# Patient Record
Sex: MALE | Race: BLACK OR AFRICAN AMERICAN | NOT HISPANIC OR LATINO | Employment: OTHER | URBAN - METROPOLITAN AREA
[De-identification: names, ages, dates, MRNs, and addresses within clinical notes are randomized per-mention and may not be internally consistent; named-entity substitution may affect disease eponyms.]

---

## 2017-01-12 PROBLEM — I10 ESSENTIAL HYPERTENSION: Status: ACTIVE | Noted: 2017-01-12

## 2017-01-12 PROBLEM — R07.9 CHEST PAIN: Status: ACTIVE | Noted: 2017-01-12

## 2017-01-15 ENCOUNTER — HOSPITAL ENCOUNTER (INPATIENT)
Facility: HOSPITAL | Age: 67
LOS: 3 days | Discharge: NURSING, REHAB OR LONG-TERM CARE | DRG: 638 | End: 2017-01-18
Attending: HOSPITALIST | Admitting: HOSPITALIST
Payer: MEDICARE

## 2017-01-15 DIAGNOSIS — M86.172 ACUTE OSTEOMYELITIS OF LEFT FOOT: ICD-10-CM

## 2017-01-15 DIAGNOSIS — N18.5 CHRONIC KIDNEY DISEASE, STAGE 5, KIDNEY FAILURE: Chronic | ICD-10-CM

## 2017-01-15 DIAGNOSIS — I10 ESSENTIAL HYPERTENSION: Chronic | ICD-10-CM

## 2017-01-15 DIAGNOSIS — M86.9 OSTEOMYELITIS: ICD-10-CM

## 2017-01-15 DIAGNOSIS — M79.89 TOE SWELLING: ICD-10-CM

## 2017-01-15 DIAGNOSIS — E87.20 METABOLIC ACIDEMIA: ICD-10-CM

## 2017-01-15 DIAGNOSIS — E11.40 DIABETIC NEUROPATHY: ICD-10-CM

## 2017-01-15 LAB
ALBUMIN SERPL BCP-MCNC: 2.7 G/DL
ALP SERPL-CCNC: 93 U/L
ALT SERPL W/O P-5'-P-CCNC: 17 U/L
ANION GAP SERPL CALC-SCNC: 12 MMOL/L
AST SERPL-CCNC: 23 U/L
BASOPHILS # BLD AUTO: 0.03 K/UL
BASOPHILS NFR BLD: 0.2 %
BILIRUB SERPL-MCNC: 0.4 MG/DL
BUN SERPL-MCNC: 41 MG/DL
CALCIUM SERPL-MCNC: 8.7 MG/DL
CHLORIDE SERPL-SCNC: 106 MMOL/L
CO2 SERPL-SCNC: 18 MMOL/L
CREAT SERPL-MCNC: 4.9 MG/DL
CRP SERPL-MCNC: 154.8 MG/L
DIFFERENTIAL METHOD: ABNORMAL
EOSINOPHIL # BLD AUTO: 0.1 K/UL
EOSINOPHIL NFR BLD: 0.5 %
ERYTHROCYTE [DISTWIDTH] IN BLOOD BY AUTOMATED COUNT: 13.3 %
ERYTHROCYTE [SEDIMENTATION RATE] IN BLOOD BY WESTERGREN METHOD: 95 MM/HR
EST. GFR  (AFRICAN AMERICAN): 13 ML/MIN/1.73 M^2
EST. GFR  (NON AFRICAN AMERICAN): 11 ML/MIN/1.73 M^2
GLUCOSE SERPL-MCNC: 144 MG/DL
HCT VFR BLD AUTO: 33.5 %
HGB BLD-MCNC: 11.1 G/DL
LYMPHOCYTES # BLD AUTO: 1.5 K/UL
LYMPHOCYTES NFR BLD: 11.9 %
MCH RBC QN AUTO: 27.7 PG
MCHC RBC AUTO-ENTMCNC: 33.1 %
MCV RBC AUTO: 84 FL
MONOCYTES # BLD AUTO: 1.2 K/UL
MONOCYTES NFR BLD: 9.5 %
NEUTROPHILS # BLD AUTO: 9.9 K/UL
NEUTROPHILS NFR BLD: 77.9 %
PLATELET # BLD AUTO: 226 K/UL
PMV BLD AUTO: 10.4 FL
POCT GLUCOSE: 132 MG/DL (ref 70–110)
POTASSIUM SERPL-SCNC: 4.4 MMOL/L
PROT SERPL-MCNC: 7.3 G/DL
RBC # BLD AUTO: 4.01 M/UL
SODIUM SERPL-SCNC: 136 MMOL/L
WBC # BLD AUTO: 12.7 K/UL

## 2017-01-15 PROCEDURE — 85651 RBC SED RATE NONAUTOMATED: CPT

## 2017-01-15 PROCEDURE — 96365 THER/PROPH/DIAG IV INF INIT: CPT

## 2017-01-15 PROCEDURE — 11000001 HC ACUTE MED/SURG PRIVATE ROOM

## 2017-01-15 PROCEDURE — 82962 GLUCOSE BLOOD TEST: CPT

## 2017-01-15 PROCEDURE — 85025 COMPLETE CBC W/AUTO DIFF WBC: CPT

## 2017-01-15 PROCEDURE — 25000003 PHARM REV CODE 250: Performed by: EMERGENCY MEDICINE

## 2017-01-15 PROCEDURE — 25000003 PHARM REV CODE 250: Performed by: PHYSICIAN ASSISTANT

## 2017-01-15 PROCEDURE — 96366 THER/PROPH/DIAG IV INF ADDON: CPT

## 2017-01-15 PROCEDURE — 80053 COMPREHEN METABOLIC PANEL: CPT

## 2017-01-15 PROCEDURE — 63600175 PHARM REV CODE 636 W HCPCS: Performed by: EMERGENCY MEDICINE

## 2017-01-15 PROCEDURE — 99284 EMERGENCY DEPT VISIT MOD MDM: CPT | Mod: 25

## 2017-01-15 PROCEDURE — 25000003 PHARM REV CODE 250: Performed by: HOSPITALIST

## 2017-01-15 PROCEDURE — 63600175 PHARM REV CODE 636 W HCPCS: Performed by: HOSPITALIST

## 2017-01-15 PROCEDURE — 86140 C-REACTIVE PROTEIN: CPT

## 2017-01-15 RX ORDER — TAMSULOSIN HYDROCHLORIDE 0.4 MG/1
0.4 CAPSULE ORAL DAILY
Status: ON HOLD | COMMUNITY
End: 2017-02-07

## 2017-01-15 RX ORDER — TAMSULOSIN HYDROCHLORIDE 0.4 MG/1
0.4 CAPSULE ORAL DAILY
Status: DISCONTINUED | OUTPATIENT
Start: 2017-01-16 | End: 2017-01-18 | Stop reason: HOSPADM

## 2017-01-15 RX ORDER — HYDRALAZINE HYDROCHLORIDE 25 MG/1
25 TABLET, FILM COATED ORAL 3 TIMES DAILY
Status: DISCONTINUED | OUTPATIENT
Start: 2017-01-15 | End: 2017-01-17

## 2017-01-15 RX ORDER — INSULIN ASPART 100 [IU]/ML
0-5 INJECTION, SOLUTION INTRAVENOUS; SUBCUTANEOUS EVERY 6 HOURS PRN
Status: DISCONTINUED | OUTPATIENT
Start: 2017-01-15 | End: 2017-01-18 | Stop reason: HOSPADM

## 2017-01-15 RX ORDER — GLUCAGON 1 MG
1 KIT INJECTION
Status: DISCONTINUED | OUTPATIENT
Start: 2017-01-15 | End: 2017-01-18 | Stop reason: HOSPADM

## 2017-01-15 RX ORDER — SUCRALFATE 1 G/10ML
1 SUSPENSION ORAL 4 TIMES DAILY
Status: DISCONTINUED | OUTPATIENT
Start: 2017-01-16 | End: 2017-01-18 | Stop reason: HOSPADM

## 2017-01-15 RX ORDER — PANTOPRAZOLE SODIUM 40 MG/1
40 TABLET, DELAYED RELEASE ORAL DAILY
Status: DISCONTINUED | OUTPATIENT
Start: 2017-01-16 | End: 2017-01-18 | Stop reason: HOSPADM

## 2017-01-15 RX ORDER — SODIUM CHLORIDE 9 MG/ML
INJECTION, SOLUTION INTRAVENOUS CONTINUOUS
Status: DISCONTINUED | OUTPATIENT
Start: 2017-01-15 | End: 2017-01-16

## 2017-01-15 RX ORDER — DILTIAZEM HYDROCHLORIDE 180 MG/1
360 CAPSULE, COATED, EXTENDED RELEASE ORAL DAILY
Status: DISCONTINUED | OUTPATIENT
Start: 2017-01-16 | End: 2017-01-18 | Stop reason: HOSPADM

## 2017-01-15 RX ORDER — HYDROCODONE BITARTRATE AND ACETAMINOPHEN 10; 325 MG/1; MG/1
1 TABLET ORAL
Status: COMPLETED | OUTPATIENT
Start: 2017-01-15 | End: 2017-01-15

## 2017-01-15 RX ORDER — MORPHINE SULFATE 2 MG/ML
2 INJECTION, SOLUTION INTRAMUSCULAR; INTRAVENOUS EVERY 4 HOURS PRN
Status: DISCONTINUED | OUTPATIENT
Start: 2017-01-15 | End: 2017-01-18 | Stop reason: HOSPADM

## 2017-01-15 RX ORDER — ONDANSETRON 2 MG/ML
4 INJECTION INTRAMUSCULAR; INTRAVENOUS EVERY 8 HOURS PRN
Status: DISCONTINUED | OUTPATIENT
Start: 2017-01-15 | End: 2017-01-18 | Stop reason: HOSPADM

## 2017-01-15 RX ORDER — ACETAMINOPHEN 325 MG/1
650 TABLET ORAL EVERY 6 HOURS PRN
Status: DISCONTINUED | OUTPATIENT
Start: 2017-01-15 | End: 2017-01-18 | Stop reason: HOSPADM

## 2017-01-15 RX ORDER — MORPHINE SULFATE 4 MG/ML
4 INJECTION, SOLUTION INTRAMUSCULAR; INTRAVENOUS EVERY 4 HOURS PRN
Status: DISCONTINUED | OUTPATIENT
Start: 2017-01-15 | End: 2017-01-18 | Stop reason: HOSPADM

## 2017-01-15 RX ADMIN — HYDRALAZINE HYDROCHLORIDE 25 MG: 25 TABLET, FILM COATED ORAL at 10:01

## 2017-01-15 RX ADMIN — SUCRALFATE 1 G: 1 SUSPENSION ORAL at 11:01

## 2017-01-15 RX ADMIN — HYDROCODONE BITARTRATE AND ACETAMINOPHEN 1 TABLET: 10; 325 TABLET ORAL at 05:01

## 2017-01-15 RX ADMIN — SODIUM CHLORIDE: 0.9 INJECTION, SOLUTION INTRAVENOUS at 10:01

## 2017-01-15 RX ADMIN — MORPHINE SULFATE 2 MG: 2 INJECTION, SOLUTION INTRAMUSCULAR; INTRAVENOUS at 10:01

## 2017-01-15 RX ADMIN — INSULIN ASPART 1 UNITS: 100 INJECTION, SOLUTION INTRAVENOUS; SUBCUTANEOUS at 11:01

## 2017-01-15 RX ADMIN — VANCOMYCIN HYDROCHLORIDE 1500 MG: 1 INJECTION, POWDER, LYOPHILIZED, FOR SOLUTION INTRAVENOUS at 05:01

## 2017-01-15 NOTE — IP AVS SNAPSHOT
[facility]  [id number] [place] [address] Dr Corona [place] [address]           I have received a copy of my After Visit Summary and discharge instructions from [facility].    INSTRUCTIONS RECEIVED AND UNDERSTOOD BY:                     Patient/Patient Representative: ________________________________________________________________     Date/Time: ________________________________________________________________                     Instructions Given By: ________________________________________________________________     Date/Time: ________________________________________________________________

## 2017-01-15 NOTE — IP AVS SNAPSHOT
[address] Dr Corona [place] [address]           Patient Discharge Instructions     Our goal is to set you up for success. This packet includes information on your condition, medications, and your home care. It will help you to care for yourself so you don't get sicker and need to go back to the hospital.     Please ask your nurse if you have any questions.        There are many details to remember when preparing to leave the hospital. Here is what you will need to do:    1. Take your medicine. If you are prescribed medications, review your Medication List in the following pages. You may have new medications to  at the pharmacy and others that you'll need to stop taking. Review the instructions for how and when to take your medications. Talk with your doctor or nurses if you are unsure of what to do.     2. Go to your follow-up appointments. Specific follow-up information is listed in the following pages. Your may be contacted by a transition nurse or clinical provider about future appointments. Be sure we have all of the phone numbers to reach you, if needed. Please contact your provider's office if you are unable to make an appointment.     3. Watch for warning signs. Your doctor or nurse will give you detailed warning signs to watch for and when to call for assistance. These instructions may also include educational information about your condition. If you experience any of warning signs to your health, call your doctor.               ** Verify the list of medication(s) below is accurate and up to date. Carry this with you in case of emergency. If your medications have changed, please notify your healthcare provider.             Medication List      START taking these medications        Additional Info                      amlodipine 5 MG tablet   Commonly known as:  NORVASC   Quantity:  30 tablet   Refills:  11   Dose:  5 mg    Start Date:  1/19/2017   Last time  this was given:  2.5 mg on 1/18/2017  8:56 AM   Instructions:  Take 1 tablet (5 mg total) by mouth once daily.     Begin Date    AM    Noon    PM    Bedtime       docusate sodium 100 MG capsule   Commonly known as:  COLACE   Refills:  0   Dose:  100 mg    Last time this was given:  100 mg on 1/18/2017  8:56 AM   Instructions:  Take 1 capsule (100 mg total) by mouth 2 (two) times daily.     Begin Date    AM    Noon    PM    Bedtime       insulin aspart 100 unit/mL Inpn pen   Commonly known as:  NovoLOG   Refills:  0   Dose:  0-5 Units    Last time this was given:  2 Units on 1/18/2017  4:56 AM   Instructions:  Inject 0-5 Units into the skin every 6 (six) hours as needed (Hyperglycemia).     Begin Date    AM    Noon    PM    Bedtime       lactulose 20 gram/30 mL Soln   Commonly known as:  CHRONULAC   Refills:  0   Dose:  30 g    Last time this was given:  10 g on 1/18/2017 11:38 AM   Instructions:  Take 45 mLs (30 g total) by mouth every 6 (six) hours as needed.     Begin Date    AM    Noon    PM    Bedtime       levoFLOXacin 750 mg/150 mL Pgbk   Commonly known as:  LEVAQUIN IN 5 % DEXTROSE   Refills:  0   Dose:  750 mg    Instructions:  Inject 150 mLs (750 mg total) into the vein every other day.     Begin Date    AM    Noon    PM    Bedtime       VANCOMYCIN 1 G/250 ML D5W (READY TO MIX SYSTEM)   Refills:  0    Last time this was given:  1,500 mg on 1/17/2017  9:52 AM   Instructions:  Consult Pharmacy at [facility] to dose     Begin Date    AM    Noon    PM    Bedtime         CHANGE how you take these medications        Additional Info                      hydrALAZINE 50 MG tablet   Commonly known as:  APRESOLINE   Quantity:  90 tablet   Refills:  11   Dose:  50 mg   What changed:    - medication strength  - how much to take    Last time this was given:  50 mg on 1/18/2017  1:50 PM   Instructions:  Take 1 tablet (50 mg total) by mouth 3 (three) times daily.     Begin Date    AM    Noon    PM    Bedtime         CONTINUE  taking these medications        Additional Info                      aspirin 81 MG Chew   Refills:  0   Dose:  81 mg    Instructions:  Take 81 mg by mouth once daily.     Begin Date    AM    Noon    PM    Bedtime       atorvastatin 40 MG tablet   Commonly known as:  LIPITOR   Refills:  0   Dose:  40 mg   Indications:  hyperlipidemia    Instructions:  Take 40 mg by mouth once daily.     Begin Date    AM    Noon    PM    Bedtime       DEXILANT 30 mg Cpdm   Refills:  0   Dose:  50 mg   Generic drug:  dexlansoprazole    Instructions:  Take 50 mg by mouth.     Begin Date    AM    Noon    PM    Bedtime       diltiaZEM 360 MG 24 hr capsule   Commonly known as:  CARDIZEM CD   Refills:  0   Dose:  360 mg    Last time this was given:  360 mg on 1/18/2017  8:56 AM   Instructions:  Take 360 mg by mouth once daily.     Begin Date    AM    Noon    PM    Bedtime       FLOMAX 0.4 mg Cp24   Refills:  0   Dose:  0.4 mg   Generic drug:  tamsulosin    Last time this was given:  0.4 mg on 1/18/2017  8:56 AM   Instructions:  Take 0.4 mg by mouth once daily.     Begin Date    AM    Noon    PM    Bedtime       ondansetron 4 MG tablet   Commonly known as:  ZOFRAN   Quantity:  10 tablet   Refills:  0   Dose:  4 mg    Instructions:  Take 1 tablet (4 mg total) by mouth every 6 (six) hours.     Begin Date    AM    Noon    PM    Bedtime       sucralfate 100 mg/mL suspension   Commonly known as:  CARAFATE   Quantity:  1 Bottle   Refills:  0   Dose:  1 g    Last time this was given:  1 g on 1/18/2017 11:33 AM   Instructions:  Take 10 mLs (1 g total) by mouth 4 (four) times daily.     Begin Date    AM    Noon    PM    Bedtime         STOP taking these medications     HUMULIN 70/30 SUBQ       pantoprazole 20 MG tablet   Commonly known as:  PROTONIX            Where to Get Your Medications      You can get these medications from any pharmacy     You don't need a prescription for these medications     docusate sodium 100 MG capsule         Information  about where to get these medications is not yet available     ! Ask your nurse or doctor about these medications     amlodipine 5 MG tablet    hydrALAZINE 50 MG tablet    insulin aspart 100 unit/mL Inpn pen    lactulose 20 gram/30 mL Soln    levoFLOXacin 750 mg/150 mL Pgbk    VANCOMYCIN 1 G/250 ML D5W (READY TO MIX SYSTEM)                  Please bring to all follow up appointments:    1. A copy of your discharge instructions.  2. All medicines you are currently taking in their original bottles.  3. Identification and insurance card.    Please arrive 15 minutes ahead of scheduled appointment time.    Please call 24 hours in advance if you must reschedule your appointment and/or time.        Follow-up Information     Please follow up.    Why:  LTAC provider in 1 day         Please follow up.    Why:  Daily random vancomycin levels         Please follow up.    Why:  BMP twice weekly on LTAC        Discharge Instructions     Future Orders    Diet general     Questions:    Total calories:      Fat restriction, if any:      Protein restriction, if any:      Na restriction, if any:      Fluid restriction:      Additional restrictions:          Primary Diagnosis     Your primary diagnosis was:  Bone Infection      Admission Information     Date & Time Provider Department [facility]    1/15/2017  5:18 PM Brendan Blake MD [facility] -  [phone]      Care Providers     Provider Role Specialty Primary office phone    Brendan Blake MD Attending Provider Internal Medicine [phone]    Bandar Gomes DPM Consulting Physician  Podiatry [phone]    Lazara Granados MD Consulting Physician  Nephrology [phone]    Brendan Blake MD Team Attending  Internal Medicine [phone]    Eder Kay MD Consulting Physician  Infectious Diseases [phone]      Important Medicare Message          Most Recent Value    Important Message from Medicare Regarding Discharge Appeal Rights  Given to  "patient/caregiver, Explained to patient/caregiver, Signed/date by patient/caregiver yes 01/18/2017 1030      Your Vitals Were     BP Pulse Temp Resp Height Weight    118/81 (BP Location: Left arm, Patient Position: Sitting, BP Method: Automatic) 97 98.9 °F (37.2 °C) (Oral) 18 6' 6" (1.981 m) 125.6 kg (277 lb)    SpO2 BMI             98% 32.01 kg/m2         Recent Lab Values     No lab values to display.      Pending Labs     Order Current Status    Blood culture Preliminary result    Blood culture Preliminary result    CBC auto differential Preliminary result      Allergies as of 1/18/2017        Reactions    Augmentin [Amoxicillin-pot Clavulanate]     Sulfa (Sulfonamide Antibiotics)       [facility]     [facility] Nurse Care Line - 24/7 Assistance  Unless otherwise directed by your provider, please contact [facility], our nurse care line that is available for 24/7 assistance.     Registered nurses in the [facility] provide clinical advisement, health education, appointment booking, and other advisory services.  Call for this free service at [phone].        Advance Directives     An advance directive is a document which, in the event you are no longer able to make decisions for yourself, tells your healthcare team what kind of treatment you do or do not want to receive, or who you would like to make those decisions for you.  If you do not currently have an advance directive, [facility] encourages you to create one.  For more information call:  [phone]), [phone] ([phone]),  or log on to [email or web address].        Language Assistance Services     ATTENTION: Language assistance services are available, free of charge. Please call [phone].      ATENCIÓN: Si habla español, tiene a espinoza disposición servicios gratuitos de asistencia lingüística. Llame al [phone].     CHÚ Ý: N?u b?n nói Ti?ng Vi?t, có các d?ch v? h? tr? ngôn ng? mi?n phí dành cho " b?n. G?i s? [phone].        Chronic Kindey Disease Education             [facility] Sign-Up     Activating your [facility] account is as easy as 1-2-3!     1) Visit [email or web address], select Sign Up Now, enter this activation code and your date of birth, then select Next.  [email or web address]  Expires: 2/26/2017  8:09 AM      2) Create a username and password to use when you visit [facility] in the future and select a security question in case you lose your password and select Next.    3) Enter your e-mail address and click Sign Up!    Additional Information  If you have questions, please e-mail [email or web address] or call [phone] to talk to our [facility] staff. Remember, [facility] is NOT to be used for urgent needs. For medical emergencies, dial 911.          [facility] complies with applicable Federal civil rights laws and does not discriminate on the basis of race, color, national origin, age, disability, or sex.

## 2017-01-15 NOTE — ED PROVIDER NOTES
"SCRIBE #1 NOTE: I, Santo Bowen, am scribing for, and in the presence of, ANISHA Chang. I have scribed the entire note.      History      Chief Complaint   Patient presents with    Toe Pain     Pt states, "My second toe on the left foot is swollen and hurting".       Review of patient's allergies indicates:   Allergen Reactions    Augmentin [amoxicillin-pot clavulanate]     Sulfa (sulfonamide antibiotics)         HPI   HPI    1/15/2017, 5:22 PM   History obtained from the patient      History of Present Illness: Conner Perez III is a 66 y.o. male patient who presents to the Emergency Department for toe swelling which onset gradually 1 day ago. Symptoms are constant and moderate in severity. The toe swelling is located to the 2nd toe of the left foot. Pt reports having toe pain along with the swelling that radiates up the lower leg. Pt states the pain is a 7/10. Pt has decreased sensation below the ankle. No mitigating or exacerbating factors reported. No associated sx reported. Patient denies any gait problem, fever, chills, n/v/d, CP, SOB, HA,, and all other sxs at this time. No further complaints or concerns at this time.     Arrival mode: Personal vehicle    PCP: Racile Angela MD       Past Medical History:  Past Medical History   Diagnosis Date    Diabetes mellitus     HTN (hypertension)     Renal disorder      CKD 3       Past Surgical History:  History reviewed. No pertinent past surgical history.      Family History:  Family History   Problem Relation Age of Onset    Hypertension Father        Social History:  Social History     Social History Main Topics    Smoking status: Never Smoker    Smokeless tobacco: Unknown    Alcohol use No    Drug use: No    Sexual activity: Unknown       ROS   Review of Systems   Constitutional: Negative for chills and fever.   HENT: Negative for sore throat.    Respiratory: Negative for shortness of breath.    Cardiovascular: Negative for chest pain. "   Gastrointestinal: Negative for diarrhea, nausea and vomiting.   Genitourinary: Negative for dysuria.   Musculoskeletal: Positive for joint swelling (left 2nd toe). Negative for back pain and gait problem.        (+) left 2nd toe pain   Skin: Negative for rash.   Neurological: Negative for weakness and headaches.   Hematological: Does not bruise/bleed easily.       Physical Exam    Initial Vitals   BP Pulse Resp Temp SpO2   01/15/17 1643 01/15/17 1643 01/15/17 1643 01/15/17 1643 01/15/17 1643   149/75 105 20 98.1 °F (36.7 °C) 95 %      Physical Exam  Nursing Notes and Vital Signs Reviewed.  Constitutional: Patient is in no acute distress. Awake and alert. Well-developed and well-nourished.  Head: Atraumatic. Normocephalic.  Eyes: PERRL. EOM intact. Conjunctivae are not pale. No scleral icterus.  ENT: Mucous membranes are moist. Oropharynx is clear and symmetric.    Neck: Supple. Full ROM. No lymphadenopathy.  Cardiovascular: Regular rate. Regular rhythm. No murmurs, rubs, or gallops. Distal pulses are 2+ and symmetric.  Pulmonary/Chest: No respiratory distress. Clear to auscultation bilaterally. No wheezing, rales, or rhonchi.  Abdominal: Soft and non-distended.  There is no tenderness.  No rebound, guarding, or rigidity.  Good bowel sounds.    Musculoskeletal: Moves all extremities. No obvious deformities. No edema. No calf tenderness.  Left Ankle:  No obvious deformity. There is swelling to the left 2nd digit.  No pain with palpation. No bony tenderness over navicular.  No tenderness over the base of the 5th metatarsal.  No proximal fibular tenderness. He is able to bear weight.   Ankle dorsiflexion and ankle plantar flexion are intact.  Intact sensation to light touch. PT and DP pulses are 2+ bilaterally. Unable to determine capillary refill due to fungus on the toenail. No open sores. Erythema to the dorsal aspect of the left foot. No pitting edema through the lower leg and foot. Pt has decreased sensation  "below the ankle at baseline.  Skin: Warm and dry.  Neurological:  Alert, awake, and appropriate.  Normal speech.  No acute focal neurological deficits are appreciated.  Psychiatric: Normal affect. Good eye contact. Appropriate in content.    ED Course    Procedures  ED Vital Signs:  Vitals:    01/15/17 1643 01/15/17 1939 01/15/17 2121   BP: (!) 149/75 (!) 144/74 (!) 154/58   Pulse: 105 92 92   Resp: 20  18   Temp: 98.1 °F (36.7 °C) 98.2 °F (36.8 °C)    TempSrc: Oral Oral    SpO2: 95% 99% 99%   Weight: 129.3 kg (285 lb)     Height: 6' 6" (1.981 m)         Abnormal Lab Results:  Labs Reviewed   CBC W/ AUTO DIFFERENTIAL - Abnormal; Notable for the following:        Result Value    RBC 4.01 (*)     Hemoglobin 11.1 (*)     Hematocrit 33.5 (*)     Gran # 9.9 (*)     Mono # 1.2 (*)     Gran% 77.9 (*)     Lymph% 11.9 (*)     All other components within normal limits   COMPREHENSIVE METABOLIC PANEL - Abnormal; Notable for the following:     CO2 18 (*)     Glucose 144 (*)     BUN, Bld 41 (*)     Creatinine 4.9 (*)     Albumin 2.7 (*)     eGFR if  13 (*)     eGFR if non  11 (*)     All other components within normal limits   SEDIMENTATION RATE, MANUAL - Abnormal; Notable for the following:     Sed Rate 95 (*)     All other components within normal limits   C-REACTIVE PROTEIN - Abnormal; Notable for the following:     .8 (*)     All other components within normal limits   POCT GLUCOSE - Abnormal; Notable for the following:     POCT Glucose 132 (*)     All other components within normal limits   POCT GLUCOSE MONITORING CONTINUOUS        All Lab Results:  Results for orders placed or performed during the hospital encounter of 01/15/17   CBC auto differential   Result Value Ref Range    WBC 12.70 3.90 - 12.70 K/uL    RBC 4.01 (L) 4.60 - 6.20 M/uL    Hemoglobin 11.1 (L) 14.0 - 18.0 g/dL    Hematocrit 33.5 (L) 40.0 - 54.0 %    MCV 84 82 - 98 fL    MCH 27.7 27.0 - 31.0 pg    MCHC 33.1 32.0 - " 36.0 %    RDW 13.3 11.5 - 14.5 %    Platelets 226 150 - 350 K/uL    MPV 10.4 9.2 - 12.9 fL    Gran # 9.9 (H) 1.8 - 7.7 K/uL    Lymph # 1.5 1.0 - 4.8 K/uL    Mono # 1.2 (H) 0.3 - 1.0 K/uL    Eos # 0.1 0.0 - 0.5 K/uL    Baso # 0.03 0.00 - 0.20 K/uL    Gran% 77.9 (H) 38.0 - 73.0 %    Lymph% 11.9 (L) 18.0 - 48.0 %    Mono% 9.5 4.0 - 15.0 %    Eosinophil% 0.5 0.0 - 8.0 %    Basophil% 0.2 0.0 - 1.9 %    Differential Method Automated    Comprehensive metabolic panel   Result Value Ref Range    Sodium 136 136 - 145 mmol/L    Potassium 4.4 3.5 - 5.1 mmol/L    Chloride 106 95 - 110 mmol/L    CO2 18 (L) 23 - 29 mmol/L    Glucose 144 (H) 70 - 110 mg/dL    BUN, Bld 41 (H) 8 - 23 mg/dL    Creatinine 4.9 (H) 0.5 - 1.4 mg/dL    Calcium 8.7 8.7 - 10.5 mg/dL    Total Protein 7.3 6.0 - 8.4 g/dL    Albumin 2.7 (L) 3.5 - 5.2 g/dL    Total Bilirubin 0.4 0.1 - 1.0 mg/dL    Alkaline Phosphatase 93 55 - 135 U/L    AST 23 10 - 40 U/L    ALT 17 10 - 44 U/L    Anion Gap 12 8 - 16 mmol/L    eGFR if African American 13 (A) >60 mL/min/1.73 m^2    eGFR if non African American 11 (A) >60 mL/min/1.73 m^2   Sedimentation rate, manual   Result Value Ref Range    Sed Rate 95 (H) 0 - 10 mm/Hr   C-reactive protein   Result Value Ref Range    .8 (H) 0.0 - 8.2 mg/L   POCT glucose   Result Value Ref Range    POCT Glucose 132 (H) 70 - 110 mg/dL         Imaging Results:  Imaging Results         X-Ray Foot Complete Left (Final result) Result time:  01/15/17 18:20:21    Final result by Iglesia Isaac Jr., MD (01/15/17 18:20:21)    Impression:         Osteomyelitis changes involving the 2nd toe      Electronically signed by: IGLESIA ISAAC MD  Date:     01/15/17  Time:    18:20     Narrative:    EXAM:   XR FOOT COMPLETE 3 VIEW LEFT    CLINICAL HISTORY:    Other specified soft tissue disorders; E11.40 Type 2 diabetes mellitus with diabetic neuropathy, unspecified;.    COMPARISON:  None    FINDINGS:   With significant soft tissue swelling of the 2nd toe.   Erosive changes involving the distal aspect of the distal phalanx of the 2nd toe, consistent with osteomyelitis.                      The Emergency Provider reviewed the vital signs and test results, which are outlined above.    ED Discussion     9:06 PM: Discussed case with Dr. Muñoz ([facility]). Dr. Muñoz agrees with current care and management of pt and accepts admission. Dr. Muñoz will go and see the patient.    9:06 PM: Re-evaluated pt. I have discussed test results, shared treatment plan, and the need for admission with patient and family at bedside. Pt and family express understanding at this time and agree with all information. All questions answered. Pt and family have no further questions or concerns at this time. Pt is ready for admit.          ED Medication(s):  Medications   vancomycin (VANCOCIN) 1,500 mg in dextrose 5 % 250 mL IVPB (1,500 mg Intravenous Trough Due 30 minutes Before Dose 1/17/17 0430)   pantoprazole EC tablet 40 mg (not administered)   diltiaZEM 24 hr capsule 360 mg (not administered)   hydrALAZINE tablet 25 mg (not administered)   sucralfate 100 mg/mL suspension 1 g (not administered)   dextrose 50% injection 12.5 g (not administered)   glucagon (human recombinant) injection 1 mg (not administered)   insulin aspart pen 0-5 Units (not administered)   acetaminophen tablet 650 mg (not administered)   ondansetron injection 4 mg (not administered)   morphine injection 2 mg (not administered)   morphine injection 4 mg (not administered)   0.9%  NaCl infusion (not administered)   hydrocodone-acetaminophen 10-325mg per tablet 1 tablet (1 tablet Oral Given 1/15/17 1744)   vancomycin (VANCOCIN) 1,500 mg in dextrose 5 % 250 mL IVPB (0 mg Intravenous Stopped 1/15/17 1956)       New Prescriptions    No medications on file             Medical Decision Making    Medical Decision Making:   Clinical Tests:   Lab Tests: Ordered and Reviewed  Radiological Study: Reviewed and Ordered            Scribe Attestation:   Scribe #1: I performed the above scribed service and the documentation accurately describes the services I performed. I attest to the accuracy of the note.    Attending:   Physician Attestation Statement for Scribe #1: I, ANISHA Chang, personally performed the services described in this documentation, as scribed by Santo Bowen, in my presence, and it is both accurate and complete.          Clinical Impression       ICD-10-CM ICD-9-CM   1. Diabetic neuropathy E11.40 250.60     357.2   2. Toe swelling M79.89 729.81   3. Osteomyelitis M86.9 730.20       Disposition:   Disposition: Admitted  Condition: Fair         ANISHA Guy  01/15/17 2133       ANISHA Guy  01/15/17 2134

## 2017-01-16 PROBLEM — E87.20 METABOLIC ACIDEMIA: Status: ACTIVE | Noted: 2017-01-16

## 2017-01-16 LAB
POCT GLUCOSE: 107 MG/DL (ref 70–110)
POCT GLUCOSE: 135 MG/DL (ref 70–110)
POCT GLUCOSE: 195 MG/DL (ref 70–110)
POCT GLUCOSE: 206 MG/DL (ref 70–110)
POCT GLUCOSE: 270 MG/DL (ref 70–110)

## 2017-01-16 PROCEDURE — 99223 1ST HOSP IP/OBS HIGH 75: CPT | Mod: ,,, | Performed by: INTERNAL MEDICINE

## 2017-01-16 PROCEDURE — 63600175 PHARM REV CODE 636 W HCPCS: Performed by: HOSPITALIST

## 2017-01-16 PROCEDURE — 96372 THER/PROPH/DIAG INJ SC/IM: CPT

## 2017-01-16 PROCEDURE — 11000001 HC ACUTE MED/SURG PRIVATE ROOM

## 2017-01-16 PROCEDURE — 87040 BLOOD CULTURE FOR BACTERIA: CPT | Mod: 59

## 2017-01-16 PROCEDURE — 25000003 PHARM REV CODE 250: Performed by: HOSPITALIST

## 2017-01-16 RX ORDER — MOXIFLOXACIN HYDROCHLORIDE 400 MG/250ML
400 INJECTION, SOLUTION INTRAVENOUS
Status: DISCONTINUED | OUTPATIENT
Start: 2017-01-16 | End: 2017-01-18 | Stop reason: HOSPADM

## 2017-01-16 RX ADMIN — HYDRALAZINE HYDROCHLORIDE 25 MG: 25 TABLET, FILM COATED ORAL at 03:01

## 2017-01-16 RX ADMIN — DILTIAZEM HYDROCHLORIDE 360 MG: 180 CAPSULE, COATED, EXTENDED RELEASE ORAL at 09:01

## 2017-01-16 RX ADMIN — MOXIFLOXACIN HYDROCHLORIDE 400 MG: 400 INJECTION, SOLUTION INTRAVENOUS at 06:01

## 2017-01-16 RX ADMIN — HYDRALAZINE HYDROCHLORIDE 25 MG: 25 TABLET, FILM COATED ORAL at 06:01

## 2017-01-16 RX ADMIN — PANTOPRAZOLE SODIUM 40 MG: 40 TABLET, DELAYED RELEASE ORAL at 09:01

## 2017-01-16 RX ADMIN — SUCRALFATE 1 G: 1 SUSPENSION ORAL at 06:01

## 2017-01-16 RX ADMIN — TAMSULOSIN HYDROCHLORIDE 0.4 MG: 0.4 CAPSULE ORAL at 09:01

## 2017-01-16 RX ADMIN — SUCRALFATE 1 G: 1 SUSPENSION ORAL at 12:01

## 2017-01-16 RX ADMIN — HYDRALAZINE HYDROCHLORIDE 25 MG: 25 TABLET, FILM COATED ORAL at 09:01

## 2017-01-16 RX ADMIN — SODIUM CHLORIDE: 0.9 INJECTION, SOLUTION INTRAVENOUS at 06:01

## 2017-01-16 RX ADMIN — INSULIN ASPART 3 UNITS: 100 INJECTION, SOLUTION INTRAVENOUS; SUBCUTANEOUS at 06:01

## 2017-01-16 NOTE — PLAN OF CARE
CM met with patient at the bedside to assess for discharge needs.  Patient states that course of treatment is still being determined.  Patient states he may need IV antibiotics.  Patient does not have preference for IV provider or HH.  Choice form completed and placed in chart.      01/16/17 8593   Discharge Assessment   Assessment Type Discharge Planning Assessment   Confirmed/corrected address and phone number on facesheet? Yes   Assessment information obtained from? Patient   Expected Length of Stay (days) (1-2)   Communicated expected length of stay with patient/caregiver yes   Prior to hospitilization cognitive status: Alert/Oriented   Prior to hospitalization functional status: Independent   Current cognitive status: Alert/Oriented   Current Functional Status: Independent   Arrived From admitted as an inpatient;home or self-care   Lives With child(kris), adult   Able to Return to Prior Arrangements yes   Is patient able to care for self after discharge? Yes   How many people do you have in your home that can help with your care after discharge? 1   Who are your caregiver(s) and their phone number(s)? Corey Soto, daughter [phone]   Patient's perception of discharge disposition admitted as an inpatient;home health;other (comments)  (Possibly IV antibiotics)   Readmission Within The Last 30 Days no previous admission in last 30 days   Patient currently being followed by outpatient case management? No   Patient currently receives home health services? No   Does the patient currently use HME? No   Patient currently receives private duty nursing? No   Patient currently receives any other outside agency services? No   Equipment Currently Used at Home none   Do you have any problems affording any of your prescribed medications? No   Is the patient taking medications as prescribed? yes   Do you have any financial concerns preventing you from receiving the healthcare you need? No   Does the patient have  transportation to healthcare appointments? Yes   Transportation Available car   On Dialysis? No   Does the patient receive services at the [facility]? No   Are there any open cases? No   Discharge Plan A Home Health   Discharge Plan B Home Health   Patient/Family In Agreement With Plan yes

## 2017-01-16 NOTE — NURSING
Patient assessed for diabetes educational needs following chart review  He reports was diagnosed over 10 years ago  Has a home glucose meter and checks 2 times daily with  a verages 160-170  He is consistent with taking his insulin using the insulin pen  He reports correct insulin storage and site selection/rotation  Reviewed info on target glucose values/hyper/hypoglycemia/diabetic foot care  He does not identify any diabetes educational needs at this time

## 2017-01-16 NOTE — SUBJECTIVE & OBJECTIVE
Interval History: no major events.     Review of Systems   Constitutional: Negative for activity change and fatigue.   HENT: Negative for congestion and hearing loss.    Cardiovascular: Negative for chest pain and leg swelling.   Musculoskeletal:        Lt 2nd toe swelling      Objective:     Vital Signs (Most Recent):  Temp: 98.5 °F (36.9 °C) (01/16/17 1537)  Pulse: 91 (01/16/17 1537)  Resp: 18 (01/16/17 1537)  BP: (!) 153/71 (01/16/17 1537)  SpO2: 95 % (01/16/17 1537) Vital Signs (24h Range):  Temp:  [98 °F (36.7 °C)-98.7 °F (37.1 °C)] 98.5 °F (36.9 °C)  Pulse:  [] 91  Resp:  [18-20] 18  SpO2:  [93 %-99 %] 95 %  BP: (126-160)/(58-85) 153/71     Weight: 125.6 kg (277 lb)  Body mass index is 32.01 kg/(m^2).    Intake/Output Summary (Last 24 hours) at 01/16/17 1609  Last data filed at 01/16/17 1500   Gross per 24 hour   Intake          1180.42 ml   Output             2075 ml   Net          -894.58 ml      Physical Exam   Constitutional: He appears well-developed and well-nourished.   HENT:   Head: Normocephalic and atraumatic.   Eyes: Conjunctivae are normal. Pupils are equal, round, and reactive to light. No scleral icterus.   Neck: Normal range of motion. Neck supple. No JVD present.   Cardiovascular: Normal rate and regular rhythm.    Pulmonary/Chest: Effort normal and breath sounds normal.   Abdominal: Soft. Bowel sounds are normal. He exhibits no distension. There is no tenderness.   Genitourinary:   Genitourinary Comments: Deferred    Musculoskeletal: Normal range of motion. He exhibits edema.   Left 2nd toe swollen   Skin: Skin is warm and dry. There is erythema.   Erythema left 2nd toe   Psychiatric: He has a normal mood and affect. His behavior is normal.       Significant Labs:   BMP:   Recent Labs  Lab 01/15/17  1743   *      K 4.4      CO2 18*   BUN 41*   CREATININE 4.9*   CALCIUM 8.7     CBC:   Recent Labs  Lab 01/14/17 2047 01/15/17  1743   WBC 13.72* 12.70   HGB 10.9* 11.1*    HCT 32.6* 33.5*    226       Significant Imaging: I have reviewed and interpreted all pertinent imaging results/findings within the past 24 hours.

## 2017-01-16 NOTE — PLAN OF CARE
Problem: Patient Care Overview  Goal: Plan of Care Review  Outcome: Ongoing (interventions implemented as appropriate)  Patient AAO and VSS. Remains free of injury. Fall precautions maintained with bed low and wheels locked.  IVF administered as ordered. Pain adequately managed with prn meds. Glucose of 206 covered with 1 unit insulin aspart per sliding scale. Chart review for 24 hours completed. Will continue to monitor till discharge.

## 2017-01-16 NOTE — ED NOTES
Dr. Muñoz, hospitals medicine, discussed admission with pt. Educated pt on POC. Questions answered. Pt verbalizes understanding. No further complaints or concerns. Pt ready for transport.

## 2017-01-16 NOTE — CONSULTS
Clinical Pharmacy: Vancomycin Consult Note    Pharmacy consulted to dose vancomycin by Dr. Germain in ER    IBW = 91.4 kg  ABW = 129.3 kg  Adjusted weight = 106.6 kg --> use for dosing  SCr = 4.9 --> will PULSE DOSE    Gave pt 1500mg (15 mg/kg x adj weight) in ER  1500mg q48h ordered as placeholder dose   Random level due 1/17 @ 0430 with AM labs    WBC = 12.70  Tmax = 98.1  No cultures drawn  ER admitting complaint is toe pain  Goal trough: 10-15 mcg/ml     Thank you for allowing us to participate in this patient's care.  Katherine McArdle, Pharm.D. 1/15/2017 7:29 PM

## 2017-01-16 NOTE — SUBJECTIVE & OBJECTIVE
Past Medical History   Diagnosis Date    Diabetes mellitus     HTN (hypertension)     Renal disorder      CKD 3       History reviewed. No pertinent past surgical history.    Review of patient's allergies indicates:   Allergen Reactions    Augmentin [amoxicillin-pot clavulanate]     Sulfa (sulfonamide antibiotics)        Current Facility-Administered Medications on File Prior to Encounter   Medication    [COMPLETED] ondansetron injection 4 mg     Current Outpatient Prescriptions on File Prior to Encounter   Medication Sig    aspirin 81 MG Chew Take 81 mg by mouth once daily.    dexlansoprazole (DEXILANT) 30 mg CpDM Take 50 mg by mouth.    diltiaZEM (CARDIZEM CD) 360 MG 24 hr capsule Take 360 mg by mouth once daily.    hydrALAZINE (APRESOLINE) 25 MG tablet Take 25 mg by mouth 3 (three) times daily.    INSULIN NPH HUM/REG INSULIN HM (HUMULIN 70/30 SUBQ) Inject 30 Units into the skin 2 (two) times daily.    ondansetron (ZOFRAN) 4 MG tablet Take 1 tablet (4 mg total) by mouth every 6 (six) hours.    pantoprazole (PROTONIX) 20 MG tablet Take 1 tablet (20 mg total) by mouth once daily.    sucralfate (CARAFATE) 100 mg/mL suspension Take 10 mLs (1 g total) by mouth 4 (four) times daily.     Family History     None        Social History Main Topics    Smoking status: Never Smoker    Smokeless tobacco: Not on file    Alcohol use No    Drug use: No    Sexual activity: Not on file     Review of Systems   Complete 14 point review of systems done and negative except per HPI  Objective:     Vital Signs (Most Recent):  Temp: 98.2 °F (36.8 °C) (01/15/17 1939)  Pulse: 92 (01/15/17 1939)  Resp: 20 (01/15/17 1643)  BP: (!) 144/74 (01/15/17 1939)  SpO2: 99 % (01/15/17 1939) Vital Signs (24h Range):  Temp:  [98.1 °F (36.7 °C)-98.2 °F (36.8 °C)] 98.2 °F (36.8 °C)  Pulse:  [] 92  Resp:  [19-22] 20  SpO2:  [95 %-99 %] 99 %  BP: (144-169)/(74-81) 144/74     Weight: 129.3 kg (285 lb)  Body mass index is 32.94  kg/(m^2).    Physical Exam   Constitutional: He appears well-developed and well-nourished.   HENT:   Head: Normocephalic and atraumatic.   Eyes: Conjunctivae are normal. Pupils are equal, round, and reactive to light. No scleral icterus.   Neck: Normal range of motion. Neck supple. No JVD present.   Cardiovascular: Normal rate and regular rhythm.    Pulmonary/Chest: Effort normal and breath sounds normal.   Abdominal: Soft. Bowel sounds are normal. He exhibits no distension. There is no tenderness.   Genitourinary:   Genitourinary Comments: Deferred    Musculoskeletal: Normal range of motion. He exhibits edema.   Left 2nd toe swollen   Skin: Skin is warm and dry. There is erythema.   Erythema left 2nd toe   Psychiatric: He has a normal mood and affect. His behavior is normal.        Significant Labs:   CBC:   Recent Labs  Lab 01/14/17  2047 01/15/17  1743   WBC 13.72* 12.70   HGB 10.9* 11.1*   HCT 32.6* 33.5*    226     CMP:   Recent Labs  Lab 01/14/17  2047 01/15/17  1743    136   K 4.8 4.4    106   CO2 16* 18*   * 144*   BUN 41* 41*   CREATININE 4.9* 4.9*   CALCIUM 8.6* 8.7   PROT 7.7 7.3   ALBUMIN 3.0* 2.7*   BILITOT 0.4 0.4   ALKPHOS 90 93   AST 23 23   ALT 18 17   ANIONGAP 13 12   EGFRNONAA 11* 11*     ESR 95  .8        Significant Imaging: Left Foot Xray - Osteomyelitis changes involving the 2nd toe

## 2017-01-16 NOTE — CONSULTS
Nephrology consult note:  Date of consult: 1/16/17  Reason for consult: chronic kidney disease  Referring physician: 1/16/17    Conner Perez III is a 66 y.o. male for whom nephrology consult has been requested to evaluate and give opinion.     HPI: Pt was seen and examined. Chart and h/o were reviewed. Pt is a 67 y/o man with a h/o of CKD stage 5, HTN, DM since 1992, who presented with left second toe swelling x 1 wks. Pt does not recall if he had bumped the foot against any objects. Imaging studies suggest he has osteomyelitis in that toe. ESRD and CPR are both very high. Review of labs show that his Cr about a year ago was close to 2.5. Cr in the past few months has been steady close to 5.0. Pt follows with Dr. Rhodes from [facility] who has talked to the pt about long term dialysis. Pt has an appt wit Dr. Rhodes in about a wk. He has no acute c/o's today, no CP, no SOB, no fever. He says that his affected foot is not hurting.    PAST MEDICAL HISTORY:  He  has a past medical history of Diabetes mellitus; HTN (hypertension); and CKD stage 5..    PAST SURGICAL HISTORY:  He  has no past surgical history on file.    SOCIAL HISTORY:  He  reports that he has never smoked. He does not have any smokeless tobacco history on file. He reports that he does not drink alcohol or use illicit drugs.    FAMILY MEDICAL HISTORY:  His family history includes Hypertension in his father.    Review of patient's allergies indicates:   Allergen Reactions    Augmentin [amoxicillin-pot clavulanate]     Sulfa (sulfonamide antibiotics)         diltiaZEM  360 mg Oral Daily    hydrALAZINE  25 mg Oral TID    pantoprazole  40 mg Oral Daily    sucralfate  1 g Oral QID    tamsulosin  0.4 mg Oral Daily    [START ON 1/17/2017] vancomycin (VANCOCIN) IVPB  1,500 mg Intravenous Q48H       Prior to Admission medications    Medication Sig Start Date End Date Taking? Authorizing Provider   aspirin 81 MG Chew Take 81 mg by mouth once daily.    "Yes Historical Provider, MD   diltiaZEM (CARDIZEM CD) 360 MG 24 hr capsule Take 360 mg by mouth once daily.   Yes Historical Provider, MD   hydrALAZINE (APRESOLINE) 25 MG tablet Take 25 mg by mouth 3 (three) times daily.   Yes Historical Provider, MD   INSULIN NPH HUM/REG INSULIN HM (HUMULIN 70/30 SUBQ) Inject 30 Units into the skin 2 (two) times daily.   Yes Historical Provider, MD   ondansetron (ZOFRAN) 4 MG tablet Take 1 tablet (4 mg total) by mouth every 6 (six) hours. 1/14/17  Yes Alcon Henderson MD   sucralfate (CARAFATE) 100 mg/mL suspension Take 10 mLs (1 g total) by mouth 4 (four) times daily. 1/12/17  Yes Loyd Germain MD   tamsulosin (FLOMAX) 0.4 mg Cp24 Take 0.4 mg by mouth once daily.   Yes Historical Provider, MD   dexlansoprazole (DEXILANT) 30 mg CpDM Take 50 mg by mouth.    Historical Provider, MD   pantoprazole (PROTONIX) 20 MG tablet Take 1 tablet (20 mg total) by mouth once daily. 1/12/17 1/12/18  Loyd Germain MD        REVIEW OF SYSTEMS:  Patient has no fever, fatigue, visual changes, chest pain, edema, cough, dyspnea, nausea, vomiting, constipation, diarrhea, arthralgias, pruritis, dizziness, weakness, depression, confusion.      Intake/Output Summary (Last 24 hours) at 01/16/17 7864  Last data filed at 01/16/17 0245   Gross per 24 hour   Intake                0 ml   Output              600 ml   Net             -600 ml       PHYSICAL EXAM:   height is 6' 6" (1.981 m) and weight is 125.6 kg (277 lb). His oral temperature is 98.7 °F (37.1 °C). His blood pressure is 126/69 and his pulse is 95. His respiration is 18 and oxygen saturation is 95%.   Gen: WDWN male in no apparent distress  Psych: Normal mood and affect  Skin: No rashes or ulcers  Eyes: Normal conjunctiva and lids, PERRLA  ENT: Normal hearing   Neck: No JVD  Chest: Clear with no rales, rhonchi, wheezing with normal effort  CV: Regular with no murmurs, gallops or rubs  Abd: Soft, nontender, no distension, positive bowel " sounds  Ext: No cyanosis, clubbing or edema, L foot exam shows a large, edematous 2nd toe      Recent Labs  Lab 01/12/17  0300 01/14/17  2047 01/15/17  1743    136 136   K 4.1 4.8 4.4    107 106   CO2 20* 16* 18*   BUN 39* 41* 41*   CREATININE 5.0* 4.9* 4.9*   CALCIUM 8.2* 8.6* 8.7       Recent Labs  Lab 01/12/17  0300 01/14/17  2047 01/15/17  1743   WBC 9.71 13.72* 12.70   HGB 10.9* 10.9* 11.1*   HCT 32.4* 32.6* 33.5*    254 226     ESRD 95      Foot Xray:  With significant soft tissue swelling of the 2nd toe.  Erosive changes involving the distal aspect of the distal phalanx of the 2nd toe, consistent with osteomyelitis.      IMPRESSION AND RECOMMENDATIONS:  65 y/o man with advanced CKD stage 5 was admitted for toe osteomyelitis    1. Renal: CKD stage 5.   Renal function appears stable, not worsened compared to baseline on this admit.  Comparing labs, s C r is worse from a year ago.  Pt has his own nephrologist, whom he sees as outpt (Dr. Rhodes)  K normal  Metabolic acidosis    2. HTN: BP controlled.  Meds reviewed    3. ID: elevated ESR and CRP.  Foot xray findings c/w osteomyelitis.  Abx reviewed: On vancomycin. Pharmacy is dosing it.  Recommend maintain a therapeutic window of 15-20.  Also, please consider adding an abx for Gram negative coverage, will defer to PCP.    4. DM: long standing. Since 1992.  Likely has diabetic nephropathy and vasculopathy.    Plans and recommendations:  As discussed above  Thank you for the consult.  Total time spent 70 minutes including time needed to review the records, the   patient evaluation, documentation, face-to-face discussion with the patient,   more than 50% of the time was spent on coordination of care and counseling.    Level V visit.    Lazara Granados MD

## 2017-01-16 NOTE — PROGRESS NOTES
[facility] Hospital Medicine  Progress Note    Patient Name: Conner Perez III  MRN: [id number]  Patient Class: IP- Inpatient   Admission Date: 1/15/2017  Length of Stay: 1 days  Attending Physician: Brendan Blake, *  Primary Care Provider: Raciel Angela MD        Subjective:     Principal Problem:Acute osteomyelitis of left foot    HPI:  66 year old male with h/o CKD stage 5 and IDDM presenrts complaining of left 2nd toe swelling and pain since yesterday.  Denies any fever, chills or sweats.  Denies any cuts or insect bites.  Followed by Dr. Rhodes for CKD.    Hospital Course:  - Has no complaints, been evaluated by nephrology, patient on IV Abx, waiting for podiatry input.     Interval History: no major events.     Review of Systems   Constitutional: Negative for activity change and fatigue.   HENT: Negative for congestion and hearing loss.    Cardiovascular: Negative for chest pain and leg swelling.   Musculoskeletal:        Lt 2nd toe swelling      Objective:     Vital Signs (Most Recent):  Temp: 98.5 °F (36.9 °C) (01/16/17 1537)  Pulse: 91 (01/16/17 1537)  Resp: 18 (01/16/17 1537)  BP: (!) 153/71 (01/16/17 1537)  SpO2: 95 % (01/16/17 1537) Vital Signs (24h Range):  Temp:  [98 °F (36.7 °C)-98.7 °F (37.1 °C)] 98.5 °F (36.9 °C)  Pulse:  [] 91  Resp:  [18-20] 18  SpO2:  [93 %-99 %] 95 %  BP: (126-160)/(58-85) 153/71     Weight: 125.6 kg (277 lb)  Body mass index is 32.01 kg/(m^2).    Intake/Output Summary (Last 24 hours) at 01/16/17 1609  Last data filed at 01/16/17 1500   Gross per 24 hour   Intake          1180.42 ml   Output             2075 ml   Net          -894.58 ml      Physical Exam   Constitutional: He appears well-developed and well-nourished.   HENT:   Head: Normocephalic and atraumatic.   Eyes: Conjunctivae are normal. Pupils are equal, round, and reactive to light. No scleral icterus.   Neck: Normal range of motion. Neck supple. No JVD present.   Cardiovascular: Normal  rate and regular rhythm.    Pulmonary/Chest: Effort normal and breath sounds normal.   Abdominal: Soft. Bowel sounds are normal. He exhibits no distension. There is no tenderness.   Genitourinary:   Genitourinary Comments: Deferred    Musculoskeletal: Normal range of motion. He exhibits edema.   Left 2nd toe swollen   Skin: Skin is warm and dry. There is erythema.   Erythema left 2nd toe   Psychiatric: He has a normal mood and affect. His behavior is normal.       Significant Labs:   BMP:   Recent Labs  Lab 01/15/17  1743   *      K 4.4      CO2 18*   BUN 41*   CREATININE 4.9*   CALCIUM 8.7     CBC:   Recent Labs  Lab 01/14/17  2047 01/15/17  1743   WBC 13.72* 12.70   HGB 10.9* 11.1*   HCT 32.6* 33.5*    226       Significant Imaging: I have reviewed and interpreted all pertinent imaging results/findings within the past 24 hours.    Assessment/Plan:      * Acute osteomyelitis of left foot  On IV Vancomycin with Hx of DM will broaden the coverage with IV avelox.     Essential hypertension  Resumed home meds; hydralazine prn    Plan: continue with the current medications, monitor and adjust as needed    Chronic kidney disease, stage 5, kidney failure  Stable, appreciate nephrology input    Osteomyelitis        Metabolic acidemia        VTE Risk Mitigation         Ordered     Place sequential compression device  Until discontinued      01/15/17 2100          Brendan Blake MD  Department of Hospital Medicine   [facility]

## 2017-01-16 NOTE — H&P
[facility] Hospital Medicine  History & Physical    Patient Name: Conner Perez III  MRN: [id number]  Admission Date: 1/15/2017  Attending Physician: Margie Muñoz MD   Primary Care Provider: Raciel Angela MD         Patient information was obtained from patient and ER records.     Subjective:     Principal Problem:Acute osteomyelitis of left foot    Chief Complaint:  Toe pain     HPI: 66 year old male with h/o CKD stage 5 and IDDM presenrts complaining of left 2nd toe swelling and pain since yesterday.  Denies any fever, chills or sweats.  Denies any cuts or insect bites.  Followed by Dr. Rhodes for CKD.    Past Medical History   Diagnosis Date    Diabetes mellitus     HTN (hypertension)     Renal disorder      CKD 3       History reviewed. No pertinent past surgical history.    Review of patient's allergies indicates:   Allergen Reactions    Augmentin [amoxicillin-pot clavulanate]     Sulfa (sulfonamide antibiotics)        Current Facility-Administered Medications on File Prior to Encounter   Medication    [COMPLETED] ondansetron injection 4 mg     Current Outpatient Prescriptions on File Prior to Encounter   Medication Sig    aspirin 81 MG Chew Take 81 mg by mouth once daily.    dexlansoprazole (DEXILANT) 30 mg CpDM Take 50 mg by mouth.    diltiaZEM (CARDIZEM CD) 360 MG 24 hr capsule Take 360 mg by mouth once daily.    hydrALAZINE (APRESOLINE) 25 MG tablet Take 25 mg by mouth 3 (three) times daily.    INSULIN NPH HUM/REG INSULIN HM (HUMULIN 70/30 SUBQ) Inject 30 Units into the skin 2 (two) times daily.    ondansetron (ZOFRAN) 4 MG tablet Take 1 tablet (4 mg total) by mouth every 6 (six) hours.    pantoprazole (PROTONIX) 20 MG tablet Take 1 tablet (20 mg total) by mouth once daily.    sucralfate (CARAFATE) 100 mg/mL suspension Take 10 mLs (1 g total) by mouth 4 (four) times daily.     Family History     None        Social History Main Topics    Smoking status: Never Smoker     Smokeless tobacco: Not on file    Alcohol use No    Drug use: No    Sexual activity: Not on file     Review of Systems   Complete 14 point review of systems done and negative except per HPI  Objective:     Vital Signs (Most Recent):  Temp: 98.2 °F (36.8 °C) (01/15/17 1939)  Pulse: 92 (01/15/17 1939)  Resp: 20 (01/15/17 1643)  BP: (!) 144/74 (01/15/17 1939)  SpO2: 99 % (01/15/17 1939) Vital Signs (24h Range):  Temp:  [98.1 °F (36.7 °C)-98.2 °F (36.8 °C)] 98.2 °F (36.8 °C)  Pulse:  [] 92  Resp:  [19-22] 20  SpO2:  [95 %-99 %] 99 %  BP: (144-169)/(74-81) 144/74     Weight: 129.3 kg (285 lb)  Body mass index is 32.94 kg/(m^2).    Physical Exam   Constitutional: He appears well-developed and well-nourished.   HENT:   Head: Normocephalic and atraumatic.   Eyes: Conjunctivae are normal. Pupils are equal, round, and reactive to light. No scleral icterus.   Neck: Normal range of motion. Neck supple. No JVD present.   Cardiovascular: Normal rate and regular rhythm.    Pulmonary/Chest: Effort normal and breath sounds normal.   Abdominal: Soft. Bowel sounds are normal. He exhibits no distension. There is no tenderness.   Genitourinary:   Genitourinary Comments: Deferred    Musculoskeletal: Normal range of motion. He exhibits edema.   Left 2nd toe swollen   Skin: Skin is warm and dry. There is erythema.   Erythema left 2nd toe   Psychiatric: He has a normal mood and affect. His behavior is normal.        Significant Labs:   CBC:   Recent Labs  Lab 01/14/17  2047 01/15/17  1743   WBC 13.72* 12.70   HGB 10.9* 11.1*   HCT 32.6* 33.5*    226     CMP:   Recent Labs  Lab 01/14/17  2047 01/15/17  1743    136   K 4.8 4.4    106   CO2 16* 18*   * 144*   BUN 41* 41*   CREATININE 4.9* 4.9*   CALCIUM 8.6* 8.7   PROT 7.7 7.3   ALBUMIN 3.0* 2.7*   BILITOT 0.4 0.4   ALKPHOS 90 93   AST 23 23   ALT 18 17   ANIONGAP 13 12   EGFRNONAA 11* 11*     ESR 95  .8        Significant Imaging: Left Foot Xray -  Osteomyelitis changes involving the 2nd toe    Assessment/Plan:     * Acute osteomyelitis of left foot  Vancomycin; consult podiatry in AM; NPO after midnight       Chronic kidney disease, stage 5, kidney failure  Consult nephrology in AM      Essential hypertension  Resume home meds; hydralazine prn      VTE Risk Mitigation         Ordered     Place sequential compression device  Until discontinued      01/15/17 2100      No Lovenox as patient will probably need surgery in AM  Margie Muñoz MD  Department of Hospital Medicine   [facility]

## 2017-01-16 NOTE — ASSESSMENT & PLAN NOTE
Resumed home meds; hydralazine prn    Plan: continue with the current medications, monitor and adjust as needed

## 2017-01-17 LAB
ANION GAP SERPL CALC-SCNC: 11 MMOL/L
BASOPHILS # BLD AUTO: 0.05 K/UL
BASOPHILS NFR BLD: 0.4 %
BUN SERPL-MCNC: 39 MG/DL
CALCIUM SERPL-MCNC: 8.2 MG/DL
CHLORIDE SERPL-SCNC: 107 MMOL/L
CO2 SERPL-SCNC: 17 MMOL/L
CREAT SERPL-MCNC: 4.6 MG/DL
DIFFERENTIAL METHOD: ABNORMAL
EOSINOPHIL # BLD AUTO: 0.4 K/UL
EOSINOPHIL NFR BLD: 3.6 %
ERYTHROCYTE [DISTWIDTH] IN BLOOD BY AUTOMATED COUNT: 13.1 %
EST. GFR  (AFRICAN AMERICAN): 14 ML/MIN/1.73 M^2
EST. GFR  (NON AFRICAN AMERICAN): 12 ML/MIN/1.73 M^2
GLUCOSE SERPL-MCNC: 204 MG/DL
HCT VFR BLD AUTO: 29.9 %
HGB BLD-MCNC: 9.9 G/DL
LYMPHOCYTES # BLD AUTO: 2 K/UL
LYMPHOCYTES NFR BLD: 16.5 %
MCH RBC QN AUTO: 27.7 PG
MCHC RBC AUTO-ENTMCNC: 33.1 %
MCV RBC AUTO: 84 FL
MONOCYTES # BLD AUTO: 1.3 K/UL
MONOCYTES NFR BLD: 10.5 %
NEUTROPHILS # BLD AUTO: 8.3 K/UL
NEUTROPHILS NFR BLD: 69 %
PLATELET # BLD AUTO: 246 K/UL
PMV BLD AUTO: 10.8 FL
POCT GLUCOSE: 147 MG/DL (ref 70–110)
POCT GLUCOSE: 232 MG/DL (ref 70–110)
POCT GLUCOSE: 256 MG/DL (ref 70–110)
POTASSIUM SERPL-SCNC: 4.4 MMOL/L
RBC # BLD AUTO: 3.58 M/UL
SODIUM SERPL-SCNC: 135 MMOL/L
VANCOMYCIN SERPL-MCNC: 8.8 UG/ML
WBC # BLD AUTO: 12.05 K/UL

## 2017-01-17 PROCEDURE — 25000003 PHARM REV CODE 250: Performed by: EMERGENCY MEDICINE

## 2017-01-17 PROCEDURE — 63600175 PHARM REV CODE 636 W HCPCS: Performed by: HOSPITALIST

## 2017-01-17 PROCEDURE — B548ZZA ULTRASONOGRAPHY OF SUPERIOR VENA CAVA, GUIDANCE: ICD-10-PCS | Performed by: RADIOLOGY

## 2017-01-17 PROCEDURE — 80202 ASSAY OF VANCOMYCIN: CPT

## 2017-01-17 PROCEDURE — 25000003 PHARM REV CODE 250: Performed by: INTERNAL MEDICINE

## 2017-01-17 PROCEDURE — 80048 BASIC METABOLIC PNL TOTAL CA: CPT

## 2017-01-17 PROCEDURE — 85025 COMPLETE CBC W/AUTO DIFF WBC: CPT

## 2017-01-17 PROCEDURE — 36415 COLL VENOUS BLD VENIPUNCTURE: CPT

## 2017-01-17 PROCEDURE — 02HV33Z INSERTION OF INFUSION DEVICE INTO SUPERIOR VENA CAVA, PERCUTANEOUS APPROACH: ICD-10-PCS | Performed by: RADIOLOGY

## 2017-01-17 PROCEDURE — 99233 SBSQ HOSP IP/OBS HIGH 50: CPT | Mod: ,,, | Performed by: INTERNAL MEDICINE

## 2017-01-17 PROCEDURE — 63600175 PHARM REV CODE 636 W HCPCS: Performed by: EMERGENCY MEDICINE

## 2017-01-17 PROCEDURE — 11000001 HC ACUTE MED/SURG PRIVATE ROOM

## 2017-01-17 PROCEDURE — 25000003 PHARM REV CODE 250: Performed by: HOSPITALIST

## 2017-01-17 PROCEDURE — 25000003 PHARM REV CODE 250: Performed by: NURSE PRACTITIONER

## 2017-01-17 PROCEDURE — 96372 THER/PROPH/DIAG INJ SC/IM: CPT

## 2017-01-17 RX ORDER — RAMELTEON 8 MG/1
8 TABLET ORAL NIGHTLY PRN
Status: DISCONTINUED | OUTPATIENT
Start: 2017-01-17 | End: 2017-01-18 | Stop reason: HOSPADM

## 2017-01-17 RX ORDER — ENOXAPARIN SODIUM 100 MG/ML
30 INJECTION SUBCUTANEOUS EVERY 24 HOURS
Status: DISCONTINUED | OUTPATIENT
Start: 2017-01-17 | End: 2017-01-18 | Stop reason: HOSPADM

## 2017-01-17 RX ORDER — POLYETHYLENE GLYCOL 3350 17 G/17G
17 POWDER, FOR SOLUTION ORAL DAILY
Status: DISCONTINUED | OUTPATIENT
Start: 2017-01-18 | End: 2017-01-18 | Stop reason: HOSPADM

## 2017-01-17 RX ORDER — DOCUSATE SODIUM 100 MG/1
100 CAPSULE, LIQUID FILLED ORAL DAILY
Status: DISCONTINUED | OUTPATIENT
Start: 2017-01-17 | End: 2017-01-17

## 2017-01-17 RX ORDER — DOCUSATE SODIUM 100 MG/1
100 CAPSULE, LIQUID FILLED ORAL 2 TIMES DAILY
Status: DISCONTINUED | OUTPATIENT
Start: 2017-01-17 | End: 2017-01-18 | Stop reason: HOSPADM

## 2017-01-17 RX ORDER — HYDRALAZINE HYDROCHLORIDE 50 MG/1
50 TABLET, FILM COATED ORAL 3 TIMES DAILY
Status: DISCONTINUED | OUTPATIENT
Start: 2017-01-17 | End: 2017-01-18 | Stop reason: HOSPADM

## 2017-01-17 RX ORDER — AMLODIPINE BESYLATE 2.5 MG/1
2.5 TABLET ORAL DAILY
Status: DISCONTINUED | OUTPATIENT
Start: 2017-01-17 | End: 2017-01-18

## 2017-01-17 RX ADMIN — RAMELTEON 8 MG: 8 TABLET, FILM COATED ORAL at 01:01

## 2017-01-17 RX ADMIN — SUCRALFATE 1 G: 1 SUSPENSION ORAL at 05:01

## 2017-01-17 RX ADMIN — HYDRALAZINE HYDROCHLORIDE 25 MG: 25 TABLET, FILM COATED ORAL at 05:01

## 2017-01-17 RX ADMIN — INSULIN ASPART 1 UNITS: 100 INJECTION, SOLUTION INTRAVENOUS; SUBCUTANEOUS at 11:01

## 2017-01-17 RX ADMIN — TAMSULOSIN HYDROCHLORIDE 0.4 MG: 0.4 CAPSULE ORAL at 09:01

## 2017-01-17 RX ADMIN — VANCOMYCIN HYDROCHLORIDE 1500 MG: 100 INJECTION, POWDER, LYOPHILIZED, FOR SOLUTION INTRAVENOUS at 09:01

## 2017-01-17 RX ADMIN — INSULIN ASPART 2 UNITS: 100 INJECTION, SOLUTION INTRAVENOUS; SUBCUTANEOUS at 12:01

## 2017-01-17 RX ADMIN — HYDRALAZINE HYDROCHLORIDE 50 MG: 50 TABLET, FILM COATED ORAL at 03:01

## 2017-01-17 RX ADMIN — DOCUSATE SODIUM 100 MG: 100 CAPSULE, LIQUID FILLED ORAL at 09:01

## 2017-01-17 RX ADMIN — SUCRALFATE 1 G: 1 SUSPENSION ORAL at 11:01

## 2017-01-17 RX ADMIN — ENOXAPARIN SODIUM 30 MG: 100 INJECTION SUBCUTANEOUS at 12:01

## 2017-01-17 RX ADMIN — MOXIFLOXACIN HYDROCHLORIDE 400 MG: 400 INJECTION, SOLUTION INTRAVENOUS at 05:01

## 2017-01-17 RX ADMIN — DILTIAZEM HYDROCHLORIDE 360 MG: 180 CAPSULE, COATED, EXTENDED RELEASE ORAL at 09:01

## 2017-01-17 RX ADMIN — PANTOPRAZOLE SODIUM 40 MG: 40 TABLET, DELAYED RELEASE ORAL at 09:01

## 2017-01-17 RX ADMIN — HYDRALAZINE HYDROCHLORIDE 50 MG: 50 TABLET, FILM COATED ORAL at 09:01

## 2017-01-17 RX ADMIN — SUCRALFATE 1 G: 1 SUSPENSION ORAL at 12:01

## 2017-01-17 RX ADMIN — AMLODIPINE BESYLATE 2.5 MG: 2.5 TABLET ORAL at 05:01

## 2017-01-17 NOTE — OP NOTE
The procedure was performed by me.  The RUE was sterilely prepped and draped.  Lidocaine was used as a local anesthetic.  Using u/s guidance a 5 [place] 40 cm picc was placed into the brachial vein into the SVC/right atrium junction.  Placement was verified using X Ray.  PICC was secured in place with attachment device and is ready to use.  Pt tolerated proc well without immediate complications.

## 2017-01-17 NOTE — PROGRESS NOTES
"Renal f/u note:  Admit Date: 1/15/2017    Follow-up For:  CKD stage 5. Acute osteomyelitis of left foot     Subjective:      Interval History: Pt was seen and examined No new events, stable last pm.  No new c/o's, o fever.  Reviewed podiatry note and recommendations to the pt.    Objective:      Vital Signs Range (Last 24H): Blood pressure (!) 150/77, pulse 83, temperature 98.7 °F (37.1 °C), temperature source Oral, resp. rate 18, height 6' 6" (1.981 m), weight 125.6 kg (277 lb), SpO2 98 %.    I & O (Last 24H):    Intake/Output Summary (Last 24 hours) at 01/17/17 1557  Last data filed at 01/17/17 1500   Gross per 24 hour   Intake             1875 ml   Output             2700 ml   Net             -825 ml     Physical Exam   Constitutional: Patient is oriented to person, place, and time.  Cardiovascular: Normal rate and regular rhythm. s1 and s2 audible   Pulmonary/Chest: Effort normal and clear to auscultation bilaterally. No respiratory distress.   Abdomen: Soft. Non-tender and non-distended. Bowel sounds are normal.   Neurological: Moves all extremities.  Skin: Warm and dry. No rashes.  Extremities: No edema.  L second toe dressed    Medications:   diltiaZEM  360 mg Oral Daily    docusate sodium  100 mg Oral Daily    enoxaparin  30 mg Subcutaneous Daily    hydrALAZINE  50 mg Oral TID    moxifloxacin  400 mg Intravenous Q24H    pantoprazole  40 mg Oral Daily    sucralfate  1 g Oral QID    tamsulosin  0.4 mg Oral Daily    vancomycin (VANCOCIN) IVPB  1,500 mg Intravenous Q48H       Laboratory Data:  Reviewed and noted in plan where applicable. Please see chart for full laboratory data.    Lab Results   Component Value Date    WBC 12.05 01/17/2017    HGB 9.9 (L) 01/17/2017    HCT 29.9 (L) 01/17/2017    MCV 84 01/17/2017     01/17/2017         Recent Labs  Lab 01/17/17  0640   *   *   K 4.4      CO2 17*   BUN 39*   CREATININE 4.6*   CALCIUM 8.2*       Microbiology Results (last 7 days) "     Procedure Component Value Units Date/Time    Blood culture [[id number]] Collected:  01/16/17 0552    Order Status:  Completed Specimen:  Blood Updated:  01/17/17 1212     Blood Culture, Routine No Growth to date     Blood Culture, Routine No Growth to date    Blood culture [[id number]] Collected:  01/16/17 0542    Order Status:  Completed Specimen:  Blood Updated:  01/17/17 1212     Blood Culture, Routine No Growth to date     Blood Culture, Routine No Growth to date          Radiology:  Reviewed and noted in plan where applicable.  Please see chart for full radiology data.    Active Hospital Problems    Diagnosis  POA    *Acute osteomyelitis of left foot [M86.172]  Yes    Metabolic acidemia [E87.2]  Yes    Chronic kidney disease, stage 5, kidney failure [N18.5]  Yes     Chronic    Osteomyelitis [M86.9]  Yes    Essential hypertension [I10]  Yes     Chronic      Resolved Hospital Problems    Diagnosis Date Resolved POA   No resolved problems to display.       67 y/o man with advanced CKD stage 5 was admitted for toe osteomyelitis     1. Renal: CKD stage 5.   No change or worsening in s Cr. At baseline.  Renal function is stable  K normal  Metabolic acidosis     2. HTN: BP not fully controlled.  Meds reviewed  Will add small dose of amldipine     3. ID: left second toe osteomyelitis.  Abx reviewed: On vancomycin. Pharmacy is dosing it.  Recommend maintain a therapeutic window of 15-20.  Also, please consider adding an abx for Gram negative coverage, will defer to PCP.  Pt may require amputation  Podiatry following     4. DM: long standing. Since 1992.  Likely has diabetic nephropathy and vasculopathy.     Plans and recommendations:  As discussed above  Add amlodipine 2.5 mg po qd  Will f/u with podiatry recommendations     Lazara Granados MD

## 2017-01-17 NOTE — PROGRESS NOTES
Clinical Pharmacy: Vancomycin Progress Note    Indication: osteomyelitis   Goal level: 15-20 mcg/ml  Random level @ 06:40 = 8.8 after one dose of 1500mg given on 1/15 @ 1800   We will re-dose with 1500mg today @ 10:00am  We will check another random level tomorrow 1/18 @ 0430 to make sure pt is within therapeutic range   CKD stage 5 but not on HD yet --> continue pulse dosing  No new CBC; Tmax 99.4; blood cultures negative    Thank you for allowing us to participate in this patient's care.  Katherine McArdle, Pharm.D. 1/17/2017 9:54 AM

## 2017-01-17 NOTE — ASSESSMENT & PLAN NOTE
- MRI is positive for osteomylitis  - On IV Vancomycin with Hx of DM will broaden the coverage with IV avelox.   - Patient want to proceed with long IV abx tx.   - Appreciate podiatry input    Plan: will get PICC line, consult ID, FU with podiatry.

## 2017-01-17 NOTE — PROGRESS NOTES
Patient C/o constipation. Says he has not had a BM since Thursday, 1/12. Order placed for stool softener. Will administer with am meds. Denies pain at this time. No distress noted. Will continue to monitor.

## 2017-01-17 NOTE — PLAN OF CARE
Problem: Patient Care Overview  Goal: Plan of Care Review  Outcome: Ongoing (interventions implemented as appropriate)  Reviewed POC, including indications and possible side effects of administered medications. Pt verbalized understanding and teachback.        Problem: Pain, Acute (Adult)  Intervention: Mutually Develop/Implement Acute Pain Management Plan  Denies pain.

## 2017-01-17 NOTE — ASSESSMENT & PLAN NOTE
Resumed home meds; hydralazine prn  - BP uncontrolled    Plan: will increase hydralazine 50mg TID, monitor and adjust as needed.

## 2017-01-17 NOTE — PROGRESS NOTES
[facility] Hospital Medicine  Progress Note    Patient Name: Conner Perez III  MRN: [id number]  Patient Class: IP- Inpatient   Admission Date: 1/15/2017  Length of Stay: 2 days  Attending Physician: Brendan Blake, *  Primary Care Provider: Raciel Angela MD        Subjective:     Principal Problem:Acute osteomyelitis of left foot    HPI:  66 year old male with h/o CKD stage 5 and IDDM presenrts complaining of left 2nd toe swelling and pain since yesterday.  Denies any fever, chills or sweats.  Denies any cuts or insect bites.  Followed by Dr. Rhodes for CKD.    Hospital Course:  1/17- has no complaints, MRI is positive for osteomyelitis, seen by podiatry, wants to do IV abx.          Interval History: No major events    Review of Systems   Constitutional: Negative for activity change and fatigue.   HENT: Negative for congestion and hearing loss.    Cardiovascular: Negative for chest pain and leg swelling.   Gastrointestinal: Negative for abdominal distention, abdominal pain, nausea and vomiting.   Genitourinary: Negative for dysuria.   Musculoskeletal:        Lt 2nd toe swelling    Neurological: Negative for dizziness, syncope and numbness.   Psychiatric/Behavioral: Negative for agitation and behavioral problems.     Objective:     Vital Signs (Most Recent):  Temp: 98.3 °F (36.8 °C) (01/17/17 0510)  Pulse: 92 (01/17/17 0735)  Resp: 18 (01/17/17 0735)  BP: (!) 165/92 (01/17/17 0735)  SpO2: 97 % (01/17/17 0735) Vital Signs (24h Range):  Temp:  [98.3 °F (36.8 °C)-99.4 °F (37.4 °C)] 98.3 °F (36.8 °C)  Pulse:  [84-96] 92  Resp:  [18] 18  SpO2:  [95 %-97 %] 97 %  BP: (147-165)/(71-97) 165/92     Weight: 125.6 kg (277 lb)  Body mass index is 32.01 kg/(m^2).    Intake/Output Summary (Last 24 hours) at 01/17/17 1104  Last data filed at 01/17/17 0509   Gross per 24 hour   Intake              950 ml   Output             2200 ml   Net            -1250 ml      Physical Exam   Constitutional: He appears  well-developed and well-nourished.   HENT:   Head: Normocephalic and atraumatic.   Eyes: Conjunctivae are normal. Pupils are equal, round, and reactive to light. No scleral icterus.   Neck: Normal range of motion. Neck supple. No JVD present.   Cardiovascular: Normal rate and regular rhythm.    Pulmonary/Chest: Effort normal and breath sounds normal.   Abdominal: Soft. Bowel sounds are normal. He exhibits no distension. There is no tenderness.   Genitourinary:   Genitourinary Comments: Deferred    Musculoskeletal: Normal range of motion. He exhibits edema.   Left 2nd toe swollen   Skin: Skin is warm and dry. There is erythema.   Erythema, swelling of left 2nd toe   Psychiatric: He has a normal mood and affect. His behavior is normal.       Significant Labs:   Blood Culture:   Recent Labs  Lab 01/16/17  0542 01/16/17  0552   LABBLOO No Growth to date No Growth to date     BMP:   Recent Labs  Lab 01/17/17  0640   *   *   K 4.4      CO2 17*   BUN 39*   CREATININE 4.6*   CALCIUM 8.2*     CBC:   Recent Labs  Lab 01/15/17  1743   WBC 12.70   HGB 11.1*   HCT 33.5*          Significant Imaging: I have reviewed all pertinent imaging results/findings within the past 24 hours.    Assessment/Plan:      * Acute osteomyelitis of left foot  - MRI is positive for osteomylitis  - On IV Vancomycin with Hx of DM will broaden the coverage with IV avelox.   - Patient want to proceed with long IV abx tx.   - Appreciate podiatry input    Plan: will get PICC line, consult ID, FU with podiatry.     Essential hypertension  Resumed home meds; hydralazine prn  - BP uncontrolled    Plan: will increase hydralazine 50mg TID, monitor and adjust as needed.     Chronic kidney disease, stage 5, kidney failure  Stable, appreciate nephrology input    Metabolic acidemia        VTE Risk Mitigation         Ordered     enoxaparin injection 30 mg  Daily     Route:  Subcutaneous        01/17/17 1102     Medium Risk of VTE  Once       01/17/17 1102     Place sequential compression device  Until discontinued      01/15/17 2100          Brendan Blake MD  Department of Hospital Medicine   [facility]

## 2017-01-17 NOTE — PLAN OF CARE
Problem: Patient Care Overview  Goal: Plan of Care Review  Outcome: Ongoing (interventions implemented as appropriate)  Reviewed POC, including indications and possible side effects of administered medications. Pt verbalized understanding and teachback.     12 hour chart check complete.      Goal: Discharge Needs Assessment  Outcome: Ongoing (interventions implemented as appropriate)  PICC line placed today in anticipation of long term antibiotic administration needs.    Problem: Pain, Acute (Adult)  Intervention: Monitor/Manage Analgesia  Denies pain at this time.

## 2017-01-17 NOTE — SUBJECTIVE & OBJECTIVE
Interval History: No major events    Review of Systems   Constitutional: Negative for activity change and fatigue.   HENT: Negative for congestion and hearing loss.    Cardiovascular: Negative for chest pain and leg swelling.   Gastrointestinal: Negative for abdominal distention, abdominal pain, nausea and vomiting.   Genitourinary: Negative for dysuria.   Musculoskeletal:        Lt 2nd toe swelling    Neurological: Negative for dizziness, syncope and numbness.   Psychiatric/Behavioral: Negative for agitation and behavioral problems.     Objective:     Vital Signs (Most Recent):  Temp: 98.3 °F (36.8 °C) (01/17/17 0510)  Pulse: 92 (01/17/17 0735)  Resp: 18 (01/17/17 0735)  BP: (!) 165/92 (01/17/17 0735)  SpO2: 97 % (01/17/17 0735) Vital Signs (24h Range):  Temp:  [98.3 °F (36.8 °C)-99.4 °F (37.4 °C)] 98.3 °F (36.8 °C)  Pulse:  [84-96] 92  Resp:  [18] 18  SpO2:  [95 %-97 %] 97 %  BP: (147-165)/(71-97) 165/92     Weight: 125.6 kg (277 lb)  Body mass index is 32.01 kg/(m^2).    Intake/Output Summary (Last 24 hours) at 01/17/17 1104  Last data filed at 01/17/17 0509   Gross per 24 hour   Intake              950 ml   Output             2200 ml   Net            -1250 ml      Physical Exam   Constitutional: He appears well-developed and well-nourished.   HENT:   Head: Normocephalic and atraumatic.   Eyes: Conjunctivae are normal. Pupils are equal, round, and reactive to light. No scleral icterus.   Neck: Normal range of motion. Neck supple. No JVD present.   Cardiovascular: Normal rate and regular rhythm.    Pulmonary/Chest: Effort normal and breath sounds normal.   Abdominal: Soft. Bowel sounds are normal. He exhibits no distension. There is no tenderness.   Genitourinary:   Genitourinary Comments: Deferred    Musculoskeletal: Normal range of motion. He exhibits edema.   Left 2nd toe swollen   Skin: Skin is warm and dry. There is erythema.   Erythema, swelling of left 2nd toe   Psychiatric: He has a normal mood and affect.  His behavior is normal.       Significant Labs:   Blood Culture:   Recent Labs  Lab 01/16/17  0542 01/16/17  0552   LABBLOO No Growth to date No Growth to date     BMP:   Recent Labs  Lab 01/17/17  0640   *   *   K 4.4      CO2 17*   BUN 39*   CREATININE 4.6*   CALCIUM 8.2*     CBC:   Recent Labs  Lab 01/15/17  1743   WBC 12.70   HGB 11.1*   HCT 33.5*          Significant Imaging: I have reviewed all pertinent imaging results/findings within the past 24 hours.

## 2017-01-17 NOTE — PLAN OF CARE
Problem: Patient Care Overview  Goal: Plan of Care Review  Outcome: Ongoing (interventions implemented as appropriate)  Remains free from harm, no c/o pain, no acute distress noted. 24 hour chart check complete.

## 2017-01-17 NOTE — CONSULTS
Inpatient consult to Podiatry  Consult performed by: LYRIC SALAZAR  Consult ordered by: JUAN JOSE SINHA      Subjective: This 66 year old male admitted for nausea and fever. Podiatry consulted for left foot infection. Patient states he noticed swelling and pain in the left toe on Saturday. Patient denies any direct injury to the toe. Patient states in the past the toe has swollen and gotten better. Patient states he is diabetic and his blood sugar runs 120-140mg/dl. Patient admits having neuropathy in his feet. Patient denies any drainage or open area from the toe.      PCP: Dr. GEORGE Angela      Patient Active Problem List   Diagnosis    Constipation    Renal dysfunction    Chest pain    Essential hypertension    Acute osteomyelitis of left foot    Chronic kidney disease, stage 5, kidney failure    Osteomyelitis    Metabolic acidemia       No current facility-administered medications on file prior to encounter.      Current Outpatient Prescriptions on File Prior to Encounter   Medication Sig Dispense Refill    aspirin 81 MG Chew Take 81 mg by mouth once daily.      diltiaZEM (CARDIZEM CD) 360 MG 24 hr capsule Take 360 mg by mouth once daily.      hydrALAZINE (APRESOLINE) 25 MG tablet Take 25 mg by mouth 3 (three) times daily.      INSULIN NPH HUM/REG INSULIN HM (HUMULIN 70/30 SUBQ) Inject 30 Units into the skin 2 (two) times daily.      ondansetron (ZOFRAN) 4 MG tablet Take 1 tablet (4 mg total) by mouth every 6 (six) hours. 10 tablet 0    sucralfate (CARAFATE) 100 mg/mL suspension Take 10 mLs (1 g total) by mouth 4 (four) times daily. 1 Bottle 0    dexlansoprazole (DEXILANT) 30 mg CpDM Take 50 mg by mouth.      pantoprazole (PROTONIX) 20 MG tablet Take 1 tablet (20 mg total) by mouth once daily. 30 tablet 0       Review of patient's allergies indicates:   Allergen Reactions    Augmentin [amoxicillin-pot clavulanate]     Sulfa (sulfonamide antibiotics)        History reviewed. No pertinent past  surgical history.    Family History   Problem Relation Age of Onset    Hypertension Father        Social History     Social History    Marital status:      Spouse name: N/A    Number of children: N/A    Years of education: N/A     Occupational History    Not on file.     Social History Main Topics    Smoking status: Never Smoker    Smokeless tobacco: Not on file    Alcohol use No    Drug use: No    Sexual activity: Not on file     Other Topics Concern    Not on file     Social History Narrative     Vitals:    01/16/17 0523 01/16/17 0855 01/16/17 1537 01/16/17 2000   BP: (!) 159/85 (!) 160/83 (!) 153/71 (!) 147/81   Pulse: 86 89 91 84   Resp: 18 18 18 18   Temp: 98 °F (36.7 °C) 98.4 °F (36.9 °C) 98.5 °F (36.9 °C) 98.4 °F (36.9 °C)   TempSrc: Oral Oral Oral Oral   SpO2: 95% (!) 93% 95% 95%   Weight:       Height:         Review of Systems   Constitution: Negative for chills, fever, weakness and malaise/fatigue.   Cardiovascular: Negative for chest pain, claudication and leg swelling.   Respiratory: Negative for cough and shortness of breath.   Skin: Positive for nail changes. Negative for dry skin, itching and rash.   Musculoskeletal: Negative for arthritis, joint pain and muscle weakness. Negative for back pain and muscle cramps.   Gastrointestinal: Positive for nausea and vomiting.   Neurological: Positive for numbness and paresthesias.   Psychiatric/Behavioral: Negative for altered mental status.     OBJECTIVE:   PHYSICAL EXAM: Apperance: Alert and orient in no distress,well developed, and with good attention to grooming and body habits  Lower Extremity Exam  VASCULAR: Dorsalis pedis pulses 1/4 bilateral and Posterior Tibial pulses 1/4 bilateral. Capillary fill time <4 seconds bilateral. Moderate edema observed left 2nd toe. Varicosities absent bilateral. Skin temperature of the lower extremities is warm to cool, proximal to distal. Hair growth dim bilateral. (--) lymphangitis or (--) cellulitis  noted bilateral.  DERMATOLOGICAL: (+) edema, (--) erythema, (--) malodor, (--) drainage, (+) warmth to left 2nd toe. No open area or fluctuance noted to left 2nd toe. Nails 1-5 bilateral dystrophic, mycotic. Webspaces 1-4 clean, dry and without evidence of break in skin integrity bilateral.   NEUROLOGICAL: Light touch, sharp-dull, proprioception all present and equal bilaterally. Protective sensation decreased at sites as tested with a [place] 5.07 monofilament.   MUSCULOSKELETAL: Muscle strength is 5/5 for foot inverters, everters, plantarflexors, and dorsiflexors. Muscle tone is normal.  Inspection/palpation of bone, joints and muscles unremarkable with the exception of that noted above.    Imaging Results         MRI Lower Extremity Without Contrast Left (In process)     Procedure changed from MRI Lower Extremity W WO Contrast Left            US Ankle Brachial Indices Low Ext WO Str (Final result) Result time:  01/16/17 15:02:23    Procedure changed from US Lower Extrem Arteries Bilat with JOSE DANIEL (xpd)        Final result by Alfonso Farr MD (01/16/17 15:02:23)    Impression:      The right and left JOSE DANIEL is normal.  The digital JOSE DANIEL is mildly to moderately decreased with dampened waveforms.          >1.3: Non-compressible or calcified vessels  0.97 - 1.29: No significant PAD  0.75 - 0.96: Mild PAD  0.50 - 0.74: Moderate PAD  <0.50: Severe PAD  <0.30: Critical PAD      Electronically signed by: ALFONSO FARR MD  Date:     01/16/17  Time:    15:02     Narrative:    Exam: US ANKLE BRACHIAL INDICES LOW EXT WO STR,    Date:  01/16/17 14:46:18    History: Arterial insufficiency    Findings: Complete lower extremity JOSE DANIEL with PVR. The lower extremities were evaluated at multiple levels with continuous wave Doppler to obtain systolic segmental pressure recordings at the brachial and ankle segments.    Right                                                     Left  brachial:  161                                                 brachial:     170  ankle(PT):189    Index:1.1                               ankle(PT):  178    Index:1.05  ankle(DP):171             1.0                               ankle(DP):  171            1.0  digit:       119       TBI  0.7                               digit:         74      TBI 0.44      The right wave form is normal.    The left wave form is normal.            X-Ray Foot Complete Left (Final result) Result time:  01/15/17 18:20:21    Final result by Iglesia Isaac Jr., MD (01/15/17 18:20:21)    Impression:         Osteomyelitis changes involving the 2nd toe      Electronically signed by: IGLESIA ISAAC MD  Date:     01/15/17  Time:    18:20     Narrative:    EXAM:   XR FOOT COMPLETE 3 VIEW LEFT    CLINICAL HISTORY:    Other specified soft tissue disorders; E11.40 Type 2 diabetes mellitus with diabetic neuropathy, unspecified;.    COMPARISON:  None    FINDINGS:   With significant soft tissue swelling of the 2nd toe.  Erosive changes involving the distal aspect of the distal phalanx of the 2nd toe, consistent with osteomyelitis.                        ASSESSMENT:  1. Toe infection, left 2nd toe   2. Diabetes mellitus with neuropathy    TREATMENT/PLAN: Treatment today consisted of the followin. The patient was counseled regarding findings of my examination, my impressions, treatment options, results of diagnostic tests and usual treatment plan.   2. Reviewed foot x-rays in exam room with patient.   3. Discussed with patient in detail the treatment options for infected ulcer with bone infection, including (1) local wound care with IV antibiotic for 6-8 weeks, or (2) surgical excision(amputation) of infected bone. Risk for treatments including further limb loss, delayed healing, non-healing was also diascussed. Patient states he undertands both treatment options and would like to try local wound care with IV antibiotics pending the MRI and circulation testing. Patient states he also  understands that he may still need amputation if IV antibiotics and wound care does not work.   3. Ordered left foot MRI and lower extremity JOSE DANIEL.   4. Podiatry to continue to follow.

## 2017-01-18 VITALS
DIASTOLIC BLOOD PRESSURE: 86 MMHG | SYSTOLIC BLOOD PRESSURE: 167 MMHG | HEART RATE: 97 BPM | HEIGHT: 78 IN | OXYGEN SATURATION: 99 % | WEIGHT: 277 LBS | RESPIRATION RATE: 20 BRPM | BODY MASS INDEX: 32.05 KG/M2 | TEMPERATURE: 98 F

## 2017-01-18 PROBLEM — D63.1 ANEMIA OF RENAL DISEASE: Status: ACTIVE | Noted: 2017-01-18

## 2017-01-18 PROBLEM — N18.9 ANEMIA OF RENAL DISEASE: Status: ACTIVE | Noted: 2017-01-18

## 2017-01-18 LAB
CRP SERPL-MCNC: 109.6 MG/L
ERYTHROCYTE [SEDIMENTATION RATE] IN BLOOD BY WESTERGREN METHOD: 106 MM/HR
POCT GLUCOSE: 151 MG/DL (ref 70–110)
POCT GLUCOSE: 175 MG/DL (ref 70–110)
POCT GLUCOSE: 228 MG/DL (ref 70–110)
POCT GLUCOSE: 231 MG/DL (ref 70–110)
VANCOMYCIN SERPL-MCNC: 19.1 UG/ML

## 2017-01-18 PROCEDURE — 86140 C-REACTIVE PROTEIN: CPT

## 2017-01-18 PROCEDURE — 63600175 PHARM REV CODE 636 W HCPCS: Performed by: HOSPITALIST

## 2017-01-18 PROCEDURE — 85651 RBC SED RATE NONAUTOMATED: CPT

## 2017-01-18 PROCEDURE — 25000003 PHARM REV CODE 250: Performed by: INTERNAL MEDICINE

## 2017-01-18 PROCEDURE — 99233 SBSQ HOSP IP/OBS HIGH 50: CPT | Mod: ,,, | Performed by: INTERNAL MEDICINE

## 2017-01-18 PROCEDURE — 25000003 PHARM REV CODE 250: Performed by: HOSPITALIST

## 2017-01-18 PROCEDURE — 25000003 PHARM REV CODE 250: Performed by: NURSE PRACTITIONER

## 2017-01-18 PROCEDURE — 96372 THER/PROPH/DIAG INJ SC/IM: CPT

## 2017-01-18 PROCEDURE — 80202 ASSAY OF VANCOMYCIN: CPT

## 2017-01-18 RX ORDER — LEVOFLOXACIN 5 MG/ML
750 INJECTION, SOLUTION INTRAVENOUS EVERY OTHER DAY
Status: ON HOLD
Start: 2017-01-18 | End: 2017-02-07 | Stop reason: HOSPADM

## 2017-01-18 RX ORDER — AMLODIPINE BESYLATE 5 MG/1
5 TABLET ORAL DAILY
Status: DISCONTINUED | OUTPATIENT
Start: 2017-01-19 | End: 2017-01-18 | Stop reason: HOSPADM

## 2017-01-18 RX ORDER — LACTULOSE 10 G/15ML
30 SOLUTION ORAL EVERY 6 HOURS PRN
Status: ON HOLD
Start: 2017-01-18 | End: 2017-02-07 | Stop reason: HOSPADM

## 2017-01-18 RX ORDER — DOCUSATE SODIUM 100 MG/1
100 CAPSULE, LIQUID FILLED ORAL 2 TIMES DAILY
Refills: 0 | Status: ON HOLD | COMMUNITY
Start: 2017-01-18 | End: 2017-02-07

## 2017-01-18 RX ORDER — HYDRALAZINE HYDROCHLORIDE 50 MG/1
50 TABLET, FILM COATED ORAL 3 TIMES DAILY
Qty: 90 TABLET | Refills: 11 | Status: ON HOLD
Start: 2017-01-18 | End: 2017-02-07

## 2017-01-18 RX ORDER — LACTULOSE 10 G/15ML
30 SOLUTION ORAL EVERY 6 HOURS PRN
Status: DISCONTINUED | OUTPATIENT
Start: 2017-01-18 | End: 2017-01-18 | Stop reason: HOSPADM

## 2017-01-18 RX ORDER — LACTULOSE 10 G/15ML
10 SOLUTION ORAL ONCE
Status: COMPLETED | OUTPATIENT
Start: 2017-01-18 | End: 2017-01-18

## 2017-01-18 RX ORDER — GLYCERIN 1 G/1
1 SUPPOSITORY RECTAL ONCE
Status: COMPLETED | OUTPATIENT
Start: 2017-01-18 | End: 2017-01-18

## 2017-01-18 RX ORDER — INSULIN ASPART 100 [IU]/ML
0-5 INJECTION, SOLUTION INTRAVENOUS; SUBCUTANEOUS EVERY 6 HOURS PRN
Refills: 0 | Status: ON HOLD
Start: 2017-01-18 | End: 2017-02-07 | Stop reason: HOSPADM

## 2017-01-18 RX ORDER — AMLODIPINE BESYLATE 5 MG/1
5 TABLET ORAL DAILY
Qty: 30 TABLET | Refills: 11 | Status: ON HOLD
Start: 2017-01-19 | End: 2017-02-07

## 2017-01-18 RX ORDER — ATORVASTATIN CALCIUM 40 MG/1
40 TABLET, FILM COATED ORAL DAILY
Status: ON HOLD | COMMUNITY
End: 2017-02-07

## 2017-01-18 RX ADMIN — DOCUSATE SODIUM 100 MG: 100 CAPSULE, LIQUID FILLED ORAL at 08:01

## 2017-01-18 RX ADMIN — LACTULOSE 30 G: 10 SOLUTION ORAL at 04:01

## 2017-01-18 RX ADMIN — DILTIAZEM HYDROCHLORIDE 360 MG: 180 CAPSULE, COATED, EXTENDED RELEASE ORAL at 08:01

## 2017-01-18 RX ADMIN — LACTULOSE 10 G: 10 SOLUTION ORAL at 11:01

## 2017-01-18 RX ADMIN — INSULIN ASPART 2 UNITS: 100 INJECTION, SOLUTION INTRAVENOUS; SUBCUTANEOUS at 04:01

## 2017-01-18 RX ADMIN — DAPTOMYCIN 500 MG: 500 INJECTION, POWDER, LYOPHILIZED, FOR SOLUTION INTRAVENOUS at 01:01

## 2017-01-18 RX ADMIN — SUCRALFATE 1 G: 1 SUSPENSION ORAL at 11:01

## 2017-01-18 RX ADMIN — TAMSULOSIN HYDROCHLORIDE 0.4 MG: 0.4 CAPSULE ORAL at 08:01

## 2017-01-18 RX ADMIN — ENOXAPARIN SODIUM 30 MG: 100 INJECTION SUBCUTANEOUS at 11:01

## 2017-01-18 RX ADMIN — GLYCERIN 1 SUPPOSITORY: 2.1 SUPPOSITORY RECTAL at 03:01

## 2017-01-18 RX ADMIN — MOXIFLOXACIN HYDROCHLORIDE 400 MG: 400 INJECTION, SOLUTION INTRAVENOUS at 04:01

## 2017-01-18 RX ADMIN — PANTOPRAZOLE SODIUM 40 MG: 40 TABLET, DELAYED RELEASE ORAL at 08:01

## 2017-01-18 RX ADMIN — HYDRALAZINE HYDROCHLORIDE 50 MG: 50 TABLET, FILM COATED ORAL at 01:01

## 2017-01-18 RX ADMIN — AMLODIPINE BESYLATE 2.5 MG: 2.5 TABLET ORAL at 08:01

## 2017-01-18 RX ADMIN — SUCRALFATE 1 G: 1 SUSPENSION ORAL at 05:01

## 2017-01-18 RX ADMIN — HYDRALAZINE HYDROCHLORIDE 50 MG: 50 TABLET, FILM COATED ORAL at 05:01

## 2017-01-18 RX ADMIN — POLYETHYLENE GLYCOL 3350 17 G: 17 POWDER, FOR SOLUTION ORAL at 08:01

## 2017-01-18 NOTE — PROGRESS NOTES
"Renal f/u note:  Admit Date: 1/15/2017    Follow-up For: CKD stage 5 and  Acute osteomyelitis of left foot     Subjective:      Interval History: Pt was seen and examined. No new events, stable last pm.  No c/o's today, no SOB.    Objective:      Vital Signs Range (Last 24H): Blood pressure (!) 148/83, pulse 87, temperature 99 °F (37.2 °C), temperature source Oral, resp. rate 18, height 6' 6" (1.981 m), weight 125.6 kg (277 lb), SpO2 98 %.    I & O (Last 24H):    Intake/Output Summary (Last 24 hours) at 01/18/17 1336  Last data filed at 01/18/17 1105   Gross per 24 hour   Intake             1390 ml   Output             2200 ml   Net             -810 ml       Physical Exam   Constitutional: Patient is oriented to person, place, and time.   Neck: Neck no JVD  Cardiovascular: Normal rate and regular rhythm.    Pulmonary/Chest: Effort normal and clear to auscultation bilaterally. No respiratory distress.   Abdomen: Soft. Non-tender and non-distended. Bowel sounds are normal.   Neurological: Moves all extremities.  Skin: Warm and dry. No rashes.  Extremities: No clubbing cyanosis or edema.  L foot dressed    Medications:   amlodipine  2.5 mg Oral Daily    DAPTOmycin (CUBICIN)  IV  4 mg/kg Intravenous Q48H    diltiaZEM  360 mg Oral Daily    docusate sodium  100 mg Oral BID    enoxaparin  30 mg Subcutaneous Daily    hydrALAZINE  50 mg Oral TID    moxifloxacin  400 mg Intravenous Q24H    pantoprazole  40 mg Oral Daily    polyethylene glycol  17 g Oral Daily    sucralfate  1 g Oral QID    tamsulosin  0.4 mg Oral Daily       Laboratory Data:  Reviewed and noted in plan where applicable. Please see chart for full laboratory data.    Lab Results   Component Value Date    WBC 12.05 01/17/2017    HGB 9.9 (L) 01/17/2017    HCT 29.9 (L) 01/17/2017    MCV 84 01/17/2017     01/17/2017     BMP  Lab Results   Component Value Date     (L) 01/17/2017    K 4.4 01/17/2017     01/17/2017    CO2 17 (L) " 01/17/2017    BUN 39 (H) 01/17/2017    CREATININE 4.6 (H) 01/17/2017    CALCIUM 8.2 (L) 01/17/2017    ANIONGAP 11 01/17/2017    ESTGFRAFRICA 14 (A) 01/17/2017    EGFRNONAA 12 (A) 01/17/2017     Vancomycin 19    Microbiology Results (last 7 days)     Procedure Component Value Units Date/Time    Blood culture [[id number]] Collected:  01/16/17 0552    Order Status:  Completed Specimen:  Blood Updated:  01/18/17 1212     Blood Culture, Routine No Growth to date     Blood Culture, Routine No Growth to date     Blood Culture, Routine No Growth to date    Blood culture [[id number]] Collected:  01/16/17 0542    Order Status:  Completed Specimen:  Blood Updated:  01/18/17 1212     Blood Culture, Routine No Growth to date     Blood Culture, Routine No Growth to date     Blood Culture, Routine No Growth to date        Active Hospital Problems    Diagnosis  POA    *Acute osteomyelitis of left foot [M86.172]  Yes    Metabolic acidemia [E87.2]  Yes    Chronic kidney disease, stage 5, kidney failure [N18.5]  Yes     Chronic    Osteomyelitis [M86.9]  Yes    Essential hypertension [I10]  Yes     Chronic      Resolved Hospital Problems    Diagnosis Date Resolved POA   No resolved problems to display.       A/P  67 y/o man with advanced CKD stage 5 was admitted for toe osteomyelitis      1. Renal: CKD stage 5. Renal function has remained stable during this hospital stay  No significant change in s Cr  Pt was informed  K normal  Metabolic acidosis is present, will add PObicarboante      2. HTN: BP better controlled after adding low dose amlodipine  Will increase dose  Meds reviewed      3. ID: left second toe osteomyelitis.  Abx reviewed: On daptomycin and ancomycin. Pharmacy is dosing vancomycin. Level is therapeutic  Recommend maintain a therapeutic window of 15-20.  Pt may require amputation  Podiatry following      4. DM: long standing. Since 1992.  Likely has diabetic nephropathy and vasculopathy.      Plans and  recommendations:  As discussed above  Increase amlodipine to 5 mg po qd  Will f/u with podiatry recommendations     Lazara Granados MD

## 2017-01-18 NOTE — ASSESSMENT & PLAN NOTE
He might need amputation, no obvious ulceration is noted at this time.  Will check ESR /CRP  Continue vanco and will add fortaz .

## 2017-01-18 NOTE — PLAN OF CARE
Spoke to Keke with [employer].  Patient will be required a copay amount that will reach 3000.00.  Met with patient to discuss options home vs LTAC.   Patient requested 20 minutes to decide.  CM will meet again with patient in 20 minutes.    @1535 met with patient at the bedside regarding choice for antibiotics - Home vs LTAC.  Patient would like to talk to [facility] to find out if services would be covered at 100%.  Choice from completed and referral made via right care.  CM left message for Giuseppe regarding referral.    @1620 CM spoke to [facility] with [facility].  Benefits would be paid just as acute care hospital.  Kathy will be coming to hospital to meet with patient.    @1629 CM met with patient at the bedside in regards to to information provided by [facility] at [facility].  Patient states that if he does not have to pay, he will go to [facility].  CM notified patient that discharge orders will be placed by doctor to reflect transfer to [facility] today.    @6724 Update to TAMIKA Sow [facility] on med/surg

## 2017-01-18 NOTE — DISCHARGE SUMMARY
[facility] Hospital Medicine  Discharge Summary      Patient Name: oCnner Perez III  MRN: [id number]  Admission Date: 1/15/2017  Hospital Length of Stay: 3 days  Discharge Date and Time: 1/18/2017 5:27 PM  Attending Physician: Brendan Blake, *   Discharging Provider: Susan Retana NP  Primary Care Provider: Raciel Angela MD      HPI:   66 year old male with h/o CKD stage 5, IDDM, and peripheral neuropathy who presented to ED with left 2nd toe erythema, swelling, and pain x 2 days.  Denies any fever, chills or sweats. Denies any cuts or insect bites.  Followed by Dr. Rhodes for CKD.    * No surgery found *      Indwelling Lines/Drains at time of discharge:   Lines/Drains/Airways     Peripherally Inserted Central Catheter Line                 PICC Double Lumen 01/17/17 1446 right brachial 1 day              Hospital Course:   The pt was admitted with presumed osteomyelitis of left 2nd toe on IV Vancomycin. MRI was positive for osteomyelitis. Care was discussed with Podiatry who recommended medical management for now with IV antibiotics. Care was discussed with ID who recommended IV Vancomycin and Levaquin x 6 weeks. The pt will be discharged to [facility] for continued IV antibiotics and wound care and close monitoring of renal function. Recommended daily random Vancomycin levels until stable. Also recommended BMP twice weekly to monitor CKD5. The pt was seen and examined today and determined stable for discharge.           Consults:   Consults         Status Ordering Provider     Inpatient consult to Infectious Diseases  Once     Provider:  Eder Kay MD    Acknowledged BRENDAN BLAKE     Inpatient consult to Nephrology  Once     Provider:  Lazara Granados MD    Completed JUAN JOSE SINHA     Inpatient consult to PICC team (NIAS)  Once     Provider:  (Not yet assigned)    Acknowledged BRENDAN BLAKE     Inpatient consult to Podiatry  Once     Provider:  Bandar Gomes  DPM    Completed JUAN JOSE SINHA     Inpatient consult to Social Work  Once     Provider:  (Not yet assigned)    Completed JORGE LUIS THOMAS     Pharmacy to dose Vancomycin consult  Once     Provider:  (Not yet assigned)    Acknowledged MI PHAM          Significant Diagnostic Studies:   Imaging Results         FL PICC Line Placement (xpd) (Final result) Result time:  01/17/17 15:02:21    Final result by Alfonso Farr MD (01/17/17 15:02:21)    Impression:          Successful ultrasound guided right upper extremity PICC placement.      Electronically signed by: ALFONSO FARR MD  Date:     01/17/17  Time:    15:02     Narrative:    Ultrasound guided PICC line placement   History: Long-term antibiotic therapy., IV abx.  Osteomyelitis right 2nd toe.    Procedure:     PICC placement.    Procedure Note:      Procedure was performed with the assistance of ANISHA Diop. After obtaining informed consent, the right upper extremity was prepped and draped in the usual sterile fashion using maximum sterile barrier technique. Lidocaine was used to achieve adequate local anesthesia. The right brachial vein was localized using sonography and venous access was achieved with a micropuncture needle under fluoroscopic guidance. A 5 [place] PICC was cut to 40 cm in length. The PICC was inserted through the sheath and tip guided to the cavoatrial junction. The catheter was flushed with heparin and secured in place with a chlorhexidine patch. The patient tolerated the procedure well with minimal blood loss and no immediate complications.    Maximum sterile barrier technique was utilized.    2 images obtained.  0.5 minute fluoroscopy time.            MRI Lower Extremity Without Contrast Left (Final result) Result time:  01/17/17 08:23:19    Procedure changed from MRI Lower Extremity W WO Contrast Left        Final result by TERESA Kumar Sr., MD (01/17/17 08:23:19)    Impression:      1.  There is a moderate  amount of edema in the distal phalanx of the left second toe.  The distal aspect of the distal phalanx is absent.  This is consistent with the patient's history and characteristic of osteomyelitis.  2.  There is a moderate amount of edema in the soft tissue around the distal and middle phalanges of the left second toe.  There is a mild amount of edema in the dorsum of the foot.  This is consistent with the patient's history and characteristic of cellulitis.        Electronically signed by: TERESA FAROOQ MD  Date:     01/17/17  Time:    08:23     Narrative:    MRI of the left foot without IV contrast    Clinical History Osteomyelitis of the second toe    Technique: A standard foot MRI protocol without IV contrast was performed.       Finding: Comparison was made to a prior examination performed on 1/15/2017.  There is a moderate amount of edema in the soft tissue around the distal and middle phalanges of the left second toe.  There is a moderate amount of edema in the distal phalanx of the left second toe.  The distal aspect of the distal phalanx is absent.  There is no dislocation.  There is a mild amount of edema in the dorsum of the foot.            US Ankle Brachial Indices Low Ext WO Str (Final result) Result time:  01/16/17 15:02:23    Procedure changed from US Lower Extrem Arteries Bilat with JOSE DANIEL (xpd)        Final result by Dashawn Farr MD (01/16/17 15:02:23)    Impression:      The right and left JOSE DANIEL is normal.  The digital JOSE DANIEL is mildly to moderately decreased with dampened waveforms.          >1.3: Non-compressible or calcified vessels  0.97 - 1.29: No significant PAD  0.75 - 0.96: Mild PAD  0.50 - 0.74: Moderate PAD  <0.50: Severe PAD  <0.30: Critical PAD      Electronically signed by: DASHAWN FARR MD  Date:     01/16/17  Time:    15:02     Narrative:    Exam: US ANKLE BRACHIAL INDICES LOW EXT WO STR,    Date:  01/16/17 14:46:18    History: Arterial insufficiency    Findings: Complete lower  extremity JOSE DANIEL with PVR. The lower extremities were evaluated at multiple levels with continuous wave Doppler to obtain systolic segmental pressure recordings at the brachial and ankle segments.    Right                                                     Left  brachial:  161                                                brachial:     170  ankle(PT):189    Index:1.1                               ankle(PT):  178    Index:1.05  ankle(DP):171             1.0                               ankle(DP):  171            1.0  digit:       119       TBI  0.7                               digit:         74      TBI 0.44      The right wave form is normal.    The left wave form is normal.            X-Ray Foot Complete Left (Final result) Result time:  01/15/17 18:20:21    Final result by Iglesia Isaac Jr., MD (01/15/17 18:20:21)    Impression:         Osteomyelitis changes involving the 2nd toe      Electronically signed by: IGLESIA ISAAC MD  Date:     01/15/17  Time:    18:20     Narrative:    EXAM:   XR FOOT COMPLETE 3 VIEW LEFT    CLINICAL HISTORY:    Other specified soft tissue disorders; E11.40 Type 2 diabetes mellitus with diabetic neuropathy, unspecified;.    COMPARISON:  None    FINDINGS:   With significant soft tissue swelling of the 2nd toe.  Erosive changes involving the distal aspect of the distal phalanx of the 2nd toe, consistent with osteomyelitis.              Pending Diagnostic Studies:     None        Final Active Diagnoses:    Diagnosis Date Noted POA    PRINCIPAL PROBLEM:  Acute osteomyelitis of left foot [M86.172] 01/15/2017 Yes    Chronic kidney disease, stage 5, kidney failure [N18.5] 01/15/2017 Yes     Chronic    Anemia of renal disease [D63.1] 01/18/2017 Yes    Metabolic acidemia [E87.2] 01/16/2017 Yes    Essential hypertension [I10] 01/12/2017 Yes     Chronic    Constipation [K59.00] 11/23/2016 Yes      Problems Resolved During this Admission:    Diagnosis Date Noted Date Resolved POA       No new Assessment & Plan notes have been filed under this hospital service since the last note was generated.  Service: Hospital Medicine      Discharged Condition: stable    Disposition: Long Term Care    Follow Up:  Follow-up Information     Please follow up.    Why:  LTAC provider in 1 day         Please follow up.    Why:  Daily random vancomycin levels         Please follow up.    Why:  BMP twice weekly on LTAC        Patient Instructions:     Diet general       Medications:  Reconciled Home Medications:   Current Discharge Medication List      START taking these medications    Details   amlodipine (NORVASC) 5 MG tablet Take 1 tablet (5 mg total) by mouth once daily.  Qty: 30 tablet, Refills: 11      docusate sodium (COLACE) 100 MG capsule Take 1 capsule (100 mg total) by mouth 2 (two) times daily.  Refills: 0      insulin aspart (NOVOLOG) 100 unit/mL InPn pen Inject 0-5 Units into the skin every 6 (six) hours as needed (Hyperglycemia).  Refills: 0      lactulose (CHRONULAC) 20 gram/30 mL Soln Take 45 mLs (30 g total) by mouth every 6 (six) hours as needed.      levoFLOXacin (LEVAQUIN IN 5 % DEXTROSE) 750 mg/150 mL PgBk Inject 150 mLs (750 mg total) into the vein every other day.      VANCOMYCIN HCL (VANCOMYCIN 1 G/250 ML D5W, READY TO MIX SYSTEM,) Consult Pharmacy at [facility] to dose         CONTINUE these medications which have CHANGED    Details   hydrALAZINE (APRESOLINE) 50 MG tablet Take 1 tablet (50 mg total) by mouth 3 (three) times daily.  Qty: 90 tablet, Refills: 11         CONTINUE these medications which have NOT CHANGED    Details   aspirin 81 MG Chew Take 81 mg by mouth once daily.      atorvastatin (LIPITOR) 40 MG tablet Take 40 mg by mouth once daily.      diltiaZEM (CARDIZEM CD) 360 MG 24 hr capsule Take 360 mg by mouth once daily.      ondansetron (ZOFRAN) 4 MG tablet Take 1 tablet (4 mg total) by mouth every 6 (six) hours.  Qty: 10 tablet, Refills: 0      sucralfate (CARAFATE) 100 mg/mL  suspension Take 10 mLs (1 g total) by mouth 4 (four) times daily.  Qty: 1 Bottle, Refills: 0      tamsulosin (FLOMAX) 0.4 mg Cp24 Take 0.4 mg by mouth once daily.      dexlansoprazole (DEXILANT) 30 mg CpDM Take 50 mg by mouth.         STOP taking these medications       INSULIN NPH HUM/REG INSULIN HM (HUMULIN 70/30 SUBQ) Comments:   Reason for Stopping:         pantoprazole (PROTONIX) 20 MG tablet Comments:   Reason for Stopping:             Time spent on the discharge of patient: 45 minutes    Susan Retana NP  Department of Hospital Medicine  [facility]     Patient is seen and examined, complained of constipation, one dose of lactulose was ordered with no relief, enema was ordered, no change in exam from yesterday. Discussed with ID about recommendation for Abx, SW consult for LTAC, patient was accepted to LTAC, will dc patient to LTAC.

## 2017-01-18 NOTE — PLAN OF CARE
Problem: Patient Care Overview  Goal: Plan of Care Review  Outcome: Ongoing (interventions implemented as appropriate)  Pt remains free from injury. Pt refuses bed alarm. Fall precautions in place. Bed in locked, low position with bed rails upx2. IV ANX given during shift as ordered. Pt tolerating DM diet. Denies pain. Call light and personal items within reach. Pt educated on medication's side effects. Pt verbalized understanding. VSS.  Will continue to monitor.

## 2017-01-18 NOTE — PROGRESS NOTES
Conner Perez [facility] [id number] is a 66 y.o. male who has been consulted for vancomycin dosing.    The patient has the following labs:     Date Creatinine (mg/dl)    BUN WBC Count   1/18/2017 Estimated Creatinine Clearance: 23.5 mL/min (based on Cr of 4.6). Lab Results   Component Value Date    BUN 39 (H) 01/17/2017     Lab Results   Component Value Date    WBC 12.05 01/17/2017      which calculates to an Estimated Creatinine Clearance: 23.5 mL/min (based on Cr of 4.6)..       Current weight is 125.6 kg (277 lb)    Random level at 0343 on 1/18/17 was 19.1    Therapeutic trough range is 15-20 mcg/mL for Osteomyelitis    We will continue 1,500 mg Vancomycin every 24 hours beginning this morning at 0730.  A trough level will be ordered prior to the next dose on 1/19/17 at 0630.    Thank you for allowing us to participate in this patient's care.     Santo Whitt

## 2017-01-18 NOTE — PROGRESS NOTES
Clinical Pharmacy: Vancomycin Brief Progress Note  **please disregard previous note from pharmacy @ 0619 this morning**    Since we are actively treating osteomyelitis (goal trough 15-20 mcg/ml) & we are pulse-dosing PRN due to CKD stage 5 (not on HD yet), we will only re-dose when random level is < 18 mcg/ml  Random @ 03:43 = 19.1 --> no dose needed today  We will check another random tomorrow 1/19 @ 0430 and will re-dose if < 18 mcg/ml  Next dose tentatively due 1/19 @ 1700 PM    Thank you for allowing us to participate in this patient's care.  Katherine McArdle, Pharm.D. 1/18/2017 7:45 AM

## 2017-01-18 NOTE — SUBJECTIVE & OBJECTIVE
Past Medical History   Diagnosis Date    Diabetes mellitus     HTN (hypertension)     Renal disorder      CKD 3       History reviewed. No pertinent past surgical history.    Review of patient's allergies indicates:   Allergen Reactions    Augmentin [amoxicillin-pot clavulanate]     Sulfa (sulfonamide antibiotics)        Medications:  Prescriptions Prior to Admission   Medication Sig    aspirin 81 MG Chew Take 81 mg by mouth once daily.    atorvastatin (LIPITOR) 40 MG tablet Take 40 mg by mouth once daily.    diltiaZEM (CARDIZEM CD) 360 MG 24 hr capsule Take 360 mg by mouth once daily.    hydrALAZINE (APRESOLINE) 25 MG tablet Take 25 mg by mouth 3 (three) times daily.    INSULIN NPH HUM/REG INSULIN HM (HUMULIN 70/30 SUBQ) Inject 30 Units into the skin 2 (two) times daily.    ondansetron (ZOFRAN) 4 MG tablet Take 1 tablet (4 mg total) by mouth every 6 (six) hours.    sucralfate (CARAFATE) 100 mg/mL suspension Take 10 mLs (1 g total) by mouth 4 (four) times daily.    tamsulosin (FLOMAX) 0.4 mg Cp24 Take 0.4 mg by mouth once daily.    dexlansoprazole (DEXILANT) 30 mg CpDM Take 50 mg by mouth.    pantoprazole (PROTONIX) 20 MG tablet Take 1 tablet (20 mg total) by mouth once daily.     Antibiotics     Start     Stop Route Frequency Ordered    01/17/17 1000  vancomycin (VANCOCIN) 1,500 mg in dextrose 5 % 250 mL IVPB      -- IV Every 48 hours (non-standard times) 01/15/17 1923    01/16/17 1730  moxifloxacin 400 mg/250 mL IVPB 400 mg      -- IV Every 24 hours (non-standard times) 01/16/17 1621        Antifungals     None        Antivirals     None             There is no immunization history on file for this patient.    Family History     Problem Relation (Age of Onset)    Hypertension Father        Social History     Social History    Marital status:      Spouse name: N/A    Number of children: N/A    Years of education: N/A     Social History Main Topics    Smoking status: Never Smoker     Smokeless tobacco: None    Alcohol use No    Drug use: No    Sexual activity: Not Asked     Other Topics Concern    None     Social History Narrative     Travel History:   Has patient traveled outside of the [place]?  No  Has patient traveled outside of [place]? No      Review of Systems   Constitutional: Negative for activity change, appetite change, chills, diaphoresis and fatigue.   HENT: Negative for congestion, dental problem, ear discharge, ear pain and facial swelling.    Eyes: Negative for pain, discharge and itching.   Respiratory: Negative for apnea, cough, chest tightness and shortness of breath.    Cardiovascular: Negative for chest pain and leg swelling.   Gastrointestinal: Negative for abdominal distention and abdominal pain.   Endocrine: Negative for cold intolerance, heat intolerance and polydipsia.   Genitourinary: Negative for difficulty urinating, dysuria and enuresis.   Musculoskeletal: Negative for arthralgias and back pain.   Skin: Negative for color change and pallor.   Allergic/Immunologic: Negative for environmental allergies and food allergies.   Neurological: Negative for dizziness, facial asymmetry, light-headedness and headaches.   Hematological: Negative for adenopathy. Does not bruise/bleed easily.   Psychiatric/Behavioral: Negative for agitation and behavioral problems.     Objective:     Vital Signs (Most Recent):  Temp: 98.1 °F (36.7 °C) (01/18/17 0357)  Pulse: 78 (01/18/17 0357)  Resp: 18 (01/18/17 0357)  BP: 127/81 (01/18/17 0357)  SpO2: 98 % (01/18/17 0357) Vital Signs (24h Range):  Temp:  [98 °F (36.7 °C)-98.7 °F (37.1 °C)] 98.1 °F (36.7 °C)  Pulse:  [76-92] 78  Resp:  [16-18] 18  SpO2:  [97 %-98 %] 98 %  BP: (121-165)/(74-92) 127/81     Weight: 125.6 kg (277 lb)  Body mass index is 32.01 kg/(m^2).    Estimated Creatinine Clearance: 23.5 mL/min (based on Cr of 4.6).    Physical Exam   Constitutional: He is oriented to person, place, and time. He appears  well-developed and well-nourished.   HENT:   Head: Normocephalic and atraumatic.   Nose: Nose normal.   Eyes: EOM are normal. Pupils are equal, round, and reactive to light.   Neck: Normal range of motion. Neck supple.   Cardiovascular: Normal rate, regular rhythm and normal heart sounds.    Pulmonary/Chest: Effort normal and breath sounds normal. No respiratory distress. He has no wheezes. He has no rales.   Abdominal: There is no tenderness.   Musculoskeletal: Normal range of motion. He exhibits no edema.   Neurological: He is alert and oriented to person, place, and time.   Skin: Skin is dry.   Nursing note and vitals reviewed.      Significant Labs:   CMP:   Recent Labs  Lab 01/17/17  0640   *   K 4.4      CO2 17*   *   BUN 39*   CREATININE 4.6*   CALCIUM 8.2*   ANIONGAP 11   EGFRNONAA 12*       Significant Imaging: I have reviewed all pertinent imaging results/findings within the past 24 hours.

## 2017-01-18 NOTE — CONSULTS
[facility]  Infectious Disease  Consult Note    Patient Name: Conner Perez III  MRN: [id number]  Admission Date: 1/15/2017  Hospital Length of Stay: 3 days  Attending Physician: Brendan Blake, *  Primary Care Provider: Raciel Angela MD     Isolation Status: No active isolations    Patient information was obtained from patient and ER records.      Consults  Assessment/Plan:     * Acute osteomyelitis of left foot  He might need amputation, no obvious ulceration is noted at this time.  Will check ESR /CRP  Continue vanco and Avelox for now .          Chronic kidney disease, stage 5, kidney failure  Nephrology follow up .  Will avoid nephrotoxic meds , closely monitor serum creatinine.      Thank you for your consult. I will follow-up with patient. Please contact us if you have any additional questions.    Subjective:     Principal Problem: Acute osteomyelitis of left foot    HPI: 66 year old male with history of CKD stage 5 and DM who was admitted with compliants of left second toe swelling and pain. Since admission, MRI of the left foot showed  that  there is a moderate amount of edema in the distal phalanx of the left second toe.  The distal aspect of the distal phalanx is absent.  This is consistent with osteomyelitis.  2.  There is a moderate amount of edema in the soft tissue around the distal and middle phalanges of the left second toe.  There is a mild amount of edema in the dorsum of the foot.  This is consistent with  cellulitis.He denies any history of trauma .      CBC showed wbc of 13.57. He is afebrile .        Past Medical History   Diagnosis Date    Diabetes mellitus     HTN (hypertension)     Renal disorder      CKD 3       History reviewed. No pertinent past surgical history.    Review of patient's allergies indicates:   Allergen Reactions    Augmentin [amoxicillin-pot clavulanate]     Sulfa (sulfonamide antibiotics)        Medications:  Prescriptions Prior to Admission    Medication Sig    aspirin 81 MG Chew Take 81 mg by mouth once daily.    atorvastatin (LIPITOR) 40 MG tablet Take 40 mg by mouth once daily.    diltiaZEM (CARDIZEM CD) 360 MG 24 hr capsule Take 360 mg by mouth once daily.    hydrALAZINE (APRESOLINE) 25 MG tablet Take 25 mg by mouth 3 (three) times daily.    INSULIN NPH HUM/REG INSULIN HM (HUMULIN 70/30 SUBQ) Inject 30 Units into the skin 2 (two) times daily.    ondansetron (ZOFRAN) 4 MG tablet Take 1 tablet (4 mg total) by mouth every 6 (six) hours.    sucralfate (CARAFATE) 100 mg/mL suspension Take 10 mLs (1 g total) by mouth 4 (four) times daily.    tamsulosin (FLOMAX) 0.4 mg Cp24 Take 0.4 mg by mouth once daily.    dexlansoprazole (DEXILANT) 30 mg CpDM Take 50 mg by mouth.    pantoprazole (PROTONIX) 20 MG tablet Take 1 tablet (20 mg total) by mouth once daily.     Antibiotics     Start     Stop Route Frequency Ordered    01/17/17 1000  vancomycin (VANCOCIN) 1,500 mg in dextrose 5 % 250 mL IVPB      -- IV Every 48 hours (non-standard times) 01/15/17 1923    01/16/17 1730  moxifloxacin 400 mg/250 mL IVPB 400 mg      -- IV Every 24 hours (non-standard times) 01/16/17 1621        Antifungals     None        Antivirals     None             There is no immunization history on file for this patient.    Family History     Problem Relation (Age of Onset)    Hypertension Father        Social History     Social History    Marital status:      Spouse name: N/A    Number of children: N/A    Years of education: N/A     Social History Main Topics    Smoking status: Never Smoker    Smokeless tobacco: None    Alcohol use No    Drug use: No    Sexual activity: Not Asked     Other Topics Concern    None     Social History Narrative     Travel History:   Has patient traveled outside of the [place]?  No  Has patient traveled outside of [place]? No      Review of Systems   Constitutional: Negative for activity change, appetite change, chills,  diaphoresis and fatigue.   HENT: Negative for congestion, dental problem, ear discharge, ear pain and facial swelling.    Eyes: Negative for pain, discharge and itching.   Respiratory: Negative for apnea, cough, chest tightness and shortness of breath.    Cardiovascular: Negative for chest pain and leg swelling.   Gastrointestinal: Negative for abdominal distention and abdominal pain.   Endocrine: Negative for cold intolerance, heat intolerance and polydipsia.   Genitourinary: Negative for difficulty urinating, dysuria and enuresis.   Musculoskeletal: Negative for arthralgias and back pain.   Skin: Negative for color change and pallor.   Allergic/Immunologic: Negative for environmental allergies and food allergies.   Neurological: Negative for dizziness, facial asymmetry, light-headedness and headaches.   Hematological: Negative for adenopathy. Does not bruise/bleed easily.   Psychiatric/Behavioral: Negative for agitation and behavioral problems.     Objective:     Vital Signs (Most Recent):  Temp: 98.1 °F (36.7 °C) (01/18/17 0357)  Pulse: 78 (01/18/17 0357)  Resp: 18 (01/18/17 0357)  BP: 127/81 (01/18/17 0357)  SpO2: 98 % (01/18/17 0357) Vital Signs (24h Range):  Temp:  [98 °F (36.7 °C)-98.7 °F (37.1 °C)] 98.1 °F (36.7 °C)  Pulse:  [76-92] 78  Resp:  [16-18] 18  SpO2:  [97 %-98 %] 98 %  BP: (121-165)/(74-92) 127/81     Weight: 125.6 kg (277 lb)  Body mass index is 32.01 kg/(m^2).    Estimated Creatinine Clearance: 23.5 mL/min (based on Cr of 4.6).    Physical Exam   Constitutional: He is oriented to person, place, and time. He appears well-developed and well-nourished.   HENT:   Head: Normocephalic and atraumatic.   Nose: Nose normal.   Eyes: EOM are normal. Pupils are equal, round, and reactive to light.   Neck: Normal range of motion. Neck supple.   Cardiovascular: Normal rate, regular rhythm and normal heart sounds.    Pulmonary/Chest: Effort normal and breath sounds normal. No respiratory distress. He has no  wheezes. He has no rales.   Abdominal: There is no tenderness.   Musculoskeletal: Normal range of motion. He exhibits no edema.       Neurological: He is alert and oriented to person, place, and time.   Skin: Skin is dry.   Nursing note and vitals reviewed.      Significant Labs:   CMP:   Recent Labs  Lab 01/17/17  0640   *   K 4.4      CO2 17*   *   BUN 39*   CREATININE 4.6*   CALCIUM 8.2*   ANIONGAP 11   EGFRNONAA 12*       Significant Imaging: I have reviewed all pertinent imaging results/findings within the past 24 hours.        Eder Kay MD  Infectious Disease  [facility]

## 2017-01-18 NOTE — PROGRESS NOTES
"Pt sitting up in chair complaining of constipation. Pt stated, "I feel like I am impacted." Last bm was 1/12//17. Stool softener increased to BID earlier in shift. Notified Dr. Muñoz. Will continue to monitor.  "

## 2017-01-18 NOTE — PLAN OF CARE
CM received consult for Home Health and home IV antibiotics.  VENICE contacted Keke with [phone] with referral.  VENICE contacted Daynell with [facility] with referral.

## 2017-01-18 NOTE — PLAN OF CARE
Problem: Patient Care Overview  Goal: Plan of Care Review  Outcome: Ongoing (interventions implemented as appropriate)  Pt denies pain. Pt ambulates in room. Pt complained of constipation during shift. VSS.  Fall precautions in place. Side rails up. Bed locked and low in position. Call light and personal items within reach. 24 hour order chart check completed. Pt educated on medication's side effects. Pt verbalized understanding.   Will continue to monitor.

## 2017-01-18 NOTE — PLAN OF CARE
Problem: Patient Care Overview  Goal: Plan of Care Review  Outcome: Ongoing (interventions implemented as appropriate)  Pt remains free from injury. Pt denies pain. Pt ambulates in room. Fall precautions in place. Side rails up x2. Bed locked and low in position. Call light and personal items within reach. Pt educated on medication's side effects. Pt verbalized understanding. VSS. Will continue to monitor.

## 2017-01-19 ENCOUNTER — ANESTHESIA EVENT (OUTPATIENT)
Dept: SURGERY | Facility: HOSPITAL | Age: 67
End: 2017-01-19

## 2017-01-19 DIAGNOSIS — M86.172 ACUTE OSTEOMYELITIS OF LEFT FOOT: Primary | ICD-10-CM

## 2017-01-19 DIAGNOSIS — L97.523 TOE ULCER, LEFT, WITH NECROSIS OF MUSCLE: ICD-10-CM

## 2017-01-19 PROBLEM — E43: Chronic | Status: ACTIVE | Noted: 2017-01-19

## 2017-01-19 PROBLEM — E11.9 DM II (DIABETES MELLITUS, TYPE II), CONTROLLED: Status: ACTIVE | Noted: 2017-01-19

## 2017-01-19 PROBLEM — E11.69: Chronic | Status: ACTIVE | Noted: 2017-01-19

## 2017-01-19 PROBLEM — R53.81 DEBILITY: Status: ACTIVE | Noted: 2017-01-19

## 2017-01-19 PROBLEM — E43 SEVERE MALNUTRITION: Chronic | Status: ACTIVE | Noted: 2017-01-19

## 2017-01-19 NOTE — PROGRESS NOTES
Room air, respirations even and unlabored, no distress noted on assessment, no pain nausea ior shortness of breath at present time, left foot second toe swollen, right upper arm PICC dl sl, transfer to [facility]

## 2017-01-20 ENCOUNTER — ANESTHESIA (OUTPATIENT)
Dept: SURGERY | Facility: HOSPITAL | Age: 67
End: 2017-01-20
Payer: MEDICARE

## 2017-01-20 PROBLEM — E11.621 DIABETIC ULCER OF LEFT FOOT: Status: ACTIVE | Noted: 2017-01-20

## 2017-01-20 PROBLEM — L97.529 DIABETIC ULCER OF LEFT FOOT: Status: ACTIVE | Noted: 2017-01-20

## 2017-01-21 LAB
BACTERIA BLD CULT: NORMAL
BACTERIA BLD CULT: NORMAL

## 2017-01-24 ENCOUNTER — SURGERY (OUTPATIENT)
Age: 67
End: 2017-01-24

## 2017-01-24 PROBLEM — M86.9 OSTEOMYELITIS OF TOE OF LEFT FOOT: Status: ACTIVE | Noted: 2017-01-24

## 2017-01-24 PROBLEM — E11.621 DIABETIC ULCER OF LEFT FOOT: Status: RESOLVED | Noted: 2017-01-20 | Resolved: 2017-01-24

## 2017-01-24 PROBLEM — L97.529 DIABETIC ULCER OF LEFT FOOT: Status: RESOLVED | Noted: 2017-01-20 | Resolved: 2017-01-24

## 2017-01-24 PROCEDURE — 25000003 PHARM REV CODE 250: Performed by: NURSE ANESTHETIST, CERTIFIED REGISTERED

## 2017-01-24 PROCEDURE — 63600175 PHARM REV CODE 636 W HCPCS: Performed by: NURSE ANESTHETIST, CERTIFIED REGISTERED

## 2017-01-24 RX ORDER — FENTANYL CITRATE 50 UG/ML
INJECTION, SOLUTION INTRAMUSCULAR; INTRAVENOUS
Status: DISCONTINUED | OUTPATIENT
Start: 2017-01-24 | End: 2017-01-24

## 2017-01-24 RX ORDER — SODIUM CHLORIDE, SODIUM LACTATE, POTASSIUM CHLORIDE, CALCIUM CHLORIDE 600; 310; 30; 20 MG/100ML; MG/100ML; MG/100ML; MG/100ML
INJECTION, SOLUTION INTRAVENOUS CONTINUOUS PRN
Status: DISCONTINUED | OUTPATIENT
Start: 2017-01-24 | End: 2017-01-24

## 2017-01-24 RX ORDER — MIDAZOLAM HYDROCHLORIDE 1 MG/ML
INJECTION, SOLUTION INTRAMUSCULAR; INTRAVENOUS
Status: DISCONTINUED | OUTPATIENT
Start: 2017-01-24 | End: 2017-01-24

## 2017-01-24 RX ORDER — PROPOFOL 10 MG/ML
VIAL (ML) INTRAVENOUS CONTINUOUS PRN
Status: DISCONTINUED | OUTPATIENT
Start: 2017-01-24 | End: 2017-01-24

## 2017-01-24 RX ORDER — LIDOCAINE HCL/PF 100 MG/5ML
SYRINGE (ML) INTRAVENOUS
Status: DISCONTINUED | OUTPATIENT
Start: 2017-01-24 | End: 2017-01-24

## 2017-01-24 RX ADMIN — MIDAZOLAM HYDROCHLORIDE 2 MG: 1 INJECTION, SOLUTION INTRAMUSCULAR; INTRAVENOUS at 07:01

## 2017-01-24 RX ADMIN — FENTANYL CITRATE 100 MCG: 50 INJECTION, SOLUTION INTRAMUSCULAR; INTRAVENOUS at 07:01

## 2017-01-24 RX ADMIN — SODIUM CHLORIDE, SODIUM LACTATE, POTASSIUM CHLORIDE, AND CALCIUM CHLORIDE: 600; 310; 30; 20 INJECTION, SOLUTION INTRAVENOUS at 07:01

## 2017-01-24 RX ADMIN — PROPOFOL 600 MG/HR: 10 INJECTION, EMULSION INTRAVENOUS at 07:01

## 2017-01-24 RX ADMIN — LIDOCAINE HYDROCHLORIDE 80 MG: 20 INJECTION, SOLUTION INTRAVENOUS at 07:01

## 2017-01-24 RX ADMIN — LIDOCAINE HYDROCHLORIDE 10 ML: 10 INJECTION, SOLUTION EPIDURAL; INFILTRATION; INTRACAUDAL; PERINEURAL at 07:01

## 2017-01-24 RX ADMIN — BUPIVACAINE HYDROCHLORIDE 30 ML: 5 INJECTION, SOLUTION PERINEURAL at 07:01

## 2017-01-24 NOTE — ANESTHESIA PREPROCEDURE EVALUATION
01/24/2017  Conner Perez III is a 66 y.o., male.    [facility] Anesthesia Evaluation    I have reviewed the Patient Summary Reports.        Review of Systems  Cardiovascular:   Hypertension    Renal/:   Chronic Renal Disease, CRI    Hepatic/GI:   malnutrition   Musculoskeletal:  Musculoskeletal General/Symptoms: Acute osteomyelitis of left foot   Endocrine:   Diabetes        Physical Exam  General:  Obesity    Airway/Jaw/Neck:  Airway Findings: Tongue: Normal Mallampati: II      Dental:  Dental Findings: Periodontal disease, Severe   Chest/Lungs:  Chest/Lungs Findings: Clear to auscultation     Heart/Vascular:  Heart Findings: Rate: Normal        Mental Status:  Mental Status Findings:  Alert and Oriented         Anesthesia Plan  Type of Anesthesia, risks & benefits discussed:  Anesthesia Type:  MAC, regional  Patient's Preference:   Intra-op Monitoring Plan:   Intra-op Monitoring Plan Comments:   Post Op Pain Control Plan:   Post Op Pain Control Plan Comments:   Induction:    Beta Blocker:  Patient is not currently on a Beta-Blocker (No further documentation required).       Informed Consent: Patient understands risks and agrees with Anesthesia plan.  Questions answered.   ASA Score: 4     Day of Surgery Review of History & Physical: I have interviewed and examined the patient. I have reviewed the patient's H&P dated: 1/20/17. There are no significant changes.          Ready For Surgery From Anesthesia Perspective.

## 2017-01-24 NOTE — ANESTHESIA RELEASE NOTE
Anesthesia Release from PACU Note    Patient: Conner Perez III    Procedure(s) Performed: Procedure(s) (LRB):  AMPUTATION-TOE (Left)    Anesthesia type: MAC    Post pain: Adequate analgesia    Post assessment: no apparent anesthetic complications    Last Vitals: There were no vitals taken for this visit.    Post vital signs: stable    Level of consciousness: awake    Nausea/Vomiting: no nausea/no vomiting    Complications: none    Airway Patency: patent    Respiratory: unassisted    Cardiovascular: stable and blood pressure at baseline    Hydration: euvolemic

## 2017-01-24 NOTE — ANESTHESIA POSTPROCEDURE EVALUATION
Anesthesia Post Evaluation    Patient: Conner Perez III    Procedure(s) Performed: Procedure(s) (LRB):  AMPUTATION-TOE (Left)    Final Anesthesia Type: general  Patient location during evaluation: PACU  Patient participation: Yes- Able to Participate  Level of consciousness: awake and alert  Post-procedure vital signs: reviewed and stable  Pain management: adequate  Airway patency: patent  PONV status at discharge: No PONV  Anesthetic complications: no      Cardiovascular status: stable  Respiratory status: unassisted  Hydration status: euvolemic  Follow-up not needed.        Visit Vitals    /66    Pulse 84    Temp 36.9 °C (98.4 °F) (Temporal)    Resp 18    SpO2 97%       Pain/Albert Score: Pain Assessment Performed: Yes (1/24/2017  8:45 AM)  Presence of Pain: denies (1/24/2017  8:45 AM)  Albert Score: 10 (1/24/2017  8:45 AM)

## 2017-02-10 ENCOUNTER — OFFICE VISIT (OUTPATIENT)
Dept: PODIATRY | Facility: CLINIC | Age: 67
End: 2017-02-10
Payer: MEDICARE

## 2017-02-10 ENCOUNTER — TELEPHONE (OUTPATIENT)
Dept: PODIATRY | Facility: CLINIC | Age: 67
End: 2017-02-10

## 2017-02-10 VITALS
WEIGHT: 265.88 LBS | HEART RATE: 83 BPM | DIASTOLIC BLOOD PRESSURE: 70 MMHG | SYSTOLIC BLOOD PRESSURE: 113 MMHG | HEIGHT: 78 IN | BODY MASS INDEX: 30.76 KG/M2

## 2017-02-10 DIAGNOSIS — M86.172 ACUTE OSTEOMYELITIS OF LEFT FOOT: ICD-10-CM

## 2017-02-10 DIAGNOSIS — Z89.422 S/P AMPUTATION OF LESSER TOE, LEFT: Primary | ICD-10-CM

## 2017-02-10 PROCEDURE — 99213 OFFICE O/P EST LOW 20 MIN: CPT | Mod: PBBFAC,PO | Performed by: PODIATRIST

## 2017-02-10 PROCEDURE — 99024 POSTOP FOLLOW-UP VISIT: CPT | Mod: ,,, | Performed by: PODIATRIST

## 2017-02-10 PROCEDURE — 99999 PR PBB SHADOW E&M-EST. PATIENT-LVL III: CPT | Mod: PBBFAC,,, | Performed by: PODIATRIST

## 2017-02-10 NOTE — TELEPHONE ENCOUNTER
[facility] was contacted in regards to Mr. Conner Perez, and Uzma answered the telephone call. Uzma was informed per Dr. Gomes to discontinue Mr. Perez [facility] for his left 2nd toenail only. Uzma verbalized understanding and the call ended well.

## 2017-02-10 NOTE — MR AVS SNAPSHOT
[facility]  [address] [place] [address]  Phone: [phone]  Fax: [phone]                  Conner Perez III   2/10/2017 2:20 PM   Office Visit    Description:  Male : 1950   Provider:  Bandar Gomes DPM   Department:  [facility]           Reason for Visit     Post-op Evaluation                To Do List           Future Appointments        Provider Department Dept Phone    2/15/2017 4:00 PM Bandar Gomes DPM [facility] [phone]      Goals (5 Years of Data)     None      [facility]     [facility] -  Assistance  Registered nurses in the [facility] provide clinical advisement, health education, appointment booking, and other advisory services.  Call for this free service at [phone].             Medications           Message regarding Medications     Verify the changes and/or additions to your medication regime listed below are the same as discussed with your clinician today.  If any of these changes or additions are incorrect, please notify your healthcare provider.             Verify that the below list of medications is an accurate representation of the medications you are currently taking.  If none reported, the list may be blank. If incorrect, please contact your healthcare provider. Carry this list with you in case of emergency.           Current Medications     amlodipine (NORVASC) 10 MG tablet Take 1 tablet (10 mg total) by mouth once daily.    aspirin 81 MG Chew Take 1 tablet (81 mg total) by mouth once daily.    atorvastatin (LIPITOR) 40 MG tablet Take 1 tablet (40 mg total) by mouth every evening.    calcium carbonate (TUMS) 200 mg calcium (500 mg) chewable tablet Take 1 tablet (500 mg total) by mouth once daily.    diltiaZEM (CARDIZEM CD) 360 MG 24 hr capsule Take 1 capsule (360 mg total) by mouth once daily.    docusate sodium (COLACE) 100 MG capsule Take 1 capsule (100 mg total) by mouth 2 (two) times  "daily.    hydrALAZINE (APRESOLINE) 50 MG tablet Take 1 tablet (50 mg total) by mouth 3 (three) times daily.    insulin NPH-insulin regular, 70/30, (NOVOLIN 70/30) 100 unit/mL (70-30) injection Inject 30 Units into the skin 2 (two) times daily. Dispense with needles and syringes.    oxycodone (ROXICODONE) 10 mg Tab immediate release tablet Take 1 tablet (10 mg total) by mouth every 4 (four) hours as needed for Pain.    pantoprazole (PROTONIX) 40 MG tablet Take 1 tablet (40 mg total) by mouth once daily.    polyethylene glycol (GLYCOLAX) 17 gram PwPk Take 17 g by mouth once daily.    sodium bicarbonate 650 MG tablet Take 1 tablet (650 mg total) by mouth 3 (three) times daily.    sucralfate (CARAFATE) 100 mg/mL suspension Take 10 mLs (1 g total) by mouth 4 (four) times daily.    tamsulosin (FLOMAX) 0.4 mg Cp24 Take 1 capsule (0.4 mg total) by mouth once daily.    zolpidem (AMBIEN) 5 MG Tab Take 1 tablet (5 mg total) by mouth nightly as needed.           Clinical Reference Information           Your Vitals Were     BP Pulse Height Weight BMI    113/70 (BP Location: Right arm, Patient Position: Sitting, BP Method: Automatic) 83 6' 6" (1.981 m) 120.6 kg (265 lb 14 oz) 30.72 kg/m2      Blood Pressure          Most Recent Value    BP  113/70      Allergies as of 2/10/2017     Augmentin [Amoxicillin-pot Clavulanate]    Sulfa (Sulfonamide Antibiotics)      Immunizations Administered on Date of Encounter - 2/10/2017     None      [facility] Sign-Up     Activating your [facility] account is as easy as 1-2-3!     1) Visit [email or web address], select Sign Up Now, enter this activation code and your date of birth, then select Next.  [email or web address]  Expires: 2/26/2017  8:09 AM      2) Create a username and password to use when you visit [facility] in the future and select a security question in case you lose your password and select Next.    3) Enter your e-mail address and click Sign Up!    Additional Information  If you have " questions, please e-mail [email or web address] or call [phone] to talk to our [facility] staff. Remember, [facility] is NOT to be used for urgent needs. For medical emergencies, dial 911.         Language Assistance Services     ATTENTION: Language assistance services are available, free of charge. Please call [phone].      ATENCIÓN: Si habla español, tiene a espinoza disposición servicios gratuitos de asistencia lingüística. Llame al [phone].     CHÚ Ý: N?u b?n nói Ti?ng Vi?t, có các d?ch v? h? tr? ngôn ng? mi?n phí dành cho b?n. G?i s? [phone].         [facility] complies with applicable Federal civil rights laws and does not discriminate on the basis of race, color, national origin, age, disability, or sex.

## 2017-02-15 ENCOUNTER — OFFICE VISIT (OUTPATIENT)
Dept: PODIATRY | Facility: CLINIC | Age: 67
End: 2017-02-15
Payer: MEDICARE

## 2017-02-15 VITALS
BODY MASS INDEX: 30.76 KG/M2 | SYSTOLIC BLOOD PRESSURE: 97 MMHG | HEIGHT: 78 IN | WEIGHT: 265.88 LBS | HEART RATE: 88 BPM | DIASTOLIC BLOOD PRESSURE: 62 MMHG

## 2017-02-15 DIAGNOSIS — E11.49 TYPE II DIABETES MELLITUS WITH NEUROLOGICAL MANIFESTATIONS: ICD-10-CM

## 2017-02-15 DIAGNOSIS — Z89.422 S/P AMPUTATION OF LESSER TOE, LEFT: Primary | ICD-10-CM

## 2017-02-15 DIAGNOSIS — Z98.890 POST-OPERATIVE STATE: Primary | ICD-10-CM

## 2017-02-15 DIAGNOSIS — M86.172 ACUTE OSTEOMYELITIS OF LEFT FOOT: ICD-10-CM

## 2017-02-15 PROCEDURE — 99024 POSTOP FOLLOW-UP VISIT: CPT | Mod: ,,, | Performed by: PODIATRIST

## 2017-02-15 PROCEDURE — 99999 PR PBB SHADOW E&M-EST. PATIENT-LVL III: CPT | Mod: PBBFAC,,, | Performed by: PODIATRIST

## 2017-02-15 PROCEDURE — 99213 OFFICE O/P EST LOW 20 MIN: CPT | Mod: PBBFAC,PO | Performed by: PODIATRIST

## 2017-02-15 RX ORDER — PEN NEEDLE, DIABETIC 31 GX5/16"
NEEDLE, DISPOSABLE MISCELLANEOUS
Refills: 0 | COMMUNITY
Start: 2017-01-10 | End: 2017-12-17 | Stop reason: ALTCHOICE

## 2017-02-15 RX ORDER — LACTULOSE 10 G/15ML
10 SOLUTION ORAL; RECTAL NIGHTLY
Refills: 0 | COMMUNITY
Start: 2017-01-13 | End: 2017-11-09

## 2017-02-15 RX ORDER — BLOOD-GLUCOSE METER
KIT MISCELLANEOUS
Refills: 0 | COMMUNITY
Start: 2017-01-10 | End: 2017-12-17 | Stop reason: ALTCHOICE

## 2017-02-15 RX ORDER — METOPROLOL SUCCINATE 25 MG/1
25 TABLET, EXTENDED RELEASE ORAL DAILY
Refills: 0 | COMMUNITY
Start: 2017-01-04 | End: 2017-04-07

## 2017-02-15 NOTE — PROGRESS NOTES
"Subjective:     Patient ID: Conner Perez III is a 66 y.o. male.    Chief Complaint: Follow-up (Post-Op left 2nd toe. Patient states no current pain.) and Diabetes Mellitus (Today's glucose- 140 mg/dL. Last PCP visit with -December 2016.)    HPI: This 66 year old male returns to the clinic 3 weeks status post left 2nd toe ampuation procedure. Patient has no complaints of fever chills or sweats. Negative pain. Patient states dressing was kept dry, clean, and intact. Patient states his blood sugar this morning was 140mg/dl.       Patient Active Problem List   Diagnosis    Constipation    Chest pain    Essential hypertension    Acute osteomyelitis of left foot    Chronic kidney disease, stage 5, kidney failure    Metabolic acidemia    Anemia of renal disease    DM II (diabetes mellitus, type II), controlled    Debility, unspecified    Severe malnutrition due to type 2 diabetes mellitus    Osteomyelitis of toe of left foot       Medication List with Changes/Refills   Current Medications    AMLODIPINE (NORVASC) 10 MG TABLET    Take 1 tablet (10 mg total) by mouth once daily.    ASPIRIN 81 MG CHEW    Take 1 tablet (81 mg total) by mouth once daily.    ATORVASTATIN (LIPITOR) 40 MG TABLET    Take 1 tablet (40 mg total) by mouth every evening.    BD ULTRA-FINE ANISA PEN NEEDLES 32 GAUGE X 5/32" NDLE        CALCIUM CARBONATE (TUMS) 200 MG CALCIUM (500 MG) CHEWABLE TABLET    Take 1 tablet (500 mg total) by mouth once daily.    DILTIAZEM (CARDIZEM CD) 360 MG 24 HR CAPSULE    Take 1 capsule (360 mg total) by mouth once daily.    DOCUSATE SODIUM (COLACE) 100 MG CAPSULE    Take 1 capsule (100 mg total) by mouth 2 (two) times daily.    FREESTYLE LITE STRIPS STRP        HYDRALAZINE (APRESOLINE) 50 MG TABLET    Take 1 tablet (50 mg total) by mouth 3 (three) times daily.    INSULIN NPH-INSULIN REGULAR, 70/30, (NOVOLIN 70/30) 100 UNIT/ML (70-30) INJECTION    Inject 30 Units into the skin 2 (two) times daily. " "Dispense with needles and syringes.    LACTULOSE (CHRONULAC) 10 GRAM/15 ML SOLUTION        METOPROLOL SUCCINATE (TOPROL-XL) 25 MG 24 HR TABLET    Take 25 mg by mouth once daily.    OXYCODONE (ROXICODONE) 10 MG TAB IMMEDIATE RELEASE TABLET    Take 1 tablet (10 mg total) by mouth every 4 (four) hours as needed for Pain.    PANTOPRAZOLE (PROTONIX) 40 MG TABLET    Take 1 tablet (40 mg total) by mouth once daily.    POLYETHYLENE GLYCOL (GLYCOLAX) 17 GRAM PWPK    Take 17 g by mouth once daily.    SODIUM BICARBONATE 650 MG TABLET    Take 1 tablet (650 mg total) by mouth 3 (three) times daily.    SUCRALFATE (CARAFATE) 100 MG/ML SUSPENSION    Take 10 mLs (1 g total) by mouth 4 (four) times daily.    TAMSULOSIN (FLOMAX) 0.4 MG CP24    Take 1 capsule (0.4 mg total) by mouth once daily.    ZOLPIDEM (AMBIEN) 5 MG TAB    Take 1 tablet (5 mg total) by mouth nightly as needed.       Review of patient's allergies indicates:   Allergen Reactions    Augmentin [amoxicillin-pot clavulanate]     Lisinopril      cough    Sulfa (sulfonamide antibiotics)        Past Surgical History   Procedure Laterality Date    Circumcision  1978       Family History   Problem Relation Age of Onset    Hypertension Father        Social History     Social History    Marital status:      Spouse name: N/A    Number of children: N/A    Years of education: N/A     Occupational History    Not on file.     Social History Main Topics    Smoking status: Former Smoker    Smokeless tobacco: Not on file    Alcohol use No    Drug use: No    Sexual activity: Not on file     Other Topics Concern    Not on file     Social History Narrative       Vitals:    02/15/17 1603   BP: 97/62   Pulse: 88   Weight: 120.6 kg (265 lb 14 oz)   Height: 6' 6" (1.981 m)   PainSc: 0-No pain     Review of Systems   Constitutional: Negative for chills and fever.   Respiratory: Negative for shortness of breath.    Cardiovascular: Negative for chest pain, palpitations, " orthopnea, claudication and leg swelling.   Gastrointestinal: Negative for diarrhea, nausea and vomiting.   Musculoskeletal: Negative for joint pain.   Skin: Negative for rash.   Neurological: Positive for tingling and speech change. Negative for dizziness, sensory change, focal weakness and weakness.   Psychiatric/Behavioral: Negative.            Objective:      Physical examination: General: Patient is in no acute distress, alert and oriented x 3.  Dressing to left foot clean, dry, and intact.   Lower Extremity Exam:  Vascular: Dorsalis pedis and Posterior tibial pulses palpable on left foot. Capillary fill time <3 sec to toes on left foot. No edema noted on left foot.   Dermatologic: Sutures Intact. Incision site well copated on left foot. Negative erythema, drainage, or increased temp noted to surgical site.   Neurological: Light touch sensation intact to left foot.   Musculoskeletal: Negative pain on palpation/ROM of left foot. Left partial 2nd toe amputation noted.         Assessment:       Encounter Diagnoses   Name Primary?    S/P amputation of lesser toe, left Yes    Acute osteomyelitis of left foot     Type II diabetes mellitus with neurological manifestations          Plan:   S/P amputation of lesser toe, left    Acute osteomyelitis of left foot    Type II diabetes mellitus with neurological manifestations    I counseled the patient on his conditions, their implications and medical management.  All sutures removed.   Left foot dressed with betadine soaked adaptic, gauze, magdaleno, kerlix, and ACE.   Patient instructed to keep dressing dry, clean and intact until Friday and then remove and return to regular bathing habits.    Patient instructed to continue to ambulate in surgical shoe.  Ordered left foot x-rays to be taken next visit.   Patient should call the clinic immediately if any signs of infection such as fever chills sweats increased redness or pain.  Patient to return in 2 weeks or sooner if  needed.                 DMITRI SalazarM  [facility]

## 2017-02-15 NOTE — MR AVS SNAPSHOT
"    [facility]  [address] [facility] [place] [address]  Phone: [phone]  Fax: [phone]                  Conner Perez III   2/15/2017 4:00 PM   Office Visit    Description:  Male : 1950   Provider:  Bandar Gomes DPM   Department:  [facility] - Podiatry           Reason for Visit     Follow-up     Diabetes Mellitus                To Do List           Future Appointments        Provider Department Dept Phone    3/1/2017 3:15 PM [facility] XR2 [facility] [phone]    3/1/2017 3:40 PM Bandar Gomes DPM [facility] [phone]      Goals (5 Years of Data)     None      [facility]     [facility] -  Assistance  Registered nurses in the [facility] provide clinical advisement, health education, appointment booking, and other advisory services.  Call for this free service at [phone].             Medications           Message regarding Medications     Verify the changes and/or additions to your medication regime listed below are the same as discussed with your clinician today.  If any of these changes or additions are incorrect, please notify your healthcare provider.             Verify that the below list of medications is an accurate representation of the medications you are currently taking.  If none reported, the list may be blank. If incorrect, please contact your healthcare provider. Carry this list with you in case of emergency.           Current Medications     amlodipine (NORVASC) 10 MG tablet Take 1 tablet (10 mg total) by mouth once daily.    aspirin 81 MG Chew Take 1 tablet (81 mg total) by mouth once daily.    atorvastatin (LIPITOR) 40 MG tablet Take 1 tablet (40 mg total) by mouth every evening.    BD ULTRA-FINE ANISA PEN NEEDLES 32 gauge x 5/32" Ndle     calcium carbonate (TUMS) 200 mg calcium (500 mg) chewable tablet Take 1 tablet (500 mg total) by mouth once daily.    diltiaZEM (CARDIZEM CD) 360 MG 24 hr capsule Take 1 " "capsule (360 mg total) by mouth once daily.    docusate sodium (COLACE) 100 MG capsule Take 1 capsule (100 mg total) by mouth 2 (two) times daily.    FREESTYLE LITE STRIPS Strp     hydrALAZINE (APRESOLINE) 50 MG tablet Take 1 tablet (50 mg total) by mouth 3 (three) times daily.    insulin NPH-insulin regular, 70/30, (NOVOLIN 70/30) 100 unit/mL (70-30) injection Inject 30 Units into the skin 2 (two) times daily. Dispense with needles and syringes.    lactulose (CHRONULAC) 10 gram/15 mL solution     metoprolol succinate (TOPROL-XL) 25 MG 24 hr tablet Take 25 mg by mouth once daily.    oxycodone (ROXICODONE) 10 mg Tab immediate release tablet Take 1 tablet (10 mg total) by mouth every 4 (four) hours as needed for Pain.    pantoprazole (PROTONIX) 40 MG tablet Take 1 tablet (40 mg total) by mouth once daily.    polyethylene glycol (GLYCOLAX) 17 gram PwPk Take 17 g by mouth once daily.    sodium bicarbonate 650 MG tablet Take 1 tablet (650 mg total) by mouth 3 (three) times daily.    sucralfate (CARAFATE) 100 mg/mL suspension Take 10 mLs (1 g total) by mouth 4 (four) times daily.    tamsulosin (FLOMAX) 0.4 mg Cp24 Take 1 capsule (0.4 mg total) by mouth once daily.    zolpidem (AMBIEN) 5 MG Tab Take 1 tablet (5 mg total) by mouth nightly as needed.           Clinical Reference Information           Your Vitals Were     BP Pulse Height Weight BMI    97/62 (BP Location: Right arm, Patient Position: Sitting, BP Method: Automatic) 88 6' 6" (1.981 m) 120.6 kg (265 lb 14 oz) 30.72 kg/m2      Blood Pressure          Most Recent Value    BP  97/62      Allergies as of 2/15/2017     Augmentin [Amoxicillin-pot Clavulanate]    Lisinopril    Sulfa (Sulfonamide Antibiotics)      Immunizations Administered on Date of Encounter - 2/15/2017     None      [facility] Sign-Up     Activating your [facility] account is as easy as 1-2-3!     1) Visit [email or web address], select Sign Up Now, enter this activation code and your date of birth, then " select Next.  [email or web address]  Expires: 2/26/2017  8:09 AM      2) Create a username and password to use when you visit [facility] in the future and select a security question in case you lose your password and select Next.    3) Enter your e-mail address and click Sign Up!    Additional Information  If you have questions, please e-mail [email or web address] or call [phone] to talk to our [employer] staff. Remember, [employer] is NOT to be used for urgent needs. For medical emergencies, dial 911.         Language Assistance Services     ATTENTION: Language assistance services are available, free of charge. Please call [phone].      ATENCIÓN: Si tessala aldoyajaira, tiene a espinoza disposición servicios gratuitos de asistencia lingüística. Llame al [phone].     CHÚ Ý: N?u b?n nói Ti?ng Vi?t, có các d?ch v? h? tr? ngôn ng? mi?n phí dành cho b?n. G?i s? [phone].         [facility] complies with applicable Federal civil rights laws and does not discriminate on the basis of race, color, national origin, age, disability, or sex.

## 2017-02-19 NOTE — PROGRESS NOTES
"Subjective:     Patient ID: Conner Perez III is a 66 y.o. male.    Chief Complaint: Post-op Evaluation (diabetic pt., amputation of the left 2nd toe, pt. denied current pain/problems)    HPI: This 66 year old male returns to the clinic 2 weeks status post left toe amputation procedure. Patient has no complaints of fever chills or sweats. Negative pain. Patient states dressing was kept dry, clean, and intact. Patient states his blood sugar this morning was 118mg/dl.     Patient Active Problem List   Diagnosis    Constipation    Chest pain    Essential hypertension    Acute osteomyelitis of left foot    Chronic kidney disease, stage 5, kidney failure    Metabolic acidemia    Anemia of renal disease    DM II (diabetes mellitus, type II), controlled    Debility, unspecified    Severe malnutrition due to type 2 diabetes mellitus    Osteomyelitis of toe of left foot       Medication List with Changes/Refills   Current Medications    AMLODIPINE (NORVASC) 10 MG TABLET    Take 1 tablet (10 mg total) by mouth once daily.    ASPIRIN 81 MG CHEW    Take 1 tablet (81 mg total) by mouth once daily.    ATORVASTATIN (LIPITOR) 40 MG TABLET    Take 1 tablet (40 mg total) by mouth every evening.    BD ULTRA-FINE ANISA PEN NEEDLES 32 GAUGE X 5/32" NDLE        CALCIUM CARBONATE (TUMS) 200 MG CALCIUM (500 MG) CHEWABLE TABLET    Take 1 tablet (500 mg total) by mouth once daily.    DILTIAZEM (CARDIZEM CD) 360 MG 24 HR CAPSULE    Take 1 capsule (360 mg total) by mouth once daily.    DOCUSATE SODIUM (COLACE) 100 MG CAPSULE    Take 1 capsule (100 mg total) by mouth 2 (two) times daily.    FREESTYLE LITE STRIPS STRP        HYDRALAZINE (APRESOLINE) 50 MG TABLET    Take 1 tablet (50 mg total) by mouth 3 (three) times daily.    INSULIN NPH-INSULIN REGULAR, 70/30, (NOVOLIN 70/30) 100 UNIT/ML (70-30) INJECTION    Inject 30 Units into the skin 2 (two) times daily. Dispense with needles and syringes.    LACTULOSE (CHRONULAC) 10 GRAM/15 ML " "SOLUTION        METOPROLOL SUCCINATE (TOPROL-XL) 25 MG 24 HR TABLET    Take 25 mg by mouth once daily.    OXYCODONE (ROXICODONE) 10 MG TAB IMMEDIATE RELEASE TABLET    Take 1 tablet (10 mg total) by mouth every 4 (four) hours as needed for Pain.    PANTOPRAZOLE (PROTONIX) 40 MG TABLET    Take 1 tablet (40 mg total) by mouth once daily.    POLYETHYLENE GLYCOL (GLYCOLAX) 17 GRAM PWPK    Take 17 g by mouth once daily.    SODIUM BICARBONATE 650 MG TABLET    Take 1 tablet (650 mg total) by mouth 3 (three) times daily.    SUCRALFATE (CARAFATE) 100 MG/ML SUSPENSION    Take 10 mLs (1 g total) by mouth 4 (four) times daily.    TAMSULOSIN (FLOMAX) 0.4 MG CP24    Take 1 capsule (0.4 mg total) by mouth once daily.    ZOLPIDEM (AMBIEN) 5 MG TAB    Take 1 tablet (5 mg total) by mouth nightly as needed.   Discontinued Medications    PANTOPRAZOLE (PROTONIX) 40 MG TABLET    Take 1 tablet (40 mg total) by mouth once daily.       Review of patient's allergies indicates:   Allergen Reactions    Augmentin [amoxicillin-pot clavulanate]     Lisinopril      cough    Sulfa (sulfonamide antibiotics)        Past Surgical History   Procedure Laterality Date    Circumcision  1978       Family History   Problem Relation Age of Onset    Hypertension Father        Social History     Social History    Marital status:      Spouse name: N/A    Number of children: N/A    Years of education: N/A     Occupational History    Not on file.     Social History Main Topics    Smoking status: Former Smoker    Smokeless tobacco: Not on file    Alcohol use No    Drug use: No    Sexual activity: Not on file     Other Topics Concern    Not on file     Social History Narrative       Vitals:    02/10/17 1433   BP: 113/70   Pulse: 83   Weight: 120.6 kg (265 lb 14 oz)   Height: 6' 6" (1.981 m)   PainSc: 0-No pain       Review of Systems   Constitutional: Negative for chills and fever.   Respiratory: Negative for shortness of breath.  "   Cardiovascular: Negative for chest pain, palpitations, orthopnea, claudication and leg swelling.   Gastrointestinal: Negative for diarrhea, nausea and vomiting.   Musculoskeletal: Negative for joint pain.   Skin: Negative for rash.   Neurological: Positive for tingling and sensory change. Negative for dizziness, focal weakness and weakness.             Objective:        Physical examination: General: Patient is in no acute distress, alert and oriented x 3.  Dressing to left foot clean, dry, and intact.   Lower Extremity Exam:  Vascular: Dorsalis pedis and Posterior tibial pulses palpable on left foot.  Capillary fill time <3 sec to toes on left foot. No edema noted on left foot.   Dermatologic: Sutures Intact. Incision site well copated on left foot. Negative erythema, drainage, or increased temp noted to surgical site.   Neurological: Light touch sensation intact to left foot.   Musculoskeletal: Negative pain on palpation/ROM of left foot. Left partial 2nd toe amputation noted.         Assessment:       Encounter Diagnoses   Name Primary?    S/P amputation of lesser toe, left Yes    Acute osteomyelitis of left foot          Plan:   S/P amputation of lesser toe, left    Acute osteomyelitis of left foot    I counseled the patient on his conditions, their implications and medical management.  Left foot dressed with betadine soaked adaptic, gauze, magdaleno, kerlix, and ACE.   Patient instructed to keep dressing dry, clean and intact.   Patient instructed to continue to ambulate in surgical shoe  Patient should call the clinic immediately if any signs of infection such as fever chills sweats increased redness or pain.  Patient to return in 1 week for suture removal.                           Bandar Gomes DPM  [facility]

## 2017-03-01 ENCOUNTER — HOSPITAL ENCOUNTER (OUTPATIENT)
Dept: RADIOLOGY | Facility: HOSPITAL | Age: 67
Discharge: HOME | End: 2017-03-01
Attending: PODIATRIST
Payer: MEDICARE

## 2017-03-01 ENCOUNTER — OFFICE VISIT (OUTPATIENT)
Dept: PODIATRY | Facility: CLINIC | Age: 67
End: 2017-03-01
Payer: MEDICARE

## 2017-03-01 VITALS
SYSTOLIC BLOOD PRESSURE: 141 MMHG | WEIGHT: 270.75 LBS | DIASTOLIC BLOOD PRESSURE: 74 MMHG | BODY MASS INDEX: 31.33 KG/M2 | HEIGHT: 78 IN | HEART RATE: 76 BPM

## 2017-03-01 DIAGNOSIS — Z89.422 S/P AMPUTATION OF LESSER TOE, LEFT: ICD-10-CM

## 2017-03-01 DIAGNOSIS — Z98.890 POST-OPERATIVE STATE: Primary | ICD-10-CM

## 2017-03-01 DIAGNOSIS — E11.49 TYPE II DIABETES MELLITUS WITH NEUROLOGICAL MANIFESTATIONS: ICD-10-CM

## 2017-03-01 DIAGNOSIS — Z98.890 POST-OPERATIVE STATE: ICD-10-CM

## 2017-03-01 PROCEDURE — 99213 OFFICE O/P EST LOW 20 MIN: CPT | Mod: PBBFAC,PO | Performed by: PODIATRIST

## 2017-03-01 PROCEDURE — 73630 X-RAY EXAM OF FOOT: CPT | Mod: 26,LT,, | Performed by: RADIOLOGY

## 2017-03-01 PROCEDURE — 99999 PR PBB SHADOW E&M-EST. PATIENT-LVL III: CPT | Mod: PBBFAC,,, | Performed by: PODIATRIST

## 2017-03-01 PROCEDURE — 99024 POSTOP FOLLOW-UP VISIT: CPT | Mod: ,,, | Performed by: PODIATRIST

## 2017-03-01 NOTE — MR AVS SNAPSHOT
"    [facility]  [address] [facility] [place] [address]  Phone: [phone]  Fax: [phone]                  Conner Perez III   3/1/2017 3:40 PM   Office Visit    Description:  Male : 1950   Provider:  Bandar Gomes DPM   Department:  [facility]           Reason for Visit     Post-op Evaluation                To Do List           Future Appointments        Provider Department Dept Phone    2017 2:00 PM Bandar Gomes DPM [facility] - Podiat[facility] [phone]      Goals (5 Years of Data)     None      [facility]     [facility] Care Line -  Assistance  Registered nurses in the [facility] Center provide clinical advisement, health education, appointment booking, and other advisory services.  Call for this free service at [phone].             Medications           Message regarding Medications     Verify the changes and/or additions to your medication regime listed below are the same as discussed with your clinician today.  If any of these changes or additions are incorrect, please notify your healthcare provider.             Verify that the below list of medications is an accurate representation of the medications you are currently taking.  If none reported, the list may be blank. If incorrect, please contact your healthcare provider. Carry this list with you in case of emergency.           Current Medications     amlodipine (NORVASC) 10 MG tablet Take 1 tablet (10 mg total) by mouth once daily.    aspirin 81 MG Chew Take 1 tablet (81 mg total) by mouth once daily.    atorvastatin (LIPITOR) 40 MG tablet Take 1 tablet (40 mg total) by mouth every evening.    BD ULTRA-FINE ANISA PEN NEEDLES 32 gauge x 5/32" Ndle     calcium carbonate (TUMS) 200 mg calcium (500 mg) chewable tablet Take 1 tablet (500 mg total) by mouth once daily.    diltiaZEM (CARDIZEM CD) 360 MG 24 hr capsule Take 1 capsule (360 mg total) by mouth once daily.    docusate sodium (COLACE) 100 MG " capsule Take 1 capsule (100 mg total) by mouth 2 (two) times daily.    FREESTYLE LITE STRIPS Strp     hydrALAZINE (APRESOLINE) 50 MG tablet Take 1 tablet (50 mg total) by mouth 3 (three) times daily.    insulin NPH-insulin regular, 70/30, (NOVOLIN 70/30) 100 unit/mL (70-30) injection Inject 30 Units into the skin 2 (two) times daily. Dispense with needles and syringes.    lactulose (CHRONULAC) 10 gram/15 mL solution     metoprolol succinate (TOPROL-XL) 25 MG 24 hr tablet Take 25 mg by mouth once daily.    pantoprazole (PROTONIX) 40 MG tablet Take 1 tablet (40 mg total) by mouth once daily.    sodium bicarbonate 650 MG tablet Take 1 tablet (650 mg total) by mouth 3 (three) times daily.    sucralfate (CARAFATE) 100 mg/mL suspension Take 10 mLs (1 g total) by mouth 4 (four) times daily.    tamsulosin (FLOMAX) 0.4 mg Cp24 Take 1 capsule (0.4 mg total) by mouth once daily.    zolpidem (AMBIEN) 5 MG Tab Take 1 tablet (5 mg total) by mouth nightly as needed.    oxycodone (ROXICODONE) 10 mg Tab immediate release tablet Take 1 tablet (10 mg total) by mouth every 4 (four) hours as needed for Pain.    polyethylene glycol (GLYCOLAX) 17 gram PwPk Take 17 g by mouth once daily.           Clinical Reference Information           Your Vitals Were     BP                   141/74 (BP Location: Right arm, Patient Position: Sitting, BP Method: Automatic)           Blood Pressure          Most Recent Value    BP  (!)  141/74      Allergies as of 3/1/2017     Augmentin [Amoxicillin-pot Clavulanate]    Lisinopril    Sulfa (Sulfonamide Antibiotics)      Immunizations Administered on Date of Encounter - 3/1/2017     None      [facility] Sign-Up     Activating your [facility] account is as easy as 1-2-3!     1) Visit [email or web address], select Sign Up Now, enter this activation code and your date of birth, then select Next.  [email or web address]  Expires: 4/15/2017  4:03 PM      2) Create a username and password to use when you visit [facility] in the  future and select a security question in case you lose your password and select Next.    3) Enter your e-mail address and click Sign Up!    Additional Information  If you have questions, please e-mail [email or web address] or call [phone] to talk to our [facility] staff. Remember, [facility] is NOT to be used for urgent needs. For medical emergencies, dial 911.         Language Assistance Services     ATTENTION: Language assistance services are available, free of charge. Please call [phone].      ATENCIÓN: Si habla español, tiene a espinoza disposición servicios gratuitos de asistencia lingüística. Llame al [phone].     CHÚ Ý: N?u b?n nói Ti?ng Vi?t, có các d?ch v? h? tr? ngôn ng? mi?n phí dành cho b?n. G?i s? [phone].         [facility] complies with applicable Federal civil rights laws and does not discriminate on the basis of race, color, national origin, age, disability, or sex.

## 2017-03-05 PROBLEM — M86.172 ACUTE OSTEOMYELITIS OF LEFT FOOT: Status: RESOLVED | Noted: 2017-01-15 | Resolved: 2017-03-05

## 2017-03-05 PROBLEM — M86.9 OSTEOMYELITIS OF TOE OF LEFT FOOT: Status: RESOLVED | Noted: 2017-01-24 | Resolved: 2017-03-05

## 2017-03-06 PROBLEM — E87.20 METABOLIC ACIDEMIA: Status: RESOLVED | Noted: 2017-01-16 | Resolved: 2017-03-06

## 2017-03-06 NOTE — PROGRESS NOTES
"Subjective:     Patient ID: Conner Perez III is a 67 y.o. male.    Chief Complaint: Post-op Evaluation (diabetic pt., left foot, pt. denied current pain/problems with his left foot )    HPI: This 67 year old male returns to the clinic 1 months status post left toe amputation procedure. Patient has no complaints of fever chills or sweats. Negative pain. Patient states his blood sugar this morning was 108mg/dl.         Patient Active Problem List   Diagnosis    Constipation    Chest pain    Essential hypertension    Chronic kidney disease, stage 5, kidney failure    Metabolic acidemia    Anemia of renal disease    DM II (diabetes mellitus, type II), controlled    Debility, unspecified    Severe malnutrition due to type 2 diabetes mellitus       Medication List with Changes/Refills   Current Medications    AMLODIPINE (NORVASC) 10 MG TABLET    Take 1 tablet (10 mg total) by mouth once daily.    ASPIRIN 81 MG CHEW    Take 1 tablet (81 mg total) by mouth once daily.    ATORVASTATIN (LIPITOR) 40 MG TABLET    Take 1 tablet (40 mg total) by mouth every evening.    BD ULTRA-FINE ANISA PEN NEEDLES 32 GAUGE X 5/32" NDLE        CALCIUM CARBONATE (TUMS) 200 MG CALCIUM (500 MG) CHEWABLE TABLET    Take 1 tablet (500 mg total) by mouth once daily.    DILTIAZEM (CARDIZEM CD) 360 MG 24 HR CAPSULE    Take 1 capsule (360 mg total) by mouth once daily.    DOCUSATE SODIUM (COLACE) 100 MG CAPSULE    Take 1 capsule (100 mg total) by mouth 2 (two) times daily.    FREESTYLE LITE STRIPS STRP        HYDRALAZINE (APRESOLINE) 50 MG TABLET    Take 1 tablet (50 mg total) by mouth 3 (three) times daily.    INSULIN NPH-INSULIN REGULAR, 70/30, (NOVOLIN 70/30) 100 UNIT/ML (70-30) INJECTION    Inject 30 Units into the skin 2 (two) times daily. Dispense with needles and syringes.    LACTULOSE (CHRONULAC) 10 GRAM/15 ML SOLUTION        METOPROLOL SUCCINATE (TOPROL-XL) 25 MG 24 HR TABLET    Take 25 mg by mouth once daily.    OXYCODONE (ROXICODONE) 10 " "MG TAB IMMEDIATE RELEASE TABLET    Take 1 tablet (10 mg total) by mouth every 4 (four) hours as needed for Pain.    PANTOPRAZOLE (PROTONIX) 40 MG TABLET    Take 1 tablet (40 mg total) by mouth once daily.    POLYETHYLENE GLYCOL (GLYCOLAX) 17 GRAM PWPK    Take 17 g by mouth once daily.    SODIUM BICARBONATE 650 MG TABLET    Take 1 tablet (650 mg total) by mouth 3 (three) times daily.    SUCRALFATE (CARAFATE) 100 MG/ML SUSPENSION    Take 10 mLs (1 g total) by mouth 4 (four) times daily.    TAMSULOSIN (FLOMAX) 0.4 MG CP24    Take 1 capsule (0.4 mg total) by mouth once daily.    ZOLPIDEM (AMBIEN) 5 MG TAB    Take 1 tablet (5 mg total) by mouth nightly as needed.       Review of patient's allergies indicates:   Allergen Reactions    Augmentin [amoxicillin-pot clavulanate]     Lisinopril      cough    Sulfa (sulfonamide antibiotics)        Past Surgical History:   Procedure Laterality Date    CIRCUMCISION  1978       Family History   Problem Relation Age of Onset    Hypertension Father        Social History     Social History    Marital status:      Spouse name: N/A    Number of children: N/A    Years of education: N/A     Occupational History    Not on file.     Social History Main Topics    Smoking status: Former Smoker    Smokeless tobacco: Not on file    Alcohol use No    Drug use: No    Sexual activity: Not on file     Other Topics Concern    Not on file     Social History Narrative       Vitals:    03/01/17 1541   BP: (!) 141/74   Pulse: 76   Weight: 122.8 kg (270 lb 11.6 oz)   Height: 6' 6" (1.981 m)   PainSc: 0-No pain       Review of Systems   Constitutional: Negative for chills and fever.   Respiratory: Negative for shortness of breath.    Cardiovascular: Negative for chest pain, palpitations, orthopnea, claudication and leg swelling.   Gastrointestinal: Negative for diarrhea, nausea and vomiting.   Musculoskeletal: Negative for joint pain.   Skin: Negative for rash.   Neurological: " Positive for tingling and sensory change. Negative for dizziness, focal weakness and weakness.   Psychiatric/Behavioral: Negative.              Objective:          Physical examination: General: Patient is in no acute distress, alert and oriented x 3.  Patient presents ambulating in surgical shoe to left foot and lace up shoe on right foot.   Lower Extremity Exam:  Vascular: Dorsalis pedis and Posterior tibial pulses palpable on left foot. Capillary fill time <3 sec to toes on left foot. No edema noted on left foot.   Dermatologic: Incision site well copated on left foot. Negative erythema, drainage, or increased temp noted to surgical site.   Neurological: Light touch sensation intact to left foot.   Musculoskeletal: Negative pain on palpation/ROM of left foot. Left partial 2nd toe amputation noted.     TEST RESULTS: Radiograph of left foot reveals stable post surgical changes second digit.  No significant soft tissue swelling or subcutaneous air.  No focal bony erosion or periosteal reaction along the distal margin of the second proximal phalanx at the amputation site.  Remaining osseous structures stable in appearance.    Assessment:       Encounter Diagnoses   Name Primary?    Post-operative state Yes    S/P amputation of lesser toe, left     Type II diabetes mellitus with neurological manifestations          Plan:   Post-operative state    S/P amputation of lesser toe, left    Type II diabetes mellitus with neurological manifestations    I counseled the patient on his conditions, their implications and medical management.  Reviewed x-rays in exam room with patient.   Patient instructed to continue to ambulate in surgical shoe for 1 week and then gradually return to proper shoe gear.   Patient should call the clinic immediately if any signs of infection such as fever chills sweats increased redness or pain.  Patient to return in 1 month or sooner if needed.                         Bandar Gomes,  DPM [facility]

## 2017-04-07 ENCOUNTER — HOSPITAL ENCOUNTER (OUTPATIENT)
Facility: HOSPITAL | Age: 67
Discharge: HOME | End: 2017-04-08
Attending: SPECIALIST | Admitting: INTERNAL MEDICINE
Payer: MEDICARE

## 2017-04-07 DIAGNOSIS — N18.5 CHRONIC KIDNEY DISEASE, STAGE 5, KIDNEY FAILURE: Chronic | ICD-10-CM

## 2017-04-07 DIAGNOSIS — E11.21 CONTROLLED TYPE 2 DIABETES MELLITUS WITH DIABETIC NEPHROPATHY, WITH LONG-TERM CURRENT USE OF INSULIN: ICD-10-CM

## 2017-04-07 DIAGNOSIS — R10.84 GENERALIZED ABDOMINAL PAIN: Primary | ICD-10-CM

## 2017-04-07 DIAGNOSIS — R10.9 ABDOMINAL PAIN: ICD-10-CM

## 2017-04-07 DIAGNOSIS — Z79.4 CONTROLLED TYPE 2 DIABETES MELLITUS WITH DIABETIC NEPHROPATHY, WITH LONG-TERM CURRENT USE OF INSULIN: ICD-10-CM

## 2017-04-07 DIAGNOSIS — I10 ESSENTIAL (PRIMARY) HYPERTENSION: ICD-10-CM

## 2017-04-07 DIAGNOSIS — R79.89 ELEVATED TROPONIN: ICD-10-CM

## 2017-04-07 LAB
ALBUMIN SERPL BCP-MCNC: 3.2 G/DL
ALP SERPL-CCNC: 93 U/L
ALT SERPL W/O P-5'-P-CCNC: 10 U/L
ANION GAP SERPL CALC-SCNC: 10 MMOL/L
APTT BLDCRRT: 29.8 SEC
AST SERPL-CCNC: 13 U/L
BACTERIA #/AREA URNS HPF: ABNORMAL /HPF
BASOPHILS # BLD AUTO: 0.05 K/UL
BASOPHILS NFR BLD: 0.6 %
BILIRUB SERPL-MCNC: 0.3 MG/DL
BILIRUB UR QL STRIP: NEGATIVE
BUN SERPL-MCNC: 41 MG/DL
CALCIUM SERPL-MCNC: 8.5 MG/DL
CHLORIDE SERPL-SCNC: 104 MMOL/L
CK SERPL-CCNC: 150 U/L
CLARITY UR: CLEAR
CO2 SERPL-SCNC: 24 MMOL/L
COLOR UR: YELLOW
CREAT SERPL-MCNC: 5.6 MG/DL
DIFFERENTIAL METHOD: ABNORMAL
EOSINOPHIL # BLD AUTO: 0.1 K/UL
EOSINOPHIL NFR BLD: 1.5 %
ERYTHROCYTE [DISTWIDTH] IN BLOOD BY AUTOMATED COUNT: 13.1 %
EST. GFR  (AFRICAN AMERICAN): 11 ML/MIN/1.73 M^2
EST. GFR  (NON AFRICAN AMERICAN): 10 ML/MIN/1.73 M^2
GLUCOSE SERPL-MCNC: 112 MG/DL
GLUCOSE UR QL STRIP: ABNORMAL
HCT VFR BLD AUTO: 33.8 %
HGB BLD-MCNC: 10.9 G/DL
HGB UR QL STRIP: ABNORMAL
HYALINE CASTS #/AREA URNS LPF: 2 /LPF
INR PPP: 1
KETONES UR QL STRIP: NEGATIVE
LEUKOCYTE ESTERASE UR QL STRIP: NEGATIVE
LIPASE SERPL-CCNC: 38 U/L
LYMPHOCYTES # BLD AUTO: 1.9 K/UL
LYMPHOCYTES NFR BLD: 21.7 %
MAGNESIUM SERPL-MCNC: 2.6 MG/DL
MCH RBC QN AUTO: 26.7 PG
MCHC RBC AUTO-ENTMCNC: 32.2 %
MCV RBC AUTO: 83 FL
MICROSCOPIC COMMENT: ABNORMAL
MONOCYTES # BLD AUTO: 0.7 K/UL
MONOCYTES NFR BLD: 7.8 %
NEUTROPHILS # BLD AUTO: 6.1 K/UL
NEUTROPHILS NFR BLD: 68.4 %
NITRITE UR QL STRIP: NEGATIVE
PH UR STRIP: 6 [PH] (ref 5–8)
PLATELET # BLD AUTO: 248 K/UL
PMV BLD AUTO: 10.2 FL
POTASSIUM SERPL-SCNC: 4.7 MMOL/L
PROT SERPL-MCNC: 7.4 G/DL
PROT UR QL STRIP: ABNORMAL
PROTHROMBIN TIME: 10.7 SEC
RBC # BLD AUTO: 4.08 M/UL
RBC #/AREA URNS HPF: 3 /HPF (ref 0–4)
SODIUM SERPL-SCNC: 138 MMOL/L
SP GR UR STRIP: 1.02 (ref 1–1.03)
TROPONIN I SERPL DL<=0.01 NG/ML-MCNC: 0.12 NG/ML
TROPONIN I SERPL DL<=0.01 NG/ML-MCNC: 0.14 NG/ML
URN SPEC COLLECT METH UR: ABNORMAL
UROBILINOGEN UR STRIP-ACNC: NEGATIVE EU/DL
WBC # BLD AUTO: 8.9 K/UL
WBC #/AREA URNS HPF: 2 /HPF (ref 0–5)

## 2017-04-07 PROCEDURE — 93306 TTE W/DOPPLER COMPLETE: CPT | Mod: 26,,, | Performed by: INTERNAL MEDICINE

## 2017-04-07 PROCEDURE — 93010 ELECTROCARDIOGRAM REPORT: CPT | Mod: ,,, | Performed by: INTERNAL MEDICINE

## 2017-04-07 PROCEDURE — 83690 ASSAY OF LIPASE: CPT

## 2017-04-07 PROCEDURE — 85730 THROMBOPLASTIN TIME PARTIAL: CPT

## 2017-04-07 PROCEDURE — 84484 ASSAY OF TROPONIN QUANT: CPT | Mod: 91

## 2017-04-07 PROCEDURE — G0378 HOSPITAL OBSERVATION PER HR: HCPCS

## 2017-04-07 PROCEDURE — 36415 COLL VENOUS BLD VENIPUNCTURE: CPT

## 2017-04-07 PROCEDURE — 25000003 PHARM REV CODE 250: Performed by: NURSE PRACTITIONER

## 2017-04-07 PROCEDURE — 93306 TTE W/DOPPLER COMPLETE: CPT

## 2017-04-07 PROCEDURE — 82550 ASSAY OF CK (CPK): CPT

## 2017-04-07 PROCEDURE — 83735 ASSAY OF MAGNESIUM: CPT

## 2017-04-07 PROCEDURE — 85025 COMPLETE CBC W/AUTO DIFF WBC: CPT

## 2017-04-07 PROCEDURE — 99285 EMERGENCY DEPT VISIT HI MDM: CPT

## 2017-04-07 PROCEDURE — 25000003 PHARM REV CODE 250: Performed by: SPECIALIST

## 2017-04-07 PROCEDURE — 81000 URINALYSIS NONAUTO W/SCOPE: CPT

## 2017-04-07 PROCEDURE — 85610 PROTHROMBIN TIME: CPT

## 2017-04-07 PROCEDURE — 80053 COMPREHEN METABOLIC PANEL: CPT

## 2017-04-07 RX ORDER — ASPIRIN 325 MG
325 TABLET ORAL DAILY
Status: DISCONTINUED | OUTPATIENT
Start: 2017-04-07 | End: 2017-04-08 | Stop reason: HOSPADM

## 2017-04-07 RX ORDER — SODIUM CHLORIDE 9 MG/ML
INJECTION, SOLUTION INTRAVENOUS CONTINUOUS
Status: DISCONTINUED | OUTPATIENT
Start: 2017-04-07 | End: 2017-04-08 | Stop reason: HOSPADM

## 2017-04-07 RX ORDER — HYDRALAZINE HYDROCHLORIDE 50 MG/1
50 TABLET, FILM COATED ORAL 3 TIMES DAILY
Status: DISCONTINUED | OUTPATIENT
Start: 2017-04-07 | End: 2017-04-08 | Stop reason: HOSPADM

## 2017-04-07 RX ORDER — FAMOTIDINE 20 MG/1
20 TABLET, FILM COATED ORAL DAILY
Status: DISCONTINUED | OUTPATIENT
Start: 2017-04-07 | End: 2017-04-08 | Stop reason: HOSPADM

## 2017-04-07 RX ORDER — BISACODYL 5 MG
5 TABLET, DELAYED RELEASE (ENTERIC COATED) ORAL ONCE
Status: COMPLETED | OUTPATIENT
Start: 2017-04-07 | End: 2017-04-07

## 2017-04-07 RX ORDER — ATORVASTATIN CALCIUM 40 MG/1
40 TABLET, FILM COATED ORAL NIGHTLY
Status: DISCONTINUED | OUTPATIENT
Start: 2017-04-07 | End: 2017-04-08 | Stop reason: HOSPADM

## 2017-04-07 RX ORDER — DILTIAZEM HYDROCHLORIDE 180 MG/1
360 CAPSULE, COATED, EXTENDED RELEASE ORAL DAILY
Status: DISCONTINUED | OUTPATIENT
Start: 2017-04-07 | End: 2017-04-08 | Stop reason: HOSPADM

## 2017-04-07 RX ORDER — HEPARIN SODIUM 5000 [USP'U]/ML
5000 INJECTION, SOLUTION INTRAVENOUS; SUBCUTANEOUS EVERY 8 HOURS
Status: DISCONTINUED | OUTPATIENT
Start: 2017-04-08 | End: 2017-04-08 | Stop reason: HOSPADM

## 2017-04-07 RX ORDER — POLYETHYLENE GLYCOL 3350 17 G/17G
17 POWDER, FOR SOLUTION ORAL DAILY
Status: DISCONTINUED | OUTPATIENT
Start: 2017-04-07 | End: 2017-04-08 | Stop reason: HOSPADM

## 2017-04-07 RX ADMIN — ASPIRIN 325 MG ORAL TABLET 325 MG: 325 PILL ORAL at 02:04

## 2017-04-07 RX ADMIN — FAMOTIDINE 20 MG: 20 TABLET ORAL at 04:04

## 2017-04-07 RX ADMIN — BISACODYL 5 MG: 5 TABLET, COATED ORAL at 04:04

## 2017-04-07 RX ADMIN — DILTIAZEM HYDROCHLORIDE 360 MG: 180 CAPSULE, COATED, EXTENDED RELEASE ORAL at 09:04

## 2017-04-07 RX ADMIN — SODIUM CHLORIDE: 0.9 INJECTION, SOLUTION INTRAVENOUS at 04:04

## 2017-04-07 RX ADMIN — ATORVASTATIN CALCIUM 40 MG: 40 TABLET, FILM COATED ORAL at 09:04

## 2017-04-07 RX ADMIN — HYDRALAZINE HYDROCHLORIDE 50 MG: 50 TABLET, FILM COATED ORAL at 09:04

## 2017-04-07 RX ADMIN — POLYETHYLENE GLYCOL 3350 17 G: 17 POWDER, FOR SOLUTION ORAL at 04:04

## 2017-04-07 NOTE — PROGRESS NOTES
Pt arrived to floor via stretcher. BS report received. Tele monitor on.  VS taken and assessment completed ,see flow sheets for detailed assessment.  No acute distress noted.Pt oriented to room service, call bell within reach, hourly rounding and fall prevention measures in placed.

## 2017-04-07 NOTE — SUBJECTIVE & OBJECTIVE
"Past Medical History:   Diagnosis Date    CKD (chronic kidney disease), stage IV     Diabetes mellitus     Diabetes mellitus, type 2     HTN (hypertension)        Past Surgical History:   Procedure Laterality Date    CIRCUMCISION  1978       Review of patient's allergies indicates:   Allergen Reactions    Augmentin [amoxicillin-pot clavulanate]     Lisinopril      cough    Sulfa (sulfonamide antibiotics)        No current facility-administered medications on file prior to encounter.      Current Outpatient Prescriptions on File Prior to Encounter   Medication Sig    aspirin 81 MG Chew Take 1 tablet (81 mg total) by mouth once daily.    atorvastatin (LIPITOR) 40 MG tablet Take 1 tablet (40 mg total) by mouth every evening.    BD ULTRA-FINE ANISA PEN NEEDLES 32 gauge x 5/32" Ndle     diltiaZEM (CARDIZEM CD) 360 MG 24 hr capsule Take 1 capsule (360 mg total) by mouth once daily.    docusate sodium (COLACE) 100 MG capsule Take 1 capsule (100 mg total) by mouth 2 (two) times daily.    FREESTYLE LITE STRIPS Strp     hydrALAZINE (APRESOLINE) 50 MG tablet Take 1 tablet (50 mg total) by mouth 3 (three) times daily.    insulin NPH-insulin regular, 70/30, (NOVOLIN 70/30) 100 unit/mL (70-30) injection Inject 30 Units into the skin 2 (two) times daily. Dispense with needles and syringes.    lactulose (CHRONULAC) 10 gram/15 mL solution     oxycodone (ROXICODONE) 10 mg Tab immediate release tablet Take 1 tablet (10 mg total) by mouth every 4 (four) hours as needed for Pain.    pantoprazole (PROTONIX) 40 MG tablet Take 1 tablet (40 mg total) by mouth once daily.    sucralfate (CARAFATE) 100 mg/mL suspension Take 10 mLs (1 g total) by mouth 4 (four) times daily.    tamsulosin (FLOMAX) 0.4 mg Cp24 Take 1 capsule (0.4 mg total) by mouth once daily.    zolpidem (AMBIEN) 5 MG Tab Take 1 tablet (5 mg total) by mouth nightly as needed.    calcium carbonate (TUMS) 200 mg calcium (500 mg) chewable tablet Take 1 tablet " (500 mg total) by mouth once daily.    polyethylene glycol (GLYCOLAX) 17 gram PwPk Take 17 g by mouth once daily.    sodium bicarbonate 650 MG tablet Take 1 tablet (650 mg total) by mouth 3 (three) times daily.    [DISCONTINUED] amlodipine (NORVASC) 10 MG tablet Take 1 tablet (10 mg total) by mouth once daily.    [DISCONTINUED] metoprolol succinate (TOPROL-XL) 25 MG 24 hr tablet Take 25 mg by mouth once daily.     Family History     Problem Relation (Age of Onset)    Hypertension Father        Social History Main Topics    Smoking status: Former Smoker    Smokeless tobacco: Not on file    Alcohol use No    Drug use: No    Sexual activity: Not on file     Review of Systems   Constitutional: Negative for chills, diaphoresis, fatigue and fever.   HENT: Negative for drooling, ear pain, rhinorrhea and sore throat.    Eyes: Negative.    Respiratory: Positive for shortness of breath (exertional). Negative for cough and wheezing.    Cardiovascular: Negative for palpitations and leg swelling.   Gastrointestinal: Positive for abdominal pain (cramping) and constipation. Negative for diarrhea and nausea.   Endocrine: Negative.    Genitourinary: Negative for dysuria, hematuria and urgency.   Musculoskeletal: Negative.    Skin: Negative for color change and wound.   Allergic/Immunologic: Negative.    Neurological: Negative for dizziness, syncope and speech difficulty.   Hematological: Negative.    Psychiatric/Behavioral: Negative.      Objective:     Vital Signs (Most Recent):  Temp: 97.7 °F (36.5 °C) (04/07/17 1545)  Pulse: 83 (04/07/17 1545)  Resp: 18 (04/07/17 1545)  BP: (!) 159/96 (04/07/17 1545)  SpO2: 95 % (04/07/17 1545) Vital Signs (24h Range):  Temp:  [97.7 °F (36.5 °C)] 97.7 °F (36.5 °C)  Pulse:  [83-84] 83  Resp:  [18] 18  SpO2:  [95 %-99 %] 95 %  BP: (154-159)/(71-96) 159/96     Weight: 123.4 kg (272 lb)  Body mass index is 31.43 kg/(m^2).    Physical Exam   Constitutional: He is oriented to person, place,  and time. He appears well-developed and well-nourished. No distress.   HENT:   Head: Normocephalic and atraumatic.   Eyes: EOM are normal.   Neck: Normal range of motion. Neck supple.   Cardiovascular: Normal rate, regular rhythm and normal heart sounds.    Pulmonary/Chest: Effort normal and breath sounds normal. No respiratory distress.   Abdominal: Soft. Bowel sounds are normal. He exhibits no distension. There is no tenderness.   Obese   Musculoskeletal: Normal range of motion. He exhibits no edema.   Neurological: He is alert and oriented to person, place, and time.   Skin: Skin is dry.   Nursing note and vitals reviewed.       Significant Labs:   CBC:   Recent Labs  Lab 04/07/17  1305   WBC 8.90   HGB 10.9*   HCT 33.8*        CMP:   Recent Labs  Lab 04/07/17  1305      K 4.7      CO2 24   *   BUN 41*   CREATININE 5.6*   CALCIUM 8.5*   PROT 7.4   ALBUMIN 3.2*   BILITOT 0.3   ALKPHOS 93   AST 13   ALT 10   ANIONGAP 10   EGFRNONAA 10*       Significant Imaging:   Imaging Results         X-Ray Chest 1 View (Final result) Result time:  04/07/17 13:27:07    Final result by TERESA Kumar Sr., MD (04/07/17 13:27:07)    Impression:      Normal study.      Electronically signed by: TERESA KUMAR MD  Date:     04/07/17  Time:    13:27     Narrative:    One-view chest x-ray    Clinical History: Abdominal pain    Finding: Comparison was made to a prior examination performed on 1/12/2017.  The size of the heart is normal. The lungs are clear. There is no pneumothorax.  The costophrenic angles are sharp.            X-Ray Abdomen Flat And Erect (Final result) Result time:  04/07/17 13:29:24    Final result by TERESA Kumar Sr., MD (04/07/17 13:29:24)    Impression:         There is a prominent amount of fecal material within the ascending portion of the colon.      Electronically signed by: TERESA KUMAR MD  Date:     04/07/17  Time:    13:29     Narrative:    Flat and erect KUB    History: Abdominal  pain    Finding: There is a prominent amount of fecal material within the ascending portion of the colon.  There is no pneumoperitoneum. The bony structures appear intact.

## 2017-04-07 NOTE — ED PROVIDER NOTES
"SCRIBE #1 NOTE: I, Vicente Arvizu, am scribing for, and in the presence of, Marisa Grossman MD. I have scribed the entire note.      History      Chief Complaint   Patient presents with    Abdominal Pain     abd pain, back pain and costipation x 3 days "I think I am impacted"       Review of patient's allergies indicates:   Allergen Reactions    Augmentin [amoxicillin-pot clavulanate]     Lisinopril      cough    Sulfa (sulfonamide antibiotics)         HPI   HPI    4/7/2017, 12:38 PM   History obtained from the patient      History of Present Illness: Conner Perez III is a 67 y.o. male patient with a PMHx of DM, who presents to the Emergency Department for constipation which onset gradually 2 days ago. Sxs are constant and moderate in severity. Pt's last BM was 2 days ago. Pt reports he is passing gas. There are no mitigating or exacerbating factors noted. Associated sxs include lower abd pain, lower back pain.  Pt denies any fever, N/V/D, dysuria, difficulty urinating, HA, blood in stool, CP,  and all other sxs at this time. Pt reports he has a podiatry appointment at 2 PM today for blisters to bilateral heels. Pt reports drinking magnesium citrate 2 days ago without relief. No further complaints or concerns at this time.       Arrival mode: Personal vehicle      PCP: Raciel Angela MD       Past Medical History:  Past Medical History:   Diagnosis Date    CKD (chronic kidney disease), stage IV     Diabetes mellitus     Diabetes mellitus, type 2     HTN (hypertension)        Past Surgical History:  Past Surgical History:   Procedure Laterality Date    CIRCUMCISION  1978         Family History:  Family History   Problem Relation Age of Onset    Hypertension Father        Social History:  Social History     Social History Main Topics    Smoking status: Former Smoker    Smokeless tobacco: Not on file    Alcohol use No    Drug use: No    Sexual activity: Not on file       ROS   Review of " Systems   Constitutional: Negative for fever.   HENT: Negative for sore throat.    Respiratory: Negative for shortness of breath.    Cardiovascular: Negative for chest pain.   Gastrointestinal: Positive for abdominal pain and constipation. Negative for blood in stool, diarrhea, nausea and vomiting.   Genitourinary: Negative for dysuria.   Musculoskeletal: Positive for back pain (lower).   Skin: Negative for rash.   Neurological: Negative for weakness.   Hematological: Does not bruise/bleed easily.     Physical Exam    Initial Vitals   BP Pulse Resp Temp SpO2   04/07/17 1235 04/07/17 1235 04/07/17 1235 04/07/17 1235 04/07/17 1235   154/71 84 18 97.7 °F (36.5 °C) 99 %      Physical Exam  Nursing Notes and Vital Signs Reviewed.  Constitutional: Patient is in no acute distress. Awake and alert. Well-developed and well-nourished.  Head: Atraumatic. Normocephalic.  Eyes: PERRL. EOM intact. Conjunctivae are not pale. No scleral icterus.  ENT: Mucous membranes are moist. Oropharynx is clear and symmetric.    Neck: Supple. Full ROM. No lymphadenopathy.  Cardiovascular: Regular rate. Regular rhythm. No murmurs, rubs, or gallops. Distal pulses are 2+ and symmetric.  Pulmonary/Chest: No respiratory distress. Clear to auscultation bilaterally. No wheezing, rales, or rhonchi.  Abdominal: Soft and non-distended.  There is no tenderness.  No rebound, guarding, or rigidity. Good bowel sounds.  Genitourinary: No CVA tenderness  Musculoskeletal: Moves all extremities. No obvious deformities. No edema. No calf tenderness.  Skin: Warm and dry.  Neurological:  Alert, awake, and appropriate.  Normal speech.  No acute focal neurological deficits are appreciated.  Psychiatric: Normal affect. Good eye contact. Appropriate in content.    ED Course    Procedures  ED Vital Signs:  Vitals:    04/07/17 1235 04/07/17 1545 04/07/17 1939   BP: (!) 154/71 (!) 159/96 (!) 156/100   Pulse: 84 83 77   Resp: 18 18 18   Temp: 97.7 °F (36.5 °C) 97.7 °F  "(36.5 °C) 97.7 °F (36.5 °C)   TempSrc: Oral Oral Oral   SpO2: 99% 95% 97%   Weight: 123.4 kg (272 lb)     Height: 6' 6" (1.981 m)         Abnormal Lab Results:  Labs Reviewed   CBC W/ AUTO DIFFERENTIAL - Abnormal; Notable for the following:        Result Value    RBC 4.08 (*)     Hemoglobin 10.9 (*)     Hematocrit 33.8 (*)     MCH 26.7 (*)     All other components within normal limits   COMPREHENSIVE METABOLIC PANEL - Abnormal; Notable for the following:     Glucose 112 (*)     BUN, Bld 41 (*)     Creatinine 5.6 (*)     Calcium 8.5 (*)     Albumin 3.2 (*)     eGFR if  11 (*)     eGFR if non  10 (*)     All other components within normal limits   TROPONIN I - Abnormal; Notable for the following:     Troponin I 0.138 (*)     All other components within normal limits   URINALYSIS - Abnormal; Notable for the following:     Protein, UA 3+ (*)     Glucose, UA 1+ (*)     Occult Blood UA 1+ (*)     All other components within normal limits   URINALYSIS MICROSCOPIC - Abnormal; Notable for the following:     Hyaline Casts, UA 2 (*)     All other components within normal limits   MAGNESIUM   CK   APTT   PROTIME-INR   LIPASE        All Lab Results:  Results for orders placed or performed during the hospital encounter of 04/07/17   CBC auto differential   Result Value Ref Range    WBC 8.90 3.90 - 12.70 K/uL    RBC 4.08 (L) 4.60 - 6.20 M/uL    Hemoglobin 10.9 (L) 14.0 - 18.0 g/dL    Hematocrit 33.8 (L) 40.0 - 54.0 %    MCV 83 82 - 98 fL    MCH 26.7 (L) 27.0 - 31.0 pg    MCHC 32.2 32.0 - 36.0 %    RDW 13.1 11.5 - 14.5 %    Platelets 248 150 - 350 K/uL    MPV 10.2 9.2 - 12.9 fL    Gran # 6.1 1.8 - 7.7 K/uL    Lymph # 1.9 1.0 - 4.8 K/uL    Mono # 0.7 0.3 - 1.0 K/uL    Eos # 0.1 0.0 - 0.5 K/uL    Baso # 0.05 0.00 - 0.20 K/uL    Gran% 68.4 38.0 - 73.0 %    Lymph% 21.7 18.0 - 48.0 %    Mono% 7.8 4.0 - 15.0 %    Eosinophil% 1.5 0.0 - 8.0 %    Basophil% 0.6 0.0 - 1.9 %    Differential Method Automated  "   Comprehensive metabolic panel   Result Value Ref Range    Sodium 138 136 - 145 mmol/L    Potassium 4.7 3.5 - 5.1 mmol/L    Chloride 104 95 - 110 mmol/L    CO2 24 23 - 29 mmol/L    Glucose 112 (H) 70 - 110 mg/dL    BUN, Bld 41 (H) 8 - 23 mg/dL    Creatinine 5.6 (H) 0.5 - 1.4 mg/dL    Calcium 8.5 (L) 8.7 - 10.5 mg/dL    Total Protein 7.4 6.0 - 8.4 g/dL    Albumin 3.2 (L) 3.5 - 5.2 g/dL    Total Bilirubin 0.3 0.1 - 1.0 mg/dL    Alkaline Phosphatase 93 55 - 135 U/L    AST 13 10 - 40 U/L    ALT 10 10 - 44 U/L    Anion Gap 10 8 - 16 mmol/L    eGFR if African American 11 (A) >60 mL/min/1.73 m^2    eGFR if non African American 10 (A) >60 mL/min/1.73 m^2   Magnesium   Result Value Ref Range    Magnesium 2.6 1.6 - 2.6 mg/dL   CK   Result Value Ref Range     20 - 200 U/L   Troponin I   Result Value Ref Range    Troponin I 0.138 (H) 0.000 - 0.026 ng/mL   Urinalysis   Result Value Ref Range    Specimen UA Urine, Clean Catch     Color, UA Yellow Yellow, Straw, Brenda    Appearance, UA Clear Clear    pH, UA 6.0 5.0 - 8.0    Specific Gravity, UA 1.020 1.005 - 1.030    Protein, UA 3+ (A) Negative    Glucose, UA 1+ (A) Negative    Ketones, UA Negative Negative    Bilirubin (UA) Negative Negative    Occult Blood UA 1+ (A) Negative    Nitrite, UA Negative Negative    Urobilinogen, UA Negative <2.0 EU/dL    Leukocytes, UA Negative Negative   APTT   Result Value Ref Range    aPTT 29.8 21.0 - 32.0 sec   Protime-INR   Result Value Ref Range    Prothrombin Time 10.7 9.0 - 12.5 sec    INR 1.0 0.8 - 1.2   Lipase   Result Value Ref Range    Lipase 38 4 - 60 U/L   Urinalysis Microscopic   Result Value Ref Range    RBC, UA 3 0 - 4 /hpf    WBC, UA 2 0 - 5 /hpf    Bacteria, UA Occasional None-Occ /hpf    Hyaline Casts, UA 2 (A) 0-1/lpf /lpf    Microscopic Comment SEE COMMENT          Imaging Results:  Imaging Results         CT Renal Stone Study ABD Pelvis WO (Final result) Result time:  04/07/17 19:11:47    Final result by Alfonso BARON  MD Javier (04/07/17 19:11:47)    Impression:     No acute intra-abdominal process.  Minor chronic findings as noted above.      All CT scans at this facility use dose modulation, iterative reconstruction and/or weight based dosing when appropriate to reduce radiation dose to as low as reasonably achievable.       Electronically signed by: DASHAWN FARR MD  Date:     04/07/17  Time:    19:11     Narrative:    CT RENAL STONE STUDY ABD PELVIS WO,     Date:  04/07/17 19:07:53    Technique: Limited noncontrast CT scan of the abdomen and pelvis.Comparison with 06/05/2015.    History:  Abdominal pain with constipation.  Chronic renal failure.,     Findings:  The lung bases are clear.     The liver and spleen are not enlarged.  Small hiatal hernia is suspected.  Gallbladder is present.    The adrenal glands reveal symmetric thickening.    Kidneys reveal no significant hydronephrosis or nephrolithiasis.    The pancreas is grossly normal.    The bowel loops are nondilated.  No evidence of appendicitis.  Several diverticula of the sigmoid colon are noted.    Within the pelvis the prostate gland does not appear significantly enlarged.    Minor thoracolumbar spondylosis.  Mild aortic atherosclerosis.            X-Ray Chest 1 View (Final result) Result time:  04/07/17 13:27:07    Final result by TERESA Kumar Sr., MD (04/07/17 13:27:07)    Impression:      Normal study.      Electronically signed by: TERESA KUMAR MD  Date:     04/07/17  Time:    13:27     Narrative:    One-view chest x-ray    Clinical History: Abdominal pain    Finding: Comparison was made to a prior examination performed on 1/12/2017.  The size of the heart is normal. The lungs are clear. There is no pneumothorax.  The costophrenic angles are sharp.            X-Ray Abdomen Flat And Erect (Final result) Result time:  04/07/17 13:29:24    Final result by TERESA Kumar Sr., MD (04/07/17 13:29:24)    Impression:         There is a prominent amount of  fecal material within the ascending portion of the colon.      Electronically signed by: TERESA FAROOQ MD  Date:     04/07/17  Time:    13:29     Narrative:    Flat and erect KUB    History: Abdominal pain    Finding: There is a prominent amount of fecal material within the ascending portion of the colon.  There is no pneumoperitoneum. The bony structures appear intact.                    The EKG was ordered, reviewed, and independently interpreted by the ED provider.  Interpretation time: 12:56  Rate: 81 BPM  Rhythm: premature ventricular contractions (PVC)  Interpretation: no acute ST changes. No STEMI.      The Emergency Provider reviewed the vital signs and test results, which are outlined above.    ED Discussion     2:55 PM: Discussed case with ANISHA Recio ([facility]). Doc agrees with current care and management of pt and accepts admission.   Admitting Service: Hospital medicine   Admitting Physician: Dr. Slade  Admit to: telemetry    2:56 PM: Re-evaluated pt. I have discussed test results, shared treatment plan, and the need for admission with patient and family at bedside. Pt and family express understanding at this time and agree with all information. All questions answered. Pt and family have no further questions or concerns at this time. Pt is ready for admit.      ED Medication(s):  Medications   diltiaZEM 24 hr capsule 360 mg (not administered)   atorvastatin tablet 40 mg (not administered)   hydrALAZINE tablet 50 mg (not administered)   0.9%  NaCl infusion ( Intravenous New Bag 4/7/17 1632)   heparin (porcine) injection 5,000 Units (not administered)   famotidine tablet 20 mg (20 mg Oral Given 4/7/17 1632)   aspirin tablet 325 mg (325 mg Oral Given 4/7/17 1454)   polyethylene glycol packet 17 g (17 g Oral Given 4/7/17 1632)   bisacodyl EC tablet 5 mg (5 mg Oral Given 4/7/17 1632)       Current Discharge Medication List                Medical Decision Making    Medical Decision Making:    Clinical Tests:   Lab Tests: Ordered and Reviewed  Radiological Study: Reviewed and Ordered  Medical Tests: Reviewed and Ordered           Scribe Attestation:   Scribe #1: I performed the above scribed service and the documentation accurately describes the services I performed. I attest to the accuracy of the note.    Attending:   Physician Attestation Statement for Scribe #1: I, Marisa Grossman MD, personally performed the services described in this documentation, as scribed by Vicente Arvizu, in my presence, and it is both accurate and complete.          Clinical Impression       ICD-10-CM ICD-9-CM   1. Generalized abdominal pain R10.84 789.07   2. Abdominal pain R10.9 789.00   3. Elevated troponin R74.8 790.6   4. Essential (primary) hypertension I10 401.9       Disposition:   Disposition: Admitted  Condition: Fair         Marisa Grossman MD  04/07/17 1958

## 2017-04-07 NOTE — PLAN OF CARE
Problem: Patient Care Overview  Goal: Plan of Care Review  Outcome: Ongoing (interventions implemented as appropriate)  Patient remains free from falls, fall precaution in place. Up ad celeste.   VS stable.c/o of abdominal pain 3/10 tolerable per patient. IV fluids infusing.   No other C/O at this time.Call bell and belongings within reach, reminded to call for assistance.

## 2017-04-07 NOTE — H&P
"[facility] Hospital Medicine  History & Physical    Patient Name: Conner Perez III  MRN: [id number]  Admission Date: 4/7/2017  Attending Physician: Antione Slade MD   Primary Care Provider: Raciel Angela MD      Patient information was obtained from patient and ER records.     Subjective:     Principal Problem:Elevated troponin    Chief Complaint:   Chief Complaint   Patient presents with    Abdominal Pain     abd pain, back pain and costipation x 3 days "I think I am impacted"        HPI:    History of Present Illness: Conner Perez III is a 67 y.o. male patient with a PMHx of DM, who presents to the Emergency Department for constipation which onset gradually 2 days ago. Sxs are constant and moderate in severity. Pt's last BM was 2 days ago. Pt reports he is passing gas. There are no mitigating or exacerbating factors noted. Associated sxs include lower abd pain, lower back pain. Pt denies any fever, N/V/D, dysuria, difficulty urinating, HA, blood in stool, CP,  and all other sxs at this time. Pt reports he has a podiatry appointment at 2 PM today for blisters to bilateral heels. Pt reports drinking magnesium citrate 2 days ago without relief. No further complaints or concerns at this time.     Past Medical History:   Diagnosis Date    CKD (chronic kidney disease), stage IV     Diabetes mellitus     Diabetes mellitus, type 2     HTN (hypertension)        Past Surgical History:   Procedure Laterality Date    CIRCUMCISION  1978       Review of patient's allergies indicates:   Allergen Reactions    Augmentin [amoxicillin-pot clavulanate]     Lisinopril      cough    Sulfa (sulfonamide antibiotics)        No current facility-administered medications on file prior to encounter.      Current Outpatient Prescriptions on File Prior to Encounter   Medication Sig    aspirin 81 MG Chew Take 1 tablet (81 mg total) by mouth once daily.    atorvastatin (LIPITOR) 40 MG tablet Take 1 tablet (40 mg " "total) by mouth every evening.    BD ULTRA-FINE ANISA PEN NEEDLES 32 gauge x 5/32" Ndle     diltiaZEM (CARDIZEM CD) 360 MG 24 hr capsule Take 1 capsule (360 mg total) by mouth once daily.    docusate sodium (COLACE) 100 MG capsule Take 1 capsule (100 mg total) by mouth 2 (two) times daily.    FREESTYLE LITE STRIPS Strp     hydrALAZINE (APRESOLINE) 50 MG tablet Take 1 tablet (50 mg total) by mouth 3 (three) times daily.    insulin NPH-insulin regular, 70/30, (NOVOLIN 70/30) 100 unit/mL (70-30) injection Inject 30 Units into the skin 2 (two) times daily. Dispense with needles and syringes.    lactulose (CHRONULAC) 10 gram/15 mL solution     oxycodone (ROXICODONE) 10 mg Tab immediate release tablet Take 1 tablet (10 mg total) by mouth every 4 (four) hours as needed for Pain.    pantoprazole (PROTONIX) 40 MG tablet Take 1 tablet (40 mg total) by mouth once daily.    sucralfate (CARAFATE) 100 mg/mL suspension Take 10 mLs (1 g total) by mouth 4 (four) times daily.    tamsulosin (FLOMAX) 0.4 mg Cp24 Take 1 capsule (0.4 mg total) by mouth once daily.    zolpidem (AMBIEN) 5 MG Tab Take 1 tablet (5 mg total) by mouth nightly as needed.    calcium carbonate (TUMS) 200 mg calcium (500 mg) chewable tablet Take 1 tablet (500 mg total) by mouth once daily.    polyethylene glycol (GLYCOLAX) 17 gram PwPk Take 17 g by mouth once daily.    sodium bicarbonate 650 MG tablet Take 1 tablet (650 mg total) by mouth 3 (three) times daily.    [DISCONTINUED] amlodipine (NORVASC) 10 MG tablet Take 1 tablet (10 mg total) by mouth once daily.    [DISCONTINUED] metoprolol succinate (TOPROL-XL) 25 MG 24 hr tablet Take 25 mg by mouth once daily.     Family History     Problem Relation (Age of Onset)    Hypertension Father        Social History Main Topics    Smoking status: Former Smoker    Smokeless tobacco: Not on file    Alcohol use No    Drug use: No    Sexual activity: Not on file     Review of Systems   Constitutional: " Negative for chills, diaphoresis, fatigue and fever.   HENT: Negative for drooling, ear pain, rhinorrhea and sore throat.    Eyes: Negative.    Respiratory: Positive for shortness of breath (exertional). Negative for cough and wheezing.    Cardiovascular: Negative for palpitations and leg swelling.   Gastrointestinal: Positive for abdominal pain (cramping) and constipation. Negative for diarrhea and nausea.   Endocrine: Negative.    Genitourinary: Negative for dysuria, hematuria and urgency.   Musculoskeletal: Negative.    Skin: Negative for color change and wound.   Allergic/Immunologic: Negative.    Neurological: Negative for dizziness, syncope and speech difficulty.   Hematological: Negative.    Psychiatric/Behavioral: Negative.      Objective:     Vital Signs (Most Recent):  Temp: 97.7 °F (36.5 °C) (04/07/17 1545)  Pulse: 83 (04/07/17 1545)  Resp: 18 (04/07/17 1545)  BP: (!) 159/96 (04/07/17 1545)  SpO2: 95 % (04/07/17 1545) Vital Signs (24h Range):  Temp:  [97.7 °F (36.5 °C)] 97.7 °F (36.5 °C)  Pulse:  [83-84] 83  Resp:  [18] 18  SpO2:  [95 %-99 %] 95 %  BP: (154-159)/(71-96) 159/96     Weight: 123.4 kg (272 lb)  Body mass index is 31.43 kg/(m^2).    Physical Exam   Constitutional: He is oriented to person, place, and time. He appears well-developed and well-nourished. No distress.   HENT:   Head: Normocephalic and atraumatic.   Eyes: EOM are normal.   Neck: Normal range of motion. Neck supple.   Cardiovascular: Normal rate, regular rhythm and normal heart sounds.    Pulmonary/Chest: Effort normal and breath sounds normal. No respiratory distress.   Abdominal: Soft. Bowel sounds are normal. He exhibits no distension. There is no tenderness.   Obese   Musculoskeletal: Normal range of motion. He exhibits no edema.   Neurological: He is alert and oriented to person, place, and time.   Skin: Skin is dry.   Nursing note and vitals reviewed.       Significant Labs:   CBC:   Recent Labs  Lab 04/07/17  1305   WBC 8.90    HGB 10.9*   HCT 33.8*        CMP:   Recent Labs  Lab 04/07/17  1305      K 4.7      CO2 24   *   BUN 41*   CREATININE 5.6*   CALCIUM 8.5*   PROT 7.4   ALBUMIN 3.2*   BILITOT 0.3   ALKPHOS 93   AST 13   ALT 10   ANIONGAP 10   EGFRNONAA 10*       Significant Imaging:   Imaging Results         X-Ray Chest 1 View (Final result) Result time:  04/07/17 13:27:07    Final result by TERESA Kumar Sr., MD (04/07/17 13:27:07)    Impression:      Normal study.      Electronically signed by: TERESA KUMAR MD  Date:     04/07/17  Time:    13:27     Narrative:    One-view chest x-ray    Clinical History: Abdominal pain    Finding: Comparison was made to a prior examination performed on 1/12/2017.  The size of the heart is normal. The lungs are clear. There is no pneumothorax.  The costophrenic angles are sharp.            X-Ray Abdomen Flat And Erect (Final result) Result time:  04/07/17 13:29:24    Final result by TERESA Kumar Sr., MD (04/07/17 13:29:24)    Impression:         There is a prominent amount of fecal material within the ascending portion of the colon.      Electronically signed by: TERESA KUMAR MD  Date:     04/07/17  Time:    13:29     Narrative:    Flat and erect KUB    History: Abdominal pain    Finding: There is a prominent amount of fecal material within the ascending portion of the colon.  There is no pneumoperitoneum. The bony structures appear intact.                Assessment/Plan:     * Elevated troponin  Troponin 0.030--1/2017.  Troponin elevation could be related to worsening kidney function. Will need to trend enzymes  -Echocardiogram      Constipation  -Dulcolax and Miralax    Generalized abdominal pain  -due to above      Essential hypertension  -resume Cardizem; hydralazine    Chronic kidney disease, stage 5, kidney failure  -Sees Dr. Rhodes at [facility]. Has appt with Vascular Surgery 4/22/17 for fistula placement.   -Creatinine 4.75 in March 2017.   -Consult  nephrology  -Strict i&O    Anemia of renal disease  H/H stable    DM II (diabetes mellitus, type II), controlled  Hold home medications  -insulin sliding scale  -HgbA1c      VTE Risk Mitigation         Ordered     heparin (porcine) injection 5,000 Units  Every 8 hours     Route:  Subcutaneous        04/07/17 1449     Medium Risk of VTE  Once      04/07/17 1544        Susan Mo NP  Department of Hospital Medicine   [facility]

## 2017-04-07 NOTE — CONSULTS
"HPI:  67 year old male with history of HTN, DM2, CKD 5, anemia  presents to [facility] with abdominal pain. Workup revealed UGO. Nephrology was consulted to help with the patient's renal care while he is admitted at [facility]. I saw and examined the patient in his hospital room. Patient reports that he developed abdominal pain 1 day PTO. Last bowel movement was about 2 days ago. He denies any other symptoms at present. Patient has history of CKD 5 and is followed by Dr. Rhodes from [facility]. He is scheduled to have permanent access placed by Dr. Peralta in near future. He denies any NSAID use. He consumes "plenty" of fluids.         Past medical history:  Past Medical History:   Diagnosis Date    CKD (chronic kidney disease), stage IV     Diabetes mellitus     Diabetes mellitus, type 2     HTN (hypertension)          Surgical history:  Past Surgical History:   Procedure Laterality Date    CIRCUMCISION  1978         Social History:    Social History     Social History    Marital status:      Spouse name: N/A    Number of children: N/A    Years of education: N/A     Occupational History    Not on file.     Social History Main Topics    Smoking status: Former Smoker    Smokeless tobacco: Not on file    Alcohol use No    Drug use: No    Sexual activity: Not on file     Other Topics Concern    Not on file     Social History Narrative       Family history:    Family History   Problem Relation Age of Onset    Hypertension Father        Allergies:   Review of patient's allergies indicates:   Allergen Reactions    Augmentin [amoxicillin-pot clavulanate]     Lisinopril      cough    Sulfa (sulfonamide antibiotics)          Medications:  Current Facility-Administered Medications   Medication Dose Route Frequency Provider Last Rate Last Dose    0.9%  NaCl infusion   Intravenous Continuous Marisa Grossman MD        aspirin tablet 325 mg  325 mg Oral Daily Marisa Grossman MD   " "325 mg at 04/07/17 1454    atorvastatin tablet 40 mg  40 mg Oral QHS Marisa Grossman MD        bisacodyl EC tablet 5 mg  5 mg Oral Once Susan Mo NP        diltiaZEM 24 hr capsule 360 mg  360 mg Oral Daily Marisa Grossman MD        famotidine tablet 20 mg  20 mg Oral Daily Marisa Grossman MD        [START ON 4/8/2017] heparin (porcine) injection 5,000 Units  5,000 Units Subcutaneous Q8H Marisa Grossman MD        hydrALAZINE tablet 50 mg  50 mg Oral TID Marisa Grossman MD        polyethylene glycol packet 17 g  17 g Oral Daily Susan Mo NP           Review of Systems:  1. GENERAL: patient denies fevers, weight change, generalized weakness/fatigue  2. HEENT: no headaches, visual disturbances, swallowing problems.  3. CARDIOVASCULAR: patient denies chest pain, palpitations.  4. PULMONARY: Patient denies SOB, wheezing, hemoptysis.  5. GI: patient reports abdominal pain, no nausea/vomiting, diarrhea.  6. GENITOURINARY: no dysuria, hematuria.  7. EXTREMITIES: no LE edema.  8. NEURO: no extremity weakness, no extremity numbness/tingling.  9. SKIN: no rashes  10. MUSKULOSKELETAL: no joint pain or joint swelling.  11. HEMATOLOGY: no rectal or gum bleeding.  12. PSYCH: no anxiety or depression.      Physical Exam:  BP (!) 159/96  Pulse 83  Temp 97.7 °F (36.5 °C) (Oral)   Resp 18  Ht 6' 6" (1.981 m)  Wt 123.4 kg (272 lb)  SpO2 95%  BMI 31.43 kg/m2    HEENT: Moist mucosal membranes, PER, no oral exudates, no nasal discharge.  NECK: no lymphadenopathy, no thyroid masses.  HEART: Regular, S1/S2 audible, no rubs, good extremity pulses.  LUNGS: Clear to auscultation, bilaterally, no wheezing, breathing is non-labored.  ABDOMEN: soft, nontender, not distended, bowel sounds are present, no abdominal hernia.  EXTREMITIES: no LE edema.  SKIN: no visible rashes.  NEURO: A & O x3      Labs:  Lab Results   Component Value Date    CREATININE 5.6 (H) 04/07/2017    BUN 41 (H) 04/07/2017    NA " 138 04/07/2017    K 4.7 04/07/2017     04/07/2017    CO2 24 04/07/2017     Lab Results   Component Value Date    CALCIUM 8.5 (L) 04/07/2017     Lab Results   Component Value Date    ALBUMIN 3.2 (L) 04/07/2017       Recent Labs  Lab 04/07/17  1305   MG 2.6     Lab Results   Component Value Date    WBC 8.90 04/07/2017    HGB 10.9 (L) 04/07/2017    HCT 33.8 (L) 04/07/2017    MCV 83 04/07/2017     04/07/2017       Recent Labs  Lab 04/07/17  1305   TROPONINI 0.138*     Urinalysis    Recent Labs  Lab 04/07/17  1434   COLORU Yellow   SPECGRAV 1.020   PHUR 6.0   PROTEINUA 3+*   BACTERIA Occasional   NITRITE Negative   LEUKOCYTESUR Negative   UROBILINOGEN Negative   HYALINECASTS 2*   3 RBC, 2 WBC    Imaging:  Imaging Results         X-Ray Chest 1 View (Final result) Result time:  04/07/17 13:27:07    Final result by TERESA Kumar Sr., MD (04/07/17 13:27:07)    Impression:      Normal study.      Electronically signed by: TERESA KUMAR MD  Date:     04/07/17  Time:    13:27     Narrative:    One-view chest x-ray    Clinical History: Abdominal pain    Finding: Comparison was made to a prior examination performed on 1/12/2017.  The size of the heart is normal. The lungs are clear. There is no pneumothorax.  The costophrenic angles are sharp.            X-Ray Abdomen Flat And Erect (Final result) Result time:  04/07/17 13:29:24    Final result by TERESA Kumar Sr., MD (04/07/17 13:29:24)    Impression:         There is a prominent amount of fecal material within the ascending portion of the colon.      Electronically signed by: TERESA KUMAR MD  Date:     04/07/17  Time:    13:29     Narrative:    Flat and erect KUB    History: Abdominal pain    Finding: There is a prominent amount of fecal material within the ascending portion of the colon.  There is no pneumoperitoneum. The bony structures appear intact.              Assessment and Plan:  67 year old male with history of HTN, DM2, CKD 5, anemia  presents to  [facility] with abdominal pain.    1. UGO: Patient has history of CKD 5 and is scheduled for dialysis access to be placed in near future. He presents with mild UGO and creatinine has now increased to 5.6. Patient displays no uremic symptoms. He is not fluid overloaded and electrolytes are well controlled. No indication for dialysis at present.     2. Electrolytes: at goal.     3. Acid-base: no issues.     4. Volume: no gross overload.     5. HTN: acceptable BP control.     6. Anemia: Hgb at goal for CKD 5 patient.     7. Medications: Medications reviewed. Agree with medical regimen. Avoid NSAIDS, ACE-I, ARBs.

## 2017-04-07 NOTE — ASSESSMENT & PLAN NOTE
Troponin 0.030--1/2017.  Troponin elevation could be related to worsening kidney function. Will need to trend enzymes  -Echocardiogram

## 2017-04-07 NOTE — IP AVS SNAPSHOT
[address] [place] [address]           Patient Discharge Instructions   Our goal is to set you up for success. This packet includes information on your condition, medications, and your home care.  It will help you care for yourself to prevent having to return to the hospital.     Please ask your nurse if you have any questions.      There are many details to remember when preparing to leave the hospital. Here is what you will need to do:    1. Take your medicine. If you are prescribed medications, review your Medication List on the following pages. You may have new medications to  at the pharmacy and others that you'll need to stop taking. Review the instructions for how and when to take your medications. Talk with your doctor or nurses if you are unsure of what to do.     2. Go to your follow-up appointments. Specific follow-up information is listed in the following pages. Your may be contacted by a nurse or clinical provider about future appointments. Be sure we have all of the phone numbers to reach you. Please contact your provider's office if you are unable to make an appointment.     3. Watch for warning signs. Your doctor or nurse will give you detailed warning signs to watch for and when to call for assistance. These instructions may also include educational information about your condition. If you experience any of warning signs to your health, call your doctor.               ** Verify the list of medication(s) below is accurate and up to date. Carry this with you in case of emergency. If your medications have changed, please notify your healthcare provider.             Medication List      CONTINUE taking these medications        Additional Info                      aspirin 81 MG Chew   Quantity:  30 tablet   Refills:  0   Dose:  81 mg    Instructions:  Take 1 tablet (81 mg total) by mouth once daily.     Begin Date    AM    Noon    PM    Bedtime     "   atorvastatin 40 MG tablet   Commonly known as:  LIPITOR   Quantity:  30 tablet   Refills:  0   Dose:  40 mg   Indications:  hyperlipidemia    Last time this was given:  40 mg on 4/7/2017  9:11 PM   Instructions:  Take 1 tablet (40 mg total) by mouth every evening.     Begin Date    AM    Noon    PM    Bedtime       BD ULTRA-FINE ANISA PEN NEEDLES 32 gauge x 5/32" Ndle   Refills:  0   Generic drug:  pen needle, diabetic      Begin Date    AM    Noon    PM    Bedtime       calcium carbonate 200 mg calcium (500 mg) chewable tablet   Commonly known as:  TUMS   Quantity:  90 tablet   Refills:  0   Dose:  1 tablet    Instructions:  Take 1 tablet (500 mg total) by mouth once daily.     Begin Date    AM    Noon    PM    Bedtime       diltiaZEM 360 MG 24 hr capsule   Commonly known as:  CARDIZEM CD   Quantity:  30 capsule   Refills:  0   Dose:  360 mg    Last time this was given:  360 mg on 4/8/2017  8:45 AM   Instructions:  Take 1 capsule (360 mg total) by mouth once daily.     Begin Date    AM    Noon    PM    Bedtime       docusate sodium 100 MG capsule   Commonly known as:  COLACE   Quantity:  60 capsule   Refills:  0   Dose:  100 mg    Instructions:  Take 1 capsule (100 mg total) by mouth 2 (two) times daily.     Begin Date    AM    Noon    PM    Bedtime       FREESTYLE LITE STRIPS Strp   Refills:  0   Generic drug:  blood sugar diagnostic      Begin Date    AM    Noon    PM    Bedtime       hydrALAZINE 50 MG tablet   Commonly known as:  APRESOLINE   Quantity:  90 tablet   Refills:  0   Dose:  50 mg    Last time this was given:  50 mg on 4/8/2017  5:13 AM   Instructions:  Take 1 tablet (50 mg total) by mouth 3 (three) times daily.     Begin Date    AM    Noon    PM    Bedtime       insulin NPH-insulin regular (70/30) 100 unit/mL (70-30) injection   Commonly known as:  NOVOLIN 70/30   Quantity:  20 mL   Refills:  0   Dose:  30 Units    Instructions:  Inject 30 Units into the skin 2 (two) times daily. Dispense with " needles and syringes.     Begin Date    AM    Noon    PM    Bedtime       lactulose 10 gram/15 mL solution   Commonly known as:  CHRONULAC   Refills:  0    Last time this was given:  30 g on 4/8/2017  1:31 AM     Begin Date    AM    Noon    PM    Bedtime       pantoprazole 40 MG tablet   Commonly known as:  PROTONIX   Quantity:  30 tablet   Refills:  0   Dose:  40 mg    Instructions:  Take 1 tablet (40 mg total) by mouth once daily.     Begin Date    AM    Noon    PM    Bedtime       polyethylene glycol 17 gram Pwpk   Commonly known as:  GLYCOLAX   Quantity:  30 packet   Refills:  0   Dose:  17 g    Last time this was given:  17 g on 4/8/2017  8:45 AM   Instructions:  Take 17 g by mouth once daily.     Begin Date    AM    Noon    PM    Bedtime       sodium bicarbonate 650 MG tablet   Quantity:  90 tablet   Refills:  0   Dose:  650 mg    Instructions:  Take 1 tablet (650 mg total) by mouth 3 (three) times daily.     Begin Date    AM    Noon    PM    Bedtime       sucralfate 100 mg/mL suspension   Commonly known as:  CARAFATE   Quantity:  1 Bottle   Refills:  0   Dose:  1 g    Instructions:  Take 10 mLs (1 g total) by mouth 4 (four) times daily.     Begin Date    AM    Noon    PM    Bedtime       tamsulosin 0.4 mg Cp24   Commonly known as:  FLOMAX   Quantity:  30 capsule   Refills:  0   Dose:  0.4 mg    Instructions:  Take 1 capsule (0.4 mg total) by mouth once daily.     Begin Date    AM    Noon    PM    Bedtime       zolpidem 5 MG Tab   Commonly known as:  AMBIEN   Quantity:  10 tablet   Refills:  0   Dose:  5 mg    Instructions:  Take 1 tablet (5 mg total) by mouth nightly as needed.     Begin Date    AM    Noon    PM    Bedtime         STOP taking these medications     oxycodone 10 mg Tab immediate release tablet   Commonly known as:  ROXICODONE                  Please bring to all follow up appointments:    1. A copy of your discharge instructions.  2. All medicines you are currently taking in their original  "bottles.  3. Identification and insurance card.    Please arrive 15 minutes ahead of scheduled appointment time.    Please call 24 hours in advance if you must reschedule your appointment and/or time.        Your Scheduled Appointments     Apr 12, 2017  8:20 AM CDT   Post OP with Bandar Gomes DPM   [facility] ([facility])    [address] [place] [id number] [phone]              Follow-up Information     Follow up with Raciel Angela MD In 3 days.    Specialty:  Family Medicine    Contact information:    [address]  [address]  [place] [address] [phone]          Discharge Instructions     Future Orders    Activity as tolerated     Diet Diabetic 2000 Calories     Diet renal       Discharge References/Attachments     CONSTIPATION (ADULT) (ENGLISH)    KIDNEY DISEASE, DISCHARGE INSTRUCTIONS FOR CHRONIC  (ENGLISH)    SCIATICA (ENGLISH)        Primary Diagnosis     Your primary diagnosis was:  Elevated Troponin Level      Admission Information     Date & Time Provider Department CSN    4/7/2017 12:36 PM Antione Slade MD [facility] -  [id number]      Care Providers     Provider Role Specialty Primary office phone    Antione Slade MD Attending Provider Internal Medicine [phone]    Antione Slade MD Team Attending  Internal Medicine [phone]    Marshall Hess MD Consulting Physician  Nephrology [phone]    Satya Barnhart MD Consulting Physician  Cardiology [phone]      Your Vitals Were     BP Pulse Temp Resp Height Weight    169/86 (BP Location: Left arm, Patient Position: Lying, BP Method: Automatic) 86 98.4 °F (36.9 °C) (Oral) 18 6' 6" (1.981 m) 123.4 kg (272 lb)    SpO2 BMI             95% 31.43 kg/m2         Recent Lab Values     No lab values to display.      Allergies as of 4/8/2017        Reactions    Augmentin [Amoxicillin-pot Clavulanate]     Lisinopril     cough    Sulfa (Sulfonamide Antibiotics)       [facility]     [facility] Nurse Care Line - 24/7 Assistance  Unless otherwise directed by your provider, please contact [facility], our nurse care line that is available for 24/7 assistance.     Registered nurses in the [facility] provide clinical advisement, health education, appointment booking, and other advisory services.  Call for this free service at [phone].        Advance Directives     An advance directive is a document which, in the event you are no longer able to make decisions for yourself, tells your healthcare team what kind of treatment you do or do not want to receive, or who you would like to make those decisions for you.  If you do not currently have an advance directive, [facility] encourages you to create one.  For more information call:  [phone]), [phone] ([phone]),  or log on to [email or web address].        Smoking Cessation     If you would like to quit smoking:   You may be eligible for free services if you are a [place] resident and started smoking cigarettes before September 1, 1988.  Call the Smoking Cessation Trust (SCT) toll free at (626) 316-8522 or (731) 661-9603.   Call 2-687-QUIT-NOW if you do not meet the above criteria.   Contact us via email: [email or web address]   View our website for more information: [email or web address]        Language Assistance Services     ATTENTION: Language assistance services are available, free of charge. Please call [phone].      ATENCIÓN: Si habla español, tiene a espinoza disposición servicios gratuitos de asistencia lingüística. Llame al [phone].     [facility] Ý: N?u b?n nói Ti?ng Vi?t, có các d?ch v? h? tr? ngôn ng? mi?n phí dành cho b?n. G?i s? [phone].        Chronic Kindey Disease Education             Diabetes Discharge Instructions                                   [facility] Sign-Up     Activating your [facility] account is as easy as 1-2-3!     1) Visit [email or web address], select Sign Up  Now, enter this activation code and your date of birth, then select Next.  [email or web address]  Expires: 4/15/2017  5:03 PM      2) Create a username and password to use when you visit [facility] in the future and select a security question in case you lose your password and select Next.    3) Enter your e-mail address and click Sign Up!    Additional Information  If you have questions, please e-mail [email or web address] or call [phone] to talk to our [facility] staff. Remember, [facility] is NOT to be used for urgent needs. For medical emergencies, dial 911.          [facility] complies with applicable Federal civil rights laws and does not discriminate on the basis of race, color, national origin, age, disability, or sex.

## 2017-04-08 VITALS
OXYGEN SATURATION: 95 % | HEIGHT: 78 IN | HEART RATE: 86 BPM | DIASTOLIC BLOOD PRESSURE: 86 MMHG | TEMPERATURE: 98 F | SYSTOLIC BLOOD PRESSURE: 169 MMHG | WEIGHT: 272 LBS | BODY MASS INDEX: 31.47 KG/M2 | RESPIRATION RATE: 18 BRPM

## 2017-04-08 PROBLEM — R10.84 GENERALIZED ABDOMINAL PAIN: Status: RESOLVED | Noted: 2017-04-07 | Resolved: 2017-04-08

## 2017-04-08 LAB
ALBUMIN SERPL BCP-MCNC: 3.2 G/DL
ALP SERPL-CCNC: 96 U/L
ALT SERPL W/O P-5'-P-CCNC: 12 U/L
ANION GAP SERPL CALC-SCNC: 11 MMOL/L
AST SERPL-CCNC: 13 U/L
BASOPHILS # BLD AUTO: 0.03 K/UL
BASOPHILS NFR BLD: 0.3 %
BILIRUB SERPL-MCNC: 0.4 MG/DL
BUN SERPL-MCNC: 40 MG/DL
CALCIUM SERPL-MCNC: 8.3 MG/DL
CHLORIDE SERPL-SCNC: 106 MMOL/L
CO2 SERPL-SCNC: 19 MMOL/L
CREAT SERPL-MCNC: 5.5 MG/DL
DIASTOLIC DYSFUNCTION: NO
DIFFERENTIAL METHOD: ABNORMAL
EOSINOPHIL # BLD AUTO: 0.1 K/UL
EOSINOPHIL NFR BLD: 1 %
ERYTHROCYTE [DISTWIDTH] IN BLOOD BY AUTOMATED COUNT: 13.2 %
EST. GFR  (AFRICAN AMERICAN): 11 ML/MIN/1.73 M^2
EST. GFR  (NON AFRICAN AMERICAN): 10 ML/MIN/1.73 M^2
GLUCOSE SERPL-MCNC: 202 MG/DL
HCT VFR BLD AUTO: 32.2 %
HGB BLD-MCNC: 10.7 G/DL
LYMPHOCYTES # BLD AUTO: 1.1 K/UL
LYMPHOCYTES NFR BLD: 12.4 %
MCH RBC QN AUTO: 27.4 PG
MCHC RBC AUTO-ENTMCNC: 33.2 %
MCV RBC AUTO: 83 FL
MONOCYTES # BLD AUTO: 0.6 K/UL
MONOCYTES NFR BLD: 7.2 %
NEUTROPHILS # BLD AUTO: 7 K/UL
NEUTROPHILS NFR BLD: 79.1 %
PLATELET # BLD AUTO: 230 K/UL
PMV BLD AUTO: 10.3 FL
POCT GLUCOSE: 192 MG/DL (ref 70–110)
POTASSIUM SERPL-SCNC: 4.3 MMOL/L
PROT SERPL-MCNC: 7.2 G/DL
RBC # BLD AUTO: 3.9 M/UL
RETIRED EF AND QEF - SEE NOTES: 55 (ref 55–65)
SODIUM SERPL-SCNC: 136 MMOL/L
TROPONIN I SERPL DL<=0.01 NG/ML-MCNC: 0.12 NG/ML
WBC # BLD AUTO: 8.9 K/UL

## 2017-04-08 PROCEDURE — 96375 TX/PRO/DX INJ NEW DRUG ADDON: CPT

## 2017-04-08 PROCEDURE — 25000003 PHARM REV CODE 250: Performed by: SPECIALIST

## 2017-04-08 PROCEDURE — 25000003 PHARM REV CODE 250: Performed by: INTERNAL MEDICINE

## 2017-04-08 PROCEDURE — 85025 COMPLETE CBC W/AUTO DIFF WBC: CPT

## 2017-04-08 PROCEDURE — 96372 THER/PROPH/DIAG INJ SC/IM: CPT

## 2017-04-08 PROCEDURE — 25000003 PHARM REV CODE 250: Performed by: NURSE PRACTITIONER

## 2017-04-08 PROCEDURE — 82962 GLUCOSE BLOOD TEST: CPT

## 2017-04-08 PROCEDURE — 96374 THER/PROPH/DIAG INJ IV PUSH: CPT

## 2017-04-08 PROCEDURE — 99203 OFFICE O/P NEW LOW 30 MIN: CPT | Mod: ,,, | Performed by: INTERNAL MEDICINE

## 2017-04-08 PROCEDURE — 36415 COLL VENOUS BLD VENIPUNCTURE: CPT

## 2017-04-08 PROCEDURE — 80053 COMPREHEN METABOLIC PANEL: CPT

## 2017-04-08 PROCEDURE — G0378 HOSPITAL OBSERVATION PER HR: HCPCS

## 2017-04-08 PROCEDURE — 96361 HYDRATE IV INFUSION ADD-ON: CPT

## 2017-04-08 PROCEDURE — 84484 ASSAY OF TROPONIN QUANT: CPT

## 2017-04-08 PROCEDURE — 25000003 PHARM REV CODE 250: Performed by: HOSPITALIST

## 2017-04-08 PROCEDURE — 63600175 PHARM REV CODE 636 W HCPCS: Performed by: HOSPITALIST

## 2017-04-08 RX ORDER — LACTULOSE 10 G/15ML
30 SOLUTION ORAL DAILY PRN
Status: DISCONTINUED | OUTPATIENT
Start: 2017-04-08 | End: 2017-04-08 | Stop reason: HOSPADM

## 2017-04-08 RX ORDER — HYDRALAZINE HYDROCHLORIDE 20 MG/ML
10 INJECTION INTRAMUSCULAR; INTRAVENOUS EVERY 6 HOURS PRN
Status: DISCONTINUED | OUTPATIENT
Start: 2017-04-08 | End: 2017-04-08 | Stop reason: HOSPADM

## 2017-04-08 RX ORDER — ACETAMINOPHEN 325 MG/1
650 TABLET ORAL EVERY 6 HOURS PRN
Status: DISCONTINUED | OUTPATIENT
Start: 2017-04-08 | End: 2017-04-08 | Stop reason: HOSPADM

## 2017-04-08 RX ORDER — ONDANSETRON 2 MG/ML
4 INJECTION INTRAMUSCULAR; INTRAVENOUS EVERY 6 HOURS PRN
Status: DISCONTINUED | OUTPATIENT
Start: 2017-04-08 | End: 2017-04-08 | Stop reason: HOSPADM

## 2017-04-08 RX ADMIN — LACTULOSE 30 G: 10 SOLUTION ORAL at 01:04

## 2017-04-08 RX ADMIN — HYDRALAZINE HYDROCHLORIDE 10 MG: 20 INJECTION INTRAMUSCULAR; INTRAVENOUS at 01:04

## 2017-04-08 RX ADMIN — POLYETHYLENE GLYCOL 3350 17 G: 17 POWDER, FOR SOLUTION ORAL at 08:04

## 2017-04-08 RX ADMIN — ASPIRIN 325 MG ORAL TABLET 325 MG: 325 PILL ORAL at 08:04

## 2017-04-08 RX ADMIN — HEPARIN SODIUM 5000 UNITS: 5000 INJECTION, SOLUTION INTRAVENOUS; SUBCUTANEOUS at 05:04

## 2017-04-08 RX ADMIN — FAMOTIDINE 20 MG: 20 TABLET ORAL at 08:04

## 2017-04-08 RX ADMIN — HYDRALAZINE HYDROCHLORIDE 50 MG: 50 TABLET, FILM COATED ORAL at 05:04

## 2017-04-08 RX ADMIN — ONDANSETRON 4 MG: 2 INJECTION INTRAMUSCULAR; INTRAVENOUS at 04:04

## 2017-04-08 RX ADMIN — DILTIAZEM HYDROCHLORIDE 360 MG: 180 CAPSULE, COATED, EXTENDED RELEASE ORAL at 08:04

## 2017-04-08 RX ADMIN — ACETAMINOPHEN 650 MG: 325 TABLET ORAL at 10:04

## 2017-04-08 NOTE — CONSULTS
Consult Note  Cardiology    Consult Requested By: Dr. Barron  Reason for Consult: Elevated troponin    SUBJECTIVE:     History of Present Illness:  Patient is a 67 y.o. male with a PMHx of CKD stage IV/V, DM type II, HTN, and hyperlipidemia who presented to [facility] ED yesterday with a chief complaint of abdominal pain, back pain, and constipation. Patient reportedly drank magnesium citrate two days prior without any improvement. He denied any associated fever, chills, nausea, vomiting, or diaphoresis. Workup in ED revealed elevated creatinine of 5.6 and initial troponin of 0.138. Abdominal Xray showed findings consistent with constipation and patient was subsequently admitted for further evaluation and treatment. Cardiology consulted due to elevated troponin. Patient seen and examined today, resting in bed. Still reports chronic back/sciatica pain. No cardiac complaints. Denies any chest pain, SOB, or other anginal signs or symptoms. He also denies any palpitations, near syncope, or syncope. He reports compliance with his medications. States he was seen in the past by Dr. Jimenez and had two previous LHC, most recent in 8/16 which did not show any significant blockages per patient. Chat reviewed. Repeat troponin 0.117, 0.122. EKG reviewed and shows SR with occasional PVC's. 2D echo pending.     Review of patient's allergies indicates:   Allergen Reactions    Augmentin [amoxicillin-pot clavulanate]     Lisinopril      cough    Sulfa (sulfonamide antibiotics)        Past Medical History:   Diagnosis Date    CKD (chronic kidney disease), stage IV     Diabetes mellitus     Diabetes mellitus, type 2     HTN (hypertension)      Past Surgical History:   Procedure Laterality Date    CIRCUMCISION  1978     Family History   Problem Relation Age of Onset    Hypertension Father      Social History   Substance Use Topics    Smoking status: Former Smoker    Smokeless tobacco: None    Alcohol use No        Review of  "Systems:  Constitutional: negative  Eyes: negative  Ears, nose, mouth, throat, and face: negative  Respiratory: negative  Cardiovascular: negative  Gastrointestinal: +constipation, abdominal pain  Genitourinary:negative  Hematologic/lymphatic: negative  Musculoskeletal: +back pain  Neurological: negative  Behavioral/Psych: negative  Endocrine: negative  Allergic/Immunologic: negative    OBJECTIVE:     Vital Signs (Most Recent)  Temp: 98.7 °F (37.1 °C) (04/08/17 0729)  Pulse: 90 (04/08/17 0729)  Resp: 18 (04/08/17 0729)  BP: (!) 146/86 (04/08/17 0729)  SpO2: 96 % (04/08/17 1002)    Vital Signs Range (Last 24H):  Temp:  [97.7 °F (36.5 °C)-98.9 °F (37.2 °C)]   Pulse:  [69-91]   Resp:  [18-20]   BP: (125-191)/()   SpO2:  [95 %-99 %]     Current Facility-Administered Medications   Medication    0.9%  NaCl infusion    acetaminophen tablet 650 mg    aspirin tablet 325 mg    atorvastatin tablet 40 mg    diltiaZEM 24 hr capsule 360 mg    famotidine tablet 20 mg    heparin (porcine) injection 5,000 Units    hydrALAZINE injection 10 mg    hydrALAZINE tablet 50 mg    lactulose 20 gram/30 mL solution Soln 30 g    ondansetron injection 4 mg    polyethylene glycol packet 17 g     No current facility-administered medications on file prior to encounter.      Current Outpatient Prescriptions on File Prior to Encounter   Medication Sig    aspirin 81 MG Chew Take 1 tablet (81 mg total) by mouth once daily.    atorvastatin (LIPITOR) 40 MG tablet Take 1 tablet (40 mg total) by mouth every evening.    BD ULTRA-FINE ANISA PEN NEEDLES 32 gauge x 5/32" Ndle     diltiaZEM (CARDIZEM CD) 360 MG 24 hr capsule Take 1 capsule (360 mg total) by mouth once daily.    docusate sodium (COLACE) 100 MG capsule Take 1 capsule (100 mg total) by mouth 2 (two) times daily.    FREESTYLE LITE STRIPS Strp     hydrALAZINE (APRESOLINE) 50 MG tablet Take 1 tablet (50 mg total) by mouth 3 (three) times daily.    insulin NPH-insulin regular, " 70/30, (NOVOLIN 70/30) 100 unit/mL (70-30) injection Inject 30 Units into the skin 2 (two) times daily. Dispense with needles and syringes.    lactulose (CHRONULAC) 10 gram/15 mL solution     oxycodone (ROXICODONE) 10 mg Tab immediate release tablet Take 1 tablet (10 mg total) by mouth every 4 (four) hours as needed for Pain.    pantoprazole (PROTONIX) 40 MG tablet Take 1 tablet (40 mg total) by mouth once daily.    sucralfate (CARAFATE) 100 mg/mL suspension Take 10 mLs (1 g total) by mouth 4 (four) times daily.    tamsulosin (FLOMAX) 0.4 mg Cp24 Take 1 capsule (0.4 mg total) by mouth once daily.    zolpidem (AMBIEN) 5 MG Tab Take 1 tablet (5 mg total) by mouth nightly as needed.    calcium carbonate (TUMS) 200 mg calcium (500 mg) chewable tablet Take 1 tablet (500 mg total) by mouth once daily.    polyethylene glycol (GLYCOLAX) 17 gram PwPk Take 17 g by mouth once daily.    sodium bicarbonate 650 MG tablet Take 1 tablet (650 mg total) by mouth 3 (three) times daily.       Physical Exam:  General appearance: alert, appears stated age and cooperative  Head: Normocephalic, without obvious abnormality, atraumatic  Neck: no adenopathy, no carotid bruit, no JVD  Lungs:  clear to auscultation bilaterally  Chest wall: no tenderness  Heart: regular rate and rhythm, S1, S2 normal, no murmur, click, rub or gallop  Abdomen: soft, non-tender; bowel sounds normal; no masses,  no organomegaly  Extremities: extremities normal, atraumatic, no cyanosis or edema  Skin: Skin color, texture, turgor normal. No rashes or lesions  Neurologic: Grossly normal, AAO x 3    Laboratory:  Chemistry:   Lab Results   Component Value Date     04/08/2017    K 4.3 04/08/2017     04/08/2017    CO2 19 (L) 04/08/2017    BUN 40 (H) 04/08/2017    CREATININE 5.5 (H) 04/08/2017    CALCIUM 8.3 (L) 04/08/2017     Cardiac Markers:   Lab Results   Component Value Date    TROPONINI 0.122 (H) 04/08/2017     Cardiac Markers (Last 3):   Lab  Results   Component Value Date    TROPONINI 0.122 (H) 04/08/2017    TROPONINI 0.117 (H) 04/07/2017    TROPONINI 0.138 (H) 04/07/2017     CBC:   Lab Results   Component Value Date    WBC 8.90 04/08/2017    HGB 10.7 (L) 04/08/2017    HCT 32.2 (L) 04/08/2017    MCV 83 04/08/2017     04/08/2017     Lipids: No results found for: CHOL, TRIG, HDL, LDLDIRECT  Coagulation:   Lab Results   Component Value Date    INR 1.0 04/07/2017    APTT 29.8 04/07/2017       Diagnostic Results:  ECG: Reviewed  X-Ray: Reviewed  Echo: Pending    ASSESSMENT/PLAN:     Patient Active Problem List   Diagnosis    Constipation    Chest pain    Essential hypertension    Chronic kidney disease, stage 5, kidney failure    Anemia of renal disease    DM II (diabetes mellitus, type II), controlled    Debility, unspecified    Severe malnutrition due to type 2 diabetes mellitus    Generalized abdominal pain    Elevated troponin      Patient who presents with constipation and elevated troponin, unclear etiology, may be in part due to ESRD. Cardiac wise, patient remains stable. Denies chest pain or SOB. EKG without acute ST-T changes. 2D echo pending. Given risk factors and history, should have MPI stress test as OP, will arrange.   Plan:   -Continue ASA, statin, CCB  -Stress test as OP  -2D echo    Chart reviewed. Dr. Barnhart examined patient and agrees with plan as outlined above.

## 2017-04-08 NOTE — PLAN OF CARE
Problem: Patient Care Overview  Goal: Plan of Care Review  Outcome: Ongoing (interventions implemented as appropriate)  Pt alert and oriented. C/o abdominal discomfort with lower back pain. No relief from Lactulose, order for soap socrates enema. BP in the 190/90's during shift, new order for PRN hydralazine. Troponin trended during shift. Mepilex dressing to bilateral heels CDI. Pt remained free of falls during shift, call light in reach, room free of clutter, POC reviewed with pt, side rails up X2, pt on telemetry monitor SR, will continue to monitor.

## 2017-04-08 NOTE — PROGRESS NOTES
"Subjective    Patient reports abdominal and back pain.       Physical Exam:  BP (!) 146/86 (BP Location: Left arm, Patient Position: Lying, BP Method: Automatic)  Pulse 90  Temp 98.7 °F (37.1 °C) (Oral)   Resp 18  Ht 6' 6" (1.981 m)  Wt 123.4 kg (272 lb)  SpO2 95%  BMI 31.43 kg/m2    HEENT: Moist mucosal membranes, PER, no oral exudates, no nasal discharge.  NECK: no lymphadenopathy, no thyroid masses.  HEART: Regular, S1/S2 audible, no rubs, good extremity pulses.  LUNGS: Clear to auscultation, bilaterally, no wheezing, breathing is non-labored.  ABDOMEN: soft, nontender, not distended, bowel sounds are present, no abdominal hernia.  EXTREMITIES: no LE edema.  SKIN: no visible rashes.  NEURO: A & O x3      Labs:  Lab Results   Component Value Date    CREATININE 5.5 (H) 04/08/2017    BUN 40 (H) 04/08/2017     04/08/2017    K 4.3 04/08/2017     04/08/2017    CO2 19 (L) 04/08/2017     Lab Results   Component Value Date    CALCIUM 8.3 (L) 04/08/2017     Lab Results   Component Value Date    ALBUMIN 3.2 (L) 04/08/2017       Recent Labs  Lab 04/07/17  1305   MG 2.6     Lab Results   Component Value Date    WBC 8.90 04/08/2017    HGB 10.7 (L) 04/08/2017    HCT 32.2 (L) 04/08/2017    MCV 83 04/08/2017     04/08/2017       Recent Labs  Lab 04/08/17  0015   TROPONINI 0.122*     Urinalysis    Recent Labs  Lab 04/07/17  1434   COLORU Yellow   SPECGRAV 1.020   PHUR 6.0   PROTEINUA 3+*   BACTERIA Occasional   NITRITE Negative   LEUKOCYTESUR Negative   UROBILINOGEN Negative   HYALINECASTS 2*   3 RBC, 2 WBC    Imaging:  Imaging Results         CT Renal Stone Study ABD Pelvis WO (Final result) Result time:  04/07/17 19:11:47    Final result by Alfonso Herrera MD (04/07/17 19:11:47)    Impression:     No acute intra-abdominal process.  Minor chronic findings as noted above.      All CT scans at this facility use dose modulation, iterative reconstruction and/or weight based dosing when appropriate to " reduce radiation dose to as low as reasonably achievable.       Electronically signed by: DASHAWN FARR MD  Date:     04/07/17  Time:    19:11     Narrative:    CT RENAL STONE STUDY ABD PELVIS WO,     Date:  04/07/17 19:07:53    Technique: Limited noncontrast CT scan of the abdomen and pelvis.Comparison with 06/05/2015.    History:  Abdominal pain with constipation.  Chronic renal failure.,     Findings:  The lung bases are clear.     The liver and spleen are not enlarged.  Small hiatal hernia is suspected.  Gallbladder is present.    The adrenal glands reveal symmetric thickening.    Kidneys reveal no significant hydronephrosis or nephrolithiasis.    The pancreas is grossly normal.    The bowel loops are nondilated.  No evidence of appendicitis.  Several diverticula of the sigmoid colon are noted.    Within the pelvis the prostate gland does not appear significantly enlarged.    Minor thoracolumbar spondylosis.  Mild aortic atherosclerosis.            X-Ray Chest 1 View (Final result) Result time:  04/07/17 13:27:07    Final result by TERESA Kumar Sr., MD (04/07/17 13:27:07)    Impression:      Normal study.      Electronically signed by: TERESA KUMAR MD  Date:     04/07/17  Time:    13:27     Narrative:    One-view chest x-ray    Clinical History: Abdominal pain    Finding: Comparison was made to a prior examination performed on 1/12/2017.  The size of the heart is normal. The lungs are clear. There is no pneumothorax.  The costophrenic angles are sharp.            X-Ray Abdomen Flat And Erect (Final result) Result time:  04/07/17 13:29:24    Final result by TERESA Kumar Sr., MD (04/07/17 13:29:24)    Impression:         There is a prominent amount of fecal material within the ascending portion of the colon.      Electronically signed by: TERESA KUMAR MD  Date:     04/07/17  Time:    13:29     Narrative:    Flat and erect KUB    History: Abdominal pain    Finding: There is a prominent amount of fecal  material within the ascending portion of the colon.  There is no pneumoperitoneum. The bony structures appear intact.              Assessment and Plan:  67 year old male with history of HTN, DM2, CKD 5, anemia  presents to [facility] with abdominal pain.    1. UGO: Patient has history of CKD 5 and is scheduled for dialysis access to be placed in near future. He presents with mild UGO. However, creatinine has now stabilized at 5.5. Patient displays no uremic symptoms. He is not fluid overloaded and electrolytes are well controlled. No indication for dialysis at present. Will monitor with repeat renal panel.    2. Electrolytes: at goal.     3. Acid-base: mild acidosis, monitor.     4. Volume: no gross overload.     5. HTN: acceptable BP control.     6. Anemia: Hgb at goal for CKD 5 patient.     7. Medications: Medications reviewed. Agree with medical regimen. Avoid NSAIDS, ACE-I, ARBs.

## 2017-04-08 NOTE — DISCHARGE SUMMARY
[facility] Hospital Medicine  Discharge Summary      Patient Name: Conner Perez III  MRN: [id number]  Admission Date: 4/7/2017  Hospital Length of Stay: 0 days  Discharge Date and Time:  04/08/2017 11:03 AM  Attending Physician: Antione Slade MD   Discharging Provider: Antione Slade MD  Primary Care Provider: Raciel Angela MD      HPI:   History of Present Illness: Conner Perez III is a 67 y.o. male patient with a PMHx of DM, who presents to the Emergency Department for constipation which onset gradually 2 days ago. Sxs are constant and moderate in severity. Pt's last BM was 2 days ago. Pt reports he is passing gas. There are no mitigating or exacerbating factors noted. Associated sxs include lower abd pain, lower back pain. Pt denies any fever, N/V/D, dysuria, difficulty urinating, HA, blood in stool, CP,  and all other sxs at this time. Pt reports he has a podiatry appointment at 2 PM today for blisters to bilateral heels. Pt reports drinking magnesium citrate 2 days ago without relief. No further complaints or concerns at this time.     * No surgery found *      Indwelling Lines/Drains at time of discharge:   Lines/Drains/Airways     Peripherally Inserted Central Catheter Line                 PICC Double Lumen 01/17/17 1446 right brachial 80 days              Hospital Course:   Patient 66 yo male admitted to hospital for elevated troponin. Patient ruled out for ACS via serial ce's. Patient was seen by cardiology who reviewed the patients recent cath and recommended a stress test to be performed this week as an O/P. Patient with constipation treated with lactulose. Patient with multiple BM with improvement in sx. 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 Patient with ongoing LBP secondary to Sciatica. Patient to f/u with ortho / spine for recs on chronic LBP. Patient was also evaluated by Nephrology for his CKD Stage V. Continue to monitor and avoid nephrotoxic materials. Patient seen and  examined on the date of discharge deemed safe for a discharge to home with close PCP followup.     Consults:   Consults         Status Ordering Provider     Inpatient consult to Cardiology  Once     Provider:  Satya Barnhart MD    Completed TITO SORIA     Inpatient consult to Nephrology  Once     Provider:  Marshall Hess MD    Acknowledged KAREN KEYS          Significant Diagnostic Studies: Labs:   BMP:   Recent Labs  Lab 04/07/17  1305 04/08/17  0512   * 202*    136   K 4.7 4.3    106   CO2 24 19*   BUN 41* 40*   CREATININE 5.6* 5.5*   CALCIUM 8.5* 8.3*   MG 2.6  --    , CMP   Recent Labs  Lab 04/07/17  1305 04/08/17  0512    136   K 4.7 4.3    106   CO2 24 19*   * 202*   BUN 41* 40*   CREATININE 5.6* 5.5*   CALCIUM 8.5* 8.3*   PROT 7.4 7.2   ALBUMIN 3.2* 3.2*   BILITOT 0.3 0.4   ALKPHOS 93 96   AST 13 13   ALT 10 12   ANIONGAP 10 11   ESTGFRAFRICA 11* 11*   EGFRNONAA 10* 10*   , CBC   Recent Labs  Lab 04/07/17  1305 04/08/17  0513   WBC 8.90 8.90   HGB 10.9* 10.7*   HCT 33.8* 32.2*    230    and All labs within the past 24 hours have been reviewed    Pending Diagnostic Studies:     None        Final Active Diagnoses:    Diagnosis Date Noted POA    PRINCIPAL PROBLEM:  Elevated troponin [R74.8] 04/07/2017 Yes    DM II (diabetes mellitus, type II), controlled [E11.9] 01/19/2017 Yes    Anemia of renal disease [D63.1] 01/18/2017 Yes    Chronic kidney disease, stage 5, kidney failure [N18.5] 01/15/2017 Yes     Chronic    Essential hypertension [I10] 01/12/2017 Yes     Chronic      Problems Resolved During this Admission:    Diagnosis Date Noted Date Resolved POA    Generalized abdominal pain [R10.84] 04/07/2017 04/08/2017 Yes    Constipation [K59.00] 11/23/2016 04/08/2017 Yes      No new Assessment & Plan notes have been filed under this hospital service since the last note was generated.  Service: Hospital Medicine      Discharged Condition:  "good    Disposition: Home or Self Care    Follow Up:  Follow-up Information     Follow up with Raciel Angela MD In 3 days.    Specialty:  Family Medicine    Contact information:    [address]   [address]  [place] [address] [phone]          Patient Instructions:     Diet renal     Diet Diabetic 2000 Calories     Activity as tolerated       Medications:  Reconciled Home Medications:   Current Discharge Medication List      CONTINUE these medications which have NOT CHANGED    Details   aspirin 81 MG Chew Take 1 tablet (81 mg total) by mouth once daily.  Qty: 30 tablet, Refills: 0      atorvastatin (LIPITOR) 40 MG tablet Take 1 tablet (40 mg total) by mouth every evening.  Qty: 30 tablet, Refills: 0      BD ULTRA-FINE ANISA PEN NEEDLES 32 gauge x 5/32" Ndle Refills: 0      diltiaZEM (CARDIZEM CD) 360 MG 24 hr capsule Take 1 capsule (360 mg total) by mouth once daily.  Qty: 30 capsule, Refills: 0      docusate sodium (COLACE) 100 MG capsule Take 1 capsule (100 mg total) by mouth 2 (two) times daily.  Qty: 60 capsule, Refills: 0      FREESTYLE LITE STRIPS Strp Refills: 0      hydrALAZINE (APRESOLINE) 50 MG tablet Take 1 tablet (50 mg total) by mouth 3 (three) times daily.  Qty: 90 tablet, Refills: 0      insulin NPH-insulin regular, 70/30, (NOVOLIN 70/30) 100 unit/mL (70-30) injection Inject 30 Units into the skin 2 (two) times daily. Dispense with needles and syringes.  Qty: 20 mL, Refills: 0      lactulose (CHRONULAC) 10 gram/15 mL solution Refills: 0      pantoprazole (PROTONIX) 40 MG tablet Take 1 tablet (40 mg total) by mouth once daily.  Qty: 30 tablet, Refills: 0      sucralfate (CARAFATE) 100 mg/mL suspension Take 10 mLs (1 g total) by mouth 4 (four) times daily.  Qty: 1 Bottle, Refills: 0      tamsulosin (FLOMAX) 0.4 mg Cp24 Take 1 capsule (0.4 mg total) by mouth once daily.  Qty: 30 capsule, Refills: 0      zolpidem (AMBIEN) 5 MG Tab Take 1 tablet (5 mg total) by mouth nightly as " needed.  Qty: 10 tablet, Refills: 0      calcium carbonate (TUMS) 200 mg calcium (500 mg) chewable tablet Take 1 tablet (500 mg total) by mouth once daily.  Qty: 90 tablet, Refills: 0      polyethylene glycol (GLYCOLAX) 17 gram PwPk Take 17 g by mouth once daily.  Qty: 30 packet, Refills: 0      sodium bicarbonate 650 MG tablet Take 1 tablet (650 mg total) by mouth 3 (three) times daily.  Qty: 90 tablet, Refills: 0         STOP taking these medications       oxycodone (ROXICODONE) 10 mg Tab immediate release tablet Comments:   Reason for Stopping:             Time spent on the discharge of patient: 35 minutes    Antione Slade MD  Department of Hospital Medicine  [facility]

## 2017-04-11 DIAGNOSIS — R94.31 ABNORMAL ELECTROCARDIOGRAM: ICD-10-CM

## 2017-04-11 DIAGNOSIS — I10 ESSENTIAL HYPERTENSION: ICD-10-CM

## 2017-04-11 DIAGNOSIS — E11.22 TYPE 2 DIABETES MELLITUS WITH STAGE 5 CHRONIC KIDNEY DISEASE NOT ON CHRONIC DIALYSIS, UNSPECIFIED LONG TERM INSULIN USE STATUS: ICD-10-CM

## 2017-04-11 DIAGNOSIS — R79.89 ELEVATED TROPONIN: Primary | ICD-10-CM

## 2017-04-11 DIAGNOSIS — N18.5 TYPE 2 DIABETES MELLITUS WITH STAGE 5 CHRONIC KIDNEY DISEASE NOT ON CHRONIC DIALYSIS, UNSPECIFIED LONG TERM INSULIN USE STATUS: ICD-10-CM

## 2017-04-11 DIAGNOSIS — R07.9 CHEST PAIN, UNSPECIFIED TYPE: ICD-10-CM

## 2017-04-11 DIAGNOSIS — R53.81 DEBILITY, UNSPECIFIED: ICD-10-CM

## 2017-04-11 DIAGNOSIS — R93.1 ABNORMAL ECHOCARDIOGRAM: ICD-10-CM

## 2017-04-11 DIAGNOSIS — N18.5 CHRONIC KIDNEY DISEASE, STAGE 5, KIDNEY FAILURE: Chronic | ICD-10-CM

## 2017-04-19 ENCOUNTER — HOSPITAL ENCOUNTER (EMERGENCY)
Facility: HOSPITAL | Age: 67
Discharge: HOME | End: 2017-04-19
Attending: EMERGENCY MEDICINE
Payer: MEDICARE

## 2017-04-19 VITALS
BODY MASS INDEX: 31.47 KG/M2 | SYSTOLIC BLOOD PRESSURE: 200 MMHG | TEMPERATURE: 98 F | OXYGEN SATURATION: 100 % | HEART RATE: 85 BPM | DIASTOLIC BLOOD PRESSURE: 91 MMHG | WEIGHT: 272 LBS | HEIGHT: 78 IN | RESPIRATION RATE: 16 BRPM

## 2017-04-19 DIAGNOSIS — R10.84 ABDOMINAL DISCOMFORT, GENERALIZED: ICD-10-CM

## 2017-04-19 LAB
ALBUMIN SERPL BCP-MCNC: 3.3 G/DL
ALP SERPL-CCNC: 93 U/L
ALT SERPL W/O P-5'-P-CCNC: 12 U/L
ANION GAP SERPL CALC-SCNC: 9 MMOL/L
AST SERPL-CCNC: 18 U/L
BACTERIA #/AREA URNS HPF: NORMAL /HPF
BASOPHILS # BLD AUTO: 0.08 K/UL
BASOPHILS NFR BLD: 1 %
BILIRUB SERPL-MCNC: 0.4 MG/DL
BILIRUB UR QL STRIP: NEGATIVE
BUN SERPL-MCNC: 35 MG/DL
CALCIUM SERPL-MCNC: 8.5 MG/DL
CHLORIDE SERPL-SCNC: 104 MMOL/L
CLARITY UR: CLEAR
CO2 SERPL-SCNC: 22 MMOL/L
COLOR UR: YELLOW
CREAT SERPL-MCNC: 6.1 MG/DL
DIFFERENTIAL METHOD: ABNORMAL
EOSINOPHIL # BLD AUTO: 0.2 K/UL
EOSINOPHIL NFR BLD: 2.1 %
ERYTHROCYTE [DISTWIDTH] IN BLOOD BY AUTOMATED COUNT: 13.5 %
EST. GFR  (AFRICAN AMERICAN): 10 ML/MIN/1.73 M^2
EST. GFR  (NON AFRICAN AMERICAN): 9 ML/MIN/1.73 M^2
GLUCOSE SERPL-MCNC: 197 MG/DL
GLUCOSE UR QL STRIP: ABNORMAL
HCT VFR BLD AUTO: 33.1 %
HGB BLD-MCNC: 11 G/DL
HGB UR QL STRIP: ABNORMAL
HYALINE CASTS #/AREA URNS LPF: 0 /LPF
KETONES UR QL STRIP: NEGATIVE
LEUKOCYTE ESTERASE UR QL STRIP: NEGATIVE
LIPASE SERPL-CCNC: 36 U/L
LYMPHOCYTES # BLD AUTO: 2.1 K/UL
LYMPHOCYTES NFR BLD: 25.2 %
MCH RBC QN AUTO: 27.4 PG
MCHC RBC AUTO-ENTMCNC: 33.2 %
MCV RBC AUTO: 82 FL
MICROSCOPIC COMMENT: NORMAL
MONOCYTES # BLD AUTO: 0.8 K/UL
MONOCYTES NFR BLD: 9.2 %
NEUTROPHILS # BLD AUTO: 5.1 K/UL
NEUTROPHILS NFR BLD: 62.5 %
NITRITE UR QL STRIP: NEGATIVE
PH UR STRIP: 7 [PH] (ref 5–8)
PLATELET # BLD AUTO: 242 K/UL
PMV BLD AUTO: 10.3 FL
POTASSIUM SERPL-SCNC: 4.6 MMOL/L
PROT SERPL-MCNC: 7.4 G/DL
PROT UR QL STRIP: ABNORMAL
RBC # BLD AUTO: 4.02 M/UL
RBC #/AREA URNS HPF: 2 /HPF (ref 0–4)
SODIUM SERPL-SCNC: 135 MMOL/L
SP GR UR STRIP: 1.02 (ref 1–1.03)
SQUAMOUS #/AREA URNS HPF: 1 /HPF
TROPONIN I SERPL DL<=0.01 NG/ML-MCNC: 0.03 NG/ML
URN SPEC COLLECT METH UR: ABNORMAL
UROBILINOGEN UR STRIP-ACNC: NEGATIVE EU/DL
WBC # BLD AUTO: 8.12 K/UL
WBC #/AREA URNS HPF: 1 /HPF (ref 0–5)

## 2017-04-19 PROCEDURE — 81000 URINALYSIS NONAUTO W/SCOPE: CPT

## 2017-04-19 PROCEDURE — 83690 ASSAY OF LIPASE: CPT

## 2017-04-19 PROCEDURE — 93005 ELECTROCARDIOGRAM TRACING: CPT

## 2017-04-19 PROCEDURE — 63600175 PHARM REV CODE 636 W HCPCS: Performed by: EMERGENCY MEDICINE

## 2017-04-19 PROCEDURE — 99284 EMERGENCY DEPT VISIT MOD MDM: CPT | Mod: 25

## 2017-04-19 PROCEDURE — 93010 ELECTROCARDIOGRAM REPORT: CPT | Mod: ,,, | Performed by: INTERNAL MEDICINE

## 2017-04-19 PROCEDURE — 85025 COMPLETE CBC W/AUTO DIFF WBC: CPT

## 2017-04-19 PROCEDURE — 84484 ASSAY OF TROPONIN QUANT: CPT

## 2017-04-19 PROCEDURE — 80053 COMPREHEN METABOLIC PANEL: CPT

## 2017-04-19 PROCEDURE — 96374 THER/PROPH/DIAG INJ IV PUSH: CPT

## 2017-04-19 RX ORDER — METOCLOPRAMIDE 10 MG/1
10 TABLET ORAL EVERY 6 HOURS PRN
Qty: 30 TABLET | Refills: 0 | Status: SHIPPED | OUTPATIENT
Start: 2017-04-19 | End: 2017-11-09

## 2017-04-19 RX ORDER — METOCLOPRAMIDE HYDROCHLORIDE 5 MG/ML
10 INJECTION INTRAMUSCULAR; INTRAVENOUS
Status: COMPLETED | OUTPATIENT
Start: 2017-04-19 | End: 2017-04-19

## 2017-04-19 RX ADMIN — METOCLOPRAMIDE 10 MG: 5 INJECTION, SOLUTION INTRAMUSCULAR; INTRAVENOUS at 12:04

## 2017-04-19 NOTE — ED AVS SNAPSHOT
[facility]  [id number] [place] [address]               Conner Perez III   2017 12:21 AM   ED    Description:  Male : 1950   Department:  [facility]           Your Care was Coordinated By:     Provider Role From To    Norma Salomon MD Attending Provider 17 0031 --      Reason for Visit     GI Problem           Diagnoses this Visit        Comments    Abdominal discomfort, generalized         Abdominal discomfort, generalized           ED Disposition     ED Disposition Condition Comment    Discharge             To Do List           Follow-up Information     Follow up with Raciel Angela MD In 1 day.    Specialty:  Family Medicine    Contact information:    [address] [facility]  [address]  [place] [id number] [phone]          Follow up with [facility].    Specialty:  Emergency Medicine    Why:  As needed, If symptoms worsen    Contact information:    [address] [place] [address] [phone]       These Medications        Disp Refills Start End    metoclopramide HCl (REGLAN) 10 MG tablet 30 tablet 0 2017     Take 1 tablet (10 mg total) by mouth every 6 (six) hours as needed. - Oral    [facility] [address] [facility] #: [phone]         [facility]     [facility] Care Line - 24/ Assistance  Unless otherwise directed by your provider, please contact [facility], our nurse care line that is available for 24/7 assistance.     Registered nurses in the [facility] provide: appointment scheduling, clinical advisement, health education, and other advisory services.  Call: [phone] (toll free)               Medications           Message regarding Medications     Verify the changes and/or additions to your medication regime listed below are the same as discussed with your clinician today.   "If any of these changes or additions are incorrect, please notify your healthcare provider.        START taking these NEW medications        Refills    metoclopramide HCl (REGLAN) 10 MG tablet 0    Sig: Take 1 tablet (10 mg total) by mouth every 6 (six) hours as needed.    Class: Print    Route: Oral      These medications were administered today        Dose Freq    metoclopramide HCl injection 10 mg 10 mg ED 1 Time    Sig: Inject 2 mLs (10 mg total) into the vein ED 1 Time.    Class: Normal    Route: Intravenous           Verify that the below list of medications is an accurate representation of the medications you are currently taking.  If none reported, the list may be blank. If incorrect, please contact your healthcare provider. Carry this list with you in case of emergency.           Current Medications     aspirin 81 MG Chew Take 1 tablet (81 mg total) by mouth once daily.    atorvastatin (LIPITOR) 40 MG tablet Take 1 tablet (40 mg total) by mouth every evening.    BD ULTRA-FINE ANISA PEN NEEDLES 32 gauge x 5/32" Ndle     calcium carbonate (TUMS) 200 mg calcium (500 mg) chewable tablet Take 1 tablet (500 mg total) by mouth once daily.    diltiaZEM (CARDIZEM CD) 360 MG 24 hr capsule Take 1 capsule (360 mg total) by mouth once daily.    docusate sodium (COLACE) 100 MG capsule Take 1 capsule (100 mg total) by mouth 2 (two) times daily.    FREESTYLE LITE STRIPS Strp     hydrALAZINE (APRESOLINE) 50 MG tablet Take 1 tablet (50 mg total) by mouth 3 (three) times daily.    insulin NPH-insulin regular, 70/30, (NOVOLIN 70/30) 100 unit/mL (70-30) injection Inject 30 Units into the skin 2 (two) times daily. Dispense with needles and syringes.    lactulose (CHRONULAC) 10 gram/15 mL solution     metoclopramide HCl (REGLAN) 10 MG tablet Take 1 tablet (10 mg total) by mouth every 6 (six) hours as needed.    pantoprazole (PROTONIX) 40 MG tablet Take 1 tablet (40 mg total) by mouth once daily.    polyethylene glycol (GLYCOLAX) " "17 gram PwPk Take 17 g by mouth once daily.    sodium bicarbonate 650 MG tablet Take 1 tablet (650 mg total) by mouth 3 (three) times daily.    sucralfate (CARAFATE) 100 mg/mL suspension Take 10 mLs (1 g total) by mouth 4 (four) times daily.    tamsulosin (FLOMAX) 0.4 mg Cp24 Take 1 capsule (0.4 mg total) by mouth once daily.    zolpidem (AMBIEN) 5 MG Tab Take 1 tablet (5 mg total) by mouth nightly as needed.           Clinical Reference Information           Your Vitals Were     BP Pulse Temp Resp Height Weight    169/85 (BP Location: Right arm, Patient Position: Sitting) 94 98.4 °F (36.9 °C) (Oral) 20 6' 6" (1.981 m) 123.4 kg (272 lb)    SpO2 BMI             96% 31.43 kg/m2         Allergies as of 4/19/2017        Reactions    Augmentin [Amoxicillin-pot Clavulanate]     Lisinopril     cough    Sulfa (Sulfonamide Antibiotics)       Immunizations Administered on Date of Encounter - 4/19/2017     None      ED Micro, Lab, POCT     Start Ordered       Status Ordering Provider    04/19/17 0039 04/19/17 0038  Lipase  STAT      Final result     04/19/17 0037 04/19/17 0037  Urinalysis Microscopic  Once      In process     04/19/17 0036 04/19/17 0036  CBC auto differential  STAT      Final result     04/19/17 0036 04/19/17 0036  Comprehensive metabolic panel  STAT      Final result     04/19/17 0036 04/19/17 0036  Urinalysis  STAT      In process     04/19/17 0036 04/19/17 0036  Troponin I  STAT      Final result       ED Imaging Orders     Start Ordered       Status Ordering Provider    04/19/17 0036 04/19/17 0036  X-Ray Abdomen Flat And Erect  1 time imaging      In process         Discharge Instructions         Symptoms With Uncertain Cause (Adult)  You have been examined, and tests may have been done. However, the exact cause of your symptoms is still not certain. Watch for any new symptoms or worsening of your condition. Another exam or more testing at a later time may be needed. Unless told otherwise, you can go back " to your normal routine.  Follow-up care  Follow up with your healthcare provider if your symptoms do not begin to improve in the next few days, or as advised by our staff.   When to seek medical advice  Call your healthcare provider if your symptoms get worse or if new symptoms appear.  Date Last Reviewed: 6/26/2015  © 3999-7004 [employer]. [address] [place] [address]. All rights reserved. This information is not intended as a substitute for professional medical care. Always follow your healthcare professional's instructions.          Your Scheduled Appointments     Apr 21, 2017 10:00 AM CDT   Post OP with Bandar Gomes DPM   [facility] ([facility]    [address] [facility] [place] [address]   [phone]            Apr 25, 2017  8:30 AM CDT   Nuclear Regadenonsen with TREADMILL, NUCLEAR MEDICINE   [facility] ([facility])    [address] [place] [address], 3rd Floor  [facility] [address]   [phone]            Apr 25, 2017  8:30 AM CDT   MPI Delay with [place] [address] CARDIAC   [facility]    [address] [facility] [address]   [phone]            Apr 25, 2017 10:30 AM CDT   MPI Delay with [place] [address] CARDIAC   [facility] ([facility]    [address] [facility] [address]   [phone]              [facility] Sign-Up     Activating your [facility] account is as easy as 1-2-3!     1) Visit [email or web address], select Sign Up Now, enter this activation code and your date of birth, then select Next.  [email or web address]  Expires: 6/3/2017  1:59 AM      2) Create a username and password to use when you visit [facility] in the future and select a security question in case you lose your password and select Next.    3) Enter your e-mail address and click Sign Up!    Additional Information  If you have questions, please e-mail [email or web address] or call [phone] to talk to our [facility] staff. Remember,  [facility] is NOT to be used for urgent needs. For medical emergencies, dial 911.         Smoking Cessation     If you would like to quit smoking:   You may be eligible for free services if you are a [place] resident and started smoking cigarettes before September 1, 1988.  Call the Smoking Cessation Trust (SCT) toll free at (883) 610-4604 or (804) 631-4564.   Call 1-800-QUIT-NOW if you do not meet the above criteria.   Contact us via email: [email or web address]   View our website for more information: [email or web address]         [facility] complies with applicable Federal civil rights laws and does not discriminate on the basis of race, color, national origin, age, disability, or sex.        Language Assistance Services     ATTENTION: Language assistance services are available, free of charge. Please call [phone].      ATENCIÓN: Si habla español, tiene a espinoza disposición servicios gratuitos de asistencia lingüística. Llame al [phone].     CHÚ Ý: N?u b?n nói Ti?ng Vi?t, có các d?ch v? h? tr? ngôn ng? mi?n phí dành cho b?n. G?i s? [phone].

## 2017-04-19 NOTE — DISCHARGE INSTRUCTIONS
Symptoms With Uncertain Cause (Adult)  You have been examined, and tests may have been done. However, the exact cause of your symptoms is still not certain. Watch for any new symptoms or worsening of your condition. Another exam or more testing at a later time may be needed. Unless told otherwise, you can go back to your normal routine.  Follow-up care  Follow up with your healthcare provider if your symptoms do not begin to improve in the next few days, or as advised by our staff.   When to seek medical advice  Call your healthcare provider if your symptoms get worse or if new symptoms appear.  Date Last Reviewed: 6/26/2015  © 4113-6015 [employer]. [address] [place] [address], [place] [address]. All rights reserved. This information is not intended as a substitute for professional medical care. Always follow your healthcare professional's instructions.

## 2017-04-19 NOTE — ED PROVIDER NOTES
"SCRIBE #1 NOTE: I, Isaias Minerva, am scribing for, and in the presence of, Norma Salomon MD. I have scribed the entire note.      History      Chief Complaint   Patient presents with    GI Problem     Pt reports bloating/feeling of fullness, "like I can't get out a burp" x1day. +nausea       Review of patient's allergies indicates:   Allergen Reactions    Augmentin [amoxicillin-pot clavulanate]     Lisinopril      cough    Sulfa (sulfonamide antibiotics)         HPI   HPI    4/19/2017, 12:35 AM   History obtained from the patient      History of Present Illness: Conner Perez III is a 67 y.o. male patient who presents to the Emergency Department for GI problem which onset gradually earlier today. Sx are constant and moderate in severity. Pt describes sx as abd discomfort, he thinks he might have some type of blockage because he has not been able to pass gas today. He has only been able to force out burps. Pt states he has had 3 small bowel movements today. There are no mitigating or exacerbating factors noted. Associated sx include nausea.  Pt denies any fever, chills, abd pain, CP, SOB, vomiting, diarrhea, urinary sx, constipation, and all other sx at this time. No further complaints or concerns at this time.       Arrival mode: Personal vehicle     PCP: Raciel Angela MD       Past Medical History:  Past Medical History:   Diagnosis Date    CKD (chronic kidney disease), stage IV     Diabetes mellitus     Diabetes mellitus, type 2     HTN (hypertension)        Past Surgical History:  Past Surgical History:   Procedure Laterality Date    CIRCUMCISION  1978         Family History:  Family History   Problem Relation Age of Onset    Hypertension Father        Social History:  Social History     Social History Main Topics    Smoking status: Former Smoker    Smokeless tobacco: Not on file    Alcohol use No    Drug use: No    Sexual activity: Not on file       ROS   Review of Systems   Constitutional: " Negative for chills and fever.   HENT: Negative for sore throat and trouble swallowing.    Respiratory: Negative for cough and shortness of breath.    Cardiovascular: Negative for chest pain.   Gastrointestinal: Positive for nausea. Negative for abdominal pain, constipation, diarrhea and vomiting.        + GI problem   Genitourinary: Negative for dysuria and hematuria.   Musculoskeletal: Negative for back pain.   Skin: Negative for rash and wound.   Neurological: Negative for weakness and numbness.   Hematological: Does not bruise/bleed easily.   All other systems reviewed and are negative.      Physical Exam    Initial Vitals   BP Pulse Resp Temp SpO2   04/19/17 0017 04/19/17 0017 04/19/17 0017 04/19/17 0017 04/19/17 0017   169/85 99 20 98.4 °F (36.9 °C) 96 %      Physical Exam  Nursing Notes and Vital Signs Reviewed.  Constitutional: Patient is in no acute distress. Awake and alert. Well-developed and well-nourished.  Head: Atraumatic. Normocephalic.  Eyes: PERRL. EOM intact. Conjunctivae are not pale. No scleral icterus.  ENT: Mucous membranes are moist. Oropharynx is clear and symmetric.    Neck: Supple. Full ROM. No lymphadenopathy.  Cardiovascular: Regular rate. Regular rhythm. No murmurs, rubs, or gallops. Distal pulses are 2+ and symmetric.  Pulmonary/Chest: No respiratory distress. Clear to auscultation bilaterally. No wheezing, rales, or rhonchi.  Abdominal: Soft and non-distended.  There is no tenderness.  No rebound, guarding, or rigidity.  Good bowel sounds.    Genitourinary: No CVA tenderness  Musculoskeletal: Moves all extremities. No obvious deformities. No edema. No calf tenderness.  Skin: Warm and dry.  Neurological:  Alert, awake, and appropriate.  Normal speech.  No acute focal neurological deficits are appreciated.  Psychiatric: Normal affect. Good eye contact. Appropriate in content.    ED Course    Procedures  ED Vital Signs:  Vitals:    04/19/17 0017 04/19/17 0042 04/19/17 0208   BP: (!)  "169/85  (!) 200/91   Pulse: 99 94 85   Resp: 20  16   Temp: 98.4 °F (36.9 °C)  98.4 °F (36.9 °C)   TempSrc: Oral  Oral   SpO2: 96%  100%   Weight: 123.4 kg (272 lb)     Height: 6' 6" (1.981 m)         Abnormal Lab Results:  Labs Reviewed   CBC W/ AUTO DIFFERENTIAL - Abnormal; Notable for the following:        Result Value    RBC 4.02 (*)     Hemoglobin 11.0 (*)     Hematocrit 33.1 (*)     All other components within normal limits   COMPREHENSIVE METABOLIC PANEL - Abnormal; Notable for the following:     Sodium 135 (*)     CO2 22 (*)     Glucose 197 (*)     BUN, Bld 35 (*)     Creatinine 6.1 (*)     Calcium 8.5 (*)     Albumin 3.3 (*)     eGFR if  10 (*)     eGFR if non  9 (*)     All other components within normal limits   URINALYSIS - Abnormal; Notable for the following:     Protein, UA 3+ (*)     Glucose, UA 1+ (*)     Occult Blood UA 2+ (*)     All other components within normal limits   TROPONIN I - Abnormal; Notable for the following:     Troponin I 0.027 (*)     All other components within normal limits   LIPASE   URINALYSIS MICROSCOPIC        All Lab Results:  Results for orders placed or performed during the hospital encounter of 04/19/17   CBC auto differential   Result Value Ref Range    WBC 8.12 3.90 - 12.70 K/uL    RBC 4.02 (L) 4.60 - 6.20 M/uL    Hemoglobin 11.0 (L) 14.0 - 18.0 g/dL    Hematocrit 33.1 (L) 40.0 - 54.0 %    MCV 82 82 - 98 fL    MCH 27.4 27.0 - 31.0 pg    MCHC 33.2 32.0 - 36.0 %    RDW 13.5 11.5 - 14.5 %    Platelets 242 150 - 350 K/uL    MPV 10.3 9.2 - 12.9 fL    Gran # 5.1 1.8 - 7.7 K/uL    Lymph # 2.1 1.0 - 4.8 K/uL    Mono # 0.8 0.3 - 1.0 K/uL    Eos # 0.2 0.0 - 0.5 K/uL    Baso # 0.08 0.00 - 0.20 K/uL    Gran% 62.5 38.0 - 73.0 %    Lymph% 25.2 18.0 - 48.0 %    Mono% 9.2 4.0 - 15.0 %    Eosinophil% 2.1 0.0 - 8.0 %    Basophil% 1.0 0.0 - 1.9 %    Differential Method Automated    Comprehensive metabolic panel   Result Value Ref Range    Sodium 135 (L) 136 " - 145 mmol/L    Potassium 4.6 3.5 - 5.1 mmol/L    Chloride 104 95 - 110 mmol/L    CO2 22 (L) 23 - 29 mmol/L    Glucose 197 (H) 70 - 110 mg/dL    BUN, Bld 35 (H) 8 - 23 mg/dL    Creatinine 6.1 (H) 0.5 - 1.4 mg/dL    Calcium 8.5 (L) 8.7 - 10.5 mg/dL    Total Protein 7.4 6.0 - 8.4 g/dL    Albumin 3.3 (L) 3.5 - 5.2 g/dL    Total Bilirubin 0.4 0.1 - 1.0 mg/dL    Alkaline Phosphatase 93 55 - 135 U/L    AST 18 10 - 40 U/L    ALT 12 10 - 44 U/L    Anion Gap 9 8 - 16 mmol/L    eGFR if African American 10 (A) >60 mL/min/1.73 m^2    eGFR if non African American 9 (A) >60 mL/min/1.73 m^2   Urinalysis   Result Value Ref Range    Specimen UA Urine, Clean Catch     Color, UA Yellow Yellow, Straw, Brenda    Appearance, UA Clear Clear    pH, UA 7.0 5.0 - 8.0    Specific Gravity, UA 1.020 1.005 - 1.030    Protein, UA 3+ (A) Negative    Glucose, UA 1+ (A) Negative    Ketones, UA Negative Negative    Bilirubin (UA) Negative Negative    Occult Blood UA 2+ (A) Negative    Nitrite, UA Negative Negative    Urobilinogen, UA Negative <2.0 EU/dL    Leukocytes, UA Negative Negative   Troponin I   Result Value Ref Range    Troponin I 0.027 (H) 0.000 - 0.026 ng/mL   Lipase   Result Value Ref Range    Lipase 36 4 - 60 U/L   Urinalysis Microscopic   Result Value Ref Range    RBC, UA 2 0 - 4 /hpf    WBC, UA 1 0 - 5 /hpf    Bacteria, UA Rare None-Occ /hpf    Squam Epithel, UA 1 /hpf    Hyaline Casts, UA 0 0-1/lpf /lpf    Microscopic Comment SEE COMMENT          Imaging Results:  Imaging Results         X-Ray Abdomen Flat And Erect   Per ED physician, pt's  abd XR results indicate no acute findings.           The EKG was ordered, reviewed, and independently interpreted by the ED provider.  Interpretation time: 12:44 AM  Rate: 94 BPM  Rhythm: normal sinus rhythm  Interpretation:  No STEMI.        The Emergency Provider reviewed the vital signs and test results, which are outlined above.    ED Discussion     2:00 AM: Reassessed pt at this time. Awake  and alert. NAD. Pt states his condition has improved at this time. Discussed with pt all pertinent ED information and results. Discussed pt dx and plan of tx. Gave pt all f/u and return to the ED instructions. All questions and concerns were addressed at this time. Pt expresses understanding of information and instructions, and is comfortable with plan to discharge. Pt is stable for discharge.      I discussed with patient and/or family/caretaker that evaluation in the ED does not suggest any emergent or life threatening medical conditions requiring immediate intervention beyond what was provided in the ED, and I believe patient is safe for discharge.  Regardless, an unremarkable evaluation in the ED does not preclude the development or presence of a serious of life threatening condition. As such, patient was instructed to return immediately for any worsening or change in current symptoms.      Pre-hypertension/Hypertension: The pt has been informed that they may have pre-hypertension or hypertension based on a blood pressure reading in the ED. I recommend that the pt call the PCP listed on their discharge instructions or a physician of their choice this week to arrange f/u for further evaluation of possible pre-hypertension or hypertension.     ED Medication(s):  Medications   metoclopramide HCl injection 10 mg (10 mg Intravenous Given 4/19/17 0049)       Discharge Medication List as of 4/19/2017  2:00 AM      START taking these medications    Details   metoclopramide HCl (REGLAN) 10 MG tablet Take 1 tablet (10 mg total) by mouth every 6 (six) hours as needed., Starting 4/19/2017, Until Discontinued, Print             Follow-up Information     Follow up with Raciel Angela MD In 1 day.    Specialty:  Family Medicine    Contact information:    [id number]   [address]  [place] [address] [phone]          Follow up with [facility] - .    Specialty:  Emergency Medicine    Why:  As  needed, If symptoms worsen    Contact information:    [address] [facility] [address] [phone]             Medical Decision Making    Medical Decision Making:   Clinical Tests:   Lab Tests: Ordered and Reviewed  Radiological Study: Ordered and Reviewed  Medical Tests: Ordered and Reviewed           Scribe Attestation:   Scribe #1: I performed the above scribed service and the documentation accurately describes the services I performed. I attest to the accuracy of the note.    Attending:   Physician Attestation Statement for Scribe #1: I, Norma Salomon MD, personally performed the services described in this documentation, as scribed by Isaias Palma in my presence, and it is both accurate and complete.          Clinical Impression       ICD-10-CM ICD-9-CM   1. Abdominal discomfort, generalized R10.84 789.07   2. Abdominal discomfort, generalized R10.84 789.07       Disposition:   Disposition: Discharged  Condition: Stable         Norma Salomon MD  04/19/17 1922

## 2017-04-19 NOTE — ED NOTES
NAD noted. AAO x 3. RR e/u, airway open and patent. MD Salomon notified of pt's BP. Pt states that he does not want BP treated at ER. Pt states that he will take his BP medication when he gets home. MD Salomon notified. MD states to continue with discharge.

## 2017-04-22 ENCOUNTER — HOSPITAL ENCOUNTER (EMERGENCY)
Facility: HOSPITAL | Age: 67
Discharge: HOME | End: 2017-04-22
Attending: EMERGENCY MEDICINE
Payer: MEDICARE

## 2017-04-22 VITALS
DIASTOLIC BLOOD PRESSURE: 82 MMHG | HEIGHT: 78 IN | BODY MASS INDEX: 31.12 KG/M2 | SYSTOLIC BLOOD PRESSURE: 151 MMHG | HEART RATE: 86 BPM | OXYGEN SATURATION: 97 % | TEMPERATURE: 98 F | RESPIRATION RATE: 16 BRPM | WEIGHT: 269 LBS

## 2017-04-22 DIAGNOSIS — E83.41 HIGH MAGNESIUM LEVELS: ICD-10-CM

## 2017-04-22 DIAGNOSIS — E11.9 TYPE II DIABETES MELLITUS, WELL CONTROLLED: ICD-10-CM

## 2017-04-22 DIAGNOSIS — K52.1 DIARRHEA DUE TO DRUG: ICD-10-CM

## 2017-04-22 DIAGNOSIS — I10 ESSENTIAL HYPERTENSION: ICD-10-CM

## 2017-04-22 DIAGNOSIS — R19.7 DIARRHEA, UNSPECIFIED TYPE: Primary | ICD-10-CM

## 2017-04-22 DIAGNOSIS — N18.5 CHRONIC KIDNEY DISEASE, STAGE 5: ICD-10-CM

## 2017-04-22 DIAGNOSIS — R11.10 VOMITING: ICD-10-CM

## 2017-04-22 LAB
ALBUMIN SERPL BCP-MCNC: 3.2 G/DL
ALP SERPL-CCNC: 87 U/L
ALT SERPL W/O P-5'-P-CCNC: 12 U/L
ANION GAP SERPL CALC-SCNC: 10 MMOL/L
AST SERPL-CCNC: 15 U/L
BACTERIA #/AREA URNS HPF: ABNORMAL /HPF
BASOPHILS # BLD AUTO: 0.05 K/UL
BASOPHILS NFR BLD: 0.7 %
BILIRUB SERPL-MCNC: 0.5 MG/DL
BILIRUB UR QL STRIP: NEGATIVE
BUN SERPL-MCNC: 37 MG/DL
CALCIUM SERPL-MCNC: 8.6 MG/DL
CHLORIDE SERPL-SCNC: 104 MMOL/L
CLARITY UR: CLEAR
CO2 SERPL-SCNC: 24 MMOL/L
COLOR UR: YELLOW
CREAT SERPL-MCNC: 6.5 MG/DL
DIFFERENTIAL METHOD: ABNORMAL
EOSINOPHIL # BLD AUTO: 0.1 K/UL
EOSINOPHIL NFR BLD: 1.8 %
ERYTHROCYTE [DISTWIDTH] IN BLOOD BY AUTOMATED COUNT: 13.5 %
EST. GFR  (AFRICAN AMERICAN): 9 ML/MIN/1.73 M^2
EST. GFR  (NON AFRICAN AMERICAN): 8 ML/MIN/1.73 M^2
GLUCOSE SERPL-MCNC: 147 MG/DL
GLUCOSE UR QL STRIP: NEGATIVE
HCT VFR BLD AUTO: 32.4 %
HGB BLD-MCNC: 10.7 G/DL
HGB UR QL STRIP: ABNORMAL
HYALINE CASTS #/AREA URNS LPF: 0 /LPF
KETONES UR QL STRIP: NEGATIVE
LEUKOCYTE ESTERASE UR QL STRIP: NEGATIVE
LYMPHOCYTES # BLD AUTO: 1.9 K/UL
LYMPHOCYTES NFR BLD: 27.8 %
MAGNESIUM SERPL-MCNC: 3.1 MG/DL
MCH RBC QN AUTO: 27.3 PG
MCHC RBC AUTO-ENTMCNC: 33 %
MCV RBC AUTO: 83 FL
MICROSCOPIC COMMENT: ABNORMAL
MONOCYTES # BLD AUTO: 0.5 K/UL
MONOCYTES NFR BLD: 6.7 %
NEUTROPHILS # BLD AUTO: 4.2 K/UL
NEUTROPHILS NFR BLD: 63 %
NITRITE UR QL STRIP: NEGATIVE
PH UR STRIP: 7 [PH] (ref 5–8)
PLATELET # BLD AUTO: 222 K/UL
PMV BLD AUTO: 10.3 FL
POCT GLUCOSE: 141 MG/DL (ref 70–110)
POTASSIUM SERPL-SCNC: 4.6 MMOL/L
PROT SERPL-MCNC: 7 G/DL
PROT UR QL STRIP: ABNORMAL
RBC # BLD AUTO: 3.92 M/UL
RBC #/AREA URNS HPF: 2 /HPF (ref 0–4)
SODIUM SERPL-SCNC: 138 MMOL/L
SP GR UR STRIP: 1.01 (ref 1–1.03)
URN SPEC COLLECT METH UR: ABNORMAL
UROBILINOGEN UR STRIP-ACNC: NEGATIVE EU/DL
WBC # BLD AUTO: 6.73 K/UL
WBC #/AREA URNS HPF: 4 /HPF (ref 0–5)

## 2017-04-22 PROCEDURE — 80053 COMPREHEN METABOLIC PANEL: CPT

## 2017-04-22 PROCEDURE — 93005 ELECTROCARDIOGRAM TRACING: CPT

## 2017-04-22 PROCEDURE — 25000003 PHARM REV CODE 250: Performed by: EMERGENCY MEDICINE

## 2017-04-22 PROCEDURE — 96374 THER/PROPH/DIAG INJ IV PUSH: CPT

## 2017-04-22 PROCEDURE — 63600175 PHARM REV CODE 636 W HCPCS: Performed by: EMERGENCY MEDICINE

## 2017-04-22 PROCEDURE — 83735 ASSAY OF MAGNESIUM: CPT

## 2017-04-22 PROCEDURE — 81000 URINALYSIS NONAUTO W/SCOPE: CPT

## 2017-04-22 PROCEDURE — 85025 COMPLETE CBC W/AUTO DIFF WBC: CPT

## 2017-04-22 PROCEDURE — 93010 ELECTROCARDIOGRAM REPORT: CPT | Mod: ,,, | Performed by: INTERNAL MEDICINE

## 2017-04-22 PROCEDURE — 99284 EMERGENCY DEPT VISIT MOD MDM: CPT | Mod: 25

## 2017-04-22 PROCEDURE — 82962 GLUCOSE BLOOD TEST: CPT

## 2017-04-22 PROCEDURE — 96361 HYDRATE IV INFUSION ADD-ON: CPT

## 2017-04-22 RX ORDER — HYDROCODONE BITARTRATE AND ACETAMINOPHEN 10; 325 MG/1; MG/1
1 TABLET ORAL
Status: COMPLETED | OUTPATIENT
Start: 2017-04-22 | End: 2017-04-22

## 2017-04-22 RX ORDER — ONDANSETRON 4 MG/1
4 TABLET, ORALLY DISINTEGRATING ORAL EVERY 8 HOURS PRN
Qty: 12 TABLET | Refills: 0 | Status: SHIPPED | OUTPATIENT
Start: 2017-04-22 | End: 2018-10-13

## 2017-04-22 RX ORDER — ONDANSETRON 2 MG/ML
4 INJECTION INTRAMUSCULAR; INTRAVENOUS ONCE
Status: COMPLETED | OUTPATIENT
Start: 2017-04-22 | End: 2017-04-22

## 2017-04-22 RX ADMIN — HYDROCODONE BITARTRATE AND ACETAMINOPHEN 1 TABLET: 10; 325 TABLET ORAL at 02:04

## 2017-04-22 RX ADMIN — ONDANSETRON 4 MG: 2 INJECTION INTRAMUSCULAR; INTRAVENOUS at 02:04

## 2017-04-22 RX ADMIN — SODIUM CHLORIDE 1000 ML: 0.9 INJECTION, SOLUTION INTRAVENOUS at 02:04

## 2017-04-22 NOTE — ED PROVIDER NOTES
SCRIBE #1 NOTE: I, Lizbet Willis, am scribing for, and in the presence of, Karyn Akhtar DO. I have scribed the entire note.      History      Chief Complaint   Patient presents with    Emesis     since last night       Review of patient's allergies indicates:   Allergen Reactions    Augmentin [amoxicillin-pot clavulanate]     Lisinopril      cough    Sulfa (sulfonamide antibiotics)         HPI   HPI    4/22/2017, 2:34 PM   History obtained from the patient      History of Present Illness: Conner Perez III is a 67 y.o. male patient who presents to the Emergency Department for emesis and diarrhea which onset gradually last night. Pt states that he has been taking milk of magnesia. Pt states he took Mylanta last night.  Patient has chronic renal failure Stage V.   Symptoms are constant and moderate in severity. No mitigating or exacerbating factors reported. Associated sxs include diarrhea (x8), and decreased appetite. Patient denies any hematochezia, abd pain, bladder incontinence, CP, cough, dizziness, lightheadedness, rash, and all other sxs at this time. Pt reports last time he took Abx was in January 2017 for osteomyelitis.. Pt also reports chronic back pain.  No new weakness of lower extremity.  There is no perirectal paresthesia.  No fever, or unexpected weight change.  No further complaints or concerns at this time.           Arrival mode: Personal vehicle      PCP: Raciel Angela MD       Past Medical History:  Past Medical History:   Diagnosis Date    CKD (chronic kidney disease), stage IV     Diabetes mellitus     Diabetes mellitus, type 2     HTN (hypertension)        Past Surgical History:  Past Surgical History:   Procedure Laterality Date    CIRCUMCISION  1978         Family History:  Family History   Problem Relation Age of Onset    Hypertension Father        Social History:  Social History     Social History Main Topics    Smoking status: Former Smoker    Smokeless tobacco: Unknown     Alcohol use No    Drug use: No    Sexual activity: Unknown       ROS   Review of Systems   Constitutional: Positive for appetite change (decreased). Negative for fever.   HENT: Negative for sore throat.    Respiratory: Negative for cough and shortness of breath.    Cardiovascular: Negative for chest pain.   Gastrointestinal: Positive for diarrhea (x8), nausea and vomiting. Negative for abdominal pain and blood in stool.   Genitourinary: Negative for dysuria.        (-) bladder incontinence   Musculoskeletal: Positive for back pain.   Skin: Negative for rash.   Neurological: Negative for dizziness, weakness and light-headedness.   Hematological: Does not bruise/bleed easily.   All other systems reviewed and are negative.      Physical Exam    Initial Vitals   BP Pulse Resp Temp SpO2   04/22/17 1339 04/22/17 1339 04/22/17 1339 04/22/17 1339 04/22/17 1339   155/78 101 20 97.5 °F (36.4 °C) 97 %      Physical Exam  Nursing Notes and Vital Signs Reviewed.  Constitutional: Patient is in no apparent distress. Awake and alert. Well-developed and well-nourished.  Head: Atraumatic. Normocephalic.  Eyes: PERRL. EOM intact. Conjunctivae are not pale. No scleral icterus.  ENT: Mucous membranes are dry. Oropharynx is clear and symmetric.    Neck: Supple. Full ROM. No lymphadenopathy.  Cardiovascular: Regular rate. Regular rhythm. No murmurs, rubs, or gallops. Distal pulses are 2+ and symmetric.  Pulmonary/Chest: No respiratory distress. Clear to auscultation bilaterally. No wheezing, rales, or rhonchi.  Abdominal: Soft and non-distended.  There is no tenderness.  No rebound, guarding, or rigidity. Good bowel sounds.  Genitourinary: No CVA tenderness  Musculoskeletal: Moves all extremities. No obvious deformities. No edema. No calf tenderness. Ambulates without difficulty.   Back: No tenderness. No midline bony tenderness, deformities, or step-offs of the T-spine or L-spine. Skin appears normal without abrasions or  "bruising.  Skin: Warm and dry.  Neurological:  Alert, awake, and appropriate.  Normal speech.  No acute focal neurological deficits are appreciated.  CNII-XIII intact.  Psychiatric: Normal affect. Good eye contact. Appropriate in content.    ED Course    Procedures  ED Vital Signs:  Vitals:    04/22/17 1339 04/22/17 1704   BP: (!) 155/78 (!) 151/82   Pulse: 101 86   Resp: 20 16   Temp: 97.5 °F (36.4 °C)    TempSrc: Oral    SpO2: 97% 97%   Weight: 122 kg (269 lb)    Height: 6' 6" (1.981 m)        Abnormal Lab Results:  Labs Reviewed   CBC W/ AUTO DIFFERENTIAL - Abnormal; Notable for the following:        Result Value    RBC 3.92 (*)     Hemoglobin 10.7 (*)     Hematocrit 32.4 (*)     All other components within normal limits   COMPREHENSIVE METABOLIC PANEL - Abnormal; Notable for the following:     Glucose 147 (*)     BUN, Bld 37 (*)     Creatinine 6.5 (*)     Calcium 8.6 (*)     Albumin 3.2 (*)     eGFR if  9 (*)     eGFR if non  8 (*)     All other components within normal limits   MAGNESIUM - Abnormal; Notable for the following:     Magnesium 3.1 (*)     All other components within normal limits   URINALYSIS - Abnormal; Notable for the following:     Protein, UA 3+ (*)     Occult Blood UA Trace (*)     All other components within normal limits   URINALYSIS MICROSCOPIC - Abnormal; Notable for the following:     Bacteria, UA Few (*)     All other components within normal limits   POCT GLUCOSE - Abnormal; Notable for the following:     POCT Glucose 141 (*)     All other components within normal limits        All Lab Results:  Results for orders placed or performed during the hospital encounter of 04/22/17   CBC auto differential   Result Value Ref Range    WBC 6.73 3.90 - 12.70 K/uL    RBC 3.92 (L) 4.60 - 6.20 M/uL    Hemoglobin 10.7 (L) 14.0 - 18.0 g/dL    Hematocrit 32.4 (L) 40.0 - 54.0 %    MCV 83 82 - 98 fL    MCH 27.3 27.0 - 31.0 pg    MCHC 33.0 32.0 - 36.0 %    RDW 13.5 11.5 - " 14.5 %    Platelets 222 150 - 350 K/uL    MPV 10.3 9.2 - 12.9 fL    Gran # 4.2 1.8 - 7.7 K/uL    Lymph # 1.9 1.0 - 4.8 K/uL    Mono # 0.5 0.3 - 1.0 K/uL    Eos # 0.1 0.0 - 0.5 K/uL    Baso # 0.05 0.00 - 0.20 K/uL    Gran% 63.0 38.0 - 73.0 %    Lymph% 27.8 18.0 - 48.0 %    Mono% 6.7 4.0 - 15.0 %    Eosinophil% 1.8 0.0 - 8.0 %    Basophil% 0.7 0.0 - 1.9 %    Differential Method Automated    Comprehensive metabolic panel   Result Value Ref Range    Sodium 138 136 - 145 mmol/L    Potassium 4.6 3.5 - 5.1 mmol/L    Chloride 104 95 - 110 mmol/L    CO2 24 23 - 29 mmol/L    Glucose 147 (H) 70 - 110 mg/dL    BUN, Bld 37 (H) 8 - 23 mg/dL    Creatinine 6.5 (H) 0.5 - 1.4 mg/dL    Calcium 8.6 (L) 8.7 - 10.5 mg/dL    Total Protein 7.0 6.0 - 8.4 g/dL    Albumin 3.2 (L) 3.5 - 5.2 g/dL    Total Bilirubin 0.5 0.1 - 1.0 mg/dL    Alkaline Phosphatase 87 55 - 135 U/L    AST 15 10 - 40 U/L    ALT 12 10 - 44 U/L    Anion Gap 10 8 - 16 mmol/L    eGFR if African American 9 (A) >60 mL/min/1.73 m^2    eGFR if non African American 8 (A) >60 mL/min/1.73 m^2   Magnesium   Result Value Ref Range    Magnesium 3.1 (H) 1.6 - 2.6 mg/dL   Urinalysis   Result Value Ref Range    Specimen UA Urine, Clean Catch     Color, UA Yellow Yellow, Straw, Brenda    Appearance, UA Clear Clear    pH, UA 7.0 5.0 - 8.0    Specific Gravity, UA 1.015 1.005 - 1.030    Protein, UA 3+ (A) Negative    Glucose, UA Negative Negative    Ketones, UA Negative Negative    Bilirubin (UA) Negative Negative    Occult Blood UA Trace (A) Negative    Nitrite, UA Negative Negative    Urobilinogen, UA Negative <2.0 EU/dL    Leukocytes, UA Negative Negative   Urinalysis Microscopic   Result Value Ref Range    RBC, UA 2 0 - 4 /hpf    WBC, UA 4 0 - 5 /hpf    Bacteria, UA Few (A) None-Occ /hpf    Hyaline Casts, UA 0 0-1/lpf /lpf    Microscopic Comment SEE COMMENT    POCT glucose   Result Value Ref Range    POCT Glucose 141 (H) 70 - 110 mg/dL         Imaging Results:  Imaging Results          X-Ray Abdomen Flat And Erect (Final result) Result time:  04/22/17 15:13:14    Final result by Rosales Al MD (04/22/17 15:13:14)    Impression:           1. Negative for acute process involving the abdomen or pelvis.  2.  Fluid in nondilated colon, which can be seen in patients with diarrhea or gastroenteritis.  Clinical correlation is advised.  3. Numerous stable findings as noted above.      Electronically signed by: ROSALES AL MD  Date:     04/22/17  Time:    15:13     Narrative:    Abdomen flat and erect, 2 views:    Clinical Indication: nausea.  Vomiting    Findings:    Comparison made to 04/19/2017.   The abdominal gas pattern is normal. No sign of bowel dilation, air/fluid levels or small bowel free air.  There appears to be some fluid within the nondilated colon.    There are degenerative changes of the spine and pelvis, stable. Phleboliths versus ureteroliths overlie the pelvis.            X-Ray Chest 1 View (Final result) Result time:  04/22/17 15:12:01    Final result by Rosales Al MD (04/22/17 15:12:01)    Impression:          1.  Negative for acute process involving the chest, considering there are low lung volumes on the current study.  2.  Stable findings as noted above.      Electronically signed by: ROSALES AL MD  Date:     04/22/17  Time:    15:12     Narrative:    Portable Chest x-ray    Clinical Indication: Vomiting.       Findings:     Comparison made to 04/07/2017, and 01/12/2017.  There are lower lung volumes on the current study with vascular crowding or atelectasis in the lung bases, similar to the 01/12/2017 study.     The lungs are otherwise clear. The cardiac silhouette size is normal. The trachea is midline and the mediastinal width is normal. Negative for focal infiltrate, effusion or pneumothorax. Pulmonary vasculature is normal. Negative for osseous abnormalities. Ectatic and tortuous aorta. There are degenerative changes of spine and both shoulder girdles. There are fat  pads adjacent to one or both cardiophrenic angles.             The EKG was ordered, reviewed, and independently interpreted by the ED provider.  Interpretation time: 14:21  Rate: 91 BPM  Rhythm: normal sinus rhythm  Interpretation: No acute ST changes. No STEMI.  When compared to EKG performed 4/19/2017, there are no significant changes.       Results for ROSA M PAL III (MRN [id number]) as of 4/22/2017 16:06   Ref. Range 4/8/2017 05:12 4/19/2017 00:49 4/22/2017 14:00   BUN, Bld Latest Ref Range: 8 - 23 mg/dL 40 (H) 35 (H) 37 (H)   Creatinine Latest Ref Range: 0.5 - 1.4 mg/dL 5.5 (H) 6.1 (H) 6.5 (H)     Results for ROSA M PAL III (MRN [id number]) as of 4/22/2017 16:06   Ref. Range 4/7/2017 13:05 4/8/2017 05:13 4/19/2017 00:49 4/22/2017 14:00   Hemoglobin Latest Ref Range: 14.0 - 18.0 g/dL 10.9 (L) 10.7 (L) 11.0 (L) 10.7 (L)   Hematocrit Latest Ref Range: 40.0 - 54.0 % 33.8 (L) 32.2 (L) 33.1 (L) 32.4 (L)         The Emergency Provider reviewed the vital signs and test results, which are outlined above.    ED Discussion   4:08 PM: Re-evaluated pt. Pt is resting comfortably and is in no acute distress.  Pt's abd is soft and non-distended. There is no tenderness. No rebound, guarding, or rigidity.    4:13 PM: Patient reports that he sees Dr. Rhodes with [facility].     4:30 PM: Dr. Akhtar discussed the pt's case with a physician at [facility] who recommends having pt avoid magnesia products.   He does not recommend starting a loop diuretic.     4:36 PM: Reassessed pt at this time.  Pt states his condition has improved at this time. Discussed with pt all pertinent ED information and results. Discussed pt dx and plan of tx. Gave pt all f/u and return to the ED instructions. All questions and concerns were addressed at this time. Pt expresses understanding of information and instructions, and is comfortable with plan to discharge. Pt is stable for discharge.    I discussed with patient and/or family/caretaker  that evaluation in the ED does not suggest any emergent or life threatening medical conditions requiring immediate intervention beyond what was provided in the ED, and I believe patient is safe for discharge.  Regardless, an unremarkable evaluation in the ED does not preclude the development or presence of a serious of life threatening condition. As such, patient was instructed to return immediately for any worsening or change in current symptoms.      ED Medication(s):  Medications   sodium chloride 0.9% bolus 1,000 mL (0 mLs Intravenous Stopped 4/22/17 1641)   ondansetron injection 4 mg (4 mg Intravenous Given 4/22/17 1432)   hydrocodone-acetaminophen 10-325mg per tablet 1 tablet (1 tablet Oral Given 4/22/17 1435)       Discharge Medication List as of 4/22/2017  4:34 PM      START taking these medications    Details   ondansetron (ZOFRAN-ODT) 4 MG TbDL Take 1 tablet (4 mg total) by mouth every 8 (eight) hours as needed., Starting 4/22/2017, Until Discontinued, Print             Follow-up Information     Follow up with [facility] [place] In 2 days.    Why:  Avoid magnesium containing products - no mylanta, no milk of magnesia, no magnesemium replacements.     Contact information:    [address]  [address]  [place] [address] [phone]          Follow up with Raciel Angela MD. Schedule an appointment as soon as possible for a visit in 2 days.    Specialty:  Family Medicine    Why:  Return to the ED for:   nausea, vomiting, fever, abdominal pain, weakness, confusuion or other concerns.     Contact information:    [address]   [address]  [place] [address] [phone]              Medical Decision Making    Medical Decision Making:   Clinical Tests:   Lab Tests: Ordered and Reviewed  Radiological Study: Ordered and Reviewed  Medical Tests: Ordered and Reviewed           Scribe Attestation:   Scribe #1: I performed the above scribed service and the documentation accurately  describes the services I performed. I attest to the accuracy of the note.    Attending:   Physician Attestation Statement for Scribe #1: I, Karyn Akhtar DO, personally performed the services described in this documentation, as scribed by Lizbet Willis, in my presence, and it is both accurate and complete.          Clinical Impression       ICD-10-CM ICD-9-CM   1. Diarrhea, unspecified type R19.7 787.91   2. Vomiting R11.10 787.03   3. High magnesium levels E83.41 275.2   4. Chronic kidney disease, stage 5 N18.5 585.5   5. Type II diabetes mellitus, well controlled E11.9 250.00   6. Essential hypertension I10 401.9   7. Diarrhea due to drug K52.1 787.91     E980.5       Disposition:   Disposition: Discharged  Condition: Stable         Karyn Akhtar DO  04/22/17 1911

## 2017-06-19 ENCOUNTER — HOSPITAL ENCOUNTER (EMERGENCY)
Facility: HOSPITAL | Age: 67
Discharge: HOME | End: 2017-06-19
Attending: EMERGENCY MEDICINE
Payer: MEDICARE

## 2017-06-19 VITALS
HEIGHT: 78 IN | HEART RATE: 92 BPM | TEMPERATURE: 98 F | RESPIRATION RATE: 20 BRPM | SYSTOLIC BLOOD PRESSURE: 177 MMHG | WEIGHT: 273 LBS | OXYGEN SATURATION: 98 % | DIASTOLIC BLOOD PRESSURE: 103 MMHG | BODY MASS INDEX: 31.59 KG/M2

## 2017-06-19 DIAGNOSIS — Z87.19 HISTORY OF HIATAL HERNIA: ICD-10-CM

## 2017-06-19 DIAGNOSIS — R10.13 DYSPEPSIA: Primary | ICD-10-CM

## 2017-06-19 DIAGNOSIS — R11.0 NAUSEA: ICD-10-CM

## 2017-06-19 DIAGNOSIS — I10 CHRONIC HYPERTENSION: ICD-10-CM

## 2017-06-19 PROCEDURE — 93005 ELECTROCARDIOGRAM TRACING: CPT

## 2017-06-19 PROCEDURE — 25000003 PHARM REV CODE 250: Performed by: EMERGENCY MEDICINE

## 2017-06-19 PROCEDURE — 93010 ELECTROCARDIOGRAM REPORT: CPT | Mod: ,,, | Performed by: INTERNAL MEDICINE

## 2017-06-19 PROCEDURE — 99284 EMERGENCY DEPT VISIT MOD MDM: CPT | Mod: 25

## 2017-06-19 RX ORDER — CYCLOBENZAPRINE HCL 10 MG
TABLET ORAL
COMMUNITY
Start: 2017-04-20 | End: 2017-11-09

## 2017-06-19 RX ORDER — FAMOTIDINE 20 MG/1
20 TABLET, FILM COATED ORAL
Status: COMPLETED | OUTPATIENT
Start: 2017-06-19 | End: 2017-06-19

## 2017-06-19 RX ORDER — DILTIAZEM HYDROCHLORIDE 180 MG/1
360 CAPSULE, COATED, EXTENDED RELEASE ORAL
Status: COMPLETED | OUTPATIENT
Start: 2017-06-19 | End: 2017-06-19

## 2017-06-19 RX ORDER — CAPSAICIN 0.1 %
CREAM (GRAM) TOPICAL
COMMUNITY
Start: 2013-06-24 | End: 2017-12-17 | Stop reason: ALTCHOICE

## 2017-06-19 RX ORDER — PANTOPRAZOLE SODIUM 40 MG/1
40 TABLET, DELAYED RELEASE ORAL DAILY
Qty: 30 TABLET | Refills: 0 | Status: SHIPPED | OUTPATIENT
Start: 2017-06-19 | End: 2019-02-19 | Stop reason: SDUPTHER

## 2017-06-19 RX ORDER — TRAMADOL HYDROCHLORIDE 50 MG/1
TABLET ORAL
COMMUNITY
Start: 2017-04-20 | End: 2017-11-09

## 2017-06-19 RX ORDER — CLONIDINE HYDROCHLORIDE 0.1 MG/1
0.1 TABLET ORAL
Status: COMPLETED | OUTPATIENT
Start: 2017-06-19 | End: 2017-06-19

## 2017-06-19 RX ORDER — AZELASTINE 1 MG/ML
2 SPRAY, METERED NASAL 2 TIMES DAILY
COMMUNITY
Start: 2017-03-01 | End: 2017-12-17 | Stop reason: ALTCHOICE

## 2017-06-19 RX ORDER — ONDANSETRON 4 MG/1
4 TABLET, ORALLY DISINTEGRATING ORAL
Status: COMPLETED | OUTPATIENT
Start: 2017-06-19 | End: 2017-06-19

## 2017-06-19 RX ADMIN — ONDANSETRON 4 MG: 4 TABLET, ORALLY DISINTEGRATING ORAL at 10:06

## 2017-06-19 RX ADMIN — FAMOTIDINE 20 MG: 20 TABLET ORAL at 10:06

## 2017-06-19 RX ADMIN — CLONIDINE HYDROCHLORIDE 0.1 MG: 0.1 TABLET ORAL at 11:06

## 2017-06-19 RX ADMIN — DILTIAZEM HYDROCHLORIDE 360 MG: 180 CAPSULE, COATED, EXTENDED RELEASE ORAL at 11:06

## 2017-06-19 RX ADMIN — DICYCLOMINE HYDROCHLORIDE 50 ML: 10 SOLUTION ORAL at 10:06

## 2017-06-19 NOTE — ED PROVIDER NOTES
"SCRIBE #1 NOTE: I, Vicente Amx Luh, am scribing for, and in the presence of, Yoana Mondragon MD. I have scribed the entire note.      History      Chief Complaint   Patient presents with    Abdominal Pain     Pt reports hx of hiatal hernia and reports feeling pressure in abdomen and belching        Review of patient's allergies indicates:   Allergen Reactions    Augmentin [amoxicillin-pot clavulanate]     Lisinopril      cough    Sulfa (sulfonamide antibiotics)         HPI   HPI    6/19/2017, 10:06 AM   History obtained from the patient      History of Present Illness: Conner Perez III is a 67 y.o. male patient who presents to the Emergency Department for upper abd pain which onset gradually one month ago. Sxs are intermittent and moderate in severity. Pt describes sxs as similar to the pain he gets from his chronic hiatal hernia. There are no mitigating or exacerbating factors noted. Pt states "I am not able to burp".  Pt denies any fever, N/V/D, chills, dysuria, hematuria, constipation, CP, SOB, and all other sxs at this time. No further complaints or concerns at this time.       Arrival mode: Personal vehicle      PCP: Raciel Angela MD       Past Medical History:  Past Medical History:   Diagnosis Date    CKD (chronic kidney disease), stage IV     Diabetes mellitus     Diabetes mellitus, type 2     HTN (hypertension)        Past Surgical History:  Past Surgical History:   Procedure Laterality Date    CIRCUMCISION  1978         Family History:  Family History   Problem Relation Age of Onset    Hypertension Father        Social History:  Social History     Social History Main Topics    Smoking status: Former Smoker    Smokeless tobacco: unknown    Alcohol use No    Drug use: No    Sexual activity: unknown       ROS   Review of Systems   Constitutional: Negative for fever.   HENT: Negative for sore throat.    Respiratory: Negative for shortness of breath.    Cardiovascular: Negative for " "chest pain.   Gastrointestinal: Positive for abdominal pain. Negative for constipation, diarrhea, nausea and vomiting.   Genitourinary: Negative for dysuria.   Musculoskeletal: Negative for back pain.   Skin: Negative for rash.   Neurological: Negative for weakness.   Hematological: Does not bruise/bleed easily.     Physical Exam      Initial Vitals [06/19/17 0937]   BP Pulse Resp Temp SpO2   (!) 201/106 108 18 98.3 °F (36.8 °C) 96 %      Physical Exam  Nursing Notes and Vital Signs Reviewed.  Constitutional: Patient is in no acute distress. Well-developed and well-nourished.  Head: Atraumatic. Normocephalic.  Eyes: PERRL. EOM intact. Conjunctivae are not pale. No scleral icterus.  ENT: Mucous membranes are moist. Oropharynx is clear and symmetric.    Neck: Supple. Full ROM. No lymphadenopathy.  Cardiovascular: Tachycardia. Regular rhythm. No murmurs, rubs, or gallops. Distal pulses are 2+ and symmetric.  Pulmonary/Chest: No respiratory distress. Clear to auscultation bilaterally. No wheezing, rales, or rhonchi.  Abdominal: Soft and non-distended.  There is no tenderness.  No rebound, guarding, or rigidity. Good bowel sounds.  Genitourinary: No CVA tenderness  Musculoskeletal: Moves all extremities. No obvious deformities. No edema. No calf tenderness.  Skin: Warm and dry.  Neurological:  Alert, awake, and appropriate.  Normal speech.  No acute focal neurological deficits are appreciated.  Psychiatric: Normal affect. Good eye contact. Appropriate in content.    ED Course    Procedures  ED Vital Signs:  Vitals:    06/19/17 0937 06/19/17 1031 06/19/17 1032 06/19/17 1047   BP: (!) 201/106  (!) 192/113 (!) 170/100   Pulse: 108 101 100 97   Resp: 18  16 13   Temp: 98.3 °F (36.8 °C)      TempSrc: Oral      SpO2: 96%  100% 100%   Weight: 123.8 kg (273 lb)      Height: 6' 6" (1.981 m)       06/19/17 1101 06/19/17 1102 06/19/17 1115 06/19/17 1134   BP: (!) 192/101 (!) 192/101 (!) 180/92 (!) 177/103   Pulse:  96 91 92   Resp: "  18 18 20   Temp:       TempSrc:       SpO2:  99% 98% 98%   Weight:       Height:           Imaging Results          X-Ray Abdomen Flat And Erect (Final result)  Result time 06/19/17 10:35:11    Final result by Paulo Rangel MD (06/19/17 10:35:11)                 Impression:     Negative total      Electronically signed by: PAULO RANGEL MD  Date:     06/19/17  Time:    10:35              Narrative:    History: Nausea and    Bowel gas pattern is nonspecific. No evidence of free air or abnormal abdominal or pelvic calcifications.                                   The EKG was ordered, reviewed, and independently interpreted by the ED provider.  Interpretation time: 10:25  Rate: 103 BPM  Rhythm: sinus tachycardia  Interpretation: No acute ST changes. No STEMI.      The Emergency Provider reviewed the vital signs and test results, which are outlined above.    ED Discussion     11:00 AM: Reassessed pt. Discussed with pt all pertinent ED information and results. Discussed plan of treatment with pt. Gave pt all f/u and return to the ED instructions. All questions and concerns were addressed at this time. Pt understands and agrees to plan as discussed. Pt is stable for discharge.       ED Medication(s):  Medications   ondansetron disintegrating tablet 4 mg (4 mg Oral Given 6/19/17 1025)   famotidine tablet 20 mg (20 mg Oral Given 6/19/17 1025)   GI cocktail (mylanta 30 mL, lidocaine 2 % viscous 10 mL, dicyclomine 10 mL) 50 mL (50 mLs Oral Given 6/19/17 1025)   diltiaZEM 24 hr capsule 360 mg (360 mg Oral Given 6/19/17 1101)   cloNIDine tablet 0.1 mg (0.1 mg Oral Given 6/19/17 1120)       Current Discharge Medication List          Follow-up Information     Raciel Angela MD. Schedule an appointment as soon as possible for a visit in 2 days.    Specialty:  Family Medicine  Why:  Return to the Emergency Room, If symptoms worsen  Contact information:  [address]  [address]  [place]  [address]  [phone]                     Medical Decision Making    Medical Decision Making:   Clinical Tests:   Radiological Study: Reviewed and Ordered  Medical Tests: Reviewed and Ordered           Scribe Attestation:   Scribe #1: I performed the above scribed service and the documentation accurately describes the services I performed. I attest to the accuracy of the note.    Attending:   Physician Attestation Statement for Scribe #1: I, Yoana Mondragon MD, personally performed the services described in this documentation, as scribed by Vicente Arvizu, in my presence, and it is both accurate and complete.          Clinical Impression       ICD-10-CM ICD-9-CM   1. Dyspepsia R10.13 536.8   2. Nausea R11.0 787.02   3. History of hiatal hernia Z87.19 V12.79   4. Chronic hypertension I10 401.9       Disposition:   Disposition: Discharged  Condition: Stable         Yoana Mondragon MD  06/19/17 1147

## 2017-07-30 ENCOUNTER — HOSPITAL ENCOUNTER (EMERGENCY)
Facility: HOSPITAL | Age: 67
Discharge: HOME | End: 2017-07-30
Attending: INTERNAL MEDICINE
Payer: MEDICARE

## 2017-07-30 VITALS
TEMPERATURE: 98 F | HEART RATE: 80 BPM | DIASTOLIC BLOOD PRESSURE: 81 MMHG | HEIGHT: 78 IN | RESPIRATION RATE: 16 BRPM | WEIGHT: 265 LBS | BODY MASS INDEX: 30.66 KG/M2 | SYSTOLIC BLOOD PRESSURE: 163 MMHG | OXYGEN SATURATION: 99 %

## 2017-07-30 DIAGNOSIS — R07.9 CHEST PAIN: ICD-10-CM

## 2017-07-30 DIAGNOSIS — R10.11 RUQ PAIN: ICD-10-CM

## 2017-07-30 LAB
ALBUMIN SERPL BCP-MCNC: 2.7 G/DL
ALP SERPL-CCNC: 72 U/L
ALT SERPL W/O P-5'-P-CCNC: 17 U/L
ANION GAP SERPL CALC-SCNC: 12 MMOL/L
AST SERPL-CCNC: 19 U/L
BASOPHILS # BLD AUTO: 0.02 K/UL
BASOPHILS NFR BLD: 0.3 %
BILIRUB SERPL-MCNC: 0.3 MG/DL
BNP SERPL-MCNC: 163 PG/ML
BUN SERPL-MCNC: 70 MG/DL
CALCIUM SERPL-MCNC: 7.3 MG/DL
CHLORIDE SERPL-SCNC: 106 MMOL/L
CO2 SERPL-SCNC: 20 MMOL/L
CREAT SERPL-MCNC: 12.1 MG/DL
DIFFERENTIAL METHOD: ABNORMAL
EOSINOPHIL # BLD AUTO: 0.2 K/UL
EOSINOPHIL NFR BLD: 2.2 %
ERYTHROCYTE [DISTWIDTH] IN BLOOD BY AUTOMATED COUNT: 13.1 %
EST. GFR  (AFRICAN AMERICAN): 4 ML/MIN/1.73 M^2
EST. GFR  (NON AFRICAN AMERICAN): 4 ML/MIN/1.73 M^2
GLUCOSE SERPL-MCNC: 135 MG/DL
HCT VFR BLD AUTO: 25.1 %
HGB BLD-MCNC: 8.2 G/DL
LIPASE SERPL-CCNC: 52 U/L
LYMPHOCYTES # BLD AUTO: 1.3 K/UL
LYMPHOCYTES NFR BLD: 19 %
MCH RBC QN AUTO: 27.2 PG
MCHC RBC AUTO-ENTMCNC: 32.7 G/DL
MCV RBC AUTO: 83 FL
MONOCYTES # BLD AUTO: 0.5 K/UL
MONOCYTES NFR BLD: 7.8 %
NEUTROPHILS # BLD AUTO: 4.9 K/UL
NEUTROPHILS NFR BLD: 70.7 %
PLATELET # BLD AUTO: 199 K/UL
PMV BLD AUTO: 10.1 FL
POCT GLUCOSE: 136 MG/DL (ref 70–110)
POTASSIUM SERPL-SCNC: 4.5 MMOL/L
PROT SERPL-MCNC: 6.5 G/DL
RBC # BLD AUTO: 3.01 M/UL
SODIUM SERPL-SCNC: 138 MMOL/L
TROPONIN I SERPL DL<=0.01 NG/ML-MCNC: 0.09 NG/ML
TROPONIN I SERPL DL<=0.01 NG/ML-MCNC: 0.09 NG/ML
WBC # BLD AUTO: 6.89 K/UL

## 2017-07-30 PROCEDURE — 80053 COMPREHEN METABOLIC PANEL: CPT

## 2017-07-30 PROCEDURE — 83690 ASSAY OF LIPASE: CPT

## 2017-07-30 PROCEDURE — 99285 EMERGENCY DEPT VISIT HI MDM: CPT | Mod: 25

## 2017-07-30 PROCEDURE — 85025 COMPLETE CBC W/AUTO DIFF WBC: CPT

## 2017-07-30 PROCEDURE — 93005 ELECTROCARDIOGRAM TRACING: CPT

## 2017-07-30 PROCEDURE — 84484 ASSAY OF TROPONIN QUANT: CPT

## 2017-07-30 PROCEDURE — 82962 GLUCOSE BLOOD TEST: CPT

## 2017-07-30 PROCEDURE — 25000003 PHARM REV CODE 250: Performed by: INTERNAL MEDICINE

## 2017-07-30 PROCEDURE — 93010 ELECTROCARDIOGRAM REPORT: CPT | Mod: ,,, | Performed by: INTERNAL MEDICINE

## 2017-07-30 PROCEDURE — 83880 ASSAY OF NATRIURETIC PEPTIDE: CPT

## 2017-07-30 RX ORDER — CLONIDINE HYDROCHLORIDE 0.2 MG/1
0.2 TABLET ORAL
Status: COMPLETED | OUTPATIENT
Start: 2017-07-30 | End: 2017-07-30

## 2017-07-30 RX ORDER — ASPIRIN 325 MG
325 TABLET ORAL
Status: COMPLETED | OUTPATIENT
Start: 2017-07-30 | End: 2017-07-30

## 2017-07-30 RX ORDER — METOPROLOL TARTRATE 50 MG/1
50 TABLET ORAL
Status: COMPLETED | OUTPATIENT
Start: 2017-07-30 | End: 2017-07-30

## 2017-07-30 RX ADMIN — METOPROLOL TARTRATE 50 MG: 50 TABLET ORAL at 07:07

## 2017-07-30 RX ADMIN — CLONIDINE HYDROCHLORIDE 0.2 MG: 0.2 TABLET ORAL at 06:07

## 2017-07-30 RX ADMIN — ASPIRIN 325 MG ORAL TABLET 325 MG: 325 PILL ORAL at 03:07

## 2017-07-30 NOTE — ED PROVIDER NOTES
"SCRIBE #1 NOTE: I, Salazar Cisneros, am scribing for, and in the presence of, Jesus Colon MD. I have scribed the entire note.      History      Chief Complaint   Patient presents with    Chest Pain     Had pain this morning ,but has little bit of discomfort at this time since 0900am,belching in triage       Review of patient's allergies indicates:   Allergen Reactions    Augmentin [amoxicillin-pot clavulanate]     Lisinopril      cough    Sulfa (sulfonamide antibiotics)      swelling        HPI   HPI    7/30/2017, 3:25 PM   History obtained from the patient      History of Present Illness: Conner Perez III is a 67 y.o. male patient with PMHx of 2 heart catheterizations who presents to the Emergency Department for chest pain which onset gradually at approximately 9:30 AM this morning. Symptoms are improved and moderate in severity. Pt states "it feels like someone is sitting on my chest" and reports no radiation. No mitigating or exacerbating factors reported. Associated sxs include RUQ abdominal pain. Patient denies any fever, chills, SOB, diaphoresis, palpitations, leg pain/swelling, N/V/D, weakness/numbness, dizziness, and all other sxs at this time. No further complaints or concerns at this time.       Arrival mode: Personal vehicle      PCP: Raciel Angela MD       Past Medical History:  Past Medical History:   Diagnosis Date    CKD (chronic kidney disease), stage IV     Diabetes mellitus     Diabetes mellitus, type 2     HTN (hypertension)        Past Surgical History:  Past Surgical History:   Procedure Laterality Date    CIRCUMCISION  1978         Family History:  Family History   Problem Relation Age of Onset    Hypertension Father        Social History:  Social History     Social History Main Topics    Smoking status: Former Smoker    Smokeless tobacco: Not given    Alcohol use No    Drug use: No    Sexual activity: Not given       ROS   Review of Systems   Constitutional: Negative " for chills, diaphoresis and fever.   HENT: Negative for sore throat.    Respiratory: Negative for shortness of breath.    Cardiovascular: Positive for chest pain. Negative for palpitations and leg swelling.   Gastrointestinal: Positive for abdominal pain (RUQ). Negative for diarrhea, nausea and vomiting.   Genitourinary: Negative for dysuria.   Musculoskeletal: Negative for back pain.   Skin: Negative for rash.   Neurological: Negative for dizziness, weakness and numbness.   Hematological: Does not bruise/bleed easily.   All other systems reviewed and are negative.    Physical Exam      Initial Vitals [07/30/17 1509]   BP Pulse Resp Temp SpO2   (!) 161/74 92 20 97.7 °F (36.5 °C) 98 %      MAP       103          Physical Exam  Nursing Notes and Vital Signs Reviewed.  Constitutional: Patient is in no acute distress. Well-developed and well-nourished.  Head: Atraumatic. Normocephalic.  Eyes: PERRL. EOM intact. Conjunctivae are not pale. No scleral icterus.  ENT: Mucous membranes are moist. Oropharynx is clear and symmetric.    Neck: Supple. Full ROM. No lymphadenopathy.  Cardiovascular: Regular rate. Regular rhythm. No murmurs, rubs, or gallops. Distal pulses are 2+ and symmetric.  Pulmonary/Chest: No respiratory distress. Clear to auscultation bilaterally. No wheezing, rales, or rhonchi.  Abdominal: Soft and non-distended.  There is RUQ tenderness.  No rebound, guarding, or rigidity. Good bowel sounds.  Musculoskeletal: Moves all extremities. No obvious deformities. No edema. No calf tenderness.  Skin: Warm and dry.  Neurological:  Alert, awake, and appropriate.  Normal speech.  No acute focal neurological deficits are appreciated.  Psychiatric: Normal affect. Good eye contact. Appropriate in content.    ED Course    Procedures  ED Vital Signs:  Vitals:    07/30/17 1509 07/30/17 1517 07/30/17 1632 07/30/17 1702   BP: (!) 161/74  (!) 195/94 (!) 192/96   Pulse: 92 85 84 89   Resp: 20  18 18   Temp: 97.7 °F (36.5 °C)     "  TempSrc: Oral      SpO2: 98%  97% 99%   Weight: 120.2 kg (265 lb)      Height: 6' 6" (1.981 m)       07/30/17 1732 07/30/17 1843 07/30/17 1920 07/30/17 1933   BP: (!) 181/83 (!) 169/89 (!) 165/71 (!) 163/81   Pulse: 80 85 84 80   Resp: 16 15 19 16   Temp:       TempSrc:       SpO2: 98% 98% 100% 99%   Weight:       Height:           Abnormal Lab Results:  Labs Reviewed   CBC W/ AUTO DIFFERENTIAL - Abnormal; Notable for the following:        Result Value    RBC 3.01 (*)     Hemoglobin 8.2 (*)     Hematocrit 25.1 (*)     All other components within normal limits   COMPREHENSIVE METABOLIC PANEL - Abnormal; Notable for the following:     CO2 20 (*)     Glucose 135 (*)     BUN, Bld 70 (*)     Creatinine 12.1 (*)     Calcium 7.3 (*)     Albumin 2.7 (*)     eGFR if  4 (*)     eGFR if non  4 (*)     All other components within normal limits   TROPONIN I - Abnormal; Notable for the following:     Troponin I 0.092 (*)     All other components within normal limits   B-TYPE NATRIURETIC PEPTIDE - Abnormal; Notable for the following:      (*)     All other components within normal limits   TROPONIN I - Abnormal; Notable for the following:     Troponin I 0.088 (*)     All other components within normal limits   POCT GLUCOSE - Abnormal; Notable for the following:     POCT Glucose 136 (*)     All other components within normal limits   LIPASE   LIPASE        All Lab Results:  Results for orders placed or performed during the hospital encounter of 07/30/17   CBC auto differential   Result Value Ref Range    WBC 6.89 3.90 - 12.70 K/uL    RBC 3.01 (L) 4.60 - 6.20 M/uL    Hemoglobin 8.2 (L) 14.0 - 18.0 g/dL    Hematocrit 25.1 (L) 40.0 - 54.0 %    MCV 83 82 - 98 fL    MCH 27.2 27.0 - 31.0 pg    MCHC 32.7 32.0 - 36.0 g/dL    RDW 13.1 11.5 - 14.5 %    Platelets 199 150 - 350 K/uL    MPV 10.1 9.2 - 12.9 fL    Gran # 4.9 1.8 - 7.7 K/uL    Lymph # 1.3 1.0 - 4.8 K/uL    Mono # 0.5 0.3 - 1.0 K/uL    Eos # " 0.2 0.0 - 0.5 K/uL    Baso # 0.02 0.00 - 0.20 K/uL    Gran% 70.7 38.0 - 73.0 %    Lymph% 19.0 18.0 - 48.0 %    Mono% 7.8 4.0 - 15.0 %    Eosinophil% 2.2 0.0 - 8.0 %    Basophil% 0.3 0.0 - 1.9 %    Differential Method Automated    Comprehensive metabolic panel   Result Value Ref Range    Sodium 138 136 - 145 mmol/L    Potassium 4.5 3.5 - 5.1 mmol/L    Chloride 106 95 - 110 mmol/L    CO2 20 (L) 23 - 29 mmol/L    Glucose 135 (H) 70 - 110 mg/dL    BUN, Bld 70 (H) 8 - 23 mg/dL    Creatinine 12.1 (H) 0.5 - 1.4 mg/dL    Calcium 7.3 (L) 8.7 - 10.5 mg/dL    Total Protein 6.5 6.0 - 8.4 g/dL    Albumin 2.7 (L) 3.5 - 5.2 g/dL    Total Bilirubin 0.3 0.1 - 1.0 mg/dL    Alkaline Phosphatase 72 55 - 135 U/L    AST 19 10 - 40 U/L    ALT 17 10 - 44 U/L    Anion Gap 12 8 - 16 mmol/L    eGFR if African American 4 (A) >60 mL/min/1.73 m^2    eGFR if non African American 4 (A) >60 mL/min/1.73 m^2   Troponin I #1   Result Value Ref Range    Troponin I 0.092 (H) 0.000 - 0.026 ng/mL   B-Type natriuretic peptide (BNP)   Result Value Ref Range     (H) 0 - 99 pg/mL   Lipase   Result Value Ref Range    Lipase 52 4 - 60 U/L   Troponin I #2   Result Value Ref Range    Troponin I 0.088 (H) 0.000 - 0.026 ng/mL   POCT glucose   Result Value Ref Range    POCT Glucose 136 (H) 70 - 110 mg/dL         Imaging Results:  Imaging Results          US Abdomen Limited (Gallbladder) (Final result)  Result time 07/30/17 16:29:27    Final result by Tito Amaya III, MD (07/30/17 16:29:27)                 Impression:      The midline structures are obscured by bowel gas.  Otherwise normal RUQ abdominal ultrasound without evidence of gallbladder disease.         Electronically signed by: TITO AMAYA MD  Date:     07/30/17  Time:    16:29              Narrative:    US ABDOMEN LIMITED    History: R10.11 Right upper quadrant pain    Findings: The liver measures 13.8 cm in length. The liver is normal in appearance without focal hepatic mass identified. The  gallbladder is normal in appearance without evidence of cholelithiasis or acute cholecystitis. Sonographic Patricia's sign is negative.  There is no biliary ductal dilation. The right kidney is unremarkable without focal mass, hydronephrosis, or shadowing stones. The pancreas and aorta are obscured by overlying bowel gas. Color and spectral Doppler evaluation demonstrated normal antegrade flow and normal waveforms in the portal vein. No ascites identified.                             X-Ray Chest AP Portable (Final result)  Result time 07/30/17 15:59:07    Final result by Damien Carlson MD (07/30/17 15:59:07)                 Impression:     Negative      Electronically signed by: DAMIEN CARLSON MD  Date:     07/30/17  Time:    15:59              Narrative:    History: Chest pain    Normal heart size. Clear lungs.                             The EKG was ordered, reviewed, and independently interpreted by the ED provider.  Interpretation time: 1517  Rate: 85 BPM  Rhythm: normal sinus rhythm  Interpretation: T wave inversion. No STEMI.  When compared to EKG performed in 6/2017 and 4/2017, there is new T wave inversion in aVL lead.         The Emergency Provider reviewed the vital signs and test results, which are outlined above.    ED Discussion     4:18 PM: Re-evaluated pt. Pt is resting comfortably and is in no acute distress.  Pt states he has been belching and states his pain is slightly relieved after belching. Also states he has been taking his Protonix. Upon reviewing patient's past medical records, pt was evaluated here in the ED for same sxs in April 2017 and had negative CT scan. Pt has also never had a stress test and had a normal ECHO. D/w pt all pertinent results. D/w pt any concerns expressed at this time. Answered all questions. Pt expresses understanding at this time.    7:40 PM: Reassessed pt at this time. Pt is AAO x3, in NAD, and VSS. Pt states his condition has improved at this time. Discussed  with pt all pertinent ED information and results. Discussed pt dx and plan of tx. Advised pt to f/u with cardiology with Dr. Chavez. Gave pt all f/u and return to the ED instructions. All questions and concerns were addressed at this time. Pt expresses understanding of information and instructions, and is comfortable with plan to discharge. Pt is stable for discharge.    I discussed with patient and/or family/caretaker that evaluation in the ED does not suggest any emergent or life threatening medical conditions requiring immediate intervention beyond what was provided in the ED, and I believe patient is safe for discharge.  Regardless, an unremarkable evaluation in the ED does not preclude the development or presence of a serious of life threatening condition. As such, patient was instructed to return immediately for any worsening or change in current symptoms.    ED Medication(s):  Medications   aspirin tablet 325 mg (325 mg Oral Given 7/30/17 1547)   cloNIDine tablet 0.2 mg (0.2 mg Oral Given 7/30/17 1812)   metoprolol tartrate (LOPRESSOR) tablet 50 mg (50 mg Oral Given 7/30/17 1917)       Discharge Medication List as of 7/30/2017  7:38 PM          Follow-up Information     Winston Chavez MD. Schedule an appointment as soon as possible for a visit in 2 days.    Specialties:  Cardiology, INTERVENTIONAL CARDIOLOGY  Why:  need outpatient cardiology work up with stress test  Contact information:  [address]  [place] [address] [phone]             Go to  [facility]    Specialty:  Emergency Medicine  Why:  If symptoms worsen  Contact information:  [address] [facility] [address] [phone]                   Medical Decision Making    Medical Decision Making:   Clinical Tests:   Lab Tests: Ordered and Reviewed  Radiological Study: Ordered and Reviewed  Medical Tests: Ordered and Reviewed           Scribe Attestation:   Scribe #1: I performed the  above scribed service and the documentation accurately describes the services I performed. I attest to the accuracy of the note.    Attending:   Physician Attestation Statement for Scribe #1: I, Jesus Colon MD, personally performed the services described in this documentation, as scribed by Salazar Cisneros, in my presence, and it is both accurate and complete.          Clinical Impression       ICD-10-CM ICD-9-CM   1. Chest pain R07.9 786.50   2. RUQ pain R10.11 789.01       Disposition:   Disposition: Discharged  Condition: Stable         Jesus Colon MD  07/30/17 1503

## 2017-08-08 ENCOUNTER — OFFICE VISIT (OUTPATIENT)
Dept: CARDIOLOGY | Facility: CLINIC | Age: 67
End: 2017-08-08
Payer: MEDICARE

## 2017-08-08 VITALS
HEART RATE: 78 BPM | DIASTOLIC BLOOD PRESSURE: 68 MMHG | SYSTOLIC BLOOD PRESSURE: 142 MMHG | BODY MASS INDEX: 29.89 KG/M2 | WEIGHT: 258.38 LBS | HEIGHT: 78 IN

## 2017-08-08 DIAGNOSIS — R79.89 ELEVATED TROPONIN: ICD-10-CM

## 2017-08-08 DIAGNOSIS — R07.9 CHEST PAIN, UNSPECIFIED TYPE: Primary | ICD-10-CM

## 2017-08-08 DIAGNOSIS — I10 ESSENTIAL HYPERTENSION: Chronic | ICD-10-CM

## 2017-08-08 PROCEDURE — 99214 OFFICE O/P EST MOD 30 MIN: CPT | Mod: S$PBB,,, | Performed by: NURSE PRACTITIONER

## 2017-08-08 PROCEDURE — 3008F BODY MASS INDEX DOCD: CPT | Mod: ,,, | Performed by: NURSE PRACTITIONER

## 2017-08-08 PROCEDURE — 3077F SYST BP >= 140 MM HG: CPT | Mod: ,,, | Performed by: NURSE PRACTITIONER

## 2017-08-08 PROCEDURE — 99214 OFFICE O/P EST MOD 30 MIN: CPT | Mod: PBBFAC,PO | Performed by: NURSE PRACTITIONER

## 2017-08-08 PROCEDURE — 3078F DIAST BP <80 MM HG: CPT | Mod: ,,, | Performed by: NURSE PRACTITIONER

## 2017-08-08 PROCEDURE — 99999 PR PBB SHADOW E&M-EST. PATIENT-LVL IV: CPT | Mod: PBBFAC,,, | Performed by: NURSE PRACTITIONER

## 2017-08-08 PROCEDURE — 1159F MED LIST DOCD IN RCRD: CPT | Mod: ,,, | Performed by: NURSE PRACTITIONER

## 2017-08-08 PROCEDURE — 1126F AMNT PAIN NOTED NONE PRSNT: CPT | Mod: ,,, | Performed by: NURSE PRACTITIONER

## 2017-08-08 NOTE — PROGRESS NOTES
Subjective:    Patient ID:  Conner Perez III is a 67 y.o. male who presents for follow-up of Hospital Follow Up; Chest Pain; and Hypertension      HPI   Mr Perez is a 67 year old male with HTN, DM, EKATERINA and CKD stage 5, who visits the clinic today for Emergency Room follow up. He was evaluated on 7/30/2017 in the ED with chest pain and RUQ pain. He currently denies chest pain. Patient was admitted to [facility] on 4/8/2017, had elevated troponin, outpatient Stress Test was recommend, has not been done. Creatinine 12.1 in the ED , primary nephrologist Dr Rhodes, reports he is about to start HD. Associated symptoms include MONTGOMERY, denies orthopnea or PND. 2 D Echo on 4/7/2017 showed normal left ventricular systolic function (EF 55-60%). Apical RWMA noted and normal left ventricular diastolic function.     Review of Systems   Constitution: Negative for diaphoresis, weakness, malaise/fatigue, weight gain and weight loss.   HENT: Negative for congestion and nosebleeds.    Eyes: Negative for blurred vision and double vision.   Cardiovascular: Positive for dyspnea on exertion. Negative for chest pain, claudication, cyanosis, irregular heartbeat, leg swelling, near-syncope, orthopnea, palpitations, paroxysmal nocturnal dyspnea and syncope.   Respiratory: Positive for shortness of breath. Negative for cough, hemoptysis, sputum production and wheezing.    Hematologic/Lymphatic: Negative for bleeding problem. Does not bruise/bleed easily.   Skin: Negative for rash.   Musculoskeletal: Negative for back pain, falls, joint pain, joint swelling and neck pain.   Gastrointestinal: Negative for abdominal pain, heartburn and vomiting.   Genitourinary: Negative for dysuria, frequency and hematuria.   Neurological: Negative for difficulty with concentration, dizziness, focal weakness, light-headedness, numbness and seizures.   Psychiatric/Behavioral: Negative for depression, memory loss and substance abuse.   Allergic/Immunologic: Negative for  "HIV exposure and hives.           Past Medical History:   Diagnosis Date    CKD (chronic kidney disease), stage IV     Diabetes mellitus     Diabetes mellitus, type 2     HTN (hypertension)      Past Surgical History:   Procedure Laterality Date    CIRCUMCISION  1978     Family History   Problem Relation Age of Onset    Hypertension Father      Social History     Social History    Marital status:      Spouse name: N/A    Number of children: N/A    Years of education: N/A     Social History Main Topics    Smoking status: Former Smoker    Smokeless tobacco: Never Used    Alcohol use No    Drug use: No    Sexual activity: Not Asked     Other Topics Concern    None     Social History Narrative    None     Review of patient's allergies indicates:   Allergen Reactions    Augmentin [amoxicillin-pot clavulanate]     Lisinopril      cough    Sulfa (sulfonamide antibiotics)      swelling     Current Outpatient Prescriptions on File Prior to Visit   Medication Sig Dispense Refill    aspirin 81 MG Chew Take 1 tablet (81 mg total) by mouth once daily. 30 tablet 0    atorvastatin (LIPITOR) 40 MG tablet Take 1 tablet (40 mg total) by mouth every evening. 30 tablet 0    avanafil 100 mg Tab Take 100 mg by mouth.      azelastine (ASTELIN) 137 mcg (0.1 %) nasal spray 2 sprays by Nasal route 2 (two) times daily.       BD ULTRA-FINE ANISA PEN NEEDLES 32 gauge x 5/32" Ndle   0    calcium carbonate (TUMS) 200 mg calcium (500 mg) chewable tablet Take 1 tablet (500 mg total) by mouth once daily. 90 tablet 0    ceramides 1,3,6-11 Crea Apply to the affected area daily.      cyclobenzaprine (FLEXERIL) 10 MG tablet One hs prn  Muscle relaxer      diltiaZEM (CARDIZEM CD) 360 MG 24 hr capsule Take 1 capsule (360 mg total) by mouth once daily. 30 capsule 0    docusate sodium (COLACE) 100 MG capsule Take 1 capsule (100 mg total) by mouth 2 (two) times daily. 60 capsule 0    FREESTYLE LITE STRIPS Strp   0    " hydrALAZINE (APRESOLINE) 50 MG tablet Take 1 tablet (50 mg total) by mouth 3 (three) times daily. 90 tablet 0    insulin NPH-insulin regular, 70/30, (NOVOLIN 70/30) 100 unit/mL (70-30) injection Inject 30 Units into the skin 2 (two) times daily. Dispense with needles and syringes. 20 mL 0    lactulose (CHRONULAC) 10 gram/15 mL solution Take 10 g by mouth every evening.   0    metoclopramide HCl (REGLAN) 10 MG tablet Take 1 tablet (10 mg total) by mouth every 6 (six) hours as needed. 30 tablet 0    ondansetron (ZOFRAN-ODT) 4 MG TbDL Take 1 tablet (4 mg total) by mouth every 8 (eight) hours as needed. 12 tablet 0    pantoprazole (PROTONIX) 40 MG tablet Take 1 tablet (40 mg total) by mouth once daily. 30 tablet 0    polyethylene glycol (GLYCOLAX) 17 gram PwPk Take 17 g by mouth once daily. 30 packet 0    sodium bicarbonate 650 MG tablet Take 1 tablet (650 mg total) by mouth 3 (three) times daily. 90 tablet 0    sucralfate (CARAFATE) 100 mg/mL suspension Take 10 mLs (1 g total) by mouth 4 (four) times daily. 1 Bottle 0    tamsulosin (FLOMAX) 0.4 mg Cp24 Take 1 capsule (0.4 mg total) by mouth once daily. 30 capsule 0    tramadol (ULTRAM) 50 mg tablet 1-2 bid prn pain      zolpidem (AMBIEN) 5 MG Tab Take 1 tablet (5 mg total) by mouth nightly as needed. 10 tablet 0     No current facility-administered medications on file prior to visit.      .     Objective:    Physical Exam   Constitutional: He is oriented to person, place, and time. He appears well-developed and well-nourished.   HENT:   Head: Normocephalic and atraumatic.   Eyes: Right eye exhibits no discharge. Left eye exhibits no discharge.   Neck: No JVD present.   Cardiovascular: Exam reveals no gallop and no friction rub.    No murmur heard.  Pulmonary/Chest: Effort normal and breath sounds normal. No respiratory distress. He has no wheezes. He has no rales. He exhibits no tenderness.   Abdominal: Soft. Bowel sounds are normal. He exhibits no  "distension and no mass. There is no tenderness. There is no rebound and no guarding.   Mild distended abdomen    Musculoskeletal: He exhibits no edema or deformity.   AV shunt Rt right    Neurological: He is alert and oriented to person, place, and time.   Skin: Skin is warm and dry.   Psychiatric: He has a normal mood and affect. His behavior is normal. Thought content normal.   Vitals reviewed.        No results found for: CHOL  No results found for: HDL  No results found for: LDLCALC  No results found for: TRIG  No results found for: CHOLHDL    Chemistry        Component Value Date/Time     07/30/2017 1540    K 4.5 07/30/2017 1540     07/30/2017 1540    CO2 20 (L) 07/30/2017 1540    BUN 70 (H) 07/30/2017 1540    CREATININE 12.1 (H) 07/30/2017 1540     (H) 07/30/2017 1540        Component Value Date/Time    CALCIUM 7.3 (L) 07/30/2017 1540    ALKPHOS 72 07/30/2017 1540    AST 19 07/30/2017 1540    ALT 17 07/30/2017 1540    BILITOT 0.3 07/30/2017 1540    ESTGFRAFRICA 4 (A) 07/30/2017 1540    EGFRNONAA 4 (A) 07/30/2017 1540          No results found for: TSH  Lab Results   Component Value Date    INR 1.0 04/07/2017    INR 1.0 01/12/2017     Lab Results   Component Value Date    WBC 6.89 07/30/2017    HGB 8.2 (L) 07/30/2017    HCT 25.1 (L) 07/30/2017    MCV 83 07/30/2017     07/30/2017     BP (!) 142/68   Pulse 78   Ht 6' 6" (1.981 m)   Wt 117.2 kg (258 lb 6.1 oz)   BMI 29.86 kg/m²     Assessment:       1. Chest pain, unspecified type    2. Essential hypertension    3. Elevated troponin      Patient presents for Emergency Room follow up for chest pain and RUQ pain.   He currently denies chest pain and feels it related to GI issues.      Plan:       Will continue current medications and treatment plan  Risk modification   Heart Healty Diet   Follow up with his Nephrologist for elevated Creatinine  NM Pharm Stress Test, telephone review results   Follow up in clinic in 3 month or sooner if " needed

## 2017-08-24 ENCOUNTER — HOSPITAL ENCOUNTER (INPATIENT)
Facility: HOSPITAL | Age: 67
LOS: 5 days | Discharge: HOME | DRG: 291 | End: 2017-08-30
Attending: EMERGENCY MEDICINE | Admitting: INTERNAL MEDICINE
Payer: MEDICARE

## 2017-08-24 DIAGNOSIS — N18.5 CHRONIC KIDNEY DISEASE, STAGE V: ICD-10-CM

## 2017-08-24 DIAGNOSIS — N18.9 ACUTE RENAL FAILURE SUPERIMPOSED ON CHRONIC KIDNEY DISEASE, UNSPECIFIED CKD STAGE, UNSPECIFIED ACUTE RENAL FAILURE TYPE: ICD-10-CM

## 2017-08-24 DIAGNOSIS — I10 ESSENTIAL HYPERTENSION: Chronic | ICD-10-CM

## 2017-08-24 DIAGNOSIS — N18.9 ACUTE RENAL FAILURE SUPERIMPOSED ON CHRONIC KIDNEY DISEASE: ICD-10-CM

## 2017-08-24 DIAGNOSIS — N19 UREMIA: ICD-10-CM

## 2017-08-24 DIAGNOSIS — R06.02 SHORTNESS OF BREATH: ICD-10-CM

## 2017-08-24 DIAGNOSIS — N17.9 ACUTE RENAL FAILURE SUPERIMPOSED ON CHRONIC KIDNEY DISEASE: ICD-10-CM

## 2017-08-24 DIAGNOSIS — J81.0 ACUTE PULMONARY EDEMA: Primary | ICD-10-CM

## 2017-08-24 DIAGNOSIS — I47.20 V-TACH: ICD-10-CM

## 2017-08-24 DIAGNOSIS — N17.9 ACUTE RENAL FAILURE SUPERIMPOSED ON CHRONIC KIDNEY DISEASE, UNSPECIFIED CKD STAGE, UNSPECIFIED ACUTE RENAL FAILURE TYPE: ICD-10-CM

## 2017-08-24 DIAGNOSIS — N17.9 AKI (ACUTE KIDNEY INJURY): ICD-10-CM

## 2017-08-24 DIAGNOSIS — I50.33 ACUTE ON CHRONIC DIASTOLIC HEART FAILURE: ICD-10-CM

## 2017-08-24 DIAGNOSIS — N18.6 END STAGE RENAL DISEASE: ICD-10-CM

## 2017-08-24 LAB
ALBUMIN SERPL BCP-MCNC: 2.7 G/DL
ALP SERPL-CCNC: 70 U/L
ALT SERPL W/O P-5'-P-CCNC: 11 U/L
ANION GAP SERPL CALC-SCNC: 16 MMOL/L
AST SERPL-CCNC: 16 U/L
BASOPHILS # BLD AUTO: 0.07 K/UL
BASOPHILS NFR BLD: 1 %
BILIRUB SERPL-MCNC: 0.3 MG/DL
BNP SERPL-MCNC: 265 PG/ML
BUN SERPL-MCNC: 72 MG/DL
CALCIUM SERPL-MCNC: 7.3 MG/DL
CHLORIDE SERPL-SCNC: 105 MMOL/L
CO2 SERPL-SCNC: 17 MMOL/L
CREAT SERPL-MCNC: 12.6 MG/DL
DIFFERENTIAL METHOD: ABNORMAL
EOSINOPHIL # BLD AUTO: 0.2 K/UL
EOSINOPHIL NFR BLD: 2.7 %
ERYTHROCYTE [DISTWIDTH] IN BLOOD BY AUTOMATED COUNT: 12.9 %
EST. GFR  (AFRICAN AMERICAN): 4 ML/MIN/1.73 M^2
EST. GFR  (NON AFRICAN AMERICAN): 4 ML/MIN/1.73 M^2
GLUCOSE SERPL-MCNC: 164 MG/DL
HCT VFR BLD AUTO: 24.8 %
HGB BLD-MCNC: 8.2 G/DL
INR PPP: 1.1
LYMPHOCYTES # BLD AUTO: 1.1 K/UL
LYMPHOCYTES NFR BLD: 17.1 %
MCH RBC QN AUTO: 27.4 PG
MCHC RBC AUTO-ENTMCNC: 33.1 G/DL
MCV RBC AUTO: 83 FL
MONOCYTES # BLD AUTO: 0.7 K/UL
MONOCYTES NFR BLD: 10.5 %
NEUTROPHILS # BLD AUTO: 4.6 K/UL
NEUTROPHILS NFR BLD: 68.7 %
PLATELET # BLD AUTO: 198 K/UL
PMV BLD AUTO: 10.2 FL
POTASSIUM SERPL-SCNC: 4.5 MMOL/L
PROT SERPL-MCNC: 6.5 G/DL
PROTHROMBIN TIME: 11 SEC
RBC # BLD AUTO: 2.99 M/UL
SODIUM SERPL-SCNC: 138 MMOL/L
TROPONIN I SERPL DL<=0.01 NG/ML-MCNC: 0.05 NG/ML
WBC # BLD AUTO: 6.68 K/UL

## 2017-08-24 PROCEDURE — 63600175 PHARM REV CODE 636 W HCPCS: Performed by: EMERGENCY MEDICINE

## 2017-08-24 PROCEDURE — 36415 COLL VENOUS BLD VENIPUNCTURE: CPT

## 2017-08-24 PROCEDURE — 83880 ASSAY OF NATRIURETIC PEPTIDE: CPT

## 2017-08-24 PROCEDURE — 96374 THER/PROPH/DIAG INJ IV PUSH: CPT

## 2017-08-24 PROCEDURE — 85025 COMPLETE CBC W/AUTO DIFF WBC: CPT

## 2017-08-24 PROCEDURE — 85610 PROTHROMBIN TIME: CPT

## 2017-08-24 PROCEDURE — 99285 EMERGENCY DEPT VISIT HI MDM: CPT | Mod: 25

## 2017-08-24 PROCEDURE — 93010 ELECTROCARDIOGRAM REPORT: CPT | Mod: ,,, | Performed by: INTERNAL MEDICINE

## 2017-08-24 PROCEDURE — 80053 COMPREHEN METABOLIC PANEL: CPT

## 2017-08-24 PROCEDURE — 96376 TX/PRO/DX INJ SAME DRUG ADON: CPT

## 2017-08-24 PROCEDURE — 93005 ELECTROCARDIOGRAM TRACING: CPT

## 2017-08-24 PROCEDURE — 84484 ASSAY OF TROPONIN QUANT: CPT

## 2017-08-24 RX ORDER — FUROSEMIDE 40 MG/1
40 TABLET ORAL DAILY
Status: ON HOLD | COMMUNITY
End: 2017-08-27

## 2017-08-24 RX ORDER — FUROSEMIDE 10 MG/ML
40 INJECTION INTRAMUSCULAR; INTRAVENOUS
Status: COMPLETED | OUTPATIENT
Start: 2017-08-24 | End: 2017-08-24

## 2017-08-24 RX ADMIN — FUROSEMIDE 40 MG: 10 INJECTION, SOLUTION INTRAMUSCULAR; INTRAVENOUS at 11:08

## 2017-08-25 PROBLEM — I50.33 ACUTE ON CHRONIC DIASTOLIC HEART FAILURE: Status: ACTIVE | Noted: 2017-08-25

## 2017-08-25 PROBLEM — N18.5 CHRONIC KIDNEY DISEASE, STAGE V: Status: ACTIVE | Noted: 2017-08-25

## 2017-08-25 PROBLEM — E83.51 HYPOCALCEMIA: Status: ACTIVE | Noted: 2017-08-25

## 2017-08-25 PROBLEM — N18.9 ACUTE RENAL FAILURE SUPERIMPOSED ON CHRONIC KIDNEY DISEASE: Status: ACTIVE | Noted: 2017-08-25

## 2017-08-25 PROBLEM — N17.9 AKI (ACUTE KIDNEY INJURY): Status: ACTIVE | Noted: 2017-08-25

## 2017-08-25 PROBLEM — D64.9 ANEMIA: Status: ACTIVE | Noted: 2017-08-25

## 2017-08-25 PROBLEM — J81.0 ACUTE PULMONARY EDEMA: Status: ACTIVE | Noted: 2017-08-25

## 2017-08-25 PROBLEM — N17.9 ACUTE RENAL FAILURE SUPERIMPOSED ON CHRONIC KIDNEY DISEASE: Status: ACTIVE | Noted: 2017-08-25

## 2017-08-25 PROBLEM — R06.02 SHORTNESS OF BREATH: Status: ACTIVE | Noted: 2017-08-25

## 2017-08-25 LAB
ALBUMIN SERPL BCP-MCNC: 2.6 G/DL
ALP SERPL-CCNC: 70 U/L
ALT SERPL W/O P-5'-P-CCNC: 10 U/L
ANION GAP SERPL CALC-SCNC: 14 MMOL/L
AST SERPL-CCNC: 15 U/L
BASOPHILS # BLD AUTO: 0.07 K/UL
BASOPHILS NFR BLD: 1.1 %
BILIRUB SERPL-MCNC: 0.4 MG/DL
BUN SERPL-MCNC: 73 MG/DL
CALCIUM SERPL-MCNC: 7.2 MG/DL
CHLORIDE SERPL-SCNC: 107 MMOL/L
CO2 SERPL-SCNC: 19 MMOL/L
CREAT SERPL-MCNC: 12.8 MG/DL
DIFFERENTIAL METHOD: ABNORMAL
EOSINOPHIL # BLD AUTO: 0.2 K/UL
EOSINOPHIL NFR BLD: 3.2 %
ERYTHROCYTE [DISTWIDTH] IN BLOOD BY AUTOMATED COUNT: 13 %
EST. GFR  (AFRICAN AMERICAN): 4 ML/MIN/1.73 M^2
EST. GFR  (NON AFRICAN AMERICAN): 4 ML/MIN/1.73 M^2
ESTIMATED AVG GLUCOSE: 117 MG/DL
GLUCOSE SERPL-MCNC: 125 MG/DL
HBA1C MFR BLD HPLC: 5.7 %
HCT VFR BLD AUTO: 25.7 %
HGB BLD-MCNC: 8.5 G/DL
LYMPHOCYTES # BLD AUTO: 1.4 K/UL
LYMPHOCYTES NFR BLD: 20.7 %
MCH RBC QN AUTO: 28 PG
MCHC RBC AUTO-ENTMCNC: 33.1 G/DL
MCV RBC AUTO: 85 FL
MONOCYTES # BLD AUTO: 0.6 K/UL
MONOCYTES NFR BLD: 9.8 %
NEUTROPHILS # BLD AUTO: 4.2 K/UL
NEUTROPHILS NFR BLD: 65.2 %
PLATELET # BLD AUTO: 206 K/UL
PMV BLD AUTO: 10.1 FL
POCT GLUCOSE: 107 MG/DL (ref 70–110)
POCT GLUCOSE: 156 MG/DL (ref 70–110)
POCT GLUCOSE: 162 MG/DL (ref 70–110)
POTASSIUM SERPL-SCNC: 4.4 MMOL/L
PROT SERPL-MCNC: 6.4 G/DL
RBC # BLD AUTO: 3.04 M/UL
SODIUM SERPL-SCNC: 140 MMOL/L
TROPONIN I SERPL DL<=0.01 NG/ML-MCNC: 0.06 NG/ML
WBC # BLD AUTO: 6.51 K/UL

## 2017-08-25 PROCEDURE — 63600175 PHARM REV CODE 636 W HCPCS: Performed by: EMERGENCY MEDICINE

## 2017-08-25 PROCEDURE — 80053 COMPREHEN METABOLIC PANEL: CPT

## 2017-08-25 PROCEDURE — 85025 COMPLETE CBC W/AUTO DIFF WBC: CPT

## 2017-08-25 PROCEDURE — 25000003 PHARM REV CODE 250: Performed by: INTERNAL MEDICINE

## 2017-08-25 PROCEDURE — 36415 COLL VENOUS BLD VENIPUNCTURE: CPT

## 2017-08-25 PROCEDURE — 99223 1ST HOSP IP/OBS HIGH 75: CPT | Mod: ,,, | Performed by: INTERNAL MEDICINE

## 2017-08-25 PROCEDURE — 63600175 PHARM REV CODE 636 W HCPCS: Performed by: INTERNAL MEDICINE

## 2017-08-25 PROCEDURE — 84484 ASSAY OF TROPONIN QUANT: CPT | Mod: 91

## 2017-08-25 PROCEDURE — 83036 HEMOGLOBIN GLYCOSYLATED A1C: CPT

## 2017-08-25 PROCEDURE — 21400001 HC TELEMETRY ROOM

## 2017-08-25 RX ORDER — INSULIN ASPART 100 [IU]/ML
0-5 INJECTION, SOLUTION INTRAVENOUS; SUBCUTANEOUS
Status: DISCONTINUED | OUTPATIENT
Start: 2017-08-25 | End: 2017-08-30 | Stop reason: HOSPADM

## 2017-08-25 RX ORDER — HYDRALAZINE HYDROCHLORIDE 20 MG/ML
10 INJECTION INTRAMUSCULAR; INTRAVENOUS EVERY 8 HOURS PRN
Status: DISCONTINUED | OUTPATIENT
Start: 2017-08-25 | End: 2017-08-30 | Stop reason: HOSPADM

## 2017-08-25 RX ORDER — ONDANSETRON 2 MG/ML
4 INJECTION INTRAMUSCULAR; INTRAVENOUS EVERY 8 HOURS PRN
Status: DISCONTINUED | OUTPATIENT
Start: 2017-08-25 | End: 2017-08-30 | Stop reason: HOSPADM

## 2017-08-25 RX ORDER — IPRATROPIUM BROMIDE AND ALBUTEROL SULFATE 2.5; .5 MG/3ML; MG/3ML
3 SOLUTION RESPIRATORY (INHALATION) EVERY 4 HOURS PRN
Status: DISCONTINUED | OUTPATIENT
Start: 2017-08-25 | End: 2017-08-30 | Stop reason: HOSPADM

## 2017-08-25 RX ORDER — GLUCAGON 1 MG
1 KIT INJECTION
Status: DISCONTINUED | OUTPATIENT
Start: 2017-08-25 | End: 2017-08-30 | Stop reason: HOSPADM

## 2017-08-25 RX ORDER — TAMSULOSIN HYDROCHLORIDE 0.4 MG/1
0.4 CAPSULE ORAL DAILY
Status: DISCONTINUED | OUTPATIENT
Start: 2017-08-25 | End: 2017-08-30 | Stop reason: HOSPADM

## 2017-08-25 RX ORDER — FUROSEMIDE 10 MG/ML
80 INJECTION INTRAMUSCULAR; INTRAVENOUS 3 TIMES DAILY
Status: DISCONTINUED | OUTPATIENT
Start: 2017-08-25 | End: 2017-08-26

## 2017-08-25 RX ORDER — INSULIN ASPART 100 [IU]/ML
20 INJECTION, SUSPENSION SUBCUTANEOUS 2 TIMES DAILY
Status: DISCONTINUED | OUTPATIENT
Start: 2017-08-25 | End: 2017-08-30 | Stop reason: HOSPADM

## 2017-08-25 RX ORDER — IBUPROFEN 200 MG
24 TABLET ORAL
Status: DISCONTINUED | OUTPATIENT
Start: 2017-08-25 | End: 2017-08-30 | Stop reason: HOSPADM

## 2017-08-25 RX ORDER — ACETAMINOPHEN 325 MG/1
650 TABLET ORAL EVERY 8 HOURS PRN
Status: DISCONTINUED | OUTPATIENT
Start: 2017-08-25 | End: 2017-08-30 | Stop reason: HOSPADM

## 2017-08-25 RX ORDER — FUROSEMIDE 10 MG/ML
40 INJECTION INTRAMUSCULAR; INTRAVENOUS
Status: COMPLETED | OUTPATIENT
Start: 2017-08-25 | End: 2017-08-25

## 2017-08-25 RX ORDER — DIPHENHYDRAMINE HCL 25 MG
25 CAPSULE ORAL EVERY 6 HOURS PRN
Status: DISCONTINUED | OUTPATIENT
Start: 2017-08-25 | End: 2017-08-30 | Stop reason: HOSPADM

## 2017-08-25 RX ORDER — ATORVASTATIN CALCIUM 40 MG/1
40 TABLET, FILM COATED ORAL NIGHTLY
Status: DISCONTINUED | OUTPATIENT
Start: 2017-08-25 | End: 2017-08-30 | Stop reason: HOSPADM

## 2017-08-25 RX ORDER — SODIUM BICARBONATE 650 MG/1
650 TABLET ORAL 3 TIMES DAILY
Status: DISCONTINUED | OUTPATIENT
Start: 2017-08-25 | End: 2017-08-30

## 2017-08-25 RX ORDER — IBUPROFEN 200 MG
16 TABLET ORAL
Status: DISCONTINUED | OUTPATIENT
Start: 2017-08-25 | End: 2017-08-30 | Stop reason: HOSPADM

## 2017-08-25 RX ORDER — DILTIAZEM HYDROCHLORIDE 180 MG/1
360 CAPSULE, COATED, EXTENDED RELEASE ORAL DAILY
Status: DISCONTINUED | OUTPATIENT
Start: 2017-08-25 | End: 2017-08-30 | Stop reason: HOSPADM

## 2017-08-25 RX ORDER — PANTOPRAZOLE SODIUM 40 MG/1
40 TABLET, DELAYED RELEASE ORAL DAILY
Status: DISCONTINUED | OUTPATIENT
Start: 2017-08-25 | End: 2017-08-30 | Stop reason: HOSPADM

## 2017-08-25 RX ORDER — GUAIFENESIN 100 MG/5ML
200 SOLUTION ORAL EVERY 4 HOURS PRN
Status: DISCONTINUED | OUTPATIENT
Start: 2017-08-25 | End: 2017-08-30 | Stop reason: HOSPADM

## 2017-08-25 RX ADMIN — TAMSULOSIN HYDROCHLORIDE 0.4 MG: 0.4 CAPSULE ORAL at 10:08

## 2017-08-25 RX ADMIN — INSULIN ASPART 20 UNITS: 100 INJECTION, SUSPENSION SUBCUTANEOUS at 09:08

## 2017-08-25 RX ADMIN — ATORVASTATIN CALCIUM 40 MG: 40 TABLET, FILM COATED ORAL at 09:08

## 2017-08-25 RX ADMIN — SODIUM BICARBONATE 650 MG TABLET 650 MG: at 09:08

## 2017-08-25 RX ADMIN — SODIUM BICARBONATE 650 MG TABLET 650 MG: at 01:08

## 2017-08-25 RX ADMIN — ATORVASTATIN CALCIUM 40 MG: 40 TABLET, FILM COATED ORAL at 02:08

## 2017-08-25 RX ADMIN — PANTOPRAZOLE SODIUM 40 MG: 40 TABLET, DELAYED RELEASE ORAL at 10:08

## 2017-08-25 RX ADMIN — FUROSEMIDE 80 MG: 10 INJECTION, SOLUTION INTRAVENOUS at 06:08

## 2017-08-25 RX ADMIN — INSULIN ASPART 2 UNITS: 100 INJECTION, SOLUTION INTRAVENOUS; SUBCUTANEOUS at 09:08

## 2017-08-25 RX ADMIN — FUROSEMIDE 80 MG: 10 INJECTION, SOLUTION INTRAVENOUS at 01:08

## 2017-08-25 RX ADMIN — SODIUM BICARBONATE 650 MG TABLET 650 MG: at 06:08

## 2017-08-25 RX ADMIN — FUROSEMIDE 40 MG: 10 INJECTION, SOLUTION INTRAVENOUS at 12:08

## 2017-08-25 RX ADMIN — FUROSEMIDE 80 MG: 10 INJECTION, SOLUTION INTRAVENOUS at 09:08

## 2017-08-25 RX ADMIN — DILTIAZEM HYDROCHLORIDE 360 MG: 180 CAPSULE, EXTENDED RELEASE ORAL at 10:08

## 2017-08-25 NOTE — ASSESSMENT & PLAN NOTE
Resume home medications.  Monitor and make adjustments as needed.    BP up; will adjust medications; continue effort at diuresis; patient likely as ESRD an in need of dialysis to maintain fluid balance. Not pushed on acid-base or potassium issues.  Will monitor CXR

## 2017-08-25 NOTE — HOSPITAL COURSE
Patient diuresing with IV lasix. Patient non-oliguric; has a fistula in place - too immature to use at present, placed in june of this year. Nephrology Dr. Granados consulted. Pts nephrologist, Dr. Rhodes not adverse to his starting hemodialysis here. Pt was given high dose parenteral lasix with prompt and sustained diuresis. Renal function was monitored and remained level. CXR clearing on serial exams. No s/s uremia. He was offered placement of a temporary access to initiate HD here if needed, however refused, this opting instead to follow up with his nephrologist as scheduled in early September.  Patient had run of V.Tach - asymptomatic. Repeat electrolytes and magnesium were normal. Pt kept overnight for continued cardiac monitoring. In Am, pt decided he wanted to initiate HD now, not wait for nephrology appt in Sept. Nephrology notified. Vas-Cath placed and HD initiated on 8/28/17. 2nd HD on 8/29/17. 3rd HD on 8/30/17. Patient was seen and examined today and deemed stable for discharge. Pt set to dialyze at [facility] on M-W-F at 10:30am.

## 2017-08-25 NOTE — ASSESSMENT & PLAN NOTE
CXR shows vascular congestion.  IV Lasix 80 mg IV TID x3 does.  May need HD if patient does not improve.  Still make good amount of urine.      Continue present regimen; monitor CXR response;  Otherwise as per nephrology consultant

## 2017-08-25 NOTE — PLAN OF CARE
IMM signed.       08/25/17 1027   Medicare Message   Important Message from Medicare regarding Discharge Appeal Rights Given to patient/caregiver;Explained to patient/caregiver;Signed/date by patient/caregiver   Date IMM was signed 08/25/17   Time IMM was signed 1000

## 2017-08-25 NOTE — PLAN OF CARE
CM met with pt for d/c planning assessment.  Per the pt, he lives at home with his son, and is independent with all ADLs.  The pt does not anticipate having any CM needs at this time.     D/C plan: home        08/25/17 1025   Discharge Assessment   Assessment Type Discharge Planning Assessment   Confirmed/corrected address and phone number on facesheet? Yes   Assessment information obtained from? Patient;Medical Record   Expected Length of Stay (days) (TBD)   Prior to hospitilization cognitive status: Alert/Oriented   Prior to hospitalization functional status: Independent   Current cognitive status: Alert/Oriented   Current Functional Status: Independent   Lives With child(kris), adult   Able to Return to Prior Arrangements yes   Is patient able to care for self after discharge? Yes   Who are your caregiver(s) and their phone number(s)? Corey Soto, daughter: [phone]; Phillip Perez, son: [phone]   Patient's perception of discharge disposition home or selfcare   Readmission Within The Last 30 Days no previous admission in last 30 days   Patient currently being followed by outpatient case management? No   Patient currently receives any other outside agency services? No   Equipment Currently Used at Home none   Do you have any problems affording any of your prescribed medications? No   Is the patient taking medications as prescribed? yes   Does the patient have transportation home? Yes   Transportation Available car;family or friend will provide   Does the patient receive services at the [facility]? No   Discharge Plan A Home   Discharge Plan B Home   Patient/Family In Agreement With Plan yes

## 2017-08-25 NOTE — ASSESSMENT & PLAN NOTE
Serum creatinine 12.6.  Non-oliguric.  Nephrology consulted.  Continue IV Lasix 80 mg IV q8 x3 doses.

## 2017-08-25 NOTE — SUBJECTIVE & OBJECTIVE
Interval History: breathing better; johanne shortness of breath reported    Review of Systems   Constitutional: Negative.    HENT: Negative.    Eyes: Negative.    Respiratory: Positive for shortness of breath.    Cardiovascular: Negative.    Gastrointestinal: Negative.    Genitourinary: Negative.    Neurological: Negative.    Hematological: Negative.    Psychiatric/Behavioral: Negative.      Objective:     Vital Signs (Most Recent):  Temp: 98.2 °F (36.8 °C) (08/25/17 1112)  Pulse: 93 (08/25/17 1112)  Resp: 18 (08/25/17 1112)  BP: (!) 180/88 (08/25/17 1112)  SpO2: 95 % (08/25/17 1112) Vital Signs (24h Range):  Temp:  [97.4 °F (36.3 °C)-98.2 °F (36.8 °C)] 98.2 °F (36.8 °C)  Pulse:  [93-99] 93  Resp:  [13-22] 18  SpO2:  [95 %-100 %] 95 %  BP: (157-180)/(74-88) 180/88     Weight: 118.7 kg (261 lb 11.2 oz)  Body mass index is 30.24 kg/m².    Intake/Output Summary (Last 24 hours) at 08/25/17 1341  Last data filed at 08/25/17 1000   Gross per 24 hour   Intake              300 ml   Output             2100 ml   Net            -1800 ml      Physical Exam   Constitutional: He appears well-developed and well-nourished.   HENT:   Head: Normocephalic.   Eyes: EOM are normal. Pupils are equal, round, and reactive to light.   Neck: Normal range of motion. Neck supple.   Cardiovascular: Regular rhythm.    Pulmonary/Chest:   Dullness at lung bases   Abdominal:   Obese, non tender   Skin: Skin is warm and dry.   Av fistula forearm, with  Good bruit and thrill, deep to forearm   Psychiatric: He has a normal mood and affect.       Significant Labs:   BMP:   Recent Labs  Lab 08/25/17  0610   *      K 4.4      CO2 19*   BUN 73*   CREATININE 12.8*   CALCIUM 7.2*     CBC:   Recent Labs  Lab 08/24/17  2146   WBC 6.68   HGB 8.2*   HCT 24.8*          Significant Imaging: I have reviewed all pertinent imaging results/findings within the past 24 hours.

## 2017-08-25 NOTE — PLAN OF CARE
Problem: Patient Care Overview  Goal: Plan of Care Review  Outcome: Ongoing (interventions implemented as appropriate)  Patient rested well during the night.  No complaints of dyspnea.  O2 sats >95% on room air.  No acute events this shift.  Patient and friend at bedside updated to plan of care, no questions at this time.     Urine output 1300mL since arrival to room

## 2017-08-25 NOTE — ASSESSMENT & PLAN NOTE
Dyspnea.  Due to CHF exacerbation  Good response to IV diuretics  Discussed salt restriction (2-3 g/day) and fluid restriction (1-2 L/day) with pt.  Pt has improved clinically  Is hemodynamically stable.

## 2017-08-25 NOTE — HPI
Mr. Perez is a 66 y/o AA male with h/o CKD V, T2DM, HTN, CAD preented to the ED c/o worsening SOB since yesterday. He was noted to have a creatinine of 12.1 on 7/30/17, but makes good amount of urine. He takes lasix 40 mg po daily. He was seen by  (nephrology) on 7/30/17.  Today CXR shows pulmonary edema pattern with vascular congestion.  was contacted, recommended giving Lasix 80 mg IV x1. Patient currently not on oxygen, and sats mid-high 90's on room air. Patient already has right radial artery fistula in place. Never been on dialysis before.

## 2017-08-25 NOTE — ASSESSMENT & PLAN NOTE
Will continue to monitor for signs and symptoms if uremia, and need for renal replacement therapy.

## 2017-08-25 NOTE — PROGRESS NOTES
[facility] Hospital Medicine  Progress Note    Patient Name: Conner Perez III  MRN: [id number]  Patient Class: IP- Inpatient   Admission Date: 8/24/2017  Length of Stay: 0 days  Attending Physician: Antione Garcia MD  Primary Care Provider: Raciel Angela MD        Subjective:     Principal Problem:Acute pulmonary edema    HPI:  Mr. Perez is a 68 y/o AA male with h/o CKD V, T2DM, HTN, CAD preented to the ED c/o worsening SOB since yesterday. He was noted to have a creatinine of 12.1 on 7/30/17, but makes good amount of urine. He takes lasix 40 mg po daily. He was seen by  (nephrology) on 7/30/17.  Today CXR shows pulmonary edema pattern with vascular congestion.  was contacted, recommended giving Lasix 80 mg IV x1. Patient currently not on oxygen, and sats mid-high 90's on room air. Patient already has right radial artery fistula in place. Never been on dialysis before.    Hospital Course:  Patient diuresing with IV lasix; however chest x-ray continues wet;  No N/V, no pruritis. or tremulousness;  Patient non-oliguric;  Has a fistula in place., placed in june of this year.    No new subjective & objective note has been filed under this hospital service since the last note was generated.    Assessment/Plan:      Anemia      Will monitor; consider DARIUSZ therapy pending course (procrit/epogen)          Chronic kidney disease, stage V      Will continue to monitor for signs and symptoms if uremia, and need for renal replacement therapy.          Hypocalcemia    Check PO4 level as well; likely secondary hyperparathyroidism;  Check intact molecule PTH if not done recently;  Also hepatitis serologies if needs to be enrolled in in center HD program.          Acute renal failure superimposed on chronic kidney disease    Serum creatinine 12.6.  Non-oliguric.  Nephrology consulted.  Continue IV Lasix 80 mg IV q8 x3 doses.      Will continue IV lasix at 80 mg BID;  And push as tolerated;     Monitor weights and I&O data.might benefit from oral zaroxolyn;  Otherwise per nephrology;  Fistula may not be matured enough to use; also appears to run deep in the forearm        DM II (diabetes mellitus, type II), controlled    Resume lower dose home insulin regimen  ISS  Monitor, avoid hypoglycemia      Continue sliding scale coverage; renal and diabetic diet to continue          Essential hypertension    Resume home medications.  Monitor and make adjustments as needed.    BP up; will adjust medications; continue effort at diuresis; patient likely as ESRD an in need of dialysis to maintain fluid balance. Not pushed on acid-base or potassium issues.  Will monitor CXR        * Acute pulmonary edema    CXR shows vascular congestion.  IV Lasix 80 mg IV TID x3 does.  May need HD if patient does not improve.  Still make good amount of urine.      Continue present regimen; monitor CXR response;  Otherwise as per nephrology consultant          VTE Risk Mitigation         Ordered     Place sequential compression device  Until discontinued      08/25/17 0136     Medium Risk of VTE  Once      08/25/17 0134     Place sequential compression device  Until discontinued      08/25/17 0134              Antione Tomlin MD  Department of Hospital Medicine   [facility]

## 2017-08-25 NOTE — PROGRESS NOTES
0125 Patient arrived to room from ED no complaints of pain or dyspnea.  Patient O2 sats via pulse oximetry 96% on room air.  Lung sounds clear equal bilat.  Patient oriented to room and instructed to call for assistance.  Md Taylor reviewing chart currently.

## 2017-08-25 NOTE — ASSESSMENT & PLAN NOTE
Check PO4 level as well; likely secondary hyperparathyroidism;  Check intact molecule PTH if not done recently;  Also hepatitis serologies if needs to be enrolled in in center HD program.

## 2017-08-25 NOTE — ASSESSMENT & PLAN NOTE
Renal: has CKD stage 5.  Pt is pre-ESRD, pre-HD  Has UGO on CKD stage 5 likely due to prerenal azotemia and CHF  K normal  Metabolic acidosis  Follows with Dr. Rhodes, who does not come to this hospital  Discussed importance of following closely with Dr. Rhodes to the pt, given that eh is very close to starting chronic HD

## 2017-08-25 NOTE — SUBJECTIVE & OBJECTIVE
"Past Medical History:   Diagnosis Date    CKD (chronic kidney disease), stage IV     Diabetes mellitus     Diabetes mellitus, type 2     HTN (hypertension)        Past Surgical History:   Procedure Laterality Date    CIRCUMCISION  1978       Review of patient's allergies indicates:   Allergen Reactions    Augmentin [amoxicillin-pot clavulanate]     Lisinopril      cough    Sulfa (sulfonamide antibiotics)      swelling       No current facility-administered medications on file prior to encounter.      Current Outpatient Prescriptions on File Prior to Encounter   Medication Sig    aspirin 81 MG Chew Take 1 tablet (81 mg total) by mouth once daily.    atorvastatin (LIPITOR) 40 MG tablet Take 1 tablet (40 mg total) by mouth every evening.    diltiaZEM (CARDIZEM CD) 360 MG 24 hr capsule Take 1 capsule (360 mg total) by mouth once daily.    hydrALAZINE (APRESOLINE) 50 MG tablet Take 1 tablet (50 mg total) by mouth 3 (three) times daily.    insulin NPH-insulin regular, 70/30, (NOVOLIN 70/30) 100 unit/mL (70-30) injection Inject 30 Units into the skin 2 (two) times daily. Dispense with needles and syringes. (Patient taking differently: Inject 30 Units into the skin 2 (two) times daily. Dispense with needles and syringes. Pt states tjhat he only takes it 1 2 times aweek)    ondansetron (ZOFRAN-ODT) 4 MG TbDL Take 1 tablet (4 mg total) by mouth every 8 (eight) hours as needed.    pantoprazole (PROTONIX) 40 MG tablet Take 1 tablet (40 mg total) by mouth once daily.    tamsulosin (FLOMAX) 0.4 mg Cp24 Take 1 capsule (0.4 mg total) by mouth once daily.    tramadol (ULTRAM) 50 mg tablet 1-2 bid prn pain    avanafil 100 mg Tab Take 100 mg by mouth.    azelastine (ASTELIN) 137 mcg (0.1 %) nasal spray 2 sprays by Nasal route 2 (two) times daily.     BD ULTRA-FINE ANISA PEN NEEDLES 32 gauge x 5/32" Ndle     calcium carbonate (TUMS) 200 mg calcium (500 mg) chewable tablet Take 1 tablet (500 mg total) by mouth once " daily.    ceramides 1,3,6-11 Crea Apply to the affected area daily.    cyclobenzaprine (FLEXERIL) 10 MG tablet One hs prn  Muscle relaxer    docusate sodium (COLACE) 100 MG capsule Take 1 capsule (100 mg total) by mouth 2 (two) times daily.    FREESTYLE LITE STRIPS Strp     lactulose (CHRONULAC) 10 gram/15 mL solution Take 10 g by mouth every evening.     metoclopramide HCl (REGLAN) 10 MG tablet Take 1 tablet (10 mg total) by mouth every 6 (six) hours as needed.    polyethylene glycol (GLYCOLAX) 17 gram PwPk Take 17 g by mouth once daily.    sodium bicarbonate 650 MG tablet Take 1 tablet (650 mg total) by mouth 3 (three) times daily.    sucralfate (CARAFATE) 100 mg/mL suspension Take 10 mLs (1 g total) by mouth 4 (four) times daily.    zolpidem (AMBIEN) 5 MG Tab Take 1 tablet (5 mg total) by mouth nightly as needed.     Family History     Problem Relation (Age of Onset)    Hypertension Father        Social History Main Topics    Smoking status: Former Smoker    Smokeless tobacco: Never Used    Alcohol use No    Drug use: No    Sexual activity: Not on file     Review of Systems   Constitutional: Negative.  Negative for appetite change, fatigue and fever.   HENT: Negative.  Negative for congestion, nosebleeds and sore throat.    Eyes: Negative.  Negative for photophobia, redness and visual disturbance.   Respiratory: Positive for shortness of breath. Negative for cough and wheezing.    Cardiovascular: Positive for leg swelling. Negative for chest pain and palpitations.   Gastrointestinal: Negative.  Negative for abdominal pain, constipation, diarrhea, nausea and vomiting.   Endocrine: Negative.  Negative for polydipsia, polyphagia and polyuria.   Genitourinary: Negative.  Negative for dysuria, flank pain, frequency and urgency.   Musculoskeletal: Negative.  Negative for arthralgias, back pain and joint swelling.   Skin: Negative.  Negative for color change, pallor and rash.   Allergic/Immunologic:  Negative.  Negative for environmental allergies, food allergies and immunocompromised state.   Neurological: Negative.  Negative for dizziness, syncope, weakness, light-headedness, numbness and headaches.   Hematological: Negative.    Psychiatric/Behavioral: Negative.  Negative for confusion and hallucinations. The patient is not nervous/anxious.    All other systems reviewed and are negative.    Objective:     Vital Signs (Most Recent):  Temp: 97.6 °F (36.4 °C) (08/25/17 0130)  Pulse: 95 (08/25/17 0130)  Resp: 20 (08/25/17 0130)  BP: (!) 176/84 (08/25/17 0130)  SpO2: 98 % (08/25/17 0130) Vital Signs (24h Range):  Temp:  [97.5 °F (36.4 °C)-97.6 °F (36.4 °C)] 97.6 °F (36.4 °C)  Pulse:  [94-99] 95  Resp:  [13-22] 20  SpO2:  [97 %-100 %] 98 %  BP: (157-176)/(74-85) 176/84     Weight: 121.6 kg (268 lb)  Body mass index is 30.97 kg/m².    Physical Exam   Constitutional: He is oriented to person, place, and time. He appears well-developed and well-nourished. No distress.   HENT:   Head: Normocephalic and atraumatic.   Eyes: Conjunctivae and EOM are normal. Pupils are equal, round, and reactive to light. No scleral icterus.   Neck: Normal range of motion. Neck supple. No thyromegaly present.   Cardiovascular: Normal rate and regular rhythm.    Pulmonary/Chest: Effort normal. No respiratory distress. He has no wheezes. He has rales. He exhibits no tenderness.   Abdominal: Soft. Bowel sounds are normal. There is no tenderness.   Musculoskeletal: Normal range of motion. He exhibits edema. He exhibits no tenderness.   Lymphadenopathy:     He has no cervical adenopathy.   Neurological: He is alert and oriented to person, place, and time. No cranial nerve deficit. He exhibits normal muscle tone. Coordination normal.   Skin: Skin is warm and dry. He is not diaphoretic.   Psychiatric: He has a normal mood and affect. His behavior is normal. Thought content normal.   Nursing note and vitals reviewed.       Significant Labs:   A1C:  No results for input(s): HGBA1C in the last 4320 hours.  Bilirubin:   Recent Labs  Lab 07/30/17  1540 08/24/17 2146   BILITOT 0.3 0.3     Blood Culture: No results for input(s): LABBLOO in the last 48 hours.  BMP:   Recent Labs  Lab 08/24/17 2146   *      K 4.5      CO2 17*   BUN 72*   CREATININE 12.6*   CALCIUM 7.3*     CBC:   Recent Labs  Lab 08/24/17 2146   WBC 6.68   HGB 8.2*   HCT 24.8*        CMP:   Recent Labs  Lab 08/24/17 2146      K 4.5      CO2 17*   *   BUN 72*   CREATININE 12.6*   CALCIUM 7.3*   PROT 6.5   ALBUMIN 2.7*   BILITOT 0.3   ALKPHOS 70   AST 16   ALT 11   ANIONGAP 16   EGFRNONAA 4*     Cardiac Markers:   Recent Labs  Lab 08/24/17 2146   *     Lactic Acid: No results for input(s): LACTATE in the last 48 hours.  Lipase: No results for input(s): LIPASE in the last 48 hours.  Magnesium: No results for input(s): MG in the last 48 hours.  Prealbumin: No results for input(s): PREALBUMIN in the last 48 hours.  Troponin:   Recent Labs  Lab 08/24/17 2146   TROPONINI 0.052*     TSH: No results for input(s): TSH in the last 4320 hours.  Urine Studies: No results for input(s): COLORU, APPEARANCEUA, PHUR, SPECGRAV, PROTEINUA, GLUCUA, KETONESU, BILIRUBINUA, OCCULTUA, NITRITE, UROBILINOGEN, LEUKOCYTESUR, RBCUA, WBCUA, BACTERIA, SQUAMEPITHEL, HYALINECASTS in the last 48 hours.    Invalid input(s): WRIGHTSUR  All pertinent labs within the past 24 hours have been reviewed.    Significant Imaging: I have reviewed and interpreted all pertinent imaging results/findings within the past 24 hours.     Imaging Results          X-Ray Chest 1 View (Final result)  Result time 08/24/17 21:50:32    Final result by Stefanie Cantrell MD (Timothy) (08/24/17 21:50:32)                 Impression:     Mild cardiomegaly.Pulmonary vascularity is slightly prominent. No focal infiltrates.  No change compared to 07/30/2017.      Electronically signed by: STEFANIE CANTRELL MD  Date:      08/24/17  Time:    21:50              Narrative:    Chest, 1 view.    Clinical History: Shortness of breath                                I have independently reviewed and interpreted the EKG. My findings include rate 90's, sinus rhythm, no ST segment changes or T wave inversions.    I have independently reviewed all pertinent labs within the past 24 hours.    I have independently reviewed, visualized and interpreted all pertinent imaging results within the past 24 hours and discussed the findings with the ED physician.

## 2017-08-25 NOTE — ED PROVIDER NOTES
"SCRIBE #1 NOTE: I, Tay Danelle, am scribing for, and in the presence of, Alcon Henderson MD. I have scribed the entire note.      History      Chief Complaint   Patient presents with    Shortness of Breath     Since this afternoon "my chest feels clogged up".       Review of patient's allergies indicates:   Allergen Reactions    Augmentin [amoxicillin-pot clavulanate]     Lisinopril      cough    Sulfa (sulfonamide antibiotics)      swelling        HPI   HPI    8/24/2017, 9:16 PM   History obtained from the patient      History of Present Illness: Conner Perez III is a 67 y.o. male patient who presents to the Emergency Department for SOB which onset gradually this afternoon and worsened this evening. Symptoms are constant and moderate in severity. Sxs are exacerbated by exertion and relieved with rest. No other mitigating or exacerbating factors reported. There are no associated sxs at this time. Patient denies any recent travel, long car trips, fever, leg pain/swelling, CP, cough, n/v, and all other sxs at this time. Pt has hx of diabetes and CKD but is not currently on dialysis. No further complaints or concerns at this time.       Arrival mode: Personal vehicle     PCP: Raciel Angela MD       Past Medical History:  Past Medical History:   Diagnosis Date    CKD (chronic kidney disease), stage IV     Diabetes mellitus     Diabetes mellitus, type 2     HTN (hypertension)        Past Surgical History:  Past Surgical History:   Procedure Laterality Date    CIRCUMCISION  1978         Family History:  Family History   Problem Relation Age of Onset    Hypertension Father        Social History:  Social History     Social History Main Topics    Smoking status: Former Smoker    Smokeless tobacco: Never Used    Alcohol use No    Drug use: No    Sexual activity: Not given       ROS   Review of Systems   Constitutional: Negative for fever.        (-) recent travel, long car trips   HENT: Negative for " sore throat.    Respiratory: Positive for shortness of breath. Negative for cough.    Cardiovascular: Negative for chest pain and leg swelling.   Gastrointestinal: Negative for nausea and vomiting.   Genitourinary: Negative for dysuria.   Musculoskeletal: Negative for back pain.   Skin: Negative for rash.   Neurological: Negative for weakness.   Hematological: Does not bruise/bleed easily.   All other systems reviewed and are negative.      Physical Exam      Initial Vitals [08/24/17 2059]   BP Pulse Resp Temp SpO2   (!) 157/74 99 (!) 22 97.5 °F (36.4 °C) 100 %      MAP       101.67          Physical Exam  Nursing Notes and Vital Signs Reviewed.  Constitutional: Patient is in no acute distress. Well-developed and well-nourished.  Head: Atraumatic. Normocephalic.  Eyes: PERRL. EOM intact. Conjunctivae are not pale. No scleral icterus.  ENT: Mucous membranes are moist. Oropharynx is clear and symmetric.    Neck: Supple. Full ROM. No lymphadenopathy.  Cardiovascular: Regular rate. Regular rhythm. No murmurs, rubs, or gallops. Distal pulses are 2+ and symmetric.  Pulmonary/Chest: No respiratory distress. Clear to auscultation bilaterally. No wheezing, rales, or rhonchi.  Abdominal: Soft and non-distended.  There is no tenderness.  No rebound, guarding, or rigidity.  Genitourinary: No CVA tenderness  Musculoskeletal: Moves all extremities. No obvious deformities. 1+ BLE edema. No calf tenderness.  Skin: Warm and dry.  Neurological:  Alert, awake, and appropriate.  Normal and clear speech.  No acute focal neurological deficits are appreciated.  Light touch sense is intact. Grossly neurlogically intact.  Psychiatric: Normal affect. Good eye contact. Appropriate in content.    ED Course    Critical Care  Date/Time: 8/25/2017 12:33 AM  Performed by: YAMILETH NIÑO.  Authorized by: YAMILETH NIÑO   Direct patient critical care time: 10 minutes  Additional history critical care time: 5 minutes  Ordering / reviewing  "critical care time: 5 minutes  Documentation critical care time: 5 minutes  Consulting other physicians critical care time: 5 minutes  Consult with family critical care time: 5 minutes  Total critical care time (exclusive of procedural time) : 35 minutes  Critical care time was exclusive of separately billable procedures and treating other patients and teaching time.  Critical care was necessary to treat or prevent imminent or life-threatening deterioration of the following conditions: IV lasix administration and acute on chronic renal failure and SOB to prevent decompensation.  Critical care was time spent personally by me on the following activities: blood draw for specimens, development of treatment plan with patient or surrogate, discussions with consultants, interpretation of cardiac output measurements, evaluation of patient's response to treatment, examination of patient, obtaining history from patient or surrogate, ordering and performing treatments and interventions, ordering and review of laboratory studies, pulse oximetry, ordering and review of radiographic studies, re-evaluation of patient's condition and review of old charts.  Subsequent provider of critical care: I assumed direction of critical care for this patient from another provider of my specialty.        ED Vital Signs:  Vitals:    08/24/17 2059 08/24/17 2131 08/24/17 2246 08/24/17 2316   BP: (!) 157/74 (!) 163/80 (!) 168/74 (!) 167/77   Pulse: 99 96 96 94   Resp: (!) 22 18 14 13   Temp: 97.5 °F (36.4 °C)      TempSrc: Oral      SpO2: 100% 99% 99% 97%   Weight: 121.6 kg (268 lb)      Height: 6' 6" (1.981 m)          Abnormal Lab Results:  Labs Reviewed   CBC W/ AUTO DIFFERENTIAL - Abnormal; Notable for the following:        Result Value    RBC 2.99 (*)     Hemoglobin 8.2 (*)     Hematocrit 24.8 (*)     Lymph% 17.1 (*)     All other components within normal limits   COMPREHENSIVE METABOLIC PANEL - Abnormal; Notable for the following:     CO2 17 " (*)     Glucose 164 (*)     BUN, Bld 72 (*)     Creatinine 12.6 (*)     Calcium 7.3 (*)     Albumin 2.7 (*)     eGFR if  4 (*)     eGFR if non  4 (*)     All other components within normal limits   TROPONIN I - Abnormal; Notable for the following:     Troponin I 0.052 (*)     All other components within normal limits   B-TYPE NATRIURETIC PEPTIDE - Abnormal; Notable for the following:      (*)     All other components within normal limits   PROTIME-INR   B-TYPE NATRIURETIC PEPTIDE        All Lab Results:  Results for orders placed or performed during the hospital encounter of 08/24/17   CBC auto differential   Result Value Ref Range    WBC 6.68 3.90 - 12.70 K/uL    RBC 2.99 (L) 4.60 - 6.20 M/uL    Hemoglobin 8.2 (L) 14.0 - 18.0 g/dL    Hematocrit 24.8 (L) 40.0 - 54.0 %    MCV 83 82 - 98 fL    MCH 27.4 27.0 - 31.0 pg    MCHC 33.1 32.0 - 36.0 g/dL    RDW 12.9 11.5 - 14.5 %    Platelets 198 150 - 350 K/uL    MPV 10.2 9.2 - 12.9 fL    Gran # 4.6 1.8 - 7.7 K/uL    Lymph # 1.1 1.0 - 4.8 K/uL    Mono # 0.7 0.3 - 1.0 K/uL    Eos # 0.2 0.0 - 0.5 K/uL    Baso # 0.07 0.00 - 0.20 K/uL    Gran% 68.7 38.0 - 73.0 %    Lymph% 17.1 (L) 18.0 - 48.0 %    Mono% 10.5 4.0 - 15.0 %    Eosinophil% 2.7 0.0 - 8.0 %    Basophil% 1.0 0.0 - 1.9 %    Differential Method Automated    Comprehensive metabolic panel   Result Value Ref Range    Sodium 138 136 - 145 mmol/L    Potassium 4.5 3.5 - 5.1 mmol/L    Chloride 105 95 - 110 mmol/L    CO2 17 (L) 23 - 29 mmol/L    Glucose 164 (H) 70 - 110 mg/dL    BUN, Bld 72 (H) 8 - 23 mg/dL    Creatinine 12.6 (H) 0.5 - 1.4 mg/dL    Calcium 7.3 (L) 8.7 - 10.5 mg/dL    Total Protein 6.5 6.0 - 8.4 g/dL    Albumin 2.7 (L) 3.5 - 5.2 g/dL    Total Bilirubin 0.3 0.1 - 1.0 mg/dL    Alkaline Phosphatase 70 55 - 135 U/L    AST 16 10 - 40 U/L    ALT 11 10 - 44 U/L    Anion Gap 16 8 - 16 mmol/L    eGFR if African American 4 (A) >60 mL/min/1.73 m^2    eGFR if non  4 (A)  >60 mL/min/1.73 m^2   Protime-INR   Result Value Ref Range    Prothrombin Time 11.0 9.0 - 12.5 sec    INR 1.1 0.8 - 1.2   Troponin I   Result Value Ref Range    Troponin I 0.052 (H) 0.000 - 0.026 ng/mL   Brain natriuretic peptide   Result Value Ref Range     (H) 0 - 99 pg/mL         Imaging Results:  Imaging Results          X-Ray Chest 1 View (Final result)  Result time 08/24/17 21:50:32    Final result by Stefanie Pham MD (Timothy) (08/24/17 21:50:32)                 Impression:     Mild cardiomegaly.Pulmonary vascularity is slightly prominent. No focal infiltrates.  No change compared to 07/30/2017.      Electronically signed by: STEFANIE PHAM MD  Date:     08/24/17  Time:    21:50              Narrative:    Chest, 1 view.    Clinical History: Shortness of breath                             The EKG was ordered, reviewed, and independently interpreted by the ED provider.  Interpretation time: 21:26  Rate: 97 BPM  Rhythm: normal sinus rhythm  Interpretation: Axis normal. QRS normal. ST segments and T wave normal. No STEMI.         The Emergency Provider reviewed the vital signs and test results, which are outlined above.    ED Discussion     11:19 PM: Pt had cardic echo completed on 4/7/17 that showed an ejection fraction of 55-60%.    11:25 PM: Pt ambulated down the hallway with increased work of breathing. Will contact nephrology and hospital medicine for consult.    11:35 PM: Dr. Henderson discussed the pt's case with Dr. London (Nephrology) who recommends admission of the pt and IV lasix.    12:22 AM: Discussed case with Dr. Taylor (Hospital Medicine).  agrees with current care and management of pt and accepts admission.   Admitting Service: Hospital medicine   Admitting Physician: Brandon  Admit to: Tele    12:35 AM: Re-evaluated pt. I have discussed test results, shared treatment plan, and the need for admission with patient and family at bedside. Pt and famil express understanding at this  time and agree with all information. All questions answered. Pt and family have no further questions or concerns at this time. Pt is ready for admit.      ED Medication(s):  Medications   furosemide injection 40 mg (not administered)   furosemide injection 40 mg (40 mg Intravenous Given 8/24/17 6945)         Medical Decision Making    Medical Decision Making:   Clinical Tests:   Lab Tests: Ordered and Reviewed  Radiological Study: Ordered and Reviewed  Medical Tests: Ordered and Reviewed           Scribe Attestation:   Scribe #1: I performed the above scribed service and the documentation accurately describes the services I performed. I attest to the accuracy of the note.    Attending:   Physician Attestation Statement for Scribe #1: I, Alcon Henderson MD, personally performed the services described in this documentation, as scribed by Tay Mcclendon, in my presence, and it is both accurate and complete.          Clinical Impression       ICD-10-CM ICD-9-CM   1. Acute renal failure superimposed on chronic kidney disease, unspecified CKD stage, unspecified acute renal failure type N17.9 584.9    N18.9 585.9   2. Shortness of breath R06.02 786.05       Disposition:   Disposition: Admitted (tele)  Condition: Fair         Alcon Henderson MD  08/25/17 0510

## 2017-08-25 NOTE — ASSESSMENT & PLAN NOTE
Resume lower dose home insulin regimen  ISS  Monitor, avoid hypoglycemia      Continue sliding scale coverage; renal and diabetic diet to continue

## 2017-08-25 NOTE — H&P
"[facility] Hospital Medicine  History & Physical    Patient Name: Conner Perez III  MRN: [id number]  Admission Date: 8/24/2017  Attending Physician: Miquel Taylor MD  Primary Care Provider: Raciel Angela MD         Patient information was obtained from patient and ER records.     Subjective:     Principal Problem:Acute pulmonary edema    Chief Complaint:   Chief Complaint   Patient presents with    Shortness of Breath     Since this afternoon "my chest feels clogged up".        HPI: Mr. Perez is a 66 y/o AA male with h/o CKD V, T2DM, HTN, CAD preented to the ED c/o worsening SOB since yesterday. He was noted to have a creatinine of 12.1 on 7/30/17, but makes good amount of urine. He takes lasix 40 mg po daily. He was seen by  (nephrology) on 7/30/17.  Today CXR shows pulmonary edema pattern with vascular congestion.  was contacted, recommended giving Lasix 80 mg IV x1. Patient currently not on oxygen, and sats mid-high 90's on room air. Patient already has right radial artery fistula in place. Never been on dialysis before.    Past Medical History:   Diagnosis Date    CKD (chronic kidney disease), stage IV     Diabetes mellitus     Diabetes mellitus, type 2     HTN (hypertension)        Past Surgical History:   Procedure Laterality Date    CIRCUMCISION  1978       Review of patient's allergies indicates:   Allergen Reactions    Augmentin [amoxicillin-pot clavulanate]     Lisinopril      cough    Sulfa (sulfonamide antibiotics)      swelling       No current facility-administered medications on file prior to encounter.      Current Outpatient Prescriptions on File Prior to Encounter   Medication Sig    aspirin 81 MG Chew Take 1 tablet (81 mg total) by mouth once daily.    atorvastatin (LIPITOR) 40 MG tablet Take 1 tablet (40 mg total) by mouth every evening.    diltiaZEM (CARDIZEM CD) 360 MG 24 hr capsule Take 1 capsule (360 mg total) by mouth once daily.    hydrALAZINE " "(APRESOLINE) 50 MG tablet Take 1 tablet (50 mg total) by mouth 3 (three) times daily.    insulin NPH-insulin regular, 70/30, (NOVOLIN 70/30) 100 unit/mL (70-30) injection Inject 30 Units into the skin 2 (two) times daily. Dispense with needles and syringes. (Patient taking differently: Inject 30 Units into the skin 2 (two) times daily. Dispense with needles and syringes. Pt states tjhat he only takes it 1 2 times aweek)    ondansetron (ZOFRAN-ODT) 4 MG TbDL Take 1 tablet (4 mg total) by mouth every 8 (eight) hours as needed.    pantoprazole (PROTONIX) 40 MG tablet Take 1 tablet (40 mg total) by mouth once daily.    tamsulosin (FLOMAX) 0.4 mg Cp24 Take 1 capsule (0.4 mg total) by mouth once daily.    tramadol (ULTRAM) 50 mg tablet 1-2 bid prn pain    avanafil 100 mg Tab Take 100 mg by mouth.    azelastine (ASTELIN) 137 mcg (0.1 %) nasal spray 2 sprays by Nasal route 2 (two) times daily.     BD ULTRA-FINE ANISA PEN NEEDLES 32 gauge x 5/32" Ndle     calcium carbonate (TUMS) 200 mg calcium (500 mg) chewable tablet Take 1 tablet (500 mg total) by mouth once daily.    ceramides 1,3,6-11 Crea Apply to the affected area daily.    cyclobenzaprine (FLEXERIL) 10 MG tablet One hs prn  Muscle relaxer    docusate sodium (COLACE) 100 MG capsule Take 1 capsule (100 mg total) by mouth 2 (two) times daily.    FREESTYLE LITE STRIPS Strp     lactulose (CHRONULAC) 10 gram/15 mL solution Take 10 g by mouth every evening.     metoclopramide HCl (REGLAN) 10 MG tablet Take 1 tablet (10 mg total) by mouth every 6 (six) hours as needed.    polyethylene glycol (GLYCOLAX) 17 gram PwPk Take 17 g by mouth once daily.    sodium bicarbonate 650 MG tablet Take 1 tablet (650 mg total) by mouth 3 (three) times daily.    sucralfate (CARAFATE) 100 mg/mL suspension Take 10 mLs (1 g total) by mouth 4 (four) times daily.    zolpidem (AMBIEN) 5 MG Tab Take 1 tablet (5 mg total) by mouth nightly as needed.     Family History     Problem " Relation (Age of Onset)    Hypertension Father        Social History Main Topics    Smoking status: Former Smoker    Smokeless tobacco: Never Used    Alcohol use No    Drug use: No    Sexual activity: Not on file     Review of Systems   Constitutional: Negative.  Negative for appetite change, fatigue and fever.   HENT: Negative.  Negative for congestion, nosebleeds and sore throat.    Eyes: Negative.  Negative for photophobia, redness and visual disturbance.   Respiratory: Positive for shortness of breath. Negative for cough and wheezing.    Cardiovascular: Positive for leg swelling. Negative for chest pain and palpitations.   Gastrointestinal: Negative.  Negative for abdominal pain, constipation, diarrhea, nausea and vomiting.   Endocrine: Negative.  Negative for polydipsia, polyphagia and polyuria.   Genitourinary: Negative.  Negative for dysuria, flank pain, frequency and urgency.   Musculoskeletal: Negative.  Negative for arthralgias, back pain and joint swelling.   Skin: Negative.  Negative for color change, pallor and rash.   Allergic/Immunologic: Negative.  Negative for environmental allergies, food allergies and immunocompromised state.   Neurological: Negative.  Negative for dizziness, syncope, weakness, light-headedness, numbness and headaches.   Hematological: Negative.    Psychiatric/Behavioral: Negative.  Negative for confusion and hallucinations. The patient is not nervous/anxious.    All other systems reviewed and are negative.    Objective:     Vital Signs (Most Recent):  Temp: 97.6 °F (36.4 °C) (08/25/17 0130)  Pulse: 95 (08/25/17 0130)  Resp: 20 (08/25/17 0130)  BP: (!) 176/84 (08/25/17 0130)  SpO2: 98 % (08/25/17 0130) Vital Signs (24h Range):  Temp:  [97.5 °F (36.4 °C)-97.6 °F (36.4 °C)] 97.6 °F (36.4 °C)  Pulse:  [94-99] 95  Resp:  [13-22] 20  SpO2:  [97 %-100 %] 98 %  BP: (157-176)/(74-85) 176/84     Weight: 121.6 kg (268 lb)  Body mass index is 30.97 kg/m².    Physical Exam    Constitutional: He is oriented to person, place, and time. He appears well-developed and well-nourished. No distress.   HENT:   Head: Normocephalic and atraumatic.   Eyes: Conjunctivae and EOM are normal. Pupils are equal, round, and reactive to light. No scleral icterus.   Neck: Normal range of motion. Neck supple. No thyromegaly present.   Cardiovascular: Normal rate and regular rhythm.    Pulmonary/Chest: Effort normal. No respiratory distress. He has no wheezes. He has rales. He exhibits no tenderness.   Abdominal: Soft. Bowel sounds are normal. There is no tenderness.   Musculoskeletal: Normal range of motion. He exhibits edema. He exhibits no tenderness.   Lymphadenopathy:     He has no cervical adenopathy.   Neurological: He is alert and oriented to person, place, and time. No cranial nerve deficit. He exhibits normal muscle tone. Coordination normal.   Skin: Skin is warm and dry. He is not diaphoretic.   Psychiatric: He has a normal mood and affect. His behavior is normal. Thought content normal.   Nursing note and vitals reviewed.       Significant Labs:   A1C: No results for input(s): HGBA1C in the last 4320 hours.  Bilirubin:   Recent Labs  Lab 07/30/17  1540 08/24/17 2146   BILITOT 0.3 0.3     Blood Culture: No results for input(s): LABBLOO in the last 48 hours.  BMP:   Recent Labs  Lab 08/24/17 2146   *      K 4.5      CO2 17*   BUN 72*   CREATININE 12.6*   CALCIUM 7.3*     CBC:   Recent Labs  Lab 08/24/17 2146   WBC 6.68   HGB 8.2*   HCT 24.8*        CMP:   Recent Labs  Lab 08/24/17 2146      K 4.5      CO2 17*   *   BUN 72*   CREATININE 12.6*   CALCIUM 7.3*   PROT 6.5   ALBUMIN 2.7*   BILITOT 0.3   ALKPHOS 70   AST 16   ALT 11   ANIONGAP 16   EGFRNONAA 4*     Cardiac Markers:   Recent Labs  Lab 08/24/17 2146   *     Lactic Acid: No results for input(s): LACTATE in the last 48 hours.  Lipase: No results for input(s): LIPASE in the last 48  hours.  Magnesium: No results for input(s): MG in the last 48 hours.  Prealbumin: No results for input(s): PREALBUMIN in the last 48 hours.  Troponin:   Recent Labs  Lab 08/24/17 2146   TROPONINI 0.052*     TSH: No results for input(s): TSH in the last 4320 hours.  Urine Studies: No results for input(s): COLORU, APPEARANCEUA, PHUR, SPECGRAV, PROTEINUA, GLUCUA, KETONESU, BILIRUBINUA, OCCULTUA, NITRITE, UROBILINOGEN, LEUKOCYTESUR, RBCUA, WBCUA, BACTERIA, SQUAMEPITHEL, HYALINECASTS in the last 48 hours.    Invalid input(s): WRIGHTSUR  All pertinent labs within the past 24 hours have been reviewed.    Significant Imaging: I have reviewed and interpreted all pertinent imaging results/findings within the past 24 hours.     Imaging Results          X-Ray Chest 1 View (Final result)  Result time 08/24/17 21:50:32    Final result by Stefanie Cantrell MD (Timothy) (08/24/17 21:50:32)                 Impression:     Mild cardiomegaly.Pulmonary vascularity is slightly prominent. No focal infiltrates.  No change compared to 07/30/2017.      Electronically signed by: STEFANIE CANTRELL MD  Date:     08/24/17  Time:    21:50              Narrative:    Chest, 1 view.    Clinical History: Shortness of breath                                I have independently reviewed and interpreted the EKG. My findings include rate 90's, sinus rhythm, no ST segment changes or T wave inversions.    I have independently reviewed all pertinent labs within the past 24 hours.    I have independently reviewed, visualized and interpreted all pertinent imaging results within the past 24 hours and discussed the findings with the ED physician.            Assessment/Plan:     * Acute pulmonary edema    CXR shows vascular congestion.  IV Lasix 80 mg IV TID x3 does.  May need HD if patient does not improve.  Still make good amount of urine.          Acute renal failure superimposed on chronic kidney disease V    Serum creatinine 12.6.  Non-oliguric.  Nephrology  consulted.  Continue IV Lasix 80 mg IV q8 x3 doses.          DM II (diabetes mellitus, type II), controlled    Resume lower dose home insulin regimen  ISS  Monitor, avoid hypoglycemia          Essential hypertension    Resume home medications.  Monitor and make adjustments as needed.            VTE Risk Mitigation         Ordered     Place sequential compression device  Until discontinued      08/25/17 0136     Medium Risk of VTE  Once      08/25/17 0134     Place sequential compression device  Until discontinued      08/25/17 0134             Miquel Taylor MD  Department of Hospital Medicine   [facility]

## 2017-08-25 NOTE — NURSING
Chart reviewed  Patient has been seen by this nurse on a previous admit  Please consult if any additional diabetes educational needs are identified

## 2017-08-25 NOTE — ED NOTES
Per Dr. Henderson orders, patient ambulated with pulse ox down paz and back to room. Patient noted with increased WOB on exertion. O2 decreased from 100 % on RA to 96% on RA during walk. Dr. Henderson notified of patient tolerance.

## 2017-08-25 NOTE — ED NOTES
Patient placed on continuous cardiac monitor, automatic blood pressure cuff and continuous pulse oximeter. NSR NOTED

## 2017-08-25 NOTE — ASSESSMENT & PLAN NOTE
Serum creatinine 12.6.  Non-oliguric.  Nephrology consulted.  Continue IV Lasix 80 mg IV q8 x3 doses.      Will continue IV lasix at 80 mg BID;  And push as tolerated;    Monitor weights and I&O data.might benefit from oral zaroxolyn;  Otherwise per nephrology;  Fistula may not be matured enough to use; also appears to run deep in the forearm

## 2017-08-25 NOTE — PROGRESS NOTES
[facility] Hospital Medicine  Progress Note    Patient Name: Conner Perez III  MRN: [id number]  Patient Class: IP- Inpatient   Admission Date: 8/24/2017  Length of Stay: 0 days  Attending Physician: Antione Garcia MD  Primary Care Provider: Raciel Angela MD        Subjective:     Principal Problem:Acute pulmonary edema    HPI:  Mr. Perez is a 66 y/o AA male with h/o CKD V, T2DM, HTN, CAD preented to the ED c/o worsening SOB since yesterday. He was noted to have a creatinine of 12.1 on 7/30/17, but makes good amount of urine. He takes lasix 40 mg po daily. He was seen by  (nephrology) on 7/30/17.  Today CXR shows pulmonary edema pattern with vascular congestion.  was contacted, recommended giving Lasix 80 mg IV x1. Patient currently not on oxygen, and sats mid-high 90's on room air. Patient already has right radial artery fistula in place. Never been on dialysis before.    Hospital Course:  Patient diuresing with IV lasix; however chest x-ray continues wet;  No N/V, no pruritis. or tremulousness;  Patient non-oliguric;  Has a fistula in place., placed in june of this year.    Interval History: breathing better; johanne shortness of breath reported    Review of Systems   Constitutional: Negative.    HENT: Negative.    Eyes: Negative.    Respiratory: Positive for shortness of breath.    Cardiovascular: Negative.    Gastrointestinal: Negative.    Genitourinary: Negative.    Neurological: Negative.    Hematological: Negative.    Psychiatric/Behavioral: Negative.      Objective:     Vital Signs (Most Recent):  Temp: 98.2 °F (36.8 °C) (08/25/17 1112)  Pulse: 93 (08/25/17 1112)  Resp: 18 (08/25/17 1112)  BP: (!) 180/88 (08/25/17 1112)  SpO2: 95 % (08/25/17 1112) Vital Signs (24h Range):  Temp:  [97.4 °F (36.3 °C)-98.2 °F (36.8 °C)] 98.2 °F (36.8 °C)  Pulse:  [93-99] 93  Resp:  [13-22] 18  SpO2:  [95 %-100 %] 95 %  BP: (157-180)/(74-88) 180/88     Weight: 118.7 kg (261 lb 11.2 oz)  Body mass  index is 30.24 kg/m².    Intake/Output Summary (Last 24 hours) at 08/25/17 1341  Last data filed at 08/25/17 1000   Gross per 24 hour   Intake              300 ml   Output             2100 ml   Net            -1800 ml      Physical Exam   Constitutional: He appears well-developed and well-nourished.   HENT:   Head: Normocephalic.   Eyes: EOM are normal. Pupils are equal, round, and reactive to light.   Neck: Normal range of motion. Neck supple.   Cardiovascular: Regular rhythm.    Pulmonary/Chest:   Dullness at lung bases   Abdominal:   Obese, non tender   Skin: Skin is warm and dry.   Av fistula forearm, with  Good bruit and thrill, deep to forearm   Psychiatric: He has a normal mood and affect.       Significant Labs:   BMP:   Recent Labs  Lab 08/25/17  0610   *      K 4.4      CO2 19*   BUN 73*   CREATININE 12.8*   CALCIUM 7.2*     CBC:   Recent Labs  Lab 08/24/17  2146   WBC 6.68   HGB 8.2*   HCT 24.8*          Significant Imaging: I have reviewed all pertinent imaging results/findings within the past 24 hours.    Assessment/Plan:      Anemia      Will monitor; consider DARIUSZ therapy pending course (procrit/epogen)          Chronic kidney disease, stage V      Will continue to monitor for signs and symptoms if uremia, and need for renal replacement therapy.          Hypocalcemia    Check PO4 level as well; likely secondary hyperparathyroidism;  Check intact molecule PTH if not done recently;  Also hepatitis serologies if needs to be enrolled in in center HD program.          Acute renal failure superimposed on chronic kidney disease    Serum creatinine 12.6.  Non-oliguric.  Nephrology consulted.  Continue IV Lasix 80 mg IV q8 x3 doses.      Will continue IV lasix at 80 mg BID;  And push as tolerated;    Monitor weights and I&O data.might benefit from oral zaroxolyn;  Otherwise per nephrology;  Fistula may not be matured enough to use; also appears to run deep in the forearm        DM II  (diabetes mellitus, type II), controlled    Resume lower dose home insulin regimen  ISS  Monitor, avoid hypoglycemia      Continue sliding scale coverage; renal and diabetic diet to continue          Essential hypertension    Resume home medications.  Monitor and make adjustments as needed.    BP up; will adjust medications; continue effort at diuresis; patient likely as ESRD an in need of dialysis to maintain fluid balance. Not pushed on acid-base or potassium issues.  Will monitor CXR        * Acute pulmonary edema    CXR shows vascular congestion.  IV Lasix 80 mg IV TID x3 does.  May need HD if patient does not improve.  Still make good amount of urine.      Continue present regimen; monitor CXR response;  Otherwise as per nephrology consultant          VTE Risk Mitigation         Ordered     Place sequential compression device  Until discontinued      08/25/17 0136     Medium Risk of VTE  Once      08/25/17 0134     Place sequential compression device  Until discontinued      08/25/17 0134              Antione Tomlin MD  Department of Hospital Medicine   [facility]

## 2017-08-25 NOTE — SUBJECTIVE & OBJECTIVE
Past Medical History:   Diagnosis Date    CKD (chronic kidney disease), stage IV     Diabetes mellitus     Diabetes mellitus, type 2     HTN (hypertension)        Past Surgical History:   Procedure Laterality Date    CIRCUMCISION  1978       Review of patient's allergies indicates:   Allergen Reactions    Augmentin [amoxicillin-pot clavulanate]     Lisinopril      cough    Sulfa (sulfonamide antibiotics)      swelling     Current Facility-Administered Medications   Medication Frequency    acetaminophen tablet 650 mg Q8H PRN    albuterol-ipratropium 2.5mg-0.5mg/3mL nebulizer solution 3 mL Q4H PRN    atorvastatin tablet 40 mg QHS    dextrose 50% injection 12.5 g PRN    dextrose 50% injection 25 g PRN    diltiaZEM 24 hr capsule 360 mg Daily    diphenhydrAMINE capsule 25 mg Q6H PRN    furosemide injection 80 mg TID    glucagon (human recombinant) injection 1 mg PRN    glucose chewable tablet 16 g PRN    glucose chewable tablet 24 g PRN    guaifenesin 100 mg/5 ml syrup 200 mg Q4H PRN    hydrALAZINE injection 10 mg Q8H PRN    insulin aspart pen 0-5 Units QID (AC + HS) PRN    insulin aspart protamine-insulin aspart (NovoLOG 70/30) injection BID    ondansetron injection 4 mg Q8H PRN    pantoprazole EC tablet 40 mg Daily    sodium bicarbonate tablet 650 mg TID    tamsulosin 24 hr capsule 0.4 mg Daily     Family History     Problem Relation (Age of Onset)    Hypertension Father        Social History Main Topics    Smoking status: Former Smoker    Smokeless tobacco: Never Used    Alcohol use No    Drug use: No    Sexual activity: Not on file     Review of Systems   Constitutional: Negative.    HENT: Negative.    Respiratory: Positive for shortness of breath.    Cardiovascular: Negative.    Gastrointestinal: Negative.    Endocrine: Negative.    Genitourinary: Negative.    Musculoskeletal: Negative.    Neurological: Negative.    Psychiatric/Behavioral: Negative.      Objective:     Vital Signs  (Most Recent):  Temp: 98 °F (36.7 °C) (08/25/17 1502)  Pulse: 89 (08/25/17 1502)  Resp: 18 (08/25/17 1502)  BP: (!) 160/77 (08/25/17 1502)  SpO2: 95 % (08/25/17 1502)  O2 Device (Oxygen Therapy): room air (08/25/17 1502) Vital Signs (24h Range):  Temp:  [97.4 °F (36.3 °C)-98.2 °F (36.8 °C)] 98 °F (36.7 °C)  Pulse:  [89-99] 89  Resp:  [13-22] 18  SpO2:  [95 %-100 %] 95 %  BP: (157-180)/(74-88) 160/77     Weight: 118.7 kg (261 lb 11.2 oz) (08/25/17 0407)  Body mass index is 30.24 kg/m².  Body surface area is 2.56 meters squared.    I/O last 3 completed shifts:  In: 300 [P.O.:300]  Out: 1650 [Urine:1650]    Physical Exam   Constitutional: He is oriented to person, place, and time. He appears well-developed and well-nourished.   HENT:   Head: Normocephalic and atraumatic.   Eyes: Pupils are equal, round, and reactive to light.   Neck: Normal range of motion. No JVD present.   Cardiovascular: Normal rate, regular rhythm and normal heart sounds.  Exam reveals no friction rub.    Pulmonary/Chest: Effort normal. He has rales.   Abdominal: Soft. Bowel sounds are normal. He exhibits no distension. There is no tenderness.   Musculoskeletal: Normal range of motion.   Neurological: He is alert and oriented to person, place, and time.   Skin: Skin is warm and dry.   Psychiatric: He has a normal mood and affect. His behavior is normal.   Nursing note and vitals reviewed.      Significant Labs:  BMP  Lab Results   Component Value Date     08/25/2017    K 4.4 08/25/2017     08/25/2017    CO2 19 (L) 08/25/2017    BUN 73 (H) 08/25/2017    CREATININE 12.8 (H) 08/25/2017    CALCIUM 7.2 (L) 08/25/2017    ANIONGAP 14 08/25/2017    ESTGFRAFRICA 4 (A) 08/25/2017    EGFRNONAA 4 (A) 08/25/2017     Lab Results   Component Value Date    WBC 6.51 08/25/2017    HGB 8.5 (L) 08/25/2017    HCT 25.7 (L) 08/25/2017    MCV 85 08/25/2017     08/25/2017       Significant Imaging: CXR reviewed, +pulm edema

## 2017-08-25 NOTE — HPI
Pt was seen and examined. H/o fully reviewed. Pt is a 66 y/o male with CKD stage 5 (baseline s Cr 6), DM, HTN, diastolic CHF who was admitted for SOB, suspected to be due to CHF exacerbation. Pt has received lasix IV, and says he is feeling better now. PT follows with Dr. Rhodes (nephrologist with [facility]) and has an UE AVF already in place in anticipation for chronic, long term HD. He was admitted at [facility] in jan 2017 for L second toe osteomyelitis. Pt desires to f/u with dr. Rhodes for his renal issues, and has an appt with him in Sept 2017.

## 2017-08-25 NOTE — CONSULTS
[facility]  Nephrology  Consult Note    Patient Name: Conner Perez III  MRN: [id number]  Admission Date: 8/24/2017  Hospital Length of Stay: 0 days  Attending Provider: Antione Garcia MD   Primary Care Physician: Raciel Angela MD  Principal Problem:Acute pulmonary edema. UGO on CKD    Referring physician: Dr. Antione Danielle  Reason for consult: UGO on CKD stage 5    Consults  Subjective:     HPI: Pt was seen and examined. H/o fully reviewed. Pt is a 66 y/o male with CKD stage 5 (baseline s Cr 6), DM, HTN, diastolic CHF who was admitted for SOB, suspected to be due to CHF exacerbation. Pt has received lasix IV, and says he is feeling better now. PT follows with Dr. Rhodes (nephrologist with [facility]) and has an UE AVF already in place in anticipation for chronic, long term HD. He was admitted at [facility] in jan 2017 for L second toe osteomyelitis. Pt desires to f/u with dr. Rhodes for his renal issues, and has an appt with him in Sept 2017.    Past Medical History:   Diagnosis Date    CKD (chronic kidney disease), stage IV     Diabetes mellitus     Diabetes mellitus, type 2     HTN (hypertension)        Past Surgical History:   Procedure Laterality Date    CIRCUMCISION  1978       Review of patient's allergies indicates:   Allergen Reactions    Augmentin [amoxicillin-pot clavulanate]     Lisinopril      cough    Sulfa (sulfonamide antibiotics)      swelling     Current Facility-Administered Medications   Medication Frequency    acetaminophen tablet 650 mg Q8H PRN    albuterol-ipratropium 2.5mg-0.5mg/3mL nebulizer solution 3 mL Q4H PRN    atorvastatin tablet 40 mg QHS    dextrose 50% injection 12.5 g PRN    dextrose 50% injection 25 g PRN    diltiaZEM 24 hr capsule 360 mg Daily    diphenhydrAMINE capsule 25 mg Q6H PRN    furosemide injection 80 mg TID    glucagon (human recombinant) injection 1 mg PRN    glucose chewable tablet 16 g PRN    glucose chewable tablet 24 g PRN     guaifenesin 100 mg/5 ml syrup 200 mg Q4H PRN    hydrALAZINE injection 10 mg Q8H PRN    insulin aspart pen 0-5 Units QID (AC + HS) PRN    insulin aspart protamine-insulin aspart (NovoLOG 70/30) injection BID    ondansetron injection 4 mg Q8H PRN    pantoprazole EC tablet 40 mg Daily    sodium bicarbonate tablet 650 mg TID    tamsulosin 24 hr capsule 0.4 mg Daily     Family History     Problem Relation (Age of Onset)    Hypertension Father        Social History Main Topics    Smoking status: Former Smoker    Smokeless tobacco: Never Used    Alcohol use No    Drug use: No    Sexual activity: Not on file     Review of Systems   Constitutional: Negative.    HENT: Negative.    Respiratory: Positive for shortness of breath.    Cardiovascular: Negative.    Gastrointestinal: Negative.    Endocrine: Negative.    Genitourinary: Negative.    Musculoskeletal: Negative.    Neurological: Negative.    Psychiatric/Behavioral: Negative.      Objective:     Vital Signs (Most Recent):  Temp: 98 °F (36.7 °C) (08/25/17 1502)  Pulse: 89 (08/25/17 1502)  Resp: 18 (08/25/17 1502)  BP: (!) 160/77 (08/25/17 1502)  SpO2: 95 % (08/25/17 1502)  O2 Device (Oxygen Therapy): room air (08/25/17 1502) Vital Signs (24h Range):  Temp:  [97.4 °F (36.3 °C)-98.2 °F (36.8 °C)] 98 °F (36.7 °C)  Pulse:  [89-99] 89  Resp:  [13-22] 18  SpO2:  [95 %-100 %] 95 %  BP: (157-180)/(74-88) 160/77     Weight: 118.7 kg (261 lb 11.2 oz) (08/25/17 0407)  Body mass index is 30.24 kg/m².  Body surface area is 2.56 meters squared.    I/O last 3 completed shifts:  In: 300 [P.O.:300]  Out: 1650 [Urine:1650]    Physical Exam   Constitutional: He is oriented to person, place, and time. He appears well-developed and well-nourished.   HENT:   Head: Normocephalic and atraumatic.   Eyes: Pupils are equal, round, and reactive to light.   Neck: Normal range of motion. No JVD present.   Cardiovascular: Normal rate, regular rhythm and normal heart sounds.  Exam reveals no  friction rub.    Pulmonary/Chest: Effort normal. He has rales.   Abdominal: Soft. Bowel sounds are normal. He exhibits no distension. There is no tenderness.   Musculoskeletal: Normal range of motion.   Neurological: He is alert and oriented to person, place, and time.   Skin: Skin is warm and dry.   Psychiatric: He has a normal mood and affect. His behavior is normal.   Nursing note and vitals reviewed.      Significant Labs:  BMP  Lab Results   Component Value Date     08/25/2017    K 4.4 08/25/2017     08/25/2017    CO2 19 (L) 08/25/2017    BUN 73 (H) 08/25/2017    CREATININE 12.8 (H) 08/25/2017    CALCIUM 7.2 (L) 08/25/2017    ANIONGAP 14 08/25/2017    ESTGFRAFRICA 4 (A) 08/25/2017    EGFRNONAA 4 (A) 08/25/2017     Lab Results   Component Value Date    WBC 6.51 08/25/2017    HGB 8.5 (L) 08/25/2017    HCT 25.7 (L) 08/25/2017    MCV 85 08/25/2017     08/25/2017       Significant Imaging: CXR reviewed, +pulm edema    Assessment/Plan:   66 y/o male with UGO on very advanced CKD stage 5 presented with CHF exacerbation:    Chronic kidney disease, stage V    Renal: has CKD stage 5.  Pt is pre-ESRD, pre-HD  Has UGO on CKD stage 5 likely due to prerenal azotemia and CHF  K normal  Metabolic acidosis  Follows with Dr. Rhodes, who does not come to this hospital  Discussed importance of following closely with Dr. Rhodes to the pt, given that  is very close to starting chronic HD        * Acute pulmonary edema    Dyspnea.  Due to CHF exacerbation  Good response to IV diuretics  Discussed salt restriction (2-3 g/day) and fluid restriction (1-2 L/day) with pt.  Pt has improved clinically  Is hemodynamically stable.        Essential hypertension    HTN: BP not controlled  Pt still fluid overloaded.  Will continue lasix IV 40 mg tid, expect with diuresis, BP will improve.  Will monitor.        DM II (diabetes mellitus, type II), controlled    Long standing DM.  Diabetic nephropathy.        Plans and  recommendations:  As detailed above  Total time spent 70 minutes including time needed to review the records, the   patient evaluation, documentation, face-to-face discussion with the patient,   more than 50% of the time was spent on coordination of care and counseling.    Level V visit.      Lazara Granados MD  Nephrology  [facility]

## 2017-08-25 NOTE — ASSESSMENT & PLAN NOTE
HTN: BP not controlled  Pt still fluid overloaded.  Will continue lasix IV 40 mg tid, expect with diuresis, BP will improve.  Will monitor.

## 2017-08-25 NOTE — ASSESSMENT & PLAN NOTE
CXR shows vascular congestion.  IV Lasix 80 mg IV TID x3 does.  May need HD if patient does not improve.  Still make good amount of urine.

## 2017-08-26 LAB
ALBUMIN SERPL BCP-MCNC: 2.5 G/DL
ANION GAP SERPL CALC-SCNC: 13 MMOL/L
BASOPHILS # BLD AUTO: 0.04 K/UL
BASOPHILS NFR BLD: 0.6 %
BUN SERPL-MCNC: 75 MG/DL
CALCIUM SERPL-MCNC: 7.3 MG/DL
CHLORIDE SERPL-SCNC: 104 MMOL/L
CO2 SERPL-SCNC: 21 MMOL/L
CREAT SERPL-MCNC: 12.9 MG/DL
DIFFERENTIAL METHOD: ABNORMAL
EOSINOPHIL # BLD AUTO: 0.3 K/UL
EOSINOPHIL NFR BLD: 3.9 %
ERYTHROCYTE [DISTWIDTH] IN BLOOD BY AUTOMATED COUNT: 12.9 %
EST. GFR  (AFRICAN AMERICAN): 4 ML/MIN/1.73 M^2
EST. GFR  (NON AFRICAN AMERICAN): 4 ML/MIN/1.73 M^2
GLUCOSE SERPL-MCNC: 115 MG/DL
HCT VFR BLD AUTO: 24.5 %
HGB BLD-MCNC: 8 G/DL
LYMPHOCYTES # BLD AUTO: 1 K/UL
LYMPHOCYTES NFR BLD: 16.1 %
MAGNESIUM SERPL-MCNC: 2.4 MG/DL
MCH RBC QN AUTO: 27.5 PG
MCHC RBC AUTO-ENTMCNC: 32.7 G/DL
MCV RBC AUTO: 84 FL
MONOCYTES # BLD AUTO: 0.6 K/UL
MONOCYTES NFR BLD: 10.1 %
NEUTROPHILS # BLD AUTO: 4.4 K/UL
NEUTROPHILS NFR BLD: 69.3 %
PHOSPHATE SERPL-MCNC: 7.9 MG/DL
PHOSPHATE SERPL-MCNC: 7.9 MG/DL
PLATELET # BLD AUTO: 208 K/UL
PMV BLD AUTO: 10.4 FL
POCT GLUCOSE: 151 MG/DL (ref 70–110)
POCT GLUCOSE: 231 MG/DL (ref 70–110)
POCT GLUCOSE: 250 MG/DL (ref 70–110)
POCT GLUCOSE: 61 MG/DL (ref 70–110)
POCT GLUCOSE: 86 MG/DL (ref 70–110)
POTASSIUM SERPL-SCNC: 4.3 MMOL/L
PTH-INTACT SERPL-MCNC: 411.7 PG/ML
RBC # BLD AUTO: 2.91 M/UL
SODIUM SERPL-SCNC: 138 MMOL/L
WBC # BLD AUTO: 6.35 K/UL

## 2017-08-26 PROCEDURE — 21400001 HC TELEMETRY ROOM

## 2017-08-26 PROCEDURE — 99233 SBSQ HOSP IP/OBS HIGH 50: CPT | Mod: ,,, | Performed by: INTERNAL MEDICINE

## 2017-08-26 PROCEDURE — 85025 COMPLETE CBC W/AUTO DIFF WBC: CPT

## 2017-08-26 PROCEDURE — 36415 COLL VENOUS BLD VENIPUNCTURE: CPT

## 2017-08-26 PROCEDURE — 83970 ASSAY OF PARATHORMONE: CPT

## 2017-08-26 PROCEDURE — 87340 HEPATITIS B SURFACE AG IA: CPT

## 2017-08-26 PROCEDURE — 63600175 PHARM REV CODE 636 W HCPCS: Performed by: EMERGENCY MEDICINE

## 2017-08-26 PROCEDURE — 83735 ASSAY OF MAGNESIUM: CPT

## 2017-08-26 PROCEDURE — 86706 HEP B SURFACE ANTIBODY: CPT

## 2017-08-26 PROCEDURE — 25000003 PHARM REV CODE 250: Performed by: INTERNAL MEDICINE

## 2017-08-26 PROCEDURE — 86803 HEPATITIS C AB TEST: CPT

## 2017-08-26 PROCEDURE — 63600175 PHARM REV CODE 636 W HCPCS: Performed by: INTERNAL MEDICINE

## 2017-08-26 PROCEDURE — 80069 RENAL FUNCTION PANEL: CPT

## 2017-08-26 RX ORDER — HYDRALAZINE HYDROCHLORIDE 50 MG/1
50 TABLET, FILM COATED ORAL EVERY 8 HOURS
Status: DISCONTINUED | OUTPATIENT
Start: 2017-08-26 | End: 2017-08-30 | Stop reason: HOSPADM

## 2017-08-26 RX ORDER — FUROSEMIDE 40 MG/1
40 TABLET ORAL EVERY 8 HOURS
Status: DISCONTINUED | OUTPATIENT
Start: 2017-08-26 | End: 2017-08-29

## 2017-08-26 RX ORDER — CALCIUM ACETATE 667 MG/1
667 CAPSULE ORAL
Status: COMPLETED | OUTPATIENT
Start: 2017-08-26 | End: 2017-08-28

## 2017-08-26 RX ADMIN — SODIUM BICARBONATE 650 MG TABLET 650 MG: at 03:08

## 2017-08-26 RX ADMIN — FUROSEMIDE 80 MG: 10 INJECTION, SOLUTION INTRAVENOUS at 02:08

## 2017-08-26 RX ADMIN — SODIUM BICARBONATE 650 MG TABLET 650 MG: at 10:08

## 2017-08-26 RX ADMIN — ATORVASTATIN CALCIUM 40 MG: 40 TABLET, FILM COATED ORAL at 10:08

## 2017-08-26 RX ADMIN — FUROSEMIDE 80 MG: 10 INJECTION, SOLUTION INTRAVENOUS at 05:08

## 2017-08-26 RX ADMIN — PANTOPRAZOLE SODIUM 40 MG: 40 TABLET, DELAYED RELEASE ORAL at 08:08

## 2017-08-26 RX ADMIN — TAMSULOSIN HYDROCHLORIDE 0.4 MG: 0.4 CAPSULE ORAL at 08:08

## 2017-08-26 RX ADMIN — CALCIUM ACETATE 667 MG: 667 CAPSULE ORAL at 12:08

## 2017-08-26 RX ADMIN — HYDRALAZINE HYDROCHLORIDE 50 MG: 50 TABLET, FILM COATED ORAL at 10:08

## 2017-08-26 RX ADMIN — INSULIN ASPART 2 UNITS: 100 INJECTION, SOLUTION INTRAVENOUS; SUBCUTANEOUS at 12:08

## 2017-08-26 RX ADMIN — SODIUM BICARBONATE 650 MG TABLET 650 MG: at 05:08

## 2017-08-26 RX ADMIN — INSULIN ASPART 20 UNITS: 100 INJECTION, SUSPENSION SUBCUTANEOUS at 12:08

## 2017-08-26 RX ADMIN — DILTIAZEM HYDROCHLORIDE 360 MG: 180 CAPSULE, EXTENDED RELEASE ORAL at 08:08

## 2017-08-26 RX ADMIN — CALCIUM ACETATE 667 MG: 667 CAPSULE ORAL at 05:08

## 2017-08-26 RX ADMIN — FUROSEMIDE 40 MG: 40 TABLET ORAL at 10:08

## 2017-08-26 NOTE — PROGRESS NOTES
Doing well, no further dyspnea but still with significant edema in lower extremities.    Followed by Dr. Rhodes and Dr. Peralta who placed right radiocephalic AV fistula approximately 2 months ago.      Fistula with strong, smooth thrill, but still somewhat small  Likely not ready to be used for dialysis yet.      Patient may benefit from fistulogram and balloon assisted maturation.  Will plan for vascath placement unless patient continues to refuse.  Currently on schedule for tomorrow AM.

## 2017-08-26 NOTE — SUBJECTIVE & OBJECTIVE
Interval History: feels much better    Review of Systems   HENT: Negative.    Eyes: Negative.    Respiratory: Positive for shortness of breath.    Cardiovascular: Negative.    Gastrointestinal: Negative.    Endocrine: Negative.    Genitourinary: Negative.         Non-oliguric   Musculoskeletal: Negative.    Allergic/Immunologic: Negative.    Neurological: Positive for weakness.   Hematological: Negative.    Psychiatric/Behavioral: Negative.      Objective:     Vital Signs (Most Recent):  Temp: 97.5 °F (36.4 °C) (08/26/17 0851)  Pulse: 89 (08/26/17 0851)  Resp: 18 (08/26/17 0851)  BP: (!) 172/81 (08/26/17 0851)  SpO2: 97 % (08/26/17 0851) Vital Signs (24h Range):  Temp:  [97.5 °F (36.4 °C)-98.8 °F (37.1 °C)] 97.5 °F (36.4 °C)  Pulse:  [87-93] 89  Resp:  [17-18] 18  SpO2:  [95 %-98 %] 97 %  BP: (145-180)/(70-88) 172/81     Weight: 116.6 kg (257 lb 0.9 oz)  Body mass index is 29.71 kg/m².    Intake/Output Summary (Last 24 hours) at 08/26/17 1059  Last data filed at 08/26/17 1000   Gross per 24 hour   Intake             1380 ml   Output             2925 ml   Net            -1545 ml      Physical Exam   Constitutional: He is oriented to person, place, and time. He appears well-developed and well-nourished.   HENT:   Head: Normocephalic and atraumatic.   Eyes: EOM are normal. Pupils are equal, round, and reactive to light.   Neck: Normal range of motion. Neck supple.   Cardiovascular: Normal rate and regular rhythm.    Pulmonary/Chest:   Dullness at lung bases   Abdominal: Soft. Bowel sounds are normal.   Musculoskeletal: Normal range of motion.   Neurological: He is alert and oriented to person, place, and time.   Skin: Skin is warm and dry.       Significant Labs:   BMP:   Recent Labs  Lab 08/26/17  0612   *      K 4.3      CO2 21*   BUN 75*   CREATININE 12.9*   CALCIUM 7.3*   MG 2.4     CBC:   Recent Labs  Lab 08/24/17  2146 08/25/17  1455 08/26/17  0613   WBC 6.68 6.51 6.35   HGB 8.2* 8.5* 8.0*    HCT 24.8* 25.7* 24.5*    206 208       Significant Imaging: I have reviewed all pertinent imaging results/findings within the past 24 hours.

## 2017-08-26 NOTE — ASSESSMENT & PLAN NOTE
Serum creatinine 12.6.  Non-oliguric.  Nephrology consulted.  Continue IV Lasix 80 mg IV q8 x3 doses.      Will continue IV lasix at 80 mg BID;  And push as tolerated;    Monitor weights and I&O data.might benefit from oral zaroxolyn;  Otherwise per nephrology;  Fistula may not be matured enough to use; also appears to run deep in the forearm    Creatinine remain fixed;  Likely progression of under lying CKD near or at end stage.  Consider institution of HD this stay;  Will request hepatitis serologies now in anticipation of him moving to in-center hemo

## 2017-08-26 NOTE — ASSESSMENT & PLAN NOTE
Resume home medications.  Monitor and make adjustments as needed.    BP up; will adjust medications; continue effort at diuresis; patient likely as ESRD an in need of dialysis to maintain fluid balance. Not pushed on acid-base or potassium issues.  Will monitor CXR    Continues on IV lasix; CXR repeated;  Monitor weights; I&O data.  Renal function and electrolytes

## 2017-08-26 NOTE — PLAN OF CARE
Problem: Patient Care Overview  Goal: Plan of Care Review  Pt remained safe this shift, and free of fall/injury.  No complaints or concerns.  No stools today, no c/o nausea or vomiting, patient ate all his meals.

## 2017-08-26 NOTE — ASSESSMENT & PLAN NOTE
Resume lower dose home insulin regimen  ISS  Monitor, avoid hypoglycemia      Continue sliding scale coverage; renal and diabetic diet to continue    Continue present regimen,

## 2017-08-26 NOTE — SUBJECTIVE & OBJECTIVE
"Interval History: Pt was seen and examined. Remained stable lat pm, no new c/o's, no SOB today. Feels "better." s Cr has not changed (no better, no worse). Previous records reviewed again, s Cr was 6 four months ago, 9 two months ago, and >12 now. No causes of UGO founds, s Cr has remained stable    Review of patient's allergies indicates:   Allergen Reactions    Augmentin [amoxicillin-pot clavulanate]     Lisinopril      cough    Sulfa (sulfonamide antibiotics)      swelling     Current Facility-Administered Medications   Medication Frequency    acetaminophen tablet 650 mg Q8H PRN    albuterol-ipratropium 2.5mg-0.5mg/3mL nebulizer solution 3 mL Q4H PRN    atorvastatin tablet 40 mg QHS    calcium acetate capsule 667 mg TID WM    dextrose 50% injection 12.5 g PRN    dextrose 50% injection 25 g PRN    diltiaZEM 24 hr capsule 360 mg Daily    diphenhydrAMINE capsule 25 mg Q6H PRN    furosemide injection 80 mg TID    glucagon (human recombinant) injection 1 mg PRN    glucose chewable tablet 16 g PRN    glucose chewable tablet 24 g PRN    guaifenesin 100 mg/5 ml syrup 200 mg Q4H PRN    hydrALAZINE injection 10 mg Q8H PRN    insulin aspart pen 0-5 Units QID (AC + HS) PRN    insulin aspart protamine-insulin aspart (NovoLOG 70/30) injection BID    ondansetron injection 4 mg Q8H PRN    pantoprazole EC tablet 40 mg Daily    sodium bicarbonate tablet 650 mg TID    tamsulosin 24 hr capsule 0.4 mg Daily       Objective:     Vital Signs (Most Recent):  Temp: 97.6 °F (36.4 °C) (08/26/17 1224)  Pulse: 88 (08/26/17 1224)  Resp: 20 (08/26/17 1224)  BP: (!) 169/77 (08/26/17 1224)  SpO2: 98 % (08/26/17 1224)  O2 Device (Oxygen Therapy): room air (08/26/17 1224) Vital Signs (24h Range):  Temp:  [97.5 °F (36.4 °C)-98.8 °F (37.1 °C)] 97.6 °F (36.4 °C)  Pulse:  [87-91] 88  Resp:  [17-20] 20  SpO2:  [95 %-98 %] 98 %  BP: (145-172)/(70-81) 169/77     Weight: 116.6 kg (257 lb 0.9 oz) (08/26/17 1000)  Body mass index is " 29.71 kg/m².  Body surface area is 2.53 meters squared.    I/O last 3 completed shifts:  In: 1260 [P.O.:1260]  Out: 4300 [Urine:4300]    Physical Exam   Constitutional: He is oriented to person, place, and time. He appears well-developed and well-nourished.   HENT:   Head: Normocephalic and atraumatic.   Eyes: Pupils are equal, round, and reactive to light.   Neck: Normal range of motion. No JVD present.   Cardiovascular: Normal rate, regular rhythm and normal heart sounds.  Exam reveals no friction rub.    Pulmonary/Chest: Effort normal. He has rales.   Abdominal: Soft. Bowel sounds are normal. He exhibits no distension. There is no tenderness.   Musculoskeletal: Normal range of motion.   Neurological: He is alert and oriented to person, place, and time.   Skin: Skin is warm and dry.   Psychiatric: He has a normal mood and affect. His behavior is normal.   Nursing note and vitals reviewed.      Significant Labs:  BMP  Lab Results   Component Value Date     08/26/2017    K 4.3 08/26/2017     08/26/2017    CO2 21 (L) 08/26/2017    BUN 75 (H) 08/26/2017    CREATININE 12.9 (H) 08/26/2017    CALCIUM 7.3 (L) 08/26/2017    ANIONGAP 13 08/26/2017    ESTGFRAFRICA 4 (A) 08/26/2017    EGFRNONAA 4 (A) 08/26/2017     Lab Results   Component Value Date    WBC 6.35 08/26/2017    HGB 8.0 (L) 08/26/2017    HCT 24.5 (L) 08/26/2017    MCV 84 08/26/2017     08/26/2017       Significant Imaging: reviewed

## 2017-08-26 NOTE — PLAN OF CARE
Problem: Patient Care Overview  Goal: Plan of Care Review  POC discussed w/patient, verbalized understanding. NSR on monitor. VSS. Voids per urinal. Patient turns independently in bed. Patient denies needs at this time.

## 2017-08-26 NOTE — ASSESSMENT & PLAN NOTE
CXR shows vascular congestion.  IV Lasix 80 mg IV TID x3 does.  May need HD if patient does not improve.  Still make good amount of urine.      Continue present regimen; monitor CXR response;  Otherwise as per nephrology consultant    Repeat CXR ordered for today.  Can  make lasix BID at discharge for ease/compliance

## 2017-08-26 NOTE — ASSESSMENT & PLAN NOTE
Will continue to monitor for signs and symptoms if uremia, and need for renal replacement therapy.    Possibly at end stage;  AV fistula just placed in June '17  May not be adequate, in which case would need a temporary catheter to initiate renal replacement therapy.

## 2017-08-26 NOTE — ASSESSMENT & PLAN NOTE
Check PO4 level as well; likely secondary hyperparathyroidism;  Check intact molecule PTH if not done recently;  Also hepatitis serologies if needs to be enrolled in in center HD program.    P04 7.9; will add phoslo to regimen ans phosphate binder; check intact molecule PTH;  Corrected calcium ~8.3

## 2017-08-26 NOTE — PLAN OF CARE
Problem: Diabetes, Type 2 (Adult)  Goal: Signs and Symptoms of Listed Potential Problems Will be Absent, Minimized or Managed (Diabetes, Type 2)  Signs and symptoms of listed potential problems will be absent, minimized or managed by discharge/transition of care (reference Diabetes, Type 2 (Adult) CPG).   Outcome: Ongoing (interventions implemented as appropriate)  Patient refused scheduled 70/30 insulin this morning.

## 2017-08-26 NOTE — ASSESSMENT & PLAN NOTE
68 y/o male with UGO on very advanced CKD stage 5 presented with CHF exacerbation:         Chronic kidney disease, stage V     Renal: has CKD stage 5.  Pt is pre-ESRD, pre-HD  Has UGO on CKD stage 5 likely due to prerenal azotemia and CHF  K normal  Metabolic acidosis  Follows with Dr. Rhodes, who does not come to this hospital  Discussed importance of following closely with Dr. Rhodes to the pt, given that eh is very close to starting chronic HD       * Acute pulmonary edema     Dyspnea.  Due to CHF exacerbation  Good response to IV diuretics  Discussed salt restriction (2-3 g/day) and fluid restriction (1-2 L/day) with pt.  Pt has improved clinically  Is hemodynamically stable.       Essential hypertension     HTN: BP not controlled  Pt still fluid overloaded.  Will continue lasix IV 40 mg tid, expect with diuresis, BP will improve.  Will monitor.       DM II (diabetes mellitus, type II), controlled     Long standing DM.  Diabetic nephropathy.       Plans and recommendations:  As detailed above

## 2017-08-26 NOTE — PROGRESS NOTES
[facility] Hospital Medicine  Progress Note    Patient Name: Conner Perez III  MRN: [id number]  Patient Class: IP- Inpatient   Admission Date: 8/24/2017  Length of Stay: 1 days  Attending Physician: Antione Garcia MD  Primary Care Provider: Raciel Angela MD        Subjective:     Principal Problem:Acute pulmonary edema    HPI:  Mr. Perez is a 68 y/o AA male with h/o CKD V, T2DM, HTN, CAD preented to the ED c/o worsening SOB since yesterday. He was noted to have a creatinine of 12.1 on 7/30/17, but makes good amount of urine. He takes lasix 40 mg po daily. He was seen by  (nephrology) on 7/30/17.  Today CXR shows pulmonary edema pattern with vascular congestion.  was contacted, recommended giving Lasix 80 mg IV x1. Patient currently not on oxygen, and sats mid-high 90's on room air. Patient already has right radial artery fistula in place. Never been on dialysis before.    Hospital Course:  Patient diuresing with IV lasix; however chest x-ray continues wet;  No N/V, no pruritis. or tremulousness;  Patient non-oliguric;  Has a fistula in place., placed in june of this year.    Continues to diurese well by his account; less shortness of breath and difficulty with breathing; to repeat CXR today;  Nephrology Dr Colon in to see patient;  Spoke with the patient's nephrology ;  Not adverse to his starting hemodoalysis here    Interval History: feels much better    Review of Systems   HENT: Negative.    Eyes: Negative.    Respiratory: Positive for shortness of breath.    Cardiovascular: Negative.    Gastrointestinal: Negative.    Endocrine: Negative.    Genitourinary: Negative.         Non-oliguric   Musculoskeletal: Negative.    Allergic/Immunologic: Negative.    Neurological: Positive for weakness.   Hematological: Negative.    Psychiatric/Behavioral: Negative.      Objective:     Vital Signs (Most Recent):  Temp: 97.5 °F (36.4 °C) (08/26/17 0851)  Pulse: 89 (08/26/17 0851)  Resp: 18  (08/26/17 0851)  BP: (!) 172/81 (08/26/17 0851)  SpO2: 97 % (08/26/17 0851) Vital Signs (24h Range):  Temp:  [97.5 °F (36.4 °C)-98.8 °F (37.1 °C)] 97.5 °F (36.4 °C)  Pulse:  [87-93] 89  Resp:  [17-18] 18  SpO2:  [95 %-98 %] 97 %  BP: (145-180)/(70-88) 172/81     Weight: 116.6 kg (257 lb 0.9 oz)  Body mass index is 29.71 kg/m².    Intake/Output Summary (Last 24 hours) at 08/26/17 1059  Last data filed at 08/26/17 1000   Gross per 24 hour   Intake             1380 ml   Output             2925 ml   Net            -1545 ml      Physical Exam   Constitutional: He is oriented to person, place, and time. He appears well-developed and well-nourished.   HENT:   Head: Normocephalic and atraumatic.   Eyes: EOM are normal. Pupils are equal, round, and reactive to light.   Neck: Normal range of motion. Neck supple.   Cardiovascular: Normal rate and regular rhythm.    Pulmonary/Chest:   Dullness at lung bases   Abdominal: Soft. Bowel sounds are normal.   Musculoskeletal: Normal range of motion.   Neurological: He is alert and oriented to person, place, and time.   Skin: Skin is warm and dry.       Significant Labs:   BMP:   Recent Labs  Lab 08/26/17  0612   *      K 4.3      CO2 21*   BUN 75*   CREATININE 12.9*   CALCIUM 7.3*   MG 2.4     CBC:   Recent Labs  Lab 08/24/17  2146 08/25/17  1455 08/26/17  0613   WBC 6.68 6.51 6.35   HGB 8.2* 8.5* 8.0*   HCT 24.8* 25.7* 24.5*    206 208       Significant Imaging: I have reviewed all pertinent imaging results/findings within the past 24 hours.    Assessment/Plan:      Acute on chronic diastolic heart failure    Continued efforts at diuresis; monitor CXR; monitor weighs and I&O          UGO (acute kidney injury)    Likely progression of underlying CKD; continue to trend          Shortness of breath    Continued efforts at diuresis; monitor CXR          Anemia      Will monitor; consider DARIUSZ therapy pending course (procrit/epogen)          Chronic kidney  disease, stage V      Will continue to monitor for signs and symptoms if uremia, and need for renal replacement therapy.    Possibly at end stage;  AV fistula just placed in June '17  May not be adequate, in which case would need a temporary catheter to initiate renal replacement therapy.        Hypocalcemia    Check PO4 level as well; likely secondary hyperparathyroidism;  Check intact molecule PTH if not done recently;  Also hepatitis serologies if needs to be enrolled in in center HD program.    P04 7.9; will add phoslo to regimen ans phosphate binder; check intact molecule PTH;  Corrected calcium ~8.3        Acute renal failure superimposed on chronic kidney disease    Serum creatinine 12.6.  Non-oliguric.  Nephrology consulted.  Continue IV Lasix 80 mg IV q8 x3 doses.      Will continue IV lasix at 80 mg BID;  And push as tolerated;    Monitor weights and I&O data.might benefit from oral zaroxolyn;  Otherwise per nephrology;  Fistula may not be matured enough to use; also appears to run deep in the forearm    Creatinine remain fixed;  Likely progression of under lying CKD near or at end stage.  Consider institution of HD this stay;  Will request hepatitis serologies now in anticipation of him moving to in-center hemo        DM II (diabetes mellitus, type II), controlled    Resume lower dose home insulin regimen  ISS  Monitor, avoid hypoglycemia      Continue sliding scale coverage; renal and diabetic diet to continue    Continue present regimen,        Essential hypertension    Resume home medications.  Monitor and make adjustments as needed.    BP up; will adjust medications; continue effort at diuresis; patient likely as ESRD an in need of dialysis to maintain fluid balance. Not pushed on acid-base or potassium issues.  Will monitor CXR    Continues on IV lasix; CXR repeated;  Monitor weights; I&O data.  Renal function and electrolytes        * Acute pulmonary edema    CXR shows vascular congestion.  IV Lasix  80 mg IV TID x3 does.  May need HD if patient does not improve.  Still make good amount of urine.      Continue present regimen; monitor CXR response;  Otherwise as per nephrology consultant    Repeat CXR ordered for today.  Can  make lasix BID at discharge for ease/compliance          VTE Risk Mitigation         Ordered     Place sequential compression device  Until discontinued      08/25/17 0136     Medium Risk of VTE  Once      08/25/17 0134     Place sequential compression device  Until discontinued      08/25/17 0134              Antione Tomlin MD  Department of Hospital Medicine   [facility]

## 2017-08-26 NOTE — PROGRESS NOTES
"[facility]  Nephrology  Progress Note    Patient Name: Conner Perez III  MRN: [id number]  Admission Date: 8/24/2017  Hospital Length of Stay: 1 days  Attending Provider: Antione Garcia MD   Primary Care Physician: Raciel Angela MD  Principal Problem:Acute pulmonary edema. CKD stage 5.    Subjective:     HPI: Pt was seen and examined. H/o fully reviewed. Pt is a 66 y/o male with CKD stage 5 (baseline s Cr 6), DM, HTN, diastolic CHF who was admitted for SOB, suspected to be due to CHF exacerbation. Pt has received lasix IV, and says he is feeling better now. PT follows with Dr. Rhodes ([facility]phrologist with [facility]) and has an UE AVF already in place in anticipation for chronic, long term HD. He was admitted at [facility] in jan 2017 for L second toe osteomyelitis. Pt desires to f/u with Dr. Rhodes for his renal issues, and has an appt with him in Sept 2017.    Interval History: Pt was seen and examined. Remained stable lat pm, no new c/o's, no SOB today. Feels "better." Discussed the medical issues in details with the pt, with Dr. Garcia, Dr Rhodes (outpt nephrologist) and with vascular (Dr. Saira Lange)    Vascular assessed the R UE AVF. Per Dr. Lange, the fistula is not fully mature yet, "best to wait a few more weeks). Discussed my recommendation with the pt to start chronic HD with an IJ vas cath. Pt is reluctant to have an IJ vas cath placed. Prefers to wait to see Dr. Rhodes on 9/15/17. Confirmed the appt with Dr Rhodes. Explained risk of pulm edema, heart failure, sz, sudden cardiac death in advanced CKD to pt. Pt verbalized understanding. Wants to go home.    Review of patient's allergies indicates:   Allergen Reactions    Augmentin [amoxicillin-pot clavulanate]     Lisinopril      cough    Sulfa (sulfonamide antibiotics)      swelling     Current Facility-Administered Medications   Medication Frequency    acetaminophen tablet 650 mg Q8H PRN    albuterol-ipratropium 2.5mg-0.5mg/3mL nebulizer " solution 3 mL Q4H PRN    atorvastatin tablet 40 mg QHS    calcium acetate capsule 667 mg TID WM    dextrose 50% injection 12.5 g PRN    dextrose 50% injection 25 g PRN    diltiaZEM 24 hr capsule 360 mg Daily    diphenhydrAMINE capsule 25 mg Q6H PRN    furosemide injection 80 mg TID    glucagon (human recombinant) injection 1 mg PRN    glucose chewable tablet 16 g PRN    glucose chewable tablet 24 g PRN    guaifenesin 100 mg/5 ml syrup 200 mg Q4H PRN    hydrALAZINE injection 10 mg Q8H PRN    insulin aspart pen 0-5 Units QID (AC + HS) PRN    insulin aspart protamine-insulin aspart (NovoLOG 70/30) injection BID    ondansetron injection 4 mg Q8H PRN    pantoprazole EC tablet 40 mg Daily    sodium bicarbonate tablet 650 mg TID    tamsulosin 24 hr capsule 0.4 mg Daily       Objective:     Vital Signs (Most Recent):  Temp: 97.6 °F (36.4 °C) (08/26/17 1224)  Pulse: 88 (08/26/17 1224)  Resp: 20 (08/26/17 1224)  BP: (!) 169/77 (08/26/17 1224)  SpO2: 98 % (08/26/17 1224)  O2 Device (Oxygen Therapy): room air (08/26/17 1224) Vital Signs (24h Range):  Temp:  [97.5 °F (36.4 °C)-98.8 °F (37.1 °C)] 97.6 °F (36.4 °C)  Pulse:  [87-91] 88  Resp:  [17-20] 20  SpO2:  [95 %-98 %] 98 %  BP: (145-172)/(70-81) 169/77     Weight: 116.6 kg (257 lb 0.9 oz) (08/26/17 1000)  Body mass index is 29.71 kg/m².  Body surface area is 2.53 meters squared.    I/O last 3 completed shifts:  In: 1260 [P.O.:1260]  Out: 4300 [Urine:4300]    Physical Exam   Constitutional: He is oriented to person, place, and time. He appears well-developed and well-nourished.   HENT:   Head: Normocephalic and atraumatic.   Eyes: Pupils are equal, round, and reactive to light.   Neck: Normal range of motion. No JVD present.   Cardiovascular: Normal rate, regular rhythm and normal heart sounds.  Exam reveals no friction rub.    Pulmonary/Chest: Effort normal. He has rales.   Abdominal: Soft. Bowel sounds are normal. He exhibits no distension. There is no  tenderness.   Musculoskeletal: Normal range of motion.   Neurological: He is alert and oriented to person, place, and time.   Skin: Skin is warm and dry.   Psychiatric: He has a normal mood and affect. His behavior is normal.   Nursing note and vitals reviewed.      Significant Labs:  BMP  Lab Results   Component Value Date     08/26/2017    K 4.3 08/26/2017     08/26/2017    CO2 21 (L) 08/26/2017    BUN 75 (H) 08/26/2017    CREATININE 12.9 (H) 08/26/2017    CALCIUM 7.3 (L) 08/26/2017    ANIONGAP 13 08/26/2017    ESTGFRAFRICA 4 (A) 08/26/2017    EGFRNONAA 4 (A) 08/26/2017     Lab Results   Component Value Date    WBC 6.35 08/26/2017    HGB 8.0 (L) 08/26/2017    HCT 24.5 (L) 08/26/2017    MCV 84 08/26/2017     08/26/2017       Significant Imaging: reviewed    Assessment/Plan:         66 y/o male with UGO on very advanced CKD stage 5 presented with CHF exacerbation:         Chronic kidney disease, stage V     Renal: has CKD stage 5.  Pt is pre-ESRD, pre-HD  Not UGO.  Risk in s Cr has taken a linear progression in the past several months.  s Cr has not changed (no better, no worse). Previous records reviewed again, s Cr was 6 four months ago, 9 two months ago, and >12 now. No causes of UGO found, s Cr has remained stable  Pt is uremic.  Although K is normal and pt only has mild metabolic acidosis, recommend start chronic HD now or in every near future.  AVF not fully mature yet  Recommend placement of an IJ vas cath  Pt declined.  Risk of dying without dialysis treatment explained to pt. Verbalized understanding  Has other complications of CKD  Secondary hyperparathyroidism  Hypocalcemia  Outpt nephrologist aware, will manage as outpt.       * Acute pulmonary edema     Dyspnea.  Due to CHF exacerbation  Good response to IV diuretics  Will switch to PO lasix  Discussed salt restriction (2-3 g/day) and fluid restriction (1-2 L/day) with pt.  Pt has improved clinically  Is hemodynamically stable.        Essential hypertension     HTN: BP not controlled  outpt meds reviewed  Will re-start hydalazine.       DM II (diabetes mellitus, type II), controlled     Long standing DM.  Diabetic nephropathy.       Plans and recommendations:  As detailed above  D/c IV lasix  Start lasix 40 mg po tid  Start hydralazine 50 mg po tid  Has outpt f/u with Dr. Dom Rhodes on 9/15/17  Pt aware of out concerns.              Lazara Granados MD  Nephrology  [facility]

## 2017-08-27 PROBLEM — N19 UREMIA: Status: ACTIVE | Noted: 2017-08-27

## 2017-08-27 PROBLEM — R06.02 SHORTNESS OF BREATH: Status: RESOLVED | Noted: 2017-08-25 | Resolved: 2017-08-27

## 2017-08-27 PROBLEM — N17.9 AKI (ACUTE KIDNEY INJURY): Status: RESOLVED | Noted: 2017-08-25 | Resolved: 2017-08-27

## 2017-08-27 PROBLEM — J81.0 ACUTE PULMONARY EDEMA: Status: RESOLVED | Noted: 2017-08-25 | Resolved: 2017-08-27

## 2017-08-27 LAB
ALBUMIN SERPL BCP-MCNC: 2.5 G/DL
ANION GAP SERPL CALC-SCNC: 14 MMOL/L
ANION GAP SERPL CALC-SCNC: 17 MMOL/L
BUN SERPL-MCNC: 76 MG/DL
BUN SERPL-MCNC: 78 MG/DL
CALCIUM SERPL-MCNC: 7.2 MG/DL
CALCIUM SERPL-MCNC: 7.3 MG/DL
CHLORIDE SERPL-SCNC: 102 MMOL/L
CHLORIDE SERPL-SCNC: 103 MMOL/L
CO2 SERPL-SCNC: 19 MMOL/L
CO2 SERPL-SCNC: 21 MMOL/L
CREAT SERPL-MCNC: 13.1 MG/DL
CREAT SERPL-MCNC: 13.2 MG/DL
EST. GFR  (AFRICAN AMERICAN): 4 ML/MIN/1.73 M^2
EST. GFR  (AFRICAN AMERICAN): 4 ML/MIN/1.73 M^2
EST. GFR  (NON AFRICAN AMERICAN): 3 ML/MIN/1.73 M^2
EST. GFR  (NON AFRICAN AMERICAN): 3 ML/MIN/1.73 M^2
GLUCOSE SERPL-MCNC: 147 MG/DL
GLUCOSE SERPL-MCNC: 196 MG/DL
MAGNESIUM SERPL-MCNC: 2.6 MG/DL
PHOSPHATE SERPL-MCNC: 7.4 MG/DL
POCT GLUCOSE: 141 MG/DL (ref 70–110)
POCT GLUCOSE: 192 MG/DL (ref 70–110)
POCT GLUCOSE: 88 MG/DL (ref 70–110)
POCT GLUCOSE: 95 MG/DL (ref 70–110)
POTASSIUM SERPL-SCNC: 4.5 MMOL/L
POTASSIUM SERPL-SCNC: 4.6 MMOL/L
SODIUM SERPL-SCNC: 137 MMOL/L
SODIUM SERPL-SCNC: 139 MMOL/L

## 2017-08-27 PROCEDURE — 80069 RENAL FUNCTION PANEL: CPT

## 2017-08-27 PROCEDURE — 25000003 PHARM REV CODE 250: Performed by: INTERNAL MEDICINE

## 2017-08-27 PROCEDURE — 36415 COLL VENOUS BLD VENIPUNCTURE: CPT

## 2017-08-27 PROCEDURE — 99233 SBSQ HOSP IP/OBS HIGH 50: CPT | Mod: ,,, | Performed by: INTERNAL MEDICINE

## 2017-08-27 PROCEDURE — 99900035 HC TECH TIME PER 15 MIN (STAT)

## 2017-08-27 PROCEDURE — 21400001 HC TELEMETRY ROOM

## 2017-08-27 PROCEDURE — 80048 BASIC METABOLIC PNL TOTAL CA: CPT

## 2017-08-27 PROCEDURE — 93010 ELECTROCARDIOGRAM REPORT: CPT | Mod: ,,, | Performed by: INTERNAL MEDICINE

## 2017-08-27 PROCEDURE — 93005 ELECTROCARDIOGRAM TRACING: CPT

## 2017-08-27 PROCEDURE — 83735 ASSAY OF MAGNESIUM: CPT

## 2017-08-27 RX ORDER — FUROSEMIDE 40 MG/1
40 TABLET ORAL 2 TIMES DAILY
Qty: 60 TABLET | Refills: 0 | Status: SHIPPED | OUTPATIENT
Start: 2017-08-27 | End: 2017-08-30 | Stop reason: HOSPADM

## 2017-08-27 RX ADMIN — ATORVASTATIN CALCIUM 40 MG: 40 TABLET, FILM COATED ORAL at 09:08

## 2017-08-27 RX ADMIN — FUROSEMIDE 40 MG: 40 TABLET ORAL at 05:08

## 2017-08-27 RX ADMIN — CALCIUM ACETATE 667 MG: 667 CAPSULE ORAL at 05:08

## 2017-08-27 RX ADMIN — SODIUM BICARBONATE 650 MG TABLET 650 MG: at 05:08

## 2017-08-27 RX ADMIN — SODIUM BICARBONATE 650 MG TABLET 650 MG: at 09:08

## 2017-08-27 RX ADMIN — PANTOPRAZOLE SODIUM 40 MG: 40 TABLET, DELAYED RELEASE ORAL at 08:08

## 2017-08-27 RX ADMIN — FUROSEMIDE 40 MG: 40 TABLET ORAL at 09:08

## 2017-08-27 RX ADMIN — HYDRALAZINE HYDROCHLORIDE 50 MG: 50 TABLET, FILM COATED ORAL at 09:08

## 2017-08-27 RX ADMIN — HYDRALAZINE HYDROCHLORIDE 50 MG: 50 TABLET, FILM COATED ORAL at 03:08

## 2017-08-27 RX ADMIN — FUROSEMIDE 40 MG: 40 TABLET ORAL at 03:08

## 2017-08-27 RX ADMIN — TAMSULOSIN HYDROCHLORIDE 0.4 MG: 0.4 CAPSULE ORAL at 08:08

## 2017-08-27 RX ADMIN — SODIUM BICARBONATE 650 MG TABLET 650 MG: at 02:08

## 2017-08-27 RX ADMIN — DILTIAZEM HYDROCHLORIDE 360 MG: 180 CAPSULE, EXTENDED RELEASE ORAL at 08:08

## 2017-08-27 RX ADMIN — CALCIUM ACETATE 667 MG: 667 CAPSULE ORAL at 12:08

## 2017-08-27 RX ADMIN — HYDRALAZINE HYDROCHLORIDE 50 MG: 50 TABLET, FILM COATED ORAL at 05:08

## 2017-08-27 RX ADMIN — CALCIUM ACETATE 667 MG: 667 CAPSULE ORAL at 08:08

## 2017-08-27 NOTE — PLAN OF CARE
08/27/17 1223   Final Note   Assessment Type Final Discharge Note   Discharge Disposition Home   What phone number can be called within the next 1-3 days to see how you are doing after discharge? [phone]   Discharge plans and expectations educations in teach back method with documentation complete? Yes   Right Care Referral Info   Post Acute Recommendation No Care   Alba Cabezas LMSW, JAYRO-MEAGHAN, CCM  08/27/2017

## 2017-08-27 NOTE — PROGRESS NOTES
Patient discharge being placed on hold after monitor showed a run of V.tach;  Patient entirely asymptomatic;  Repeat electrolytes and magnesium were normal.  Patient consenting to stay overnight for continued cardiac monitoring, and repeat labs in the morning.

## 2017-08-27 NOTE — PLAN OF CARE
08/27/17 1215   Medicare Message   Important Message from Medicare regarding Discharge Appeal Rights Given to patient/caregiver;Explained to patient/caregiver;Signed/date by patient/caregiver   Date IMM was signed 08/27/17   Time IMM was signed 1215   Alba Cabezas LMSW, ACENRIQUE-SW, CCM  08/27/2017

## 2017-08-27 NOTE — PROGRESS NOTES
"[facility]  Nephrology  Progress Note    Patient Name: Cnoner Perez III  MRN: [id number]  Admission Date: 8/24/2017  Hospital Length of Stay: 2 days  Attending Provider: Antione Garcia MD   Primary Care Physician: Raciel Angela MD  Principal Problem:Acute pulmonary edema    Subjective:     HPI: Pt was seen and examined. H/o fully reviewed. Pt is a 66 y/o male with CKD stage 5 (baseline s Cr 6), DM, HTN, diastolic CHF who was admitted for SOB, suspected to be due to CHF exacerbation. Pt has received lasix IV, and says he is feeling better now. PT follows with Dr. Rhodes (nephrologist with [facility]) and has an UE AVF already in place in anticipation for chronic, long term HD. He was admitted at [facility] in jan 2017 for L second toe osteomyelitis. Pt desires to f/u with dr. Rhodes for his renal issues, and has an appt with him in Sept 2017.    Interval History:  Pt was seen and examined. Discharge home has been arranged for this am. Pt has no new c/o's, no questions, had a "good night sleep", denies SOB, no CP, no fever. Pt's girlfriend is at bedside. Discussed same issues as yesterday with the pt. It is recommended that pt will initiate chronic HD, but the AVF placed on 6/27/17 is not mature yet. Placement of an IJ vas cath discussed, pt did not want that and said that he wants to f/u with his own nephrologist Dr. Rhodes on 9/15/17.    Review of patient's allergies indicates:   Allergen Reactions    Augmentin [amoxicillin-pot clavulanate]     Lisinopril      cough    Sulfa (sulfonamide antibiotics)      swelling     Current Facility-Administered Medications   Medication Frequency    acetaminophen tablet 650 mg Q8H PRN    albuterol-ipratropium 2.5mg-0.5mg/3mL nebulizer solution 3 mL Q4H PRN    atorvastatin tablet 40 mg QHS    calcium acetate capsule 667 mg TID WM    dextrose 50% injection 12.5 g PRN    dextrose 50% injection 25 g PRN    diltiaZEM 24 hr capsule 360 mg Daily    " diphenhydrAMINE capsule 25 mg Q6H PRN    furosemide tablet 40 mg Q8H    glucagon (human recombinant) injection 1 mg PRN    glucose chewable tablet 16 g PRN    glucose chewable tablet 24 g PRN    guaifenesin 100 mg/5 ml syrup 200 mg Q4H PRN    hydrALAZINE injection 10 mg Q8H PRN    hydrALAZINE tablet 50 mg Q8H    insulin aspart pen 0-5 Units QID (AC + HS) PRN    insulin aspart protamine-insulin aspart (NovoLOG 70/30) injection BID    ondansetron injection 4 mg Q8H PRN    pantoprazole EC tablet 40 mg Daily    sodium bicarbonate tablet 650 mg TID    tamsulosin 24 hr capsule 0.4 mg Daily       Objective:     Vital Signs (Most Recent):  Temp: 98.4 °F (36.9 °C) (08/27/17 0415)  Pulse: 87 (08/27/17 0415)  Resp: 18 (08/27/17 0415)  BP: 133/68 (08/27/17 0415)  SpO2: 96 % (08/27/17 0415)  O2 Device (Oxygen Therapy): room air (08/26/17 2000) Vital Signs (24h Range):  Temp:  [97.6 °F (36.4 °C)-99.1 °F (37.3 °C)] 98.4 °F (36.9 °C)  Pulse:  [83-91] 87  Resp:  [18-20] 18  SpO2:  [95 %-98 %] 96 %  BP: (133-169)/(68-89) 133/68     Weight: 117 kg (257 lb 15 oz) (08/27/17 0415)  Body mass index is 29.81 kg/m².  Body surface area is 2.54 meters squared.    I/O last 3 completed shifts:  In: 1420 [P.O.:1420]  Out: 3475 [Urine:3475]    Physical Exam   Constitutional: He is oriented to person, place, and time. He appears well-developed and well-nourished.   HENT:   Head: Normocephalic and atraumatic.   Eyes: Pupils are equal, round, and reactive to light.   Neck: Normal range of motion. No JVD present.   Cardiovascular: Normal rate, regular rhythm and normal heart sounds.  Exam reveals no friction rub.    Pulmonary/Chest: Effort normal. He has rales.   Abdominal: Soft. Bowel sounds are normal. He exhibits no distension. There is no tenderness.   Musculoskeletal: Normal range of motion.   Neurological: He is alert and oriented to person, place, and time.   Skin: Skin is warm and dry.   Psychiatric: He has a normal mood and  affect. His behavior is normal.   Nursing note and vitals reviewed.      Significant Labs:  BMP  Lab Results   Component Value Date     08/27/2017    K 4.5 08/27/2017     08/27/2017    CO2 19 (L) 08/27/2017    BUN 76 (H) 08/27/2017    CREATININE 13.1 (H) 08/27/2017    CALCIUM 7.2 (L) 08/27/2017    ANIONGAP 17 (H) 08/27/2017    ESTGFRAFRICA 4 (A) 08/27/2017    EGFRNONAA 3 (A) 08/27/2017     Lab Results   Component Value Date    WBC 6.35 08/26/2017    HGB 8.0 (L) 08/26/2017    HCT 24.5 (L) 08/26/2017    MCV 84 08/26/2017     08/26/2017       Significant Imaging: CXR was reviewed    Assessment/Plan:     66 y/o male with UGO on very advanced CKD stage 5 presented with CHF exacerbation        Chronic kidney disease, stage V     Renal: has CKD stage 5.  Pt is pre-ESRD, pre-HD  s Cr has remained stable, but very high  Pt is uremic.  Although K is normal and pt only has mild metabolic acidosis, recommend start chronic HD   AVF not fully mature yet  Recommend placement of an IJ vas cath  Pt declined.  Risk of dying without dialysis treatment explained to pt. Verbalized understanding  Has other complications of CKD  Secondary hyperparathyroidism  Hypocalcemia  Outpt nephrologist aware, will manage as outpt.       * Acute pulmonary edema     Dyspnea.  Due to CHF exacerbation  Good response to IV diuretics, switched to PO  Discussed salt restriction (2-3 g/day) and fluid restriction (1-2 L/day) with pt.  Pt has improved clinically  Is hemodynamically stable.       Essential hypertension     HTN: BP controlled after restarting hydralazine  Meds reviewed       DM II (diabetes mellitus, type II), controlled     Long standing DM.  Diabetic nephropathy.       Plans and recommendations:  As detailed above  Has f/u with Vascular: Dr. Jeremias Peralta  Has outpt f/u with Dr. oDm Rhodes on 9/15/17  Pt was advised to see above doctors in f/u        Pt was advised to go to ER for any CP, discomfort, SOB, puffy legs,  neurological developments, sz, or if not           feeling right.        Pt verbalized understanding.      Lazara Granados MD  Nephrology  [facility]

## 2017-08-27 NOTE — SUBJECTIVE & OBJECTIVE
"Interval History:  Pt was seen and examined. Discharge home has been arranged for this am. Pt has no new c/o's, no questions, had a "good night sleep", denies SOB, no CP, no fever. Pt's girlfriend is at bedside. Discussed same issues as yesterday with the pt. It is recommended that pt will initiate chronic HD, but the AVF placed on 6/27/17 is not mature yet. Placement of an IJ vas cath discussed, pt did not want that and said that he wants to f/u with his own nephrologist Dr. Rhodes on 9/15/17.    Review of patient's allergies indicates:   Allergen Reactions    Augmentin [amoxicillin-pot clavulanate]     Lisinopril      cough    Sulfa (sulfonamide antibiotics)      swelling     Current Facility-Administered Medications   Medication Frequency    acetaminophen tablet 650 mg Q8H PRN    albuterol-ipratropium 2.5mg-0.5mg/3mL nebulizer solution 3 mL Q4H PRN    atorvastatin tablet 40 mg QHS    calcium acetate capsule 667 mg TID WM    dextrose 50% injection 12.5 g PRN    dextrose 50% injection 25 g PRN    diltiaZEM 24 hr capsule 360 mg Daily    diphenhydrAMINE capsule 25 mg Q6H PRN    furosemide tablet 40 mg Q8H    glucagon (human recombinant) injection 1 mg PRN    glucose chewable tablet 16 g PRN    glucose chewable tablet 24 g PRN    guaifenesin 100 mg/5 ml syrup 200 mg Q4H PRN    hydrALAZINE injection 10 mg Q8H PRN    hydrALAZINE tablet 50 mg Q8H    insulin aspart pen 0-5 Units QID (AC + HS) PRN    insulin aspart protamine-insulin aspart (NovoLOG 70/30) injection BID    ondansetron injection 4 mg Q8H PRN    pantoprazole EC tablet 40 mg Daily    sodium bicarbonate tablet 650 mg TID    tamsulosin 24 hr capsule 0.4 mg Daily       Objective:     Vital Signs (Most Recent):  Temp: 98.4 °F (36.9 °C) (08/27/17 0415)  Pulse: 87 (08/27/17 0415)  Resp: 18 (08/27/17 0415)  BP: 133/68 (08/27/17 0415)  SpO2: 96 % (08/27/17 0415)  O2 Device (Oxygen Therapy): room air (08/26/17 2000) Vital Signs (24h " Range):  Temp:  [97.6 °F (36.4 °C)-99.1 °F (37.3 °C)] 98.4 °F (36.9 °C)  Pulse:  [83-91] 87  Resp:  [18-20] 18  SpO2:  [95 %-98 %] 96 %  BP: (133-169)/(68-89) 133/68     Weight: 117 kg (257 lb 15 oz) (08/27/17 0415)  Body mass index is 29.81 kg/m².  Body surface area is 2.54 meters squared.    I/O last 3 completed shifts:  In: 1420 [P.O.:1420]  Out: 3475 [Urine:3475]    Physical Exam   Constitutional: He is oriented to person, place, and time. He appears well-developed and well-nourished.   HENT:   Head: Normocephalic and atraumatic.   Eyes: Pupils are equal, round, and reactive to light.   Neck: Normal range of motion. No JVD present.   Cardiovascular: Normal rate, regular rhythm and normal heart sounds.  Exam reveals no friction rub.    Pulmonary/Chest: Effort normal. He has rales.   Abdominal: Soft. Bowel sounds are normal. He exhibits no distension. There is no tenderness.   Musculoskeletal: Normal range of motion.   Neurological: He is alert and oriented to person, place, and time.   Skin: Skin is warm and dry.   Psychiatric: He has a normal mood and affect. His behavior is normal.   Nursing note and vitals reviewed.      Significant Labs:  BMP  Lab Results   Component Value Date     08/27/2017    K 4.5 08/27/2017     08/27/2017    CO2 19 (L) 08/27/2017    BUN 76 (H) 08/27/2017    CREATININE 13.1 (H) 08/27/2017    CALCIUM 7.2 (L) 08/27/2017    ANIONGAP 17 (H) 08/27/2017    ESTGFRAFRICA 4 (A) 08/27/2017    EGFRNONAA 3 (A) 08/27/2017     Lab Results   Component Value Date    WBC 6.35 08/26/2017    HGB 8.0 (L) 08/26/2017    HCT 24.5 (L) 08/26/2017    MCV 84 08/26/2017     08/26/2017       Significant Imaging: CXR was reviewed

## 2017-08-27 NOTE — PLAN OF CARE
Problem: Patient Care Overview  Goal: Plan of Care Review  Outcome: Ongoing (interventions implemented as appropriate)  Pt. Currently resting in bed. NSR on monitor on RA.  Fall precautions maintained. Family at bedside.  Plan of care reviewed. Will continue to monitor.

## 2017-08-27 NOTE — DISCHARGE SUMMARY
[facility] Hospital Medicine  Discharge Summary      Patient Name: Conner Perez III  MRN: [id number]  Admission Date: 8/24/2017  Hospital Length of Stay: 2 days  Discharge Date and Time:  08/27/2017 10:16 AM  Attending Physician: Antione Tomlin MD   Discharging Provider: Antione Tomlin MD  Primary Care Provider: Raciel Angela MD      HPI:   Mr. Perez is a 66 y/o AA male with h/o CKD V, T2DM, HTN, CAD preented to the ED c/o worsening SOB since yesterday. He was noted to have a creatinine of 12.1 on 7/30/17, but makes good amount of urine. He takes lasix 40 mg po daily. He was seen by  (nephrology) on 7/30/17.  Today CXR shows pulmonary edema pattern with vascular congestion.  was contacted, recommended giving Lasix 80 mg IV x1. Patient currently not on oxygen, and sats mid-high 90's on room air. Patient already has right radial artery fistula in place. Never been on dialysis before.    Procedure(s) (LRB):  VASCULAR CATH INSERTION (Right)      Indwelling Lines/Drains at time of discharge:   Lines/Drains/Airways     Drain                 Hemodialysis AV Fistula Right forearm 60144 days              Hospital Course:   Patient diuresing with IV lasix; however chest x-ray continues wet;  No N/V, no pruritis. or tremulousness;  Patient non-oliguric;  Has a fistula in place., placed in june of this year.    Continues to diurese well by his account; less shortness of breath and difficulty with breathing; to repeat CXR today;  Nephrology Dr Granados in to see patient;  Spoke with the patient's nephrology ;  Not adverse to his starting hemodoalysis here    The patient continued with high dose parenteral lasix with a prompt and sustained diuresis;  His renal function and electrolytes were monitored and remained level;  Chest xray clearing was reported on serial exams; he was tolerating his diet and exhibited no signs or symptoms of uremia;  His AVF created in the right forearm ~2 months ago was  not deemed not mature enough to access;  He was offered placement of a temporary access to initiate HD here if needed, however refused, this opting instead to follow up with his nephrologist as scheduled in early September. On rounds on the day of discharge he was much improved clinically and deemed stable for discharge; He will increase his lasix therapy to twice a day from once a day and encouraged to monitor his weights on a daily basis.  He is asked to return for recurrence of his symptoms;  He is also asked to follow with a renal dietician;  as well as his vascular surgeon.     Consults:   Consults         Status Ordering Provider     Inpatient consult to Nephrology  Once     Provider:  Fabián London MD    Acknowledged ESTRELLA ALCALA     Inpatient consult to Vascular Surgery  Once     Provider:  Minesh Morrow MD    Acknowledged DWIGHT WORLEY          Significant Diagnostic Studies: Labs:   BMP:   Recent Labs  Lab 08/26/17 0612 08/27/17  0621   * 147*    139   K 4.3 4.5    103   CO2 21* 19*   BUN 75* 76*   CREATININE 12.9* 13.1*   CALCIUM 7.3* 7.2*   MG 2.4  --    , CBC   Recent Labs  Lab 08/25/17  1455 08/26/17  0613   WBC 6.51 6.35   HGB 8.5* 8.0*   HCT 25.7* 24.5*    208    and A1C:   Recent Labs  Lab 08/25/17  0610   HGBA1C 5.7*       Pending Diagnostic Studies:     Procedure Component Value Units Date/Time    Hepatitis B surface antibody [[id number]] Collected:  08/26/17 0612    Order Status:  Sent Lab Status:  In process Updated:  08/26/17 1703    Specimen:  Blood from Blood     Hepatitis B surface antigen [[id number]] Collected:  08/26/17 0612    Order Status:  Sent Lab Status:  In process Updated:  08/26/17 1703    Specimen:  Blood from Blood     Hepatitis C antibody [[id number]] Collected:  08/26/17 0612    Order Status:  Sent Lab Status:  In process Updated:  08/26/17 1703    Specimen:  Blood from Blood         Final Active Diagnoses:    Diagnosis Date Noted POA    Acute  renal failure superimposed on chronic kidney disease [N17.9, N18.9] 08/25/2017 Yes    Hypocalcemia [E83.51] 08/25/2017 Yes    Chronic kidney disease, stage V [N18.5] 08/25/2017 Yes    Anemia [D64.9] 08/25/2017 Unknown    Acute on chronic diastolic heart failure [I50.33] 08/25/2017 Yes    DM II (diabetes mellitus, type II), controlled [E11.9] 01/19/2017 Yes    Essential hypertension [I10] 01/12/2017 Yes     Chronic      Problems Resolved During this Admission:    Diagnosis Date Noted Date Resolved POA    PRINCIPAL PROBLEM:  Acute pulmonary edema [J81.0] 08/25/2017 08/27/2017 Yes    Shortness of breath [R06.02] 08/25/2017 08/27/2017 Yes    UGO (acute kidney injury) [N17.9] 08/25/2017 08/27/2017 Yes      No new Assessment & Plan notes have been filed under this hospital service since the last note was generated.  Service: Hospital Medicine      Discharged Condition: stable    Disposition: Home or Self Care    Follow Up:  Follow-up Information     Raciel Angela MD In 3 days.    Specialty:  Family Medicine  Contact information:  [address] [facility] [address]  [facility] [address] [phone]                 Patient Instructions:     Diet renal     Diet Diabetic 1800 Calories     Call MD for:  redness, tenderness, or signs of infection (pain, swelling, redness, odor or green/yellow discharge around incision site)     Call MD for:  difficulty breathing or increased cough     Call MD for:   Order Comments: Voiding difficulty     Call MD for:  persistent nausea and vomiting or diarrhea       Medications:  Reconciled Home Medications:   Current Discharge Medication List      CONTINUE these medications which have CHANGED    Details   furosemide (LASIX) 40 MG tablet Take 1 tablet (40 mg total) by mouth 2 (two) times daily.  Qty: 60 tablet, Refills: 0         CONTINUE these medications which have NOT CHANGED    Details   aspirin 81 MG Chew Take 1 tablet (81 mg total) by mouth once daily.  Qty: 30 tablet, Refills:  "0      atorvastatin (LIPITOR) 40 MG tablet Take 1 tablet (40 mg total) by mouth every evening.  Qty: 30 tablet, Refills: 0      diltiaZEM (CARDIZEM CD) 360 MG 24 hr capsule Take 1 capsule (360 mg total) by mouth once daily.  Qty: 30 capsule, Refills: 0      hydrALAZINE (APRESOLINE) 50 MG tablet Take 1 tablet (50 mg total) by mouth 3 (three) times daily.  Qty: 90 tablet, Refills: 0      insulin NPH-insulin regular, 70/30, (NOVOLIN 70/30) 100 unit/mL (70-30) injection Inject 30 Units into the skin 2 (two) times daily. Dispense with needles and syringes.  Qty: 20 mL, Refills: 0      ondansetron (ZOFRAN-ODT) 4 MG TbDL Take 1 tablet (4 mg total) by mouth every 8 (eight) hours as needed.  Qty: 12 tablet, Refills: 0      pantoprazole (PROTONIX) 40 MG tablet Take 1 tablet (40 mg total) by mouth once daily.  Qty: 30 tablet, Refills: 0      tamsulosin (FLOMAX) 0.4 mg Cp24 Take 1 capsule (0.4 mg total) by mouth once daily.  Qty: 30 capsule, Refills: 0      tramadol (ULTRAM) 50 mg tablet 1-2 bid prn pain      avanafil 100 mg Tab Take 100 mg by mouth.      azelastine (ASTELIN) 137 mcg (0.1 %) nasal spray 2 sprays by Nasal route 2 (two) times daily.       BD ULTRA-FINE ANISA PEN NEEDLES 32 gauge x 5/32" Ndle Refills: 0      calcium carbonate (TUMS) 200 mg calcium (500 mg) chewable tablet Take 1 tablet (500 mg total) by mouth once daily.  Qty: 90 tablet, Refills: 0      ceramides 1,3,6-11 Crea Apply to the affected area daily.      cyclobenzaprine (FLEXERIL) 10 MG tablet One hs prn  Muscle relaxer      docusate sodium (COLACE) 100 MG capsule Take 1 capsule (100 mg total) by mouth 2 (two) times daily.  Qty: 60 capsule, Refills: 0      FREESTYLE LITE STRIPS Strp Refills: 0      lactulose (CHRONULAC) 10 gram/15 mL solution Take 10 g by mouth every evening.   Refills: 0      metoclopramide HCl (REGLAN) 10 MG tablet Take 1 tablet (10 mg total) by mouth every 6 (six) hours as needed.  Qty: 30 tablet, Refills: 0      polyethylene glycol " (GLYCOLAX) 17 gram PwPk Take 17 g by mouth once daily.  Qty: 30 packet, Refills: 0      sodium bicarbonate 650 MG tablet Take 1 tablet (650 mg total) by mouth 3 (three) times daily.  Qty: 90 tablet, Refills: 0      sucralfate (CARAFATE) 100 mg/mL suspension Take 10 mLs (1 g total) by mouth 4 (four) times daily.  Qty: 1 Bottle, Refills: 0      zolpidem (AMBIEN) 5 MG Tab Take 1 tablet (5 mg total) by mouth nightly as needed.  Qty: 10 tablet, Refills: 0           Time spent on the discharge of patient: 35minutes    HOS POC IP DISCHARGE SUMMARY    Antione Tomlin MD  Department of Hospital Medicine  [facility]

## 2017-08-27 NOTE — ASSESSMENT & PLAN NOTE
66 y/o male with UGO on very advanced CKD stage 5 presented with CHF exacerbation:         Chronic kidney disease, stage V     Renal: has CKD stage 5.  Pt is pre-ESRD, pre-HD  Has UGO on CKD stage 5 likely due to prerenal azotemia and CHF  K normal  Metabolic acidosis  Follows with Dr. Rhodes, who does not come to this hospital  Discussed importance of following closely with Dr. Rhodes to the pt, given that eh is very close to starting chronic HD       * Acute pulmonary edema     Dyspnea.  Due to CHF exacerbation  Good response to IV diuretics  Discussed salt restriction (2-3 g/day) and fluid restriction (1-2 L/day) with pt.  Pt has improved clinically  Is hemodynamically stable.       Essential hypertension     HTN: BP not controlled  Pt still fluid overloaded.  Will continue lasix IV 40 mg tid, expect with diuresis, BP will improve.  Will monitor.       DM II (diabetes mellitus, type II), controlled     Long standing DM.  Diabetic nephropathy.       Plans and recommendations:  As detailed above

## 2017-08-28 PROBLEM — I47.29 NSVT (NONSUSTAINED VENTRICULAR TACHYCARDIA): Status: ACTIVE | Noted: 2017-08-28

## 2017-08-28 PROBLEM — D64.9 ANEMIA: Status: ACTIVE | Noted: 2017-08-28

## 2017-08-28 LAB
ALBUMIN SERPL BCP-MCNC: 2.5 G/DL
ANION GAP SERPL CALC-SCNC: 14 MMOL/L
BASOPHILS # BLD AUTO: 0.05 K/UL
BASOPHILS NFR BLD: 0.7 %
BUN SERPL-MCNC: 80 MG/DL
CALCIUM SERPL-MCNC: 7 MG/DL
CHLORIDE SERPL-SCNC: 102 MMOL/L
CO2 SERPL-SCNC: 21 MMOL/L
CREAT SERPL-MCNC: 13.4 MG/DL
DIFFERENTIAL METHOD: ABNORMAL
EOSINOPHIL # BLD AUTO: 0.3 K/UL
EOSINOPHIL NFR BLD: 3.8 %
ERYTHROCYTE [DISTWIDTH] IN BLOOD BY AUTOMATED COUNT: 13 %
EST. GFR  (AFRICAN AMERICAN): 4 ML/MIN/1.73 M^2
EST. GFR  (NON AFRICAN AMERICAN): 3 ML/MIN/1.73 M^2
GLUCOSE SERPL-MCNC: 128 MG/DL
HBV SURFACE AB SER-ACNC: NEGATIVE M[IU]/ML
HBV SURFACE AG SERPL QL IA: NEGATIVE
HCT VFR BLD AUTO: 23.4 %
HCV AB SERPL QL IA: NEGATIVE
HGB BLD-MCNC: 7.7 G/DL
LYMPHOCYTES # BLD AUTO: 1.6 K/UL
LYMPHOCYTES NFR BLD: 23.9 %
MAGNESIUM SERPL-MCNC: 2.3 MG/DL
MCH RBC QN AUTO: 27.7 PG
MCHC RBC AUTO-ENTMCNC: 32.9 G/DL
MCV RBC AUTO: 84 FL
MONOCYTES # BLD AUTO: 0.7 K/UL
MONOCYTES NFR BLD: 10.8 %
NEUTROPHILS # BLD AUTO: 4.1 K/UL
NEUTROPHILS NFR BLD: 60.8 %
PHOSPHATE SERPL-MCNC: 7 MG/DL
PLATELET # BLD AUTO: 191 K/UL
PMV BLD AUTO: 10.3 FL
POCT GLUCOSE: 108 MG/DL (ref 70–110)
POCT GLUCOSE: 141 MG/DL (ref 70–110)
POCT GLUCOSE: 183 MG/DL (ref 70–110)
POCT GLUCOSE: 224 MG/DL (ref 70–110)
POTASSIUM SERPL-SCNC: 4.6 MMOL/L
RBC # BLD AUTO: 2.78 M/UL
SODIUM SERPL-SCNC: 137 MMOL/L
WBC # BLD AUTO: 6.79 K/UL

## 2017-08-28 PROCEDURE — 80069 RENAL FUNCTION PANEL: CPT

## 2017-08-28 PROCEDURE — 94761 N-INVAS EAR/PLS OXIMETRY MLT: CPT

## 2017-08-28 PROCEDURE — 99291 CRITICAL CARE FIRST HOUR: CPT | Mod: ,,, | Performed by: INTERNAL MEDICINE

## 2017-08-28 PROCEDURE — 63600175 PHARM REV CODE 636 W HCPCS: Performed by: INTERNAL MEDICINE

## 2017-08-28 PROCEDURE — 85025 COMPLETE CBC W/AUTO DIFF WBC: CPT

## 2017-08-28 PROCEDURE — 02HV33Z INSERTION OF INFUSION DEVICE INTO SUPERIOR VENA CAVA, PERCUTANEOUS APPROACH: ICD-10-PCS | Performed by: SURGERY

## 2017-08-28 PROCEDURE — 80100016 HC MAINTENANCE HEMODIALYSIS

## 2017-08-28 PROCEDURE — 63600175 PHARM REV CODE 636 W HCPCS

## 2017-08-28 PROCEDURE — 02H633Z INSERTION OF INFUSION DEVICE INTO RIGHT ATRIUM, PERCUTANEOUS APPROACH: ICD-10-PCS | Performed by: SURGERY

## 2017-08-28 PROCEDURE — 36415 COLL VENOUS BLD VENIPUNCTURE: CPT

## 2017-08-28 PROCEDURE — 83735 ASSAY OF MAGNESIUM: CPT

## 2017-08-28 PROCEDURE — 77001 FLUOROGUIDE FOR VEIN DEVICE: CPT

## 2017-08-28 PROCEDURE — 25000003 PHARM REV CODE 250: Performed by: INTERNAL MEDICINE

## 2017-08-28 PROCEDURE — 21400001 HC TELEMETRY ROOM

## 2017-08-28 PROCEDURE — 86706 HEP B SURFACE ANTIBODY: CPT

## 2017-08-28 PROCEDURE — 99223 1ST HOSP IP/OBS HIGH 75: CPT | Mod: ,,, | Performed by: INTERNAL MEDICINE

## 2017-08-28 PROCEDURE — 86704 HEP B CORE ANTIBODY TOTAL: CPT

## 2017-08-28 PROCEDURE — 87340 HEPATITIS B SURFACE AG IA: CPT

## 2017-08-28 PROCEDURE — 25000003 PHARM REV CODE 250

## 2017-08-28 RX ORDER — HEPARIN SODIUM 1000 [USP'U]/ML
3200 INJECTION, SOLUTION INTRAVENOUS; SUBCUTANEOUS
Status: DISCONTINUED | OUTPATIENT
Start: 2017-08-28 | End: 2017-08-30 | Stop reason: HOSPADM

## 2017-08-28 RX ORDER — MANNITOL 250 MG/ML
25 INJECTION, SOLUTION INTRAVENOUS ONCE
Status: COMPLETED | OUTPATIENT
Start: 2017-08-28 | End: 2017-08-28

## 2017-08-28 RX ORDER — MANNITOL 250 MG/ML
25 INJECTION, SOLUTION INTRAVENOUS ONCE
Status: COMPLETED | OUTPATIENT
Start: 2017-08-29 | End: 2017-08-29

## 2017-08-28 RX ORDER — HYDROCODONE BITARTRATE AND ACETAMINOPHEN 5; 325 MG/1; MG/1
1 TABLET ORAL EVERY 8 HOURS PRN
Status: DISCONTINUED | OUTPATIENT
Start: 2017-08-28 | End: 2017-08-30 | Stop reason: HOSPADM

## 2017-08-28 RX ORDER — HEPARIN SODIUM 1000 [USP'U]/ML
3200 INJECTION, SOLUTION INTRAVENOUS; SUBCUTANEOUS ONCE
Status: DISCONTINUED | OUTPATIENT
Start: 2017-08-28 | End: 2017-08-28

## 2017-08-28 RX ADMIN — MANNITOL 25 G: 250 INJECTION, SOLUTION INTRAVENOUS at 03:08

## 2017-08-28 RX ADMIN — HEPARIN SODIUM 3200 UNITS: 1000 INJECTION, SOLUTION INTRAVENOUS; SUBCUTANEOUS at 04:08

## 2017-08-28 RX ADMIN — CALCIUM ACETATE 667 MG: 667 CAPSULE ORAL at 08:08

## 2017-08-28 RX ADMIN — PANTOPRAZOLE SODIUM 40 MG: 40 TABLET, DELAYED RELEASE ORAL at 08:08

## 2017-08-28 RX ADMIN — HYDROCODONE BITARTRATE AND ACETAMINOPHEN 1 TABLET: 5; 325 TABLET ORAL at 10:08

## 2017-08-28 RX ADMIN — SODIUM BICARBONATE 650 MG TABLET 650 MG: at 09:08

## 2017-08-28 RX ADMIN — FUROSEMIDE 40 MG: 40 TABLET ORAL at 09:08

## 2017-08-28 RX ADMIN — TAMSULOSIN HYDROCHLORIDE 0.4 MG: 0.4 CAPSULE ORAL at 08:08

## 2017-08-28 RX ADMIN — SODIUM BICARBONATE 650 MG TABLET 650 MG: at 05:08

## 2017-08-28 RX ADMIN — CALCIUM ACETATE 667 MG: 667 CAPSULE ORAL at 11:08

## 2017-08-28 RX ADMIN — ATORVASTATIN CALCIUM 40 MG: 40 TABLET, FILM COATED ORAL at 09:08

## 2017-08-28 RX ADMIN — DILTIAZEM HYDROCHLORIDE 360 MG: 180 CAPSULE, EXTENDED RELEASE ORAL at 08:08

## 2017-08-28 RX ADMIN — HYDRALAZINE HYDROCHLORIDE 50 MG: 50 TABLET, FILM COATED ORAL at 09:08

## 2017-08-28 RX ADMIN — HYDRALAZINE HYDROCHLORIDE 50 MG: 50 TABLET, FILM COATED ORAL at 05:08

## 2017-08-28 RX ADMIN — FUROSEMIDE 40 MG: 40 TABLET ORAL at 05:08

## 2017-08-28 RX ADMIN — INSULIN ASPART 2 UNITS: 100 INJECTION, SOLUTION INTRAVENOUS; SUBCUTANEOUS at 11:08

## 2017-08-28 NOTE — PROGRESS NOTES
Pt returned from getting vas cath placed.  VSS.  Pt has no complaints of pain.  Lunch microwaved and provided to pt.  Pt taken to dialysis.

## 2017-08-28 NOTE — PHYSICIAN QUERY
PT Name: Conner Perez III  MR #: [id number]    Physician Query Form - Consultant Diagnosis Clarification     CDS/: Litzy Borges                 Contact information:[phone]  This form is a permanent document in the medical record.     Query Date: August 28, 2017      By submitting this query, we are merely seeking further clarification of documentation.  Please utilize your independent clinical judgment when addressing the question(s) below.      The Medical record contains the following:   Diagnosis Supporting Clinical Information Location in Medical Record   Diastolic CHF exacerbation       Acute pulmonary edema    Dyspnea. Due to CHF exacerbation   Good response to IV diuretics, switched to PO   Discussed salt restriction (2-3 g/day) and fluid restriction (1-2 L/day) with pt.   Pt has improved clinically   Is hemodynamically stable.      Pt is a 66 y/o male with CKD stage 5 (baseline s Cr 6), DM, HTN, diastolic CHF who was admitted for SOB, suspected to be due to CHF exacerbation    [id number]     Renal note 8-27                      Renal note 8-25            Lab 8-24             Do you agree with the Consultants diagnosis of  Diastolic CHF exacerbation_?                 [ x  ]   Yes               [   ]   No                [   ]   Clinically undetermined               [   ]   Other/Clarification of findings: ___________________________________________

## 2017-08-28 NOTE — OP NOTE
Date of procedure 8/28/2017  Preop Dx ESRD   Post OP Dx same    Procedure RIJ tunnelled vascular catheter with ultrasound and fluro guidance    Blood loss minimal  Anesthesia local    Procedure  Under ultrasound guidance the RIJ was accessed using a micropuncture. J wire was placed. 1 percent lidocaine was used to anesthetize from the clavicle to the wire insertion site. Small incision was made and the vas cath was tunneled. Serial dilation of the vein was performed. Peel away sheath was placed over the wire. Catheter was then threaded and was at the junction of the atrium and the SVC. Catheter flushed and aspirated and was sutured in place. It is ok to use for dialysis.

## 2017-08-28 NOTE — CONSULTS
[facility] -   Cardiology  Consult Note    Patient Name: Conner Perez III  MRN: [id number]  Admission Date: 8/24/2017  Hospital Length of Stay: 3 days  Code Status: Full Code   Attending Provider: Antione Slade MD   Consulting Provider: Damian Fairbanks MD  Primary Care Physician: Raciel Angela MD  Principal Problem:Acute pulmonary edema    Patient information was obtained from patient, past medical records and ER records.     Consults  Subjective:     Chief Complaint:  Dyspnea     HPI: Cardiology consulted for nonsustained VT.  Pt has h/o HTN, ESRD.  Admitted with dyspnea, volume overload with worsening renal function.  Now there are plans to initiate dialysis today per pt.  Overnight had 8 beat run of NSVT, asymptomatic.  Pt denies cp sxs.  No palpitations.  No syncope, presyncopal sxs.  Dyspnea seems mostly resolved.  ecg shows NSR, nonspecific st-t abnl.  Troponin flat, 0.05 range.  .  Echo 4/17 showed normal EF, apical WMA.  Pt reports [facility] 2016, no major blockages per pt.    Past Medical History:   Diagnosis Date    CKD (chronic kidney disease), stage IV     Diabetes mellitus     Diabetes mellitus, type 2     HTN (hypertension)        Past Surgical History:   Procedure Laterality Date    CIRCUMCISION  1978       Review of patient's allergies indicates:   Allergen Reactions    Augmentin [amoxicillin-pot clavulanate]     Lisinopril      cough    Sulfa (sulfonamide antibiotics)      swelling       No current facility-administered medications on file prior to encounter.      Current Outpatient Prescriptions on File Prior to Encounter   Medication Sig    aspirin 81 MG Chew Take 1 tablet (81 mg total) by mouth once daily.    atorvastatin (LIPITOR) 40 MG tablet Take 1 tablet (40 mg total) by mouth every evening.    diltiaZEM (CARDIZEM CD) 360 MG 24 hr capsule Take 1 capsule (360 mg total) by mouth once daily.    hydrALAZINE (APRESOLINE) 50 MG tablet Take 1 tablet (50 mg total) by  "mouth 3 (three) times daily.    insulin NPH-insulin regular, 70/30, (NOVOLIN 70/30) 100 unit/mL (70-30) injection Inject 30 Units into the skin 2 (two) times daily. Dispense with needles and syringes. (Patient taking differently: Inject 30 Units into the skin 2 (two) times daily. Dispense with needles and syringes. Pt states tjhat he only takes it 1 2 times aweek)    ondansetron (ZOFRAN-ODT) 4 MG TbDL Take 1 tablet (4 mg total) by mouth every 8 (eight) hours as needed.    pantoprazole (PROTONIX) 40 MG tablet Take 1 tablet (40 mg total) by mouth once daily.    tamsulosin (FLOMAX) 0.4 mg Cp24 Take 1 capsule (0.4 mg total) by mouth once daily.    tramadol (ULTRAM) 50 mg tablet 1-2 bid prn pain    avanafil 100 mg Tab Take 100 mg by mouth.    azelastine (ASTELIN) 137 mcg (0.1 %) nasal spray 2 sprays by Nasal route 2 (two) times daily.     BD ULTRA-FINE ANISA PEN NEEDLES 32 gauge x 5/32" Ndle     calcium carbonate (TUMS) 200 mg calcium (500 mg) chewable tablet Take 1 tablet (500 mg total) by mouth once daily.    ceramides 1,3,6-11 Crea Apply to the affected area daily.    cyclobenzaprine (FLEXERIL) 10 MG tablet One hs prn  Muscle relaxer    docusate sodium (COLACE) 100 MG capsule Take 1 capsule (100 mg total) by mouth 2 (two) times daily.    FREESTYLE LITE STRIPS Strp     lactulose (CHRONULAC) 10 gram/15 mL solution Take 10 g by mouth every evening.     metoclopramide HCl (REGLAN) 10 MG tablet Take 1 tablet (10 mg total) by mouth every 6 (six) hours as needed.    polyethylene glycol (GLYCOLAX) 17 gram PwPk Take 17 g by mouth once daily.    sodium bicarbonate 650 MG tablet Take 1 tablet (650 mg total) by mouth 3 (three) times daily.    sucralfate (CARAFATE) 100 mg/mL suspension Take 10 mLs (1 g total) by mouth 4 (four) times daily.    zolpidem (AMBIEN) 5 MG Tab Take 1 tablet (5 mg total) by mouth nightly as needed.     Family History     Problem Relation (Age of Onset)    Hypertension Father        Social " History Main Topics    Smoking status: Former Smoker    Smokeless tobacco: Never Used    Alcohol use No    Drug use: No    Sexual activity: Not on file     Review of Systems   Constitution: Negative.   HENT: Negative.    Eyes: Negative.    Cardiovascular: Positive for dyspnea on exertion.   Respiratory: Positive for shortness of breath.    Endocrine: Negative.    Hematologic/Lymphatic: Negative.    Skin: Negative.    Musculoskeletal: Negative.    Gastrointestinal: Negative.    Genitourinary: Negative.    Neurological: Negative.    Psychiatric/Behavioral: Negative.    Allergic/Immunologic: Negative.      Objective:     Vital Signs (Most Recent):  Temp: 97.9 °F (36.6 °C) (08/28/17 1108)  Pulse: 86 (08/28/17 1108)  Resp: 20 (08/28/17 1108)  BP: (!) 150/73 (08/28/17 1108)  SpO2: 97 % (08/28/17 1108) Vital Signs (24h Range):  Temp:  [97.7 °F (36.5 °C)-98.1 °F (36.7 °C)] 97.9 °F (36.6 °C)  Pulse:  [] 86  Resp:  [18-20] 20  SpO2:  [95 %-97 %] 97 %  BP: (142-161)/(71-80) 150/73     Weight: 117 kg (257 lb 15 oz)  Body mass index is 29.81 kg/m².    SpO2: 97 %  O2 Device (Oxygen Therapy): room air      Intake/Output Summary (Last 24 hours) at 08/28/17 1227  Last data filed at 08/28/17 0800   Gross per 24 hour   Intake              720 ml   Output             1650 ml   Net             -930 ml       Lines/Drains/Airways     Drain                 Hemodialysis AV Fistula Right forearm [id number] days          Peripheral Intravenous Line                 Peripheral IV - Single Lumen 08/24/17 2130 Left Forearm 3 days                Physical Exam   Constitutional: He is oriented to person, place, and time. He appears well-developed and well-nourished. No distress.   HENT:   Head: Normocephalic and atraumatic.   Eyes: Conjunctivae and EOM are normal. Pupils are equal, round, and reactive to light.   Neck: Normal range of motion. Neck supple. Normal carotid pulses, no hepatojugular reflux and no JVD present. Carotid bruit is not  present. No tracheal deviation present. No thyromegaly present.   Cardiovascular: Normal rate, regular rhythm, S1 normal, S2 normal and intact distal pulses.  Exam reveals no gallop, no S3 and no S4.    No murmur heard.  Pulses:       Radial pulses are 2+ on the right side, and 2+ on the left side.   Pulmonary/Chest: Effort normal. No respiratory distress. He has no wheezes. He has no rales.   Abdominal: Soft. Bowel sounds are normal. He exhibits no distension, no abdominal bruit and no mass. There is no tenderness. There is no rebound and no guarding.   Musculoskeletal: He exhibits no edema.   Lymphadenopathy:     He has no cervical adenopathy.   Neurological: He is alert and oriented to person, place, and time.   Skin: Skin is warm and dry. He is not diaphoretic.   Psychiatric: He has a normal mood and affect.   Nursing note and vitals reviewed.      Significant Labs:   CMP   Recent Labs  Lab 08/27/17  0621 08/27/17  1446 08/28/17  0435    137 137   K 4.5 4.6 4.6    102 102   CO2 19* 21* 21*   * 196* 128*   BUN 76* 78* 80*   CREATININE 13.1* 13.2* 13.4*   CALCIUM 7.2* 7.3* 7.0*   ALBUMIN 2.5*  --  2.5*   ANIONGAP 17* 14 14   ESTGFRAFRICA 4* 4* 4*   EGFRNONAA 3* 3* 3*   , CBC   Recent Labs  Lab 08/28/17  0435   WBC 6.79   HGB 7.7*   HCT 23.4*       and Troponin No results for input(s): TROPONINI in the last 48 hours.    Significant Imaging: Echocardiogram:   2D echo with color flow doppler:   Results for orders placed or performed during the hospital encounter of 04/07/17   2D echo with color flow doppler   Result Value Ref Range    EF 55 55 - 65    Diastolic Dysfunction No      Assessment and Plan:     Active Diagnoses:    Diagnosis Date Noted POA    NSVT (nonsustained ventricular tachycardia) [I47.2] 08/28/2017 Unknown    Uremia [N19] 08/27/2017 No    Acute renal failure superimposed on chronic kidney disease [N17.9, N18.9] 08/25/2017 Yes    Hypocalcemia [E83.51] 08/25/2017 Yes     Chronic kidney disease, stage V [N18.5] 08/25/2017 Yes    Anemia [D64.9] 08/25/2017 Unknown    Acute on chronic diastolic heart failure [I50.33] 08/25/2017 Yes    DM II (diabetes mellitus, type II), controlled [E11.9] 01/19/2017 Yes    Essential hypertension [I10] 01/12/2017 Yes     Chronic      Problems Resolved During this Admission:    Diagnosis Date Noted Date Resolved POA    PRINCIPAL PROBLEM:  Acute pulmonary edema [J81.0] 08/25/2017 08/27/2017 Yes    Shortness of breath [R06.02] 08/25/2017 08/27/2017 Yes    UGO (acute kidney injury) [N17.9] 08/25/2017 08/27/2017 Yes       VTE Risk Mitigation         Ordered     Place sequential compression device  Until discontinued      08/25/17 0136     Medium Risk of VTE  Once      08/25/17 0134     Place sequential compression device  Until discontinued      08/25/17 0134        NSVT:  Asymptomatic.  Likely precipitated by severe renal failure issues/anemia, multifactorial.  Normal EF on recent echo.  Continue current dose of Cardizem CD.  Continue current meds/tx.    Correct anemia.  Dialysis.  F/u in clinic after discharge.      Thank you for your consult.     Damian Fairbanks MD  Cardiology   [facility] -

## 2017-08-28 NOTE — PROGRESS NOTES
Pt returned from dialysis, pt states he feels fine.  VSS see flow sheet.  Blood sugar 183.  Will continue to monitor.

## 2017-08-28 NOTE — PROGRESS NOTES
[facility] Hospital Medicine  Progress Note    Patient Name: Conner Perez III  MRN: [id number]  Patient Class: IP- Inpatient   Admission Date: 8/24/2017  Length of Stay: 3 days  Attending Physician: Antione Slade MD  Primary Care Provider: Raciel Angela MD        Subjective:     Principal Problem:Acute renal failure superimposed on chronic kidney disease    HPI:  Mr. Perez is a 68 y/o AA male with h/o CKD V, T2DM, HTN, CAD preented to the ED c/o worsening SOB since yesterday. He was noted to have a creatinine of 12.1 on 7/30/17, but makes good amount of urine. He takes lasix 40 mg po daily. He was seen by  (nephrology) on 7/30/17.  Today CXR shows pulmonary edema pattern with vascular congestion.  was contacted, recommended giving Lasix 80 mg IV x1. Patient currently not on oxygen, and sats mid-high 90's on room air. Patient already has right radial artery fistula in place. Never been on dialysis before.    Hospital Course:  Patient diuresing with IV lasix. Patient non-oliguric; has a fistula in place - too immature to use at present, placed in june of this year. Nephrology Dr Granados consulted. Pts nephrologistCarmelo not adverse to his starting hemodoalysis here.     The patient continued with high dose parenteral lasix with a prompt and sustained diuresis;  His renal function and electrolytes were monitored and remained level;  Chest xray clearing was reported on serial exams; he was tolerating his diet and exhibited no signs or symptoms of uremia;  His AVF created in the right forearm ~2 months ago was not deemed not mature enough to access;  He was offered placement of a temporary access to initiate HD here if needed, however refused, this opting instead to follow up with his nephrologist as scheduled in early September. On rounds on the day of (planned) discharge he was much improved clinically and deemed stable for discharge; He will increase his lasix therapy to twice a  day from once a day and encouraged to monitor his weights on a daily basis.  He is asked to return for recurrence of his symptoms;  He is also asked to follow with a renal dietician;  as well as his vascular surgeon. Patient discharge being placed on hold after monitor showed a run of V.tach. Pt asymptomatic. Repeat electrolytes and magnesium were normal. Pt kept overnight for continued cardiac monitoring. In Am, pt decided he wanted to initiate HD now, not wait for nephrology appt in Sept. Nephrology notified.     Interval History: No acute events overnight. Denies Cp, palpitations, irregular heart beat, and dizziness. Pt has decided he would like to have Vas Cath placed and initiate HD today. Nephrology notified.       Review of Systems   Constitutional: Negative for activity change, appetite change, chills, fatigue and fever.   HENT: Negative for dental problem, ear pain, hearing loss, mouth sores, nosebleeds, sinus pressure, sore throat, tinnitus and trouble swallowing.    Eyes: Negative for pain, discharge and visual disturbance.   Respiratory: Positive for shortness of breath (mild). Negative for cough, choking, chest tightness and wheezing.    Cardiovascular: Negative for chest pain, palpitations and leg swelling.   Gastrointestinal: Negative for abdominal distention, abdominal pain, anal bleeding, blood in stool, constipation, diarrhea, nausea and vomiting.   Endocrine: Negative for cold intolerance, heat intolerance, polydipsia, polyphagia and polyuria.   Genitourinary: Negative for decreased urine volume, difficulty urinating, flank pain, frequency, hematuria and urgency.   Musculoskeletal: Negative for arthralgias, back pain, gait problem, myalgias, neck pain and neck stiffness.   Skin: Negative for color change, rash and wound.   Allergic/Immunologic: Negative for food allergies and immunocompromised state.   Neurological: Negative for dizziness, tremors, seizures, syncope, speech difficulty, weakness,  light-headedness and headaches.   Hematological: Negative for adenopathy. Does not bruise/bleed easily.   Psychiatric/Behavioral: Negative for confusion and sleep disturbance. The patient is not nervous/anxious.    All other systems reviewed and are negative.    Objective:     Vital Signs (Most Recent):  Temp: 97.9 °F (36.6 °C) (08/28/17 1108)  Pulse: 86 (08/28/17 1108)  Resp: 20 (08/28/17 1108)  BP: (!) 150/73 (08/28/17 1108)  SpO2: 97 % (08/28/17 1108) Vital Signs (24h Range):  Temp:  [97.7 °F (36.5 °C)-98.1 °F (36.7 °C)] 97.9 °F (36.6 °C)  Pulse:  [] 86  Resp:  [18-20] 20  SpO2:  [95 %-97 %] 97 %  BP: (142-161)/(71-80) 150/73     Weight: 117 kg (257 lb 15 oz)  Body mass index is 29.81 kg/m².    Intake/Output Summary (Last 24 hours) at 08/28/17 1149  Last data filed at 08/28/17 0800   Gross per 24 hour   Intake              720 ml   Output             1650 ml   Net             -930 ml      Physical Exam   Constitutional: He is oriented to person, place, and time. He appears well-developed and well-nourished. No distress.   HENT:   Head: Normocephalic and atraumatic.   Nose: Nose normal.   Mouth/Throat: Oropharynx is clear and moist.   Eyes: Conjunctivae and EOM are normal. Pupils are equal, round, and reactive to light. Right eye exhibits no discharge. Left eye exhibits no discharge.   Neck: Normal range of motion. Neck supple. No JVD present. No tracheal deviation present. No thyromegaly present.   Cardiovascular: Normal rate, regular rhythm, normal heart sounds and intact distal pulses.  Exam reveals no gallop and no friction rub.    No murmur heard.  Pulmonary/Chest: Effort normal and breath sounds normal. No respiratory distress. He has no wheezes. He has no rales. He exhibits no tenderness.   Abdominal: Soft. Bowel sounds are normal. He exhibits no distension and no mass. There is no tenderness.   Musculoskeletal: Normal range of motion. He exhibits no edema, tenderness or deformity.   Neurological: He  is alert and oriented to person, place, and time. He exhibits normal muscle tone. Coordination normal.   Skin: Skin is warm and dry. Capillary refill takes less than 2 seconds. No rash noted. He is not diaphoretic. No erythema.   Psychiatric: He has a normal mood and affect. His behavior is normal.   Nursing note and vitals reviewed.      Significant Labs:   BMP:   Recent Labs  Lab 08/28/17  0435   *      K 4.6      CO2 21*   BUN 80*   CREATININE 13.4*   CALCIUM 7.0*   MG 2.3     CBC:   Recent Labs  Lab 08/28/17  0435   WBC 6.79   HGB 7.7*   HCT 23.4*        All pertinent labs within the past 24 hours have been reviewed.    Significant Imaging: I have reviewed all pertinent imaging results/findings within the past 24 hours.    Assessment/Plan:      * Acute renal failure superimposed on chronic kidney disease    --Non-oliguric  --Nephrology following  --Continue IV Lasix   --Daily weights  --I&O  --Fistula not matured enough to use - Vas Cath placed  --HD initiated per pt request on 8/28/17        Acute on chronic diastolic heart failure    --IV furosemide  --daily weights  --I & O  --HD initiated today 8/28/17        NSVT (nonsustained ventricular tachycardia)    --X 1 on 8/27/17  --asymptomatic  --cardiac monitoring  --cardiology following          Hypocalcemia    Check PO4 level as well; likely secondary hyperparathyroidism;  Check intact molecule PTH if not done recently;  Also hepatitis serologies if needs to be enrolled in in center HD program.    P04 7.9; will add phoslo to regimen ans phosphate binder; check intact molecule PTH;  Corrected calcium ~8.3        Anemia    --H/H 7.7/23.4  --monitor and transfuse if indicated              DM II (diabetes mellitus, type II), controlled    --SSI and accuchecks  --Diabetic diet        Essential hypertension    --continue furosemide, diltiazem  --low sodium diet        Chronic kidney disease, stage V    --HD initiated via Vas Cath           VTE Risk Mitigation         Ordered     Place sequential compression device  Until discontinued      08/25/17 0136     Medium Risk of VTE  Once      08/25/17 0134     Place sequential compression device  Until discontinued      08/25/17 0134              BENTON Simmons-SOHEILA  Department of Hospital Medicine   [facility]

## 2017-08-28 NOTE — INTERVAL H&P NOTE
The patient has been examined and the H&P has been reviewed:    I concur with the findings and changes have been noted since the H&P was written: Patient now is in need of urgent HD. He was refusing a vascular catheter but is now in agreement. He has a fistula that needs to mature.     Anesthesia/Surgery risks, benefits and alternative options discussed and understood by patient/family.          Active Hospital Problems    Diagnosis  POA    NSVT (nonsustained ventricular tachycardia) [I47.2]  Unknown    Uremia [N19]  No    Acute renal failure superimposed on chronic kidney disease [N17.9, N18.9]  Yes    Hypocalcemia [E83.51]  Yes    Chronic kidney disease, stage V [N18.5]  Yes    Anemia [D64.9]  Unknown    Acute on chronic diastolic heart failure [I50.33]  Yes    DM II (diabetes mellitus, type II), controlled [E11.9]  Yes    Essential hypertension [I10]  Yes     Chronic      Resolved Hospital Problems    Diagnosis Date Resolved POA    *Acute pulmonary edema [J81.0] 08/27/2017 Yes    Shortness of breath [R06.02] 08/27/2017 Yes    UGO (acute kidney injury) [N17.9] 08/27/2017 Yes

## 2017-08-28 NOTE — PLAN OF CARE
Patient received hd for 2 hours today. Net removal none. No access problems. Tolerated well. Dr. Granados visited during hd. Adm. Mannitol as ordered with hd.

## 2017-08-28 NOTE — CONSULTS
Pt 7 beat run of Vtach posted in chart. Dr. Tomlin notified pt denied having in chest pain, sob, or heart palpitations. No change in vital signs. Dr. Tomlin ordered labs and and ekg.

## 2017-08-28 NOTE — PLAN OF CARE
Problem: Patient Care Overview  Goal: Plan of Care Review  Outcome: Ongoing (interventions implemented as appropriate)  Pt. Currently resting in bed. NSR on monitor on RA.   No c/o of CP. Pt states he wants to talk to Dr. Tomlin about HD.   Fall precautions maintained. Family at bedside.  Plan of care reviewed. Will continue to monitor.

## 2017-08-28 NOTE — PROGRESS NOTES
Dr. Peralta with vascular called about pt getting eduardo cano, MD is currently on her way and requesting cath lab or OR space.  Cath lab said the next available time is greater than an hour and the OR said the next available is not for another 3-4 hours.  MD stated she will call cath lab and come back, pt to remain NPO.  Vas cath consent and dialysis consent placed in pt chart.

## 2017-08-28 NOTE — PROGRESS NOTES
[facility]  Nephrology  Progress Note    Patient Name: Conner Perez III  MRN: [id number]  Admission Date: 8/24/2017  Hospital Length of Stay: 3 days  Attending Provider: Antione Slade MD   Primary Care Physician: Raciel Angela MD  Principal Problem:Acute pulmonary edema. CKD stage 5.    Subjective:     HPI: Pt was seen and examined. H/o fully reviewed. Pt is a 66 y/o male with CKD stage 5 (baseline s Cr 6), DM, HTN, diastolic CHF who was admitted for SOB, suspected to be due to CHF exacerbation. Pt has received lasix IV, and says he is feeling better now. PT follows with Dr. Rhodes (nephrologist with [facility]) and has an UE AVF already in place in anticipation for chronic, long term HD. He was admitted at [facility] in jan 2017 for L second toe osteomyelitis. Pt desires to f/u with dr. Rhodes for his renal issues, and has an appt with him in Sept 2017.    Interval History: Pt was seen and examined. Discussed with pt and his girlfriend. Pt is noted to have had 7 beat run of VTach last pm. D/c home was put on hold. This am, felt well, no c/o's, no palpitation, no CP, no discomfort, no SOB, no dizziness.     Review of patient's allergies indicates:   Allergen Reactions    Augmentin [amoxicillin-pot clavulanate]     Lisinopril      cough    Sulfa (sulfonamide antibiotics)      swelling     Current Facility-Administered Medications   Medication Frequency    acetaminophen tablet 650 mg Q8H PRN    albuterol-ipratropium 2.5mg-0.5mg/3mL nebulizer solution 3 mL Q4H PRN    atorvastatin tablet 40 mg QHS    dextrose 50% injection 12.5 g PRN    dextrose 50% injection 25 g PRN    diltiaZEM 24 hr capsule 360 mg Daily    diphenhydrAMINE capsule 25 mg Q6H PRN    furosemide tablet 40 mg Q8H    glucagon (human recombinant) injection 1 mg PRN    glucose chewable tablet 16 g PRN    glucose chewable tablet 24 g PRN    guaifenesin 100 mg/5 ml syrup 200 mg Q4H PRN    hydrALAZINE injection 10 mg Q8H PRN     hydrALAZINE tablet 50 mg Q8H    insulin aspart pen 0-5 Units QID (AC + HS) PRN    insulin aspart protamine-insulin aspart (NovoLOG 70/30) injection BID    mannitol 25% injection 25 g Once    [START ON 8/29/2017] mannitol 25% injection 25 g Once    ondansetron injection 4 mg Q8H PRN    pantoprazole EC tablet 40 mg Daily    sodium bicarbonate tablet 650 mg TID    tamsulosin 24 hr capsule 0.4 mg Daily       Objective:     Vital Signs (Most Recent):  Temp: 97.9 °F (36.6 °C) (08/28/17 1108)  Pulse: 85 (08/28/17 1334)  Resp: 20 (08/28/17 1108)  BP: (!) 150/73 (08/28/17 1108)  SpO2: 97 % (08/28/17 1334)  O2 Device (Oxygen Therapy): room air (08/28/17 1334) Vital Signs (24h Range):  Temp:  [97.7 °F (36.5 °C)-98.1 °F (36.7 °C)] 97.9 °F (36.6 °C)  Pulse:  [] 85  Resp:  [18-20] 20  SpO2:  [95 %-97 %] 97 %  BP: (142-161)/(71-80) 150/73     Weight: 117 kg (257 lb 15 oz) (08/27/17 0415)  Body mass index is 29.81 kg/m².  Body surface area is 2.54 meters squared.    I/O last 3 completed shifts:  In: 1280 [P.O.:1280]  Out: 2000 [Urine:2000]    Physical Exam   Constitutional: He is oriented to person, place, and time. He appears well-developed and well-nourished.   HENT:   Head: Normocephalic and atraumatic.   Eyes: Pupils are equal, round, and reactive to light.   Neck: Normal range of motion. No JVD present.   Cardiovascular: Normal rate, regular rhythm and normal heart sounds.  Exam reveals no friction rub.    Pulmonary/Chest: Effort normal. He has rales.   Abdominal: Soft. Bowel sounds are normal. He exhibits no distension. There is no tenderness.   Musculoskeletal: Normal range of motion.   Neurological: He is alert and oriented to person, place, and time.   Skin: Skin is warm and dry.   Psychiatric: He has a normal mood and affect. His behavior is normal.   Nursing note and vitals reviewed.      Significant Labs: reviewed  BMP  Lab Results   Component Value Date     08/28/2017    K 4.6 08/28/2017    CL  102 08/28/2017    CO2 21 (L) 08/28/2017    BUN 80 (H) 08/28/2017    CREATININE 13.4 (H) 08/28/2017    CALCIUM 7.0 (L) 08/28/2017    ANIONGAP 14 08/28/2017    ESTGFRAFRICA 4 (A) 08/28/2017    EGFRNONAA 3 (A) 08/28/2017     Lab Results   Component Value Date    WBC 6.79 08/28/2017    HGB 7.7 (L) 08/28/2017    HCT 23.4 (L) 08/28/2017    MCV 84 08/28/2017     08/28/2017       Significant Imaging: reviewed CXR    Reviewed EKG and telemetry records    Assessment/Plan:     Chronic kidney disease, stage V    68 y/o male with UGO on very advanced CKD stage 5 presented with CHF exacerbation        Chronic kidney disease, stage V     Renal: has CKD stage 5.  Pt is pre-ESRD, pre-HD  New run of ventricular tachycardia is very concerning.  Strongly advised the pt of this significance in layman's language  Strongly advised the pt to start chronic HD  Explained to him that he will be at risk of sudden cardiac death without HD  Pt is uremic.  K is normal at this point   Metabolic acidosis  AVF not fully mature yet     Recommend placement of an IJ vas cath and starting chronic HD  Pt initially did not agree. Wanted to think about it.  I returned after about 2 hours, further discussion occurred. Pt agreed to recommendations  Risks and benefits of hemodialysis initiation were explained to the pt. Benefits include correction of hyperkalemia and other electrolyte disorders, maintanance of fluid balance, and improvement in oxygenation. Risks include changes in fluid status, heart failure, and death.  Vascular was consulted for IJ vas cath placement (AV not ready)  Hep B serologies will be ordered for outpt HD placement purpose  SW will be consulted for outpt HD placement  Discussed with HD RN  Will give mannitol with HD  HD#1 today x 2 hours  HD#2 tomorrow x 3 hours    Secondary hyperparathyroidism  Hypocalcemia  Outpt nephrologist aware, will manage as outpt.       * Acute pulmonary edema     Dyspnea.  Due to CHF  exacerbation  Good response to IV diuretics, switched to PO  Discussed salt restriction (2-3 g/day) and fluid restriction (1-2 L/day) with pt.  Pt has improved clinically  Is hemodynamically stable.       Essential hypertension     HTN: BP controlled after restarting hydralazine  Meds reviewed       DM II (diabetes mellitus, type II), controlled     Long standing DM.  Diabetic nephropathy.       Plans and recommendations:        As detailed above        Start HD today, see above foe details of the plans.        Discussed with pt        Opportunity for questions and discussion provided.        Pt verbalized understanding.  This was an extensive consult visit today, multiple visits and evaluations made. Multiple issues addressed. Quality of medical care provided was critical care level.  Total critical care time provided was 50 min.  More than 50% spent on counseling and coordination of care.                Lazara Granados MD  Nephrology  [facility]

## 2017-08-28 NOTE — PROGRESS NOTES
Pt off of unit for vas cath placement.  Dialysis called and are ready for pt once he returns from getting vas cath placed.

## 2017-08-28 NOTE — ASSESSMENT & PLAN NOTE
66 y/o male with UGO on very advanced CKD stage 5 presented with CHF exacerbation        Chronic kidney disease, stage V     Renal: has CKD stage 5.  Pt is pre-ESRD, pre-HD  s Cr has remained stable, but very high  Pt is uremic.  Although K is normal and pt only has mild metabolic acidosis, recommend start chronic HD   AVF not fully mature yet  Recommend placement of an IJ vas cath  Pt declined.  Risk of dying without dialysis treatment explained to pt. Verbalized understanding  Has other complications of CKD  Secondary hyperparathyroidism  Hypocalcemia  Outpt nephrologist aware, will manage as outpt.       * Acute pulmonary edema     Dyspnea.  Due to CHF exacerbation  Good response to IV diuretics, switched to PO  Discussed salt restriction (2-3 g/day) and fluid restriction (1-2 L/day) with pt.  Pt has improved clinically  Is hemodynamically stable.       Essential hypertension     HTN: BP controlled after restarting hydralazine  Meds reviewed       DM II (diabetes mellitus, type II), controlled     Long standing DM.  Diabetic nephropathy.       Plans and recommendations:  As detailed above  Has f/u with Vascular: Dr. Jeremias Peralta  Has outpt f/u with Dr. Dom Rhodes on 9/15/17  Pt was advised to see above doctors in f/u        Pt was advised to go to ER for any CP, discomfort, SOB, puffy legs, neurological developments, sz, or if not           feeling right.        Pt verbalized understanding.

## 2017-08-28 NOTE — PROGRESS NOTES
"Renal addendum note:  Pt was visited twice while receiving HD initiation.  Was doing "well", no CP, no SOB, no dizziness    A/P ESRD  Started chronic HD  HD#1 today  No issues with HD  No sx's or signs of dialysis dysequilibrium syndrome  Receiving lactulose with HD.  Continue HD    Lazara Granados MD  "

## 2017-08-28 NOTE — SUBJECTIVE & OBJECTIVE
Interval History: Pt was seen and examined. Discussed with pt and his girlfriend. Pt is noted to have had 7 beat run of VTach last pm. D/c home was put on hold. This am, felt well, no c/o's, no palpitation, no CP, no discomfort, no SOB, no dizziness.     Review of patient's allergies indicates:   Allergen Reactions    Augmentin [amoxicillin-pot clavulanate]     Lisinopril      cough    Sulfa (sulfonamide antibiotics)      swelling     Current Facility-Administered Medications   Medication Frequency    acetaminophen tablet 650 mg Q8H PRN    albuterol-ipratropium 2.5mg-0.5mg/3mL nebulizer solution 3 mL Q4H PRN    atorvastatin tablet 40 mg QHS    dextrose 50% injection 12.5 g PRN    dextrose 50% injection 25 g PRN    diltiaZEM 24 hr capsule 360 mg Daily    diphenhydrAMINE capsule 25 mg Q6H PRN    furosemide tablet 40 mg Q8H    glucagon (human recombinant) injection 1 mg PRN    glucose chewable tablet 16 g PRN    glucose chewable tablet 24 g PRN    guaifenesin 100 mg/5 ml syrup 200 mg Q4H PRN    hydrALAZINE injection 10 mg Q8H PRN    hydrALAZINE tablet 50 mg Q8H    insulin aspart pen 0-5 Units QID (AC + HS) PRN    insulin aspart protamine-insulin aspart (NovoLOG 70/30) injection BID    mannitol 25% injection 25 g Once    [START ON 8/29/2017] mannitol 25% injection 25 g Once    ondansetron injection 4 mg Q8H PRN    pantoprazole EC tablet 40 mg Daily    sodium bicarbonate tablet 650 mg TID    tamsulosin 24 hr capsule 0.4 mg Daily       Objective:     Vital Signs (Most Recent):  Temp: 97.9 °F (36.6 °C) (08/28/17 1108)  Pulse: 85 (08/28/17 1334)  Resp: 20 (08/28/17 1108)  BP: (!) 150/73 (08/28/17 1108)  SpO2: 97 % (08/28/17 1334)  O2 Device (Oxygen Therapy): room air (08/28/17 1334) Vital Signs (24h Range):  Temp:  [97.7 °F (36.5 °C)-98.1 °F (36.7 °C)] 97.9 °F (36.6 °C)  Pulse:  [] 85  Resp:  [18-20] 20  SpO2:  [95 %-97 %] 97 %  BP: (142-161)/(71-80) 150/73     Weight: 117 kg (257 lb 15 oz)  (08/27/17 8355)  Body mass index is 29.81 kg/m².  Body surface area is 2.54 meters squared.    I/O last 3 completed shifts:  In: 1280 [P.O.:1280]  Out: 2000 [Urine:2000]    Physical Exam   Constitutional: He is oriented to person, place, and time. He appears well-developed and well-nourished.   HENT:   Head: Normocephalic and atraumatic.   Eyes: Pupils are equal, round, and reactive to light.   Neck: Normal range of motion. No JVD present.   Cardiovascular: Normal rate, regular rhythm and normal heart sounds.  Exam reveals no friction rub.    Pulmonary/Chest: Effort normal. He has rales.   Abdominal: Soft. Bowel sounds are normal. He exhibits no distension. There is no tenderness.   Musculoskeletal: Normal range of motion.   Neurological: He is alert and oriented to person, place, and time.   Skin: Skin is warm and dry.   Psychiatric: He has a normal mood and affect. His behavior is normal.   Nursing note and vitals reviewed.      Significant Labs: reviewed  BMP  Lab Results   Component Value Date     08/28/2017    K 4.6 08/28/2017     08/28/2017    CO2 21 (L) 08/28/2017    BUN 80 (H) 08/28/2017    CREATININE 13.4 (H) 08/28/2017    CALCIUM 7.0 (L) 08/28/2017    ANIONGAP 14 08/28/2017    ESTGFRAFRICA 4 (A) 08/28/2017    EGFRNONAA 3 (A) 08/28/2017     Lab Results   Component Value Date    WBC 6.79 08/28/2017    HGB 7.7 (L) 08/28/2017    HCT 23.4 (L) 08/28/2017    MCV 84 08/28/2017     08/28/2017       Significant Imaging: reviewed CXR    Reviewed EKG and telemetry records

## 2017-08-28 NOTE — SUBJECTIVE & OBJECTIVE
Interval History: No acute events overnight. Denies Cp, palpitations, irregular heart beat, and dizziness. Pt has decided he would like to have Vas Cath placed and initiate HD today. Nephrology notified.       Review of Systems   Constitutional: Negative for activity change, appetite change, chills, fatigue and fever.   HENT: Negative for dental problem, ear pain, hearing loss, mouth sores, nosebleeds, sinus pressure, sore throat, tinnitus and trouble swallowing.    Eyes: Negative for pain, discharge and visual disturbance.   Respiratory: Positive for shortness of breath (mild). Negative for cough, choking, chest tightness and wheezing.    Cardiovascular: Negative for chest pain, palpitations and leg swelling.   Gastrointestinal: Negative for abdominal distention, abdominal pain, anal bleeding, blood in stool, constipation, diarrhea, nausea and vomiting.   Endocrine: Negative for cold intolerance, heat intolerance, polydipsia, polyphagia and polyuria.   Genitourinary: Negative for decreased urine volume, difficulty urinating, flank pain, frequency, hematuria and urgency.   Musculoskeletal: Negative for arthralgias, back pain, gait problem, myalgias, neck pain and neck stiffness.   Skin: Negative for color change, rash and wound.   Allergic/Immunologic: Negative for food allergies and immunocompromised state.   Neurological: Negative for dizziness, tremors, seizures, syncope, speech difficulty, weakness, light-headedness and headaches.   Hematological: Negative for adenopathy. Does not bruise/bleed easily.   Psychiatric/Behavioral: Negative for confusion and sleep disturbance. The patient is not nervous/anxious.    All other systems reviewed and are negative.    Objective:     Vital Signs (Most Recent):  Temp: 97.9 °F (36.6 °C) (08/28/17 1108)  Pulse: 86 (08/28/17 1108)  Resp: 20 (08/28/17 1108)  BP: (!) 150/73 (08/28/17 1108)  SpO2: 97 % (08/28/17 1108) Vital Signs (24h Range):  Temp:  [97.7 °F (36.5 °C)-98.1 °F  (36.7 °C)] 97.9 °F (36.6 °C)  Pulse:  [] 86  Resp:  [18-20] 20  SpO2:  [95 %-97 %] 97 %  BP: (142-161)/(71-80) 150/73     Weight: 117 kg (257 lb 15 oz)  Body mass index is 29.81 kg/m².    Intake/Output Summary (Last 24 hours) at 08/28/17 1149  Last data filed at 08/28/17 0800   Gross per 24 hour   Intake              720 ml   Output             1650 ml   Net             -930 ml      Physical Exam   Constitutional: He is oriented to person, place, and time. He appears well-developed and well-nourished. No distress.   HENT:   Head: Normocephalic and atraumatic.   Nose: Nose normal.   Mouth/Throat: Oropharynx is clear and moist.   Eyes: Conjunctivae and EOM are normal. Pupils are equal, round, and reactive to light. Right eye exhibits no discharge. Left eye exhibits no discharge.   Neck: Normal range of motion. Neck supple. No JVD present. No tracheal deviation present. No thyromegaly present.   Cardiovascular: Normal rate, regular rhythm, normal heart sounds and intact distal pulses.  Exam reveals no gallop and no friction rub.    No murmur heard.  Pulmonary/Chest: Effort normal and breath sounds normal. No respiratory distress. He has no wheezes. He has no rales. He exhibits no tenderness.   Abdominal: Soft. Bowel sounds are normal. He exhibits no distension and no mass. There is no tenderness.   Musculoskeletal: Normal range of motion. He exhibits no edema, tenderness or deformity.   Neurological: He is alert and oriented to person, place, and time. He exhibits normal muscle tone. Coordination normal.   Skin: Skin is warm and dry. Capillary refill takes less than 2 seconds. No rash noted. He is not diaphoretic. No erythema.   Psychiatric: He has a normal mood and affect. His behavior is normal.   Nursing note and vitals reviewed.      Significant Labs:   BMP:   Recent Labs  Lab 08/28/17  0435   *      K 4.6      CO2 21*   BUN 80*   CREATININE 13.4*   CALCIUM 7.0*   MG 2.3     CBC:   Recent  Labs  Lab 08/28/17  0435   WBC 6.79   HGB 7.7*   HCT 23.4*        All pertinent labs within the past 24 hours have been reviewed.    Significant Imaging: I have reviewed all pertinent imaging results/findings within the past 24 hours.

## 2017-08-29 PROBLEM — N19 UREMIA: Status: ACTIVE | Noted: 2017-08-29

## 2017-08-29 PROBLEM — N18.6 ESRD (END STAGE RENAL DISEASE): Status: ACTIVE | Noted: 2017-08-29

## 2017-08-29 LAB
ANION GAP SERPL CALC-SCNC: 14 MMOL/L
BASOPHILS # BLD AUTO: 0.06 K/UL
BASOPHILS NFR BLD: 0.9 %
BUN SERPL-MCNC: 64 MG/DL
CALCIUM SERPL-MCNC: 7.4 MG/DL
CHLORIDE SERPL-SCNC: 100 MMOL/L
CO2 SERPL-SCNC: 24 MMOL/L
CREAT SERPL-MCNC: 11.1 MG/DL
DIFFERENTIAL METHOD: ABNORMAL
EOSINOPHIL # BLD AUTO: 0.2 K/UL
EOSINOPHIL NFR BLD: 3 %
ERYTHROCYTE [DISTWIDTH] IN BLOOD BY AUTOMATED COUNT: 12.7 %
EST. GFR  (AFRICAN AMERICAN): 5 ML/MIN/1.73 M^2
EST. GFR  (NON AFRICAN AMERICAN): 4 ML/MIN/1.73 M^2
GLUCOSE SERPL-MCNC: 162 MG/DL
HBV CORE AB SERPL QL IA: NEGATIVE
HBV SURFACE AB SER-ACNC: NEGATIVE M[IU]/ML
HBV SURFACE AG SERPL QL IA: NEGATIVE
HCT VFR BLD AUTO: 24.4 %
HGB BLD-MCNC: 8 G/DL
LYMPHOCYTES # BLD AUTO: 1.6 K/UL
LYMPHOCYTES NFR BLD: 22.4 %
MCH RBC QN AUTO: 27.8 PG
MCHC RBC AUTO-ENTMCNC: 32.8 G/DL
MCV RBC AUTO: 85 FL
MONOCYTES # BLD AUTO: 0.6 K/UL
MONOCYTES NFR BLD: 8.8 %
NEUTROPHILS # BLD AUTO: 4.6 K/UL
NEUTROPHILS NFR BLD: 64.9 %
PLATELET # BLD AUTO: 174 K/UL
PMV BLD AUTO: 9.9 FL
POCT GLUCOSE: 112 MG/DL (ref 70–110)
POCT GLUCOSE: 121 MG/DL (ref 70–110)
POCT GLUCOSE: 167 MG/DL (ref 70–110)
POCT GLUCOSE: 195 MG/DL (ref 70–110)
POTASSIUM SERPL-SCNC: 4.8 MMOL/L
RBC # BLD AUTO: 2.88 M/UL
SODIUM SERPL-SCNC: 138 MMOL/L
WBC # BLD AUTO: 7.05 K/UL

## 2017-08-29 PROCEDURE — 63600175 PHARM REV CODE 636 W HCPCS: Performed by: INTERNAL MEDICINE

## 2017-08-29 PROCEDURE — 25000003 PHARM REV CODE 250: Performed by: INTERNAL MEDICINE

## 2017-08-29 PROCEDURE — 85025 COMPLETE CBC W/AUTO DIFF WBC: CPT

## 2017-08-29 PROCEDURE — 80048 BASIC METABOLIC PNL TOTAL CA: CPT

## 2017-08-29 PROCEDURE — 36415 COLL VENOUS BLD VENIPUNCTURE: CPT

## 2017-08-29 PROCEDURE — 21400001 HC TELEMETRY ROOM

## 2017-08-29 PROCEDURE — 99233 SBSQ HOSP IP/OBS HIGH 50: CPT | Mod: ,,, | Performed by: INTERNAL MEDICINE

## 2017-08-29 PROCEDURE — 80100016 HC MAINTENANCE HEMODIALYSIS

## 2017-08-29 PROCEDURE — 94761 N-INVAS EAR/PLS OXIMETRY MLT: CPT

## 2017-08-29 RX ORDER — AMLODIPINE BESYLATE 5 MG/1
5 TABLET ORAL DAILY
Status: DISCONTINUED | OUTPATIENT
Start: 2017-08-30 | End: 2017-08-30

## 2017-08-29 RX ADMIN — DILTIAZEM HYDROCHLORIDE 360 MG: 180 CAPSULE, EXTENDED RELEASE ORAL at 09:08

## 2017-08-29 RX ADMIN — FUROSEMIDE 40 MG: 40 TABLET ORAL at 01:08

## 2017-08-29 RX ADMIN — SODIUM BICARBONATE 650 MG TABLET 650 MG: at 09:08

## 2017-08-29 RX ADMIN — SODIUM BICARBONATE 650 MG TABLET 650 MG: at 05:08

## 2017-08-29 RX ADMIN — HYDRALAZINE HYDROCHLORIDE 50 MG: 50 TABLET, FILM COATED ORAL at 01:08

## 2017-08-29 RX ADMIN — PANTOPRAZOLE SODIUM 40 MG: 40 TABLET, DELAYED RELEASE ORAL at 09:08

## 2017-08-29 RX ADMIN — FUROSEMIDE 40 MG: 40 TABLET ORAL at 05:08

## 2017-08-29 RX ADMIN — TAMSULOSIN HYDROCHLORIDE 0.4 MG: 0.4 CAPSULE ORAL at 09:08

## 2017-08-29 RX ADMIN — HYDRALAZINE HYDROCHLORIDE 50 MG: 50 TABLET, FILM COATED ORAL at 05:08

## 2017-08-29 RX ADMIN — HEPARIN SODIUM 3200 UNITS: 1000 INJECTION, SOLUTION INTRAVENOUS; SUBCUTANEOUS at 09:08

## 2017-08-29 RX ADMIN — SODIUM BICARBONATE 650 MG TABLET 650 MG: at 01:08

## 2017-08-29 RX ADMIN — MANNITOL 25 G: 250 INJECTION, SOLUTION INTRAVENOUS at 09:08

## 2017-08-29 RX ADMIN — ATORVASTATIN CALCIUM 40 MG: 40 TABLET, FILM COATED ORAL at 09:08

## 2017-08-29 RX ADMIN — HYDRALAZINE HYDROCHLORIDE 50 MG: 50 TABLET, FILM COATED ORAL at 09:08

## 2017-08-29 NOTE — PLAN OF CARE
CM received call from Judith with [facility].  Patient accepted at [facility] on a MWF schedule.  He should report to [facility] @10:30 for his first treatment. Information/update provided to patient.

## 2017-08-29 NOTE — ASSESSMENT & PLAN NOTE
68 y/o male with UGO on very advanced CKD stage 5 presented with CHF exacerbation        Chronic kidney disease, stage V     Renal: has CKD stage 5.  Pt is pre-ESRD, pre-HD  New run of ventricular tachycardia is very concerning.  Strongly advised the pt of this significance in layman's language  Strongly advised the pt to start chronic HD  Explained to him that he will be at risk of sudden cardiac death without HD  Pt is uremic.  K is normal at this point   Metabolic acidosis  AVF not fully mature yet      Recommend placement of an IJ vas cath and starting chronic HD  Pt initially did not agree. Wanted to think about it.  I returned after about 2 hours, further discussion occurred. Pt agreed to recommendations  Risks and benefits of hemodialysis initiation were explained to the pt. Benefits include correction of hyperkalemia and other electrolyte disorders, maintanance of fluid balance, and improvement in oxygenation. Risks include changes in fluid status, heart failure, and death.  Vascular was consulted for IJ vas cath placement (AV not ready)  Hep B serologies will be ordered for outpt HD placement purpose  SW will be consulted for outpt HD placement  Discussed with HD RN  Will give mannitol with HD  HD#1 today x 2 hours  HD#2 tomorrow x 3 hours     Secondary hyperparathyroidism  Hypocalcemia  Outpt nephrologist aware, will manage as outpt.       * Acute pulmonary edema     Dyspnea.  Due to CHF exacerbation  Good response to IV diuretics, switched to PO  Discussed salt restriction (2-3 g/day) and fluid restriction (1-2 L/day) with pt.  Pt has improved clinically  Is hemodynamically stable.       Essential hypertension     HTN: BP controlled after restarting hydralazine  Meds reviewed       DM II (diabetes mellitus, type II), controlled     Long standing DM.  Diabetic nephropathy.       Plans and recommendations:        As detailed above        Start HD today, see above foe details of the plans.        Discussed  with pt        Opportunity for questions and discussion provided.        Pt verbalized understanding.  This was an extensive consult visit today, multiple visits and evaluations made. Multiple issues addressed. Quality of medical care provided was critical care level.  Total critical care time provided was 50 min.  More than 50% spent on counseling and coordination of care.

## 2017-08-29 NOTE — PROGRESS NOTES
Pt back from dialysis, VSS. Pt on RA, no SOB noted. Lunch heated up for patient, blood sugar 112. Pt states he feels fine. Will continue to monitor.

## 2017-08-29 NOTE — PLAN OF CARE
Problem: Patient Care Overview  Goal: Plan of Care Review  Outcome: Ongoing (interventions implemented as appropriate)  Pt free of falls during shift. VSS. PIV intact and saline locked. Sinus rhythm on tele monitor. Pt on room air, no SOB noted. Pt had hemodialysis today, tolerated well. No complaints of pain and no complications during shift. Pt resting in bed. Call light in reach, hourly rounding made, will continue to monitor.

## 2017-08-29 NOTE — PROGRESS NOTES
[facility] Hospital Medicine  Progress Note    Patient Name: Conner Perez III  MRN: [id number]  Patient Class: IP- Inpatient   Admission Date: 8/24/2017  Length of Stay: 4 days  Attending Physician: Antione Slade MD  Primary Care Provider: Raciel Angela MD        Subjective:     Principal Problem:Acute renal failure superimposed on chronic kidney disease    HPI:  Mr. Perez is a 66 y/o AA male with h/o CKD V, T2DM, HTN, CAD preented to the ED c/o worsening SOB since yesterday. He was noted to have a creatinine of 12.1 on 7/30/17, but makes good amount of urine. He takes lasix 40 mg po daily. He was seen by  (nephrology) on 7/30/17.  Today CXR shows pulmonary edema pattern with vascular congestion.  was contacted, recommended giving Lasix 80 mg IV x1. Patient currently not on oxygen, and sats mid-high 90's on room air. Patient already has right radial artery fistula in place. Never been on dialysis before.    Hospital Course:  Patient diuresing with IV lasix. Patient non-oliguric; has a fistula in place - too immature to use at present, placed in june of this year. Nephrology Dr. Granados consulted. Pts nephrologist, Dr. Rhodes not adverse to his starting hemodialysis here. Pt was given high dose parenteral lasix with prompt and sustained diuresis. Renal function was monitored and remained level. CXR clearing on serial exams. No s/s uremia. He was offered placement of a temporary access to initiate HD here if needed, however refused, this opting instead to follow up with his nephrologist as scheduled in early September.  Patient had run of V.Tach - asymptomatic. Repeat electrolytes and magnesium were normal. Pt kept overnight for continued cardiac monitoring. In Am, pt decided he wanted to initiate HD now, not wait for nephrology appt in Sept. Nephrology notified. Vas-Cath placed and HD initiated on 8/28/17. 2nd HD on 8/29/17. Pt set to dialyze at [facility] on M-W-F at  10:30am.    Interval History: No acute events overnight. Pt had HD yesterday and again today. Reports he is feeling better - less chest tightness, more energy. Will dc home after 3rd HD treatment.    Review of Systems   Constitutional: Negative for activity change, appetite change, chills, fatigue and fever.   HENT: Negative for dental problem, ear pain, hearing loss, mouth sores, nosebleeds, sinus pressure, sore throat, tinnitus and trouble swallowing.    Eyes: Negative for pain, discharge and visual disturbance.   Respiratory: Positive for chest tightness. Negative for cough, choking, shortness of breath and wheezing.    Cardiovascular: Negative for chest pain, palpitations and leg swelling.   Gastrointestinal: Negative for abdominal distention, abdominal pain, anal bleeding, blood in stool, constipation, diarrhea, nausea and vomiting.   Endocrine: Negative for cold intolerance, heat intolerance, polydipsia, polyphagia and polyuria.   Genitourinary: Negative for decreased urine volume, difficulty urinating, flank pain, frequency, hematuria and urgency.   Musculoskeletal: Negative for arthralgias, back pain, gait problem, myalgias, neck pain and neck stiffness.   Skin: Negative for color change, rash and wound.   Allergic/Immunologic: Negative for food allergies and immunocompromised state.   Neurological: Positive for weakness. Negative for dizziness, tremors, seizures, syncope, speech difficulty, light-headedness and headaches.   Hematological: Negative for adenopathy. Does not bruise/bleed easily.   Psychiatric/Behavioral: Negative for confusion and sleep disturbance. The patient is not nervous/anxious.    All other systems reviewed and are negative.    Objective:     Vital Signs (Most Recent):  Temp: 98.7 °F (37.1 °C) (08/29/17 1215)  Pulse: 77 (08/29/17 1220)  Resp: 20 (08/29/17 1220)  BP: (!) 162/79 (08/29/17 1220)  SpO2: 99 % (08/29/17 1258) Vital Signs (24h Range):  Temp:  [97.3 °F (36.3 °C)-99 °F (37.2  °C)] 98.7 °F (37.1 °C)  Pulse:  [] 77  Resp:  [18-20] 20  SpO2:  [92 %-99 %] 99 %  BP: (137-189)/(68-90) 162/79     Weight: 117 kg (257 lb 15 oz)  Body mass index is 29.81 kg/m².    Intake/Output Summary (Last 24 hours) at 08/29/17 1425  Last data filed at 08/29/17 1215   Gross per 24 hour   Intake              680 ml   Output             2230 ml   Net            -1550 ml      Physical Exam   Constitutional: He is oriented to person, place, and time. He appears well-developed and well-nourished. No distress.   HENT:   Head: Normocephalic and atraumatic.   Nose: Nose normal.   Mouth/Throat: Oropharynx is clear and moist.   Eyes: Conjunctivae and EOM are normal. Pupils are equal, round, and reactive to light. Right eye exhibits no discharge. Left eye exhibits no discharge.   Neck: Normal range of motion. Neck supple. No JVD present. No tracheal deviation present. No thyromegaly present.   Cardiovascular: Normal rate, regular rhythm, normal heart sounds and intact distal pulses.  Exam reveals no gallop and no friction rub.    No murmur heard.  Pulmonary/Chest: Effort normal and breath sounds normal. No respiratory distress. He has no wheezes. He has no rales. He exhibits no tenderness.   Abdominal: Soft. Bowel sounds are normal. He exhibits no distension and no mass. There is no tenderness.   Musculoskeletal: Normal range of motion. He exhibits no edema, tenderness or deformity.   Neurological: He is alert and oriented to person, place, and time. He exhibits normal muscle tone. Coordination normal.   Skin: Skin is warm and dry. Capillary refill takes less than 2 seconds. No rash noted. He is not diaphoretic. No erythema.   Fistula to Right forearm with +thrill, +bruit   Psychiatric: He has a normal mood and affect. His behavior is normal.   Nursing note and vitals reviewed.      Significant Labs:   BMP:   Recent Labs  Lab 08/28/17  0435 08/29/17  0848   * 162*    138   K 4.6 4.8    100   CO2  21* 24   BUN 80* 64*   CREATININE 13.4* 11.1*   CALCIUM 7.0* 7.4*   MG 2.3  --      CBC:   Recent Labs  Lab 08/28/17  0435 08/29/17  0848   WBC 6.79 7.05   HGB 7.7* 8.0*   HCT 23.4* 24.4*    174     All pertinent labs within the past 24 hours have been reviewed.    Significant Imaging: I have reviewed all pertinent imaging results/findings within the past 24 hours.    Assessment/Plan:      * Acute renal failure superimposed on chronic kidney disease    --Non-oliguric  --Nephrology following  --Continue IV Lasix   --Daily weights  --I&O  --Fistula not matured enough to use - Vas Cath placed  --HD initiated per pt request on 8/28/17        Acute on chronic diastolic heart failure    --IV furosemide  --daily weights  --I & O  --HD initiated today 8/28/17        NSVT (nonsustained ventricular tachycardia)    --X 1 on 8/27/17  --asymptomatic  --cardiac monitoring  --cardiology following          Hypocalcemia    Check PO4 level as well; likely secondary hyperparathyroidism;  Check intact molecule PTH if not done recently;  Also hepatitis serologies if needs to be enrolled in in center HD program.    P04 7.9; will add phoslo to regimen ans phosphate binder; check intact molecule PTH;  Corrected calcium ~8.3        Anemia    --H/H 7.7/23.4  --monitor and transfuse if indicated              DM II (diabetes mellitus, type II), controlled    --SSI and accuchecks  --Diabetic diet        Essential hypertension    --continue furosemide, diltiazem  --low sodium diet        Chronic kidney disease, stage V    --HD initiated via Vas Cath          VTE Risk Mitigation         Ordered     Place sequential compression device  Until discontinued      08/25/17 0136     Medium Risk of VTE  Once      08/25/17 0134     Place sequential compression device  Until discontinued      08/25/17 0134              BENTON Simmons-SOHEILA  Department of Hospital Medicine   [facility]

## 2017-08-29 NOTE — PLAN OF CARE
Consult received for HD placement.  CM met with patient in HD room.  CM provided dialysis finder list.  Patient preference is for [facility].  Preference form completed and placed in chart. CM contacted Judith with [facility] and made [place].  Clinical documentation [place] [facility] @[phone].       08/29/17 1154   Discharge Reassessment   Assessment Type Discharge Planning Reassessment   Provided patient/caregiver education on the expected discharge date and the discharge plan Yes   Do you have any problems affording any of your prescribed medications? No   Discharge Plan A Home   Patient choice form signed by patient/caregiver Yes  (Outpatient HD- [facility])   Can the patient answer the patient profile reliably? Yes, cognitively intact   How does the patient rate their overall health at the present time? Good   Describe the patient's ability to walk at the present time. No restrictions   How often would a person be available to care for the patient? Often   Number of comorbid conditions (as recorded on the chart) Three   During the past month, has the patient often been bothered by feeling down, depressed or hopeless? No   During the past month, has the patient often been bothered by little interest or pleasure in doing things? No

## 2017-08-29 NOTE — SUBJECTIVE & OBJECTIVE
Interval History: Pt was seen and examined on HD. No c/o's, no new issues, no CP, no SOB, no dizziness. Receiving mannitol with HD. Opportunity for questions and discussion provided.     Review of patient's allergies indicates:   Allergen Reactions    Augmentin [amoxicillin-pot clavulanate]     Lisinopril      cough    Sulfa (sulfonamide antibiotics)      swelling     Current Facility-Administered Medications   Medication Frequency    acetaminophen tablet 650 mg Q8H PRN    albuterol-ipratropium 2.5mg-0.5mg/3mL nebulizer solution 3 mL Q4H PRN    atorvastatin tablet 40 mg QHS    dextrose 50% injection 12.5 g PRN    dextrose 50% injection 25 g PRN    diltiaZEM 24 hr capsule 360 mg Daily    diphenhydrAMINE capsule 25 mg Q6H PRN    furosemide tablet 40 mg Q8H    glucagon (human recombinant) injection 1 mg PRN    glucose chewable tablet 16 g PRN    glucose chewable tablet 24 g PRN    guaifenesin 100 mg/5 ml syrup 200 mg Q4H PRN    heparin (porcine) injection 3,200 Units PRN    hydrALAZINE injection 10 mg Q8H PRN    hydrALAZINE tablet 50 mg Q8H    hydrocodone-acetaminophen 5-325mg per tablet 1 tablet Q8H PRN    insulin aspart pen 0-5 Units QID (AC + HS) PRN    insulin aspart protamine-insulin aspart (NovoLOG 70/30) injection BID    ondansetron injection 4 mg Q8H PRN    pantoprazole EC tablet 40 mg Daily    sodium bicarbonate tablet 650 mg TID    tamsulosin 24 hr capsule 0.4 mg Daily       Objective:     Vital Signs (Most Recent):  Temp: 97.7 °F (36.5 °C) (08/29/17 1304)  Pulse: 88 (08/29/17 1304)  Resp: 18 (08/29/17 1304)  BP: (!) 192/90 (08/29/17 1304)  SpO2: 99 % (08/29/17 1304)  O2 Device (Oxygen Therapy): room air (08/29/17 1304) Vital Signs (24h Range):  Temp:  [97.6 °F (36.4 °C)-99 °F (37.2 °C)] 97.7 °F (36.5 °C)  Pulse:  [76-88] 88  Resp:  [18-20] 18  SpO2:  [92 %-99 %] 99 %  BP: (140-192)/(68-90) 192/90     Weight: 117 kg (257 lb 15 oz) (08/27/17 0415)  Body mass index is 29.81 kg/m².  Body  surface area is 2.54 meters squared.    I/O last 3 completed shifts:  In: 820 [P.O.:720; Other:100]  Out: 1980 [Urine:1380; Other:600]    Physical Exam   Constitutional: He is oriented to person, place, and time. He appears well-developed and well-nourished.   HENT:   Head: Normocephalic and atraumatic.   Eyes: Pupils are equal, round, and reactive to light.   Neck: Normal range of motion. No JVD present.   Cardiovascular: Normal rate, regular rhythm and normal heart sounds.  Exam reveals no friction rub.    Pulmonary/Chest: Effort normal. He has rales.   Abdominal: Soft. Bowel sounds are normal. He exhibits no distension. There is no tenderness.   Musculoskeletal: Normal range of motion.   Neurological: He is alert and oriented to person, place, and time.   Skin: Skin is warm and dry.   Psychiatric: He has a normal mood and affect. His behavior is normal.   Nursing note and vitals reviewed.      Significant Labs: reviewed  BMP  Lab Results   Component Value Date     08/29/2017    K 4.8 08/29/2017     08/29/2017    CO2 24 08/29/2017    BUN 64 (H) 08/29/2017    CREATININE 11.1 (H) 08/29/2017    CALCIUM 7.4 (L) 08/29/2017    ANIONGAP 14 08/29/2017    ESTGFRAFRICA 5 (A) 08/29/2017    EGFRNONAA 4 (A) 08/29/2017     Lab Results   Component Value Date    WBC 7.05 08/29/2017    HGB 8.0 (L) 08/29/2017    HCT 24.4 (L) 08/29/2017    MCV 85 08/29/2017     08/29/2017

## 2017-08-29 NOTE — PLAN OF CARE
Problem: Patient Care Overview  Goal: Plan of Care Review  Outcome: Ongoing (interventions implemented as appropriate)  Pt stable. Pt is NSR on the heart monitor and on RA.  Pt had vas cath placed today and received first round of HD.  Pt tolerated well.  Right FA fistula positive for thrills and bruits.  Plan of care reviewed.  Pt verbalizes understanding.  Pt was free from falls or injuries during duration of shift.  Pt turned and repositioned self.  PIV and right subclavian vas cath intact with no redness, swelling or drainage.  Bed low, wheels locked, bed alarm on, call light in reach.  Pt instructed to call for assistance.  Will continue to monitor.

## 2017-08-29 NOTE — SUBJECTIVE & OBJECTIVE
Interval History: No acute events overnight. Pt had HD yesterday and again today. Reports he is feeling better - less chest tightness, more energy. Will dc home after 3rd HD treatment.    Review of Systems   Constitutional: Negative for activity change, appetite change, chills, fatigue and fever.   HENT: Negative for dental problem, ear pain, hearing loss, mouth sores, nosebleeds, sinus pressure, sore throat, tinnitus and trouble swallowing.    Eyes: Negative for pain, discharge and visual disturbance.   Respiratory: Positive for chest tightness. Negative for cough, choking, shortness of breath and wheezing.    Cardiovascular: Negative for chest pain, palpitations and leg swelling.   Gastrointestinal: Negative for abdominal distention, abdominal pain, anal bleeding, blood in stool, constipation, diarrhea, nausea and vomiting.   Endocrine: Negative for cold intolerance, heat intolerance, polydipsia, polyphagia and polyuria.   Genitourinary: Negative for decreased urine volume, difficulty urinating, flank pain, frequency, hematuria and urgency.   Musculoskeletal: Negative for arthralgias, back pain, gait problem, myalgias, neck pain and neck stiffness.   Skin: Negative for color change, rash and wound.   Allergic/Immunologic: Negative for food allergies and immunocompromised state.   Neurological: Positive for weakness. Negative for dizziness, tremors, seizures, syncope, speech difficulty, light-headedness and headaches.   Hematological: Negative for adenopathy. Does not bruise/bleed easily.   Psychiatric/Behavioral: Negative for confusion and sleep disturbance. The patient is not nervous/anxious.    All other systems reviewed and are negative.    Objective:     Vital Signs (Most Recent):  Temp: 98.7 °F (37.1 °C) (08/29/17 1215)  Pulse: 77 (08/29/17 1220)  Resp: 20 (08/29/17 1220)  BP: (!) 162/79 (08/29/17 1220)  SpO2: 99 % (08/29/17 1258) Vital Signs (24h Range):  Temp:  [97.3 °F (36.3 °C)-99 °F (37.2 °C)] 98.7 °F  (37.1 °C)  Pulse:  [] 77  Resp:  [18-20] 20  SpO2:  [92 %-99 %] 99 %  BP: (137-189)/(68-90) 162/79     Weight: 117 kg (257 lb 15 oz)  Body mass index is 29.81 kg/m².    Intake/Output Summary (Last 24 hours) at 08/29/17 1425  Last data filed at 08/29/17 1215   Gross per 24 hour   Intake              680 ml   Output             2230 ml   Net            -1550 ml      Physical Exam   Constitutional: He is oriented to person, place, and time. He appears well-developed and well-nourished. No distress.   HENT:   Head: Normocephalic and atraumatic.   Nose: Nose normal.   Mouth/Throat: Oropharynx is clear and moist.   Eyes: Conjunctivae and EOM are normal. Pupils are equal, round, and reactive to light. Right eye exhibits no discharge. Left eye exhibits no discharge.   Neck: Normal range of motion. Neck supple. No JVD present. No tracheal deviation present. No thyromegaly present.   Cardiovascular: Normal rate, regular rhythm, normal heart sounds and intact distal pulses.  Exam reveals no gallop and no friction rub.    No murmur heard.  Pulmonary/Chest: Effort normal and breath sounds normal. No respiratory distress. He has no wheezes. He has no rales. He exhibits no tenderness.   Abdominal: Soft. Bowel sounds are normal. He exhibits no distension and no mass. There is no tenderness.   Musculoskeletal: Normal range of motion. He exhibits no edema, tenderness or deformity.   Neurological: He is alert and oriented to person, place, and time. He exhibits normal muscle tone. Coordination normal.   Skin: Skin is warm and dry. Capillary refill takes less than 2 seconds. No rash noted. He is not diaphoretic. No erythema.   Fistula to Right forearm with +thrill, +bruit   Psychiatric: He has a normal mood and affect. His behavior is normal.   Nursing note and vitals reviewed.      Significant Labs:   BMP:   Recent Labs  Lab 08/28/17  0435 08/29/17  0848   * 162*    138   K 4.6 4.8    100   CO2 21* 24   BUN  80* 64*   CREATININE 13.4* 11.1*   CALCIUM 7.0* 7.4*   MG 2.3  --      CBC:   Recent Labs  Lab 08/28/17  0435 08/29/17  0848   WBC 6.79 7.05   HGB 7.7* 8.0*   HCT 23.4* 24.4*    174     All pertinent labs within the past 24 hours have been reviewed.    Significant Imaging: I have reviewed all pertinent imaging results/findings within the past 24 hours.

## 2017-08-29 NOTE — PROGRESS NOTES
[facility]  Nephrology  Progress Note    Patient Name: Conner Perez III  MRN: [id number]  Admission Date: 8/24/2017  Hospital Length of Stay: 4 days  Attending Provider: Antione Slade MD   Primary Care Physician: Raciel Angela MD  Principal Problem: ESRD on chronic HD. HD was initiated this admit    Subjective:     HPI: Pt was seen and examined. H/o fully reviewed. Pt is a 66 y/o male with CKD stage 5 (baseline s Cr 6), DM, HTN, diastolic CHF who was admitted for SOB, suspected to be due to CHF exacerbation. Pt has received lasix IV, and says he is feeling better now. PT follows with Dr. Rhodes (nephrologist with [facility]) and has an UE AVF already in place in anticipation for chronic, long term HD. He was admitted at [facility] in jan 2017 for L second toe osteomyelitis. Pt desires to f/u with dr. Rhodes for his renal issues, and has an appt with him in Sept 2017.    Interval History: Pt was seen and examined on HD. No c/o's, no new issues, no CP, no SOB, no dizziness. Receiving mannitol with HD. Opportunity for questions and discussion provided.     Review of patient's allergies indicates:   Allergen Reactions    Augmentin [amoxicillin-pot clavulanate]     Lisinopril      cough    Sulfa (sulfonamide antibiotics)      swelling     Current Facility-Administered Medications   Medication Frequency    acetaminophen tablet 650 mg Q8H PRN    albuterol-ipratropium 2.5mg-0.5mg/3mL nebulizer solution 3 mL Q4H PRN    atorvastatin tablet 40 mg QHS    dextrose 50% injection 12.5 g PRN    dextrose 50% injection 25 g PRN    diltiaZEM 24 hr capsule 360 mg Daily    diphenhydrAMINE capsule 25 mg Q6H PRN    furosemide tablet 40 mg Q8H    glucagon (human recombinant) injection 1 mg PRN    glucose chewable tablet 16 g PRN    glucose chewable tablet 24 g PRN    guaifenesin 100 mg/5 ml syrup 200 mg Q4H PRN    heparin (porcine) injection 3,200 Units PRN    hydrALAZINE injection 10 mg Q8H PRN     hydrALAZINE tablet 50 mg Q8H    hydrocodone-acetaminophen 5-325mg per tablet 1 tablet Q8H PRN    insulin aspart pen 0-5 Units QID (AC + HS) PRN    insulin aspart protamine-insulin aspart (NovoLOG 70/30) injection BID    ondansetron injection 4 mg Q8H PRN    pantoprazole EC tablet 40 mg Daily    sodium bicarbonate tablet 650 mg TID    tamsulosin 24 hr capsule 0.4 mg Daily       Objective:     Vital Signs (Most Recent):  Temp: 97.7 °F (36.5 °C) (08/29/17 1304)  Pulse: 88 (08/29/17 1304)  Resp: 18 (08/29/17 1304)  BP: (!) 192/90 (08/29/17 1304)  SpO2: 99 % (08/29/17 1304)  O2 Device (Oxygen Therapy): room air (08/29/17 1304) Vital Signs (24h Range):  Temp:  [97.6 °F (36.4 °C)-99 °F (37.2 °C)] 97.7 °F (36.5 °C)  Pulse:  [76-88] 88  Resp:  [18-20] 18  SpO2:  [92 %-99 %] 99 %  BP: (140-192)/(68-90) 192/90     Weight: 117 kg (257 lb 15 oz) (08/27/17 0415)  Body mass index is 29.81 kg/m².  Body surface area is 2.54 meters squared.    I/O last 3 completed shifts:  In: 820 [P.O.:720; Other:100]  Out: 1980 [Urine:1380; Other:600]    Physical Exam   Constitutional: He is oriented to person, place, and time. He appears well-developed and well-nourished.   HENT:   Head: Normocephalic and atraumatic.   Eyes: Pupils are equal, round, and reactive to light.   Neck: Normal range of motion. No JVD present.   Cardiovascular: Normal rate, regular rhythm and normal heart sounds.  Exam reveals no friction rub.    Pulmonary/Chest: Effort normal. He has rales.   Abdominal: Soft. Bowel sounds are normal. He exhibits no distension. There is no tenderness.   Musculoskeletal: Normal range of motion.   Neurological: He is alert and oriented to person, place, and time.   Skin: Skin is warm and dry.   Psychiatric: He has a normal mood and affect. His behavior is normal.   Nursing note and vitals reviewed.      Significant Labs: reviewed  BMP  Lab Results   Component Value Date     08/29/2017    K 4.8 08/29/2017     08/29/2017     CO2 24 08/29/2017    BUN 64 (H) 08/29/2017    CREATININE 11.1 (H) 08/29/2017    CALCIUM 7.4 (L) 08/29/2017    ANIONGAP 14 08/29/2017    ESTGFRAFRICA 5 (A) 08/29/2017    EGFRNONAA 4 (A) 08/29/2017     Lab Results   Component Value Date    WBC 7.05 08/29/2017    HGB 8.0 (L) 08/29/2017    HCT 24.4 (L) 08/29/2017    MCV 85 08/29/2017     08/29/2017         Assessment/Plan:         66 y/o male with UGO on very advanced CKD stage 5 presented with CHF exacerbation        Chronic kidney disease, stage V leading to ESRD     Renal: ESRD  Pt initiated chronic HD yesterday  HD#2 today  HD#3 tomorrow  Using the IJ Vas cath without any problems  UE AVF not mature yet (was placed on 6/27/17 by Dr. Jeremias Peralta)  Hep B serologies for outpt HD placement purpose pending  SW consulted for outpt HD placement  Discussed with HD RN  Receiving mannitol with HD     Secondary hyperparathyroidism  Hypocalcemia  Outpt nephrologist aware, will manage as outpt.       * Acute pulmonary edema     Dyspnea.  Due to CHF exacerbation and renal failure  Good response to IV diuretics, switched to PO  Discussed salt restriction (2-3 g/day) and fluid restriction (1-2 L/day) with pt.  Pt has improved clinically  Is hemodynamically stable.       Essential hypertension     HTN: BP not well controlled today  Meds reviewed  Will add amlodipine  On HD now, may stop lasix       DM II (diabetes mellitus, type II), controlled     Long standing DM.  Diabetic nephropathy.       Plans and recommendations:        As detailed above        D/c lasix        Add amlodipine 5 mg po qd        HD#2 today        HD#3 tomorrow        Awaiting out pt HD placement       May d/c home post HD       outpt nephrologist will be Dr. Dom Rhodes from [facility]            Lazara Granados MD  Nephrology  [facility]

## 2017-08-29 NOTE — PLAN OF CARE
Patient received hd for 3 hours today. Net removal 0.5 liters. No access problems. Tolerated well. Adm. Mannitol with hd as ordered.

## 2017-08-30 VITALS
HEIGHT: 78 IN | DIASTOLIC BLOOD PRESSURE: 81 MMHG | OXYGEN SATURATION: 95 % | HEART RATE: 94 BPM | TEMPERATURE: 98 F | WEIGHT: 257.94 LBS | SYSTOLIC BLOOD PRESSURE: 172 MMHG | RESPIRATION RATE: 18 BRPM | BODY MASS INDEX: 29.84 KG/M2

## 2017-08-30 LAB
ALBUMIN SERPL BCP-MCNC: 2.6 G/DL
ALP SERPL-CCNC: 63 U/L
ALT SERPL W/O P-5'-P-CCNC: 11 U/L
ANION GAP SERPL CALC-SCNC: 11 MMOL/L
AST SERPL-CCNC: 13 U/L
BASOPHILS # BLD AUTO: 0.04 K/UL
BASOPHILS NFR BLD: 0.6 %
BILIRUB SERPL-MCNC: 0.4 MG/DL
BUN SERPL-MCNC: 43 MG/DL
CALCIUM SERPL-MCNC: 7.7 MG/DL
CHLORIDE SERPL-SCNC: 103 MMOL/L
CO2 SERPL-SCNC: 23 MMOL/L
CREAT SERPL-MCNC: 8.7 MG/DL
DIFFERENTIAL METHOD: ABNORMAL
EOSINOPHIL # BLD AUTO: 0.3 K/UL
EOSINOPHIL NFR BLD: 4.4 %
ERYTHROCYTE [DISTWIDTH] IN BLOOD BY AUTOMATED COUNT: 12.9 %
EST. GFR  (AFRICAN AMERICAN): 7 ML/MIN/1.73 M^2
EST. GFR  (NON AFRICAN AMERICAN): 6 ML/MIN/1.73 M^2
GLUCOSE SERPL-MCNC: 129 MG/DL
HCT VFR BLD AUTO: 24.5 %
HGB BLD-MCNC: 8 G/DL
LYMPHOCYTES # BLD AUTO: 1.5 K/UL
LYMPHOCYTES NFR BLD: 22.4 %
MCH RBC QN AUTO: 27.9 PG
MCHC RBC AUTO-ENTMCNC: 32.7 G/DL
MCV RBC AUTO: 85 FL
MONOCYTES # BLD AUTO: 0.8 K/UL
MONOCYTES NFR BLD: 11 %
NEUTROPHILS # BLD AUTO: 4.2 K/UL
NEUTROPHILS NFR BLD: 61.6 %
PLATELET # BLD AUTO: 179 K/UL
PMV BLD AUTO: 10.4 FL
POCT GLUCOSE: 141 MG/DL (ref 70–110)
POCT GLUCOSE: 192 MG/DL (ref 70–110)
POCT GLUCOSE: 204 MG/DL (ref 70–110)
POTASSIUM SERPL-SCNC: 4.5 MMOL/L
PROT SERPL-MCNC: 6.3 G/DL
RBC # BLD AUTO: 2.87 M/UL
SODIUM SERPL-SCNC: 137 MMOL/L
WBC # BLD AUTO: 6.83 K/UL

## 2017-08-30 PROCEDURE — 85025 COMPLETE CBC W/AUTO DIFF WBC: CPT

## 2017-08-30 PROCEDURE — 99233 SBSQ HOSP IP/OBS HIGH 50: CPT | Mod: ,,, | Performed by: INTERNAL MEDICINE

## 2017-08-30 PROCEDURE — 80053 COMPREHEN METABOLIC PANEL: CPT

## 2017-08-30 PROCEDURE — 25000003 PHARM REV CODE 250: Performed by: INTERNAL MEDICINE

## 2017-08-30 PROCEDURE — 25000003 PHARM REV CODE 250: Performed by: NURSE PRACTITIONER

## 2017-08-30 PROCEDURE — 80100016 HC MAINTENANCE HEMODIALYSIS

## 2017-08-30 PROCEDURE — 36415 COLL VENOUS BLD VENIPUNCTURE: CPT

## 2017-08-30 RX ORDER — METOPROLOL SUCCINATE 50 MG/1
50 TABLET, EXTENDED RELEASE ORAL DAILY
Qty: 30 TABLET | Refills: 0 | Status: ON HOLD | OUTPATIENT
Start: 2017-08-30 | End: 2017-12-18

## 2017-08-30 RX ORDER — LOSARTAN POTASSIUM 25 MG/1
25 TABLET ORAL DAILY
Qty: 30 TABLET | Refills: 0 | Status: SHIPPED | OUTPATIENT
Start: 2017-08-30 | End: 2018-03-04 | Stop reason: HOSPADM

## 2017-08-30 RX ORDER — SODIUM CHLORIDE 9 MG/ML
INJECTION, SOLUTION INTRAVENOUS
Status: DISCONTINUED | OUTPATIENT
Start: 2017-08-30 | End: 2017-08-30 | Stop reason: HOSPADM

## 2017-08-30 RX ORDER — MANNITOL 250 MG/ML
25 INJECTION, SOLUTION INTRAVENOUS ONCE
Status: DISCONTINUED | OUTPATIENT
Start: 2017-08-30 | End: 2017-08-30 | Stop reason: HOSPADM

## 2017-08-30 RX ORDER — AMLODIPINE BESYLATE 10 MG/1
10 TABLET ORAL DAILY
Status: DISCONTINUED | OUTPATIENT
Start: 2017-08-30 | End: 2017-08-30 | Stop reason: HOSPADM

## 2017-08-30 RX ORDER — AMLODIPINE BESYLATE 5 MG/1
5 TABLET ORAL DAILY
Qty: 30 TABLET | Refills: 0 | Status: SHIPPED | OUTPATIENT
Start: 2017-08-30 | End: 2017-11-09 | Stop reason: ALTCHOICE

## 2017-08-30 RX ADMIN — HYDRALAZINE HYDROCHLORIDE 50 MG: 50 TABLET, FILM COATED ORAL at 02:08

## 2017-08-30 RX ADMIN — AMLODIPINE BESYLATE 5 MG: 5 TABLET ORAL at 10:08

## 2017-08-30 RX ADMIN — TAMSULOSIN HYDROCHLORIDE 0.4 MG: 0.4 CAPSULE ORAL at 10:08

## 2017-08-30 RX ADMIN — PANTOPRAZOLE SODIUM 40 MG: 40 TABLET, DELAYED RELEASE ORAL at 10:08

## 2017-08-30 RX ADMIN — SODIUM BICARBONATE 650 MG TABLET 650 MG: at 05:08

## 2017-08-30 RX ADMIN — ATORVASTATIN CALCIUM 40 MG: 40 TABLET, FILM COATED ORAL at 09:08

## 2017-08-30 RX ADMIN — SODIUM BICARBONATE 650 MG TABLET 650 MG: at 02:08

## 2017-08-30 RX ADMIN — DILTIAZEM HYDROCHLORIDE 360 MG: 180 CAPSULE, EXTENDED RELEASE ORAL at 10:08

## 2017-08-30 RX ADMIN — HYDRALAZINE HYDROCHLORIDE 50 MG: 50 TABLET, FILM COATED ORAL at 05:08

## 2017-08-30 RX ADMIN — HYDRALAZINE HYDROCHLORIDE 50 MG: 50 TABLET, FILM COATED ORAL at 09:08

## 2017-08-30 NOTE — ASSESSMENT & PLAN NOTE
66 y/o male with UGO on very advanced CKD stage 5 presented with CHF exacerbation        Chronic kidney disease, stage V leading to ESRD     Renal: ESRD  Pt initiated chronic HD yesterday  HD#2 today  HD#3 tomorrow  Using the IJ Vas cath without any problems  UE AVF not mature yet (was placed on 6/27/17 by Dr. Jeremias Peralta)  Hep B serologies for outpt HD placement purpose pending  SW consulted for outpt HD placement  Discussed with HD RN  Receiving mannitol with HD     Secondary hyperparathyroidism  Hypocalcemia  Outpt nephrologist aware, will manage as outpt.       * Acute pulmonary edema     Dyspnea.  Due to CHF exacerbation and renal failure  Good response to IV diuretics, switched to PO  Discussed salt restriction (2-3 g/day) and fluid restriction (1-2 L/day) with pt.  Pt has improved clinically  Is hemodynamically stable.       Essential hypertension     HTN: BP not well controlled today  Meds reviewed  Will add amlodipine  On HD now, may stop lasix       DM II (diabetes mellitus, type II), controlled     Long standing DM.  Diabetic nephropathy.       Plans and recommendations:        As detailed above        D/c lasix        Add amlodipine 5 mg po qd        HD#2 today        HD#3 tomorrow        Awaiting out pt HD placement       May d/c home post HD       outpt nephrologist will be Dr. Dom Rhodes from [facility]

## 2017-08-30 NOTE — PLAN OF CARE
Sep4 Go to Raciel Angela MD   Monday Sep 4, 2017   at 9:30am  [facility]   [phone]         Sep6 Established Patient Visit with Damian Fairbanks MD   Wednesday Sep 6, 2017 1:40 PM  [facility]   [id number] [facility] [id number] [phone]      Patient discharging home self care.  Follow up appointments scheduled.  Patient will report for HD on Friday at [facility].     08/30/17 0941   Final Note   Assessment Type Final Discharge Note   Discharge Disposition Home   What phone number can be called within the next 1-3 days to see how you are doing after discharge? [phone]   Hospital Follow Up  Appt(s) scheduled? Yes   Discharge plans and expectations educations in teach back method with documentation complete? Yes   Right Care Referral Info   Post Acute Recommendation No Care

## 2017-08-30 NOTE — DISCHARGE SUMMARY
[facility] Hospital Medicine  Discharge Summary      Patient Name: Conner Perez III  MRN: [id number]  Admission Date: 8/24/2017  Hospital Length of Stay: 5 days  Discharge Date and Time:  08/30/2017 9:11 AM  Attending Physician: Antione Slade MD   Discharging Provider: JONELLE Simmons  Primary Care Provider: Raciel Angela MD      HPI:   Mr. Perez is a 66 y/o AA male with h/o CKD V, T2DM, HTN, CAD preented to the ED c/o worsening SOB since yesterday. He was noted to have a creatinine of 12.1 on 7/30/17, but makes good amount of urine. He takes lasix 40 mg po daily. He was seen by  (nephrology) on 7/30/17.  Today CXR shows pulmonary edema pattern with vascular congestion.  was contacted, recommended giving Lasix 80 mg IV x1. Patient currently not on oxygen, and sats mid-high 90's on room air. Patient already has right radial artery fistula in place. Never been on dialysis before.    Procedure(s) (LRB):  VASCULAR CATH INSERTION (N/A)      Indwelling Lines/Drains at time of discharge:   Lines/Drains/Airways     Central Venous Catheter Line                 Hemodialysis Catheter 08/28/17 right internal jugular 2 days          Drain                 Hemodialysis AV Fistula Right forearm 39631 days              Hospital Course:   Patient diuresing with IV lasix. Patient non-oliguric; has a fistula in place - too immature to use at present, placed in june of this year. Nephrology Dr. Granados consulted. Pts nephrologist, Dr. Rhodes not adverse to his starting hemodialysis here. Pt was given high dose parenteral lasix with prompt and sustained diuresis. Renal function was monitored and remained level. CXR clearing on serial exams. No s/s uremia. He was offered placement of a temporary access to initiate HD here if needed, however refused, this opting instead to follow up with his nephrologist as scheduled in early September.  Patient had run of V.Tach - asymptomatic. Repeat  electrolytes and magnesium were normal. Pt kept overnight for continued cardiac monitoring. In Am, pt decided he wanted to initiate HD now, not wait for nephrology appt in Sept. Nephrology notified. Vas-Cath placed and HD initiated on 8/28/17. 2nd HD on 8/29/17. 3rd HD on 8/30/17. Patient was seen and examined today and deemed stable for discharge. Pt set to dialyze at [facility] on M-W-F at 10:30am.     Consults:   Consults         Status Ordering Provider     Inpatient consult to Cardiology  Once     Provider:  Damian Fairbanks MD    Completed TITO SORIA     Inpatient consult to Nephrology  Once     Provider:  Fabián London MD    Acknowledged ESTRELLA ALCALA     Inpatient consult to Social Work  Once     Provider:  (Not yet assigned)    DWIGHT Baugh     Inpatient consult to Vascular Surgery  Once     Provider:  Minesh Morrow MD    Acknowledged DWIGHT WORLEY     Inpatient consult to Vascular Surgery  Once     Provider:  Jeremias Peralta MD    Acknowledged DWIGHT WORLEY          Significant Diagnostic Studies: Labs:   BMP:   Recent Labs  Lab 08/29/17  0848 08/30/17  0414   * 129*    137   K 4.8 4.5    103   CO2 24 23   BUN 64* 43*   CREATININE 11.1* 8.7*   CALCIUM 7.4* 7.7*   , CBC   Recent Labs  Lab 08/29/17  0848 08/30/17  0414   WBC 7.05 6.83   HGB 8.0* 8.0*   HCT 24.4* 24.5*    179    and All labs within the past 24 hours have been reviewed    Pending Diagnostic Studies:     None        Final Active Diagnoses:    Diagnosis Date Noted POA    PRINCIPAL PROBLEM:  Acute renal failure superimposed on chronic kidney disease [N17.9, N18.9] 08/25/2017 Yes    Acute on chronic diastolic heart failure [I50.33] 08/25/2017 Yes    NSVT (nonsustained ventricular tachycardia) [I47.2] 08/28/2017 Yes    Hypocalcemia [E83.51] 08/25/2017 Yes    Anemia [D64.9] 08/28/2017 Yes    DM II (diabetes mellitus, type II), controlled [E11.9] 01/19/2017 Yes    Essential hypertension [I10]  01/12/2017 Yes     Chronic    ESRD (end stage renal disease) [N18.6] 08/29/2017 Unknown    Uremia [N19] 08/29/2017 Yes    Chronic kidney disease, stage V [N18.5] 08/25/2017 Yes      Problems Resolved During this Admission:    Diagnosis Date Noted Date Resolved POA    Acute pulmonary edema [J81.0] 08/25/2017 08/27/2017 Yes    Shortness of breath [R06.02] 08/25/2017 08/27/2017 Yes    UGO (acute kidney injury) [N17.9] 08/25/2017 08/27/2017 Yes      No new Assessment & Plan notes have been filed under this hospital service since the last note was generated.  Service: Hospital Medicine      Discharged Condition: stable    Disposition: Home or Self Care    Follow Up:  Follow-up Information     Raciel Angela MD In 3 days.    Specialty:  Family Medicine  Contact information:  [address] [facility] [address]  [phone]             [facility]    Why:  Monday, Wednesday, and Friday.  Arrive on first treatment at 10:30am  Contact information:  [address] [facility] [address]  [phone]           Damian Fairbanks MD In 2 weeks.    Specialties:  Cardiology, INTERVENTIONAL CARDIOLOGY  Contact information:  [address]  [facility] [address]  [phone]                 Patient Instructions:     Diet renal     Diet Diabetic 1800 Calories     Diet renal     Call MD for:  redness, tenderness, or signs of infection (pain, swelling, redness, odor or green/yellow discharge around incision site)     Call MD for:  difficulty breathing or increased cough     Call MD for:   Order Comments: Voiding difficulty     Call MD for:  persistent nausea and vomiting or diarrhea     Activity as tolerated     Call MD for:  persistent dizziness, light-headedness, or visual disturbances     Call MD for:  increased confusion or weakness       Medications:  Reconciled Home Medications:   Current Discharge Medication List      START taking these medications    Details   amlodipine  "(NORVASC) 5 MG tablet Take 1 tablet (5 mg total) by mouth once daily.  Qty: 30 tablet, Refills: 0      losartan (COZAAR) 25 MG tablet Take 1 tablet (25 mg total) by mouth once daily.  Qty: 30 tablet, Refills: 0      metoprolol succinate (TOPROL-XL) 50 MG 24 hr tablet Take 1 tablet (50 mg total) by mouth once daily.  Qty: 30 tablet, Refills: 0         CONTINUE these medications which have NOT CHANGED    Details   aspirin 81 MG Chew Take 1 tablet (81 mg total) by mouth once daily.  Qty: 30 tablet, Refills: 0      atorvastatin (LIPITOR) 40 MG tablet Take 1 tablet (40 mg total) by mouth every evening.  Qty: 30 tablet, Refills: 0      diltiaZEM (CARDIZEM CD) 360 MG 24 hr capsule Take 1 capsule (360 mg total) by mouth once daily.  Qty: 30 capsule, Refills: 0      hydrALAZINE (APRESOLINE) 50 MG tablet Take 1 tablet (50 mg total) by mouth 3 (three) times daily.  Qty: 90 tablet, Refills: 0      insulin NPH-insulin regular, 70/30, (NOVOLIN 70/30) 100 unit/mL (70-30) injection Inject 30 Units into the skin 2 (two) times daily. Dispense with needles and syringes.  Qty: 20 mL, Refills: 0      ondansetron (ZOFRAN-ODT) 4 MG TbDL Take 1 tablet (4 mg total) by mouth every 8 (eight) hours as needed.  Qty: 12 tablet, Refills: 0      pantoprazole (PROTONIX) 40 MG tablet Take 1 tablet (40 mg total) by mouth once daily.  Qty: 30 tablet, Refills: 0      tamsulosin (FLOMAX) 0.4 mg Cp24 Take 1 capsule (0.4 mg total) by mouth once daily.  Qty: 30 capsule, Refills: 0      tramadol (ULTRAM) 50 mg tablet 1-2 bid prn pain      avanafil 100 mg Tab Take 100 mg by mouth.      azelastine (ASTELIN) 137 mcg (0.1 %) nasal spray 2 sprays by Nasal route 2 (two) times daily.       BD ULTRA-FINE ANISA PEN NEEDLES 32 gauge x 5/32" Ndle Refills: 0      calcium carbonate (TUMS) 200 mg calcium (500 mg) chewable tablet Take 1 tablet (500 mg total) by mouth once daily.  Qty: 90 tablet, Refills: 0      ceramides 1,3,6-11 Crea Apply to the affected area daily.    "   cyclobenzaprine (FLEXERIL) 10 MG tablet One hs prn  Muscle relaxer      docusate sodium (COLACE) 100 MG capsule Take 1 capsule (100 mg total) by mouth 2 (two) times daily.  Qty: 60 capsule, Refills: 0      FREESTYLE LITE STRIPS Strp Refills: 0      lactulose (CHRONULAC) 10 gram/15 mL solution Take 10 g by mouth every evening.   Refills: 0      metoclopramide HCl (REGLAN) 10 MG tablet Take 1 tablet (10 mg total) by mouth every 6 (six) hours as needed.  Qty: 30 tablet, Refills: 0      polyethylene glycol (GLYCOLAX) 17 gram PwPk Take 17 g by mouth once daily.  Qty: 30 packet, Refills: 0      sodium bicarbonate 650 MG tablet Take 1 tablet (650 mg total) by mouth 3 (three) times daily.  Qty: 90 tablet, Refills: 0      sucralfate (CARAFATE) 100 mg/mL suspension Take 10 mLs (1 g total) by mouth 4 (four) times daily.  Qty: 1 Bottle, Refills: 0      zolpidem (AMBIEN) 5 MG Tab Take 1 tablet (5 mg total) by mouth nightly as needed.  Qty: 10 tablet, Refills: 0         STOP taking these medications       furosemide (LASIX) 40 MG tablet Comments:   Reason for Stopping:             Time spent on the discharge of patient: 45 minutes    HOS POC IP DISCHARGE SUMMARY    Roz Walker, FNP-C  Department of Hospital Medicine  [facility]

## 2017-08-30 NOTE — PLAN OF CARE
Problem: Patient Care Overview  Goal: Plan of Care Review  Outcome: Ongoing (interventions implemented as appropriate)  Pt rested well, no complaint of pain or SOB. NSR noted on cardiac monitor. BP elevated at the beginning of the shift but improved over night. No falls or injury, safety maintained. Spouse present at bedside.

## 2017-08-30 NOTE — PLAN OF CARE
IMM signed.       08/30/17 0921   Medicare Message   Important Message from Medicare regarding Discharge Appeal Rights Given to patient/caregiver;Explained to patient/caregiver;Signed/date by patient/caregiver   Date IMM was signed 08/30/17   Time IMM was signed 0915

## 2017-08-30 NOTE — SUBJECTIVE & OBJECTIVE
Interval History: Pt was seen and examined. Stable last night, no new c/o's, tolerating HD well, no dizziness, no CP, no SOB. Discussed d/c plans with the pt.    Review of patient's allergies indicates:   Allergen Reactions    Augmentin [amoxicillin-pot clavulanate]     Lisinopril      cough    Sulfa (sulfonamide antibiotics)      swelling     Current Facility-Administered Medications   Medication Frequency    0.9%  NaCl infusion PRN    acetaminophen tablet 650 mg Q8H PRN    albuterol-ipratropium 2.5mg-0.5mg/3mL nebulizer solution 3 mL Q4H PRN    amlodipine tablet 5 mg Daily    atorvastatin tablet 40 mg QHS    dextrose 50% injection 12.5 g PRN    dextrose 50% injection 25 g PRN    diltiaZEM 24 hr capsule 360 mg Daily    diphenhydrAMINE capsule 25 mg Q6H PRN    epoetin ovi injection 6,000 Units Once    glucagon (human recombinant) injection 1 mg PRN    glucose chewable tablet 16 g PRN    glucose chewable tablet 24 g PRN    guaifenesin 100 mg/5 ml syrup 200 mg Q4H PRN    heparin (porcine) injection 3,200 Units PRN    hydrALAZINE injection 10 mg Q8H PRN    hydrALAZINE tablet 50 mg Q8H    hydrocodone-acetaminophen 5-325mg per tablet 1 tablet Q8H PRN    insulin aspart pen 0-5 Units QID (AC + HS) PRN    insulin aspart protamine-insulin aspart (NovoLOG 70/30) injection BID    mannitol 25% injection 25 g Once    ondansetron injection 4 mg Q8H PRN    pantoprazole EC tablet 40 mg Daily    sodium bicarbonate tablet 650 mg TID    tamsulosin 24 hr capsule 0.4 mg Daily       Objective:     Vital Signs (Most Recent):  Temp: 98.1 °F (36.7 °C) (08/30/17 1211)  Pulse: 94 (08/30/17 1211)  Resp: 18 (08/30/17 1211)  BP: (!) 182/82 (08/30/17 1211)  SpO2: 98 % (08/30/17 1211)  O2 Device (Oxygen Therapy): room air (08/30/17 1211) Vital Signs (24h Range):  Temp:  [98.1 °F (36.7 °C)-99.3 °F (37.4 °C)] 98.1 °F (36.7 °C)  Pulse:  [86-94] 94  Resp:  [18-20] 18  SpO2:  [95 %-99 %] 98 %  BP: (142-192)/(69-86) 182/82      Weight: 117 kg (257 lb 15 oz) (08/27/17 0415)  Body mass index is 29.81 kg/m².  Body surface area is 2.54 meters squared.    I/O last 3 completed shifts:  In: 100 [Other:100]  Out: 2730 [Urine:1630; Other:1100]    Physical Exam   Constitutional: He is oriented to person, place, and time. He appears well-developed and well-nourished.   HENT:   Head: Normocephalic and atraumatic.   Eyes: Pupils are equal, round, and reactive to light.   Neck: Normal range of motion. No JVD present.   Cardiovascular: Normal rate, regular rhythm and normal heart sounds.  Exam reveals no friction rub.    Pulmonary/Chest: Effort normal. He has rales.   Abdominal: Soft. Bowel sounds are normal. He exhibits no distension. There is no tenderness.   Musculoskeletal: Normal range of motion.   Neurological: He is alert and oriented to person, place, and time.   Skin: Skin is warm and dry.   Psychiatric: He has a normal mood and affect. His behavior is normal.   Nursing note and vitals reviewed.      Significant Labs:  Reviewed  BMP  Lab Results   Component Value Date     08/30/2017    K 4.5 08/30/2017     08/30/2017    CO2 23 08/30/2017    BUN 43 (H) 08/30/2017    CREATININE 8.7 (H) 08/30/2017    CALCIUM 7.7 (L) 08/30/2017    ANIONGAP 11 08/30/2017    ESTGFRAFRICA 7 (A) 08/30/2017    EGFRNONAA 6 (A) 08/30/2017     Lab Results   Component Value Date    WBC 6.83 08/30/2017    HGB 8.0 (L) 08/30/2017    HCT 24.5 (L) 08/30/2017    MCV 85 08/30/2017     08/30/2017       Significant Imaging:

## 2017-08-30 NOTE — PROGRESS NOTES
[facility]  Nephrology  Progress Note    Patient Name: Conner Perez III  MRN: [id number]  Admission Date: 8/24/2017  Hospital Length of Stay: 5 days  Attending Provider: Shree Bautista MD   Primary Care Physician: Raciel Angela MD  Principal Problem: ESRD on HD    Subjective:     HPI: Pt was seen and examined. H/o fully reviewed. Pt is a 66 y/o male with CKD stage 5 (baseline s Cr 6), DM, HTN, diastolic CHF who was admitted for SOB, suspected to be due to CHF exacerbation. Pt has received lasix IV, and says he is feeling better now. PT follows with Dr. Rhodes (nephrologist with [facility]) and has an UE AVF already in place in anticipation for chronic, long term HD. He was admitted at [facility] in jan 2017 for L second toe osteomyelitis. Pt desires to f/u with dr. Rhodes for his renal issues, and has an appt with him in Sept 2017.    Interval History: Pt was seen and examined. Stable last night, no new c/o's, tolerating HD well, no dizziness, no CP, no SOB. Discussed d/c plans with the pt.    Review of patient's allergies indicates:   Allergen Reactions    Augmentin [amoxicillin-pot clavulanate]     Lisinopril      cough    Sulfa (sulfonamide antibiotics)      swelling     Current Facility-Administered Medications   Medication Frequency    0.9%  NaCl infusion PRN    acetaminophen tablet 650 mg Q8H PRN    albuterol-ipratropium 2.5mg-0.5mg/3mL nebulizer solution 3 mL Q4H PRN    amlodipine tablet 5 mg Daily    atorvastatin tablet 40 mg QHS    dextrose 50% injection 12.5 g PRN    dextrose 50% injection 25 g PRN    diltiaZEM 24 hr capsule 360 mg Daily    diphenhydrAMINE capsule 25 mg Q6H PRN    epoetin ovi injection 6,000 Units Once    glucagon (human recombinant) injection 1 mg PRN    glucose chewable tablet 16 g PRN    glucose chewable tablet 24 g PRN    guaifenesin 100 mg/5 ml syrup 200 mg Q4H PRN    heparin (porcine) injection 3,200 Units PRN    hydrALAZINE injection 10 mg Q8H PRN     hydrALAZINE tablet 50 mg Q8H    hydrocodone-acetaminophen 5-325mg per tablet 1 tablet Q8H PRN    insulin aspart pen 0-5 Units QID (AC + HS) PRN    insulin aspart protamine-insulin aspart (NovoLOG 70/30) injection BID    mannitol 25% injection 25 g Once    ondansetron injection 4 mg Q8H PRN    pantoprazole EC tablet 40 mg Daily    sodium bicarbonate tablet 650 mg TID    tamsulosin 24 hr capsule 0.4 mg Daily       Objective:     Vital Signs (Most Recent):  Temp: 98.1 °F (36.7 °C) (08/30/17 1211)  Pulse: 94 (08/30/17 1211)  Resp: 18 (08/30/17 1211)  BP: (!) 182/82 (08/30/17 1211)  SpO2: 98 % (08/30/17 1211)  O2 Device (Oxygen Therapy): room air (08/30/17 1211) Vital Signs (24h Range):  Temp:  [98.1 °F (36.7 °C)-99.3 °F (37.4 °C)] 98.1 °F (36.7 °C)  Pulse:  [86-94] 94  Resp:  [18-20] 18  SpO2:  [95 %-99 %] 98 %  BP: (142-192)/(69-86) 182/82     Weight: 117 kg (257 lb 15 oz) (08/27/17 0415)  Body mass index is 29.81 kg/m².  Body surface area is 2.54 meters squared.    I/O last 3 completed shifts:  In: 100 [Other:100]  Out: 2730 [Urine:1630; Other:1100]    Physical Exam   Constitutional: He is oriented to person, place, and time. He appears well-developed and well-nourished.   HENT:   Head: Normocephalic and atraumatic.   Eyes: Pupils are equal, round, and reactive to light.   Neck: Normal range of motion. No JVD present.   Cardiovascular: Normal rate, regular rhythm and normal heart sounds.  Exam reveals no friction rub.    Pulmonary/Chest: Effort normal. He has rales.   Abdominal: Soft. Bowel sounds are normal. He exhibits no distension. There is no tenderness.   Musculoskeletal: Normal range of motion.   Neurological: He is alert and oriented to person, place, and time.   Skin: Skin is warm and dry.   Psychiatric: He has a normal mood and affect. His behavior is normal.   Nursing note and vitals reviewed.      Significant Labs:  Reviewed  BMP  Lab Results   Component Value Date     08/30/2017    K 4.5  08/30/2017     08/30/2017    CO2 23 08/30/2017    BUN 43 (H) 08/30/2017    CREATININE 8.7 (H) 08/30/2017    CALCIUM 7.7 (L) 08/30/2017    ANIONGAP 11 08/30/2017    ESTGFRAFRICA 7 (A) 08/30/2017    EGFRNONAA 6 (A) 08/30/2017     Lab Results   Component Value Date    WBC 6.83 08/30/2017    HGB 8.0 (L) 08/30/2017    HCT 24.5 (L) 08/30/2017    MCV 85 08/30/2017     08/30/2017       Significant Imaging:     Assessment/Plan:     ESRD (end stage renal disease)    66 y/o male with UGO on very advanced CKD stage 5 presented with CHF exacerbation        Chronic kidney disease, stage V leading to ESRD     Renal: ESRD  Pt initiated chronic   HD#3 today  Using the IJ Vas cath without any problems  UE AVF not mature yet (was placed on 6/27/17 by Dr. Jeremias Peralta)  Hep B serologies for outpt HD placement purpose negative  [facility] HD [facility] (previouslt pt of Dr. Dom Rhodes)  Receiving mannitol with HD  OK to go home post HD today  Metabolic acidosis: improved with HD, may d/c PO bicarbonate     Secondary hyperparathyroidism  Hypocalcemia  Outpt nephrologist aware, will manage as outpt.       * Acute pulmonary edema     Dyspnea.  Due to CHF exacerbation and renal failure  Good response to diuretics and HD initiation  Discussed salt restriction (2-3 g/day) and fluid restriction (1-2 L/day) with pt.  Pt has improved clinically  Is hemodynamically stable.       Essential hypertension     HTN: BP still not well controlled   Meds reviewed  Will increase amlodipine dose   On HD now, lasix was stopped       DM II (diabetes mellitus, type II), controlled     Long standing DM.  Diabetic nephropathy.       Plans and recommendations:        As detailed above        Increase amlodipine to 10 mg po qd        D/c PO bicarbonate        HD#3 today        May d/c home post HD        Informed Dr. Rhodes of out pt HD placement              Lazara Granados MD  Nephrology  [facility]

## 2017-08-30 NOTE — PLAN OF CARE
Problem: Patient Care Overview  Goal: Plan of Care Review  Outcome: Ongoing (interventions implemented as appropriate)  Plan of care reviewed with patient. Patient stable. Aaox3. Patient free from falls fall precautions in place. Assist x 1 to the bathroom. Call be;; and belongings with in reach reminded to call for assistance. Blood glucose monitoring. Patient for dialysis. No signs and symptoms of infection. Blood pressure monitoring. Will continue to monitor

## 2017-08-31 NOTE — CARE UPDATE
Received report from HD nurse, transported back to room 215. bp 172/81, night dose of hydralazine admin. Discharge instructions given, verbalized ,understanding, 20g PIV to LFA removed, catheter tip intact. Tele monitor removed. Pt sitting up ion bed eating dinner, will call for wheelchair when ready to be wheeled down.

## 2017-08-31 NOTE — PROGRESS NOTES
Pt completed 3 hours of HD tx with 1 L net fluid removal.  Pt received mannitol during tx with 12.5g given at start and 12.5g given midway through tx.  Pt tolerated tx well.  Post tx, blood rinsed back, CVC flushed with normal saline, lines locked with 1.9 ml of 1:1000 heparin, clamped, and sterile caps applied.  Verbal and written report to nurse attending.

## 2017-09-01 ENCOUNTER — TELEPHONE (OUTPATIENT)
Dept: CARDIOLOGY | Facility: CLINIC | Age: 67
End: 2017-09-01

## 2017-09-01 ENCOUNTER — PATIENT OUTREACH (OUTPATIENT)
Dept: ADMINISTRATIVE | Facility: CLINIC | Age: 67
End: 2017-09-01

## 2017-09-01 NOTE — TELEPHONE ENCOUNTER
"----- Message from Karyn Ward RN sent at 9/1/2017  9:45 AM CDT -----  I just spoke with Conner Perez III for a "transition of care" call following his recent discharge from [facility].  He was scheduled for a FU with Dr. Fairbanks on 9/6/17, but the pt has dialysis on Mon, Wed and Fri now.  He is requesting that this appt be moved to a Tues or Thurs and I was unable to schedule this in [facility].  Thank you.  Karyn Ward RN  Care Coordination Call Center    "

## 2017-09-01 NOTE — TELEPHONE ENCOUNTER
Called back to patient and offered appointment with mid-level provider on 9/7/17-patient declined appointment--states will be in [place] with son--appointment has been rescheduled to 9/12/17 with ANISHA Glasgow

## 2017-09-01 NOTE — PATIENT INSTRUCTIONS
Arteriovenous (AV) Fistula for Dialysis  An AV fistula is a connection between an artery and a vein. For this procedure, an AV fistula is surgically created using an artery and a vein in your arm. (Your doctor will let you know if another site is to be used.) When the artery and vein are joined, blood flow increases from the artery into the vein. As a result, the vein enlarges over time. The enlarged vein provides easier access to the blood for dialysis (a treatment for kidney failure). This sheet explains the procedure and what to expect.     An AV fistula increases blood flow from the artery into the vein. Over time, the vein becomes stronger and enlarged.   Preparing for the Procedure  Prepare as you have been told. In addition:  · Tell your doctor about all medications you take. This includes over-the-counter drugs. It also includes herbs and other supplements. You may need to stop taking some or all of them before the procedure.  · Follow any directions youre given for not eating or drinking before the procedure.  · Do not allow anyone to draw blood from or take blood pressure on the arm that will have the fistula prior to the procedure.  The Day of the Procedure  The procedure takes about 1-2 hours. Youll likely go home the same day.  Before the procedure begins:  · An IV line is put into a vein in the arm or hand not being used for the procedure. This line supplies fluids and medications.  · To keep you free of pain during the procedure, youre given general anesthesia. This medication puts you into a state like deep sleep through the procedure. Or a nerve block may be used. This medication numbs the arm. With it, you may also be given medication that makes you relaxed and drowsy through the procedure.  During the procedure:  · The skin over your arm may be injected with numbing medication.  · One or more small incisions are then made through the numbed skin. This depends on the size of your arm and the  depth of the vein in your arm.  · The vein is attached to the selected artery.  · Any incisions made are then closed with sutures (stitches), staples, surgical glue, or strips of surgical tape.  After the procedure:  · Youll be asked to keep your arm raised (elevated) as often as possible for at least a week after the procedure.  · Youll be given medications to manage pain as needed.  · Your arm and hand will be checked to make sure blood is flowing through the fistula properly. The feeling of blood rushing through the fistula is called a thrill. It is somewhat similar to the purring of a cat. Youll be taught how to check for this feeling each day to make sure there are no problems with your fistula. Youll also be taught how to care for your fistula at home.  · When its time for you to leave the hospital, have an adult family member or friend ready to drive you home.  Recovering at Home  Once at home, follow all of the instructions youve been given. Be sure to:  · Take all medications as directed.  · Care for your incision as instructed.  · Check for signs of infection at the incision site (see below).  · Avoid heavy lifting and strenuous activities as directed.  · Monitor and care for your fistula as instructed.  · Do your hand and arm exercises as instructed. This usually involves squeezing a ball in your hand for a few minutes each hour.  Call Your Health Care Provider If You Have Any of the Following:  · Fever of 100.4°F or higher  · Signs of infection at the incision site, such as increased redness or swelling, warmth, worsening pain, bleeding, or foul-smelling drainage  · Lack of a thrill (you cant feel it)  · Pain or numbness in your fingers, hand, or arm  · Bleeding, redness, or warmth around your fistula  · Sudden bulging of the fistula (more than usual; a slight bulge is normal)   Follow-Up  Your health care provider will check your fistula within 1 to 2 weeks after the procedure. It will likely  take about 6 to 8 weeks for the fistula to enlarge enough to start dialysis. After that, make sure the fistula is checked each time you have dialysis. Your health care provider may also suggest checkups every 6 months.  Risks and Possible Complications Include:  · Failure of the fistula to work properly  · Long wait before the fistula is ready (up to 6 months)  · Coldness or numbness in the hand (due to blood flowing away from the hand and into the fistula)  · An unsightly bump under the skin (due to enlargement of the fistula)  · Prolonged bleeding from the fistula after dialysis  · Narrowing or weakening of the blood vessels used for the fistula  · Formation of blood clots in the blood vessels used for the fistula  · Risks of anesthesia or any other medications used during the procedure   Living with an AV Fistula  A problem, such as narrowing of the vein (stricture) or infection, can make the fistula unusable. If this happens, you may need other treatments to repair or make a new fistula. To protect your fistula, follow these and any other guidelines youre given:  · Check your fistula as often as your health care provider says. If you cant feel your thrill, let he or she know right away.  · Make sure your fistula is checked before each dialysis treatment.  · Dont let anyone draw blood from or take blood pressure on the arm that has the fistula.  · Wash your hands often and keep the area around your fistula clean.  · Dont sleep on the arm that has the fistula.  · Dont wear tight jewelry or a watch on the arm with your fistula.  · Protect your fistula from cuts, scrapes, or blows.  Date Last Reviewed: 11/26/2014  © 5810-5792 [employer]. [address] [place] [address], [place] [address]. All rights reserved. This information is not intended as a substitute for professional medical care. Always follow your healthcare professional's instructions.

## 2017-09-23 RX ORDER — AMLODIPINE BESYLATE 5 MG/1
TABLET ORAL
Qty: 30 TABLET | Refills: 0 | OUTPATIENT
Start: 2017-09-23

## 2017-09-23 RX ORDER — LOSARTAN POTASSIUM 25 MG/1
TABLET ORAL
Qty: 30 TABLET | Refills: 0 | OUTPATIENT
Start: 2017-09-23

## 2017-09-23 RX ORDER — METOPROLOL SUCCINATE 50 MG/1
TABLET, EXTENDED RELEASE ORAL
Qty: 30 TABLET | Refills: 0 | OUTPATIENT
Start: 2017-09-23

## 2017-09-28 ENCOUNTER — HOSPITAL ENCOUNTER (OUTPATIENT)
Dept: RADIOLOGY | Facility: HOSPITAL | Age: 67
Discharge: HOME | End: 2017-09-28
Attending: NURSE PRACTITIONER
Payer: MEDICARE

## 2017-09-28 ENCOUNTER — CLINICAL SUPPORT (OUTPATIENT)
Dept: CARDIOLOGY | Facility: CLINIC | Age: 67
End: 2017-09-28
Payer: MEDICARE

## 2017-09-28 DIAGNOSIS — R53.81 DEBILITY, UNSPECIFIED: ICD-10-CM

## 2017-09-28 DIAGNOSIS — R07.9 CHEST PAIN, UNSPECIFIED TYPE: ICD-10-CM

## 2017-09-28 DIAGNOSIS — N18.5 CHRONIC KIDNEY DISEASE, STAGE 5, KIDNEY FAILURE: Chronic | ICD-10-CM

## 2017-09-28 DIAGNOSIS — I10 ESSENTIAL HYPERTENSION: ICD-10-CM

## 2017-09-28 DIAGNOSIS — R93.1 ABNORMAL ECHOCARDIOGRAM: ICD-10-CM

## 2017-09-28 DIAGNOSIS — R79.89 ELEVATED TROPONIN: ICD-10-CM

## 2017-09-28 DIAGNOSIS — N18.5 TYPE 2 DIABETES MELLITUS WITH STAGE 5 CHRONIC KIDNEY DISEASE NOT ON CHRONIC DIALYSIS, UNSPECIFIED LONG TERM INSULIN USE STATUS: ICD-10-CM

## 2017-09-28 DIAGNOSIS — E11.22 TYPE 2 DIABETES MELLITUS WITH STAGE 5 CHRONIC KIDNEY DISEASE NOT ON CHRONIC DIALYSIS, UNSPECIFIED LONG TERM INSULIN USE STATUS: ICD-10-CM

## 2017-09-28 LAB — DIASTOLIC DYSFUNCTION: NO

## 2017-09-28 PROCEDURE — 78452 HT MUSCLE IMAGE SPECT MULT: CPT | Mod: TC,PO

## 2017-09-28 PROCEDURE — 78452 HT MUSCLE IMAGE SPECT MULT: CPT | Mod: 26,,, | Performed by: INTERNAL MEDICINE

## 2017-09-28 PROCEDURE — 93016 CV STRESS TEST SUPVJ ONLY: CPT | Mod: S$PBB,,, | Performed by: INTERNAL MEDICINE

## 2017-09-28 PROCEDURE — A9502 TC99M TETROFOSMIN: HCPCS | Mod: PO

## 2017-09-28 PROCEDURE — 93018 CV STRESS TEST I&R ONLY: CPT | Mod: S$PBB,,, | Performed by: INTERNAL MEDICINE

## 2017-11-09 ENCOUNTER — OFFICE VISIT (OUTPATIENT)
Dept: CARDIOLOGY | Facility: CLINIC | Age: 67
End: 2017-11-09
Payer: MEDICARE

## 2017-11-09 VITALS
HEART RATE: 60 BPM | BODY MASS INDEX: 27.11 KG/M2 | HEIGHT: 78 IN | WEIGHT: 234.31 LBS | SYSTOLIC BLOOD PRESSURE: 138 MMHG | DIASTOLIC BLOOD PRESSURE: 68 MMHG

## 2017-11-09 DIAGNOSIS — I10 ESSENTIAL HYPERTENSION: Primary | Chronic | ICD-10-CM

## 2017-11-09 DIAGNOSIS — I50.33 ACUTE ON CHRONIC DIASTOLIC HEART FAILURE: ICD-10-CM

## 2017-11-09 DIAGNOSIS — R79.89 ELEVATED TROPONIN: ICD-10-CM

## 2017-11-09 DIAGNOSIS — I47.29 NSVT (NONSUSTAINED VENTRICULAR TACHYCARDIA): ICD-10-CM

## 2017-11-09 DIAGNOSIS — E11.21 CONTROLLED TYPE 2 DIABETES MELLITUS WITH DIABETIC NEPHROPATHY, WITH LONG-TERM CURRENT USE OF INSULIN: ICD-10-CM

## 2017-11-09 DIAGNOSIS — N18.6 ESRD (END STAGE RENAL DISEASE): ICD-10-CM

## 2017-11-09 DIAGNOSIS — Z79.4 CONTROLLED TYPE 2 DIABETES MELLITUS WITH DIABETIC NEPHROPATHY, WITH LONG-TERM CURRENT USE OF INSULIN: ICD-10-CM

## 2017-11-09 DIAGNOSIS — I25.10 CORONARY ARTERY DISEASE INVOLVING NATIVE CORONARY ARTERY OF NATIVE HEART WITHOUT ANGINA PECTORIS: ICD-10-CM

## 2017-11-09 PROCEDURE — 99999 PR PBB SHADOW E&M-EST. PATIENT-LVL III: CPT | Mod: PBBFAC,,, | Performed by: INTERNAL MEDICINE

## 2017-11-09 PROCEDURE — 99214 OFFICE O/P EST MOD 30 MIN: CPT | Mod: S$PBB,,, | Performed by: INTERNAL MEDICINE

## 2017-11-09 PROCEDURE — 99213 OFFICE O/P EST LOW 20 MIN: CPT | Mod: PBBFAC,PO | Performed by: INTERNAL MEDICINE

## 2017-11-09 NOTE — PROGRESS NOTES
Subjective:   Patient ID:  Conner Perez III is a 67 y.o. male who presents for follow up of Follow-up      68 yo, male, came in for f/u, candice vega of [facility].  PMH CAD, s/p twice LHC at [facility] and last on was . Did not have PCI. Was told no blockage, IDDM, ESRD on HD in , via cath, HTN, HLD,   He had chronic elevation of troponin to 0.05  ECHO in  normal EF and apical wma.  Nuclear stress in  normal.    Denied chest pain, dyspnea, palpitation, dizziness, orthopnea and syncope.  Lipid profile is good per pt checked at PCP' office  No smoking/drinking        Past Medical History:   Diagnosis Date    CKD (chronic kidney disease), stage IV     Diabetes mellitus     Diabetes mellitus, type 2     HTN (hypertension)        Past Surgical History:   Procedure Laterality Date    CIRCUMCISION  1978       Social History   Substance Use Topics    Smoking status: Former Smoker    Smokeless tobacco: Never Used    Alcohol use No       Family History   Problem Relation Age of Onset    Hypertension Father          Review of Systems   Constitution: Negative for decreased appetite, diaphoresis, fever, weakness, malaise/fatigue, night sweats, weight gain and weight loss.   HENT: Negative for congestion and nosebleeds.    Eyes: Negative for blurred vision and double vision.   Cardiovascular: Negative for chest pain, claudication, cyanosis, dyspnea on exertion, irregular heartbeat, leg swelling, near-syncope, orthopnea, palpitations, paroxysmal nocturnal dyspnea and syncope.   Respiratory: Negative for cough, hemoptysis, shortness of breath, sleep disturbances due to breathing, snoring, sputum production and wheezing.    Endocrine: Negative for cold intolerance and polyuria.   Hematologic/Lymphatic: Negative for bleeding problem. Does not bruise/bleed easily.   Skin: Negative for rash.   Musculoskeletal: Negative for back pain, falls, joint pain, joint swelling and neck pain.   Gastrointestinal: Negative  for abdominal pain, heartburn, nausea and vomiting.   Genitourinary: Negative for dysuria, frequency and hematuria.   Neurological: Negative for difficulty with concentration, dizziness, focal weakness, headaches, light-headedness, numbness and seizures.   Psychiatric/Behavioral: Negative for depression, memory loss and substance abuse. The patient does not have insomnia.    Allergic/Immunologic: Negative for HIV exposure and hives.       Objective:   Physical Exam   Constitutional: He is oriented to person, place, and time. He appears well-nourished.   HENT:   Head: Normocephalic.   Eyes: Pupils are equal, round, and reactive to light.   Neck: Normal carotid pulses and no JVD present. Carotid bruit is not present. No thyromegaly present.   Cardiovascular: Normal rate, regular rhythm, normal heart sounds and normal pulses.   No extrasystoles are present. PMI is not displaced.  Exam reveals no gallop and no S3.    No murmur heard.  Pulmonary/Chest: Breath sounds normal. No stridor. No respiratory distress.   Abdominal: Soft. Bowel sounds are normal. There is no tenderness. There is no rebound.   Musculoskeletal: Normal range of motion.   Neurological: He is alert and oriented to person, place, and time.   Skin: Skin is intact. No rash noted.   Psychiatric: His behavior is normal.       No results found for: CHOL  No results found for: HDL  No results found for: LDLCALC  No results found for: TRIG  No results found for: CHOLHDL    Chemistry        Component Value Date/Time     08/30/2017 0414    K 4.5 08/30/2017 0414     08/30/2017 0414    CO2 23 08/30/2017 0414    BUN 43 (H) 08/30/2017 0414    CREATININE 8.7 (H) 08/30/2017 0414     (H) 08/30/2017 0414        Component Value Date/Time    CALCIUM 7.7 (L) 08/30/2017 0414    ALKPHOS 63 08/30/2017 0414    AST 13 08/30/2017 0414    ALT 11 08/30/2017 0414    BILITOT 0.4 08/30/2017 0414    ESTGFRAFRICA 7 (A) 08/30/2017 0414    EGFRNONAA 6 (A) 08/30/2017  0414          No results found for: TSH  Lab Results   Component Value Date    INR 1.1 08/24/2017    INR 1.0 04/07/2017    INR 1.0 01/12/2017     Lab Results   Component Value Date    WBC 6.83 08/30/2017    HGB 8.0 (L) 08/30/2017    HCT 24.5 (L) 08/30/2017    MCV 85 08/30/2017     08/30/2017     BMP  Sodium   Date Value Ref Range Status   08/30/2017 137 136 - 145 mmol/L Final     Potassium   Date Value Ref Range Status   08/30/2017 4.5 3.5 - 5.1 mmol/L Final     Chloride   Date Value Ref Range Status   08/30/2017 103 95 - 110 mmol/L Final     CO2   Date Value Ref Range Status   08/30/2017 23 23 - 29 mmol/L Final     BUN, Bld   Date Value Ref Range Status   08/30/2017 43 (H) 8 - 23 mg/dL Final     Creatinine   Date Value Ref Range Status   08/30/2017 8.7 (H) 0.5 - 1.4 mg/dL Final     Calcium   Date Value Ref Range Status   08/30/2017 7.7 (L) 8.7 - 10.5 mg/dL Final     Anion Gap   Date Value Ref Range Status   08/30/2017 11 8 - 16 mmol/L Final     eGFR if    Date Value Ref Range Status   08/30/2017 7 (A) >60 mL/min/1.73 m^2 Final     eGFR if non    Date Value Ref Range Status   08/30/2017 6 (A) >60 mL/min/1.73 m^2 Final     Comment:     Calculation used to obtain the estimated glomerular filtration  rate (eGFR) is the CKD-EPI equation. Since race is unknown   in our information system, the eGFR values for   -American and Non--American patients are given   for each creatinine result.       CrCl cannot be calculated (Patient's most recent lab result is older than the maximum 7 days allowed.).     Assessment:      1. Essential hypertension    2. Coronary artery disease involving native coronary artery of native heart without angina pectoris    3. NSVT (nonsustained ventricular tachycardia)    4. Elevated troponin    5. Acute on chronic diastolic heart failure    6. ESRD (end stage renal disease)    7. Controlled type 2 diabetes mellitus with diabetic nephropathy, with  long-term current use of insulin        Plan:   D/c amlodipine AND continue cardizem  DASH and fluid restriction  CONTINUE METOPROLOL, LOSARTAN, AND HYDRALAZINE  Check BP at home, CALL IF BP IS HIGH  RTC IN 6 MONTHS

## 2017-12-09 ENCOUNTER — HOSPITAL ENCOUNTER (EMERGENCY)
Facility: HOSPITAL | Age: 67
Discharge: HOME | End: 2017-12-09
Attending: EMERGENCY MEDICINE
Payer: MEDICARE

## 2017-12-09 VITALS
RESPIRATION RATE: 14 BRPM | WEIGHT: 239.19 LBS | BODY MASS INDEX: 27.67 KG/M2 | HEIGHT: 78 IN | TEMPERATURE: 98 F | DIASTOLIC BLOOD PRESSURE: 89 MMHG | OXYGEN SATURATION: 98 % | HEART RATE: 77 BPM | SYSTOLIC BLOOD PRESSURE: 198 MMHG

## 2017-12-09 DIAGNOSIS — R06.02 SOB (SHORTNESS OF BREATH): ICD-10-CM

## 2017-12-09 DIAGNOSIS — N18.9 CHRONIC RENAL FAILURE, UNSPECIFIED CKD STAGE: Primary | ICD-10-CM

## 2017-12-09 LAB
ANION GAP SERPL CALC-SCNC: 10 MMOL/L
BASOPHILS # BLD AUTO: 0.06 K/UL
BASOPHILS NFR BLD: 1.3 %
BNP SERPL-MCNC: 427 PG/ML
BUN SERPL-MCNC: 25 MG/DL
CALCIUM SERPL-MCNC: 8.8 MG/DL
CHLORIDE SERPL-SCNC: 100 MMOL/L
CK MB SERPL-MCNC: 2.1 NG/ML
CK MB SERPL-RTO: 1.2 %
CK SERPL-CCNC: 179 U/L
CK SERPL-CCNC: 179 U/L
CO2 SERPL-SCNC: 30 MMOL/L
CREAT SERPL-MCNC: 7.8 MG/DL
DIFFERENTIAL METHOD: ABNORMAL
EOSINOPHIL # BLD AUTO: 0.2 K/UL
EOSINOPHIL NFR BLD: 4 %
ERYTHROCYTE [DISTWIDTH] IN BLOOD BY AUTOMATED COUNT: 14.4 %
EST. GFR  (AFRICAN AMERICAN): 7 ML/MIN/1.73 M^2
EST. GFR  (NON AFRICAN AMERICAN): 6 ML/MIN/1.73 M^2
GLUCOSE SERPL-MCNC: 87 MG/DL
HCT VFR BLD AUTO: 38.3 %
HGB BLD-MCNC: 12.5 G/DL
INR PPP: 1.1
LYMPHOCYTES # BLD AUTO: 1.8 K/UL
LYMPHOCYTES NFR BLD: 37.3 %
MCH RBC QN AUTO: 28.1 PG
MCHC RBC AUTO-ENTMCNC: 32.6 G/DL
MCV RBC AUTO: 86 FL
MONOCYTES # BLD AUTO: 0.5 K/UL
MONOCYTES NFR BLD: 10.2 %
NEUTROPHILS # BLD AUTO: 2.2 K/UL
NEUTROPHILS NFR BLD: 47.2 %
PLATELET # BLD AUTO: 165 K/UL
PMV BLD AUTO: 10.5 FL
POTASSIUM SERPL-SCNC: 4 MMOL/L
PROTHROMBIN TIME: 11.4 SEC
RBC # BLD AUTO: 4.45 M/UL
SODIUM SERPL-SCNC: 140 MMOL/L
TROPONIN I SERPL DL<=0.01 NG/ML-MCNC: 0.05 NG/ML
WBC # BLD AUTO: 4.72 K/UL

## 2017-12-09 PROCEDURE — 93005 ELECTROCARDIOGRAM TRACING: CPT

## 2017-12-09 PROCEDURE — 99284 EMERGENCY DEPT VISIT MOD MDM: CPT | Mod: 25

## 2017-12-09 PROCEDURE — 82553 CREATINE MB FRACTION: CPT

## 2017-12-09 PROCEDURE — 84484 ASSAY OF TROPONIN QUANT: CPT

## 2017-12-09 PROCEDURE — 85610 PROTHROMBIN TIME: CPT

## 2017-12-09 PROCEDURE — 93010 ELECTROCARDIOGRAM REPORT: CPT | Mod: ,,, | Performed by: INTERNAL MEDICINE

## 2017-12-09 PROCEDURE — 85025 COMPLETE CBC W/AUTO DIFF WBC: CPT

## 2017-12-09 PROCEDURE — 83880 ASSAY OF NATRIURETIC PEPTIDE: CPT

## 2017-12-09 PROCEDURE — 80048 BASIC METABOLIC PNL TOTAL CA: CPT

## 2017-12-09 RX ORDER — CLONIDINE HYDROCHLORIDE 0.1 MG/1
TABLET ORAL
Status: ON HOLD | COMMUNITY
Start: 2017-11-27 | End: 2018-04-28 | Stop reason: HOSPADM

## 2017-12-10 NOTE — ED PROVIDER NOTES
"SCRIBE #1 NOTE: I, Madihaty Dia, am scribing for, and in the presence of, Alcon Henderson MD. I have scribed the HPI, ROS, and PE.     SCRIBE #2 NOTE: I, Maury Draper, am scribing for, and in the presence of,  Alcon Henderson MD. I have scribed the remaining portions of the note not scribed by Scribe #1.     History      Chief Complaint   Patient presents with    Shortness of Breath     "I think i have too much fluid on my lungs"       Review of patient's allergies indicates:   Allergen Reactions    Augmentin [amoxicillin-pot clavulanate]     Lisinopril      cough    Sulfa (sulfonamide antibiotics)      swelling        HPI   HPI    12/9/2017, 7:30 PM   History obtained from the patient      History of Present Illness: Conner Perez III is a 67 y.o. male patient with PMHx of CKD and HTN who presents to the Emergency Department for SOB which onset gradually this afternoon. Patient reports having dialysis on Mondays, Wednesdays, and Fridays. Patient's nephrologist is Dr. Oakley. Symptoms are constant and moderate in severity. No mitigating or exacerbating factors reported. Associated sxs include mild epigastric pain. Patient denies any fever, chills, CP, extremity weakness/numbness, leg swelling, dizziness, cough, N/V, recent travel, long car rides, and all other sxs at this time. No further complaints or concerns at this time.     Arrival mode: Personal vehicle      PCP: Raciel Angela MD       Past Medical History:  Past Medical History:   Diagnosis Date    CKD (chronic kidney disease), stage IV     Diabetes mellitus     Diabetes mellitus, type 2     HTN (hypertension)        Past Surgical History:  Past Surgical History:   Procedure Laterality Date    CIRCUMCISION  1978         Family History:  Family History   Problem Relation Age of Onset    Hypertension Father        Social History:  Social History     Social History Main Topics    Smoking status: Former Smoker    Smokeless tobacco: Never Used "    Alcohol use No    Drug use: No    Sexual activity: Not on file       ROS   Review of Systems   Constitutional: Negative for chills and fever.   HENT: Negative for sore throat.    Respiratory: Positive for shortness of breath. Negative for cough.    Cardiovascular: Negative for chest pain and leg swelling.   Gastrointestinal: Positive for abdominal pain (mild epigastric). Negative for nausea and vomiting.   Genitourinary: Negative for dysuria.   Musculoskeletal: Negative for back pain.   Skin: Negative for rash.   Neurological: Negative for dizziness, weakness and numbness.   Hematological: Does not bruise/bleed easily.   All other systems reviewed and are negative.    Physical Exam      Initial Vitals [12/09/17 1856]   BP Pulse Resp Temp SpO2   (!) 222/109 84 (!) 22 97.5 °F (36.4 °C) 95 %      MAP       146.67          Physical Exam  Nursing Notes and Vital Signs Reviewed.  Constitutional: Patient is in no acute distress. Well-developed and well-nourished.  Head: Atraumatic. Normocephalic.  Eyes: PERRL. EOM intact. Conjunctivae are not pale. No scleral icterus.  ENT: Mucous membranes are moist. Oropharynx is clear and symmetric.    Neck: Supple. Full ROM. No lymphadenopathy.  Cardiovascular: Regular rate. Regular rhythm.   Pulmonary/Chest: No respiratory distress. Clear to auscultation bilaterally. No wheezing or rales.  Abdominal: Soft and non-distended.  There is no tenderness.  No rebound, guarding, or rigidity. Good bowel sounds.  Genitourinary: No CVA tenderness  Musculoskeletal: Moves all extremities. No obvious deformities. No edema. No calf tenderness.  Skin: Warm and dry.  Neurological:  Alert, awake, and appropriate.  Normal speech.  No acute focal neurological deficits are appreciated.  Psychiatric: Normal affect. Good eye contact. Appropriate in content.    ED Course    Procedures  ED Vital Signs:  Vitals:    12/09/17 1856 12/09/17 1907   BP: (!) 222/109    Pulse: 84 82   Resp: (!) 22    Temp: 97.5  "°F (36.4 °C)    TempSrc: Oral    SpO2: 95%    Weight: 108.5 kg (239 lb 3.2 oz)    Height: 6' 6" (1.981 m)        Abnormal Lab Results:  Labs Reviewed   CBC W/ AUTO DIFFERENTIAL - Abnormal; Notable for the following:        Result Value    RBC 4.45 (*)     Hemoglobin 12.5 (*)     Hematocrit 38.3 (*)     All other components within normal limits   B-TYPE NATRIURETIC PEPTIDE - Abnormal; Notable for the following:      (*)     All other components within normal limits   BASIC METABOLIC PANEL - Abnormal; Notable for the following:     CO2 30 (*)     BUN, Bld 25 (*)     Creatinine 7.8 (*)     eGFR if  7 (*)     eGFR if non  6 (*)     All other components within normal limits   TROPONIN I - Abnormal; Notable for the following:     Troponin I 0.053 (*)     All other components within normal limits   PROTIME-INR   CK-MB   CK        All Lab Results:  Results for orders placed or performed during the hospital encounter of 12/09/17   CBC auto differential   Result Value Ref Range    WBC 4.72 3.90 - 12.70 K/uL    RBC 4.45 (L) 4.60 - 6.20 M/uL    Hemoglobin 12.5 (L) 14.0 - 18.0 g/dL    Hematocrit 38.3 (L) 40.0 - 54.0 %    MCV 86 82 - 98 fL    MCH 28.1 27.0 - 31.0 pg    MCHC 32.6 32.0 - 36.0 g/dL    RDW 14.4 11.5 - 14.5 %    Platelets 165 150 - 350 K/uL    MPV 10.5 9.2 - 12.9 fL    Gran # 2.2 1.8 - 7.7 K/uL    Lymph # 1.8 1.0 - 4.8 K/uL    Mono # 0.5 0.3 - 1.0 K/uL    Eos # 0.2 0.0 - 0.5 K/uL    Baso # 0.06 0.00 - 0.20 K/uL    Gran% 47.2 38.0 - 73.0 %    Lymph% 37.3 18.0 - 48.0 %    Mono% 10.2 4.0 - 15.0 %    Eosinophil% 4.0 0.0 - 8.0 %    Basophil% 1.3 0.0 - 1.9 %    Differential Method Automated    Brain natriuretic peptide   Result Value Ref Range     (H) 0 - 99 pg/mL   Basic metabolic panel   Result Value Ref Range    Sodium 140 136 - 145 mmol/L    Potassium 4.0 3.5 - 5.1 mmol/L    Chloride 100 95 - 110 mmol/L    CO2 30 (H) 23 - 29 mmol/L    Glucose 87 70 - 110 mg/dL    BUN, Bld 25 " (H) 8 - 23 mg/dL    Creatinine 7.8 (H) 0.5 - 1.4 mg/dL    Calcium 8.8 8.7 - 10.5 mg/dL    Anion Gap 10 8 - 16 mmol/L    eGFR if African American 7 (A) >60 mL/min/1.73 m^2    eGFR if non African American 6 (A) >60 mL/min/1.73 m^2   Protime-INR   Result Value Ref Range    Prothrombin Time 11.4 9.0 - 12.5 sec    INR 1.1 0.8 - 1.2   Troponin I   Result Value Ref Range    Troponin I 0.053 (H) 0.000 - 0.026 ng/mL   CK-MB   Result Value Ref Range     20 - 200 U/L    CPK MB 2.1 0.1 - 6.5 ng/mL    MB% 1.2 0.0 - 5.0 %   CK   Result Value Ref Range     20 - 200 U/L         Imaging Results:  Imaging Results          X-Ray Chest 1 View (Final result)  Result time 12/09/17 20:16:45    Final result by Dashawn Farr MD (12/09/17 20:16:45)                 Impression:      No acute findings.      Electronically signed by: DASHAWN FARR MD  Date:     12/09/17  Time:    20:16              Narrative:    Exam: XR CHEST 1 VIEW,    Date:  12/09/17 19:41:51    History: Shortness of breath.    Comparison:  08/26/2017.    Findings: Mild cardiomegaly.  Decreased lung volumes.  No acute findings.                             The EKG was ordered, reviewed, and independently interpreted by the ED provider.  Interpretation time: 1907  Rate: 82 BPM  Rhythm: normal sinus rhythm  Interpretation: QRS is under 8ms. ST segment normal. T wave normal. Axis normal. No STEMI.         The Emergency Provider reviewed the vital signs and test results, which are outlined above.    ED Discussion     8:52 PM: Pt's troponin is noted to be elevated but is at baseline with his previous studies. Pt's repeat blood pressure is 198/89. Pt states he feels better and is ready to go home. Pt also states he is due for his nightly dose of Cardizem. Pt denies and HA, CP, or SOB and states he will take his BP meds upon arriving home.     8:58 PM: Reassessed pt at this time. Pt is awake, alert, and in NAD. Pt states his condition has improved at this  time. Discussed with pt all pertinent ED information and results. Discussed pt dx of chronic renal failure and plan of tx. Gave pt all f/u and return to the ED instructions. All questions and concerns were addressed at this time. Pt expresses understanding of information and instructions, and is comfortable with plan to discharge. Pt is stable for discharge.    I discussed with patient and/or family/caretaker that evaluation in the ED does not suggest any emergent or life threatening medical conditions requiring immediate intervention beyond what was provided in the ED, and I believe patient is safe for discharge.  Regardless, an unremarkable evaluation in the ED does not preclude the development or presence of a serious of life threatening condition. As such, patient was instructed to return immediately for any worsening or change in current symptoms.      ED Medication(s):  Medications - No data to display    New Prescriptions    No medications on file             Medical Decision Making    Medical Decision Making:   Clinical Tests:   Lab Tests: Ordered and Reviewed  Radiological Study: Ordered and Reviewed  Medical Tests: Ordered and Reviewed           Scribe Attestation:   Scribe #1: I performed the above scribed service and the documentation accurately describes the services I performed. I attest to the accuracy of the note.    Attending:   Physician Attestation Statement for Scribe #1: I, Alcon Henderson MD, personally performed the services described in this documentation, as scribed by Madiha Dia, in my presence, and it is both accurate and complete.       Scribe Attestation:   Scribe #2: I performed the above scribed service and the documentation accurately describes the services I performed. I attest to the accuracy of the note.    Attending Attestation:           Physician Attestation for Scribe:    Physician Attestation Statement for Scribe #2: I, Alcon Henderson MD, reviewed documentation, as  scribed by Maury Draper in my presence, and it is both accurate and complete. I also acknowledge and confirm the content of the note done by Michell #1.          Clinical Impression       ICD-10-CM ICD-9-CM   1. Chronic renal failure, unspecified CKD stage N18.9 585.9   2. SOB (shortness of breath) R06.02 786.05       Disposition:   Disposition: Discharged  Condition: Stable         Alcon Henderson MD  12/10/17 0526

## 2017-12-10 NOTE — ED NOTES
MD Henderson notified of BP. MD states to continue with discharge. Pt instructed by MD to take Cardizem when he gets home. Pt verbalizes understanding. NAD noted. AAO x 3. RR e/u, airway open and patent. Will continue with discharge.

## 2017-12-17 ENCOUNTER — HOSPITAL ENCOUNTER (OUTPATIENT)
Facility: HOSPITAL | Age: 67
Discharge: HOME | End: 2017-12-18
Attending: EMERGENCY MEDICINE | Admitting: HOSPITALIST
Payer: MEDICARE

## 2017-12-17 DIAGNOSIS — Z99.2 STAGE 5 CHRONIC KIDNEY DISEASE ON CHRONIC DIALYSIS: ICD-10-CM

## 2017-12-17 DIAGNOSIS — N18.5 CHRONIC KIDNEY DISEASE, STAGE 5, KIDNEY FAILURE: Chronic | ICD-10-CM

## 2017-12-17 DIAGNOSIS — I16.0 HYPERTENSIVE URGENCY: Primary | ICD-10-CM

## 2017-12-17 DIAGNOSIS — N18.6 STAGE 5 CHRONIC KIDNEY DISEASE ON CHRONIC DIALYSIS: ICD-10-CM

## 2017-12-17 DIAGNOSIS — R06.02 SOB (SHORTNESS OF BREATH): ICD-10-CM

## 2017-12-17 DIAGNOSIS — E11.21 CONTROLLED TYPE 2 DIABETES MELLITUS WITH DIABETIC NEPHROPATHY, WITH LONG-TERM CURRENT USE OF INSULIN: ICD-10-CM

## 2017-12-17 DIAGNOSIS — Z79.4 CONTROLLED TYPE 2 DIABETES MELLITUS WITH DIABETIC NEPHROPATHY, WITH LONG-TERM CURRENT USE OF INSULIN: ICD-10-CM

## 2017-12-17 LAB
ALBUMIN SERPL BCP-MCNC: 3.3 G/DL
ALBUMIN SERPL BCP-MCNC: 3.3 G/DL
ALP SERPL-CCNC: 59 U/L
ALT SERPL W/O P-5'-P-CCNC: 12 U/L
ANION GAP SERPL CALC-SCNC: 15 MMOL/L
ANION GAP SERPL CALC-SCNC: 15 MMOL/L
AST SERPL-CCNC: 20 U/L
BACTERIA #/AREA URNS HPF: NORMAL /HPF
BASOPHILS # BLD AUTO: 0.05 K/UL
BASOPHILS NFR BLD: 0.9 %
BILIRUB SERPL-MCNC: 0.6 MG/DL
BILIRUB UR QL STRIP: NEGATIVE
BNP SERPL-MCNC: 1128 PG/ML
BUN SERPL-MCNC: 30 MG/DL
BUN SERPL-MCNC: 30 MG/DL
CALCIUM SERPL-MCNC: 8.4 MG/DL
CALCIUM SERPL-MCNC: 8.4 MG/DL
CHLORIDE SERPL-SCNC: 100 MMOL/L
CHLORIDE SERPL-SCNC: 100 MMOL/L
CK SERPL-CCNC: 263 U/L
CLARITY UR: CLEAR
CO2 SERPL-SCNC: 28 MMOL/L
CO2 SERPL-SCNC: 28 MMOL/L
COLOR UR: YELLOW
CREAT SERPL-MCNC: 9.2 MG/DL
CREAT SERPL-MCNC: 9.2 MG/DL
DIFFERENTIAL METHOD: ABNORMAL
EOSINOPHIL # BLD AUTO: 0.2 K/UL
EOSINOPHIL NFR BLD: 3.1 %
ERYTHROCYTE [DISTWIDTH] IN BLOOD BY AUTOMATED COUNT: 14.5 %
EST. GFR  (AFRICAN AMERICAN): 6 ML/MIN/1.73 M^2
EST. GFR  (AFRICAN AMERICAN): 6 ML/MIN/1.73 M^2
EST. GFR  (NON AFRICAN AMERICAN): 5 ML/MIN/1.73 M^2
EST. GFR  (NON AFRICAN AMERICAN): 5 ML/MIN/1.73 M^2
GLUCOSE SERPL-MCNC: 98 MG/DL
GLUCOSE SERPL-MCNC: 98 MG/DL
GLUCOSE UR QL STRIP: ABNORMAL
HCT VFR BLD AUTO: 37.6 %
HGB BLD-MCNC: 12.1 G/DL
HGB UR QL STRIP: ABNORMAL
HYALINE CASTS #/AREA URNS LPF: 0 /LPF
INR PPP: 1.1
KETONES UR QL STRIP: NEGATIVE
LEUKOCYTE ESTERASE UR QL STRIP: NEGATIVE
LYMPHOCYTES # BLD AUTO: 1.8 K/UL
LYMPHOCYTES NFR BLD: 32.3 %
MAGNESIUM SERPL-MCNC: 2.3 MG/DL
MCH RBC QN AUTO: 27.5 PG
MCHC RBC AUTO-ENTMCNC: 32.2 G/DL
MCV RBC AUTO: 86 FL
MICROSCOPIC COMMENT: NORMAL
MONOCYTES # BLD AUTO: 0.6 K/UL
MONOCYTES NFR BLD: 10.4 %
NEUTROPHILS # BLD AUTO: 2.9 K/UL
NEUTROPHILS NFR BLD: 53.3 %
NITRITE UR QL STRIP: NEGATIVE
PH UR STRIP: >8 [PH] (ref 5–8)
PHOSPHATE SERPL-MCNC: 2.9 MG/DL
PLATELET # BLD AUTO: 163 K/UL
PMV BLD AUTO: 10.9 FL
POTASSIUM SERPL-SCNC: 3.9 MMOL/L
POTASSIUM SERPL-SCNC: 3.9 MMOL/L
PROT SERPL-MCNC: 7.1 G/DL
PROT UR QL STRIP: ABNORMAL
PROTHROMBIN TIME: 11 SEC
RBC # BLD AUTO: 4.4 M/UL
RBC #/AREA URNS HPF: 3 /HPF (ref 0–4)
SODIUM SERPL-SCNC: 143 MMOL/L
SODIUM SERPL-SCNC: 143 MMOL/L
SP GR UR STRIP: 1.02 (ref 1–1.03)
TROPONIN I SERPL DL<=0.01 NG/ML-MCNC: 0.06 NG/ML
TSH SERPL DL<=0.005 MIU/L-ACNC: 0.67 UIU/ML
URN SPEC COLLECT METH UR: ABNORMAL
UROBILINOGEN UR STRIP-ACNC: NEGATIVE EU/DL
WBC # BLD AUTO: 5.41 K/UL
WBC #/AREA URNS HPF: 2 /HPF (ref 0–5)

## 2017-12-17 PROCEDURE — 99285 EMERGENCY DEPT VISIT HI MDM: CPT | Mod: 25

## 2017-12-17 PROCEDURE — 85610 PROTHROMBIN TIME: CPT

## 2017-12-17 PROCEDURE — 82550 ASSAY OF CK (CPK): CPT

## 2017-12-17 PROCEDURE — 93010 ELECTROCARDIOGRAM REPORT: CPT | Mod: ,,, | Performed by: INTERNAL MEDICINE

## 2017-12-17 PROCEDURE — G0378 HOSPITAL OBSERVATION PER HR: HCPCS

## 2017-12-17 PROCEDURE — 81000 URINALYSIS NONAUTO W/SCOPE: CPT

## 2017-12-17 PROCEDURE — 84100 ASSAY OF PHOSPHORUS: CPT

## 2017-12-17 PROCEDURE — 21400001 HC TELEMETRY ROOM

## 2017-12-17 PROCEDURE — 36415 COLL VENOUS BLD VENIPUNCTURE: CPT

## 2017-12-17 PROCEDURE — 84443 ASSAY THYROID STIM HORMONE: CPT

## 2017-12-17 PROCEDURE — 85025 COMPLETE CBC W/AUTO DIFF WBC: CPT

## 2017-12-17 PROCEDURE — 96375 TX/PRO/DX INJ NEW DRUG ADDON: CPT

## 2017-12-17 PROCEDURE — 96372 THER/PROPH/DIAG INJ SC/IM: CPT

## 2017-12-17 PROCEDURE — 84484 ASSAY OF TROPONIN QUANT: CPT

## 2017-12-17 PROCEDURE — 96374 THER/PROPH/DIAG INJ IV PUSH: CPT

## 2017-12-17 PROCEDURE — 80053 COMPREHEN METABOLIC PANEL: CPT

## 2017-12-17 PROCEDURE — 63600175 PHARM REV CODE 636 W HCPCS: Performed by: EMERGENCY MEDICINE

## 2017-12-17 PROCEDURE — 63600175 PHARM REV CODE 636 W HCPCS: Performed by: HOSPITALIST

## 2017-12-17 PROCEDURE — 83735 ASSAY OF MAGNESIUM: CPT

## 2017-12-17 PROCEDURE — 25000003 PHARM REV CODE 250: Performed by: EMERGENCY MEDICINE

## 2017-12-17 PROCEDURE — 25000003 PHARM REV CODE 250: Performed by: HOSPITALIST

## 2017-12-17 PROCEDURE — 83880 ASSAY OF NATRIURETIC PEPTIDE: CPT

## 2017-12-17 RX ORDER — FUROSEMIDE 10 MG/ML
40 INJECTION INTRAMUSCULAR; INTRAVENOUS
Status: COMPLETED | OUTPATIENT
Start: 2017-12-17 | End: 2017-12-17

## 2017-12-17 RX ORDER — GLUCAGON 1 MG
1 KIT INJECTION
Status: DISCONTINUED | OUTPATIENT
Start: 2017-12-17 | End: 2017-12-17 | Stop reason: SDUPTHER

## 2017-12-17 RX ORDER — INSULIN ASPART 100 [IU]/ML
1-10 INJECTION, SOLUTION INTRAVENOUS; SUBCUTANEOUS
Status: DISCONTINUED | OUTPATIENT
Start: 2017-12-17 | End: 2017-12-18 | Stop reason: HOSPADM

## 2017-12-17 RX ORDER — CLONIDINE HYDROCHLORIDE 0.1 MG/1
0.1 TABLET ORAL 3 TIMES DAILY
Status: DISCONTINUED | OUTPATIENT
Start: 2017-12-17 | End: 2017-12-18 | Stop reason: HOSPADM

## 2017-12-17 RX ORDER — HYDRALAZINE HYDROCHLORIDE 20 MG/ML
10 INJECTION INTRAMUSCULAR; INTRAVENOUS EVERY 4 HOURS PRN
Status: DISCONTINUED | OUTPATIENT
Start: 2017-12-17 | End: 2017-12-18 | Stop reason: HOSPADM

## 2017-12-17 RX ORDER — IBUPROFEN 200 MG
16 TABLET ORAL
Status: DISCONTINUED | OUTPATIENT
Start: 2017-12-17 | End: 2017-12-18 | Stop reason: HOSPADM

## 2017-12-17 RX ORDER — HYDRALAZINE HYDROCHLORIDE 20 MG/ML
10 INJECTION INTRAMUSCULAR; INTRAVENOUS
Status: COMPLETED | OUTPATIENT
Start: 2017-12-17 | End: 2017-12-17

## 2017-12-17 RX ORDER — ACETAMINOPHEN 325 MG/1
650 TABLET ORAL EVERY 8 HOURS PRN
Status: DISCONTINUED | OUTPATIENT
Start: 2017-12-17 | End: 2017-12-18 | Stop reason: HOSPADM

## 2017-12-17 RX ORDER — IBUPROFEN 200 MG
24 TABLET ORAL
Status: DISCONTINUED | OUTPATIENT
Start: 2017-12-17 | End: 2017-12-18 | Stop reason: HOSPADM

## 2017-12-17 RX ORDER — ATORVASTATIN CALCIUM 40 MG/1
40 TABLET, FILM COATED ORAL DAILY
Status: DISCONTINUED | OUTPATIENT
Start: 2017-12-18 | End: 2017-12-18 | Stop reason: HOSPADM

## 2017-12-17 RX ORDER — IBUPROFEN 200 MG
16 TABLET ORAL
Status: DISCONTINUED | OUTPATIENT
Start: 2017-12-17 | End: 2017-12-17 | Stop reason: SDUPTHER

## 2017-12-17 RX ORDER — GLUCAGON 1 MG
1 KIT INJECTION
Status: DISCONTINUED | OUTPATIENT
Start: 2017-12-17 | End: 2017-12-18 | Stop reason: HOSPADM

## 2017-12-17 RX ORDER — HEPARIN SODIUM 5000 [USP'U]/ML
5000 INJECTION, SOLUTION INTRAVENOUS; SUBCUTANEOUS EVERY 8 HOURS
Status: DISCONTINUED | OUTPATIENT
Start: 2017-12-17 | End: 2017-12-18 | Stop reason: HOSPADM

## 2017-12-17 RX ORDER — ONDANSETRON 2 MG/ML
4 INJECTION INTRAMUSCULAR; INTRAVENOUS EVERY 12 HOURS PRN
Status: DISCONTINUED | OUTPATIENT
Start: 2017-12-17 | End: 2017-12-18 | Stop reason: HOSPADM

## 2017-12-17 RX ORDER — ASPIRIN 81 MG/1
81 TABLET ORAL DAILY
Status: DISCONTINUED | OUTPATIENT
Start: 2017-12-18 | End: 2017-12-18 | Stop reason: HOSPADM

## 2017-12-17 RX ORDER — SODIUM CHLORIDE 0.9 % (FLUSH) 0.9 %
5 SYRINGE (ML) INJECTION
Status: DISCONTINUED | OUTPATIENT
Start: 2017-12-17 | End: 2017-12-18 | Stop reason: HOSPADM

## 2017-12-17 RX ORDER — HYDRALAZINE HYDROCHLORIDE 50 MG/1
50 TABLET, FILM COATED ORAL EVERY 8 HOURS
Status: DISCONTINUED | OUTPATIENT
Start: 2017-12-17 | End: 2017-12-18

## 2017-12-17 RX ORDER — IBUPROFEN 200 MG
24 TABLET ORAL
Status: DISCONTINUED | OUTPATIENT
Start: 2017-12-17 | End: 2017-12-17 | Stop reason: SDUPTHER

## 2017-12-17 RX ORDER — ACETAMINOPHEN 325 MG/1
650 TABLET ORAL EVERY 4 HOURS PRN
Status: DISCONTINUED | OUTPATIENT
Start: 2017-12-17 | End: 2017-12-18 | Stop reason: HOSPADM

## 2017-12-17 RX ORDER — HYDRALAZINE HYDROCHLORIDE 25 MG/1
50 TABLET, FILM COATED ORAL EVERY 8 HOURS
Status: DISCONTINUED | OUTPATIENT
Start: 2017-12-17 | End: 2017-12-17

## 2017-12-17 RX ADMIN — NITROGLYCERIN 1 INCH: 20 OINTMENT TOPICAL at 05:12

## 2017-12-17 RX ADMIN — HYDRALAZINE HYDROCHLORIDE 50 MG: 25 TABLET, FILM COATED ORAL at 08:12

## 2017-12-17 RX ADMIN — CLONIDINE HYDROCHLORIDE 0.1 MG: 0.1 TABLET ORAL at 10:12

## 2017-12-17 RX ADMIN — FUROSEMIDE 40 MG: 10 INJECTION, SOLUTION INTRAMUSCULAR; INTRAVENOUS at 07:12

## 2017-12-17 RX ADMIN — HYDRALAZINE HYDROCHLORIDE 10 MG: 20 INJECTION INTRAMUSCULAR; INTRAVENOUS at 07:12

## 2017-12-17 RX ADMIN — HEPARIN SODIUM 5000 UNITS: 5000 INJECTION, SOLUTION INTRAVENOUS; SUBCUTANEOUS at 10:12

## 2017-12-17 NOTE — ED PROVIDER NOTES
"SCRIBE #1 NOTE: I, Marisela Alex, am scribing for, and in the presence of, Endy Alford MD. I have scribed the entire note.      History      Chief Complaint   Patient presents with    Shortness of Breath     "i feel like my body is retaining fluid"       Review of patient's allergies indicates:   Allergen Reactions    Augmentin [amoxicillin-pot clavulanate]     Lisinopril      cough    Sulfa (sulfonamide antibiotics)      swelling        HPI   HPI    12/17/2017, 5:49 PM   History obtained from the patient      History of Present Illness: Conner Perez III is a 67 y.o. male patient HTN, DM, CKD on dialysis who presents to the Emergency Department for SOB which worsened today. Symptoms are constant and moderate in severity. He believes that he is retaining fluid and states that his arms are swelling. He has been drinking plenty of fluids because he has been thirsty and reports compliance with Lasix. No mitigating or exacerbating factors reported. Associated sx include productive cough. Patient denies fever, chills, leg swelling, leg pain, CP, abd pain, N/V, dizziness, extremity weakness/numbness, HA, fatigue, lightheadedness, and all other sxs at this time. No further complaints or concerns at this time.     Arrival mode: Personal vehicle    PCP: Raciel Angela MD       Past Medical History:  Past Medical History:   Diagnosis Date    CKD (chronic kidney disease), stage IV     Diabetes mellitus     Diabetes mellitus, type 2     HTN (hypertension)        Past Surgical History:  Past Surgical History:   Procedure Laterality Date    CIRCUMCISION  1978    TOE AMPUTATION Left 01/24/2017    4th toe         Family History:  Family History   Problem Relation Age of Onset    Hypertension Father        Social History:  Social History     Social History Main Topics    Smoking status: Former Smoker    Smokeless tobacco: Never Used    Alcohol use No    Drug use: No    Sexual activity: Unknonw       ROS "   Review of Systems   Constitutional: Negative for chills, fatigue and fever.   HENT: Negative for sore throat.    Respiratory: Positive for cough and shortness of breath. Negative for chest tightness.    Cardiovascular: Negative for chest pain, palpitations and leg swelling.   Gastrointestinal: Negative for abdominal pain, diarrhea, nausea and vomiting.   Genitourinary: Negative for dysuria.   Musculoskeletal: Negative for back pain.   Skin: Negative for rash.   Neurological: Negative for syncope, weakness, numbness and headaches.   Hematological: Does not bruise/bleed easily.   All other systems reviewed and are negative.      Physical Exam      Initial Vitals   BP Pulse Resp Temp SpO2   12/17/17 1710 12/17/17 1709 12/17/17 1709 12/17/17 1709 12/17/17 1709   (!) 217/108 88 18 97.6 °F (36.4 °C) 97 %      MAP       12/17/17 1710       144.33          Physical Exam  Nursing Notes and Vital Signs Reviewed.  Constitutional: Patient is in no acute distress. Awake and alert. Well-nourished. Pt is elderly.  Head: Atraumatic. Normocephalic.  Eyes: PERRL. EOM intact. Conjunctivae are not pale. No scleral icterus.  ENT: Mucous membranes are moist. Oropharynx is clear and symmetric.    Neck: Supple. Full ROM. No lymphadenopathy.  Cardiovascular: Regular rate. Regular rhythm. No murmurs, rubs, or gallops. Distal pulses are 2+ and symmetric.  Pulmonary/Chest: No respiratory distress. Clear to auscultation bilaterally. No wheezing, rales, or rhonchi.  Abdominal: Soft and non-distended.  There is no tenderness.  No rebound, guarding, or rigidity.  Good bowel sounds.    Musculoskeletal: Moves all extremities. No obvious deformities. No swelling appreciated. No calf tenderness.  Skin: Warm and dry.  Neurological:  Alert, awake, and appropriate.  Normal speech.  No acute focal neurological deficits are appreciated.  Psychiatric: Normal affect. Good eye contact. Appropriate in content.    ED Course    Procedures  ED Vital  "Signs:  Vitals:    12/17/17 1709 12/17/17 1710 12/17/17 1816 12/17/17 1846   BP:  (!) 217/108 (!) 210/100 (!) 214/91   Pulse: 88  78 77   Resp: 18   11   Temp: 97.6 °F (36.4 °C)      TempSrc: Oral      SpO2: 97%  100% 98%   Weight: 108.4 kg (239 lb)      Height: 6' 6" (1.981 m)       12/17/17 1902 12/17/17 1923 12/17/17 1924 12/17/17 1931   BP: (!) 220/111 (!) 204/90 (!) 204/90 (!) 215/101   Pulse: 85 80  79   Resp: 16 (!) 21  12   Temp:       TempSrc:       SpO2: 100% 100%  99%   Weight:       Height:           Abnormal Lab Results:  Labs Reviewed   CBC W/ AUTO DIFFERENTIAL - Abnormal; Notable for the following:        Result Value    RBC 4.40 (*)     Hemoglobin 12.1 (*)     Hematocrit 37.6 (*)     All other components within normal limits   COMPREHENSIVE METABOLIC PANEL - Abnormal; Notable for the following:     BUN, Bld 30 (*)     Creatinine 9.2 (*)     Calcium 8.4 (*)     Albumin 3.3 (*)     eGFR if  6 (*)     eGFR if non  5 (*)     All other components within normal limits   B-TYPE NATRIURETIC PEPTIDE - Abnormal; Notable for the following:     BNP 1,128 (*)     All other components within normal limits   CK - Abnormal; Notable for the following:      (*)     All other components within normal limits   TROPONIN I - Abnormal; Notable for the following:     Troponin I 0.064 (*)     All other components within normal limits   RENAL FUNCTION PANEL - Abnormal; Notable for the following:     BUN, Bld 30 (*)     Calcium 8.4 (*)     Creatinine 9.2 (*)     Albumin 3.3 (*)     eGFR if  6 (*)     eGFR if non  5 (*)     All other components within normal limits   MAGNESIUM   PHOSPHORUS   PROTIME-INR   TSH   RENAL FUNCTION PANEL   URINALYSIS   TSH   MAGNESIUM   PROTIME-INR   POCT GLUCOSE MONITORING CONTINUOUS        All Lab Results:  Results for orders placed or performed during the hospital encounter of 12/17/17   CBC auto differential   Result Value Ref " Range    WBC 5.41 3.90 - 12.70 K/uL    RBC 4.40 (L) 4.60 - 6.20 M/uL    Hemoglobin 12.1 (L) 14.0 - 18.0 g/dL    Hematocrit 37.6 (L) 40.0 - 54.0 %    MCV 86 82 - 98 fL    MCH 27.5 27.0 - 31.0 pg    MCHC 32.2 32.0 - 36.0 g/dL    RDW 14.5 11.5 - 14.5 %    Platelets 163 150 - 350 K/uL    MPV 10.9 9.2 - 12.9 fL    Gran # 2.9 1.8 - 7.7 K/uL    Lymph # 1.8 1.0 - 4.8 K/uL    Mono # 0.6 0.3 - 1.0 K/uL    Eos # 0.2 0.0 - 0.5 K/uL    Baso # 0.05 0.00 - 0.20 K/uL    Gran% 53.3 38.0 - 73.0 %    Lymph% 32.3 18.0 - 48.0 %    Mono% 10.4 4.0 - 15.0 %    Eosinophil% 3.1 0.0 - 8.0 %    Basophil% 0.9 0.0 - 1.9 %    Differential Method Automated    Comprehensive metabolic panel   Result Value Ref Range    Sodium 143 136 - 145 mmol/L    Potassium 3.9 3.5 - 5.1 mmol/L    Chloride 100 95 - 110 mmol/L    CO2 28 23 - 29 mmol/L    Glucose 98 70 - 110 mg/dL    BUN, Bld 30 (H) 8 - 23 mg/dL    Creatinine 9.2 (H) 0.5 - 1.4 mg/dL    Calcium 8.4 (L) 8.7 - 10.5 mg/dL    Total Protein 7.1 6.0 - 8.4 g/dL    Albumin 3.3 (L) 3.5 - 5.2 g/dL    Total Bilirubin 0.6 0.1 - 1.0 mg/dL    Alkaline Phosphatase 59 55 - 135 U/L    AST 20 10 - 40 U/L    ALT 12 10 - 44 U/L    Anion Gap 15 8 - 16 mmol/L    eGFR if African American 6 (A) >60 mL/min/1.73 m^2    eGFR if non African American 5 (A) >60 mL/min/1.73 m^2   Brain natriuretic peptide   Result Value Ref Range    BNP 1,128 (H) 0 - 99 pg/mL   CK   Result Value Ref Range     (H) 20 - 200 U/L   Troponin I   Result Value Ref Range    Troponin I 0.064 (H) 0.000 - 0.026 ng/mL   Renal function panel   Result Value Ref Range    Glucose 98 70 - 110 mg/dL    Sodium 143 136 - 145 mmol/L    Potassium 3.9 3.5 - 5.1 mmol/L    Chloride 100 95 - 110 mmol/L    CO2 28 23 - 29 mmol/L    BUN, Bld 30 (H) 8 - 23 mg/dL    Calcium 8.4 (L) 8.7 - 10.5 mg/dL    Creatinine 9.2 (H) 0.5 - 1.4 mg/dL    Albumin 3.3 (L) 3.5 - 5.2 g/dL    eGFR if African American 6 (A) >60 mL/min/1.73 m^2    eGFR if non  5 (A) >60  mL/min/1.73 m^2    Anion Gap 15 8 - 16 mmol/L       Imaging Results:  Imaging Results          X-Ray Chest PA And Lateral (Final result)  Result time 12/17/17 19:17:37    Final result by Perfecto Bender Jr., MD (12/17/17 19:17:37)                 Impression:     Persistent mild cardiomegaly without acute air space disease.  Findings are similar to the previous exam.      Electronically signed by: PERFECTO BENDER M.D.  Date:     12/17/17  Time:    19:17              Narrative:    EXAM: [id number] CHEST PA AND LATERAL    CLINICAL HISTORY: sob    COMPARISON: Previous frontal view of the chest from 12/09/2017 was reviewed.    FINDINGS: The heart size is mildly enlarged. The lung fields are clear. No acute process is identified.                             The EKG was ordered, reviewed, and independently interpreted by the ED provider.  Interpretation time: 17:35  Rate: 80 BPM  Rhythm: normal sinus rhythm  Interpretation: Nonspecific T wave abnormality. Prolonged QT. No STEMI.    The Emergency Provider reviewed the vital signs and test results, which are outlined above.    ED Discussion     6:57 PM: Dr. Alford discussed the pt's case with Dr. Colon (Nephrologist) who recommends admission and IV hydralazine. States patient will have dialysis in the morning.    7:00 PM: Re-evaluated pt. I have discussed test results, shared treatment plan, and the need for admission with patient and family at bedside. Pt and family express understanding at this time and agree with all information. All questions answered. Pt and family have no further questions or concerns at this time. Pt is ready for admit.    7:03 PM: Discussed case with Dr. Salazar ([facility]). Dr. Salazar agrees with current care and management of pt and accepts admission.   Admitting Service: Hospital medicine   Admitting Physician: Dr. Salazar  Admit to: Tele    ED Medication(s):  Medications   ondansetron injection 4 mg (not administered)   promethazine (PHENERGAN) 6.25  mg in dextrose 5 % 50 mL IVPB (not administered)   acetaminophen tablet 650 mg (not administered)   sodium chloride 0.9% flush 5 mL (not administered)   glucose chewable tablet 16 g (not administered)   glucose chewable tablet 24 g (not administered)   dextrose 50% injection 12.5 g (not administered)   dextrose 50% injection 25 g (not administered)   glucagon (human recombinant) injection 1 mg (not administered)   heparin (porcine) injection 5,000 Units (not administered)   acetaminophen tablet 650 mg (not administered)   aspirin EC tablet 81 mg (not administered)   atorvastatin tablet 40 mg (not administered)   insulin aspart pen 1-10 Units (not administered)   hydrALAZINE tablet 50 mg (not administered)   nitroGLYCERIN 2% TD oint ointment 1 inch (1 inch Topical (Top) Given 12/17/17 1749)   hydrALAZINE injection 10 mg (10 mg Intravenous Given 12/17/17 1924)   furosemide injection 40 mg (40 mg Intravenous Given 12/17/17 1923)       New Prescriptions    No medications on file            Medical Decision Making    Medical Decision Making:   Clinical Tests:   Lab Tests: Reviewed and Ordered  Radiological Study: Reviewed and Ordered  Medical Tests: Reviewed and Ordered           Scribe Attestation:   Scribe #1: I performed the above scribed service and the documentation accurately describes the services I performed. I attest to the accuracy of the note.    Attending:   Physician Attestation Statement for Scribe #1: I, Endy Alford MD, personally performed the services described in this documentation, as scribed by Marisela Johnson, in my presence, and it is both accurate and complete.          Clinical Impression       ICD-10-CM ICD-9-CM   1. Hypertensive urgency I16.0 401.9   2. SOB (shortness of breath) R06.02 786.05   3. Stage 5 chronic kidney disease on chronic dialysis N18.6 585.6    Z99.2 V45.11       Disposition:   Disposition: Admitted (Tele)  Condition: Serious         Endy Alford MD  12/17/17 2007

## 2017-12-18 VITALS
OXYGEN SATURATION: 97 % | BODY MASS INDEX: 27.85 KG/M2 | TEMPERATURE: 98 F | HEART RATE: 78 BPM | SYSTOLIC BLOOD PRESSURE: 174 MMHG | DIASTOLIC BLOOD PRESSURE: 86 MMHG | WEIGHT: 240.75 LBS | HEIGHT: 78 IN | RESPIRATION RATE: 16 BRPM

## 2017-12-18 PROBLEM — N18.6 STAGE 5 CHRONIC KIDNEY DISEASE ON CHRONIC DIALYSIS: Status: ACTIVE | Noted: 2017-08-25

## 2017-12-18 PROBLEM — Z99.2 STAGE 5 CHRONIC KIDNEY DISEASE ON CHRONIC DIALYSIS: Status: ACTIVE | Noted: 2017-08-25

## 2017-12-18 LAB
ALBUMIN SERPL BCP-MCNC: 2.9 G/DL
ANION GAP SERPL CALC-SCNC: 14 MMOL/L
BASOPHILS # BLD AUTO: 0.04 K/UL
BASOPHILS NFR BLD: 0.8 %
BUN SERPL-MCNC: 35 MG/DL
CALCIUM SERPL-MCNC: 8.1 MG/DL
CHLORIDE SERPL-SCNC: 99 MMOL/L
CO2 SERPL-SCNC: 27 MMOL/L
CREAT SERPL-MCNC: 10.1 MG/DL
DIFFERENTIAL METHOD: ABNORMAL
EOSINOPHIL # BLD AUTO: 0.1 K/UL
EOSINOPHIL NFR BLD: 2.3 %
ERYTHROCYTE [DISTWIDTH] IN BLOOD BY AUTOMATED COUNT: 14.4 %
EST. GFR  (AFRICAN AMERICAN): 5 ML/MIN/1.73 M^2
EST. GFR  (NON AFRICAN AMERICAN): 5 ML/MIN/1.73 M^2
ESTIMATED AVG GLUCOSE: 105 MG/DL
GLUCOSE SERPL-MCNC: 107 MG/DL
HBA1C MFR BLD HPLC: 5.3 %
HCT VFR BLD AUTO: 35.9 %
HGB BLD-MCNC: 11.4 G/DL
LYMPHOCYTES # BLD AUTO: 1.4 K/UL
LYMPHOCYTES NFR BLD: 27 %
MAGNESIUM SERPL-MCNC: 2.3 MG/DL
MCH RBC QN AUTO: 27.1 PG
MCHC RBC AUTO-ENTMCNC: 31.8 G/DL
MCV RBC AUTO: 85 FL
MONOCYTES # BLD AUTO: 0.4 K/UL
MONOCYTES NFR BLD: 7.9 %
NEUTROPHILS # BLD AUTO: 3.2 K/UL
NEUTROPHILS NFR BLD: 62 %
PHOSPHATE SERPL-MCNC: 2.9 MG/DL
PHOSPHATE SERPL-MCNC: 2.9 MG/DL
PLATELET # BLD AUTO: 171 K/UL
PMV BLD AUTO: 11.5 FL
POCT GLUCOSE: 70 MG/DL (ref 70–110)
POCT GLUCOSE: 81 MG/DL (ref 70–110)
POTASSIUM SERPL-SCNC: 3.7 MMOL/L
RBC # BLD AUTO: 4.21 M/UL
SODIUM SERPL-SCNC: 140 MMOL/L
WBC # BLD AUTO: 5.19 K/UL

## 2017-12-18 PROCEDURE — 25000003 PHARM REV CODE 250: Performed by: NURSE PRACTITIONER

## 2017-12-18 PROCEDURE — G0378 HOSPITAL OBSERVATION PER HR: HCPCS

## 2017-12-18 PROCEDURE — 25000003 PHARM REV CODE 250: Performed by: HOSPITALIST

## 2017-12-18 PROCEDURE — 80100016 HC MAINTENANCE HEMODIALYSIS

## 2017-12-18 PROCEDURE — 80069 RENAL FUNCTION PANEL: CPT

## 2017-12-18 PROCEDURE — 63600175 PHARM REV CODE 636 W HCPCS: Performed by: NURSE PRACTITIONER

## 2017-12-18 PROCEDURE — 90935 HEMODIALYSIS ONE EVALUATION: CPT | Mod: ,,, | Performed by: INTERNAL MEDICINE

## 2017-12-18 PROCEDURE — 63600175 PHARM REV CODE 636 W HCPCS: Performed by: HOSPITALIST

## 2017-12-18 PROCEDURE — 99214 OFFICE O/P EST MOD 30 MIN: CPT | Mod: 25,,, | Performed by: INTERNAL MEDICINE

## 2017-12-18 PROCEDURE — 85025 COMPLETE CBC W/AUTO DIFF WBC: CPT

## 2017-12-18 PROCEDURE — 83735 ASSAY OF MAGNESIUM: CPT

## 2017-12-18 PROCEDURE — 83036 HEMOGLOBIN GLYCOSYLATED A1C: CPT

## 2017-12-18 PROCEDURE — 63600175 PHARM REV CODE 636 W HCPCS: Performed by: INTERNAL MEDICINE

## 2017-12-18 PROCEDURE — G0257 UNSCHED DIALYSIS ESRD PT HOS: HCPCS

## 2017-12-18 PROCEDURE — 36415 COLL VENOUS BLD VENIPUNCTURE: CPT

## 2017-12-18 PROCEDURE — 96372 THER/PROPH/DIAG INJ SC/IM: CPT

## 2017-12-18 RX ORDER — FUROSEMIDE 10 MG/ML
80 INJECTION INTRAMUSCULAR; INTRAVENOUS ONCE
Status: COMPLETED | OUTPATIENT
Start: 2017-12-18 | End: 2017-12-18

## 2017-12-18 RX ORDER — HYDRALAZINE HYDROCHLORIDE 50 MG/1
50 TABLET, FILM COATED ORAL ONCE
Status: COMPLETED | OUTPATIENT
Start: 2017-12-18 | End: 2017-12-18

## 2017-12-18 RX ORDER — LOSARTAN POTASSIUM 50 MG/1
50 TABLET ORAL DAILY
Status: DISCONTINUED | OUTPATIENT
Start: 2017-12-18 | End: 2017-12-18 | Stop reason: HOSPADM

## 2017-12-18 RX ORDER — METOPROLOL SUCCINATE 50 MG/1
100 TABLET, EXTENDED RELEASE ORAL DAILY
Qty: 30 TABLET | Refills: 0 | Status: SHIPPED | OUTPATIENT
Start: 2017-12-18 | End: 2018-05-10 | Stop reason: ALTCHOICE

## 2017-12-18 RX ORDER — HYDRALAZINE HYDROCHLORIDE 50 MG/1
100 TABLET, FILM COATED ORAL 2 TIMES DAILY
Status: DISCONTINUED | OUTPATIENT
Start: 2017-12-18 | End: 2017-12-18 | Stop reason: HOSPADM

## 2017-12-18 RX ORDER — METOPROLOL SUCCINATE 50 MG/1
100 TABLET, EXTENDED RELEASE ORAL DAILY
Status: DISCONTINUED | OUTPATIENT
Start: 2017-12-19 | End: 2017-12-18 | Stop reason: HOSPADM

## 2017-12-18 RX ORDER — HEPARIN SODIUM 1000 [USP'U]/ML
3000 INJECTION, SOLUTION INTRAVENOUS; SUBCUTANEOUS ONCE
Status: COMPLETED | OUTPATIENT
Start: 2017-12-18 | End: 2017-12-18

## 2017-12-18 RX ORDER — HYDRALAZINE HYDROCHLORIDE 50 MG/1
50 TABLET, FILM COATED ORAL EVERY 8 HOURS
Status: DISCONTINUED | OUTPATIENT
Start: 2017-12-18 | End: 2017-12-18

## 2017-12-18 RX ORDER — HYDRALAZINE HYDROCHLORIDE 50 MG/1
100 TABLET, FILM COATED ORAL EVERY 8 HOURS
Status: DISCONTINUED | OUTPATIENT
Start: 2017-12-18 | End: 2017-12-18

## 2017-12-18 RX ORDER — DILTIAZEM HYDROCHLORIDE 180 MG/1
360 CAPSULE, COATED, EXTENDED RELEASE ORAL NIGHTLY
Status: DISCONTINUED | OUTPATIENT
Start: 2017-12-18 | End: 2017-12-18 | Stop reason: HOSPADM

## 2017-12-18 RX ORDER — HYDRALAZINE HYDROCHLORIDE 50 MG/1
50 TABLET, FILM COATED ORAL ONCE
Status: DISCONTINUED | OUTPATIENT
Start: 2017-12-18 | End: 2017-12-18 | Stop reason: SDUPTHER

## 2017-12-18 RX ORDER — HYDRALAZINE HYDROCHLORIDE 20 MG/ML
10 INJECTION INTRAMUSCULAR; INTRAVENOUS ONCE
Status: COMPLETED | OUTPATIENT
Start: 2017-12-18 | End: 2017-12-18

## 2017-12-18 RX ORDER — HYDRALAZINE HYDROCHLORIDE 50 MG/1
100 TABLET, FILM COATED ORAL EVERY 12 HOURS
Qty: 90 TABLET | Refills: 0 | Status: ON HOLD | OUTPATIENT
Start: 2017-12-18 | End: 2018-04-28 | Stop reason: HOSPADM

## 2017-12-18 RX ADMIN — FUROSEMIDE 80 MG: 10 INJECTION, SOLUTION INTRAMUSCULAR; INTRAVENOUS at 06:12

## 2017-12-18 RX ADMIN — HEPARIN SODIUM 3000 UNITS: 1000 INJECTION, SOLUTION INTRAVENOUS; SUBCUTANEOUS at 10:12

## 2017-12-18 RX ADMIN — HYDRALAZINE HYDROCHLORIDE 10 MG: 20 INJECTION INTRAMUSCULAR; INTRAVENOUS at 02:12

## 2017-12-18 RX ADMIN — NITROGLYCERIN 0.5 INCH: 20 OINTMENT TOPICAL at 01:12

## 2017-12-18 RX ADMIN — CLONIDINE HYDROCHLORIDE 0.1 MG: 0.1 TABLET ORAL at 05:12

## 2017-12-18 RX ADMIN — HYDRALAZINE HYDROCHLORIDE 50 MG: 50 TABLET, FILM COATED ORAL at 06:12

## 2017-12-18 RX ADMIN — HYDRALAZINE HYDROCHLORIDE 50 MG: 50 TABLET, FILM COATED ORAL at 01:12

## 2017-12-18 RX ADMIN — NITROGLYCERIN 0.5 INCH: 20 OINTMENT TOPICAL at 03:12

## 2017-12-18 RX ADMIN — HEPARIN SODIUM 5000 UNITS: 5000 INJECTION, SOLUTION INTRAVENOUS; SUBCUTANEOUS at 05:12

## 2017-12-18 RX ADMIN — CLONIDINE HYDROCHLORIDE 0.1 MG: 0.1 TABLET ORAL at 01:12

## 2017-12-18 RX ADMIN — HYDRALAZINE HYDROCHLORIDE 50 MG: 50 TABLET, FILM COATED ORAL at 05:12

## 2017-12-18 RX ADMIN — HYDRALAZINE HYDROCHLORIDE 10 MG: 20 INJECTION INTRAMUSCULAR; INTRAVENOUS at 12:12

## 2017-12-18 RX ADMIN — DILTIAZEM HYDROCHLORIDE 360 MG: 180 CAPSULE, COATED, EXTENDED RELEASE ORAL at 05:12

## 2017-12-18 NOTE — ED NOTES
Pt resting quietly on stretcher in NAD. Pt on monitor with NSR noted. Pt TBA - pt informed per Dr Alford. Pt requesting dinner tray - house sup called for tray. Pt states still having some SOB but not as bad as when he came. Denies pain at this time.   Lasix given and informed pt of need for urine specimen and I&O.  Urinal given.   SR up X 2. HOB elevated 75. Call light in reach.   Armband checked, patient verified. Verified by spelling and stated name on armband along with .   Dialysis shunt to right forearm with good bruit an thrill noted.

## 2017-12-18 NOTE — HOSPITAL COURSE
Patient admitted to telemetry for Hypertensive urgency under the care of [facility]. He had HD on 12/18 and they removed 3L fluid. Losartan increased, hydralazine increased, and clonidine increased. Spoke with nephrology - recommended increase in metoprolol and hydralazine and referral back to cardiology outpatient to follow up. Patient was seen and examined today and deemed stable for discharge home.

## 2017-12-18 NOTE — ASSESSMENT & PLAN NOTE
- h/o ESRD on HD MWF  - takes cardizem, hydralazine, losartan and clonidine at home  - review home meds with patient and make sure he takes all of his medications  - review fluid restriction diet with patient w/ ESRD  - restart home meds

## 2017-12-18 NOTE — ASSESSMENT & PLAN NOTE
- h/o ESRD on HD MWF  - takes cardizem, hydralazine and clonidine at home  - review home meds with patient and make sure he takes all of his medications  - review fluid restriction diet with patient w/ ESRD  - restart home meds

## 2017-12-18 NOTE — PROGRESS NOTES
12/18/17 0124   Vital Signs   Pulse 90   BP (!) 198/94   BP Location Right leg   BP Method Automatic   Patient Position Lying   Santo DEUTSCH notified, ordered additional 10 mg IV hydralazine now.

## 2017-12-18 NOTE — HPI
67 y.o. male w/ pmh of HTN, DM, ESRD on HD MWF who presents with SOB which worsened today but reports they have been ongoing throughout day. He also complains of swelling in his arms and legs.  He reports he drinks plenty of water and does not follow any fluid restrictions. He reports compliance of all medications and dialysis sessions.  There are no associated symptoms.   Denies fever, chills, leg pain, CP, abd pain, N/V, fatigue, lightheadedness.  Reports mild cough. No sputum.

## 2017-12-18 NOTE — H&P
"[facility] Hospital Medicine  History & Physical    Patient Name: Conner Perez III  MRN: [id number]  Admission Date: 12/17/2017  Attending Physician: Antione Slade MD   Primary Care Provider: Raciel Angela MD         Patient information was obtained from patient and ER records.     Subjective:     Principal Problem:Hypertensive urgency    Chief Complaint:   Chief Complaint   Patient presents with    Shortness of Breath     "i feel like my body is retaining fluid"        HPI: 67 y.o. male  w/ pmh of HTN, DM, ESRD on HD MWF who presents with SOB which worsened today but reports they have been ongoing throughout day. He also complains of swelling in his arms and legs.  He reports he drinks plenty of water and does not follow any fluid restrictions. He reports compliance of all medications and dialysis sessions.  There are no associated symptoms.   Denies fever, chills, leg pain, CP, abd pain, N/V, fatigue, lightheadedness.  Reports mild cough. No sputum.     Past Medical History:   Diagnosis Date    CKD (chronic kidney disease), stage IV     Diabetes mellitus     Diabetes mellitus, type 2     HTN (hypertension)        Past Surgical History:   Procedure Laterality Date    CIRCUMCISION  1978    TOE AMPUTATION Left 01/24/2017    4th toe       Review of patient's allergies indicates:   Allergen Reactions    Augmentin [amoxicillin-pot clavulanate] Edema    Lisinopril      cough    Sulfa (sulfonamide antibiotics)      swelling       No current facility-administered medications on file prior to encounter.      Current Outpatient Prescriptions on File Prior to Encounter   Medication Sig    aspirin 81 MG Chew Take 1 tablet (81 mg total) by mouth once daily.    atorvastatin (LIPITOR) 40 MG tablet Take 1 tablet (40 mg total) by mouth every evening. (Patient taking differently: Take 20 mg by mouth every evening. )    cloNIDine (CATAPRES) 0.1 MG tablet take 1 tablet by mouth twice a day    " diltiaZEM (CARDIZEM CD) 360 MG 24 hr capsule Take 1 capsule (360 mg total) by mouth once daily.    losartan (COZAAR) 25 MG tablet Take 1 tablet (25 mg total) by mouth once daily. (Patient taking differently: Take 50 mg by mouth once daily. )    pantoprazole (PROTONIX) 40 MG tablet Take 1 tablet (40 mg total) by mouth once daily.    tamsulosin (FLOMAX) 0.4 mg Cp24 Take 1 capsule (0.4 mg total) by mouth once daily.    zolpidem (AMBIEN) 5 MG Tab Take 1 tablet (5 mg total) by mouth nightly as needed.    [DISCONTINUED] hydrALAZINE (APRESOLINE) 50 MG tablet Take 1 tablet (50 mg total) by mouth 3 (three) times daily.    [DISCONTINUED] metoprolol succinate (TOPROL-XL) 50 MG 24 hr tablet Take 1 tablet (50 mg total) by mouth once daily.    ondansetron (ZOFRAN-ODT) 4 MG TbDL Take 1 tablet (4 mg total) by mouth every 8 (eight) hours as needed.     Family History     Problem Relation (Age of Onset)    Hypertension Father    No Known Problems Mother        Social History Main Topics    Smoking status: Former Smoker    Smokeless tobacco: Never Used    Alcohol use No    Drug use: No    Sexual activity: Not on file     Review of Systems   Constitutional: Positive for fatigue. Negative for activity change, appetite change, chills, diaphoresis and fever.   HENT: Negative for facial swelling, sore throat, tinnitus and trouble swallowing.    Eyes: Negative for photophobia and visual disturbance.   Respiratory: Positive for cough and shortness of breath. Negative for apnea, chest tightness and wheezing.    Cardiovascular: Positive for leg swelling. Negative for chest pain and palpitations.   Gastrointestinal: Negative for abdominal distention, abdominal pain, constipation, diarrhea, nausea and vomiting.   Endocrine: Negative for polydipsia, polyphagia and polyuria.   Genitourinary: Negative for decreased urine volume, dysuria, flank pain, frequency and hematuria.   Musculoskeletal: Negative for arthralgias, back pain, joint  swelling, myalgias and neck stiffness.   Skin: Negative for pallor and rash.   Allergic/Immunologic: Negative for immunocompromised state.   Neurological: Negative for dizziness, seizures, syncope, weakness, numbness and headaches.   Psychiatric/Behavioral: Negative for confusion, hallucinations and suicidal ideas. The patient is not nervous/anxious.    All other systems reviewed and are negative.    Objective:     Vital Signs (Most Recent):  Temp: 97.8 °F (36.6 °C) (12/18/17 1628)  Pulse: 78 (12/18/17 1628)  Resp: 16 (12/18/17 1628)  BP: (!) 174/86 (12/18/17 1628)  SpO2: 97 % (12/18/17 1628) Vital Signs (24h Range):  Temp:  [97.7 °F (36.5 °C)-98.2 °F (36.8 °C)] 97.8 °F (36.6 °C)  Pulse:  [71-98] 78  Resp:  [11-21] 16  SpO2:  [94 %-100 %] 97 %  BP: (154-220)/() 174/86     Weight: 109.2 kg (240 lb 11.9 oz)  Body mass index is 27.82 kg/m².    Physical Exam   Constitutional: He is oriented to person, place, and time. He appears well-developed and well-nourished. No distress.   HENT:   Head: Normocephalic and atraumatic.   Mouth/Throat: Oropharynx is clear and moist.   Eyes: Conjunctivae are normal. Pupils are equal, round, and reactive to light. No scleral icterus.   Neck: No JVD present. No thyromegaly present.   Cardiovascular: Regular rhythm.  Exam reveals no gallop and no friction rub.    No murmur heard.  Pulmonary/Chest: He is in respiratory distress. He has no wheezes.   Coarse breath sounds   Abdominal: Soft. Bowel sounds are normal. He exhibits no distension. There is no tenderness. There is no guarding.   Musculoskeletal: Normal range of motion. He exhibits edema.   Neurological: He is alert and oriented to person, place, and time. No cranial nerve deficit.   Skin: Skin is warm. Capillary refill takes 2 to 3 seconds. He is not diaphoretic. No erythema.   Psychiatric: He has a normal mood and affect.   Nursing note and vitals reviewed.        CRANIAL NERVES     CN III, IV, VI   Pupils are equal, round,  and reactive to light.       Significant Labs: All pertinent labs within the past 24 hours have been reviewed.    Significant Imaging: I have reviewed all pertinent imaging results/findings within the past 24 hours.    Assessment/Plan:     * Hypertensive urgency    - h/o ESRD on HD MWF  - takes cardizem, hydralazine, losartan and clonidine at home  - review home meds with patient and make sure he takes all of his medications  - review fluid restriction diet with patient w/ ESRD  - restart home meds          DM II (diabetes mellitus, type II), controlled    - glucose in ER <100  - will start on moderate SSI, with POCG QAC and QHS  - check a1c            Chronic kidney disease, stage 5, kidney failure    - ESRD on HD  - Dr Colon (nephro) called in ER  - awaiting HD today for fluid overload            VTE Risk Mitigation         Ordered     heparin (porcine) injection 5,000 Units  Every 8 hours     Route:  Subcutaneous        12/17/17 1911     Medium Risk of VTE  Once      12/17/17 1911     Place sequential compression device  Until discontinued      12/17/17 1911             BENTON Simmons-C  Department of Hospital Medicine   [facility]

## 2017-12-18 NOTE — PLAN OF CARE
Problem: Patient Care Overview  Goal: Plan of Care Review  Outcome: Ongoing (interventions implemented as appropriate)  POC discussed w/patient, verbalized understanding. NSR on monitor. VSS. Voids per [facility]. Frequent weight change encouraged. Pt received dialysis today. Tolerated well. No c/o pain. Frequent weight change encouraged.  Patient turns independently in bed. Fall precautions in place, bed alarm on. Patient denies needs at this time.

## 2017-12-18 NOTE — PLAN OF CARE
CM met with patient at the bedside to assess for discharge needs.  Patient is a HD patient established at [facility] (M/W/F @11:00am).  He is independent and is able to drive himself to HD.  He does not anticipate any discharge needs at this time.  CM provided a transitional care folder, information on advanced directives, information on pharmacy bedside delivery, and discharge planning begins on admission with contact information for TAMIKA Perez    D/C Plan:  Home with no needs    CM handed off to TAMIKA Perez     12/18/17 3698   Discharge Assessment   Assessment Type Discharge Planning Assessment   Confirmed/corrected address and phone number on facesheet? Yes   Assessment information obtained from? Patient;Medical Record   Expected Length of Stay (days) (TBD)   Communicated expected length of stay with patient/caregiver yes   Prior to hospitilization cognitive status: Alert/Oriented   Prior to hospitalization functional status: Independent   Current cognitive status: Alert/Oriented   Current Functional Status: Independent   Facility Arrived From: home   Lives With child(kris), adult   Able to Return to Prior Arrangements yes   Is patient able to care for self after discharge? Yes   Who are your caregiver(s) and their phone number(s)? elisabeth Acosta [phone] and Phillip Perez, son [phone]   Patient's perception of discharge disposition home or selfcare   Readmission Within The Last 30 Days no previous admission in last 30 days   Patient currently being followed by outpatient case management? No   Patient currently receives any other outside agency services? No   Equipment Currently Used at Home none   Do you have any problems affording any of your prescribed medications? No   Is the patient taking medications as prescribed? yes   Does the patient have transportation home? Yes   Transportation Available family or friend will provide;car   Dialysis Name and Scheduled days [facility] M/W/F @11:00am   Does the  patient receive services at the [facility]? No   Discharge Plan A Home with family   Discharge Plan B Home with family   Patient/Family In Agreement With Plan yes

## 2017-12-18 NOTE — PROGRESS NOTES
12/18/17 0337   Vital Signs   Temp 98.2 °F (36.8 °C)   Temp src Oral   Pulse 87   Heart Rate Source Monitor   Resp 18   SpO2 95 %   O2 Device (Oxygen Therapy) room air   BP (!) 204/95   MAP (mmHg) 136   BP Location Left arm   Patient Position Lying     Santo Np notified, 0.5 mg nitro paste applied as ordered. Will continue to monitor.     0417 /98 HR 81, Dr. Ryder notified, Cardizem 360mg PO, hydralazine 50mg PO x1 given.  Along with scheduled clonidine 0.1mg PO.     0554 /93 HR 87, Lasix 80mg IV and scheduled hydralazine 50mg PO given. Will monitor.

## 2017-12-18 NOTE — DISCHARGE SUMMARY
[facility] Medicine  Discharge Summary      Patient Name: Conner Perez III  MRN: [id number]  Admission Date: 12/17/2017  Hospital Length of Stay: 1 days  Discharge Date and Time:  12/18/2017 3:21 PM  Attending Physician: Antione Slade MD   Discharging Provider: JONELLE Simmons  Primary Care Provider: Raciel Angela MD      HPI:   67 y.o. male  w/ pmh of HTN, DM, ESRD on HD MWF who presents with SOB which worsened today but reports they have been ongoing throughout day. He also complains of swelling in his arms and legs.  He reports he drinks plenty of water and does not follow any fluid restrictions. He reports compliance of all medications and dialysis sessions.  There are no associated symptoms.   Denies fever, chills, leg pain, CP, abd pain, N/V, fatigue, lightheadedness.  Reports mild cough. No sputum.     * No surgery found *      Hospital Course:   Patient admitted to telemetry for Hypertensive urgency under the care of Hospital Medicine. He had HD on 12/18 and they removed 3L fluid. Losartan increased, hydralazine increased, and clonidine increased. Spoke with nephrology - recommended increase in metoprolol and hydralazine and referral back to cardiology outpatient to follow up. Patient's status improved faster than expected with HD and medication. Patient was seen and examined today and deemed stable for discharge home.     Consults:     * Hypertensive urgency    - h/o ESRD on HD MWF  - takes cardizem, hydralazine, losartan and clonidine at home  - review home meds with patient and make sure he takes all of his medications  - review fluid restriction diet with patient w/ ESRD  - restart home meds            Final Active Diagnoses:    Diagnosis Date Noted POA    PRINCIPAL PROBLEM:  Hypertensive urgency [I16.0] 12/17/2017 Yes    DM II (diabetes mellitus, type II), controlled [E11.9] 01/19/2017 Yes    Chronic kidney disease, stage 5, kidney failure [N18.5] 01/15/2017  Yes     Chronic      Problems Resolved During this Admission:    Diagnosis Date Noted Date Resolved POA       Discharged Condition: stable    Disposition: Home or Self Care    Follow Up:  Follow-up Information     Satya MD Obey In 2 weeks.    Specialties:  Cardiology, Cardiovascular Disease  Why:  blood pressure/ hospital follow up  Contact information:  [address] [facility] [address] [phone]                 Patient Instructions:     Diet Low Sodium, 2gm     Diet Cardiac     Diet renal     Diet Diabetic 2200 Calories     Activity as tolerated     Call MD for:  severe persistent headache     Call MD for:   Order Comments: Elevated blood pressure         Significant Diagnostic Studies: Labs:   BMP:   Recent Labs  Lab 12/17/17  1732 12/18/17  0652   GLU 98  98 107     143 140   K 3.9  3.9 3.7     100 99   CO2 28  28 27   BUN 30*  30* 35*   CREATININE 9.2*  9.2* 10.1*   CALCIUM 8.4*  8.4* 8.1*   MG 2.3 2.3   , CMP   Recent Labs  Lab 12/17/17  1732 12/18/17  0652     143 140   K 3.9  3.9 3.7     100 99   CO2 28  28 27   GLU 98  98 107   BUN 30*  30* 35*   CREATININE 9.2*  9.2* 10.1*   CALCIUM 8.4*  8.4* 8.1*   PROT 7.1  --    ALBUMIN 3.3*  3.3* 2.9*   BILITOT 0.6  --    ALKPHOS 59  --    AST 20  --    ALT 12  --    ANIONGAP 15  15 14   ESTGFRAFRICA 6*  6* 5*   EGFRNONAA 5*  5* 5*   , CBC   Recent Labs  Lab 12/17/17  1732 12/18/17  0652   WBC 5.41 5.19   HGB 12.1* 11.4*   HCT 37.6* 35.9*    171    and All labs within the past 24 hours have been reviewed    Pending Diagnostic Studies:     Procedure Component Value Units Date/Time    Hemoglobin A1c [[id number]] Collected:  12/18/17 0652    Order Status:  Sent Lab Status:  In process Updated:  12/18/17 1501    Specimen:  Blood from Blood          Medications:  Reconciled Home Medications:   Current Discharge Medication List      CONTINUE these medications which have CHANGED    Details   hydrALAZINE (APRESOLINE)  50 MG tablet Take 2 tablets (100 mg total) by mouth every 12 (twelve) hours.  Qty: 90 tablet, Refills: 0      metoprolol succinate (TOPROL-XL) 50 MG 24 hr tablet Take 2 tablets (100 mg total) by mouth once daily.  Qty: 30 tablet, Refills: 0         CONTINUE these medications which have NOT CHANGED    Details   aspirin 81 MG Chew Take 1 tablet (81 mg total) by mouth once daily.  Qty: 30 tablet, Refills: 0      atorvastatin (LIPITOR) 40 MG tablet Take 1 tablet (40 mg total) by mouth every evening.  Qty: 30 tablet, Refills: 0      cloNIDine (CATAPRES) 0.1 MG tablet take 1 tablet by mouth twice a day      diltiaZEM (CARDIZEM CD) 360 MG 24 hr capsule Take 1 capsule (360 mg total) by mouth once daily.  Qty: 30 capsule, Refills: 0      losartan (COZAAR) 25 MG tablet Take 1 tablet (25 mg total) by mouth once daily.  Qty: 30 tablet, Refills: 0      pantoprazole (PROTONIX) 40 MG tablet Take 1 tablet (40 mg total) by mouth once daily.  Qty: 30 tablet, Refills: 0      tamsulosin (FLOMAX) 0.4 mg Cp24 Take 1 capsule (0.4 mg total) by mouth once daily.  Qty: 30 capsule, Refills: 0      zolpidem (AMBIEN) 5 MG Tab Take 1 tablet (5 mg total) by mouth nightly as needed.  Qty: 10 tablet, Refills: 0      ondansetron (ZOFRAN-ODT) 4 MG TbDL Take 1 tablet (4 mg total) by mouth every 8 (eight) hours as needed.  Qty: 12 tablet, Refills: 0             Indwelling Lines/Drains at time of discharge:   Lines/Drains/Airways     Drain                 Hemodialysis AV Fistula Right forearm -- days         Hemodialysis AV Fistula 12/18/17 0017 Right forearm less than 1 day                Time spent on the discharge of patient: 60 minutes  Patient was seen and examined on the date of discharge and determined to be suitable for discharge.         JONELLE Simmons  Department of Hospital Medicine  [facility]

## 2017-12-18 NOTE — PLAN OF CARE
Problem: Patient Care Overview  Goal: Plan of Care Review  Outcome: Ongoing (interventions implemented as appropriate)  Pt verbalized understanding of plan of care. Fall precautions maintained.  Bed locked and low.  Call light and personal items within reach. SR up x 2. CBG monitoring ordered AC/HS.  Scheduled and SSI admin as ordered. 24 hour chart check complete.

## 2017-12-18 NOTE — SUBJECTIVE & OBJECTIVE
Past Medical History:   Diagnosis Date    CKD (chronic kidney disease), stage IV     Diabetes mellitus     Diabetes mellitus, type 2     HTN (hypertension)        Past Surgical History:   Procedure Laterality Date    CIRCUMCISION  1978    TOE AMPUTATION Left 01/24/2017    4th toe       Review of patient's allergies indicates:   Allergen Reactions    Augmentin [amoxicillin-pot clavulanate] Edema    Lisinopril      cough    Sulfa (sulfonamide antibiotics)      swelling       No current facility-administered medications on file prior to encounter.      Current Outpatient Prescriptions on File Prior to Encounter   Medication Sig    aspirin 81 MG Chew Take 1 tablet (81 mg total) by mouth once daily.    atorvastatin (LIPITOR) 40 MG tablet Take 1 tablet (40 mg total) by mouth every evening. (Patient taking differently: Take 20 mg by mouth every evening. )    cloNIDine (CATAPRES) 0.1 MG tablet take 1 tablet by mouth twice a day    diltiaZEM (CARDIZEM CD) 360 MG 24 hr capsule Take 1 capsule (360 mg total) by mouth once daily.    hydrALAZINE (APRESOLINE) 50 MG tablet Take 1 tablet (50 mg total) by mouth 3 (three) times daily.    losartan (COZAAR) 25 MG tablet Take 1 tablet (25 mg total) by mouth once daily. (Patient taking differently: Take 50 mg by mouth once daily. )    metoprolol succinate (TOPROL-XL) 50 MG 24 hr tablet Take 1 tablet (50 mg total) by mouth once daily.    pantoprazole (PROTONIX) 40 MG tablet Take 1 tablet (40 mg total) by mouth once daily.    tamsulosin (FLOMAX) 0.4 mg Cp24 Take 1 capsule (0.4 mg total) by mouth once daily.    zolpidem (AMBIEN) 5 MG Tab Take 1 tablet (5 mg total) by mouth nightly as needed.    ondansetron (ZOFRAN-ODT) 4 MG TbDL Take 1 tablet (4 mg total) by mouth every 8 (eight) hours as needed.     Family History     Problem Relation (Age of Onset)    Hypertension Father        Social History Main Topics    Smoking status: Former Smoker    Smokeless tobacco: Never  Used    Alcohol use No    Drug use: No    Sexual activity: Not on file     Review of Systems   Constitutional: Positive for fatigue. Negative for activity change, appetite change, chills, diaphoresis and fever.   HENT: Negative for facial swelling, sore throat, tinnitus and trouble swallowing.    Eyes: Negative for photophobia and visual disturbance.   Respiratory: Positive for cough and shortness of breath. Negative for apnea, chest tightness and wheezing.    Cardiovascular: Positive for leg swelling. Negative for chest pain and palpitations.   Gastrointestinal: Negative for abdominal distention, abdominal pain, constipation, diarrhea, nausea and vomiting.   Endocrine: Negative for polydipsia, polyphagia and polyuria.   Genitourinary: Negative for decreased urine volume, dysuria, flank pain, frequency and hematuria.   Musculoskeletal: Negative for arthralgias, back pain, joint swelling, myalgias and neck stiffness.   Skin: Negative for pallor and rash.   Allergic/Immunologic: Negative for immunocompromised state.   Neurological: Negative for dizziness, seizures, syncope, weakness, numbness and headaches.   Psychiatric/Behavioral: Negative for confusion, hallucinations and suicidal ideas. The patient is not nervous/anxious.    All other systems reviewed and are negative.    Objective:     Vital Signs (Most Recent):  Temp: 98.2 °F (36.8 °C) (12/18/17 0337)  Pulse: 81 (12/18/17 0417)  Resp: 18 (12/18/17 0337)  BP: (!) 212/98 (12/18/17 0417)  SpO2: 95 % (12/18/17 0337) Vital Signs (24h Range):  Temp:  [97.6 °F (36.4 °C)-98.2 °F (36.8 °C)] 98.2 °F (36.8 °C)  Pulse:  [77-98] 81  Resp:  [11-21] 18  SpO2:  [95 %-100 %] 95 %  BP: (198-220)/() 212/98     Weight: 109.2 kg (240 lb 11.9 oz)  Body mass index is 27.82 kg/m².    Physical Exam   Constitutional: He is oriented to person, place, and time. He appears well-developed and well-nourished. No distress.   HENT:   Head: Normocephalic and atraumatic.   Mouth/Throat:  Oropharynx is clear and moist.   Eyes: Conjunctivae are normal. Pupils are equal, round, and reactive to light. No scleral icterus.   Neck: No JVD present. No thyromegaly present.   Cardiovascular: Regular rhythm.  Exam reveals no gallop and no friction rub.    No murmur heard.  Pulmonary/Chest: He is in respiratory distress. He has no wheezes.   Coarse breath sounds   Abdominal: Soft. Bowel sounds are normal. He exhibits no distension. There is no tenderness. There is no guarding.   Musculoskeletal: Normal range of motion. He exhibits edema.   Neurological: He is alert and oriented to person, place, and time. No cranial nerve deficit.   Skin: Skin is warm. Capillary refill takes 2 to 3 seconds. He is not diaphoretic. No erythema.   Psychiatric: He has a normal mood and affect.   Nursing note and vitals reviewed.        CRANIAL NERVES     CN III, IV, VI   Pupils are equal, round, and reactive to light.       Significant Labs:   BMP:   Recent Labs  Lab 12/17/17  1732   GLU 98  98     143   K 3.9  3.9     100   CO2 28  28   BUN 30*  30*   CREATININE 9.2*  9.2*   CALCIUM 8.4*  8.4*   MG 2.3     CBC:   Recent Labs  Lab 12/17/17  1732   WBC 5.41   HGB 12.1*   HCT 37.6*        Magnesium:   Recent Labs  Lab 12/17/17  1732   MG 2.3     All pertinent labs within the past 24 hours have been reviewed.    Significant Imaging: I have reviewed and interpreted all pertinent imaging results/findings within the past 24 hours.   Imaging Results          X-Ray Chest PA And Lateral (Final result)  Result time 12/17/17 19:17:37    Final result by Perfecto Ashley Jr., MD (12/17/17 19:17:37)                 Impression:     Persistent mild cardiomegaly without acute air space disease.  Findings are similar to the previous exam.      Electronically signed by: PERFECTO ASHLEY M.D.  Date:     12/17/17  Time:    19:17              Narrative:    EXAM: [id number] CHEST PA AND LATERAL    CLINICAL HISTORY: sob    COMPARISON:  Previous frontal view of the chest from 12/09/2017 was reviewed.    FINDINGS: The heart size is mildly enlarged. The lung fields are clear. No acute process is identified.

## 2017-12-18 NOTE — PROGRESS NOTES
IV and Telemetry monitor removed Per TAMIKA Eldridge request. Discharge instruction explained to pt. Verbalized understanding. Pt awaiting ride.

## 2017-12-18 NOTE — PROGRESS NOTES
Pt completed 4 hours of HD removing 3L. BP remained stable throughout tx (see flowsheet). Pt tolerated HD well. No signs of distress noted. De accessed per p&p  Each site held for 10mins, no post bleeding noted

## 2017-12-18 NOTE — PROCEDURES
"Conner Perez III is a 67 y.o. male patient.    Temp: 98 °F (36.7 °C) (12/18/17 1232)  Pulse: 84 (12/18/17 1444)  Resp: 18 (12/18/17 0728)  BP: (!) 181/84 (12/18/17 1444)  SpO2: (!) 94 % (12/18/17 0728)  Weight: 109.2 kg (240 lb 11.9 oz) (12/17/17 2300)  Height: 6' 6" (198.1 cm) (12/17/17 2300)       Hemodialysis inpatient  Date/Time: 12/18/2017 3:14 PM  Performed by: JACKELYN COLON.  Authorized by: JACKELYN COLON.         Pt completed 4 hours of HD removing 3L. BP remained stable throughout tx (see flowsheet). Pt tolerated HD well. No signs of distress noted.      Plans for d/c d/w dr. Slade and dr. Calvillo ( renal )       Jackelyn Colon  12/18/2017  "

## 2017-12-19 NOTE — PLAN OF CARE
12/19/17 0846   Final Note   Assessment Type Final Discharge Note   Discharge Disposition Home

## 2017-12-19 NOTE — HPI
Patient is a 67-year-old with ESRD on hemodialysis on a Monday Wednesday Friday schedule patient of [facility].  Also followed by [facility] cardiology for blood pressure control.  Admitted with shortness of breath and high blood pressure.  Nephrology consultation requested for dialysis and blood pressure control

## 2017-12-19 NOTE — PROGRESS NOTES
No signs of acute distress noted. PT transported via wheelchair to front entrance. Assisted to car with family member.

## 2017-12-19 NOTE — SUBJECTIVE & OBJECTIVE
Past Medical History:   Diagnosis Date    CKD (chronic kidney disease), stage IV     Diabetes mellitus     Diabetes mellitus, type 2     HTN (hypertension)        Past Surgical History:   Procedure Laterality Date    CIRCUMCISION  1978    TOE AMPUTATION Left 01/24/2017    4th toe       Review of patient's allergies indicates:   Allergen Reactions    Augmentin [amoxicillin-pot clavulanate] Edema    Lisinopril      cough    Sulfa (sulfonamide antibiotics)      swelling       No current facility-administered medications on file prior to encounter.      Current Outpatient Prescriptions on File Prior to Encounter   Medication Sig    aspirin 81 MG Chew Take 1 tablet (81 mg total) by mouth once daily.    atorvastatin (LIPITOR) 40 MG tablet Take 1 tablet (40 mg total) by mouth every evening. (Patient taking differently: Take 20 mg by mouth every evening. )    cloNIDine (CATAPRES) 0.1 MG tablet take 1 tablet by mouth twice a day    diltiaZEM (CARDIZEM CD) 360 MG 24 hr capsule Take 1 capsule (360 mg total) by mouth once daily.    losartan (COZAAR) 25 MG tablet Take 1 tablet (25 mg total) by mouth once daily. (Patient taking differently: Take 50 mg by mouth once daily. )    pantoprazole (PROTONIX) 40 MG tablet Take 1 tablet (40 mg total) by mouth once daily.    tamsulosin (FLOMAX) 0.4 mg Cp24 Take 1 capsule (0.4 mg total) by mouth once daily.    zolpidem (AMBIEN) 5 MG Tab Take 1 tablet (5 mg total) by mouth nightly as needed.    [DISCONTINUED] hydrALAZINE (APRESOLINE) 50 MG tablet Take 1 tablet (50 mg total) by mouth 3 (three) times daily.    [DISCONTINUED] metoprolol succinate (TOPROL-XL) 50 MG 24 hr tablet Take 1 tablet (50 mg total) by mouth once daily.    ondansetron (ZOFRAN-ODT) 4 MG TbDL Take 1 tablet (4 mg total) by mouth every 8 (eight) hours as needed.     Family History     Problem Relation (Age of Onset)    Hypertension Father    No Known Problems Mother        Social History Main Topics     Smoking status: Former Smoker    Smokeless tobacco: Never Used    Alcohol use No    Drug use: No    Sexual activity: Not on file     Review of Systems   Constitutional: Positive for fatigue. Negative for activity change, appetite change, chills, diaphoresis and fever.   HENT: Negative for facial swelling, sore throat, tinnitus and trouble swallowing.    Eyes: Negative for photophobia and visual disturbance.   Respiratory: Positive for cough and shortness of breath. Negative for apnea, chest tightness and wheezing.    Cardiovascular: Positive for leg swelling. Negative for chest pain and palpitations.   Gastrointestinal: Negative for abdominal distention, abdominal pain, constipation, diarrhea, nausea and vomiting.   Endocrine: Negative for polydipsia, polyphagia and polyuria.   Genitourinary: Negative for decreased urine volume, dysuria, flank pain, frequency and hematuria.   Musculoskeletal: Negative for arthralgias, back pain, joint swelling, myalgias and neck stiffness.   Skin: Negative for pallor and rash.   Allergic/Immunologic: Negative for immunocompromised state.   Neurological: Negative for dizziness, seizures, syncope, weakness, numbness and headaches.   Psychiatric/Behavioral: Negative for confusion, hallucinations and suicidal ideas. The patient is not nervous/anxious.    All other systems reviewed and are negative.    Objective:     Vital Signs (Most Recent):  Temp: 97.8 °F (36.6 °C) (12/18/17 1628)  Pulse: 78 (12/18/17 1628)  Resp: 16 (12/18/17 1628)  BP: (!) 174/86 (12/18/17 1628)  SpO2: 97 % (12/18/17 1628) Vital Signs (24h Range):  Temp:  [97.7 °F (36.5 °C)-98.2 °F (36.8 °C)] 97.8 °F (36.6 °C)  Pulse:  [71-98] 78  Resp:  [16-18] 16  SpO2:  [94 %-97 %] 97 %  BP: (154-219)/() 174/86     Weight: 109.2 kg (240 lb 11.9 oz)  Body mass index is 27.82 kg/m².    Physical Exam   Constitutional: He is oriented to person, place, and time. He appears well-developed and well-nourished. No distress.    HENT:   Head: Normocephalic and atraumatic.   Mouth/Throat: Oropharynx is clear and moist.   Eyes: Conjunctivae are normal. Pupils are equal, round, and reactive to light. No scleral icterus.   Neck: No JVD present. No thyromegaly present.   Cardiovascular: Regular rhythm.  Exam reveals no gallop and no friction rub.    No murmur heard.  Pulmonary/Chest: He is in respiratory distress. He has no wheezes.   Coarse breath sounds   Abdominal: Soft. Bowel sounds are normal. He exhibits no distension. There is no tenderness. There is no guarding.   Musculoskeletal: Normal range of motion. He exhibits edema.   lUAF   Neurological: He is alert and oriented to person, place, and time. No cranial nerve deficit.   Skin: Skin is warm. Capillary refill takes 2 to 3 seconds. He is not diaphoretic. No erythema.   Psychiatric: He has a normal mood and affect.   Nursing note and vitals reviewed.        CRANIAL NERVES     CN III, IV, VI   Pupils are equal, round, and reactive to light.       Significant Labs: All pertinent labs within the past 24 hours have been reviewed.    Significant Imaging: I have reviewed all pertinent imaging results/findings within the past 24 hours.

## 2017-12-19 NOTE — CONSULTS
[facility] -   Nephrology  Consult Note    Patient Name: Conner Perez III  MRN: [id number]  Admission Date: 12/17/2017  Hospital Length of Stay: 1 days  Attending Provider: No att. providers found   Primary Care Physician: Raciel Angela MD  Principal Problem:Hypertensive urgency    Consults  Subjective:     HPI: Patient is a 67-year-old with ESRD on hemodialysis on a Monday Wednesday Friday schedule patient of renal associates.  Also followed by [facility] for blood pressure control.  Admitted with shortness of breath and high blood pressure.  Nephrology consultation requested for dialysis and blood pressure control    Past Medical History:   Diagnosis Date    CKD (chronic kidney disease), stage IV     Diabetes mellitus     Diabetes mellitus, type 2     HTN (hypertension)        Past Surgical History:   Procedure Laterality Date    CIRCUMCISION  1978    TOE AMPUTATION Left 01/24/2017    4th toe       Review of patient's allergies indicates:   Allergen Reactions    Augmentin [amoxicillin-pot clavulanate] Edema    Lisinopril      cough    Sulfa (sulfonamide antibiotics)      swelling       No current facility-administered medications on file prior to encounter.      Current Outpatient Prescriptions on File Prior to Encounter   Medication Sig    aspirin 81 MG Chew Take 1 tablet (81 mg total) by mouth once daily.    atorvastatin (LIPITOR) 40 MG tablet Take 1 tablet (40 mg total) by mouth every evening. (Patient taking differently: Take 20 mg by mouth every evening. )    cloNIDine (CATAPRES) 0.1 MG tablet take 1 tablet by mouth twice a day    diltiaZEM (CARDIZEM CD) 360 MG 24 hr capsule Take 1 capsule (360 mg total) by mouth once daily.    losartan (COZAAR) 25 MG tablet Take 1 tablet (25 mg total) by mouth once daily. (Patient taking differently: Take 50 mg by mouth once daily. )    pantoprazole (PROTONIX) 40 MG tablet Take 1 tablet (40 mg total) by mouth once daily.    tamsulosin  (FLOMAX) 0.4 mg Cp24 Take 1 capsule (0.4 mg total) by mouth once daily.    zolpidem (AMBIEN) 5 MG Tab Take 1 tablet (5 mg total) by mouth nightly as needed.    [DISCONTINUED] hydrALAZINE (APRESOLINE) 50 MG tablet Take 1 tablet (50 mg total) by mouth 3 (three) times daily.    [DISCONTINUED] metoprolol succinate (TOPROL-XL) 50 MG 24 hr tablet Take 1 tablet (50 mg total) by mouth once daily.    ondansetron (ZOFRAN-ODT) 4 MG TbDL Take 1 tablet (4 mg total) by mouth every 8 (eight) hours as needed.     Family History     Problem Relation (Age of Onset)    Hypertension Father    No Known Problems Mother        Social History Main Topics    Smoking status: Former Smoker    Smokeless tobacco: Never Used    Alcohol use No    Drug use: No    Sexual activity: Not on file     Review of Systems   Constitutional: Positive for fatigue. Negative for activity change, appetite change, chills, diaphoresis and fever.   HENT: Negative for facial swelling, sore throat, tinnitus and trouble swallowing.    Eyes: Negative for photophobia and visual disturbance.   Respiratory: Positive for cough and shortness of breath. Negative for apnea, chest tightness and wheezing.    Cardiovascular: Positive for leg swelling. Negative for chest pain and palpitations.   Gastrointestinal: Negative for abdominal distention, abdominal pain, constipation, diarrhea, nausea and vomiting.   Endocrine: Negative for polydipsia, polyphagia and polyuria.   Genitourinary: Negative for decreased urine volume, dysuria, flank pain, frequency and hematuria.   Musculoskeletal: Negative for arthralgias, back pain, joint swelling, myalgias and neck stiffness.   Skin: Negative for pallor and rash.   Allergic/Immunologic: Negative for immunocompromised state.   Neurological: Negative for dizziness, seizures, syncope, weakness, numbness and headaches.   Psychiatric/Behavioral: Negative for confusion, hallucinations and suicidal ideas. The patient is not  nervous/anxious.    All other systems reviewed and are negative.    Objective:     Vital Signs (Most Recent):  Temp: 97.8 °F (36.6 °C) (12/18/17 1628)  Pulse: 78 (12/18/17 1628)  Resp: 16 (12/18/17 1628)  BP: (!) 174/86 (12/18/17 1628)  SpO2: 97 % (12/18/17 1628) Vital Signs (24h Range):  Temp:  [97.7 °F (36.5 °C)-98.2 °F (36.8 °C)] 97.8 °F (36.6 °C)  Pulse:  [71-98] 78  Resp:  [16-18] 16  SpO2:  [94 %-97 %] 97 %  BP: (154-219)/() 174/86     Weight: 109.2 kg (240 lb 11.9 oz)  Body mass index is 27.82 kg/m².    Physical Exam   Constitutional: He is oriented to person, place, and time. He appears well-developed and well-nourished. No distress.   HENT:   Head: Normocephalic and atraumatic.   Mouth/Throat: Oropharynx is clear and moist.   Eyes: Conjunctivae are normal. Pupils are equal, round, and reactive to light. No scleral icterus.   Neck: No JVD present. No thyromegaly present.   Cardiovascular: Regular rhythm.  Exam reveals no gallop and no friction rub.    No murmur heard.  Pulmonary/Chest: He is in respiratory distress. He has no wheezes.   Coarse breath sounds   Abdominal: Soft. Bowel sounds are normal. He exhibits no distension. There is no tenderness. There is no guarding.   Musculoskeletal: Normal range of motion. He exhibits edema.   lUAF   Neurological: He is alert and oriented to person, place, and time. No cranial nerve deficit.   Skin: Skin is warm. Capillary refill takes 2 to 3 seconds. He is not diaphoretic. No erythema.   Psychiatric: He has a normal mood and affect.   Nursing note and vitals reviewed.        CRANIAL NERVES     CN III, IV, VI   Pupils are equal, round, and reactive to light.       Significant Labs: All pertinent labs within the past 24 hours have been reviewed.    Significant Imaging: I have reviewed all pertinent imaging results/findings within the past 24 hours.    Assessment/Plan:     * Hypertensive urgency    meds changes d/w Hospital medicine and dr. Calvillo         ESRD  (end stage renal disease)    HD today see HD note             Thank you for your consult.     Jesus Colon MD  Nephrology  [facility]

## 2017-12-19 NOTE — SUBJECTIVE & OBJECTIVE
Past Medical History:   Diagnosis Date    CKD (chronic kidney disease), stage IV     Diabetes mellitus     Diabetes mellitus, type 2     HTN (hypertension)        Past Surgical History:   Procedure Laterality Date    CIRCUMCISION  1978    TOE AMPUTATION Left 01/24/2017    4th toe       Review of patient's allergies indicates:   Allergen Reactions    Augmentin [amoxicillin-pot clavulanate] Edema    Lisinopril      cough    Sulfa (sulfonamide antibiotics)      swelling       No current facility-administered medications on file prior to encounter.      Current Outpatient Prescriptions on File Prior to Encounter   Medication Sig    aspirin 81 MG Chew Take 1 tablet (81 mg total) by mouth once daily.    atorvastatin (LIPITOR) 40 MG tablet Take 1 tablet (40 mg total) by mouth every evening. (Patient taking differently: Take 20 mg by mouth every evening. )    cloNIDine (CATAPRES) 0.1 MG tablet take 1 tablet by mouth twice a day    diltiaZEM (CARDIZEM CD) 360 MG 24 hr capsule Take 1 capsule (360 mg total) by mouth once daily.    losartan (COZAAR) 25 MG tablet Take 1 tablet (25 mg total) by mouth once daily. (Patient taking differently: Take 50 mg by mouth once daily. )    pantoprazole (PROTONIX) 40 MG tablet Take 1 tablet (40 mg total) by mouth once daily.    tamsulosin (FLOMAX) 0.4 mg Cp24 Take 1 capsule (0.4 mg total) by mouth once daily.    zolpidem (AMBIEN) 5 MG Tab Take 1 tablet (5 mg total) by mouth nightly as needed.    [DISCONTINUED] hydrALAZINE (APRESOLINE) 50 MG tablet Take 1 tablet (50 mg total) by mouth 3 (three) times daily.    [DISCONTINUED] metoprolol succinate (TOPROL-XL) 50 MG 24 hr tablet Take 1 tablet (50 mg total) by mouth once daily.    ondansetron (ZOFRAN-ODT) 4 MG TbDL Take 1 tablet (4 mg total) by mouth every 8 (eight) hours as needed.     Family History     Problem Relation (Age of Onset)    Hypertension Father    No Known Problems Mother        Social History Main Topics     Smoking status: Former Smoker    Smokeless tobacco: Never Used    Alcohol use No    Drug use: No    Sexual activity: Not on file     Review of Systems   Constitutional: Positive for fatigue. Negative for activity change, appetite change, chills, diaphoresis and fever.   HENT: Negative for facial swelling, sore throat, tinnitus and trouble swallowing.    Eyes: Negative for photophobia and visual disturbance.   Respiratory: Positive for cough and shortness of breath. Negative for apnea, chest tightness and wheezing.    Cardiovascular: Positive for leg swelling. Negative for chest pain and palpitations.   Gastrointestinal: Negative for abdominal distention, abdominal pain, constipation, diarrhea, nausea and vomiting.   Endocrine: Negative for polydipsia, polyphagia and polyuria.   Genitourinary: Negative for decreased urine volume, dysuria, flank pain, frequency and hematuria.   Musculoskeletal: Negative for arthralgias, back pain, joint swelling, myalgias and neck stiffness.   Skin: Negative for pallor and rash.   Allergic/Immunologic: Negative for immunocompromised state.   Neurological: Negative for dizziness, seizures, syncope, weakness, numbness and headaches.   Psychiatric/Behavioral: Negative for confusion, hallucinations and suicidal ideas. The patient is not nervous/anxious.    All other systems reviewed and are negative.    Objective:     Vital Signs (Most Recent):  Temp: 97.8 °F (36.6 °C) (12/18/17 1628)  Pulse: 78 (12/18/17 1628)  Resp: 16 (12/18/17 1628)  BP: (!) 174/86 (12/18/17 1628)  SpO2: 97 % (12/18/17 1628) Vital Signs (24h Range):  Temp:  [97.7 °F (36.5 °C)-98.2 °F (36.8 °C)] 97.8 °F (36.6 °C)  Pulse:  [71-98] 78  Resp:  [11-21] 16  SpO2:  [94 %-100 %] 97 %  BP: (154-220)/() 174/86     Weight: 109.2 kg (240 lb 11.9 oz)  Body mass index is 27.82 kg/m².    Physical Exam   Constitutional: He is oriented to person, place, and time. He appears well-developed and well-nourished. No distress.    HENT:   Head: Normocephalic and atraumatic.   Mouth/Throat: Oropharynx is clear and moist.   Eyes: Conjunctivae are normal. Pupils are equal, round, and reactive to light. No scleral icterus.   Neck: No JVD present. No thyromegaly present.   Cardiovascular: Regular rhythm.  Exam reveals no gallop and no friction rub.    No murmur heard.  Pulmonary/Chest: He is in respiratory distress. He has no wheezes.   Coarse breath sounds   Abdominal: Soft. Bowel sounds are normal. He exhibits no distension. There is no tenderness. There is no guarding.   Musculoskeletal: Normal range of motion. He exhibits edema.   Neurological: He is alert and oriented to person, place, and time. No cranial nerve deficit.   Skin: Skin is warm. Capillary refill takes 2 to 3 seconds. He is not diaphoretic. No erythema.   Psychiatric: He has a normal mood and affect.   Nursing note and vitals reviewed.        CRANIAL NERVES     CN III, IV, VI   Pupils are equal, round, and reactive to light.       Significant Labs: All pertinent labs within the past 24 hours have been reviewed.    Significant Imaging: I have reviewed all pertinent imaging results/findings within the past 24 hours.

## 2017-12-23 ENCOUNTER — HOSPITAL ENCOUNTER (EMERGENCY)
Facility: HOSPITAL | Age: 67
Discharge: HOME | End: 2017-12-23
Attending: EMERGENCY MEDICINE
Payer: MEDICARE

## 2017-12-23 VITALS
SYSTOLIC BLOOD PRESSURE: 172 MMHG | HEART RATE: 71 BPM | OXYGEN SATURATION: 95 % | WEIGHT: 238.75 LBS | RESPIRATION RATE: 20 BRPM | BODY MASS INDEX: 27.59 KG/M2 | DIASTOLIC BLOOD PRESSURE: 84 MMHG | TEMPERATURE: 98 F

## 2017-12-23 DIAGNOSIS — R60.0 BILATERAL LEG EDEMA: Primary | ICD-10-CM

## 2017-12-23 PROCEDURE — 99283 EMERGENCY DEPT VISIT LOW MDM: CPT

## 2017-12-23 NOTE — DISCHARGE INSTRUCTIONS
Regarding EDEMA, advised patient to elevate lower extremities while lying down, perform daily leg exercises, follow a low-salt diet to help reduce fluid buildup and swelling, wear support stockings, obtain/maintain healthy weight, avoid wearing tight clothing or garters around thighs, and take medications for hypertension and fluid as directed.

## 2017-12-23 NOTE — ED PROVIDER NOTES
"SCRIBE #1 NOTE: I, Angella Gill, am scribing for, and in the presence of, Lucho Leon Jr., MD. I have scribed the entire note.      History      Chief Complaint   Patient presents with    Leg Swelling       Review of patient's allergies indicates:   Allergen Reactions    Augmentin [amoxicillin-pot clavulanate] Edema    Lisinopril      cough    Sulfa (sulfonamide antibiotics)      swelling        HPI   HPI    12/23/2017, 5:17 AM   History obtained from the patient      History of Present Illness: Conner Perez III is a 67 y.o. male patient who presents to the Emergency Department for bilateral ankle swelling which onset gradually yesterday after pt was dialyzed. Pt is dialyzed on MWF. Symptoms are improving and moderate in severity. No mitigating or exacerbating factors reported. Associated sxs include "a little" SOB. Patient denies any fever, chills, n/v, CP, cough, calf pain, extremity weakness/numbness, recent travel/long car rides, falls, trauma, and all other sxs at this time. Pt states he has been elevating his feet which has reduced the swelling. No further complaints or concerns at this time.       Arrival mode: Personal vehicle     PCP: Raciel Angela MD       Past Medical History:  Past Medical History:   Diagnosis Date    CKD (chronic kidney disease), stage IV     Diabetes mellitus     Diabetes mellitus, type 2     HTN (hypertension)        Past Surgical History:  Past Surgical History:   Procedure Laterality Date    CIRCUMCISION  1978    TOE AMPUTATION Left 01/24/2017    4th toe         Family History:  Family History   Problem Relation Age of Onset    Hypertension Father     No Known Problems Mother        Social History:  Social History     Social History Main Topics    Smoking status: Former Smoker    Smokeless tobacco: Never Used    Alcohol use No    Drug use: No    Sexual activity: unknown       ROS   Review of Systems   Constitutional: Negative for chills and fever.   HENT: " Negative for sore throat.    Respiratory: Positive for shortness of breath.    Cardiovascular: Negative for chest pain.   Gastrointestinal: Negative for nausea and vomiting.   Genitourinary: Negative for dysuria.   Musculoskeletal: Positive for joint swelling (bilat ankles). Negative for back pain.        (-) calf pain   Skin: Negative for rash.   Neurological: Negative for weakness and numbness.   Hematological: Does not bruise/bleed easily.   All other systems reviewed and are negative.      Physical Exam      Initial Vitals [12/23/17 0413]   BP Pulse Resp Temp SpO2   (!) 172/84 71 20 98.1 °F (36.7 °C) 95 %      MAP       113.33          Physical Exam  Nursing Notes and Vital Signs Reviewed.  Constitutional: Patient is in no acute distress. Well-developed and well-nourished.  Head: Atraumatic. Normocephalic.  Eyes: PERRL. EOM intact. Conjunctivae are not pale. No scleral icterus.  ENT: Mucous membranes are moist. Oropharynx is clear and symmetric.    Neck: Supple. Full ROM. No lymphadenopathy.  Cardiovascular: Regular rate. Regular rhythm. No murmurs, rubs, or gallops. Distal pulses are 2+ and symmetric.  Pulmonary/Chest: No respiratory distress. Clear to auscultation bilaterally. No wheezing or rales.  Abdominal: Soft and non-distended.  There is no tenderness.  No rebound, guarding, or rigidity.  Musculoskeletal: Moves all extremities. No obvious deformities. Mild bilateral ankle edema. No calf tenderness.  Skin: Warm and dry.  Neurological:  Alert, awake, and appropriate.  Normal speech.  No acute focal neurological deficits are appreciated.  Psychiatric: Normal affect. Good eye contact. Appropriate in content.    ED Course    Procedures  ED Vital Signs:  Vitals:    12/23/17 0413   BP: (!) 172/84   Pulse: 71   Resp: 20   Temp: 98.1 °F (36.7 °C)   TempSrc: Oral   SpO2: 95%   Weight: 108.3 kg (238 lb 12.1 oz)              The Emergency Provider reviewed the vital signs and test results, which are outlined  above.    ED Discussion     5:17 AM: Initial assessment of pt.  Pt is awake, alert, and in NAD at this time. Discussed with pt all pertinent ED information. Discussed pt dx and plan of tx. Advised pt to continue elevating his feet and use compression socks. Gave pt all f/u and return to the ED instructions. All questions and concerns were addressed at this time. Pt expresses understanding of information and instructions, and is comfortable with plan to discharge. Pt is stable for discharge.    I discussed with patient and/or family/caretaker that evaluation in the ED does not suggest any emergent or life threatening medical conditions requiring immediate intervention beyond what was provided in the ED, and I believe patient is safe for discharge.  Regardless, an unremarkable evaluation in the ED does not preclude the development or presence of a serious of life threatening condition. As such, patient was instructed to return immediately for any worsening or change in current symptoms.    Regarding EDEMA, advised patient to elevate lower extremities while lying down, perform daily leg exercises, follow a low-salt diet to help reduce fluid buildup and swelling, wear support stockings, obtain/maintain healthy weight, avoid wearing tight clothing or garters around thighs, and take medications for hypertension and fluid as directed.      ED Medication(s):  Medications - No data to display    Discharge Medication List as of 12/23/2017  5:35 AM          Follow-up Information     Raciel Angela MD. Schedule an appointment as soon as possible for a visit in 1 week.    Specialty:  Family Medicine  Contact information:  [address]  [address]  [facility] [address] [phone]             [facility].    Specialty:  Emergency Medicine  Why:  As needed, If symptoms worsen  Contact information:  [address] [address] [phone]                   [facility]  Decision Making              Scribe Attestation:   Scribe #1: I performed the above scribed service and the documentation accurately describes the services I performed. I attest to the accuracy of the note.    Attending:   Physician Attestation Statement for Scribe #1: I, Lucho Leon Jr., MD, personally performed the services described in this documentation, as scribed by Angella Gill, in my presence, and it is both accurate and complete.          Clinical Impression       ICD-10-CM ICD-9-CM   1. Bilateral leg edema R60.0 782.3       Disposition:   Disposition: Discharged  Condition: Stable         Lucho Leon Jr., MD  12/29/17 7404

## 2017-12-29 ENCOUNTER — HOSPITAL ENCOUNTER (EMERGENCY)
Facility: HOSPITAL | Age: 67
Discharge: HOME | End: 2017-12-30
Attending: EMERGENCY MEDICINE
Payer: MEDICARE

## 2017-12-29 VITALS
TEMPERATURE: 98 F | RESPIRATION RATE: 19 BRPM | HEIGHT: 78 IN | SYSTOLIC BLOOD PRESSURE: 197 MMHG | BODY MASS INDEX: 27.66 KG/M2 | OXYGEN SATURATION: 98 % | HEART RATE: 80 BPM | WEIGHT: 239.06 LBS | DIASTOLIC BLOOD PRESSURE: 93 MMHG

## 2017-12-29 DIAGNOSIS — R05.9 COUGH: Primary | ICD-10-CM

## 2017-12-29 LAB
FLUAV AG SPEC QL IA: NEGATIVE
FLUBV AG SPEC QL IA: NEGATIVE
SPECIMEN SOURCE: NORMAL

## 2017-12-29 PROCEDURE — 87400 INFLUENZA A/B EACH AG IA: CPT

## 2017-12-29 PROCEDURE — 99284 EMERGENCY DEPT VISIT MOD MDM: CPT

## 2017-12-29 PROCEDURE — 93010 ELECTROCARDIOGRAM REPORT: CPT | Mod: ,,, | Performed by: INTERNAL MEDICINE

## 2017-12-30 LAB
ALBUMIN SERPL BCP-MCNC: 3.5 G/DL
ALP SERPL-CCNC: 67 U/L
ALT SERPL W/O P-5'-P-CCNC: 18 U/L
ANION GAP SERPL CALC-SCNC: 11 MMOL/L
AST SERPL-CCNC: 22 U/L
BASOPHILS # BLD AUTO: 0.06 K/UL
BASOPHILS NFR BLD: 1.1 %
BILIRUB SERPL-MCNC: 0.5 MG/DL
BNP SERPL-MCNC: 790 PG/ML
BUN SERPL-MCNC: 16 MG/DL
CALCIUM SERPL-MCNC: 9.1 MG/DL
CHLORIDE SERPL-SCNC: 98 MMOL/L
CO2 SERPL-SCNC: 35 MMOL/L
CREAT SERPL-MCNC: 5.6 MG/DL
DIFFERENTIAL METHOD: ABNORMAL
EOSINOPHIL # BLD AUTO: 0.2 K/UL
EOSINOPHIL NFR BLD: 3 %
ERYTHROCYTE [DISTWIDTH] IN BLOOD BY AUTOMATED COUNT: 14.8 %
EST. GFR  (AFRICAN AMERICAN): 11 ML/MIN/1.73 M^2
EST. GFR  (NON AFRICAN AMERICAN): 10 ML/MIN/1.73 M^2
GLUCOSE SERPL-MCNC: 111 MG/DL
HCT VFR BLD AUTO: 36.1 %
HGB BLD-MCNC: 11.4 G/DL
LYMPHOCYTES # BLD AUTO: 1.8 K/UL
LYMPHOCYTES NFR BLD: 34 %
MCH RBC QN AUTO: 27.4 PG
MCHC RBC AUTO-ENTMCNC: 31.6 G/DL
MCV RBC AUTO: 87 FL
MONOCYTES # BLD AUTO: 0.6 K/UL
MONOCYTES NFR BLD: 11.7 %
NEUTROPHILS # BLD AUTO: 2.7 K/UL
NEUTROPHILS NFR BLD: 50.2 %
PLATELET # BLD AUTO: 194 K/UL
PMV BLD AUTO: 11.3 FL
POTASSIUM SERPL-SCNC: 3.7 MMOL/L
PROT SERPL-MCNC: 7.7 G/DL
RBC # BLD AUTO: 4.16 M/UL
SODIUM SERPL-SCNC: 144 MMOL/L
TROPONIN I SERPL DL<=0.01 NG/ML-MCNC: 0.08 NG/ML
WBC # BLD AUTO: 5.39 K/UL

## 2017-12-30 PROCEDURE — 80053 COMPREHEN METABOLIC PANEL: CPT

## 2017-12-30 PROCEDURE — 84484 ASSAY OF TROPONIN QUANT: CPT

## 2017-12-30 PROCEDURE — 83880 ASSAY OF NATRIURETIC PEPTIDE: CPT

## 2017-12-30 PROCEDURE — 85025 COMPLETE CBC W/AUTO DIFF WBC: CPT

## 2017-12-30 NOTE — ED PROVIDER NOTES
"Encounter Date: 12/29/2017       History     Chief Complaint   Patient presents with    Cough     x 1 week. "flem all day"      HPI   Mr Perez is a 67 year old male with PMHx of CHF, ESRD, HTN, HLD and DM. He presented to [facility] Emergency Room after waking up shortness of breath. Pt report coughing up sputum as well. Denies associated symptoms of chest pain, palpitation, nausea, vomiting or diaphoresis. Patient was recently discharged from [facility] on 12/18/2017 and was evaluated in the Emergency Room 12/29/2017.   He he received HD today on 12/29/2017 as well.       Review of patient's allergies indicates:   Allergen Reactions    Augmentin [amoxicillin-pot clavulanate] Edema    Lisinopril      cough    Sulfa (sulfonamide antibiotics)      swelling     Past Medical History:   Diagnosis Date    CKD (chronic kidney disease), stage IV     Diabetes mellitus     Diabetes mellitus, type 2     HTN (hypertension)      Past Surgical History:   Procedure Laterality Date    CIRCUMCISION  1978    TOE AMPUTATION Left 01/24/2017    4th toe     Family History   Problem Relation Age of Onset    Hypertension Father     No Known Problems Mother      Social History   Substance Use Topics    Smoking status: Former Smoker    Smokeless tobacco: Never Used    Alcohol use No     Review of Systems   Constitutional: Negative for chills, diaphoresis, fatigue and fever.   HENT: Negative for congestion, ear discharge, rhinorrhea, sinus pressure, sore throat and trouble swallowing.    Eyes: Negative for discharge and visual disturbance.   Respiratory: Positive for shortness of breath. Negative for apnea, cough, choking, chest tightness, wheezing and stridor.    Cardiovascular: Negative for chest pain, palpitations and leg swelling.   Gastrointestinal: Negative for abdominal distention, abdominal pain, diarrhea, nausea and vomiting.   Endocrine: Negative for cold intolerance and heat intolerance.   Genitourinary: Negative for dysuria, " frequency and hematuria.   Musculoskeletal: Negative for arthralgias, back pain, myalgias and neck pain.   Skin: Negative for pallor and rash.   Neurological: Negative for dizziness, seizures, syncope, weakness and headaches.   Psychiatric/Behavioral: Negative for agitation, confusion and sleep disturbance.       Physical Exam     Initial Vitals [12/29/17 2158]   BP Pulse Resp Temp SpO2   (!) 197/93 80 19 97.5 °F (36.4 °C) 98 %      MAP       127.67         Physical Exam    Nursing note and vitals reviewed.  Constitutional: He appears well-developed and well-nourished.   HENT:   Head: Normocephalic and atraumatic.   Neck: No JVD present.   Cardiovascular: Exam reveals no gallop and no friction rub.    No murmur heard.  Pulmonary/Chest: No respiratory distress. He has no wheezes. He has no rhonchi. He has no rales. He exhibits no tenderness.   Abdominal: Soft. Bowel sounds are normal. He exhibits no distension and no mass. There is no tenderness. There is no rebound and no guarding.   Musculoskeletal: He exhibits no edema or tenderness.   Neurological: He is alert and oriented to person, place, and time.   Skin: Skin is warm and dry. Capillary refill takes less than 2 seconds.   Psychiatric: He has a normal mood and affect. His behavior is normal. Judgment and thought content normal.         ED Course   Procedures  Labs Reviewed   COMPREHENSIVE METABOLIC PANEL - Abnormal; Notable for the following:        Result Value    CO2 35 (*)     Glucose 111 (*)     Creatinine 5.6 (*)     eGFR if  11 (*)     eGFR if non  10 (*)     All other components within normal limits   CBC W/ AUTO DIFFERENTIAL - Abnormal; Notable for the following:     RBC 4.16 (*)     Hemoglobin 11.4 (*)     Hematocrit 36.1 (*)     MCHC 31.6 (*)     RDW 14.8 (*)     All other components within normal limits   TROPONIN I - Abnormal; Notable for the following:     Troponin I 0.079 (*)     All other components within normal  limits   B-TYPE NATRIURETIC PEPTIDE - Abnormal; Notable for the following:      (*)     All other components within normal limits   INFLUENZA A AND B ANTIGEN                     Labs and test reviewed and all stable. Patient now feeling much better, with no complaints of shortness of breath.            ED Course      Clinical Impression:   The encounter diagnosis was Cough.    Disposition:   Disposition: Discharged  Condition: Stable                        Jerrell Faith NP  12/30/17 0254

## 2018-01-15 RX ORDER — HYDRALAZINE HYDROCHLORIDE 50 MG/1
TABLET, FILM COATED ORAL
Qty: 90 TABLET | Refills: 0 | OUTPATIENT
Start: 2018-01-15

## 2018-01-20 ENCOUNTER — HOSPITAL ENCOUNTER (EMERGENCY)
Facility: HOSPITAL | Age: 68
Discharge: HOME | End: 2018-01-20
Attending: INTERNAL MEDICINE
Payer: MEDICARE

## 2018-01-20 VITALS
HEIGHT: 78 IN | OXYGEN SATURATION: 95 % | BODY MASS INDEX: 27.14 KG/M2 | TEMPERATURE: 98 F | HEART RATE: 70 BPM | SYSTOLIC BLOOD PRESSURE: 203 MMHG | DIASTOLIC BLOOD PRESSURE: 100 MMHG | WEIGHT: 234.56 LBS | RESPIRATION RATE: 17 BRPM

## 2018-01-20 DIAGNOSIS — I16.0 HYPERTENSIVE URGENCY: Primary | ICD-10-CM

## 2018-01-20 DIAGNOSIS — R06.02 SOB (SHORTNESS OF BREATH): ICD-10-CM

## 2018-01-20 DIAGNOSIS — I10 ESSENTIAL HYPERTENSION: Chronic | ICD-10-CM

## 2018-01-20 DIAGNOSIS — N18.6 ESRD (END STAGE RENAL DISEASE): ICD-10-CM

## 2018-01-20 DIAGNOSIS — K59.00 CONSTIPATION: ICD-10-CM

## 2018-01-20 PROCEDURE — 99284 EMERGENCY DEPT VISIT MOD MDM: CPT | Mod: 25

## 2018-01-20 PROCEDURE — 93010 ELECTROCARDIOGRAM REPORT: CPT | Mod: ,,, | Performed by: INTERNAL MEDICINE

## 2018-01-20 PROCEDURE — 93005 ELECTROCARDIOGRAM TRACING: CPT

## 2018-01-20 PROCEDURE — 25000003 PHARM REV CODE 250: Performed by: INTERNAL MEDICINE

## 2018-01-20 RX ORDER — LACTULOSE 10 G/15ML
20 SOLUTION ORAL 3 TIMES DAILY
Qty: 900 ML | Refills: 0 | Status: SHIPPED | OUTPATIENT
Start: 2018-01-20 | End: 2018-01-30

## 2018-01-20 RX ORDER — CLONIDINE HYDROCHLORIDE 0.1 MG/1
0.1 TABLET ORAL
Status: COMPLETED | OUTPATIENT
Start: 2018-01-20 | End: 2018-01-20

## 2018-01-20 RX ORDER — PSEUDOEPHEDRINE/ACETAMINOPHEN 30MG-500MG
100 TABLET ORAL
Status: COMPLETED | OUTPATIENT
Start: 2018-01-20 | End: 2018-01-20

## 2018-01-20 RX ADMIN — Medication 100 ML: at 05:01

## 2018-01-20 RX ADMIN — CLONIDINE HYDROCHLORIDE 0.1 MG: 0.1 TABLET ORAL at 04:01

## 2018-01-20 NOTE — ED PROVIDER NOTES
"SCRIBE #1 NOTE: I, Madiha South, am scribing for, and in the presence of, Jesus Colon MD. I have scribed the entire note.      History      Chief Complaint   Patient presents with    Constipation     pt reports no BM in 4 days, abdominal bloating reported       Review of patient's allergies indicates:   Allergen Reactions    Augmentin [amoxicillin-pot clavulanate] Edema    Lisinopril      cough    Sulfa (sulfonamide antibiotics)      swelling        HPI   HPI    1/20/2018, 3:48 PM   History obtained from the patient      History of Present Illness: Conner Perez III is a 67 y.o. male patient who presents to the Emergency Department for constipation which onset gradually 4 days ago. Patient states his last bowel movement was 4 days ago. Symptoms are constant and moderate in severity. No mitigating or exacerbating factors reported. Associated sxs include abd pain described as "pressure". Patient states he is able to flatulate. Patient reports eating grits this morning. Patient states he last had dialysis yesterday. Patient denies any fever, chills, CP, SOB, leg swelling, dysuria, hematuria, N/V, and all other sxs at this time. No further complaints or concerns at this time.     Arrival mode: Personal vehicle      PCP: Raciel Angela MD       Past Medical History:  Past Medical History:   Diagnosis Date    CKD (chronic kidney disease), stage IV     Diabetes mellitus     Diabetes mellitus, type 2     HTN (hypertension)        Past Surgical History:  Past Surgical History:   Procedure Laterality Date    CIRCUMCISION  1978    TOE AMPUTATION Left 01/24/2017    4th toe         Family History:  Family History   Problem Relation Age of Onset    Hypertension Father     No Known Problems Mother        Social History:  Social History     Social History Main Topics    Smoking status: Former Smoker    Smokeless tobacco: Never Used    Alcohol use No    Drug use: No    Sexual activity: Unknown       ROS "   Review of Systems   Constitutional: Negative for chills and fever.   HENT: Negative for sore throat.    Respiratory: Negative for shortness of breath.    Cardiovascular: Negative for chest pain and leg swelling.   Gastrointestinal: Positive for abdominal pain (pressure) and constipation. Negative for nausea and vomiting.   Genitourinary: Negative for dysuria and hematuria.   Musculoskeletal: Negative for back pain.   Skin: Negative for rash.   Neurological: Negative for weakness.   Hematological: Does not bruise/bleed easily.   All other systems reviewed and are negative.      Physical Exam      Initial Vitals [01/20/18 1538]   BP Pulse Resp Temp SpO2   (!) 188/86 76 18 97.4 °F (36.3 °C) 100 %      MAP       120          Physical Exam  Nursing Notes and Vital Signs Reviewed.  Constitutional: Patient is in no acute distress. Well-developed and well-nourished.  Head: Atraumatic. Normocephalic.  Eyes: PERRL. EOM intact. Conjunctivae are not pale. No scleral icterus.  ENT: Mucous membranes are moist. Oropharynx is clear and symmetric.    Neck: Supple. Full ROM. No lymphadenopathy.  Cardiovascular: Regular rate. Regular rhythm. No murmurs, rubs, or gallops. Distal pulses are 2+ and symmetric.  Pulmonary/Chest: No respiratory distress. Clear to auscultation bilaterally. No wheezing or rales.  Abdominal: Soft and non-distended.  There is no tenderness.  No rebound, guarding, or rigidity. Good bowel sounds.  Genitourinary: No CVA tenderness  Musculoskeletal: Moves all extremities. No obvious deformities. No edema. No calf tenderness. R forearm fistula, good thrill.  Skin: Warm and dry.  Neurological:  Alert, awake, and appropriate.  Normal speech.  No acute focal neurological deficits are appreciated.  Psychiatric: Normal affect. Good eye contact. Appropriate in content.    ED Course    Procedures  ED Vital Signs:  Vitals:    01/20/18 1538 01/20/18 1613 01/20/18 1901   BP: (!) 188/86 (!) 209/99 (!) 203/100   Pulse: 76  70  "  Resp: 18  17   Temp: 97.4 °F (36.3 °C)  98.4 °F (36.9 °C)   TempSrc: Oral     SpO2: 100%  95%   Weight: 106.4 kg (234 lb 9.1 oz)     Height: 6' 6" (1.981 m)         Imaging Results:  Imaging Results          X-Ray Chest 1 View (Final result)  Result time 01/20/18 16:28:29    Final result by Santo Duval III, MD (01/20/18 16:28:29)                 Impression:         Negative one view chest x-ray.      Electronically signed by: SANTO DUVAL MD  Date:     01/20/18  Time:    16:28              Narrative:    One view chest x-ray.    Clinical indication:  Shortness of breath    Heart size is enlarged. The lung fields are clear. No acute pulmonary infiltrate.                             X-Ray Abdomen Flat And Erect (Final result)  Result time 01/20/18 16:29:24    Final result by Santo Duval III, MD (01/20/18 16:29:24)                 Impression:     No acute abnormality suggested.      Electronically signed by: SANTO DUVAL MD  Date:     01/20/18  Time:    16:29              Narrative:    Abdomen series, 2 views.    Clinical indication: Abdominal pain.    Nonspecific gas pattern without mass or obstruction. No free air. No acute abnormality suggested. Mild distention of few small bowel which are nonspecific.                             The EKG was ordered, reviewed, and independently interpreted by the ED provider.  Interpretation time: 1604  Rate: 68 BPM  Rhythm: normal sinus rhythm  Interpretation: T wave abnormality, consider lateral ischemia. Abnormal ECG. No STEMI.  When compared to EKG performed 12/2017, there are no significant changes.         The Emergency Provider reviewed the vital signs and test results, which are outlined above.    ED Discussion     6:32 PM: Reassessed pt at this time.  Pt states his condition has improved at this time. Discussed with pt all pertinent ED information and results. Discussed pt dx and plan of tx. Gave pt all f/u and return to the ED instructions. " All questions and concerns were addressed at this time. Pt expresses understanding of information and instructions, and is comfortable with plan to discharge. Pt is stable for discharge.    I discussed with patient and/or family/caretaker that evaluation in the ED does not suggest any emergent or life threatening medical conditions requiring immediate intervention beyond what was provided in the ED, and I believe patient is safe for discharge.  Regardless, an unremarkable evaluation in the ED does not preclude the development or presence of a serious of life threatening condition. As such, patient was instructed to return immediately for any worsening or change in current symptoms.      ED Medication(s):  Medications   cloNIDine tablet 0.1 mg (0.1 mg Oral Given 1/20/18 8663)   glycerin 99.5% topical solution 100 mL (100 mLs Rectal Given 1/20/18 5767)       Discharge Medication List as of 1/20/2018  6:37 PM      START taking these medications    Details   lactulose (CHRONULAC) 20 gram/30 mL Soln Take 30 mLs (20 g total) by mouth 3 (three) times daily., Starting Sat 1/20/2018, Until Tue 1/30/2018, Print             Follow-up Information     Go to  [facility] - .    Specialty:  Emergency Medicine  Why:  If symptoms worsen  Contact information:  [address]  [facility] [address] [phone]                   Medical Decision Making    Medical Decision Making:   Clinical Tests:   Radiological Study: Ordered and Reviewed           Scribe Attestation:   Scribe #1: I performed the above scribed service and the documentation accurately describes the services I performed. I attest to the accuracy of the note.    Attending:   Physician Attestation Statement for Scribe #1: I, Jesus Colon MD, personally performed the services described in this documentation, as scribed by Madiha Dia, in my presence, and it is both accurate and complete.          Clinical Impression        ICD-10-CM ICD-9-CM   1. Hypertensive urgency I16.0 401.9   2. ESRD (end stage renal disease) N18.6 585.6   3. Essential hypertension I10 401.9   4. SOB (shortness of breath) R06.02 786.05   5. Constipation K59.00 564.00       Disposition:   Disposition: Discharged  Condition: Stable         eJsus Colon MD  01/20/18 1941

## 2018-01-20 NOTE — ED NOTES
level of Consciousness: The patient is awake, alert, and oriented with appropriate affect and speech; oriented to person, place and time.  Appearance: Sitting up in bed with no acute distress noted. Clothing and hygiene are clean and worn appropriately.  Skin: Skin is warm and dry with good skin turgor; intact; color consistent with ethnicity.  Mucous membranes are moist. SHUNT TO RIGHT EXTREMITY. Limb alert applied.   Musculoskeletal: Moves all extremities well in full range of motion. No obvious deformities or swelling noted.  Respiratory: Airway open and patent, respirations spontaneous, even and unlabored. No accessory muscles in use.   Cardiac: Regular rate and rhythm, good pulses palpated peripherally, capillary refill < 3 seconds.  Abdomen: Soft, non-tender to palpation. No distention noted.  Neurologic: PERRLA, face exhibits symmetrical expression, hand grasps equal and even bilaterally, normal sensation noted to all extremities and face when touched by a finger.

## 2018-01-21 ENCOUNTER — HOSPITAL ENCOUNTER (EMERGENCY)
Facility: HOSPITAL | Age: 68
Discharge: HOME | End: 2018-01-21
Attending: EMERGENCY MEDICINE
Payer: MEDICARE

## 2018-01-21 VITALS
OXYGEN SATURATION: 99 % | WEIGHT: 234 LBS | BODY MASS INDEX: 27.07 KG/M2 | TEMPERATURE: 98 F | RESPIRATION RATE: 12 BRPM | SYSTOLIC BLOOD PRESSURE: 194 MMHG | HEIGHT: 78 IN | HEART RATE: 70 BPM | DIASTOLIC BLOOD PRESSURE: 91 MMHG

## 2018-01-21 DIAGNOSIS — R06.02 SOB (SHORTNESS OF BREATH): ICD-10-CM

## 2018-01-21 LAB
ALBUMIN SERPL BCP-MCNC: 3.5 G/DL
ALP SERPL-CCNC: 65 U/L
ALT SERPL W/O P-5'-P-CCNC: 7 U/L
ANION GAP SERPL CALC-SCNC: 12 MMOL/L
AST SERPL-CCNC: 15 U/L
BASOPHILS # BLD AUTO: 0.04 K/UL
BASOPHILS NFR BLD: 0.6 %
BILIRUB SERPL-MCNC: 0.7 MG/DL
BNP SERPL-MCNC: 990 PG/ML
BUN SERPL-MCNC: 23 MG/DL
CALCIUM SERPL-MCNC: 8.9 MG/DL
CHLORIDE SERPL-SCNC: 99 MMOL/L
CO2 SERPL-SCNC: 30 MMOL/L
CREAT SERPL-MCNC: 8.3 MG/DL
DIFFERENTIAL METHOD: ABNORMAL
EOSINOPHIL # BLD AUTO: 0.1 K/UL
EOSINOPHIL NFR BLD: 1.6 %
ERYTHROCYTE [DISTWIDTH] IN BLOOD BY AUTOMATED COUNT: 16.3 %
EST. GFR  (AFRICAN AMERICAN): 7 ML/MIN/1.73 M^2
EST. GFR  (NON AFRICAN AMERICAN): 6 ML/MIN/1.73 M^2
GLUCOSE SERPL-MCNC: 159 MG/DL
HCT VFR BLD AUTO: 27.9 %
HGB BLD-MCNC: 9.1 G/DL
LYMPHOCYTES # BLD AUTO: 1.8 K/UL
LYMPHOCYTES NFR BLD: 28.9 %
MAGNESIUM SERPL-MCNC: 2.1 MG/DL
MCH RBC QN AUTO: 27.7 PG
MCHC RBC AUTO-ENTMCNC: 32.6 G/DL
MCV RBC AUTO: 85 FL
MONOCYTES # BLD AUTO: 0.5 K/UL
MONOCYTES NFR BLD: 8.3 %
NEUTROPHILS # BLD AUTO: 3.7 K/UL
NEUTROPHILS NFR BLD: 60.6 %
PHOSPHATE SERPL-MCNC: 2.2 MG/DL
PLATELET # BLD AUTO: 199 K/UL
PMV BLD AUTO: 9.9 FL
POTASSIUM SERPL-SCNC: 3.8 MMOL/L
PROT SERPL-MCNC: 7 G/DL
RBC # BLD AUTO: 3.28 M/UL
SODIUM SERPL-SCNC: 141 MMOL/L
WBC # BLD AUTO: 6.16 K/UL

## 2018-01-21 PROCEDURE — 80053 COMPREHEN METABOLIC PANEL: CPT

## 2018-01-21 PROCEDURE — 85025 COMPLETE CBC W/AUTO DIFF WBC: CPT

## 2018-01-21 PROCEDURE — 93010 ELECTROCARDIOGRAM REPORT: CPT | Mod: ,,, | Performed by: INTERNAL MEDICINE

## 2018-01-21 PROCEDURE — 84100 ASSAY OF PHOSPHORUS: CPT

## 2018-01-21 PROCEDURE — 83880 ASSAY OF NATRIURETIC PEPTIDE: CPT

## 2018-01-21 PROCEDURE — 83735 ASSAY OF MAGNESIUM: CPT

## 2018-01-22 NOTE — ED PROVIDER NOTES
SCRIBE #1 NOTE: I, Santo Bowen, am scribing for, and in the presence of, Norma Salomon MD. I have scribed the entire note.      History      Chief Complaint   Patient presents with    Shortness of Breath     feeling woorse after seen here an hour ago       Review of patient's allergies indicates:   Allergen Reactions    Augmentin [amoxicillin-pot clavulanate] Edema    Lisinopril      cough    Sulfa (sulfonamide antibiotics)      swelling        HPI   HPI    1/21/2018, 9:03 PM   History obtained from the patient      History of Present Illness: Conner Perez III is a 67 y.o. male patient who presents to the Emergency Department for SOB which onset gradually 1 hour PTA. Symptoms are constant and moderate in severity. Pt was seen in the ED today for chest tightness and was dx with hypertensive urgency. Pt was told to come back to the ED if he became more short of breath. Pt states he has become more short of breath. Pt states the didn't pull enough fluid off at dialysis on Friday.  No mitigating or exacerbating factors reported. Associated sxs include chest tightness. Patient denies any fever, chills, n/v/d, CP, leg swelling, palpitations, HA, lightheadedness, dizziness, numbness, weakness, and all other sxs at this time. No further complaints or concerns at this time.         Arrival mode: Personal vehicle    PCP: Raciel Angela MD       Past Medical History:  Past Medical History:   Diagnosis Date    CKD (chronic kidney disease), stage IV     Diabetes mellitus     Diabetes mellitus, type 2     HTN (hypertension)        Past Surgical History:  Past Surgical History:   Procedure Laterality Date    CIRCUMCISION  1978    TOE AMPUTATION Left 01/24/2017    4th toe         Family History:  Family History   Problem Relation Age of Onset    Hypertension Father     No Known Problems Mother        Social History:  Social History     Social History Main Topics    Smoking status: Former Smoker    Smokeless tobacco:  Never Used    Alcohol use No    Drug use: No    Sexual activity: Unknown       ROS   Review of Systems   Constitutional: Negative for chills and fever.   HENT: Negative for sore throat.    Respiratory: Positive for chest tightness. Negative for cough and shortness of breath.    Cardiovascular: Negative for chest pain, palpitations and leg swelling.   Gastrointestinal: Negative for diarrhea, nausea and vomiting.   Genitourinary: Negative for dysuria.   Musculoskeletal: Negative for back pain.   Skin: Negative for rash.   Neurological: Negative for dizziness, weakness, light-headedness, numbness and headaches.   Hematological: Does not bruise/bleed easily.       Physical Exam      Initial Vitals [01/21/18 2051]   BP Pulse Resp Temp SpO2   (!) 147/70 72 18 98.5 °F (36.9 °C) 99 %      MAP       95.67          Physical Exam  Nursing Notes and Vital Signs Reviewed.  Constitutional: Patient is in no acute distress. Well-developed and well-nourished.  Head: Atraumatic. Normocephalic.  Eyes: PERRL. EOM intact. Conjunctivae are not pale. No scleral icterus.  ENT: Mucous membranes are moist. Oropharynx is clear and symmetric.    Neck: Supple. Full ROM. No lymphadenopathy.  Cardiovascular: Regular rate. Regular rhythm. No murmurs, rubs, or gallops. Distal pulses are 2+ and symmetric.  Pulmonary/Chest: No respiratory distress. Clear to auscultation bilaterally. No wheezing or rales.  Abdominal: Soft and non-distended.  There is no tenderness.  No rebound, guarding, or rigidity.   Musculoskeletal: Moves all extremities. No obvious deformities. No edema.   Skin: Warm and dry.  Neurological:  Alert, awake, and appropriate.  Normal speech.  No acute focal neurological deficits are appreciated.  Psychiatric: Normal affect. Good eye contact. Appropriate in content.    ED Course    Procedures  ED Vital Signs:  Vitals:    01/21/18 2051 01/21/18 2124 01/21/18 2126 01/21/18 2216   BP: (!) 147/70 (!) 191/90  (!) 194/91   Pulse: 72 73 73  "70   Resp: 18 16  12   Temp: 98.5 °F (36.9 °C)   97.8 °F (36.6 °C)   TempSrc: Oral   Oral   SpO2: 99% 100%  99%   Weight: 106.1 kg (234 lb)      Height: 6' 6" (1.981 m)          Abnormal Lab Results:  Labs Reviewed   CBC W/ AUTO DIFFERENTIAL - Abnormal; Notable for the following:        Result Value    RBC 3.28 (*)     Hemoglobin 9.1 (*)     Hematocrit 27.9 (*)     RDW 16.3 (*)     All other components within normal limits   COMPREHENSIVE METABOLIC PANEL - Abnormal; Notable for the following:     CO2 30 (*)     Glucose 159 (*)     Creatinine 8.3 (*)     ALT 7 (*)     eGFR if  7 (*)     eGFR if non  6 (*)     All other components within normal limits   B-TYPE NATRIURETIC PEPTIDE - Abnormal; Notable for the following:      (*)     All other components within normal limits   PHOSPHORUS - Abnormal; Notable for the following:     Phosphorus 2.2 (*)     All other components within normal limits   MAGNESIUM        All Lab Results:  Results for orders placed or performed during the hospital encounter of 01/21/18   CBC auto differential   Result Value Ref Range    WBC 6.16 3.90 - 12.70 K/uL    RBC 3.28 (L) 4.60 - 6.20 M/uL    Hemoglobin 9.1 (L) 14.0 - 18.0 g/dL    Hematocrit 27.9 (L) 40.0 - 54.0 %    MCV 85 82 - 98 fL    MCH 27.7 27.0 - 31.0 pg    MCHC 32.6 32.0 - 36.0 g/dL    RDW 16.3 (H) 11.5 - 14.5 %    Platelets 199 150 - 350 K/uL    MPV 9.9 9.2 - 12.9 fL    Gran # 3.7 1.8 - 7.7 K/uL    Lymph # 1.8 1.0 - 4.8 K/uL    Mono # 0.5 0.3 - 1.0 K/uL    Eos # 0.1 0.0 - 0.5 K/uL    Baso # 0.04 0.00 - 0.20 K/uL    Gran% 60.6 38.0 - 73.0 %    Lymph% 28.9 18.0 - 48.0 %    Mono% 8.3 4.0 - 15.0 %    Eosinophil% 1.6 0.0 - 8.0 %    Basophil% 0.6 0.0 - 1.9 %    Differential Method Automated    Comprehensive metabolic panel   Result Value Ref Range    Sodium 141 136 - 145 mmol/L    Potassium 3.8 3.5 - 5.1 mmol/L    Chloride 99 95 - 110 mmol/L    CO2 30 (H) 23 - 29 mmol/L    Glucose 159 (H) 70 - 110 " mg/dL    BUN, Bld 23 8 - 23 mg/dL    Creatinine 8.3 (H) 0.5 - 1.4 mg/dL    Calcium 8.9 8.7 - 10.5 mg/dL    Total Protein 7.0 6.0 - 8.4 g/dL    Albumin 3.5 3.5 - 5.2 g/dL    Total Bilirubin 0.7 0.1 - 1.0 mg/dL    Alkaline Phosphatase 65 55 - 135 U/L    AST 15 10 - 40 U/L    ALT 7 (L) 10 - 44 U/L    Anion Gap 12 8 - 16 mmol/L    eGFR if African American 7 (A) >60 mL/min/1.73 m^2    eGFR if non African American 6 (A) >60 mL/min/1.73 m^2   Brain natriuretic peptide   Result Value Ref Range     (H) 0 - 99 pg/mL   Magnesium   Result Value Ref Range    Magnesium 2.1 1.6 - 2.6 mg/dL   Phosphorus   Result Value Ref Range    Phosphorus 2.2 (L) 2.7 - 4.5 mg/dL         Imaging Results:  Imaging Results          X-Ray Chest AP Portable (Final result)  Result time 01/21/18 21:18:44    Final result by Santo Duval III, MD (01/21/18 21:18:44)                 Impression:         Negative one view chest x-ray.      Electronically signed by: SANTO DUVAL MD  Date:     01/21/18  Time:    21:18              Narrative:    One view chest x-ray.    Clinical indication: Shortness of breath     Heart size is borderline. The lung fields are clear. No acute pulmonary infiltrate.                             The EKG was ordered, reviewed, and independently interpreted by the ED provider.  Interpretation time: 21:04  Rate: 70 BPM  Rhythm: normal sinus rhythm  Interpretation: Nonspecific T wave abnormality. Prolonged QT. No STEMI.             The Emergency Provider reviewed the vital signs and test results, which are outlined above.    ED Discussion     10:13 PM: Reassessed pt at this time.  Pt is awake, alert, and in no distress. Pt states his SOB has improved. Discussed with pt all pertinent ED information and results. Discussed pt dx and plan of tx. Gave pt all f/u and return to the ED instructions. All questions and concerns were addressed at this time. Pt expresses understanding of information and instructions, and is  comfortable with plan to discharge. Pt is stable for discharge.    I discussed with patient and/or family/caretaker that evaluation in the ED does not suggest any emergent or life threatening medical conditions requiring immediate intervention beyond what was provided in the ED, and I believe patient is safe for discharge.  Regardless, an unremarkable evaluation in the ED does not preclude the development or presence of a serious of life threatening condition. As such, patient was instructed to return immediately for any worsening or change in current symptoms.      ED Medication(s):  Medications - No data to display    Discharge Medication List as of 1/21/2018 10:16 PM          Follow-up Information     Raciel Angela MD In 1 day.    Specialty:  Family Medicine  Contact information:  [address]  [address]  [facility] [address] [phone]             [facility] - .    Specialty:  Emergency Medicine  Why:  As needed, If symptoms worsen  Contact information:  [address]  [facility] [address] [phone]                   Medical Decision Making    Medical Decision Making:   Clinical Tests:   Lab Tests: Ordered and Reviewed  Radiological Study: Ordered and Reviewed           Scribe Attestation:   Scribe #1: I performed the above scribed service and the documentation accurately describes the services I performed. I attest to the accuracy of the note.    Attending:   Physician Attestation Statement for Scribe #1: I, Norma Salomon MD, personally performed the services described in this documentation, as scribed by Santo Bowen, in my presence, and it is both accurate and complete.          Clinical Impression       ICD-10-CM ICD-9-CM   1. SOB (shortness of breath) R06.02 786.05   2. SOB (shortness of breath) R06.02 786.05       Disposition:   Disposition: Discharged  Condition: Stable         Norma Salomon MD  01/22/18 0506

## 2018-01-25 ENCOUNTER — HOSPITAL ENCOUNTER (EMERGENCY)
Facility: HOSPITAL | Age: 68
Discharge: HOME | End: 2018-01-26
Attending: EMERGENCY MEDICINE
Payer: MEDICARE

## 2018-01-25 DIAGNOSIS — R14.0 BLOATED ABDOMEN: ICD-10-CM

## 2018-01-25 PROCEDURE — 99284 EMERGENCY DEPT VISIT MOD MDM: CPT | Mod: 25

## 2018-01-25 PROCEDURE — 93005 ELECTROCARDIOGRAM TRACING: CPT

## 2018-01-26 VITALS
RESPIRATION RATE: 10 BRPM | WEIGHT: 228.63 LBS | HEIGHT: 78 IN | DIASTOLIC BLOOD PRESSURE: 91 MMHG | TEMPERATURE: 97 F | SYSTOLIC BLOOD PRESSURE: 198 MMHG | BODY MASS INDEX: 26.45 KG/M2 | HEART RATE: 70 BPM | OXYGEN SATURATION: 100 %

## 2018-01-26 LAB
ALBUMIN SERPL BCP-MCNC: 3.4 G/DL
ALP SERPL-CCNC: 69 U/L
ALT SERPL W/O P-5'-P-CCNC: 8 U/L
ANION GAP SERPL CALC-SCNC: 12 MMOL/L
AST SERPL-CCNC: 15 U/L
BASOPHILS # BLD AUTO: 0.02 K/UL
BASOPHILS NFR BLD: 0.4 %
BILIRUB SERPL-MCNC: 0.9 MG/DL
BNP SERPL-MCNC: 1122 PG/ML
BUN SERPL-MCNC: 18 MG/DL
CALCIUM SERPL-MCNC: 8.8 MG/DL
CHLORIDE SERPL-SCNC: 98 MMOL/L
CO2 SERPL-SCNC: 33 MMOL/L
CREAT SERPL-MCNC: 6.8 MG/DL
DIFFERENTIAL METHOD: ABNORMAL
EOSINOPHIL # BLD AUTO: 0.1 K/UL
EOSINOPHIL NFR BLD: 2.3 %
ERYTHROCYTE [DISTWIDTH] IN BLOOD BY AUTOMATED COUNT: 16.8 %
EST. GFR  (AFRICAN AMERICAN): 9 ML/MIN/1.73 M^2
EST. GFR  (NON AFRICAN AMERICAN): 8 ML/MIN/1.73 M^2
GLUCOSE SERPL-MCNC: 138 MG/DL
HCT VFR BLD AUTO: 29.1 %
HGB BLD-MCNC: 9.3 G/DL
LIPASE SERPL-CCNC: 23 U/L
LYMPHOCYTES # BLD AUTO: 1.6 K/UL
LYMPHOCYTES NFR BLD: 29.5 %
MCH RBC QN AUTO: 28.2 PG
MCHC RBC AUTO-ENTMCNC: 32 G/DL
MCV RBC AUTO: 88 FL
MONOCYTES # BLD AUTO: 0.6 K/UL
MONOCYTES NFR BLD: 10.6 %
NEUTROPHILS # BLD AUTO: 3.2 K/UL
NEUTROPHILS NFR BLD: 57.2 %
PLATELET # BLD AUTO: 168 K/UL
PMV BLD AUTO: 10.8 FL
POTASSIUM SERPL-SCNC: 3.7 MMOL/L
PROT SERPL-MCNC: 6.8 G/DL
RBC # BLD AUTO: 3.3 M/UL
SODIUM SERPL-SCNC: 143 MMOL/L
TROPONIN I SERPL DL<=0.01 NG/ML-MCNC: 0.04 NG/ML
WBC # BLD AUTO: 5.55 K/UL

## 2018-01-26 PROCEDURE — 85025 COMPLETE CBC W/AUTO DIFF WBC: CPT

## 2018-01-26 PROCEDURE — 25000003 PHARM REV CODE 250: Performed by: EMERGENCY MEDICINE

## 2018-01-26 PROCEDURE — 83690 ASSAY OF LIPASE: CPT

## 2018-01-26 PROCEDURE — 93010 ELECTROCARDIOGRAM REPORT: CPT | Mod: ,,, | Performed by: INTERNAL MEDICINE

## 2018-01-26 PROCEDURE — 80053 COMPREHEN METABOLIC PANEL: CPT

## 2018-01-26 PROCEDURE — 84484 ASSAY OF TROPONIN QUANT: CPT

## 2018-01-26 PROCEDURE — 83880 ASSAY OF NATRIURETIC PEPTIDE: CPT

## 2018-01-26 RX ADMIN — LIDOCAINE HYDROCHLORIDE 50 ML: 20 SOLUTION ORAL; TOPICAL at 01:01

## 2018-01-26 NOTE — ED PROVIDER NOTES
"SCRIBE #1 NOTE: I, Marisela Johnson, am scribing for, and in the presence of, Norma Salomon MD. I have scribed the entire note.      History      Chief Complaint   Patient presents with    Abdominal Pain     abd pain and gas        Review of patient's allergies indicates:   Allergen Reactions    Augmentin [amoxicillin-pot clavulanate] Edema    Lisinopril      cough    Sulfa (sulfonamide antibiotics)      swelling        HPI   HPI    1/25/2018, 11:51 PM   History obtained from the patient      History of Present Illness: Conner Preez III is a 67 y.o. male patient with DM, CKD stage IV on dialysis (last yesterday) who presents to the Emergency Department for abdominal bloating which he reports has been going on "for a while" after he ate Oatmeal. He states "I have gas really bad" and that he had a "little bowel movement today." Symptoms are constant and moderate in severity. No mitigating or exacerbating factors reported. Patient denies fever, chills, CP, SOB, N/V/D, dysuria, hematuria, and all other sxs at this time. No further complaints or concerns at this time.       Arrival mode: Personal vehicle    PCP: Raciel Angela MD       Past Medical History:  Past Medical History:   Diagnosis Date    CKD (chronic kidney disease), stage IV     Diabetes mellitus     Diabetes mellitus, type 2     HTN (hypertension)        Past Surgical History:  Past Surgical History:   Procedure Laterality Date    AV FISTULA PLACEMENT Right 06/2017    CIRCUMCISION  1978    TOE AMPUTATION Left 01/24/2017    4th toe         Family History:  Family History   Problem Relation Age of Onset    Hypertension Father     No Known Problems Mother        Social History:  Social History     Social History Main Topics    Smoking status: Former Smoker     Types: Cigarettes     Quit date: 1971    Smokeless tobacco: Never Used    Alcohol use No    Drug use: No    Sexual activity: Unknown       ROS   Review of Systems   Constitutional: " Negative for chills and fever.   HENT: Negative for sore throat.    Respiratory: Negative for shortness of breath.    Cardiovascular: Negative for chest pain and leg swelling.   Gastrointestinal: Positive for abdominal pain (mild). Negative for abdominal distention, anal bleeding, blood in stool, diarrhea, nausea and vomiting.        (+) abdominal bloating   Genitourinary: Negative for dysuria.   Musculoskeletal: Negative for back pain.   Skin: Negative for rash.   Neurological: Negative for dizziness, weakness and headaches.   Hematological: Does not bruise/bleed easily.   All other systems reviewed and are negative.      Physical Exam      Initial Vitals [01/25/18 2344]   BP Pulse Resp Temp SpO2   (!) 192/90 73 20 97.4 °F (36.3 °C) 98 %      MAP       124          Physical Exam  Nursing Notes and Vital Signs Reviewed.  Constitutional: Patient is in no acute distress. Awake and alert. Well-developed and well-nourished.  Head: Atraumatic. Normocephalic.  Eyes: PERRL. EOM intact. Conjunctivae are not pale. No scleral icterus.  ENT: Mucous membranes are moist. Oropharynx is clear and symmetric.    Neck: Supple. Full ROM. No lymphadenopathy.  Cardiovascular: Regular rate. Regular rhythm. No murmurs, rubs, or gallops. Distal pulses are 2+ and symmetric.  Pulmonary/Chest: No respiratory distress. Clear to auscultation bilaterally. No wheezing, rales, or rhonchi.  Abdominal: Soft and non-distended.  There is no tenderness.  No rebound, guarding, or rigidity.  Good bowel sounds.    : No CVA TTP.  Musculoskeletal: Moves all extremities. No obvious deformities. No edema. No calf tenderness.  Skin: Warm and dry.  Neurological:  Alert, awake, and appropriate.  Normal speech.  No acute focal neurological deficits are appreciated.  Psychiatric: Normal affect. Good eye contact. Appropriate in content.    ED Course    Procedures  ED Vital Signs:  Vitals:    01/25/18 2344 01/26/18 0216   BP: (!) 192/90 (!) 198/91   Pulse: 73 70  "  Resp: 20 10   Temp: 97.4 °F (36.3 °C)    TempSrc: Oral    SpO2: 98% 100%   Weight: 103.7 kg (228 lb 9.9 oz)    Height: 6' 6" (1.981 m)        Abnormal Lab Results:  Labs Reviewed   CBC W/ AUTO DIFFERENTIAL - Abnormal; Notable for the following:        Result Value    RBC 3.30 (*)     Hemoglobin 9.3 (*)     Hematocrit 29.1 (*)     RDW 16.8 (*)     All other components within normal limits   COMPREHENSIVE METABOLIC PANEL - Abnormal; Notable for the following:     CO2 33 (*)     Glucose 138 (*)     Creatinine 6.8 (*)     Albumin 3.4 (*)     ALT 8 (*)     eGFR if  9 (*)     eGFR if non  8 (*)     All other components within normal limits   TROPONIN I - Abnormal; Notable for the following:     Troponin I 0.039 (*)     All other components within normal limits   B-TYPE NATRIURETIC PEPTIDE - Abnormal; Notable for the following:     BNP 1,122 (*)     All other components within normal limits   LIPASE        All Lab Results:  Results for orders placed or performed during the hospital encounter of 01/25/18   CBC auto differential   Result Value Ref Range    WBC 5.55 3.90 - 12.70 K/uL    RBC 3.30 (L) 4.60 - 6.20 M/uL    Hemoglobin 9.3 (L) 14.0 - 18.0 g/dL    Hematocrit 29.1 (L) 40.0 - 54.0 %    MCV 88 82 - 98 fL    MCH 28.2 27.0 - 31.0 pg    MCHC 32.0 32.0 - 36.0 g/dL    RDW 16.8 (H) 11.5 - 14.5 %    Platelets 168 150 - 350 K/uL    MPV 10.8 9.2 - 12.9 fL    Gran # (ANC) 3.2 1.8 - 7.7 K/uL    Lymph # 1.6 1.0 - 4.8 K/uL    Mono # 0.6 0.3 - 1.0 K/uL    Eos # 0.1 0.0 - 0.5 K/uL    Baso # 0.02 0.00 - 0.20 K/uL    Gran% 57.2 38.0 - 73.0 %    Lymph% 29.5 18.0 - 48.0 %    Mono% 10.6 4.0 - 15.0 %    Eosinophil% 2.3 0.0 - 8.0 %    Basophil% 0.4 0.0 - 1.9 %    Differential Method Automated    Comprehensive metabolic panel   Result Value Ref Range    Sodium 143 136 - 145 mmol/L    Potassium 3.7 3.5 - 5.1 mmol/L    Chloride 98 95 - 110 mmol/L    CO2 33 (H) 23 - 29 mmol/L    Glucose 138 (H) 70 - 110 mg/dL "    BUN, Bld 18 8 - 23 mg/dL    Creatinine 6.8 (H) 0.5 - 1.4 mg/dL    Calcium 8.8 8.7 - 10.5 mg/dL    Total Protein 6.8 6.0 - 8.4 g/dL    Albumin 3.4 (L) 3.5 - 5.2 g/dL    Total Bilirubin 0.9 0.1 - 1.0 mg/dL    Alkaline Phosphatase 69 55 - 135 U/L    AST 15 10 - 40 U/L    ALT 8 (L) 10 - 44 U/L    Anion Gap 12 8 - 16 mmol/L    eGFR if African American 9 (A) >60 mL/min/1.73 m^2    eGFR if non African American 8 (A) >60 mL/min/1.73 m^2   Lipase   Result Value Ref Range    Lipase 23 4 - 60 U/L   Troponin I   Result Value Ref Range    Troponin I 0.039 (H) 0.000 - 0.026 ng/mL   Brain natriuretic peptide   Result Value Ref Range    BNP 1,122 (H) 0 - 99 pg/mL     Imaging Results:  Ordered, reviewed, and independently interpreted by the ED provider.  Study: X-ray Abdomen Flat and Erect  Findings: NAF    The EKG was ordered, reviewed, and independently interpreted by the ED provider.  Interpretation time: 0:06  Rate: 68 BPM  Rhythm: normal sinus rhythm  Interpretation: Nonspecific T wave abnormality. No STEMI.    The Emergency Provider reviewed the vital signs and test results, which are outlined above.    ED Discussion     1:35 AM: Reassessed pt at this time. Pt states his condition has improved at this time. Discussed with pt all pertinent ED information and results. Discussed pt dx and plan of tx. Gave pt all f/u and return to the ED instructions. All questions and concerns were addressed at this time. Pt expresses understanding of information and instructions, and is comfortable with plan to discharge. Pt is stable for discharge.    I discussed with patient and/or family/caretaker that evaluation in the ED does not suggest any emergent or life threatening medical conditions requiring immediate intervention beyond what was provided in the ED, and I believe patient is safe for discharge.  Regardless, an unremarkable evaluation in the ED does not preclude the development or presence of a serious of life threatening condition.  As such, patient was instructed to return immediately for any worsening or change in current symptoms.  ED Medication(s):  Medications   GI cocktail (mylanta 30 mL, lidocaine 2 % viscous 10 mL, dicyclomine 10 mL) 50 mL (50 mLs Oral Given 1/26/18 0159)       Discharge Medication List as of 1/26/2018  1:34 AM          Follow-up Information     Raciel Angela MD In 3 days.    Specialty:  Family Medicine  Contact information:  [address] [facility]  [address]  [facility] [address] [phone]             [facility]    Specialty:  Emergency Medicine  Why:  As needed, If symptoms worsen  Contact information:  [address]  [facility] [address] [phone]                  Medical Decision Making    Medical Decision Making:   Clinical Tests:   Lab Tests: Reviewed and Ordered  Radiological Study: Reviewed and Ordered  Medical Tests: Reviewed and Ordered           Scribe Attestation:   Scribe #1: I performed the above scribed service and the documentation accurately describes the services I performed. I attest to the accuracy of the note.    Attending:   Physician Attestation Statement for Scribe #1: I, Norma Salomon MD, personally performed the services described in this documentation, as scribed by Marisela Johnson, in my presence, and it is both accurate and complete.          Clinical Impression       ICD-10-CM ICD-9-CM   1. Bloated abdomen R14.0 787.3   2. Bloated abdomen R14.0 787.3       Disposition:   Disposition: Discharged  Condition: Stable         Norma Salomon MD  01/29/18 2008

## 2018-01-30 ENCOUNTER — HOSPITAL ENCOUNTER (EMERGENCY)
Facility: HOSPITAL | Age: 68
Discharge: HOME | End: 2018-01-30
Attending: EMERGENCY MEDICINE
Payer: MEDICARE

## 2018-01-30 VITALS
DIASTOLIC BLOOD PRESSURE: 93 MMHG | SYSTOLIC BLOOD PRESSURE: 200 MMHG | WEIGHT: 220 LBS | HEART RATE: 69 BPM | BODY MASS INDEX: 25.45 KG/M2 | TEMPERATURE: 98 F | OXYGEN SATURATION: 99 % | RESPIRATION RATE: 17 BRPM | HEIGHT: 78 IN

## 2018-01-30 DIAGNOSIS — R10.9 ABDOMINAL PAIN: ICD-10-CM

## 2018-01-30 DIAGNOSIS — I10 HYPERTENSION, UNSPECIFIED TYPE: ICD-10-CM

## 2018-01-30 DIAGNOSIS — K52.9 ENTERITIS: Primary | ICD-10-CM

## 2018-01-30 DIAGNOSIS — R07.9 CHEST PAIN: ICD-10-CM

## 2018-01-30 LAB
ALBUMIN SERPL BCP-MCNC: 3.6 G/DL
ALP SERPL-CCNC: 65 U/L
ALT SERPL W/O P-5'-P-CCNC: 8 U/L
ANION GAP SERPL CALC-SCNC: 12 MMOL/L
AST SERPL-CCNC: 15 U/L
BASOPHILS # BLD AUTO: 0.02 K/UL
BASOPHILS NFR BLD: 0.4 %
BILIRUB SERPL-MCNC: 0.7 MG/DL
BNP SERPL-MCNC: 911 PG/ML
BUN SERPL-MCNC: 16 MG/DL
CALCIUM SERPL-MCNC: 8.9 MG/DL
CHLORIDE SERPL-SCNC: 96 MMOL/L
CO2 SERPL-SCNC: 35 MMOL/L
CREAT SERPL-MCNC: 6.2 MG/DL
DIFFERENTIAL METHOD: ABNORMAL
EOSINOPHIL # BLD AUTO: 0.1 K/UL
EOSINOPHIL NFR BLD: 2.1 %
ERYTHROCYTE [DISTWIDTH] IN BLOOD BY AUTOMATED COUNT: 16 %
EST. GFR  (AFRICAN AMERICAN): 10 ML/MIN/1.73 M^2
EST. GFR  (NON AFRICAN AMERICAN): 9 ML/MIN/1.73 M^2
GLUCOSE SERPL-MCNC: 118 MG/DL
HCT VFR BLD AUTO: 27.8 %
HGB BLD-MCNC: 9 G/DL
LIPASE SERPL-CCNC: 20 U/L
LYMPHOCYTES # BLD AUTO: 2 K/UL
LYMPHOCYTES NFR BLD: 37 %
MCH RBC QN AUTO: 28.4 PG
MCHC RBC AUTO-ENTMCNC: 32.4 G/DL
MCV RBC AUTO: 88 FL
MONOCYTES # BLD AUTO: 0.6 K/UL
MONOCYTES NFR BLD: 12.1 %
NEUTROPHILS # BLD AUTO: 2.6 K/UL
NEUTROPHILS NFR BLD: 48.4 %
PLATELET # BLD AUTO: 143 K/UL
PMV BLD AUTO: 10.8 FL
POTASSIUM SERPL-SCNC: 3.7 MMOL/L
PROT SERPL-MCNC: 7 G/DL
RBC # BLD AUTO: 3.17 M/UL
SODIUM SERPL-SCNC: 143 MMOL/L
WBC # BLD AUTO: 5.3 K/UL

## 2018-01-30 PROCEDURE — 93010 ELECTROCARDIOGRAM REPORT: CPT | Mod: ,,, | Performed by: INTERNAL MEDICINE

## 2018-01-30 PROCEDURE — 83690 ASSAY OF LIPASE: CPT

## 2018-01-30 PROCEDURE — 93005 ELECTROCARDIOGRAM TRACING: CPT

## 2018-01-30 PROCEDURE — 99284 EMERGENCY DEPT VISIT MOD MDM: CPT | Mod: 25

## 2018-01-30 PROCEDURE — 83880 ASSAY OF NATRIURETIC PEPTIDE: CPT

## 2018-01-30 PROCEDURE — 85025 COMPLETE CBC W/AUTO DIFF WBC: CPT

## 2018-01-30 PROCEDURE — 25000003 PHARM REV CODE 250: Performed by: EMERGENCY MEDICINE

## 2018-01-30 PROCEDURE — 80053 COMPREHEN METABOLIC PANEL: CPT

## 2018-01-30 RX ORDER — METRONIDAZOLE 500 MG/1
500 TABLET ORAL 3 TIMES DAILY
Qty: 21 TABLET | Refills: 0 | Status: SHIPPED | OUTPATIENT
Start: 2018-01-30 | End: 2018-02-06

## 2018-01-30 RX ORDER — CLONIDINE HYDROCHLORIDE 0.3 MG/1
0.3 TABLET ORAL
Status: COMPLETED | OUTPATIENT
Start: 2018-01-30 | End: 2018-01-30

## 2018-01-30 RX ORDER — CIPROFLOXACIN 500 MG/1
250 TABLET ORAL DAILY
Qty: 20 TABLET | Refills: 0 | Status: SHIPPED | OUTPATIENT
Start: 2018-01-30 | End: 2018-02-09

## 2018-01-30 RX ORDER — METRONIDAZOLE 500 MG/1
500 TABLET ORAL 3 TIMES DAILY
Qty: 21 TABLET | Refills: 0 | Status: SHIPPED | OUTPATIENT
Start: 2018-01-30 | End: 2018-01-30

## 2018-01-30 RX ADMIN — CLONIDINE HYDROCHLORIDE 0.3 MG: 0.3 TABLET ORAL at 02:01

## 2018-01-30 NOTE — ED PROVIDER NOTES
"SCRIBE #1 NOTE: I, Corinne Mack, am scribing for, and in the presence of, aSndy Knutson Do, MD. I have scribed the entire note.      History      Chief Complaint   Patient presents with    Shortness of Breath     "I feel like I have too much fluid" dialysis patient- last session was yesterday. reports shortness of breath and abdominal swelling       Review of patient's allergies indicates:   Allergen Reactions    Augmentin [amoxicillin-pot clavulanate] Edema    Lisinopril      cough    Sulfa (sulfonamide antibiotics)      swelling        HPI   HPI    1/30/2018, 11:48 AM   History obtained from the patient      History of Present Illness: Conner Perez III is a 67 y.o. male patient who presents to the Emergency Department for SOB which onset gradually yesterday. Pt had dialysis yesterday and is on lasix, but states he continues to feel like he "has too much fluid" and SOB. Pt reports it feels like he has "pressure" in his abd. Symptoms are constant and moderate in severity. No mitigating or exacerbating factors reported. No associated sxs reported. Patient denies any fever, chills, CP, N/V/D, back pain, HA, dizziness, and all other sxs at this time. No prior Tx reported outside of the pt's daily medications. Pt has Hx of DM, HTN, and CKD. No further complaints or concerns at this time.       Arrival mode: Personal vehicle      PCP: Raciel Angela MD       Past Medical History:  Past Medical History:   Diagnosis Date    CKD (chronic kidney disease), stage IV     Diabetes mellitus     Diabetes mellitus, type 2     HTN (hypertension)        Past Surgical History:  Past Surgical History:   Procedure Laterality Date    AV FISTULA PLACEMENT Right 06/2017    CIRCUMCISION  1978    TOE AMPUTATION Left 01/24/2017    4th toe         Family History:  Family History   Problem Relation Age of Onset    Hypertension Father     No Known Problems Mother        Social History:  Social History     Social History Main " Topics    Smoking status: Former Smoker     Types: Cigarettes     Quit date: 1971    Smokeless tobacco: Never Used    Alcohol use No    Drug use: No    Sexual activity: Not on file       ROS   Review of Systems   Constitutional: Negative for chills and fever.   Respiratory: Positive for shortness of breath. Negative for cough.    Cardiovascular: Negative for chest pain and leg swelling.   Gastrointestinal: Positive for abdominal pain (pressure). Negative for diarrhea, nausea and vomiting.   Musculoskeletal: Negative for back pain, neck pain and neck stiffness.   Skin: Negative for rash and wound.   Neurological: Negative for dizziness, light-headedness, numbness and headaches.   All other systems reviewed and are negative.    Physical Exam      Initial Vitals [01/30/18 1130]   BP Pulse Resp Temp SpO2   (!) 154/77 69 20 97.7 °F (36.5 °C) 97 %      MAP       102.67          Physical Exam  Nursing Notes and Vital Signs Reviewed.  Constitutional: Patient is in no apparent distress. Well-developed and well-nourished.  Head: Atraumatic. Normocephalic.  Eyes: PERRL. EOM intact. Conjunctivae are not pale. No scleral icterus.  ENT: Mucous membranes are moist. Oropharynx is clear and symmetric.    Neck: Supple. Full ROM. No lymphadenopathy.  Cardiovascular: Regular rate. Regular rhythm. No murmurs, rubs, or gallops. Distal pulses are 2+ and symmetric.  Pulmonary/Chest: No respiratory distress. Clear to auscultation bilaterally. No wheezing or rales.  Abdominal: Soft and non-distended.  There is no tenderness.  No rebound, guarding, or rigidity.  Musculoskeletal: Moves all extremities. No obvious deformities. No pedal edema.   Skin: Warm and dry.  Neurological:  Alert, awake, and appropriate.  Normal speech.  No acute focal neurological deficits are appreciated.  Psychiatric: Normal affect. Good eye contact. Appropriate in content.    ED Course    Procedures  ED Vital Signs:  Vitals:    01/30/18 1130 01/30/18 1150   BP:  "(!) 154/77    Pulse: 69 70   Resp: 20    Temp: 97.7 °F (36.5 °C)    SpO2: 97%    Weight: 99.8 kg (220 lb)    Height: 6' 6" (1.981 m)        Abnormal Lab Results:  Labs Reviewed   CBC W/ AUTO DIFFERENTIAL - Abnormal; Notable for the following:        Result Value    RBC 3.17 (*)     Hemoglobin 9.0 (*)     Hematocrit 27.8 (*)     RDW 16.0 (*)     Platelets 143 (*)     All other components within normal limits   COMPREHENSIVE METABOLIC PANEL - Abnormal; Notable for the following:     CO2 35 (*)     Glucose 118 (*)     Creatinine 6.2 (*)     ALT 8 (*)     eGFR if  10 (*)     eGFR if non  9 (*)     All other components within normal limits   B-TYPE NATRIURETIC PEPTIDE - Abnormal; Notable for the following:      (*)     All other components within normal limits   LIPASE        All Lab Results:  Results for orders placed or performed during the hospital encounter of 01/30/18   CBC auto differential   Result Value Ref Range    WBC 5.30 3.90 - 12.70 K/uL    RBC 3.17 (L) 4.60 - 6.20 M/uL    Hemoglobin 9.0 (L) 14.0 - 18.0 g/dL    Hematocrit 27.8 (L) 40.0 - 54.0 %    MCV 88 82 - 98 fL    MCH 28.4 27.0 - 31.0 pg    MCHC 32.4 32.0 - 36.0 g/dL    RDW 16.0 (H) 11.5 - 14.5 %    Platelets 143 (L) 150 - 350 K/uL    MPV 10.8 9.2 - 12.9 fL    Gran # (ANC) 2.6 1.8 - 7.7 K/uL    Lymph # 2.0 1.0 - 4.8 K/uL    Mono # 0.6 0.3 - 1.0 K/uL    Eos # 0.1 0.0 - 0.5 K/uL    Baso # 0.02 0.00 - 0.20 K/uL    Gran% 48.4 38.0 - 73.0 %    Lymph% 37.0 18.0 - 48.0 %    Mono% 12.1 4.0 - 15.0 %    Eosinophil% 2.1 0.0 - 8.0 %    Basophil% 0.4 0.0 - 1.9 %    Differential Method Automated    Comprehensive metabolic panel   Result Value Ref Range    Sodium 143 136 - 145 mmol/L    Potassium 3.7 3.5 - 5.1 mmol/L    Chloride 96 95 - 110 mmol/L    CO2 35 (H) 23 - 29 mmol/L    Glucose 118 (H) 70 - 110 mg/dL    BUN, Bld 16 8 - 23 mg/dL    Creatinine 6.2 (H) 0.5 - 1.4 mg/dL    Calcium 8.9 8.7 - 10.5 mg/dL    Total Protein 7.0 6.0 " - 8.4 g/dL    Albumin 3.6 3.5 - 5.2 g/dL    Total Bilirubin 0.7 0.1 - 1.0 mg/dL    Alkaline Phosphatase 65 55 - 135 U/L    AST 15 10 - 40 U/L    ALT 8 (L) 10 - 44 U/L    Anion Gap 12 8 - 16 mmol/L    eGFR if African American 10 (A) >60 mL/min/1.73 m^2    eGFR if non African American 9 (A) >60 mL/min/1.73 m^2   B-Type natriuretic peptide (BNP)   Result Value Ref Range     (H) 0 - 99 pg/mL   Lipase   Result Value Ref Range    Lipase 20 4 - 60 U/L       Imaging Results:  Imaging Results          X-Ray Abdomen Flat And Erect (Final result)  Result time 01/30/18 12:39:01    Final result by Santo Merchant MD (01/30/18 12:39:01)                 Impression:     Mild mucosal thickening is seen within prominent loops of small bowel within the left upper quadrant. Findings could reflect inflammatory or infectious enteritis however nonspecific.      Electronically signed by: SANTO MERCHANT MD  Date:     01/30/18  Time:    12:39              Narrative:    History: Abdominal pain    Comparison: 1/26/18    Result: 3 view abdomen    Mild mucosal thickening is seen within prominent loops of small bowel within the left upper quadrant. Air-filled loops are seen throughout the abdomen. Mild constipation is seen within the colon. Findings could reflect inflammatory or infectious enteritis however nonspecific. Nonobstructive bowel gas pattern is noted. No radiopaque kidney calculus is identified.  There are multiple calcifications in the pelvis most consistent with phleboliths.  No evidence of organomegaly.  The bones demonstrate mild degenerative changes within the lower lumbar region.  The bones are otherwise intact.                             X-Ray Chest PA And Lateral (Final result)  Result time 01/30/18 12:37:37    Final result by Santo Merchant MD (01/30/18 12:37:37)                 Impression:       No acute process seen.      Electronically signed by: SANTO MERCHANT MD  Date:     01/30/18  Time:    12:37               Narrative:    Clinical Data:Chest pain    Comparison:  1/21/18    Findings:  Single view of the chest.      Cardiac silhouette is borderline enlarged but stable.  The lungs demonstrate no evidence of active disease.  No evidence of pleural effusion or pneumothorax.  Bones appear intact.                               The EKG was ordered, reviewed, and independently interpreted by the ED provider.  Interpretation time: 1146  Rate: 70 BPM  Rhythm: normal sinus rhythm  Interpretation: Nonspecific T wave abnormality. No STEMI.           The Emergency Provider reviewed the vital signs and test results, which are outlined above.    ED Discussion     2:04 PM: Reassessed pt at this time. Pt is awake, alert, and in no distress. Discussed with pt all pertinent ED information and results. Discussed pt dx and plan of tx. Gave pt all f/u and return to the ED instructions. All questions and concerns were addressed at this time. Pt expresses understanding of information and instructions, and is comfortable with plan to discharge. Pt is stable for discharge.    I discussed with patient and/or family/caretaker that evaluation in the ED does not suggest any emergent or life threatening medical conditions requiring immediate intervention beyond what was provided in the ED, and I believe patient is safe for discharge.  Regardless, an unremarkable evaluation in the ED does not preclude the development or presence of a serious of life threatening condition. As such, patient was instructed to return immediately for any worsening or change in current symptoms.      ED Medication(s):  Medications   cloNIDine tablet 0.3 mg (not administered)       New Prescriptions    CIPROFLOXACIN HCL (CIPRO) 500 MG TABLET    Take 0.5 tablets (250 mg total) by mouth once daily.    METRONIDAZOLE (FLAGYL) 500 MG TABLET    Take 1 tablet (500 mg total) by mouth 3 (three) times daily.       Follow-up Information     Raciel Angela MD In 2 days.    Specialty:   Family Medicine  Contact information:  [address]   [address]  [place] [address] [phone]                     Medical Decision Making    Medical Decision Making:   Clinical Tests:   Lab Tests: Reviewed and Ordered  Radiological Study: Ordered and Reviewed  Medical Tests: Ordered and Reviewed           Scribe Attestation:   Scribe #1: I performed the above scribed service and the documentation accurately describes the services I performed. I attest to the accuracy of the note.    Attending:   Physician Attestation Statement for Scribe #1: I, Sandy Knutson Do, MD, personally performed the services described in this documentation, as scribed by Corinne Mack, in my presence, and it is both accurate and complete.          Clinical Impression       ICD-10-CM ICD-9-CM   1. Enteritis K52.9 558.9   2. Chest pain R07.9 786.50   3. Abdominal pain R10.9 789.00   4. Hypertension, unspecified type I10 401.9       Disposition:   Disposition: Discharged  Condition: Stable         Sandy Knutson Do, MD  01/30/18 8546

## 2018-02-01 ENCOUNTER — HOSPITAL ENCOUNTER (EMERGENCY)
Facility: HOSPITAL | Age: 68
Discharge: HOME | End: 2018-02-01
Attending: EMERGENCY MEDICINE
Payer: MEDICARE

## 2018-02-01 VITALS
TEMPERATURE: 99 F | HEIGHT: 78 IN | HEART RATE: 66 BPM | OXYGEN SATURATION: 98 % | BODY MASS INDEX: 25.45 KG/M2 | SYSTOLIC BLOOD PRESSURE: 210 MMHG | WEIGHT: 220 LBS | RESPIRATION RATE: 20 BRPM | DIASTOLIC BLOOD PRESSURE: 98 MMHG

## 2018-02-01 DIAGNOSIS — R07.9 CHEST PAIN, UNSPECIFIED TYPE: ICD-10-CM

## 2018-02-01 DIAGNOSIS — Z79.4 CONTROLLED TYPE 2 DIABETES MELLITUS WITH DIABETIC NEPHROPATHY, WITH LONG-TERM CURRENT USE OF INSULIN: ICD-10-CM

## 2018-02-01 DIAGNOSIS — I10 HYPERTENSION: ICD-10-CM

## 2018-02-01 DIAGNOSIS — N04.9 ANASARCA ASSOCIATED WITH DISORDER OF KIDNEY: ICD-10-CM

## 2018-02-01 DIAGNOSIS — E11.21 CONTROLLED TYPE 2 DIABETES MELLITUS WITH DIABETIC NEPHROPATHY, WITH LONG-TERM CURRENT USE OF INSULIN: ICD-10-CM

## 2018-02-01 DIAGNOSIS — R10.84 GENERALIZED ABDOMINAL PAIN: ICD-10-CM

## 2018-02-01 DIAGNOSIS — N18.6 ESRD (END STAGE RENAL DISEASE): Primary | ICD-10-CM

## 2018-02-01 DIAGNOSIS — I50.33 ACUTE ON CHRONIC DIASTOLIC HEART FAILURE: ICD-10-CM

## 2018-02-01 DIAGNOSIS — K82.8 SLUDGE IN GALLBLADDER: ICD-10-CM

## 2018-02-01 PROCEDURE — 93010 ELECTROCARDIOGRAM REPORT: CPT | Mod: ,,, | Performed by: INTERNAL MEDICINE

## 2018-02-01 PROCEDURE — 25000003 PHARM REV CODE 250: Performed by: EMERGENCY MEDICINE

## 2018-02-01 PROCEDURE — 99284 EMERGENCY DEPT VISIT MOD MDM: CPT | Mod: 25

## 2018-02-01 PROCEDURE — 96372 THER/PROPH/DIAG INJ SC/IM: CPT

## 2018-02-01 PROCEDURE — 93005 ELECTROCARDIOGRAM TRACING: CPT

## 2018-02-01 PROCEDURE — 63600175 PHARM REV CODE 636 W HCPCS: Performed by: EMERGENCY MEDICINE

## 2018-02-01 RX ORDER — HYDRALAZINE HYDROCHLORIDE 20 MG/ML
20 INJECTION INTRAMUSCULAR; INTRAVENOUS
Status: DISCONTINUED | OUTPATIENT
Start: 2018-02-01 | End: 2018-02-01 | Stop reason: HOSPADM

## 2018-02-01 RX ORDER — CLONIDINE HYDROCHLORIDE 0.2 MG/1
0.2 TABLET ORAL
Status: COMPLETED | OUTPATIENT
Start: 2018-02-01 | End: 2018-02-01

## 2018-02-01 RX ORDER — DICYCLOMINE HYDROCHLORIDE 10 MG/ML
20 INJECTION INTRAMUSCULAR
Status: COMPLETED | OUTPATIENT
Start: 2018-02-01 | End: 2018-02-01

## 2018-02-01 RX ORDER — FAMOTIDINE 20 MG/1
20 TABLET, FILM COATED ORAL
Status: COMPLETED | OUTPATIENT
Start: 2018-02-01 | End: 2018-02-01

## 2018-02-01 RX ADMIN — DICYCLOMINE HYDROCHLORIDE 20 MG: 10 INJECTION INTRAMUSCULAR at 03:02

## 2018-02-01 RX ADMIN — CLONIDINE HYDROCHLORIDE 0.2 MG: 0.2 TABLET ORAL at 05:02

## 2018-02-01 RX ADMIN — LIDOCAINE HYDROCHLORIDE 50 ML: 20 SOLUTION ORAL; TOPICAL at 03:02

## 2018-02-01 RX ADMIN — FAMOTIDINE 20 MG: 20 TABLET, FILM COATED ORAL at 03:02

## 2018-02-01 NOTE — ED PROVIDER NOTES
SCRIBE #1 NOTE: I, Vicente Arvizu, am scribing for, and in the presence of, Yoana Mondragon MD. I have scribed the entire note.     SCRIBE #2 NOTE: I, Corinne Mack, am scribing for, and in the presence of,  Han Ingram MD. I have scribed the remaining portions of the note not scribed by Scribe #1.     History      Chief Complaint   Patient presents with    Abdominal Pain     pt states he was here last night and dx with enteritis and is now feeling worse       Review of patient's allergies indicates:   Allergen Reactions    Augmentin [amoxicillin-pot clavulanate] Edema    Lisinopril      cough    Sulfa (sulfonamide antibiotics)      swelling        HPI   HPI    2/1/2018, 2:55 PM   History obtained from the patient      History of Present Illness: Conner Perez III is a 67 y.o. male patient who presents to the Emergency Department for ABD distension which onset gradually last night. Distension located to upper ABD and is described as bloating and pressure. Sxs are constant and moderate in severity. Pt was seen in ED 2 days ago and Dx with enteritis. There are no mitigating or exacerbating factors noted. Pt denies any fever, N/V/D, CP, blood in stool, dysuria, hematuria, constipation, HA, and all other sxs at this time. Prior tx includes CIPRO and FLAGYL as Rx. No further complaints or concerns at this time.       Arrival mode: Personal vehicle      PCP: Raciel Angela MD       Past Medical History:  Past Medical History:   Diagnosis Date    CKD (chronic kidney disease), stage IV     Diabetes mellitus     Diabetes mellitus, type 2     HTN (hypertension)        Past Surgical History:  Past Surgical History:   Procedure Laterality Date    AV FISTULA PLACEMENT Right 06/2017    CIRCUMCISION  1978    TOE AMPUTATION Left 01/24/2017    4th toe         Family History:  Family History   Problem Relation Age of Onset    Hypertension Father     No Known Problems Mother        Social History:  Social  History     Social History Main Topics    Smoking status: Former Smoker     Types: Cigarettes     Quit date: 1971    Smokeless tobacco: Never Used    Alcohol use No    Drug use: No    Sexual activity: Not on file       ROS   Review of Systems   Constitutional: Negative for fever.   HENT: Negative for sore throat.    Respiratory: Negative for shortness of breath.    Cardiovascular: Negative for chest pain.   Gastrointestinal: Positive for abdominal distention. Negative for abdominal pain, blood in stool, constipation, diarrhea, nausea and vomiting.   Genitourinary: Negative for dysuria.   Musculoskeletal: Negative for back pain.   Skin: Negative for rash.   Neurological: Negative for weakness.   Hematological: Does not bruise/bleed easily.     Physical Exam      Initial Vitals [02/01/18 1406]   BP Pulse Resp Temp SpO2   (!) 188/91 69 20 98.6 °F (37 °C) 98 %      MAP       123.33          Physical Exam  Nursing Notes and Vital Signs Reviewed.  Constitutional: Patient is in no acute distress. Well-developed and well-nourished.  Head: Atraumatic. Normocephalic.  Eyes: PERRL. EOM intact. Conjunctivae are not pale. No scleral icterus.  ENT: Mucous membranes are moist. Oropharynx is clear and symmetric.    Neck: Supple. Full ROM. No lymphadenopathy.  Cardiovascular: Regular rate. Regular rhythm. No murmurs, rubs, or gallops. Distal pulses are 2+ and symmetric.  Pulmonary/Chest: No respiratory distress. Clear to auscultation bilaterally. No wheezing or rales.  Abdominal: Soft and non-distended.  There is no tenderness.  No rebound, guarding, or rigidity. Good bowel sounds.  Genitourinary: No CVA tenderness  Musculoskeletal: Moves all extremities. No obvious deformities. No edema. No calf tenderness.  Skin: Warm and dry.  Neurological:  Alert, awake, and appropriate.  Normal speech.  No acute focal neurological deficits are appreciated.  Psychiatric: Normal affect. Good eye contact. Appropriate in content.    ED Course   "  Procedures  ED Vital Signs:  Vitals:    02/01/18 1406 02/01/18 1531 02/01/18 1701   BP: (!) 188/91 (!) 201/95 (!) 210/98   Pulse: 69 64 66   Resp: 20 12 20   Temp: 98.6 °F (37 °C)     TempSrc: Oral     SpO2: 98%     Weight: 99.8 kg (220 lb 0.3 oz)     Height: 6' 6" (1.981 m)         Abnormal Lab Results:  Labs Reviewed - No data to display     All Lab Results:  None    Imaging Results:  Imaging Results          CT Abdomen Pelvis  Without Contrast (Final result)  Result time 02/01/18 16:11:47    Final result by Tito Amaya III, MD (02/01/18 16:11:47)                 Impression:           1.  Diffuse body wall edema, mesenteric stranding and mild pelvic ascites suggesting anasarca.  2.  Distended gallbladder with gallbladder sludge.  No calcified gallstones or surrounding inflammatory change identified.  Correlation with ultrasound may be of benefit if clinically indicated.  No other evidence of acute disease in the abdomen or pelvis.        Electronically signed by: TITO AMAYA MD  Date:     02/01/18  Time:    16:11              Narrative:    CT ABDOMEN PELVIS WITHOUT CONTRAST    Clinical Indication: Abdominal pain, unspecified .      Technique:  Axial images through the abdomen and pelvis were obtained without IV contrast. <Coronal reconstructions are provided for review.> All CT scans at this facility use dose modulation, iterative reconstruction, and/or weight based dosing when appropriate to reduce radiation dose to as low as reasonably achievable.    Comparison: Abdominal CT 04/07/2017    FINDINGS: No acute disease is seen in the lung bases. No acute osseous abnormality or suspicious bone marrow lesion identified.       There is no evidence of urolithiasis or obstructive uropathy. Bladder is incompletely distended with a stable minimal bladder wall thickening.    There is mild diffuse body wall edema and central mesenteric stranding suggesting anasarca.  There is a small amount of pelvic ascites.  There " is no evidence of bowel obstruction, acute inflammatory process or other acute bowel pathology. The stomach is within normal limits.  There is no evidence of appendicitis.  The liver, pancreas, adrenals and spleen are unremarkable for noncontrast technique. The gallbladder is distended filled with sludge.  No calcified gallstones identified.  No surrounding inflammatory changes identified.  There is no significant biliary ductal dilation. No aortic aneurysm identified. No free intraperitoneal air or abscess identified. No pathologically enlarged lymph nodes are identified.                                      The EKG was ordered, reviewed, and independently interpreted by the ED provider.  Interpretation time: 15:06  Rate: 68 BPM  Rhythm: normal sinus rhythm  Interpretation: Nonspecific ST and T wave abnormality. Prolonged QT. No STEMI.    The Emergency Provider reviewed the vital signs and test results, which are outlined above.    ED Discussion     3:01 PM: Pt refusing lab workup or IV.    3:57 PM: Dr. Mondragon transfers care of pt to Dr. Ingram, pending imaging results.    4:28 PM: Re-evaluated pt. Pt is resting comfortably and is in no acute distress.  Pt is aggravated with the lack of diagnosis. Pt states his pain is not improved.  D/w pt all pertinent results. D/w pt any concerns expressed at this time. Answered all questions. Pt expresses understanding at this time.    4:48 PM: Reassessed pt at this time. Pt is awake, alert, and in no distress. Discussed with pt all pertinent ED information and results. Discussed pt dx and plan of tx. Gave pt all f/u and return to the ED instructions. All questions and concerns were addressed at this time. Pt expresses understanding of information and instructions, and is comfortable with plan to discharge. Pt is stable for discharge.    I discussed with patient and/or family/caretaker that evaluation in the ED does not suggest any emergent or life threatening medical  conditions requiring immediate intervention beyond what was provided in the ED, and I believe patient is safe for discharge.  Regardless, an unremarkable evaluation in the ED does not preclude the development or presence of a serious of life threatening condition. As such, patient was instructed to return immediately for any worsening or change in current symptoms.      ED Medication(s):  Medications   hydrALAZINE injection 20 mg (not administered)   famotidine tablet 20 mg (20 mg Oral Given 2/1/18 1510)   GI cocktail (mylanta 30 mL, lidocaine 2 % viscous 10 mL, dicyclomine 10 mL) 50 mL (50 mLs Oral Given 2/1/18 1512)   dicyclomine injection 20 mg (20 mg Intramuscular Given 2/1/18 1512)   cloNIDine tablet 0.2 mg (0.2 mg Oral Given 2/1/18 1713)       New Prescriptions    No medications on file       Follow-up Information     Schedule an appointment as soon as possible for a visit  with Louis O. Jeansonne, MD.    Specialties:  General Surgery, Surgery  Contact information:  [address] [place] [address] [address] [place] [phone]                     Medical Decision Making    Medical Decision Making:   Clinical Tests:   Radiological Study: Reviewed and Ordered           Scribe Attestation:   Scribe #1: I performed the above scribed service and the documentation accurately describes the services I performed. I attest to the accuracy of the note.    Attending:   Physician Attestation Statement for Scribe #1: I, Yoana Mondragon MD, personally performed the services described in this documentation, as scribed by Vicente Arvizu, in my presence, and it is both accurate and complete.       Scribe Attestation:   Scribe #2: I performed the above scribed service and the documentation accurately describes the services I performed. I attest to the accuracy of the note.    Attending Attestation:           Physician Attestation for Scribe:    Physician Attestation Statement for Scribe #2: I, Han Ingram MD,  reviewed documentation, as scribed by Corinne Mack in my presence, and it is both accurate and complete. I also acknowledge and confirm the content of the note done by Scribe #1.          Clinical Impression       ICD-10-CM ICD-9-CM   1. ESRD (end stage renal disease) N18.6 585.6   2. Hypertension I10 401.9   3. Chest pain, unspecified type R07.9 786.50   4. Controlled type 2 diabetes mellitus with diabetic nephropathy, with long-term current use of insulin E11.21 250.40    Z79.4 583.81     V58.67   5. Acute on chronic diastolic heart failure I50.33 428.33   6. Generalized abdominal pain R10.84 789.07   7. Anasarca associated with disorder of kidney N04.9 581.9   8. Sludge in gallbladder K82.8 575.8       Disposition:   Disposition: Discharged  Condition: Stable         Han Ingram MD  02/01/18 0569

## 2018-02-13 ENCOUNTER — HOSPITAL ENCOUNTER (EMERGENCY)
Facility: HOSPITAL | Age: 68
Discharge: HOME | End: 2018-02-13
Attending: EMERGENCY MEDICINE
Payer: MEDICARE

## 2018-02-13 VITALS
RESPIRATION RATE: 18 BRPM | DIASTOLIC BLOOD PRESSURE: 107 MMHG | HEART RATE: 72 BPM | BODY MASS INDEX: 26.84 KG/M2 | HEIGHT: 78 IN | WEIGHT: 231.94 LBS | SYSTOLIC BLOOD PRESSURE: 222 MMHG | TEMPERATURE: 98 F | OXYGEN SATURATION: 96 %

## 2018-02-13 DIAGNOSIS — K59.00 CONSTIPATION: ICD-10-CM

## 2018-02-13 PROCEDURE — 99283 EMERGENCY DEPT VISIT LOW MDM: CPT

## 2018-02-14 NOTE — ED PROVIDER NOTES
SCRIBE #1 NOTE: I, Maury Draper, am scribing for, and in the presence of, Alcon Henderson MD. I have scribed the entire note.      History      Chief Complaint   Patient presents with    Constipation       Review of patient's allergies indicates:   Allergen Reactions    Augmentin [amoxicillin-pot clavulanate] Edema    Lisinopril      cough    Sulfa (sulfonamide antibiotics)      swelling        HPI   HPI    2/13/2018, 7:22 PM   History obtained from the patient      History of Present Illness: Conner Perez III is a 67 y.o. male patient who presents to the Emergency Department for an evaluation of constipation which onset gradually x1 week ago. Pt reports he has only been able to move a small amount of stool over the last week with his last BM being this morning. Pt denies relief from MiraLax, stool softeners, and lactulose which prompted him to come in for an evaluation. Pt report she has had similar sx in the past which were resolved by having an enema done in the ED. Pt states he attempted an enema at home, but denies relief. Symptoms are constant and moderate in severity. Exacerbated by nothing and relieved by nothing. Patient denies any fever, chills, N/V/D, abd pain, blood in stool, and all other sxs at this time. No further complaints or concerns at this time.       Arrival mode: Personal vehicle    PCP: Raciel Angela MD       Past Medical History:  Past Medical History:   Diagnosis Date    CKD (chronic kidney disease), stage IV     Diabetes mellitus     Diabetes mellitus, type 2     HTN (hypertension)        Past Surgical History:  Past Surgical History:   Procedure Laterality Date    AV FISTULA PLACEMENT Right 06/2017    CIRCUMCISION  1978    TOE AMPUTATION Left 01/24/2017    4th toe         Family History:  Family History   Problem Relation Age of Onset    Hypertension Father     No Known Problems Mother        Social History:  Social History     Social History Main Topics    Smoking  status: Former Smoker     Types: Cigarettes     Quit date: 1971    Smokeless tobacco: Never Used    Alcohol use No    Drug use: No    Sexual activity: Unknonw       ROS   Review of Systems   Constitutional: Negative for chills and fever.   HENT: Negative for congestion, sore throat and trouble swallowing.    Respiratory: Negative for cough and shortness of breath.    Cardiovascular: Negative for chest pain.   Gastrointestinal: Positive for constipation. Negative for abdominal pain, diarrhea, nausea and vomiting.   Genitourinary: Negative for dysuria, frequency, hematuria and urgency.   Musculoskeletal: Negative for back pain.   Skin: Negative for rash.   Neurological: Negative for weakness and headaches.   Hematological: Does not bruise/bleed easily.     Physical Exam      Initial Vitals [02/13/18 1916]   BP Pulse Resp Temp SpO2   (!) 219/104 74 18 97.6 °F (36.4 °C) 97 %      MAP       142.33          Physical Exam  Nursing Notes and Vital Signs Reviewed.  Constitutional: Patient is in no acute distress. Well-developed and well-nourished.  Head: Atraumatic. Normocephalic.  Eyes: PERRL. EOM intact. Conjunctivae are not pale. No scleral icterus.  ENT: Mucous membranes are moist. Oropharynx is clear and symmetric.    Neck: Supple. Full ROM. No lymphadenopathy.  Cardiovascular: Regular rate. Regular rhythm.  Pulmonary/Chest: No respiratory distress.  Abdominal: Soft and non-distended.  There is no tenderness.  No rebound, guarding, or rigidity. Good bowel sounds.  Musculoskeletal: Moves all extremities. No obvious deformities. Dialysis access noted to right forearm.   Skin: Warm and dry.  Neurological:  Alert, awake, and appropriate.  Normal speech.  No acute focal neurological deficits are appreciated.  Psychiatric: Normal affect. Good eye contact. Appropriate in content.    ED Course    Procedures  ED Vital Signs:  Vitals:    02/13/18 1916 02/13/18 2017 02/13/18 2102   BP: (!) 219/104 (!) 214/104 (!) 222/107  "  Pulse: 74 74 72   Resp: 18     Temp: 97.6 °F (36.4 °C)     TempSrc: Oral     SpO2: 97% 97% 96%   Weight: 105.2 kg (231 lb 14.8 oz)     Height: 6' 6" (1.981 m)         Abnormal Lab Results:  Labs Reviewed - No data to display     All Lab Results:  Results for orders placed or performed during the hospital encounter of 01/30/18   CBC auto differential   Result Value Ref Range    WBC 5.30 3.90 - 12.70 K/uL    RBC 3.17 (L) 4.60 - 6.20 M/uL    Hemoglobin 9.0 (L) 14.0 - 18.0 g/dL    Hematocrit 27.8 (L) 40.0 - 54.0 %    MCV 88 82 - 98 fL    MCH 28.4 27.0 - 31.0 pg    MCHC 32.4 32.0 - 36.0 g/dL    RDW 16.0 (H) 11.5 - 14.5 %    Platelets 143 (L) 150 - 350 K/uL    MPV 10.8 9.2 - 12.9 fL    Gran # (ANC) 2.6 1.8 - 7.7 K/uL    Lymph # 2.0 1.0 - 4.8 K/uL    Mono # 0.6 0.3 - 1.0 K/uL    Eos # 0.1 0.0 - 0.5 K/uL    Baso # 0.02 0.00 - 0.20 K/uL    Gran% 48.4 38.0 - 73.0 %    Lymph% 37.0 18.0 - 48.0 %    Mono% 12.1 4.0 - 15.0 %    Eosinophil% 2.1 0.0 - 8.0 %    Basophil% 0.4 0.0 - 1.9 %    Differential Method Automated    Comprehensive metabolic panel   Result Value Ref Range    Sodium 143 136 - 145 mmol/L    Potassium 3.7 3.5 - 5.1 mmol/L    Chloride 96 95 - 110 mmol/L    CO2 35 (H) 23 - 29 mmol/L    Glucose 118 (H) 70 - 110 mg/dL    BUN, Bld 16 8 - 23 mg/dL    Creatinine 6.2 (H) 0.5 - 1.4 mg/dL    Calcium 8.9 8.7 - 10.5 mg/dL    Total Protein 7.0 6.0 - 8.4 g/dL    Albumin 3.6 3.5 - 5.2 g/dL    Total Bilirubin 0.7 0.1 - 1.0 mg/dL    Alkaline Phosphatase 65 55 - 135 U/L    AST 15 10 - 40 U/L    ALT 8 (L) 10 - 44 U/L    Anion Gap 12 8 - 16 mmol/L    eGFR if African American 10 (A) >60 mL/min/1.73 m^2    eGFR if non African American 9 (A) >60 mL/min/1.73 m^2   B-Type natriuretic peptide (BNP)   Result Value Ref Range     (H) 0 - 99 pg/mL   Lipase   Result Value Ref Range    Lipase 20 4 - 60 U/L         Imaging Results:  Imaging Results          X-Ray Abdomen AP 1 View (Final result)  Result time 02/13/18 20:13:39    Final " result by Stefanie Cantrell MD (Timothy) (02/13/18 20:13:39)                 Impression:     Nonobstructive bowel gas pattern.      Electronically signed by: STEFANIE CANTRELL MD  Date:     02/13/18  Time:    20:13              Narrative:    Abdomen, one view    Clinical history: abdominal pain    Findings: Nonspecific, nonobstructed bowel gas pattern. There is an average amount of stool present.  The scattered gas in the small bowel.  No significant dilatation.  There are no unusual calcifications. The bones are unremarkable. There is no intraperitoneal free air. The lung bases are clear.                                      The Emergency Provider reviewed the vital signs and test results, which are outlined above.    ED Discussion     9:05 PM: Re-evaluated pt. Pt was given dosage instructions for lactulose that pt has at home to help him have a BM. I informed pt that based on his x-ray pt has a moderate amount of stool in his colon and I do not believe an enema would be appropriate treatment at this time. I will d/c pt home and advise him to f/u with PCP as out pt     9:08 PM: Reassessed pt at this time. Pt is awake, alert, and in NAD. Discussed with pt all pertinent ED information and results. Discussed pt dx and plan of tx. Gave pt all f/u and return to the ED instructions. All questions and concerns were addressed at this time. Pt expresses understanding of information and instructions, and is comfortable with plan to discharge. Pt is stable for discharge.    I discussed with patient and/or family/caretaker that evaluation in the ED does not suggest any emergent or life threatening medical conditions requiring immediate intervention beyond what was provided in the ED, and I believe patient is safe for discharge.  Regardless, an unremarkable evaluation in the ED does not preclude the development or presence of a serious of life threatening condition. As such, patient was instructed to return immediately for any  worsening or change in current symptoms.      ED Medication(s):  Medications - No data to display    Discharge Medication List as of 2/13/2018  9:05 PM          Follow-up Information     Raciel Angela MD In 1 day.    Specialty:  Family Medicine  Why:  Patient should return to the ED for any concerns or worsening of condition.  Contact information:  [address]  [place] [id number] [phone]                     Medical Decision Making              Scribe Attestation:   Scribe #1: I performed the above scribed service and the documentation accurately describes the services I performed. I attest to the accuracy of the note.    Attending:   Physician Attestation Statement for Scribe #1: I, Alcon Henderson MD, personally performed the services described in this documentation, as scribed by Maury Draper, in my presence, and it is both accurate and complete.          Clinical Impression       ICD-10-CM ICD-9-CM   1. Constipation K59.00 564.00               Alcon Henderson MD  02/14/18 0045

## 2018-02-14 NOTE — ED NOTES
In bed resting, VSS,aaox3,skin warm dry to touch,equal bilateral clear lung sounds,side rails up x 2,bed locked and in low position, oriented to surroundings, instructed to call with any needs.

## 2018-03-04 ENCOUNTER — HOSPITAL ENCOUNTER (OUTPATIENT)
Facility: HOSPITAL | Age: 68
Discharge: HOME | End: 2018-03-04
Attending: EMERGENCY MEDICINE | Admitting: HOSPITALIST
Payer: MEDICARE

## 2018-03-04 VITALS
OXYGEN SATURATION: 98 % | TEMPERATURE: 98 F | WEIGHT: 228 LBS | HEIGHT: 78 IN | RESPIRATION RATE: 18 BRPM | SYSTOLIC BLOOD PRESSURE: 142 MMHG | HEART RATE: 54 BPM | BODY MASS INDEX: 26.38 KG/M2 | DIASTOLIC BLOOD PRESSURE: 66 MMHG

## 2018-03-04 DIAGNOSIS — N18.6 ESRD (END STAGE RENAL DISEASE) ON DIALYSIS: ICD-10-CM

## 2018-03-04 DIAGNOSIS — Z99.2 ESRD (END STAGE RENAL DISEASE) ON DIALYSIS: ICD-10-CM

## 2018-03-04 DIAGNOSIS — I16.0 HYPERTENSIVE URGENCY: Primary | ICD-10-CM

## 2018-03-04 DIAGNOSIS — N18.6 ESRD (END STAGE RENAL DISEASE): ICD-10-CM

## 2018-03-04 LAB
ALBUMIN SERPL BCP-MCNC: 3.4 G/DL
ALP SERPL-CCNC: 60 U/L
ALT SERPL W/O P-5'-P-CCNC: 7 U/L
ANION GAP SERPL CALC-SCNC: 12 MMOL/L
AST SERPL-CCNC: 16 U/L
BACTERIA #/AREA URNS HPF: ABNORMAL /HPF
BASOPHILS # BLD AUTO: 0.01 K/UL
BASOPHILS NFR BLD: 0.2 %
BILIRUB SERPL-MCNC: 0.4 MG/DL
BILIRUB UR QL STRIP: NEGATIVE
BUN SERPL-MCNC: 16 MG/DL
CALCIUM SERPL-MCNC: 8.8 MG/DL
CHLORIDE SERPL-SCNC: 98 MMOL/L
CLARITY UR: CLEAR
CO2 SERPL-SCNC: 31 MMOL/L
COLOR UR: YELLOW
CREAT SERPL-MCNC: 6.9 MG/DL
DIFFERENTIAL METHOD: ABNORMAL
EOSINOPHIL # BLD AUTO: 0.1 K/UL
EOSINOPHIL NFR BLD: 2.3 %
ERYTHROCYTE [DISTWIDTH] IN BLOOD BY AUTOMATED COUNT: 14.5 %
EST. GFR  (AFRICAN AMERICAN): 9 ML/MIN/1.73 M^2
EST. GFR  (NON AFRICAN AMERICAN): 7 ML/MIN/1.73 M^2
ESTIMATED AVG GLUCOSE: 97 MG/DL
GLUCOSE SERPL-MCNC: 127 MG/DL
GLUCOSE UR QL STRIP: ABNORMAL
HBA1C MFR BLD HPLC: 5 %
HCT VFR BLD AUTO: 28.4 %
HGB BLD-MCNC: 9.4 G/DL
HGB UR QL STRIP: ABNORMAL
HYALINE CASTS #/AREA URNS LPF: 0 /LPF
KETONES UR QL STRIP: NEGATIVE
LEUKOCYTE ESTERASE UR QL STRIP: NEGATIVE
LYMPHOCYTES # BLD AUTO: 1.5 K/UL
LYMPHOCYTES NFR BLD: 25.2 %
MAGNESIUM SERPL-MCNC: 2.2 MG/DL
MCH RBC QN AUTO: 29 PG
MCHC RBC AUTO-ENTMCNC: 33.1 G/DL
MCV RBC AUTO: 88 FL
MICROSCOPIC COMMENT: ABNORMAL
MONOCYTES # BLD AUTO: 0.8 K/UL
MONOCYTES NFR BLD: 13.1 %
NEUTROPHILS # BLD AUTO: 3.6 K/UL
NEUTROPHILS NFR BLD: 59.2 %
NITRITE UR QL STRIP: NEGATIVE
PH UR STRIP: 8 [PH] (ref 5–8)
PHOSPHATE SERPL-MCNC: 2.4 MG/DL
PLATELET # BLD AUTO: 217 K/UL
PMV BLD AUTO: 10.3 FL
POCT GLUCOSE: 189 MG/DL (ref 70–110)
POTASSIUM SERPL-SCNC: 3.6 MMOL/L
PROT SERPL-MCNC: 7 G/DL
PROT UR QL STRIP: ABNORMAL
RBC # BLD AUTO: 3.24 M/UL
RBC #/AREA URNS HPF: 1 /HPF (ref 0–4)
SODIUM SERPL-SCNC: 141 MMOL/L
SP GR UR STRIP: 1.02 (ref 1–1.03)
SQUAMOUS #/AREA URNS HPF: 1 /HPF
URN SPEC COLLECT METH UR: ABNORMAL
UROBILINOGEN UR STRIP-ACNC: NEGATIVE EU/DL
WBC # BLD AUTO: 6.12 K/UL
WBC #/AREA URNS HPF: 10 /HPF (ref 0–5)

## 2018-03-04 PROCEDURE — 36415 COLL VENOUS BLD VENIPUNCTURE: CPT

## 2018-03-04 PROCEDURE — 83036 HEMOGLOBIN GLYCOSYLATED A1C: CPT

## 2018-03-04 PROCEDURE — 25000003 PHARM REV CODE 250: Performed by: EMERGENCY MEDICINE

## 2018-03-04 PROCEDURE — 63600175 PHARM REV CODE 636 W HCPCS: Performed by: NURSE PRACTITIONER

## 2018-03-04 PROCEDURE — 83735 ASSAY OF MAGNESIUM: CPT

## 2018-03-04 PROCEDURE — 99284 EMERGENCY DEPT VISIT MOD MDM: CPT

## 2018-03-04 PROCEDURE — 85025 COMPLETE CBC W/AUTO DIFF WBC: CPT

## 2018-03-04 PROCEDURE — 25000003 PHARM REV CODE 250: Performed by: NURSE PRACTITIONER

## 2018-03-04 PROCEDURE — 96376 TX/PRO/DX INJ SAME DRUG ADON: CPT

## 2018-03-04 PROCEDURE — 80053 COMPREHEN METABOLIC PANEL: CPT

## 2018-03-04 PROCEDURE — G0378 HOSPITAL OBSERVATION PER HR: HCPCS

## 2018-03-04 PROCEDURE — 99214 OFFICE O/P EST MOD 30 MIN: CPT | Mod: ,,, | Performed by: INTERNAL MEDICINE

## 2018-03-04 PROCEDURE — 80100016 HC MAINTENANCE HEMODIALYSIS

## 2018-03-04 PROCEDURE — 63600175 PHARM REV CODE 636 W HCPCS: Performed by: EMERGENCY MEDICINE

## 2018-03-04 PROCEDURE — 96375 TX/PRO/DX INJ NEW DRUG ADDON: CPT

## 2018-03-04 PROCEDURE — 99354 PR PROLONGED SVC, OUPT, 1ST HR: CPT | Mod: ,,, | Performed by: INTERNAL MEDICINE

## 2018-03-04 PROCEDURE — 81000 URINALYSIS NONAUTO W/SCOPE: CPT

## 2018-03-04 PROCEDURE — G0257 UNSCHED DIALYSIS ESRD PT HOS: HCPCS

## 2018-03-04 PROCEDURE — 84100 ASSAY OF PHOSPHORUS: CPT

## 2018-03-04 PROCEDURE — 96374 THER/PROPH/DIAG INJ IV PUSH: CPT

## 2018-03-04 PROCEDURE — 96372 THER/PROPH/DIAG INJ SC/IM: CPT

## 2018-03-04 RX ORDER — IBUPROFEN 200 MG
24 TABLET ORAL
Status: DISCONTINUED | OUTPATIENT
Start: 2018-03-04 | End: 2018-03-04

## 2018-03-04 RX ORDER — FUROSEMIDE 10 MG/ML
60 INJECTION INTRAMUSCULAR; INTRAVENOUS ONCE
Status: COMPLETED | OUTPATIENT
Start: 2018-03-04 | End: 2018-03-04

## 2018-03-04 RX ORDER — CLONIDINE HYDROCHLORIDE 0.1 MG/1
0.1 TABLET ORAL 2 TIMES DAILY
Status: DISCONTINUED | OUTPATIENT
Start: 2018-03-04 | End: 2018-03-04 | Stop reason: HOSPADM

## 2018-03-04 RX ORDER — HYDRALAZINE HYDROCHLORIDE 20 MG/ML
10 INJECTION INTRAMUSCULAR; INTRAVENOUS
Status: COMPLETED | OUTPATIENT
Start: 2018-03-04 | End: 2018-03-04

## 2018-03-04 RX ORDER — DILTIAZEM HYDROCHLORIDE 180 MG/1
360 CAPSULE, COATED, EXTENDED RELEASE ORAL DAILY
Status: DISCONTINUED | OUTPATIENT
Start: 2018-03-04 | End: 2018-03-04 | Stop reason: HOSPADM

## 2018-03-04 RX ORDER — IBUPROFEN 200 MG
16 TABLET ORAL
Status: DISCONTINUED | OUTPATIENT
Start: 2018-03-04 | End: 2018-03-04

## 2018-03-04 RX ORDER — TAMSULOSIN HYDROCHLORIDE 0.4 MG/1
0.4 CAPSULE ORAL DAILY
Status: DISCONTINUED | OUTPATIENT
Start: 2018-03-04 | End: 2018-03-04 | Stop reason: HOSPADM

## 2018-03-04 RX ORDER — METOPROLOL SUCCINATE 50 MG/1
100 TABLET, EXTENDED RELEASE ORAL DAILY
Status: DISCONTINUED | OUTPATIENT
Start: 2018-03-04 | End: 2018-03-04 | Stop reason: HOSPADM

## 2018-03-04 RX ORDER — NAPROXEN SODIUM 220 MG/1
81 TABLET, FILM COATED ORAL DAILY
Status: DISCONTINUED | OUTPATIENT
Start: 2018-03-04 | End: 2018-03-04 | Stop reason: HOSPADM

## 2018-03-04 RX ORDER — ONDANSETRON 2 MG/ML
4 INJECTION INTRAMUSCULAR; INTRAVENOUS EVERY 12 HOURS PRN
Status: DISCONTINUED | OUTPATIENT
Start: 2018-03-04 | End: 2018-03-04 | Stop reason: HOSPADM

## 2018-03-04 RX ORDER — HEPARIN SODIUM 5000 [USP'U]/ML
5000 INJECTION, SOLUTION INTRAVENOUS; SUBCUTANEOUS EVERY 8 HOURS
Status: DISCONTINUED | OUTPATIENT
Start: 2018-03-04 | End: 2018-03-04 | Stop reason: HOSPADM

## 2018-03-04 RX ORDER — LOSARTAN POTASSIUM 50 MG/1
100 TABLET ORAL DAILY
Status: DISCONTINUED | OUTPATIENT
Start: 2018-03-04 | End: 2018-03-04 | Stop reason: HOSPADM

## 2018-03-04 RX ORDER — PANTOPRAZOLE SODIUM 40 MG/1
40 TABLET, DELAYED RELEASE ORAL DAILY
Status: DISCONTINUED | OUTPATIENT
Start: 2018-03-04 | End: 2018-03-04 | Stop reason: HOSPADM

## 2018-03-04 RX ORDER — IBUPROFEN 200 MG
24 TABLET ORAL
Status: DISCONTINUED | OUTPATIENT
Start: 2018-03-04 | End: 2018-03-04 | Stop reason: HOSPADM

## 2018-03-04 RX ORDER — ATORVASTATIN CALCIUM 10 MG/1
20 TABLET, FILM COATED ORAL NIGHTLY
Status: DISCONTINUED | OUTPATIENT
Start: 2018-03-04 | End: 2018-03-04 | Stop reason: HOSPADM

## 2018-03-04 RX ORDER — CLONIDINE HYDROCHLORIDE 0.2 MG/1
0.2 TABLET ORAL
Status: COMPLETED | OUTPATIENT
Start: 2018-03-04 | End: 2018-03-04

## 2018-03-04 RX ORDER — GLUCAGON 1 MG
1 KIT INJECTION
Status: DISCONTINUED | OUTPATIENT
Start: 2018-03-04 | End: 2018-03-04

## 2018-03-04 RX ORDER — ACETAMINOPHEN 325 MG/1
650 TABLET ORAL EVERY 8 HOURS PRN
Status: DISCONTINUED | OUTPATIENT
Start: 2018-03-04 | End: 2018-03-04 | Stop reason: HOSPADM

## 2018-03-04 RX ORDER — INSULIN ASPART 100 [IU]/ML
0-5 INJECTION, SOLUTION INTRAVENOUS; SUBCUTANEOUS
Status: DISCONTINUED | OUTPATIENT
Start: 2018-03-04 | End: 2018-03-04 | Stop reason: HOSPADM

## 2018-03-04 RX ORDER — LOSARTAN POTASSIUM 50 MG/1
50 TABLET ORAL DAILY
Status: DISCONTINUED | OUTPATIENT
Start: 2018-03-04 | End: 2018-03-04

## 2018-03-04 RX ORDER — IBUPROFEN 200 MG
16 TABLET ORAL
Status: DISCONTINUED | OUTPATIENT
Start: 2018-03-04 | End: 2018-03-04 | Stop reason: HOSPADM

## 2018-03-04 RX ORDER — SODIUM CHLORIDE 0.9 % (FLUSH) 0.9 %
5 SYRINGE (ML) INJECTION
Status: DISCONTINUED | OUTPATIENT
Start: 2018-03-04 | End: 2018-03-04 | Stop reason: HOSPADM

## 2018-03-04 RX ORDER — HYDRALAZINE HYDROCHLORIDE 50 MG/1
100 TABLET, FILM COATED ORAL EVERY 12 HOURS
Status: DISCONTINUED | OUTPATIENT
Start: 2018-03-04 | End: 2018-03-04 | Stop reason: HOSPADM

## 2018-03-04 RX ORDER — HEPARIN SODIUM 1000 [USP'U]/ML
3000 INJECTION, SOLUTION INTRAVENOUS; SUBCUTANEOUS ONCE
Status: DISCONTINUED | OUTPATIENT
Start: 2018-03-04 | End: 2018-03-04 | Stop reason: HOSPADM

## 2018-03-04 RX ORDER — GLUCAGON 1 MG
1 KIT INJECTION
Status: DISCONTINUED | OUTPATIENT
Start: 2018-03-04 | End: 2018-03-04 | Stop reason: HOSPADM

## 2018-03-04 RX ORDER — LOSARTAN POTASSIUM 100 MG/1
100 TABLET ORAL DAILY
Qty: 90 TABLET | Refills: 3 | OUTPATIENT
Start: 2018-03-05 | End: 2018-05-10 | Stop reason: SDUPTHER

## 2018-03-04 RX ADMIN — PANTOPRAZOLE SODIUM 40 MG: 40 TABLET, DELAYED RELEASE ORAL at 09:03

## 2018-03-04 RX ADMIN — TAMSULOSIN HYDROCHLORIDE 0.4 MG: 0.4 CAPSULE ORAL at 08:03

## 2018-03-04 RX ADMIN — CLONIDINE HYDROCHLORIDE 0.2 MG: 0.2 TABLET ORAL at 02:03

## 2018-03-04 RX ADMIN — HEPARIN SODIUM 5000 UNITS: 5000 INJECTION, SOLUTION INTRAVENOUS; SUBCUTANEOUS at 07:03

## 2018-03-04 RX ADMIN — NITROGLYCERIN 1 INCH: 20 OINTMENT TOPICAL at 06:03

## 2018-03-04 RX ADMIN — ASPIRIN 81 MG CHEWABLE TABLET 81 MG: 81 TABLET CHEWABLE at 08:03

## 2018-03-04 RX ADMIN — CLONIDINE HYDROCHLORIDE 0.1 MG: 0.1 TABLET ORAL at 08:03

## 2018-03-04 RX ADMIN — HYDRALAZINE HYDROCHLORIDE 10 MG: 20 INJECTION INTRAMUSCULAR; INTRAVENOUS at 04:03

## 2018-03-04 RX ADMIN — DILTIAZEM HYDROCHLORIDE 360 MG: 180 CAPSULE, COATED, EXTENDED RELEASE ORAL at 08:03

## 2018-03-04 RX ADMIN — HYDRALAZINE HYDROCHLORIDE 100 MG: 25 TABLET, FILM COATED ORAL at 09:03

## 2018-03-04 RX ADMIN — FUROSEMIDE 60 MG: 10 INJECTION, SOLUTION INTRAMUSCULAR; INTRAVENOUS at 06:03

## 2018-03-04 RX ADMIN — HYDRALAZINE HYDROCHLORIDE 10 MG: 20 INJECTION INTRAMUSCULAR; INTRAVENOUS at 03:03

## 2018-03-04 RX ADMIN — METOPROLOL SUCCINATE 100 MG: 50 TABLET, EXTENDED RELEASE ORAL at 08:03

## 2018-03-04 RX ADMIN — LOSARTAN POTASSIUM 100 MG: 50 TABLET, FILM COATED ORAL at 08:03

## 2018-03-04 NOTE — HOSPITAL COURSE
Pt was seen by Nephrology who documented his hypertensive urgency was secondary to fluid overload. Pt underwent hemodialysis and blood pressure was more controlled. He denied any further complaints. Pt was seen and examined and determined to be safe and stable for discharge. He was advised to resume his current hemodialysis schedule.

## 2018-03-04 NOTE — SUBJECTIVE & OBJECTIVE
Past Medical History:   Diagnosis Date    CKD (chronic kidney disease), stage IV     Diabetes mellitus     Diabetes mellitus, type 2     HTN (hypertension)        Past Surgical History:   Procedure Laterality Date    AV FISTULA PLACEMENT Right 06/2017    CIRCUMCISION  1978    TOE AMPUTATION Left 01/24/2017    4th toe       Review of patient's allergies indicates:   Allergen Reactions    Augmentin [amoxicillin-pot clavulanate] Edema    Lisinopril      cough    Sulfa (sulfonamide antibiotics)      swelling       No current facility-administered medications on file prior to encounter.      Current Outpatient Prescriptions on File Prior to Encounter   Medication Sig    aspirin 81 MG Chew Take 1 tablet (81 mg total) by mouth once daily.    atorvastatin (LIPITOR) 40 MG tablet Take 1 tablet (40 mg total) by mouth every evening. (Patient taking differently: Take 20 mg by mouth every evening. )    cloNIDine (CATAPRES) 0.1 MG tablet take 1 tablet by mouth twice a day    diltiaZEM (CARDIZEM CD) 360 MG 24 hr capsule Take 1 capsule (360 mg total) by mouth once daily.    hydrALAZINE (APRESOLINE) 50 MG tablet Take 2 tablets (100 mg total) by mouth every 12 (twelve) hours.    losartan (COZAAR) 25 MG tablet Take 1 tablet (25 mg total) by mouth once daily. (Patient taking differently: Take 50 mg by mouth once daily. )    metoprolol succinate (TOPROL-XL) 50 MG 24 hr tablet Take 2 tablets (100 mg total) by mouth once daily.    ondansetron (ZOFRAN-ODT) 4 MG TbDL Take 1 tablet (4 mg total) by mouth every 8 (eight) hours as needed.    pantoprazole (PROTONIX) 40 MG tablet Take 1 tablet (40 mg total) by mouth once daily.    tamsulosin (FLOMAX) 0.4 mg Cp24 Take 1 capsule (0.4 mg total) by mouth once daily.    zolpidem (AMBIEN) 5 MG Tab Take 1 tablet (5 mg total) by mouth nightly as needed.     Family History     Problem Relation (Age of Onset)    Hypertension Father    No Known Problems Mother        Social History Main  Topics    Smoking status: Former Smoker     Types: Cigarettes     Quit date: 1971    Smokeless tobacco: Never Used    Alcohol use No    Drug use: No    Sexual activity: Not on file     Review of Systems   Constitutional: Negative for chills, diaphoresis, fatigue and fever.   HENT: Negative for congestion, sore throat and voice change.    Eyes: Negative for photophobia and visual disturbance.   Respiratory: Negative for cough, shortness of breath, wheezing and stridor.    Cardiovascular: Negative for chest pain and leg swelling.        Positive Hypertension     Gastrointestinal: Negative for abdominal distention, abdominal pain, constipation, diarrhea, nausea and vomiting.   Endocrine: Negative for polydipsia, polyphagia and polyuria.   Genitourinary: Negative for difficulty urinating, dysuria, flank pain, testicular pain and urgency.   Musculoskeletal: Negative for back pain, joint swelling, neck pain and neck stiffness.   Skin: Negative for color change and rash.   Allergic/Immunologic: Negative for immunocompromised state.   Neurological: Negative for dizziness, syncope, weakness, numbness and headaches.   Hematological: Does not bruise/bleed easily.   Psychiatric/Behavioral: Negative for agitation, behavioral problems and confusion.     Objective:     Vital Signs (Most Recent):  Temp: 97.7 °F (36.5 °C) (03/04/18 0154)  Pulse: 69 (03/04/18 0440)  Resp: 14 (03/04/18 0440)  BP: (!) 202/102 (03/04/18 0440)  SpO2: 98 % (03/04/18 0440) Vital Signs (24h Range):  Temp:  [97.7 °F (36.5 °C)] 97.7 °F (36.5 °C)  Pulse:  [69-74] 69  Resp:  [14-20] 14  SpO2:  [95 %-100 %] 98 %  BP: (202-213)/() 202/102     Weight: 103.4 kg (228 lb)  Body mass index is 26.35 kg/m².    Physical Exam   Constitutional: He is oriented to person, place, and time. He appears well-developed. He has a sickly appearance. He does not appear ill. No distress.   HENT:   Head: Normocephalic and atraumatic.   Nose: Nose normal.   Eyes:  Conjunctivae and EOM are normal. Pupils are equal, round, and reactive to light. No scleral icterus.   Neck: Normal range of motion. Neck supple. No tracheal deviation present.   Cardiovascular: Normal rate, regular rhythm, normal heart sounds and intact distal pulses.    No murmur heard.  Left chest wall vascath    Pulmonary/Chest: Effort normal. No stridor. No respiratory distress. He has no wheezes. He has no rales.   Course bilaterally    Abdominal: Soft. Bowel sounds are normal. He exhibits no distension. There is no tenderness. There is no guarding.   Genitourinary:   Genitourinary Comments: +HD patient  Left forarm Access +/+--patient reports not functioning properly for HD which is why he has vascath    Musculoskeletal: Normal range of motion. He exhibits no edema or deformity.   Neurological: He is alert and oriented to person, place, and time. No cranial nerve deficit.   Skin: Skin is warm and dry. Capillary refill takes less than 2 seconds. No rash noted. He is not diaphoretic.   Psychiatric: He has a normal mood and affect. His behavior is normal. Judgment and thought content normal.   Nursing note and vitals reviewed.        CRANIAL NERVES     CN III, IV, VI   Pupils are equal, round, and reactive to light.  Extraocular motions are normal.        Significant Labs: All pertinent labs within the past 24 hours have been reviewed.   Results for orders placed or performed during the hospital encounter of 03/04/18   CBC auto differential   Result Value Ref Range    WBC 6.12 3.90 - 12.70 K/uL    RBC 3.24 (L) 4.60 - 6.20 M/uL    Hemoglobin 9.4 (L) 14.0 - 18.0 g/dL    Hematocrit 28.4 (L) 40.0 - 54.0 %    MCV 88 82 - 98 fL    MCH 29.0 27.0 - 31.0 pg    MCHC 33.1 32.0 - 36.0 g/dL    RDW 14.5 11.5 - 14.5 %    Platelets 217 150 - 350 K/uL    MPV 10.3 9.2 - 12.9 fL    Gran # (ANC) 3.6 1.8 - 7.7 K/uL    Lymph # 1.5 1.0 - 4.8 K/uL    Mono # 0.8 0.3 - 1.0 K/uL    Eos # 0.1 0.0 - 0.5 K/uL    Baso # 0.01 0.00 - 0.20 K/uL     Gran% 59.2 38.0 - 73.0 %    Lymph% 25.2 18.0 - 48.0 %    Mono% 13.1 4.0 - 15.0 %    Eosinophil% 2.3 0.0 - 8.0 %    Basophil% 0.2 0.0 - 1.9 %    Differential Method Automated    Comprehensive metabolic panel   Result Value Ref Range    Sodium 141 136 - 145 mmol/L    Potassium 3.6 3.5 - 5.1 mmol/L    Chloride 98 95 - 110 mmol/L    CO2 31 (H) 23 - 29 mmol/L    Glucose 127 (H) 70 - 110 mg/dL    BUN, Bld 16 8 - 23 mg/dL    Creatinine 6.9 (H) 0.5 - 1.4 mg/dL    Calcium 8.8 8.7 - 10.5 mg/dL    Total Protein 7.0 6.0 - 8.4 g/dL    Albumin 3.4 (L) 3.5 - 5.2 g/dL    Total Bilirubin 0.4 0.1 - 1.0 mg/dL    Alkaline Phosphatase 60 55 - 135 U/L    AST 16 10 - 40 U/L    ALT 7 (L) 10 - 44 U/L    Anion Gap 12 8 - 16 mmol/L    eGFR if African American 9 (A) >60 mL/min/1.73 m^2    eGFR if non African American 7 (A) >60 mL/min/1.73 m^2         Significant Imaging: I have reviewed all pertinent imaging results/findings within the past 24 hours.   Imaging Results          X-Ray Chest PA And Lateral (In process)    independent preliminary soft read: NAF

## 2018-03-04 NOTE — ASSESSMENT & PLAN NOTE
Obs  Continue home meds  Assess response to Nitro to chest, dose of IV lasix as patient reports still urinates.   Renal consult for HD and evaluation of medication regimen  Consider cardiology consult.   Encourage compliance with diet and overall medical POC

## 2018-03-04 NOTE — NURSING
Dialyzed x 4 hours. Net uf of 3.5 Liters. Tolerated well. Final bp 144/75 and hr 57. Dialysis Cath flowed well without any issues. Dsg changed to HD cath.

## 2018-03-04 NOTE — PLAN OF CARE
03/04/18 1628   Final Note   Assessment Type Final Discharge Note   Discharge Disposition Home   Right Care Referral Info   Post Acute Recommendation No Care

## 2018-03-04 NOTE — PLAN OF CARE
Problem: Patient Care Overview  Goal: Plan of Care Review  Outcome: Ongoing (interventions implemented as appropriate)  POC discussed w/patient, verbalized understanding. NSR/SB on monitor. VSS. Voids per BRP. No c/o pain. Frequent weight change encouraged. Patient turns independently in bed. Fall precautions in place, bed alarm on. Patient denies needs at this time.

## 2018-03-04 NOTE — DISCHARGE SUMMARY
[facility] Hospital Medicine  Discharge Summary      Patient Name: Conner Perez III  MRN: [id number]  Admission Date: 3/4/2018  Hospital Length of Stay: 0 days  Discharge Date and Time:  03/04/2018 4:49 PM  Attending Physician: Antione Slade MD;Ha *   Discharging Provider: Danyell Beurmen NP  Primary Care Provider: Raciel Angela MD      HPI:   Conner Perez III is a 68 y.o. male patient who presents to the Emergency Department for an evaluation of hypertension which onset gradually today. Pt reports he came in for an evaluation after he noticed his blood pressure was elevated. Symptoms are constant and moderate in severity. Exacerbated by nothing and relieved by nothing. Patient denies any fever, chills, CP, SOB, abd pain, N/V, dysuria, hematuria, HA, visual disturbance, fatigue, and all other sxs at this time. Pt denies tx PTA. No further complaints or concerns at this time. Patient evalauted in the ER, multiple medications to reduce patient bp not effective. Patient reports that he is compliant with diet, medications and HD. Er discussed case with Renal who recommend obs for further evalaution/managment of patient hypertensive urgency. Patient placed in obs.        * No surgery found *      Hospital Course:   Pt was seen by Nephrology who documented his hypertensive urgency was secondary to fluid overload. Pt underwent hemodialysis and blood pressure was more controlled. He denied any further complaints. Pt was seen and examined and determined to be safe and stable for discharge. He was advised to resume his current hemodialysis schedule.      Consults:   Consults         Status Ordering Provider     Inpatient consult to Nephrology  Once     Provider:  Jesus Colon MD    Acknowledged RINA MCWILLIAMS          No new Assessment & Plan notes have been filed under this hospital service since the last note was generated.  Service: Hospital Medicine    Final Active Diagnoses:     Diagnosis Date Noted POA    Coronary artery disease involving native coronary artery of native heart without angina pectoris [I25.10] 11/09/2017 Yes    ESRD (end stage renal disease) [N18.6] 08/29/2017 Yes    DM II (diabetes mellitus, type II), controlled [E11.9] 01/19/2017 Yes    Anemia of renal disease [D63.1] 01/18/2017 Yes      Problems Resolved During this Admission:    Diagnosis Date Noted Date Resolved POA    PRINCIPAL PROBLEM:  Hypertensive urgency [I16.0] 12/17/2017 03/04/2018 Yes       Discharged Condition: stable    Disposition: Home or Self Care    Follow Up:  Follow-up Information     Dialysis.    Why:  Resume your usual M,W,F dialysis rotation               Patient Instructions:     Activity as tolerated     Notify your health care provider if you experience any of the following:  temperature >100.4     Notify your health care provider if you experience any of the following:  difficulty breathing or increased cough         Significant Diagnostic Studies: Labs:   CMP   Recent Labs  Lab 03/04/18  0501      K 3.6   CL 98   CO2 31*   *   BUN 16   CREATININE 6.9*   CALCIUM 8.8   PROT 7.0   ALBUMIN 3.4*   BILITOT 0.4   ALKPHOS 60   AST 16   ALT 7*   ANIONGAP 12   ESTGFRAFRICA 9*   EGFRNONAA 7*    and CBC   Recent Labs  Lab 03/04/18  0501   WBC 6.12   HGB 9.4*   HCT 28.4*          Pending Diagnostic Studies:     None         Medications:  Reconciled Home Medications:   Current Discharge Medication List      CONTINUE these medications which have CHANGED    Details   losartan (COZAAR) 100 MG tablet Take 1 tablet (100 mg total) by mouth once daily.  Qty: 90 tablet, Refills: 3         CONTINUE these medications which have NOT CHANGED    Details   aspirin 81 MG Chew Take 1 tablet (81 mg total) by mouth once daily.  Qty: 30 tablet, Refills: 0      atorvastatin (LIPITOR) 40 MG tablet Take 1 tablet (40 mg total) by mouth every evening.  Qty: 30 tablet, Refills: 0      cloNIDine (CATAPRES) 0.1  MG tablet take 1 tablet by mouth twice a day      diltiaZEM (CARDIZEM CD) 360 MG 24 hr capsule Take 1 capsule (360 mg total) by mouth once daily.  Qty: 30 capsule, Refills: 0      hydrALAZINE (APRESOLINE) 50 MG tablet Take 2 tablets (100 mg total) by mouth every 12 (twelve) hours.  Qty: 90 tablet, Refills: 0      metoprolol succinate (TOPROL-XL) 50 MG 24 hr tablet Take 2 tablets (100 mg total) by mouth once daily.  Qty: 30 tablet, Refills: 0      ondansetron (ZOFRAN-ODT) 4 MG TbDL Take 1 tablet (4 mg total) by mouth every 8 (eight) hours as needed.  Qty: 12 tablet, Refills: 0      pantoprazole (PROTONIX) 40 MG tablet Take 1 tablet (40 mg total) by mouth once daily.  Qty: 30 tablet, Refills: 0      tamsulosin (FLOMAX) 0.4 mg Cp24 Take 1 capsule (0.4 mg total) by mouth once daily.  Qty: 30 capsule, Refills: 0      zolpidem (AMBIEN) 5 MG Tab Take 1 tablet (5 mg total) by mouth nightly as needed.  Qty: 10 tablet, Refills: 0             Indwelling Lines/Drains at time of discharge:   Lines/Drains/Airways     Central Venous Catheter Line                 Hemodialysis Catheter 03/04/18 0715 right subclavian less than 1 day          Drain                 Hemodialysis AV Fistula 12/18/17 0017 Right forearm 76 days                Time spent on the discharge of patient: > 30 minutes  Patient was seen and examined on the date of discharge and determined to be suitable for discharge.         Danyell Berumen NP  Department of Hospital Medicine  [facility]

## 2018-03-04 NOTE — ED PROVIDER NOTES
SCRIBE #1 NOTE: I, Maury Draper, am scribing for, and in the presence of, Endy Alford MD. I have scribed the entire note.      History      Chief Complaint   Patient presents with    Hypertension       Review of patient's allergies indicates:   Allergen Reactions    Augmentin [amoxicillin-pot clavulanate] Edema    Lisinopril      cough    Sulfa (sulfonamide antibiotics)      swelling        HPI   HPI    3/4/2018, 1:59 AM   History obtained from the patient      History of Present Illness: Conner Perez III is a 68 y.o. male patient who presents to the Emergency Department for an evaluation of hypertension which onset gradually today. Pt reports he came in for an evaluation after he noticed his blood pressure was elevated. Symptoms are constant and moderate in severity. Exacerbated by nothing and relieved by nothing. Patient denies any fever, chills, CP, SOB, abd pain, N/V, dysuria, hematuria, HA, visual disturbance, fatigue, and all other sxs at this time. Pt denies tx PTA. No further complaints or concerns at this time.     Pt dialyzes MWF.     Arrival mode: Personal vehicle    PCP: Raciel Angela MD       Past Medical History:  Past Medical History:   Diagnosis Date    CKD (chronic kidney disease), stage IV     Diabetes mellitus     Diabetes mellitus, type 2     HTN (hypertension)        Past Surgical History:  Past Surgical History:   Procedure Laterality Date    AV FISTULA PLACEMENT Right 06/2017    CIRCUMCISION  1978    TOE AMPUTATION Left 01/24/2017    4th toe         Family History:  Family History   Problem Relation Age of Onset    Hypertension Father     No Known Problems Mother        Social History:  Social History     Social History Main Topics    Smoking status: Former Smoker     Types: Cigarettes     Quit date: 1971    Smokeless tobacco: Never Used    Alcohol use No    Drug use: No    Sexual activity: Unknown       ROS   Review of Systems   Constitutional: Negative for chills,  fatigue and fever.        (+) Hypertension   HENT: Negative for congestion, sore throat and trouble swallowing.    Eyes: Negative for photophobia and visual disturbance.   Respiratory: Negative for cough and shortness of breath.    Cardiovascular: Negative for chest pain and leg swelling.   Gastrointestinal: Negative for abdominal pain, nausea and vomiting.   Genitourinary: Negative for dysuria, frequency, hematuria and urgency.   Musculoskeletal: Negative for back pain, neck pain and neck stiffness.   Skin: Negative for color change and rash.   Neurological: Negative for dizziness, weakness, light-headedness and headaches.   Hematological: Does not bruise/bleed easily.     Physical Exam      Initial Vitals [03/04/18 0154]   BP Pulse Resp Temp SpO2   (!) 206/92 73 20 97.7 °F (36.5 °C) 95 %      MAP       130          Physical Exam  Nursing Notes and Vital Signs Reviewed.  Constitutional: Patient is in no acute distress. Well-developed and well-nourished.  Head: Atraumatic. Normocephalic.  Eyes: PERRL. EOM intact. Conjunctivae are not pale. No scleral icterus.  ENT: Mucous membranes are moist. Oropharynx is clear and symmetric.    Neck: Supple. Full ROM. No lymphadenopathy.  Cardiovascular: Regular rate. Regular rhythm.  Pulmonary/Chest: No respiratory distress. Clear to auscultation bilaterally. No wheezing or rales.  Abdominal: Soft and non-distended.  There is no tenderness.   Musculoskeletal: Moves all extremities. No obvious deformities. No edema. No calf tenderness.  Skin: Warm and dry.  Neurological:  Alert, awake, and appropriate.  Normal speech.  No acute focal neurological deficits are appreciated.  Psychiatric: Normal affect. Good eye contact. Appropriate in content.    ED Course    Procedures  ED Vital Signs:  Vitals:    03/04/18 0154 03/04/18 0219 03/04/18 0318 03/04/18 0335   BP: (!) 206/92 (!) 213/100 (!) 210/102 (!) 210/102   Pulse: 73 74 72    Resp: 20 18 14    Temp: 97.7 °F (36.5 °C)      TempSrc:  "Oral      SpO2: 95% 100% 100%    Weight: 103.4 kg (228 lb)      Height: 6' 6" (1.981 m)       03/04/18 0440   BP: (!) 202/102   Pulse: 69   Resp: 14   Temp:    TempSrc:    SpO2: 98%   Weight:    Height:        Abnormal Lab Results:  Labs Reviewed   CBC W/ AUTO DIFFERENTIAL - Abnormal; Notable for the following:        Result Value    RBC 3.24 (*)     Hemoglobin 9.4 (*)     Hematocrit 28.4 (*)     All other components within normal limits   COMPREHENSIVE METABOLIC PANEL - Abnormal; Notable for the following:     CO2 31 (*)     Glucose 127 (*)     Creatinine 6.9 (*)     Albumin 3.4 (*)     ALT 7 (*)     eGFR if  9 (*)     eGFR if non  7 (*)     All other components within normal limits        All Lab Results:  Results for orders placed or performed during the hospital encounter of 03/04/18   CBC auto differential   Result Value Ref Range    WBC 6.12 3.90 - 12.70 K/uL    RBC 3.24 (L) 4.60 - 6.20 M/uL    Hemoglobin 9.4 (L) 14.0 - 18.0 g/dL    Hematocrit 28.4 (L) 40.0 - 54.0 %    MCV 88 82 - 98 fL    MCH 29.0 27.0 - 31.0 pg    MCHC 33.1 32.0 - 36.0 g/dL    RDW 14.5 11.5 - 14.5 %    Platelets 217 150 - 350 K/uL    MPV 10.3 9.2 - 12.9 fL    Gran # (ANC) 3.6 1.8 - 7.7 K/uL    Lymph # 1.5 1.0 - 4.8 K/uL    Mono # 0.8 0.3 - 1.0 K/uL    Eos # 0.1 0.0 - 0.5 K/uL    Baso # 0.01 0.00 - 0.20 K/uL    Gran% 59.2 38.0 - 73.0 %    Lymph% 25.2 18.0 - 48.0 %    Mono% 13.1 4.0 - 15.0 %    Eosinophil% 2.3 0.0 - 8.0 %    Basophil% 0.2 0.0 - 1.9 %    Differential Method Automated    Comprehensive metabolic panel   Result Value Ref Range    Sodium 141 136 - 145 mmol/L    Potassium 3.6 3.5 - 5.1 mmol/L    Chloride 98 95 - 110 mmol/L    CO2 31 (H) 23 - 29 mmol/L    Glucose 127 (H) 70 - 110 mg/dL    BUN, Bld 16 8 - 23 mg/dL    Creatinine 6.9 (H) 0.5 - 1.4 mg/dL    Calcium 8.8 8.7 - 10.5 mg/dL    Total Protein 7.0 6.0 - 8.4 g/dL    Albumin 3.4 (L) 3.5 - 5.2 g/dL    Total Bilirubin 0.4 0.1 - 1.0 mg/dL    Alkaline " Phosphatase 60 55 - 135 U/L    AST 16 10 - 40 U/L    ALT 7 (L) 10 - 44 U/L    Anion Gap 12 8 - 16 mmol/L    eGFR if African American 9 (A) >60 mL/min/1.73 m^2    eGFR if non African American 7 (A) >60 mL/min/1.73 m^2         Imaging Results:    Per ED physician, pt's CXR results: NAF             The Emergency Provider reviewed the vital signs and test results, which are outlined above.    ED Discussion     5:34 AM: Dr. Alford discussed the pt's case with Dr. Colon (Nephrology) who recommends admitting pt to hospital medicine and he will dialyze him.    5:36 AM: Discussed case with Santo Rao NP (Hospital Medicine). Dr. Salazar agrees with current care and management of pt and accepts admission. He also recommends holding off on giving pt aldactone.   Admitting Service: Hospital medicine   Admitting Physician: Dr. Fall  Admit to: Observation    5:36 AM: Re-evaluated pt. I have discussed test results, shared treatment plan, and the need for admission with patient and family at bedside. Pt and family express understanding at this time and agree with all information. All questions answered. Pt and family have no further questions or concerns at this time. Pt is ready for admit.    ED Medication(s):  Medications   cloNIDine tablet 0.2 mg (0.2 mg Oral Given 3/4/18 0097)   hydrALAZINE injection 10 mg (10 mg Intravenous Given 3/4/18 3029)   hydrALAZINE injection 10 mg (10 mg Intravenous Given 3/4/18 3158)       New Prescriptions    No medications on file             Medical Decision Making    Medical Decision Making:   Clinical Tests:   Lab Tests: Ordered and Reviewed  Radiological Study: Ordered and Reviewed           Scribe Attestation:   Scribe #1: I performed the above scribed service and the documentation accurately describes the services I performed. I attest to the accuracy of the note.    Attending:   Physician Attestation Statement for Scribe #1: I, Endy Alford MD, personally performed the services described  in this documentation, as scribed by Maury Draper, in my presence, and it is both accurate and complete.          Clinical Impression       ICD-10-CM ICD-9-CM   1. Hypertensive urgency I16.0 401.9   2. ESRD (end stage renal disease) on dialysis N18.6 585.6    Z99.2 V45.11       Disposition:   Disposition: Placed in Observation  Condition: Stable         Endy Alford MD  03/04/18 0607

## 2018-03-04 NOTE — CONSULTS
[facility] -   Nephrology  Consult Note    Patient Name: Conner Perez III  MRN: [id number]  Admission Date: 3/4/2018  Hospital Length of Stay: 0 days  Attending Provider: Antione Slade MD   Primary Care Physician: Raciel Angela MD  Principal Problem:Hypertensive urgency    Consults  Subjective:     HPI: Patient is a 68-year-old male with history of with ESRD on hemodialysis on a Monday Wednesday Friday schedule [facility] of [facility]. Also followed by [facility] for blood pressure control. Admitted with shortness of breath and high blood pressure. Nephrology consultation requested for dialysis and blood pressure control      I have reviewed patient's last 2 months records in the emergency room as well as in the dialysis unit.  Patient has had multiple emergency room visits with multiple different complaints.  A lot of those complaints are dialysis related I have communicated with Dr. Lechuga in the past with [facility] for the control of his blood pressure.  According to the dialysis records his dry weight has been lowered down to 104 kg but he has not been challenged in the last 2 weeks.  He has been coming under his dry weight and the his average ultrafiltration goal has been 1-2 kilogram at the most.  Last Monday he did not get any ultrafiltration done.  As a result he has accumulated fluid and his blood pressure has gone up higher and he has presented to the emergency room with high blood pressure and symptoms of uncontrolled blood pressure including shortness of breath.  He is being evaluated for urgent hemodialysis today.  He would be discharged home after dialysis.    Past Medical History:   Diagnosis Date    CKD (chronic kidney disease), stage IV     Diabetes mellitus     Diabetes mellitus, type 2     HTN (hypertension)        Past Surgical History:   Procedure Laterality Date    AV FISTULA PLACEMENT Right 06/2017    CIRCUMCISION  1978    TOE AMPUTATION Left  01/24/2017    4th toe       Review of patient's allergies indicates:   Allergen Reactions    Augmentin [amoxicillin-pot clavulanate] Edema    Lisinopril      cough    Sulfa (sulfonamide antibiotics)      swelling     Current Facility-Administered Medications   Medication Frequency    acetaminophen tablet 650 mg Q8H PRN    aspirin chewable tablet 81 mg Daily    atorvastatin tablet 20 mg QHS    cloNIDine tablet 0.1 mg BID    dextrose 50% injection 12.5 g PRN    dextrose 50% injection 25 g PRN    diltiaZEM 24 hr capsule 360 mg Daily    glucagon (human recombinant) injection 1 mg PRN    glucose chewable tablet 16 g PRN    glucose chewable tablet 24 g PRN    heparin (porcine) injection 3,000 Units Once    heparin (porcine) injection 5,000 Units Q8H    hydrALAZINE tablet 100 mg Q12H    insulin aspart U-100 pen 0-5 Units QID (AC + HS) PRN    losartan tablet 100 mg Daily    metoprolol succinate (TOPROL-XL) 24 hr tablet 100 mg Daily    nitroGLYCERIN 2% TD oint ointment 1 inch Q6H    ondansetron injection 4 mg Q12H PRN    pantoprazole EC tablet 40 mg Daily    sodium chloride 0.9% flush 5 mL PRN    tamsulosin 24 hr capsule 0.4 mg Daily     Current Outpatient Prescriptions   Medication    aspirin 81 MG Chew    atorvastatin (LIPITOR) 40 MG tablet    cloNIDine (CATAPRES) 0.1 MG tablet    diltiaZEM (CARDIZEM CD) 360 MG 24 hr capsule    hydrALAZINE (APRESOLINE) 50 MG tablet    metoprolol succinate (TOPROL-XL) 50 MG 24 hr tablet    ondansetron (ZOFRAN-ODT) 4 MG TbDL    pantoprazole (PROTONIX) 40 MG tablet    tamsulosin (FLOMAX) 0.4 mg Cp24    [START ON 3/5/2018] losartan (COZAAR) 100 MG tablet    zolpidem (AMBIEN) 5 MG Tab     Family History     Problem Relation (Age of Onset)    Hypertension Father    No Known Problems Mother        Social History Main Topics    Smoking status: Former Smoker     Types: Cigarettes     Quit date: 1971    Smokeless tobacco: Never Used    Alcohol use No    Drug  use: No    Sexual activity: Not on file     Review of Systems   Constitutional: Negative.  Negative for activity change, appetite change, chills, diaphoresis, fatigue and fever.   HENT: Negative.  Negative for congestion and trouble swallowing.    Eyes: Negative.    Respiratory: Positive for cough and shortness of breath. Negative for chest tightness and wheezing.    Cardiovascular: Negative.  Negative for chest pain, palpitations and leg swelling.   Gastrointestinal: Negative.  Negative for abdominal distention, abdominal pain, nausea and vomiting.   Genitourinary: Negative.  Negative for decreased urine volume, difficulty urinating, dysuria, enuresis, flank pain, frequency, hematuria, penile swelling, scrotal swelling and urgency.   Musculoskeletal: Negative.  Negative for arthralgias, back pain, joint swelling and neck pain.   Skin: Negative for rash.   Neurological: Negative.  Negative for tremors, seizures and headaches.   Psychiatric/Behavioral: Negative.  Negative for confusion and sleep disturbance. The patient is not nervous/anxious.      Objective:     Vital Signs (Most Recent):  Temp: 98 °F (36.7 °C) (03/04/18 0920)  Pulse: (!) 53 (03/04/18 1015)  Resp: 18 (03/04/18 1015)  BP: (!) 163/84 (03/04/18 1015)  SpO2: 97 % (03/04/18 0800)  O2 Device (Oxygen Therapy): room air (03/04/18 1015) Vital Signs (24h Range):  Temp:  [97.7 °F (36.5 °C)-98 °F (36.7 °C)] 98 °F (36.7 °C)  Pulse:  [53-74] 53  Resp:  [14-20] 18  SpO2:  [95 %-100 %] 97 %  BP: (161-214)/() 163/84     Weight: 103.4 kg (228 lb) (03/04/18 0154)  Body mass index is 26.35 kg/m².  Body surface area is 2.39 meters squared.    No intake/output data recorded.    Physical Exam   Constitutional: He is oriented to person, place, and time. He appears well-developed and well-nourished. No distress.   HENT:   Head: Normocephalic.   Eyes: EOM are normal. Pupils are equal, round, and reactive to light.   Neck: Normal range of motion. Neck supple. No JVD  present. No thyromegaly present.   Cardiovascular: Normal rate, regular rhythm, S1 normal, S2 normal and intact distal pulses.  PMI is not displaced.  Exam reveals no gallop and no friction rub.    No murmur heard.  Positive JVD   Pulmonary/Chest: Effort normal. No respiratory distress. He has no wheezes. He has rales. He exhibits no tenderness.   Abdominal: He exhibits no distension and no mass. There is no hepatosplenomegaly. There is no tenderness. There is no rebound and no CVA tenderness. No hernia.   Musculoskeletal: Normal range of motion. He exhibits no edema or tenderness.   Right forearm fistula is working with positive bruit and thrill   Lymphadenopathy:     He has no cervical adenopathy.   Neurological: He is alert and oriented to person, place, and time. He has normal reflexes. He is not disoriented. He displays normal reflexes. No cranial nerve deficit. He exhibits normal muscle tone. Coordination normal.   Skin: Skin is warm and dry. No rash noted. No erythema.   Psychiatric: He has a normal mood and affect. His behavior is normal. Judgment and thought content normal.       Significant Labs:  All labs within the past 24 hours have been reviewed.    Significant Imaging:  Labs: Reviewed  X-Ray: Reviewed  CT: Reviewed    Assessment/Plan:     ESRD (end stage renal disease)    Patient's blood pressure is high because of fluid overload.  We will arrange urgent hemodialysis and try to control the blood pressure.  His dry weight should be challenged down to 100 kg at this time from 104 kg.  I've also explained to the patient that if his blood pressure more than 160 he needs to at least challenge himself with 2-3 kg of ultrafiltration goal for each dialysis session.  He needs to take part in the care of his own dialysis as well as his blood pressure control issues.  I have reviewed and explained all the dialysis notes by [facility] and I will also explained to him all the treatment logs at the [facility]  dialysis unit in [facility]--Dr. Lechuga is his primary nephrologist in [facility].    Time spent is about 45 minutes in addition to the regular time required for consultation.  Extended time was face-to-face explaining to the patient about the therapeutic options for his hypertension and shortness of breath and overall fluid overload situation and how to handle his blood pressure issues along with ultrafiltration goal in the dialysis unit.            Thank you for your consult.     Jesus Colon MD  Nephrology  [facility]

## 2018-03-04 NOTE — HPI
Conner Perez III is a 68 y.o. male patient who presents to the Emergency Department for an evaluation of hypertension which onset gradually today. Pt reports he came in for an evaluation after he noticed his blood pressure was elevated. Symptoms are constant and moderate in severity. Exacerbated by nothing and relieved by nothing. Patient denies any fever, chills, CP, SOB, abd pain, N/V, dysuria, hematuria, HA, visual disturbance, fatigue, and all other sxs at this time. Pt denies tx PTA. No further complaints or concerns at this time. Patient evalauted in the ER, multiple medications to reduce patient bp not effective. Patient reports that he is compliant with diet, medications and HD. Er discussed case with Renal who recommend obs for further evalaution/managment of patient hypertensive urgency. Patient placed in obs.

## 2018-03-04 NOTE — H&P
[facility] Hospital Medicine  History & Physical    Patient Name: Conner Perez III  MRN: [id number]  Admission Date: 3/4/2018  Attending Physician: Martin Max MD  Primary Care Provider: Raciel Angela MD         Patient information was obtained from patient, past medical records and ER records.     Subjective:     Principal Problem:Hypertensive urgency    Chief Complaint:   Chief Complaint   Patient presents with    Hypertension        HPI: Conner Perez III is a 68 y.o. male patient who presents to the Emergency Department for an evaluation of hypertension which onset gradually today. Pt reports he came in for an evaluation after he noticed his blood pressure was elevated. Symptoms are constant and moderate in severity. Exacerbated by nothing and relieved by nothing. Patient denies any fever, chills, CP, SOB, abd pain, N/V, dysuria, hematuria, HA, visual disturbance, fatigue, and all other sxs at this time. Pt denies tx PTA. No further complaints or concerns at this time. Patient evalauted in the ER, multiple medications to reduce patient bp not effective. Patient reports that he is compliant with diet, medications and HD. Er discussed case with [facility] who recommend obs for further evalaution/managment of patient hypertensive urgency. Patient placed in obs.        Past Medical History:   Diagnosis Date    CKD (chronic kidney disease), stage IV     Diabetes mellitus     Diabetes mellitus, type 2     HTN (hypertension)        Past Surgical History:   Procedure Laterality Date    AV FISTULA PLACEMENT Right 06/2017    CIRCUMCISION  1978    TOE AMPUTATION Left 01/24/2017    4th toe       Review of patient's allergies indicates:   Allergen Reactions    Augmentin [amoxicillin-pot clavulanate] Edema    Lisinopril      cough    Sulfa (sulfonamide antibiotics)      swelling       No current facility-administered medications on file prior to encounter.      Current Outpatient Prescriptions on File Prior  to Encounter   Medication Sig    aspirin 81 MG Chew Take 1 tablet (81 mg total) by mouth once daily.    atorvastatin (LIPITOR) 40 MG tablet Take 1 tablet (40 mg total) by mouth every evening. (Patient taking differently: Take 20 mg by mouth every evening. )    cloNIDine (CATAPRES) 0.1 MG tablet take 1 tablet by mouth twice a day    diltiaZEM (CARDIZEM CD) 360 MG 24 hr capsule Take 1 capsule (360 mg total) by mouth once daily.    hydrALAZINE (APRESOLINE) 50 MG tablet Take 2 tablets (100 mg total) by mouth every 12 (twelve) hours.    losartan (COZAAR) 25 MG tablet Take 1 tablet (25 mg total) by mouth once daily. (Patient taking differently: Take 50 mg by mouth once daily. )    metoprolol succinate (TOPROL-XL) 50 MG 24 hr tablet Take 2 tablets (100 mg total) by mouth once daily.    ondansetron (ZOFRAN-ODT) 4 MG TbDL Take 1 tablet (4 mg total) by mouth every 8 (eight) hours as needed.    pantoprazole (PROTONIX) 40 MG tablet Take 1 tablet (40 mg total) by mouth once daily.    tamsulosin (FLOMAX) 0.4 mg Cp24 Take 1 capsule (0.4 mg total) by mouth once daily.    zolpidem (AMBIEN) 5 MG Tab Take 1 tablet (5 mg total) by mouth nightly as needed.     Family History     Problem Relation (Age of Onset)    Hypertension Father    No Known Problems Mother        Social History Main Topics    Smoking status: Former Smoker     Types: Cigarettes     Quit date: 1971    Smokeless tobacco: Never Used    Alcohol use No    Drug use: No    Sexual activity: Not on file     Review of Systems   Constitutional: Negative for chills, diaphoresis, fatigue and fever.   HENT: Negative for congestion, sore throat and voice change.    Eyes: Negative for photophobia and visual disturbance.   Respiratory: Negative for cough, shortness of breath, wheezing and stridor.    Cardiovascular: Negative for chest pain and leg swelling.        Positive Hypertension     Gastrointestinal: Negative for abdominal distention, abdominal pain,  constipation, diarrhea, nausea and vomiting.   Endocrine: Negative for polydipsia, polyphagia and polyuria.   Genitourinary: Negative for difficulty urinating, dysuria, flank pain, testicular pain and urgency.   Musculoskeletal: Negative for back pain, joint swelling, neck pain and neck stiffness.   Skin: Negative for color change and rash.   Allergic/Immunologic: Negative for immunocompromised state.   Neurological: Negative for dizziness, syncope, weakness, numbness and headaches.   Hematological: Does not bruise/bleed easily.   Psychiatric/Behavioral: Negative for agitation, behavioral problems and confusion.     Objective:     Vital Signs (Most Recent):  Temp: 97.7 °F (36.5 °C) (03/04/18 0154)  Pulse: 69 (03/04/18 0440)  Resp: 14 (03/04/18 0440)  BP: (!) 202/102 (03/04/18 0440)  SpO2: 98 % (03/04/18 0440) Vital Signs (24h Range):  Temp:  [97.7 °F (36.5 °C)] 97.7 °F (36.5 °C)  Pulse:  [69-74] 69  Resp:  [14-20] 14  SpO2:  [95 %-100 %] 98 %  BP: (202-213)/() 202/102     Weight: 103.4 kg (228 lb)  Body mass index is 26.35 kg/m².    Physical Exam   Constitutional: He is oriented to person, place, and time. He appears well-developed. He has a sickly appearance. He does not appear ill. No distress.   HENT:   Head: Normocephalic and atraumatic.   Nose: Nose normal.   Eyes: Conjunctivae and EOM are normal. Pupils are equal, round, and reactive to light. No scleral icterus.   Neck: Normal range of motion. Neck supple. No tracheal deviation present.   Cardiovascular: Normal rate, regular rhythm, normal heart sounds and intact distal pulses.    No murmur heard.  Left chest wall vascath    Pulmonary/Chest: Effort normal. No stridor. No respiratory distress. He has no wheezes. He has no rales.   Course bilaterally    Abdominal: Soft. Bowel sounds are normal. He exhibits no distension. There is no tenderness. There is no guarding.   Genitourinary:   Genitourinary Comments: +HD patient  Left forarm Access +/+--patient  reports not functioning properly for HD which is why he has vascath    Musculoskeletal: Normal range of motion. He exhibits no edema or deformity.   Neurological: He is alert and oriented to person, place, and time. No cranial nerve deficit.   Skin: Skin is warm and dry. Capillary refill takes less than 2 seconds. No rash noted. He is not diaphoretic.   Psychiatric: He has a normal mood and affect. His behavior is normal. Judgment and thought content normal.   Nursing note and vitals reviewed.        CRANIAL NERVES     CN III, IV, VI   Pupils are equal, round, and reactive to light.  Extraocular motions are normal.        Significant Labs: All pertinent labs within the past 24 hours have been reviewed.   Results for orders placed or performed during the hospital encounter of 03/04/18   CBC auto differential   Result Value Ref Range    WBC 6.12 3.90 - 12.70 K/uL    RBC 3.24 (L) 4.60 - 6.20 M/uL    Hemoglobin 9.4 (L) 14.0 - 18.0 g/dL    Hematocrit 28.4 (L) 40.0 - 54.0 %    MCV 88 82 - 98 fL    MCH 29.0 27.0 - 31.0 pg    MCHC 33.1 32.0 - 36.0 g/dL    RDW 14.5 11.5 - 14.5 %    Platelets 217 150 - 350 K/uL    MPV 10.3 9.2 - 12.9 fL    Gran # (ANC) 3.6 1.8 - 7.7 K/uL    Lymph # 1.5 1.0 - 4.8 K/uL    Mono # 0.8 0.3 - 1.0 K/uL    Eos # 0.1 0.0 - 0.5 K/uL    Baso # 0.01 0.00 - 0.20 K/uL    Gran% 59.2 38.0 - 73.0 %    Lymph% 25.2 18.0 - 48.0 %    Mono% 13.1 4.0 - 15.0 %    Eosinophil% 2.3 0.0 - 8.0 %    Basophil% 0.2 0.0 - 1.9 %    Differential Method Automated    Comprehensive metabolic panel   Result Value Ref Range    Sodium 141 136 - 145 mmol/L    Potassium 3.6 3.5 - 5.1 mmol/L    Chloride 98 95 - 110 mmol/L    CO2 31 (H) 23 - 29 mmol/L    Glucose 127 (H) 70 - 110 mg/dL    BUN, Bld 16 8 - 23 mg/dL    Creatinine 6.9 (H) 0.5 - 1.4 mg/dL    Calcium 8.8 8.7 - 10.5 mg/dL    Total Protein 7.0 6.0 - 8.4 g/dL    Albumin 3.4 (L) 3.5 - 5.2 g/dL    Total Bilirubin 0.4 0.1 - 1.0 mg/dL    Alkaline Phosphatase 60 55 - 135 U/L    AST 16  10 - 40 U/L    ALT 7 (L) 10 - 44 U/L    Anion Gap 12 8 - 16 mmol/L    eGFR if African American 9 (A) >60 mL/min/1.73 m^2    eGFR if non African American 7 (A) >60 mL/min/1.73 m^2         Significant Imaging: I have reviewed all pertinent imaging results/findings within the past 24 hours.   Imaging Results          X-Ray Chest PA And Lateral (In process)    independent preliminary soft read: NAF             I have personally reviewed the patients labs, imaging, and discussed the patient case in detail with the Er provider    Assessment/Plan:     * Hypertensive urgency    Obs  Continue home meds  Assess response to Nitro to chest, dose of IV lasix as patient reports still urinates.   Renal consult for HD and evaluation of medication regimen  Consider cardiology consult.   Encourage compliance with diet and overall medical POC            Coronary artery disease involving native coronary artery of native heart without angina pectoris    Neg CP  Continue home meds            ESRD (end stage renal disease)    Renal consult for HD        DM II (diabetes mellitus, type II), controlled    SSI  Monitor         Anemia of renal disease      Stable   Monitor           VTE Risk Mitigation         Ordered     heparin (porcine) injection 5,000 Units  Every 8 hours     Route:  Subcutaneous        03/04/18 0551     Medium Risk of VTE  Once      03/04/18 0551     Place AKIN hose  Until discontinued      03/04/18 0551     Place sequential compression device  Until discontinued      03/04/18 0551     Place sequential compression device  Until discontinued      03/04/18 0546             Santo Rao NP  Department of Hospital Medicine   [facility]

## 2018-03-04 NOTE — HPI
Patient is a 68-year-old male with history of with ESRD on hemodialysis on a Monday Wednesday Friday schedule [facility] of [facility]. Also followed by [facility] for blood pressure control. Admitted with shortness of breath and high blood pressure. Nephrology consultation requested for dialysis and blood pressure control      I have reviewed patient's last 2 months records in the emergency room as well as in the dialysis unit.  Patient has had multiple emergency room visits with multiple different complaints.  A lot of those complaints are dialysis related I have communicated with Dr. Lechuga in the past with [facility] for the control of his blood pressure.  According to the dialysis records his dry weight has been lowered down to 104 kg but he has not been challenged in the last 2 weeks.  He has been coming under his dry weight and the his average ultrafiltration goal has been 1-2 kilogram at the most.  Last Monday he did not get any ultrafiltration done.  As a result he has accumulated fluid and his blood pressure has gone up higher and he has presented to the emergency room with high blood pressure and symptoms of uncontrolled blood pressure including shortness of breath.  He is being evaluated for urgent hemodialysis today.  He would be discharged home after dialysis.

## 2018-03-04 NOTE — ASSESSMENT & PLAN NOTE
Patient's blood pressure is high because of fluid overload.  We will arrange urgent hemodialysis and try to control the blood pressure.  His dry weight should be challenged down to 100 kg at this time from 104 kg.  I've also explained to the patient that if his blood pressure more than 160 he needs to at least challenge himself with 2-3 kg of ultrafiltration goal for each dialysis session.  He needs to take part in the care of his own dialysis as well as his blood pressure control issues.  I have reviewed and explained all the dialysis notes by [facility] and I will also explained to him all the treatment logs at the [facility] dialysis unit in [facility]--Dr. Lechuga is his primary nephrologist in [facility].    Time spent is about 45 minutes in addition to the regular time required for consultation.  Extended time was face-to-face explaining to the patient about the therapeutic options for his hypertension and shortness of breath and overall fluid overload situation and how to handle his blood pressure issues along with ultrafiltration goal in the dialysis unit.

## 2018-03-06 ENCOUNTER — HOSPITAL ENCOUNTER (EMERGENCY)
Facility: HOSPITAL | Age: 68
Discharge: HOME | End: 2018-03-06
Attending: EMERGENCY MEDICINE
Payer: MEDICARE

## 2018-03-06 VITALS
SYSTOLIC BLOOD PRESSURE: 191 MMHG | DIASTOLIC BLOOD PRESSURE: 88 MMHG | HEART RATE: 68 BPM | BODY MASS INDEX: 25.45 KG/M2 | HEIGHT: 78 IN | WEIGHT: 220 LBS | TEMPERATURE: 99 F | RESPIRATION RATE: 18 BRPM | OXYGEN SATURATION: 100 %

## 2018-03-06 DIAGNOSIS — I10 HTN (HYPERTENSION): ICD-10-CM

## 2018-03-06 DIAGNOSIS — Z99.2 ESRD (END STAGE RENAL DISEASE) ON DIALYSIS: Primary | ICD-10-CM

## 2018-03-06 DIAGNOSIS — N18.6 ESRD (END STAGE RENAL DISEASE) ON DIALYSIS: Primary | ICD-10-CM

## 2018-03-06 LAB
ALBUMIN SERPL BCP-MCNC: 3.7 G/DL
ALP SERPL-CCNC: 66 U/L
ALT SERPL W/O P-5'-P-CCNC: 7 U/L
ANION GAP SERPL CALC-SCNC: 12 MMOL/L
AST SERPL-CCNC: 15 U/L
BASOPHILS # BLD AUTO: 0.04 K/UL
BASOPHILS NFR BLD: 0.5 %
BILIRUB SERPL-MCNC: 0.7 MG/DL
BUN SERPL-MCNC: 13 MG/DL
CALCIUM SERPL-MCNC: 9.2 MG/DL
CHLORIDE SERPL-SCNC: 98 MMOL/L
CO2 SERPL-SCNC: 32 MMOL/L
CREAT SERPL-MCNC: 4.6 MG/DL
DIFFERENTIAL METHOD: ABNORMAL
EOSINOPHIL # BLD AUTO: 0.1 K/UL
EOSINOPHIL NFR BLD: 1.8 %
ERYTHROCYTE [DISTWIDTH] IN BLOOD BY AUTOMATED COUNT: 14.7 %
EST. GFR  (AFRICAN AMERICAN): 14 ML/MIN/1.73 M^2
EST. GFR  (NON AFRICAN AMERICAN): 12 ML/MIN/1.73 M^2
GLUCOSE SERPL-MCNC: 137 MG/DL
HCT VFR BLD AUTO: 29.3 %
HGB BLD-MCNC: 9.7 G/DL
LYMPHOCYTES # BLD AUTO: 2 K/UL
LYMPHOCYTES NFR BLD: 25.4 %
MCH RBC QN AUTO: 29 PG
MCHC RBC AUTO-ENTMCNC: 33.1 G/DL
MCV RBC AUTO: 88 FL
MONOCYTES # BLD AUTO: 0.8 K/UL
MONOCYTES NFR BLD: 10.7 %
NEUTROPHILS # BLD AUTO: 4.7 K/UL
NEUTROPHILS NFR BLD: 61.6 %
PLATELET # BLD AUTO: 229 K/UL
PMV BLD AUTO: 9.8 FL
POTASSIUM SERPL-SCNC: 3.2 MMOL/L
PROT SERPL-MCNC: 7.6 G/DL
RBC # BLD AUTO: 3.34 M/UL
SODIUM SERPL-SCNC: 142 MMOL/L
TROPONIN I SERPL DL<=0.01 NG/ML-MCNC: 0.04 NG/ML
WBC # BLD AUTO: 7.69 K/UL

## 2018-03-06 PROCEDURE — 84484 ASSAY OF TROPONIN QUANT: CPT

## 2018-03-06 PROCEDURE — 80053 COMPREHEN METABOLIC PANEL: CPT

## 2018-03-06 PROCEDURE — 63600175 PHARM REV CODE 636 W HCPCS: Performed by: EMERGENCY MEDICINE

## 2018-03-06 PROCEDURE — 99284 EMERGENCY DEPT VISIT MOD MDM: CPT | Mod: 25

## 2018-03-06 PROCEDURE — 85025 COMPLETE CBC W/AUTO DIFF WBC: CPT

## 2018-03-06 PROCEDURE — 93010 ELECTROCARDIOGRAM REPORT: CPT | Mod: ,,, | Performed by: INTERNAL MEDICINE

## 2018-03-06 PROCEDURE — 93005 ELECTROCARDIOGRAM TRACING: CPT

## 2018-03-06 PROCEDURE — 96374 THER/PROPH/DIAG INJ IV PUSH: CPT

## 2018-03-06 RX ORDER — HYDRALAZINE HYDROCHLORIDE 20 MG/ML
20 INJECTION INTRAMUSCULAR; INTRAVENOUS
Status: COMPLETED | OUTPATIENT
Start: 2018-03-06 | End: 2018-03-06

## 2018-03-06 RX ADMIN — HYDRALAZINE HYDROCHLORIDE 20 MG: 20 INJECTION INTRAMUSCULAR; INTRAVENOUS at 11:03

## 2018-03-06 NOTE — ED PROVIDER NOTES
"SCRIBE #1 NOTE: I, Corinne Mack, am scribing for, and in the presence of, Conner Johnson Jr., MD. I have scribed the entire note.      History      Chief Complaint   Patient presents with    Hypertension     no symptoms "I took all my meds this morning and my pressure be up"       Review of patient's allergies indicates:   Allergen Reactions    Augmentin [amoxicillin-pot clavulanate] Edema    Lisinopril      cough    Sulfa (sulfonamide antibiotics)      swelling        HPI   HPI    3/6/2018, 10:11 AM   History obtained from the patient      History of Present Illness: Conner Perez III is a 68 y.o. male patient with PMHx of CKD, DM, and HTN who presents to the Emergency Department for HTN which onset gradually this morning. Pt states he took all of his daily medications, but notes his BP is still up. Pt otherwise has no complaints. No mitigating or exacerbating factors reported. No associated sxs. Patient denies any fever, chills, diaphoresis, N/V, CP, SOB, back pain, neck pain, HA, dizziness, numbness, and all other sxs at this time. No further complaints or concerns at this time. Pt's next dialysis appointment is tomorrow.        Arrival mode: Personal vehicle      PCP: Raciel Angela MD       Past Medical History:  Past Medical History:   Diagnosis Date    CKD (chronic kidney disease), stage IV     Diabetes mellitus     Diabetes mellitus, type 2     HTN (hypertension)        Past Surgical History:  Past Surgical History:   Procedure Laterality Date    AV FISTULA PLACEMENT Right 06/2017    CIRCUMCISION  1978    TOE AMPUTATION Left 01/24/2017    4th toe         Family History:  Family History   Problem Relation Age of Onset    Hypertension Father     No Known Problems Mother        Social History:  Social History     Social History Main Topics    Smoking status: Former Smoker     Types: Cigarettes     Quit date: 1971    Smokeless tobacco: Never Used    Alcohol use No    Drug use: No    Sexual " activity: Not on file       ROS   Review of Systems   Constitutional: Negative for chills and fever.   Respiratory: Negative for cough and shortness of breath.    Cardiovascular: Negative for chest pain and leg swelling.        (+) HTN   Gastrointestinal: Negative for nausea and vomiting.   Musculoskeletal: Negative for back pain, neck pain and neck stiffness.   Skin: Negative for rash and wound.   Neurological: Negative for dizziness, light-headedness, numbness and headaches.   All other systems reviewed and are negative.    Physical Exam      Initial Vitals [03/06/18 1003]   BP Pulse Resp Temp SpO2   (!) 190/78 68 18 98.7 °F (37.1 °C) 98 %      MAP       115.33          Physical Exam  Nursing Notes and Vital Signs Reviewed.  Constitutional: Patient is in no apparent distress. Well-developed and well-nourished.  Head: Atraumatic. Normocephalic.  Eyes: PERRL. EOM intact. Conjunctivae are not pale. No scleral icterus.  ENT: Mucous membranes are moist. Oropharynx is clear and symmetric.    Neck: Supple. Full ROM. No lymphadenopathy.  Cardiovascular: Regular rate. Regular rhythm. No murmurs, rubs, or gallops. Distal pulses are 2+ and symmetric.  Pulmonary/Chest: No respiratory distress. Clear to auscultation bilaterally. No wheezing or rales.  Abdominal: Soft and non-distended.  There is no tenderness.  No rebound, guarding, or rigidity.   Musculoskeletal: Moves all extremities. No obvious deformities. No edema. No calf tenderness. Vas cath to the R chest wall. AV fistula to the RUE.  Skin: Warm and dry.  Neurological:  Alert, awake, and appropriate.  Normal speech.  No acute focal neurological deficits are appreciated.  Psychiatric: Normal affect. Good eye contact. Appropriate in content.    ED Course    Procedures  ED Vital Signs:  Vitals:    03/06/18 1003 03/06/18 1116 03/06/18 1120 03/06/18 1128   BP: (!) 190/78 (!) 196/101  (!) 194/94   Pulse: 68  69 68   Resp: 18      Temp: 98.7 °F (37.1 °C)      SpO2: 98%  100%  "100%   Weight: 99.8 kg (220 lb)      Height: 6' 6" (1.981 m)       03/06/18 1201   BP: (!) 191/88   Pulse: 68   Resp:    Temp:    SpO2: 100%   Weight:    Height:        Abnormal Lab Results:  Labs Reviewed   CBC W/ AUTO DIFFERENTIAL - Abnormal; Notable for the following:        Result Value    RBC 3.34 (*)     Hemoglobin 9.7 (*)     Hematocrit 29.3 (*)     RDW 14.7 (*)     All other components within normal limits   COMPREHENSIVE METABOLIC PANEL - Abnormal; Notable for the following:     Potassium 3.2 (*)     CO2 32 (*)     Glucose 137 (*)     Creatinine 4.6 (*)     ALT 7 (*)     eGFR if  14 (*)     eGFR if non  12 (*)     All other components within normal limits   TROPONIN I - Abnormal; Notable for the following:     Troponin I 0.038 (*)     All other components within normal limits        All Lab Results:  Results for orders placed or performed during the hospital encounter of 03/06/18   CBC auto differential   Result Value Ref Range    WBC 7.69 3.90 - 12.70 K/uL    RBC 3.34 (L) 4.60 - 6.20 M/uL    Hemoglobin 9.7 (L) 14.0 - 18.0 g/dL    Hematocrit 29.3 (L) 40.0 - 54.0 %    MCV 88 82 - 98 fL    MCH 29.0 27.0 - 31.0 pg    MCHC 33.1 32.0 - 36.0 g/dL    RDW 14.7 (H) 11.5 - 14.5 %    Platelets 229 150 - 350 K/uL    MPV 9.8 9.2 - 12.9 fL    Gran # (ANC) 4.7 1.8 - 7.7 K/uL    Lymph # 2.0 1.0 - 4.8 K/uL    Mono # 0.8 0.3 - 1.0 K/uL    Eos # 0.1 0.0 - 0.5 K/uL    Baso # 0.04 0.00 - 0.20 K/uL    Gran% 61.6 38.0 - 73.0 %    Lymph% 25.4 18.0 - 48.0 %    Mono% 10.7 4.0 - 15.0 %    Eosinophil% 1.8 0.0 - 8.0 %    Basophil% 0.5 0.0 - 1.9 %    Differential Method Automated    Comprehensive metabolic panel   Result Value Ref Range    Sodium 142 136 - 145 mmol/L    Potassium 3.2 (L) 3.5 - 5.1 mmol/L    Chloride 98 95 - 110 mmol/L    CO2 32 (H) 23 - 29 mmol/L    Glucose 137 (H) 70 - 110 mg/dL    BUN, Bld 13 8 - 23 mg/dL    Creatinine 4.6 (H) 0.5 - 1.4 mg/dL    Calcium 9.2 8.7 - 10.5 mg/dL    Total " Protein 7.6 6.0 - 8.4 g/dL    Albumin 3.7 3.5 - 5.2 g/dL    Total Bilirubin 0.7 0.1 - 1.0 mg/dL    Alkaline Phosphatase 66 55 - 135 U/L    AST 15 10 - 40 U/L    ALT 7 (L) 10 - 44 U/L    Anion Gap 12 8 - 16 mmol/L    eGFR if African American 14 (A) >60 mL/min/1.73 m^2    eGFR if non African American 12 (A) >60 mL/min/1.73 m^2   Troponin I   Result Value Ref Range    Troponin I 0.038 (H) 0.000 - 0.026 ng/mL       Imaging Results:  Imaging Results          X-Ray Chest AP Portable (Final result)  Result time 03/06/18 10:48:49    Final result by TERESA Kumar Sr., MD (03/06/18 10:48:49)                 Impression:        1. The lungs are clear.  2. A right subclavian venous line remains in place.   3. The size of the heart is prominent. This is characteristic of magnification.      Electronically signed by: TERESA KUMAR MD  Date:     03/06/18  Time:    10:48              Narrative:    One-view chest x-ray    Clinical History: Essential (primary) hypertension    Finding: Comparison was made to a prior examination performed on 3/4/2018. A right subclavian venous line remains in place. The size of the heart is prominent. The lungs are clear. There is no pneumothorax.  The costophrenic angles are sharp.                               The EKG was ordered, reviewed, and independently interpreted by the ED provider.  Interpretation time: 1034  Rate: 67 BPM  Rhythm: Sinus rhythm with PACs with aberrant conduction  Interpretation: Possible L atrial enlargement. Nonspecific T wave abnormality. No STEMI.           The Emergency Provider reviewed the vital signs and test results, which are outlined above.    ED Discussion     12:19 PM: Dr. Johnson discussed the pt's case with Dr. Lechuga (Nephrology) who recommends having the pt f/u in dialysis tomorrow.    12:23 PM: Reassessed pt at this time. Pt is awake, alert, and in no distress. Discussed with pt all pertinent ED information and results. Discussed pt dx and plan of tx. Gave pt  all f/u and return to the ED instructions. All questions and concerns were addressed at this time. Pt expresses understanding of information and instructions, and is comfortable with plan to discharge. Pt is stable for discharge.    I discussed with patient and/or family/caretaker that evaluation in the ED does not suggest any emergent or life threatening medical conditions requiring immediate intervention beyond what was provided in the ED, and I believe patient is safe for discharge.  Regardless, an unremarkable evaluation in the ED does not preclude the development or presence of a serious of life threatening condition. As such, patient was instructed to return immediately for any worsening or change in current symptoms.      ED Medication(s):  Medications   hydrALAZINE injection 20 mg (20 mg Intravenous Given 3/6/18 1124)       New Prescriptions    No medications on file       Follow-up Information     Go to  Steven Lechuga MD.    Specialty:  Nephrology  Why:  go to dialysis tommorrow for recheck of your blood pressue and to discuss dialysis - fluid removal  Contact information:  [address]  [address]  [facility]   [facility] [id number] [phone]                     Medical Decision Making    Medical Decision Making:   Clinical Tests:   Lab Tests: Ordered and Reviewed  Radiological Study: Ordered and Reviewed  Medical Tests: Ordered and Reviewed           Scribe Attestation:   Scribe #1: I performed the above scribed service and the documentation accurately describes the services I performed. I attest to the accuracy of the note.    Attending:   Physician Attestation Statement for Scribe #1: I, Conner Johnson Jr., MD, personally performed the services described in this documentation, as scribed by Corinne Mack, in my presence, and it is both accurate and complete.          Clinical Impression       ICD-10-CM ICD-9-CM   1. ESRD (end stage renal disease) on dialysis N18.6 585.6    Z99.2 V45.11    2. HTN (hypertension) I10 401.9       Disposition:   Disposition: Discharged  Condition: Stable         Conner Johnson Jr., MD  03/06/18 2019

## 2018-04-10 ENCOUNTER — HOSPITAL ENCOUNTER (EMERGENCY)
Facility: HOSPITAL | Age: 68
Discharge: HOME | End: 2018-04-10
Attending: EMERGENCY MEDICINE
Payer: MEDICARE

## 2018-04-10 VITALS
HEART RATE: 79 BPM | BODY MASS INDEX: 25.38 KG/M2 | OXYGEN SATURATION: 100 % | WEIGHT: 219.38 LBS | DIASTOLIC BLOOD PRESSURE: 88 MMHG | HEIGHT: 78 IN | SYSTOLIC BLOOD PRESSURE: 189 MMHG | TEMPERATURE: 98 F | RESPIRATION RATE: 11 BRPM

## 2018-04-10 DIAGNOSIS — Z99.2 ESRD (END STAGE RENAL DISEASE) ON DIALYSIS: Primary | ICD-10-CM

## 2018-04-10 DIAGNOSIS — I10 HYPERTENSION: ICD-10-CM

## 2018-04-10 DIAGNOSIS — N18.6 ESRD (END STAGE RENAL DISEASE) ON DIALYSIS: Primary | ICD-10-CM

## 2018-04-10 PROCEDURE — 96375 TX/PRO/DX INJ NEW DRUG ADDON: CPT

## 2018-04-10 PROCEDURE — 93005 ELECTROCARDIOGRAM TRACING: CPT

## 2018-04-10 PROCEDURE — 96376 TX/PRO/DX INJ SAME DRUG ADON: CPT

## 2018-04-10 PROCEDURE — 99284 EMERGENCY DEPT VISIT MOD MDM: CPT | Mod: 25

## 2018-04-10 PROCEDURE — 25000003 PHARM REV CODE 250: Performed by: EMERGENCY MEDICINE

## 2018-04-10 PROCEDURE — 63600175 PHARM REV CODE 636 W HCPCS: Performed by: EMERGENCY MEDICINE

## 2018-04-10 PROCEDURE — 93010 ELECTROCARDIOGRAM REPORT: CPT | Mod: ,,, | Performed by: INTERNAL MEDICINE

## 2018-04-10 PROCEDURE — 96374 THER/PROPH/DIAG INJ IV PUSH: CPT

## 2018-04-10 RX ORDER — CLONIDINE HYDROCHLORIDE 0.2 MG/1
0.2 TABLET ORAL
Status: COMPLETED | OUTPATIENT
Start: 2018-04-10 | End: 2018-04-10

## 2018-04-10 RX ORDER — HYDRALAZINE HYDROCHLORIDE 20 MG/ML
20 INJECTION INTRAMUSCULAR; INTRAVENOUS
Status: COMPLETED | OUTPATIENT
Start: 2018-04-10 | End: 2018-04-10

## 2018-04-10 RX ADMIN — HYDRALAZINE HYDROCHLORIDE 20 MG: 20 INJECTION INTRAMUSCULAR; INTRAVENOUS at 02:04

## 2018-04-10 RX ADMIN — LORAZEPAM 1 MG: 2 INJECTION, SOLUTION INTRAMUSCULAR; INTRAVENOUS at 03:04

## 2018-04-10 RX ADMIN — CLONIDINE HYDROCHLORIDE 0.2 MG: 0.2 TABLET ORAL at 05:04

## 2018-04-10 RX ADMIN — HYDRALAZINE HYDROCHLORIDE 20 MG: 20 INJECTION INTRAMUSCULAR; INTRAVENOUS at 05:04

## 2018-04-10 RX ADMIN — HYDRALAZINE HYDROCHLORIDE 20 MG: 20 INJECTION INTRAMUSCULAR; INTRAVENOUS at 03:04

## 2018-04-10 NOTE — ED PROVIDER NOTES
SCRIBE #1 NOTE: I, Alex Velarde, am scribing for, and in the presence of, Norma Salomon MD. I have scribed the entire note.      History      Chief Complaint   Patient presents with    Hypertension     reports high BP at home       Review of patient's allergies indicates:   Allergen Reactions    Augmentin [amoxicillin-pot clavulanate] Edema    Lisinopril      cough    Sulfa (sulfonamide antibiotics)      swelling        HPI   HPI    4/10/2018, 1:30 AM   History obtained from the patient      History of Present Illness: Conner Perez III is a 68 y.o. male patient who presents to the Emergency Department for HTN, which was noticed this PM. Pt's BP is 207/99 at this time. Symptoms are constant and moderate in severity. No mitigating or exacerbating factors reported. No other associated sxs reported. Pt is currently on Metoprolol, Clonidine, hydralazine, and losartan. Pt reports that he needs a refill of his losartan, but is otherwise compliant w/ his BP medications. Patient denies any chest pain, headache, dizziness, focal weakness/ numbness, neck pain, n/v/d, visual disturbance, and all other sxs at this time. Pt is currently on dialysis, and was last dialyzed today. No further complaints or concerns at this time.       Arrival mode: Personal vehicle    PCP: Raciel Angela MD       Past Medical History:  Past Medical History:   Diagnosis Date    CKD (chronic kidney disease), stage IV     Diabetes mellitus     Diabetes mellitus, type 2     HTN (hypertension)        Past Surgical History:  Past Surgical History:   Procedure Laterality Date    AV FISTULA PLACEMENT Right 06/2017    CIRCUMCISION  1978    TOE AMPUTATION Left 01/24/2017    4th toe         Family History:  Family History   Problem Relation Age of Onset    Hypertension Father     No Known Problems Mother        Social History:  Social History     Social History Main Topics    Smoking status: Former Smoker     Types: Cigarettes     Quit date:  1971    Smokeless tobacco: Never Used    Alcohol use No    Drug use: No    Sexual activity: Not on file       ROS   Review of Systems   Constitutional: Negative for chills and fever.        + HTN   Eyes: Negative for visual disturbance.   Respiratory: Negative for cough.    Cardiovascular: Negative for chest pain.   Gastrointestinal: Negative for abdominal pain, diarrhea, nausea and vomiting.   Genitourinary: Negative for difficulty urinating and urgency.   Neurological: Negative for dizziness, syncope, weakness, light-headedness, numbness and headaches.   All other systems reviewed and are negative.      Physical Exam      Initial Vitals   BP Pulse Resp Temp SpO2   04/10/18 0115 04/10/18 0115 04/10/18 0115 04/10/18 0114 04/10/18 0115   (!) 207/99 70 (!) 21 98.2 °F (36.8 °C) 98 %      MAP       04/10/18 0115       135          Physical Exam  Nursing Notes and Vital Signs Reviewed.  Constitutional: Patient is in no apparent distress. Well-developed and well-nourished.  Head: Atraumatic. Normocephalic.  Eyes: PERRL. EOM intact. Conjunctivae are not pale. No scleral icterus.  ENT: Mucous membranes are moist. Oropharynx is clear and symmetric.    Neck: Supple. Full ROM. No lymphadenopathy.  Cardiovascular: Regular rate. Regular rhythm. No murmurs, rubs, or gallops. Distal pulses are 2+ and symmetric.  Pulmonary/Chest: No respiratory distress. Clear to auscultation bilaterally. No wheezing or rales.  Abdominal: Soft and non-distended.  There is no tenderness.  No rebound, guarding, or rigidity. Good bowel sounds.  Musculoskeletal: Moves all extremities. No obvious deformities. No edema. No calf tenderness. AV shunt noted to right forearm, w/ positive thrill.   Skin: Warm and dry.  Neurological:  Alert, awake, and appropriate.  Normal speech.  No acute focal neurological deficits are appreciated.  Psychiatric: Normal affect. Good eye contact. Appropriate in content.    ED Course    Procedures  ED Vital Signs:  Vitals:  "   04/10/18 0114 04/10/18 0115 04/10/18 0216 04/10/18 0246   BP:  (!) 207/99 (!) 218/105 (!) 208/102   Pulse:  70 74 75   Resp:  (!) 21 16 18   Temp: 98.2 °F (36.8 °C) 98.2 °F (36.8 °C)     TempSrc: Oral Oral     SpO2:  98% 97% 100%   Weight:  99.5 kg (219 lb 5.7 oz)     Height:  6' 6" (1.981 m)      04/10/18 0301 04/10/18 0316 04/10/18 0323 04/10/18 0401   BP: (!) 212/99 (!) 198/98 (!) 198/95 (!) 181/88   Pulse: 79 76 77 69   Resp: 13 (!) 9 12 15   Temp:   98 °F (36.7 °C)    TempSrc:   Oral    SpO2: 99% 99% 98% 97%   Weight:       Height:        04/10/18 0446 04/10/18 0501 04/10/18 0531   BP: (!) 189/91 (!) 193/96 (!) 189/88   Pulse: 71 75 79   Resp: 19 12 11   Temp:      TempSrc:      SpO2: 98% 100% 100%   Weight:      Height:           The EKG was ordered, reviewed, and independently interpreted by the ED provider.  Interpretation time: 1:44  Rate: 72 BPM  Rhythm: normal sinus rhythm  Interpretation: Nonspecific T wave abnormality. Prolonged QT. No STEMI.           The Emergency Provider reviewed the vital signs and test results, which are outlined above.    ED Discussion     5:20 AM: Discussed with pt all pertinent ED information and results. Discussed pt dx and plan of tx. Gave pt all f/u and return to the ED instructions. All questions and concerns were addressed at this time. Pt expresses understanding of information and instructions, and is comfortable with plan to discharge. Pt is stable for discharge.    I discussed with patient and/or family/caretaker that evaluation in the ED does not suggest any emergent or life threatening medical conditions requiring immediate intervention beyond what was provided in the ED, and I believe patient is safe for discharge.  Regardless, an unremarkable evaluation in the ED does not preclude the development or presence of a serious of life threatening condition. As such, patient was instructed to return immediately for any worsening or change in current symptoms.      ED " Medication(s):  Medications   hydrALAZINE injection 20 mg (20 mg Intravenous Given 4/10/18 0220)   hydrALAZINE injection 20 mg (20 mg Intravenous Given 4/10/18 0312)   lorazepam (ATIVAN) injection 1 mg (1 mg Intravenous Given 4/10/18 0312)   hydrALAZINE injection 20 mg (20 mg Intravenous Given 4/10/18 2665)   cloNIDine tablet 0.2 mg (0.2 mg Oral Given 4/10/18 2836)       Discharge Medication List as of 4/10/2018  5:10 AM          Follow-up Information     Raciel Angela MD In 2 days.    Specialty:  Family Medicine  Contact information:  [address]  [address]  [facility] [address] [phone]             [facility]    Specialty:  Emergency Medicine  Why:  As needed  Contact information:  [address]  [facility] [address] [phone]                   Medical Decision Making    Medical Decision Making:   Clinical Tests:   Medical Tests: Ordered and Reviewed           Scribe Attestation:   Scribe #1: I performed the above scribed service and the documentation accurately describes the services I performed. I attest to the accuracy of the note.    Attending:   Physician Attestation Statement for Scribe #1: I, Norma Salomon MD, personally performed the services described in this documentation, as scribed by Alex Velarde, in my presence, and it is both accurate and complete.          Clinical Impression       ICD-10-CM ICD-9-CM   1. ESRD (end stage renal disease) on dialysis N18.6 585.6    Z99.2 V45.11   2. Hypertension I10 401.9       Disposition:   Disposition: Discharged  Condition: Stable         Norma Salomon MD  04/12/18 1699

## 2018-04-10 NOTE — ED NOTES
Pt sitting upright in bed, aaox4, in no acute distress. Pt denies any complaint at this time and states he feels better since the admin of recent meds. Pt updated on POC. Will continue to monitor. Bed in low position, side rails up, call light in reach.

## 2018-04-12 ENCOUNTER — HOSPITAL ENCOUNTER (EMERGENCY)
Facility: HOSPITAL | Age: 68
Discharge: HOME | End: 2018-04-12
Attending: EMERGENCY MEDICINE
Payer: MEDICARE

## 2018-04-12 VITALS
TEMPERATURE: 98 F | WEIGHT: 217 LBS | HEIGHT: 78 IN | BODY MASS INDEX: 25.11 KG/M2 | DIASTOLIC BLOOD PRESSURE: 98 MMHG | OXYGEN SATURATION: 99 % | RESPIRATION RATE: 17 BRPM | HEART RATE: 70 BPM | SYSTOLIC BLOOD PRESSURE: 192 MMHG

## 2018-04-12 DIAGNOSIS — I10 ESSENTIAL HYPERTENSION: Primary | Chronic | ICD-10-CM

## 2018-04-12 DIAGNOSIS — N18.6 ESRD (END STAGE RENAL DISEASE): ICD-10-CM

## 2018-04-12 LAB
ALBUMIN SERPL BCP-MCNC: 3.7 G/DL
ALP SERPL-CCNC: 67 U/L
ALT SERPL W/O P-5'-P-CCNC: 9 U/L
ANION GAP SERPL CALC-SCNC: 10 MMOL/L
APTT BLDCRRT: 29.4 SEC
AST SERPL-CCNC: 17 U/L
BASOPHILS # BLD AUTO: 0.04 K/UL
BASOPHILS NFR BLD: 0.7 %
BILIRUB SERPL-MCNC: 0.6 MG/DL
BNP SERPL-MCNC: 1513 PG/ML
BUN SERPL-MCNC: 19 MG/DL
CALCIUM SERPL-MCNC: 8.7 MG/DL
CHLORIDE SERPL-SCNC: 99 MMOL/L
CK MB SERPL-MCNC: 2.2 NG/ML
CK MB SERPL-RTO: 1 %
CK SERPL-CCNC: 218 U/L
CK SERPL-CCNC: 218 U/L
CO2 SERPL-SCNC: 31 MMOL/L
CREAT SERPL-MCNC: 6.3 MG/DL
DIFFERENTIAL METHOD: ABNORMAL
EOSINOPHIL # BLD AUTO: 0.1 K/UL
EOSINOPHIL NFR BLD: 1.5 %
ERYTHROCYTE [DISTWIDTH] IN BLOOD BY AUTOMATED COUNT: 14.7 %
EST. GFR  (AFRICAN AMERICAN): 10 ML/MIN/1.73 M^2
EST. GFR  (NON AFRICAN AMERICAN): 8 ML/MIN/1.73 M^2
GLUCOSE SERPL-MCNC: 178 MG/DL
HCT VFR BLD AUTO: 36.9 %
HGB BLD-MCNC: 12 G/DL
INR PPP: 1.1
LYMPHOCYTES # BLD AUTO: 1.4 K/UL
LYMPHOCYTES NFR BLD: 26.1 %
MAGNESIUM SERPL-MCNC: 2 MG/DL
MCH RBC QN AUTO: 28.8 PG
MCHC RBC AUTO-ENTMCNC: 32.5 G/DL
MCV RBC AUTO: 89 FL
MONOCYTES # BLD AUTO: 0.6 K/UL
MONOCYTES NFR BLD: 10.6 %
NEUTROPHILS # BLD AUTO: 3.3 K/UL
NEUTROPHILS NFR BLD: 61.1 %
PHOSPHATE SERPL-MCNC: 2.9 MG/DL
PLATELET # BLD AUTO: 202 K/UL
PMV BLD AUTO: 10.8 FL
POTASSIUM SERPL-SCNC: 4.4 MMOL/L
PROT SERPL-MCNC: 7.4 G/DL
PROTHROMBIN TIME: 11.8 SEC
RBC # BLD AUTO: 4.16 M/UL
SODIUM SERPL-SCNC: 140 MMOL/L
TROPONIN I SERPL DL<=0.01 NG/ML-MCNC: 0.03 NG/ML
WBC # BLD AUTO: 5.37 K/UL

## 2018-04-12 PROCEDURE — 82553 CREATINE MB FRACTION: CPT

## 2018-04-12 PROCEDURE — 85730 THROMBOPLASTIN TIME PARTIAL: CPT

## 2018-04-12 PROCEDURE — 85025 COMPLETE CBC W/AUTO DIFF WBC: CPT

## 2018-04-12 PROCEDURE — 93005 ELECTROCARDIOGRAM TRACING: CPT

## 2018-04-12 PROCEDURE — 84100 ASSAY OF PHOSPHORUS: CPT

## 2018-04-12 PROCEDURE — 83735 ASSAY OF MAGNESIUM: CPT

## 2018-04-12 PROCEDURE — 99284 EMERGENCY DEPT VISIT MOD MDM: CPT | Mod: 25

## 2018-04-12 PROCEDURE — 80053 COMPREHEN METABOLIC PANEL: CPT

## 2018-04-12 PROCEDURE — 83880 ASSAY OF NATRIURETIC PEPTIDE: CPT

## 2018-04-12 PROCEDURE — 84484 ASSAY OF TROPONIN QUANT: CPT

## 2018-04-12 PROCEDURE — 93010 ELECTROCARDIOGRAM REPORT: CPT | Mod: ,,, | Performed by: INTERNAL MEDICINE

## 2018-04-12 PROCEDURE — 82550 ASSAY OF CK (CPK): CPT

## 2018-04-12 PROCEDURE — 85610 PROTHROMBIN TIME: CPT

## 2018-04-12 NOTE — ED NOTES
Appearance: Sitting up in bed with no acute distress noted.     HEENT: Atraumatic. Mucous membranes moist and intact.   Skin: Skin is warm and dry with good skin turgor; Skin is intact; color consistent with ethnicity.  Musculoskeletal: Moves all extremities well in full range of motion. No obvious deformities or swelling noted.   Respiratory: Airway open and patent. Respirations spontaneous, even and unlabored. No accessory muscles in use.  Cardiac: Regular rate and rhythm. No peripheral edema noted. 2+ pulses palpated peripherally. Vascath to R subclavian and R forearm fistula with bruit and thrill. Pt c/o HTN.  Abdomen: Soft, non-tender to palpation.  Neurologic: Alert, awake, and appropriate. Normal speech. No acute neurological deficits noted.

## 2018-04-12 NOTE — ED PROVIDER NOTES
SCRIBE #1 NOTE: I, Angella Gill, am scribing for, and in the presence of, Loyd Germain MD. I have scribed the entire note.      History      Chief Complaint   Patient presents with    Hypertension     BP high 223/110 at home       Review of patient's allergies indicates:   Allergen Reactions    Augmentin [amoxicillin-pot clavulanate] Edema    Lisinopril      cough    Sulfa (sulfonamide antibiotics)      swelling        HPI   HPI    4/12/2018, 5:27 PM   History obtained from the patient      History of Present Illness: Conner Perez III is a 68 y.o. male patient who presents to the Emergency Department for HTN which onset gradually today. Pt reports a BP of 223/110. Symptoms are constant and moderate in severity. No mitigating or exacerbating factors reported. Pt is asymptomatic. Patient denies any fever, chills, HA, CP, SOB, palpitations, BLE edema, photophobia, visual disturbances, dizziness, and all other sxs at this time. Pt dialyzes MWF. Pt last dialyzed yesterday. No further complaints or concerns at this time.       Arrival mode: Personal vehicle      PCP: Raciel Angela MD       Past Medical History:  Past Medical History:   Diagnosis Date    CKD (chronic kidney disease), stage IV     Diabetes mellitus     Diabetes mellitus, type 2     HTN (hypertension)        Past Surgical History:  Past Surgical History:   Procedure Laterality Date    AV FISTULA PLACEMENT Right 06/2017    CIRCUMCISION  1978    TOE AMPUTATION Left 01/24/2017    4th toe         Family History:  Family History   Problem Relation Age of Onset    Hypertension Father     No Known Problems Mother        Social History:  Social History     Social History Main Topics    Smoking status: Former Smoker     Types: Cigarettes     Quit date: 1971    Smokeless tobacco: Never Used    Alcohol use No    Drug use: No    Sexual activity: unknown       ROS   Review of Systems   Constitutional: Negative for chills and fever.        (+) HTN    HENT: Negative for sore throat.    Eyes: Negative for photophobia and visual disturbance.   Respiratory: Negative for shortness of breath.    Cardiovascular: Negative for chest pain, palpitations and leg swelling.   Gastrointestinal: Negative for nausea and vomiting.   Genitourinary: Negative for dysuria.   Musculoskeletal: Negative for back pain and myalgias.   Skin: Negative for rash.   Neurological: Negative for dizziness, weakness, numbness and headaches.   Hematological: Does not bruise/bleed easily.   All other systems reviewed and are negative.    Physical Exam      Initial Vitals [04/12/18 1638]   BP Pulse Resp Temp SpO2   (!) 177/75 67 20 97.5 °F (36.4 °C) 95 %      MAP       109          Physical Exam  Nursing Notes and Vital Signs Reviewed.  Constitutional: Patient is in no acute distress. Well-developed and well-nourished.  Head: Atraumatic. Normocephalic.  Eyes: PERRL. EOM intact. Conjunctivae are not pale. No scleral icterus.  ENT: Mucous membranes are moist. Oropharynx is clear and symmetric.    Neck: Supple. Full ROM. No lymphadenopathy.  Cardiovascular: Regular rate. Regular rhythm. No murmurs, rubs, or gallops. Distal pulses are 2+ and symmetric.  Pulmonary/Chest: No respiratory distress. Clear to auscultation bilaterally. No wheezing or rales. Vas cath to R chest wall.  Abdominal: Soft and non-distended.  There is no tenderness.  No rebound, guarding, or rigidity.   Musculoskeletal: Fistula to RUE with good thrill. Moves all extremities. No obvious deformities. No edema. No calf tenderness.  Skin: Warm and dry.  Neurological:  Alert, awake, and appropriate.  Normal speech.  No acute focal neurological deficits are appreciated.  Psychiatric: Normal affect. Good eye contact. Appropriate in content.    ED Course    Procedures  ED Vital Signs:  Vitals:    04/12/18 1638 04/12/18 1748 04/12/18 1847   BP: (!) 177/75  (!) 186/96   Pulse: 67 65 67   Resp: 20  14   Temp: 97.5 °F (36.4 °C)     TempSrc:  "Oral     SpO2: 95%  98%   Weight: 98.4 kg (217 lb)     Height: 6' 6" (1.981 m)         Abnormal Lab Results:  Labs Reviewed   CBC W/ AUTO DIFFERENTIAL - Abnormal; Notable for the following:        Result Value    RBC 4.16 (*)     Hemoglobin 12.0 (*)     Hematocrit 36.9 (*)     RDW 14.7 (*)     All other components within normal limits   COMPREHENSIVE METABOLIC PANEL - Abnormal; Notable for the following:     CO2 31 (*)     Glucose 178 (*)     Creatinine 6.3 (*)     ALT 9 (*)     eGFR if  10 (*)     eGFR if non  8 (*)     All other components within normal limits   CK-MB - Abnormal; Notable for the following:      (*)     All other components within normal limits   CK - Abnormal; Notable for the following:      (*)     All other components within normal limits   TROPONIN I - Abnormal; Notable for the following:     Troponin I 0.028 (*)     All other components within normal limits   B-TYPE NATRIURETIC PEPTIDE - Abnormal; Notable for the following:     BNP 1,513 (*)     All other components within normal limits   PROTIME-INR   APTT   MAGNESIUM   PHOSPHORUS        All Lab Results:  Results for orders placed or performed during the hospital encounter of 04/12/18   CBC auto differential   Result Value Ref Range    WBC 5.37 3.90 - 12.70 K/uL    RBC 4.16 (L) 4.60 - 6.20 M/uL    Hemoglobin 12.0 (L) 14.0 - 18.0 g/dL    Hematocrit 36.9 (L) 40.0 - 54.0 %    MCV 89 82 - 98 fL    MCH 28.8 27.0 - 31.0 pg    MCHC 32.5 32.0 - 36.0 g/dL    RDW 14.7 (H) 11.5 - 14.5 %    Platelets 202 150 - 350 K/uL    MPV 10.8 9.2 - 12.9 fL    Gran # (ANC) 3.3 1.8 - 7.7 K/uL    Lymph # 1.4 1.0 - 4.8 K/uL    Mono # 0.6 0.3 - 1.0 K/uL    Eos # 0.1 0.0 - 0.5 K/uL    Baso # 0.04 0.00 - 0.20 K/uL    Gran% 61.1 38.0 - 73.0 %    Lymph% 26.1 18.0 - 48.0 %    Mono% 10.6 4.0 - 15.0 %    Eosinophil% 1.5 0.0 - 8.0 %    Basophil% 0.7 0.0 - 1.9 %    Differential Method Automated    Comprehensive metabolic panel "   Result Value Ref Range    Sodium 140 136 - 145 mmol/L    Potassium 4.4 3.5 - 5.1 mmol/L    Chloride 99 95 - 110 mmol/L    CO2 31 (H) 23 - 29 mmol/L    Glucose 178 (H) 70 - 110 mg/dL    BUN, Bld 19 8 - 23 mg/dL    Creatinine 6.3 (H) 0.5 - 1.4 mg/dL    Calcium 8.7 8.7 - 10.5 mg/dL    Total Protein 7.4 6.0 - 8.4 g/dL    Albumin 3.7 3.5 - 5.2 g/dL    Total Bilirubin 0.6 0.1 - 1.0 mg/dL    Alkaline Phosphatase 67 55 - 135 U/L    AST 17 10 - 40 U/L    ALT 9 (L) 10 - 44 U/L    Anion Gap 10 8 - 16 mmol/L    eGFR if African American 10 (A) >60 mL/min/1.73 m^2    eGFR if non African American 8 (A) >60 mL/min/1.73 m^2   Protime-INR   Result Value Ref Range    Prothrombin Time 11.8 9.0 - 12.5 sec    INR 1.1 0.8 - 1.2   APTT   Result Value Ref Range    aPTT 29.4 21.0 - 32.0 sec   Magnesium   Result Value Ref Range    Magnesium 2.0 1.6 - 2.6 mg/dL   Phosphorus   Result Value Ref Range    Phosphorus 2.9 2.7 - 4.5 mg/dL   CK-MB   Result Value Ref Range     (H) 20 - 200 U/L    CPK MB 2.2 0.1 - 6.5 ng/mL    MB% 1.0 0.0 - 5.0 %   CK   Result Value Ref Range     (H) 20 - 200 U/L   Troponin I   Result Value Ref Range    Troponin I 0.028 (H) 0.000 - 0.026 ng/mL   Brain natriuretic peptide   Result Value Ref Range    BNP 1,513 (H) 0 - 99 pg/mL       Imaging Results:  Imaging Results          X-Ray Chest 1 View (Final result)  Result time 04/12/18 18:04:55    Final result by Stefanie Cantrell MD (Timothy) (04/12/18 18:04:55)                 Impression:     Comparison 03/06/2018.  Vas-Cath are in good position.  Stable unchanged cardiomegaly.Clear lungs.         Electronically signed by: STEFANIE CANTRELL MD  Date:     04/12/18  Time:    18:04              Narrative:    Chest, 1 view.    Clinical History: Hypertension                             The EKG was ordered, reviewed, and independently interpreted by the ED provider.  Interpretation time: 17:49  Rate: 64 BPM  Rhythm: normal sinus rhythm  Interpretation: Lateral infarct.  No STEMI.         The Emergency Provider reviewed the vital signs and test results, which are outlined above.    ED Discussion     7:52 PM: Reassessed pt at this time.  Pt's BP has improved. Advised pt to take an extra dose of Clonidine at home if his BP remains elevated for 2 readings. Discussed with pt all pertinent ED information and results. Discussed pt dx and plan of tx. Advised pt to f/u outpatient with his PCP. Gave pt all f/u and return to the ED instructions. All questions and concerns were addressed at this time. Pt expresses understanding of information and instructions, and is comfortable with plan to discharge. Pt is stable for discharge.    I discussed with patient and/or family/caretaker that evaluation in the ED does not suggest any emergent or life threatening medical conditions requiring immediate intervention beyond what was provided in the ED, and I believe patient is safe for discharge. Regardless, an unremarkable evaluation in the ED does not preclude the development or presence of a serious or life threatening condition. As such, patient was instructed to return immediately for any worsening or change in current symptoms.      ED Medication(s):  Medications - No data to display    New Prescriptions    No medications on file       Follow-up Information     Raciel Angela MD In 1 day.    Specialty:  Family Medicine  Contact information:  [address]  [address]  [facility] [address] [phone]             [facility] .    Specialty:  Emergency Medicine  Why:  If symptoms worsen  Contact information:  [address]  [facility] [address] [phone]                   Medical Decision Making    Medical Decision Making:   Clinical Tests:   Lab Tests: Ordered and Reviewed  Radiological Study: Ordered and Reviewed  Medical Tests: Ordered and Reviewed           Scribe Attestation:   Scribe #1: I performed the above scribed service and the  documentation accurately describes the services I performed. I attest to the accuracy of the note.    Attending:   Physician Attestation Statement for Scribe #1: I, Loyd Germain MD, personally performed the services described in this documentation, as scribed by Angella Gill, in my presence, and it is both accurate and complete.          Clinical Impression       ICD-10-CM ICD-9-CM   1. Essential hypertension I10 401.9   2. ESRD (end stage renal disease) N18.6 585.6       Disposition:   Disposition: Discharged  Condition: Stable         Loyd Germain MD  04/16/18 1532

## 2018-04-14 ENCOUNTER — HOSPITAL ENCOUNTER (EMERGENCY)
Facility: HOSPITAL | Age: 68
Discharge: HOME | End: 2018-04-14
Attending: EMERGENCY MEDICINE
Payer: MEDICARE

## 2018-04-14 VITALS
TEMPERATURE: 98 F | RESPIRATION RATE: 22 BRPM | HEIGHT: 78 IN | DIASTOLIC BLOOD PRESSURE: 90 MMHG | HEART RATE: 60 BPM | SYSTOLIC BLOOD PRESSURE: 186 MMHG | OXYGEN SATURATION: 95 %

## 2018-04-14 DIAGNOSIS — N18.6 STAGE 5 CHRONIC KIDNEY DISEASE ON CHRONIC DIALYSIS: Primary | ICD-10-CM

## 2018-04-14 DIAGNOSIS — F41.9 ANXIETY: ICD-10-CM

## 2018-04-14 DIAGNOSIS — R07.89 CHEST TIGHTNESS: ICD-10-CM

## 2018-04-14 DIAGNOSIS — R07.89 FEELING OF CHEST TIGHTNESS: ICD-10-CM

## 2018-04-14 DIAGNOSIS — Z99.2 STAGE 5 CHRONIC KIDNEY DISEASE ON CHRONIC DIALYSIS: Primary | ICD-10-CM

## 2018-04-14 LAB
ALBUMIN SERPL BCP-MCNC: 3.6 G/DL
ALP SERPL-CCNC: 65 U/L
ALT SERPL W/O P-5'-P-CCNC: 9 U/L
ANION GAP SERPL CALC-SCNC: 12 MMOL/L
AST SERPL-CCNC: 16 U/L
BASOPHILS # BLD AUTO: 0.04 K/UL
BASOPHILS NFR BLD: 0.7 %
BILIRUB SERPL-MCNC: 0.6 MG/DL
BUN SERPL-MCNC: 15 MG/DL
CALCIUM SERPL-MCNC: 8.9 MG/DL
CHLORIDE SERPL-SCNC: 99 MMOL/L
CO2 SERPL-SCNC: 29 MMOL/L
CREAT SERPL-MCNC: 6.5 MG/DL
DIFFERENTIAL METHOD: ABNORMAL
EOSINOPHIL # BLD AUTO: 0.1 K/UL
EOSINOPHIL NFR BLD: 2.4 %
ERYTHROCYTE [DISTWIDTH] IN BLOOD BY AUTOMATED COUNT: 15 %
EST. GFR  (AFRICAN AMERICAN): 9 ML/MIN/1.73 M^2
EST. GFR  (NON AFRICAN AMERICAN): 8 ML/MIN/1.73 M^2
GLUCOSE SERPL-MCNC: 119 MG/DL
HCT VFR BLD AUTO: 37 %
HGB BLD-MCNC: 11.9 G/DL
LYMPHOCYTES # BLD AUTO: 1.8 K/UL
LYMPHOCYTES NFR BLD: 29.6 %
MCH RBC QN AUTO: 28.7 PG
MCHC RBC AUTO-ENTMCNC: 32.2 G/DL
MCV RBC AUTO: 89 FL
MONOCYTES # BLD AUTO: 0.8 K/UL
MONOCYTES NFR BLD: 12.6 %
NEUTROPHILS # BLD AUTO: 3.3 K/UL
NEUTROPHILS NFR BLD: 54.7 %
PLATELET # BLD AUTO: 209 K/UL
PMV BLD AUTO: 10.3 FL
POTASSIUM SERPL-SCNC: 4.2 MMOL/L
PROT SERPL-MCNC: 7.3 G/DL
RBC # BLD AUTO: 4.15 M/UL
SODIUM SERPL-SCNC: 140 MMOL/L
TROPONIN I SERPL DL<=0.01 NG/ML-MCNC: 0.04 NG/ML
WBC # BLD AUTO: 5.95 K/UL

## 2018-04-14 PROCEDURE — 25000003 PHARM REV CODE 250: Performed by: EMERGENCY MEDICINE

## 2018-04-14 PROCEDURE — 36415 COLL VENOUS BLD VENIPUNCTURE: CPT

## 2018-04-14 PROCEDURE — 85025 COMPLETE CBC W/AUTO DIFF WBC: CPT

## 2018-04-14 PROCEDURE — 80053 COMPREHEN METABOLIC PANEL: CPT

## 2018-04-14 PROCEDURE — 84484 ASSAY OF TROPONIN QUANT: CPT

## 2018-04-14 PROCEDURE — 93005 ELECTROCARDIOGRAM TRACING: CPT

## 2018-04-14 PROCEDURE — 99284 EMERGENCY DEPT VISIT MOD MDM: CPT | Mod: 25

## 2018-04-14 PROCEDURE — 93010 ELECTROCARDIOGRAM REPORT: CPT | Mod: ,,, | Performed by: INTERNAL MEDICINE

## 2018-04-14 RX ORDER — ALPRAZOLAM 0.5 MG/1
0.5 TABLET ORAL
Status: COMPLETED | OUTPATIENT
Start: 2018-04-14 | End: 2018-04-14

## 2018-04-14 RX ORDER — ALPRAZOLAM 0.5 MG/1
0.5 TABLET ORAL 2 TIMES DAILY PRN
Qty: 30 TABLET | Refills: 0 | Status: SHIPPED | OUTPATIENT
Start: 2018-04-14 | End: 2018-05-04

## 2018-04-14 RX ADMIN — ALPRAZOLAM 0.5 MG: 0.5 TABLET ORAL at 11:04

## 2018-04-14 NOTE — ED PROVIDER NOTES
SCRIBE #1 NOTE: I, Corinne Mack, am scribing for, and in the presence of, Conner Johnson Jr., MD. I have scribed the entire note.      History      Chief Complaint   Patient presents with    Chest Pain     chest tightness since 4 am today, went to dialysis yesterday, BP this morning was 204/104.       Review of patient's allergies indicates:   Allergen Reactions    Augmentin [amoxicillin-pot clavulanate] Edema    Lisinopril      cough    Sulfa (sulfonamide antibiotics)      swelling        HPI   HPI    4/14/2018, 11:19 AM   History obtained from the patient      History of Present Illness: Conner Perez III is a 68 y.o. male patient with PMHx of 2 heart caths, CKD, DM, and HTN who presents to the Emergency Department for chest tightness which onset gradually today at 4:00 AM. Symptoms are constant and moderate in severity. No mitigating or exacerbating factors reported. Associated sxs include cough. Patient denies any fever, chills, diaphoresis, SOB, N/V, abd pain, neck pain, back pain, HA, dizziness, and all other sxs at this time. No prior Tx reported. Pt last had dialysis yesterday. No further complaints or concerns at this time.         Arrival mode: Personal vehicle    PCP: Raciel Angela MD       Past Medical History:  Past Medical History:   Diagnosis Date    CKD (chronic kidney disease), stage IV     Diabetes mellitus     Diabetes mellitus, type 2     HTN (hypertension)        Past Surgical History:  Past Surgical History:   Procedure Laterality Date    AV FISTULA PLACEMENT Right 06/2017    CIRCUMCISION  1978    TOE AMPUTATION Left 01/24/2017    4th toe         Family History:  Family History   Problem Relation Age of Onset    Hypertension Father     No Known Problems Mother        Social History:  Social History     Social History Main Topics    Smoking status: Former Smoker     Types: Cigarettes     Quit date: 1971    Smokeless tobacco: Never Used    Alcohol use No    Drug use: No     "Sexual activity: Not on file       ROS   Review of Systems   Constitutional: Negative for chills, diaphoresis and fever.   Respiratory: Positive for cough and chest tightness. Negative for shortness of breath.    Cardiovascular: Negative for chest pain and leg swelling.   Gastrointestinal: Negative for abdominal pain, diarrhea, nausea and vomiting.   Musculoskeletal: Negative for back pain, neck pain and neck stiffness.   Skin: Negative for rash and wound.   Neurological: Negative for dizziness, light-headedness, numbness and headaches.   All other systems reviewed and are negative.    Physical Exam      Initial Vitals [04/14/18 1102]   BP Pulse Resp Temp SpO2   (!) 146/74 60 18 -- 95 %      MAP       98          Physical Exam  Nursing Notes and Vital Signs Reviewed.  Constitutional: Patient is in no apparent distress. Well-developed and well-nourished.  Head: Atraumatic. Normocephalic.  Eyes: PERRL. EOM intact. Conjunctivae are not pale. No scleral icterus.  ENT: Mucous membranes are moist. Oropharynx is clear and symmetric.    Neck: Supple. Full ROM. No lymphadenopathy.  Cardiovascular: Regular rate. Regular rhythm. No murmurs, rubs, or gallops. Distal pulses are 2+ and symmetric.  Pulmonary/Chest: No respiratory distress. Clear to auscultation bilaterally. No wheezing or rales.  Abdominal: Soft and non-distended.  There is no tenderness.  No rebound, guarding, or rigidity.   Musculoskeletal: Moves all extremities. No obvious deformities. No edema. No calf tenderness.  Skin: Warm and dry.  Neurological:  Alert, awake, and appropriate.  Normal speech.  No acute focal neurological deficits are appreciated.  Psychiatric: Normal affect. Good eye contact. Appropriate in content.    ED Course    Procedures  ED Vital Signs:  Vitals:    04/14/18 1102 04/14/18 1112 04/14/18 1116   BP: (!) 146/74  (!) 168/81   Pulse: 60 60 (!) 59   Resp: 18  18   SpO2: 95%  98%   Height: 6' 6" (1.981 m)         Abnormal Lab Results:  Labs " Reviewed   CBC W/ AUTO DIFFERENTIAL - Abnormal; Notable for the following:        Result Value    RBC 4.15 (*)     Hemoglobin 11.9 (*)     Hematocrit 37.0 (*)     RDW 15.0 (*)     All other components within normal limits   COMPREHENSIVE METABOLIC PANEL - Abnormal; Notable for the following:     Glucose 119 (*)     Creatinine 6.5 (*)     ALT 9 (*)     eGFR if  9 (*)     eGFR if non  8 (*)     All other components within normal limits   TROPONIN I - Abnormal; Notable for the following:     Troponin I 0.035 (*)     All other components within normal limits        All Lab Results:  Results for orders placed or performed during the hospital encounter of 04/14/18   CBC auto differential   Result Value Ref Range    WBC 5.95 3.90 - 12.70 K/uL    RBC 4.15 (L) 4.60 - 6.20 M/uL    Hemoglobin 11.9 (L) 14.0 - 18.0 g/dL    Hematocrit 37.0 (L) 40.0 - 54.0 %    MCV 89 82 - 98 fL    MCH 28.7 27.0 - 31.0 pg    MCHC 32.2 32.0 - 36.0 g/dL    RDW 15.0 (H) 11.5 - 14.5 %    Platelets 209 150 - 350 K/uL    MPV 10.3 9.2 - 12.9 fL    Gran # (ANC) 3.3 1.8 - 7.7 K/uL    Lymph # 1.8 1.0 - 4.8 K/uL    Mono # 0.8 0.3 - 1.0 K/uL    Eos # 0.1 0.0 - 0.5 K/uL    Baso # 0.04 0.00 - 0.20 K/uL    Gran% 54.7 38.0 - 73.0 %    Lymph% 29.6 18.0 - 48.0 %    Mono% 12.6 4.0 - 15.0 %    Eosinophil% 2.4 0.0 - 8.0 %    Basophil% 0.7 0.0 - 1.9 %    Differential Method Automated    Comprehensive metabolic panel   Result Value Ref Range    Sodium 140 136 - 145 mmol/L    Potassium 4.2 3.5 - 5.1 mmol/L    Chloride 99 95 - 110 mmol/L    CO2 29 23 - 29 mmol/L    Glucose 119 (H) 70 - 110 mg/dL    BUN, Bld 15 8 - 23 mg/dL    Creatinine 6.5 (H) 0.5 - 1.4 mg/dL    Calcium 8.9 8.7 - 10.5 mg/dL    Total Protein 7.3 6.0 - 8.4 g/dL    Albumin 3.6 3.5 - 5.2 g/dL    Total Bilirubin 0.6 0.1 - 1.0 mg/dL    Alkaline Phosphatase 65 55 - 135 U/L    AST 16 10 - 40 U/L    ALT 9 (L) 10 - 44 U/L    Anion Gap 12 8 - 16 mmol/L    eGFR if African American 9  (A) >60 mL/min/1.73 m^2    eGFR if non African American 8 (A) >60 mL/min/1.73 m^2   Troponin I   Result Value Ref Range    Troponin I 0.035 (H) 0.000 - 0.026 ng/mL       Imaging Results:  Imaging Results          X-Ray Chest AP Portable (Final result)  Result time 04/14/18 11:55:57    Final result by Santo Duval III, MD (04/14/18 11:55:57)                 Impression:     As above. Vas-Cath in position. No pneumothorax.      Electronically signed by: SANTO DUVAL MD  Date:     04/14/18  Time:    11:55              Narrative:    One view chest x-ray.    Clinical indication: Chest pain    Heart size is enlarged. The lung fields are clear. No acute pulmonary infiltrate. Right sided Vas-Cath with tip projected over the region of the SVC-right atrial junction.                               The EKG was ordered, reviewed, and independently interpreted by the ED provider.  Interpretation time: 1110  Rate: 60 BPM  Rhythm: sinus rhythm with 1st degree AV block  Interpretation: T wave abnormality. No STEMI.             The Emergency Provider reviewed the vital signs and test results, which are outlined above.    ED Discussion     1:42 PM: Reassessed pt at this time. Pt is awake, alert, and in no distress. Discussed with pt all pertinent ED information and results. Discussed pt dx and plan of tx. Gave pt all f/u and return to the ED instructions. All questions and concerns were addressed at this time. Pt expresses understanding of information and instructions, and is comfortable with plan to discharge. Pt is stable for discharge.    I discussed with patient and/or family/caretaker that evaluation in the ED does not suggest any emergent or life threatening medical conditions requiring immediate intervention beyond what was provided in the ED, and I believe patient is safe for discharge.  Regardless, an unremarkable evaluation in the ED does not preclude the development or presence of a serious of life threatening  condition. As such, patient was instructed to return immediately for any worsening or change in current symptoms.    I have discussed with patient and/or family/caretaker chest pain precautions, specifically to return for worsening chest pain, shortness of breath, fever, or any concern.  I have low suspicion for cardiopulmonary, vascular, infectious, respiratory, or other emergent medical condition based on my evaluation in the ED.      ED Medication(s):  Medications   ALPRAZolam tablet 0.5 mg (0.5 mg Oral Given 4/14/18 1131)       New Prescriptions    No medications on file             Medical Decision Making    Medical Decision Making:   Clinical Tests:   Lab Tests: Ordered and Reviewed  Radiological Study: Ordered and Reviewed  Medical Tests: Ordered and Reviewed           Scribe Attestation:   Scribe #1: I performed the above scribed service and the documentation accurately describes the services I performed. I attest to the accuracy of the note.    Attending:   Physician Attestation Statement for Scribe #1: I, Conner Johnson Jr., MD, personally performed the services described in this documentation, as scribed by Corinne Mack, in my presence, and it is both accurate and complete.          Clinical Impression       ICD-10-CM ICD-9-CM   1. Stage 5 chronic kidney disease on chronic dialysis N18.6 585.6    Z99.2 V45.11   2. Chest tightness R07.89 786.59       Disposition:   Disposition: Discharged  Condition: Stable           Conner Johnson Jr., MD  04/14/18 4466

## 2018-04-23 ENCOUNTER — HOSPITAL ENCOUNTER (EMERGENCY)
Facility: HOSPITAL | Age: 68
Discharge: HOME | End: 2018-04-23
Attending: EMERGENCY MEDICINE
Payer: MEDICARE

## 2018-04-23 VITALS
RESPIRATION RATE: 15 BRPM | DIASTOLIC BLOOD PRESSURE: 102 MMHG | HEART RATE: 72 BPM | SYSTOLIC BLOOD PRESSURE: 196 MMHG | TEMPERATURE: 98 F | OXYGEN SATURATION: 97 % | BODY MASS INDEX: 26.3 KG/M2 | WEIGHT: 227.31 LBS | HEIGHT: 78 IN

## 2018-04-23 DIAGNOSIS — R06.00 DYSPNEA: ICD-10-CM

## 2018-04-23 DIAGNOSIS — E87.70 HYPERVOLEMIA, UNSPECIFIED HYPERVOLEMIA TYPE: ICD-10-CM

## 2018-04-23 DIAGNOSIS — I10 ESSENTIAL HYPERTENSION: ICD-10-CM

## 2018-04-23 DIAGNOSIS — D64.9 ANEMIA, UNSPECIFIED TYPE: ICD-10-CM

## 2018-04-23 DIAGNOSIS — N18.9 CHRONIC RENAL FAILURE, UNSPECIFIED CKD STAGE: Primary | ICD-10-CM

## 2018-04-23 LAB
ALBUMIN SERPL BCP-MCNC: 3.4 G/DL
ALP SERPL-CCNC: 71 U/L
ALT SERPL W/O P-5'-P-CCNC: 9 U/L
ANION GAP SERPL CALC-SCNC: 11 MMOL/L
APTT BLDCRRT: 24.1 SEC
AST SERPL-CCNC: 18 U/L
BASOPHILS # BLD AUTO: 0.05 K/UL
BASOPHILS NFR BLD: 0.9 %
BILIRUB SERPL-MCNC: 0.5 MG/DL
BNP SERPL-MCNC: 945 PG/ML
BUN SERPL-MCNC: 39 MG/DL
CALCIUM SERPL-MCNC: 8.3 MG/DL
CHLORIDE SERPL-SCNC: 100 MMOL/L
CO2 SERPL-SCNC: 28 MMOL/L
CREAT SERPL-MCNC: 9.7 MG/DL
DIFFERENTIAL METHOD: ABNORMAL
EOSINOPHIL # BLD AUTO: 0.5 K/UL
EOSINOPHIL NFR BLD: 7.8 %
ERYTHROCYTE [DISTWIDTH] IN BLOOD BY AUTOMATED COUNT: 14.8 %
EST. GFR  (AFRICAN AMERICAN): 6 ML/MIN/1.73 M^2
EST. GFR  (NON AFRICAN AMERICAN): 5 ML/MIN/1.73 M^2
GLUCOSE SERPL-MCNC: 128 MG/DL
HCT VFR BLD AUTO: 37.7 %
HGB BLD-MCNC: 12.2 G/DL
INR PPP: 1
LYMPHOCYTES # BLD AUTO: 1.4 K/UL
LYMPHOCYTES NFR BLD: 24.6 %
MCH RBC QN AUTO: 28.2 PG
MCHC RBC AUTO-ENTMCNC: 32.4 G/DL
MCV RBC AUTO: 87 FL
MONOCYTES # BLD AUTO: 0.7 K/UL
MONOCYTES NFR BLD: 12.4 %
NEUTROPHILS # BLD AUTO: 3.1 K/UL
NEUTROPHILS NFR BLD: 54.3 %
PLATELET # BLD AUTO: 161 K/UL
PMV BLD AUTO: 10.4 FL
POTASSIUM SERPL-SCNC: 4.9 MMOL/L
PROT SERPL-MCNC: 7.1 G/DL
PROTHROMBIN TIME: 10.8 SEC
RBC # BLD AUTO: 4.32 M/UL
SODIUM SERPL-SCNC: 139 MMOL/L
TROPONIN I SERPL DL<=0.01 NG/ML-MCNC: 0.04 NG/ML
WBC # BLD AUTO: 5.74 K/UL

## 2018-04-23 PROCEDURE — 85610 PROTHROMBIN TIME: CPT

## 2018-04-23 PROCEDURE — 96375 TX/PRO/DX INJ NEW DRUG ADDON: CPT

## 2018-04-23 PROCEDURE — 85730 THROMBOPLASTIN TIME PARTIAL: CPT

## 2018-04-23 PROCEDURE — 99284 EMERGENCY DEPT VISIT MOD MDM: CPT | Mod: 25

## 2018-04-23 PROCEDURE — 80053 COMPREHEN METABOLIC PANEL: CPT

## 2018-04-23 PROCEDURE — 84484 ASSAY OF TROPONIN QUANT: CPT

## 2018-04-23 PROCEDURE — 63600175 PHARM REV CODE 636 W HCPCS: Performed by: EMERGENCY MEDICINE

## 2018-04-23 PROCEDURE — 25000003 PHARM REV CODE 250: Performed by: EMERGENCY MEDICINE

## 2018-04-23 PROCEDURE — 96374 THER/PROPH/DIAG INJ IV PUSH: CPT

## 2018-04-23 PROCEDURE — 83880 ASSAY OF NATRIURETIC PEPTIDE: CPT

## 2018-04-23 PROCEDURE — 85025 COMPLETE CBC W/AUTO DIFF WBC: CPT

## 2018-04-23 RX ORDER — CLONIDINE HYDROCHLORIDE 0.1 MG/1
0.1 TABLET ORAL
Status: COMPLETED | OUTPATIENT
Start: 2018-04-23 | End: 2018-04-23

## 2018-04-23 RX ORDER — HYDRALAZINE HYDROCHLORIDE 20 MG/ML
10 INJECTION INTRAMUSCULAR; INTRAVENOUS
Status: COMPLETED | OUTPATIENT
Start: 2018-04-23 | End: 2018-04-23

## 2018-04-23 RX ORDER — ALBUTEROL SULFATE 0.83 MG/ML
2.5 SOLUTION RESPIRATORY (INHALATION)
COMMUNITY
Start: 2018-03-29 | End: 2019-03-29

## 2018-04-23 RX ORDER — FUROSEMIDE 10 MG/ML
80 INJECTION INTRAMUSCULAR; INTRAVENOUS
Status: COMPLETED | OUTPATIENT
Start: 2018-04-23 | End: 2018-04-23

## 2018-04-23 RX ORDER — LACTULOSE 10 G/15ML
SOLUTION ORAL; RECTAL
COMMUNITY
Start: 2018-02-05 | End: 2018-10-13

## 2018-04-23 RX ORDER — ALBUTEROL SULFATE 90 UG/1
AEROSOL, METERED RESPIRATORY (INHALATION)
COMMUNITY
Start: 2018-02-15 | End: 2019-02-15

## 2018-04-23 RX ORDER — FUROSEMIDE 40 MG/1
40 TABLET ORAL DAILY
COMMUNITY
Start: 2018-02-22 | End: 2019-02-22

## 2018-04-23 RX ADMIN — FUROSEMIDE 80 MG: 10 INJECTION, SOLUTION INTRAMUSCULAR; INTRAVENOUS at 05:04

## 2018-04-23 RX ADMIN — NITROGLYCERIN 1 INCH: 20 OINTMENT TOPICAL at 05:04

## 2018-04-23 RX ADMIN — HYDRALAZINE HYDROCHLORIDE 10 MG: 20 INJECTION INTRAMUSCULAR; INTRAVENOUS at 07:04

## 2018-04-23 RX ADMIN — CLONIDINE HYDROCHLORIDE 0.1 MG: 0.1 TABLET ORAL at 08:04

## 2018-04-23 NOTE — ED NOTES
Pt discharged to dialysis. Awake, alert and oriented, assisted to wheel chair. Rolled out to lobby by his son.

## 2018-04-23 NOTE — ED PROVIDER NOTES
SCRIBE #1 NOTE: I, Elpidio Tam, am scribing for, and in the presence of, Karyn Akhtar DO. I have scribed the HPI, ROS, and PEX.      History      Chief Complaint   Patient presents with    Shortness of Breath     pt reports chest tightness and hard to breath        Review of patient's allergies indicates:   Allergen Reactions    Augmentin [amoxicillin-pot clavulanate] Edema    Lisinopril      cough    Sulfa (sulfonamide antibiotics)      swelling        HPI   HPI    4/23/2018, 6:21 AM   History obtained from the patient      History of Present Illness: Conner Perez III is a 68 y.o. male patient who presents to the Emergency Department for SOB which onset gradually one day ago.  Patient gets dialysis on M/W/F. Last dialysis was on Friday.  He is followed by [facility].  HE receives his dialysis from [facility] on [facility]. Symptoms are intermittent and moderate in severity. No mitigating or exacerbating factors reported. Associated sxs include chest tightness.   Breathing is worse when he lays day.   He denies any nausea, vomiting, diaphoresis.Patient denies any fever, chills, n/v, recent travel, long car rides, CP, cough, extremity pain/swelling, and all other sxs at this time. No prior Tx reported. No further complaints or concerns at this time.         Arrival mode: Personal vehicle    PCP: Raciel Angela MD       Past Medical History:  Past Medical History:   Diagnosis Date    CKD (chronic kidney disease), stage IV     Diabetes mellitus     Diabetes mellitus, type 2     HTN (hypertension)        Past Surgical History:  Past Surgical History:   Procedure Laterality Date    AV FISTULA PLACEMENT Right 06/2017    CIRCUMCISION  1978    TOE AMPUTATION Left 01/24/2017    4th toe         Family History:  Family History   Problem Relation Age of Onset    Hypertension Father     No Known Problems Mother        Social History:  Social History     Social History Main Topics    Smoking status:  Former Smoker     Types: Cigarettes     Quit date: 1971    Smokeless tobacco: Never Used    Alcohol use No    Drug use: No    Sexual activity: Unknown       ROS   Review of Systems   Constitutional: Negative for chills and fever.        (-) Recent travel/Long car trips   HENT: Negative for sore throat.    Respiratory: Positive for chest tightness and shortness of breath. Negative for cough.    Cardiovascular: Negative for chest pain.   Gastrointestinal: Negative for nausea and vomiting.   Genitourinary: Negative for dysuria.   Musculoskeletal: Negative for back pain.   Skin: Negative for rash.   Neurological: Negative for dizziness, weakness, numbness and headaches.   Hematological: Does not bruise/bleed easily.   All other systems reviewed and are negative.    Physical Exam      Initial Vitals [04/23/18 0418]   BP Pulse Resp Temp SpO2   (!) 192/92 67 (!) 22 97.6 °F (36.4 °C) 95 %      MAP       125.33          Physical Exam  Nursing Notes and Vital Signs Reviewed.  Constitutional: Patient is in no acute distress. Well-developed and well-nourished. Vas cath R upper chest wall. Thrill to R forearm.   Head: Atraumatic. Normocephalic.  Eyes: PERRL. EOM intact. Conjunctivae are not pale. No scleral icterus.  ENT: Mucous membranes are moist. Oropharynx is clear and symmetric.    Neck: Supple. Full ROM. No lymphadenopathy.  Positive JVD  Cardiovascular: Regular rate. Regular rhythm. No murmurs, rubs, or gallops. Distal pulses are 2+ and symmetric.  Pulmonary/Chest: No respiratory distress. Clear to auscultation bilaterally. No wheezing or rales.  Abdominal: Soft and non-distended.  There is no tenderness.  No rebound, guarding, or rigidity. Good bowel sounds.  Genitourinary: No CVA tenderness  Musculoskeletal: Moves all extremities. No obvious deformities. No edema. No calf tenderness. Right Vascath present.  Nontender to touch.   Shunt right arm.  Positive thrill.  Skin: Warm and dry.  Neurological:  Alert, awake,  "and appropriate.  Normal speech.  No acute focal neurological deficits are appreciated.  CNII-XII intact.  Psychiatric: Normal affect. Good eye contact. Appropriate in content.    ED Course    Procedures  ED Vital Signs:  Vitals:    04/23/18 0418 04/23/18 0522 04/23/18 0525 04/23/18 0631   BP: (!) 192/92  (!) 201/98 (!) 212/105   Pulse: 67 69 69 69   Resp: (!) 22  17 17   Temp: 97.6 °F (36.4 °C)      TempSrc: Oral      SpO2: 95%  96% 97%   Weight: 103.1 kg (227 lb 4.7 oz)      Height: 6' 6" (1.981 m)       04/23/18 0705 04/23/18 0706 04/23/18 0716 04/23/18 0731   BP:  (!) 204/101 (!) 195/102 (!) 194/99   Pulse: 66 66 68 67   Resp:  19 (!) 23 17   Temp:  97.7 °F (36.5 °C)     TempSrc:  Oral     SpO2:  98% 99% 98%   Weight:       Height:        04/23/18 0802 04/23/18 0817 04/23/18 0832   BP: (!) 194/99 (!) 196/100 (!) 197/101   Pulse: 70 71 71   Resp: 16 15 (!) 25   Temp:      TempSrc:      SpO2: 98% 97% 95%   Weight:      Height:          Abnormal Lab Results:  Labs Reviewed   CBC W/ AUTO DIFFERENTIAL - Abnormal; Notable for the following:        Result Value    RBC 4.32 (*)     Hemoglobin 12.2 (*)     Hematocrit 37.7 (*)     RDW 14.8 (*)     All other components within normal limits   COMPREHENSIVE METABOLIC PANEL - Abnormal; Notable for the following:     Glucose 128 (*)     BUN, Bld 39 (*)     Creatinine 9.7 (*)     Calcium 8.3 (*)     Albumin 3.4 (*)     ALT 9 (*)     eGFR if  6 (*)     eGFR if non  5 (*)     All other components within normal limits   B-TYPE NATRIURETIC PEPTIDE - Abnormal; Notable for the following:      (*)     All other components within normal limits   TROPONIN I - Abnormal; Notable for the following:     Troponin I 0.037 (*)     All other components within normal limits   PROTIME-INR   APTT        All Lab Results:  Results for orders placed or performed during the hospital encounter of 04/23/18   CBC auto differential   Result Value Ref Range    WBC " 5.74 3.90 - 12.70 K/uL    RBC 4.32 (L) 4.60 - 6.20 M/uL    Hemoglobin 12.2 (L) 14.0 - 18.0 g/dL    Hematocrit 37.7 (L) 40.0 - 54.0 %    MCV 87 82 - 98 fL    MCH 28.2 27.0 - 31.0 pg    MCHC 32.4 32.0 - 36.0 g/dL    RDW 14.8 (H) 11.5 - 14.5 %    Platelets 161 150 - 350 K/uL    MPV 10.4 9.2 - 12.9 fL    Gran # (ANC) 3.1 1.8 - 7.7 K/uL    Lymph # 1.4 1.0 - 4.8 K/uL    Mono # 0.7 0.3 - 1.0 K/uL    Eos # 0.5 0.0 - 0.5 K/uL    Baso # 0.05 0.00 - 0.20 K/uL    Gran% 54.3 38.0 - 73.0 %    Lymph% 24.6 18.0 - 48.0 %    Mono% 12.4 4.0 - 15.0 %    Eosinophil% 7.8 0.0 - 8.0 %    Basophil% 0.9 0.0 - 1.9 %    Differential Method Automated    Comprehensive metabolic panel   Result Value Ref Range    Sodium 139 136 - 145 mmol/L    Potassium 4.9 3.5 - 5.1 mmol/L    Chloride 100 95 - 110 mmol/L    CO2 28 23 - 29 mmol/L    Glucose 128 (H) 70 - 110 mg/dL    BUN, Bld 39 (H) 8 - 23 mg/dL    Creatinine 9.7 (H) 0.5 - 1.4 mg/dL    Calcium 8.3 (L) 8.7 - 10.5 mg/dL    Total Protein 7.1 6.0 - 8.4 g/dL    Albumin 3.4 (L) 3.5 - 5.2 g/dL    Total Bilirubin 0.5 0.1 - 1.0 mg/dL    Alkaline Phosphatase 71 55 - 135 U/L    AST 18 10 - 40 U/L    ALT 9 (L) 10 - 44 U/L    Anion Gap 11 8 - 16 mmol/L    eGFR if African American 6 (A) >60 mL/min/1.73 m^2    eGFR if non African American 5 (A) >60 mL/min/1.73 m^2   Brain natriuretic peptide   Result Value Ref Range     (H) 0 - 99 pg/mL   Protime-INR   Result Value Ref Range    Prothrombin Time 10.8 9.0 - 12.5 sec    INR 1.0 0.8 - 1.2   APTT   Result Value Ref Range    aPTT 24.1 21.0 - 32.0 sec   Troponin I   Result Value Ref Range    Troponin I 0.037 (H) 0.000 - 0.026 ng/mL         Imaging Results:  Imaging Results          X-Ray Chest AP Portable   Increase vascular markings            Imaging Results          X-Ray Chest AP Portable (Final result)  Result time 04/23/18 08:21:29    Final result by Iglesia Isaac Jr., MD (04/23/18 08:21:29)                 Impression:          Cardiomegaly.  Central  pulmonary vascular congestion      Electronically signed by: LASHON CRAVEN MD  Date:     04/23/18  Time:    08:21              Narrative:    EXAM:   XR CHEST AP PORTABLE    CLINICAL HISTORY:   Dyspnea, unspecified    COMPARISON:  04/14/2018    FINDINGS:   Heart size is enlarged.  Persistent right-sided double-lumen Vas-Cath.  Hilar structures remain prominent suggesting some central vascular congestion.  No focal alveolar infiltrates or large effusions.  No significant pulmonary edema is appreciated.  No pneumothorax.. No acute upper abdominal findings are evident..                                       The Emergency Provider reviewed the vital signs and test results, which are outlined above.    8:00 AM Spoke with Dr. Granados, he recommends patient go to dialysis.  He contacted the center at [facility] ([facility]):  They are able to accept him early for dialysis.    8:36 AM [facility]oke with [facility].  Spoke with Dr. Rhodes.  Patient does not require supplemental oxygen.   There is no respiratory distress.   There are no neurologic findings on exam.   EKG does not show STEMI.  Troponins on chart review are at baseline.  He recommends that the patient go to his dialysis center to have dialysis.  This will help with his elevated blood pressure.    8:47AM  Patient verbalized understanding of need to go directly to dialysis.    ED Discussion         ED Medication(s):  Medications   furosemide injection 80 mg (80 mg Intravenous Given 4/23/18 1906)   nitroGLYCERIN 2% TD oint ointment 1 inch (1 inch Topical (Top) Given 4/23/18 5176)   hydrALAZINE injection 10 mg (10 mg Intravenous Given 4/23/18 1310)   cloNIDine tablet 0.1 mg (0.1 mg Oral Given 4/23/18 4546)       New Prescriptions    No medications on file       Follow-up Information     [facility] In 2 days.    Why:  Return to the ED for:  chest pain, chest pressure, fever, cough or other concerns.  Contact information:  [address] [facility]  [address]  [place] [address]  [phone]                     Medical Decision Making              Scribe Attestation:   Scribe #1: I performed the above scribed service and the documentation accurately describes the services I performed. I attest to the accuracy of the note.    Attending:   Physician Attestation Statement for Scribe #1: I, Karyn Akhtar DO, personally performed the services described in this documentation, as scribed by Elpidio Tam, in my presence, and it is both accurate and complete.          Clinical Impression       ICD-10-CM ICD-9-CM   1. Chronic renal failure, unspecified CKD stage N18.9 585.9   2. Dyspnea R06.00 786.09   3. Hypervolemia, unspecified hypervolemia type E87.70 276.69   4. Essential hypertension I10 401.9   5. Anemia, unspecified type D64.9 285.9       Disposition:   Disposition: Discharged  Condition: Stable         Karyn Akhtar DO  04/24/18 1154

## 2018-04-23 NOTE — ED NOTES
Pt sitting upright in bed, aaox4, in no acute distress. Pt updated on POC. Will continue to monitor. Bed in low position, side rails up, call light in reach, family at bedside.

## 2018-04-27 ENCOUNTER — HOSPITAL ENCOUNTER (OUTPATIENT)
Facility: HOSPITAL | Age: 68
Discharge: HOME | End: 2018-04-28
Attending: EMERGENCY MEDICINE | Admitting: INTERNAL MEDICINE
Payer: MEDICARE

## 2018-04-27 DIAGNOSIS — E87.70 VOLUME OVERLOAD: ICD-10-CM

## 2018-04-27 DIAGNOSIS — Z99.2 ESRD (END STAGE RENAL DISEASE) ON DIALYSIS: ICD-10-CM

## 2018-04-27 DIAGNOSIS — I16.0 HYPERTENSIVE URGENCY: ICD-10-CM

## 2018-04-27 DIAGNOSIS — I50.1 PULMONARY EDEMA CARDIAC CAUSE: ICD-10-CM

## 2018-04-27 DIAGNOSIS — I50.9 CHF (CONGESTIVE HEART FAILURE): ICD-10-CM

## 2018-04-27 DIAGNOSIS — I10 HYPERTENSION: ICD-10-CM

## 2018-04-27 DIAGNOSIS — N18.6 ESRD (END STAGE RENAL DISEASE) ON DIALYSIS: ICD-10-CM

## 2018-04-27 DIAGNOSIS — I10 ESSENTIAL HYPERTENSION: ICD-10-CM

## 2018-04-27 DIAGNOSIS — R06.02 SOB (SHORTNESS OF BREATH): ICD-10-CM

## 2018-04-27 DIAGNOSIS — E87.70 HYPERVOLEMIA, UNSPECIFIED HYPERVOLEMIA TYPE: Primary | ICD-10-CM

## 2018-04-27 LAB
ALBUMIN SERPL BCP-MCNC: 3.3 G/DL
ALBUMIN SERPL BCP-MCNC: 3.6 G/DL
ALP SERPL-CCNC: 68 U/L
ALP SERPL-CCNC: 73 U/L
ALT SERPL W/O P-5'-P-CCNC: 12 U/L
ALT SERPL W/O P-5'-P-CCNC: 5 U/L
ANION GAP SERPL CALC-SCNC: 12 MMOL/L
ANION GAP SERPL CALC-SCNC: 15 MMOL/L
AST SERPL-CCNC: 12 U/L
AST SERPL-CCNC: 54 U/L
BASOPHILS # BLD AUTO: 0.05 K/UL
BASOPHILS NFR BLD: 0.8 %
BILIRUB SERPL-MCNC: 0.5 MG/DL
BILIRUB SERPL-MCNC: 0.6 MG/DL
BNP SERPL-MCNC: 1220 PG/ML
BUN SERPL-MCNC: 34 MG/DL
BUN SERPL-MCNC: 34 MG/DL
CALCIUM SERPL-MCNC: 8.5 MG/DL
CALCIUM SERPL-MCNC: 8.9 MG/DL
CHLORIDE SERPL-SCNC: 100 MMOL/L
CHLORIDE SERPL-SCNC: 100 MMOL/L
CO2 SERPL-SCNC: 26 MMOL/L
CO2 SERPL-SCNC: 30 MMOL/L
CREAT SERPL-MCNC: 8.6 MG/DL
CREAT SERPL-MCNC: 8.7 MG/DL
DIASTOLIC DYSFUNCTION: NO
DIFFERENTIAL METHOD: ABNORMAL
EOSINOPHIL # BLD AUTO: 0.2 K/UL
EOSINOPHIL NFR BLD: 3.6 %
ERYTHROCYTE [DISTWIDTH] IN BLOOD BY AUTOMATED COUNT: 15.1 %
EST. GFR  (AFRICAN AMERICAN): 7 ML/MIN/1.73 M^2
EST. GFR  (AFRICAN AMERICAN): 7 ML/MIN/1.73 M^2
EST. GFR  (NON AFRICAN AMERICAN): 6 ML/MIN/1.73 M^2
EST. GFR  (NON AFRICAN AMERICAN): 6 ML/MIN/1.73 M^2
ESTIMATED AVG GLUCOSE: 91 MG/DL
GLUCOSE SERPL-MCNC: 151 MG/DL
GLUCOSE SERPL-MCNC: 153 MG/DL
HBA1C MFR BLD HPLC: 4.8 %
HCT VFR BLD AUTO: 36.1 %
HGB BLD-MCNC: 11.5 G/DL
LYMPHOCYTES # BLD AUTO: 1.7 K/UL
LYMPHOCYTES NFR BLD: 25.9 %
MCH RBC QN AUTO: 28 PG
MCHC RBC AUTO-ENTMCNC: 31.9 G/DL
MCV RBC AUTO: 88 FL
MITRAL VALVE MOBILITY: NORMAL
MONOCYTES # BLD AUTO: 0.6 K/UL
MONOCYTES NFR BLD: 9.6 %
NEUTROPHILS # BLD AUTO: 3.8 K/UL
NEUTROPHILS NFR BLD: 60.1 %
PLATELET # BLD AUTO: 188 K/UL
PMV BLD AUTO: 10.7 FL
POCT GLUCOSE: 143 MG/DL (ref 70–110)
POCT GLUCOSE: 149 MG/DL (ref 70–110)
POCT GLUCOSE: 173 MG/DL (ref 70–110)
POTASSIUM SERPL-SCNC: 4.2 MMOL/L
POTASSIUM SERPL-SCNC: 5.6 MMOL/L
PROT SERPL-MCNC: 6.8 G/DL
PROT SERPL-MCNC: 8.4 G/DL
RBC # BLD AUTO: 4.1 M/UL
RETIRED EF AND QEF - SEE NOTES: 55 (ref 55–65)
SODIUM SERPL-SCNC: 141 MMOL/L
SODIUM SERPL-SCNC: 142 MMOL/L
TRICUSPID VALVE REGURGITATION: NORMAL
TROPONIN I SERPL DL<=0.01 NG/ML-MCNC: 0.02 NG/ML
TROPONIN I SERPL DL<=0.01 NG/ML-MCNC: 0.03 NG/ML
WBC # BLD AUTO: 6.37 K/UL

## 2018-04-27 PROCEDURE — 93306 TTE W/DOPPLER COMPLETE: CPT

## 2018-04-27 PROCEDURE — 63600175 PHARM REV CODE 636 W HCPCS: Performed by: PHYSICIAN ASSISTANT

## 2018-04-27 PROCEDURE — 99215 OFFICE O/P EST HI 40 MIN: CPT | Mod: 25,,, | Performed by: INTERNAL MEDICINE

## 2018-04-27 PROCEDURE — 25000003 PHARM REV CODE 250: Performed by: INTERNAL MEDICINE

## 2018-04-27 PROCEDURE — 96374 THER/PROPH/DIAG INJ IV PUSH: CPT | Mod: 59

## 2018-04-27 PROCEDURE — 83880 ASSAY OF NATRIURETIC PEPTIDE: CPT

## 2018-04-27 PROCEDURE — 84484 ASSAY OF TROPONIN QUANT: CPT

## 2018-04-27 PROCEDURE — 90937 HEMODIALYSIS REPEATED EVAL: CPT | Mod: ,,, | Performed by: INTERNAL MEDICINE

## 2018-04-27 PROCEDURE — 96375 TX/PRO/DX INJ NEW DRUG ADDON: CPT | Mod: 59

## 2018-04-27 PROCEDURE — 63600175 PHARM REV CODE 636 W HCPCS: Performed by: INTERNAL MEDICINE

## 2018-04-27 PROCEDURE — 99285 EMERGENCY DEPT VISIT HI MDM: CPT | Mod: 25

## 2018-04-27 PROCEDURE — 25000003 PHARM REV CODE 250: Performed by: EMERGENCY MEDICINE

## 2018-04-27 PROCEDURE — 80053 COMPREHEN METABOLIC PANEL: CPT

## 2018-04-27 PROCEDURE — G0378 HOSPITAL OBSERVATION PER HR: HCPCS

## 2018-04-27 PROCEDURE — 63600175 PHARM REV CODE 636 W HCPCS: Performed by: EMERGENCY MEDICINE

## 2018-04-27 PROCEDURE — 83036 HEMOGLOBIN GLYCOSYLATED A1C: CPT

## 2018-04-27 PROCEDURE — 96372 THER/PROPH/DIAG INJ SC/IM: CPT

## 2018-04-27 PROCEDURE — 93010 ELECTROCARDIOGRAM REPORT: CPT | Mod: ,,, | Performed by: INTERNAL MEDICINE

## 2018-04-27 PROCEDURE — 85025 COMPLETE CBC W/AUTO DIFF WBC: CPT

## 2018-04-27 PROCEDURE — G0257 UNSCHED DIALYSIS ESRD PT HOS: HCPCS

## 2018-04-27 PROCEDURE — 84484 ASSAY OF TROPONIN QUANT: CPT | Mod: 91

## 2018-04-27 PROCEDURE — 93306 TTE W/DOPPLER COMPLETE: CPT | Mod: 26,,, | Performed by: INTERNAL MEDICINE

## 2018-04-27 PROCEDURE — 25000003 PHARM REV CODE 250: Performed by: PHYSICIAN ASSISTANT

## 2018-04-27 PROCEDURE — 80100016 HC MAINTENANCE HEMODIALYSIS

## 2018-04-27 RX ORDER — ZOLPIDEM TARTRATE 5 MG/1
5 TABLET ORAL NIGHTLY PRN
Status: DISCONTINUED | OUTPATIENT
Start: 2018-04-27 | End: 2018-04-29 | Stop reason: HOSPADM

## 2018-04-27 RX ORDER — GLUCAGON 1 MG
1 KIT INJECTION
Status: DISCONTINUED | OUTPATIENT
Start: 2018-04-27 | End: 2018-04-29 | Stop reason: HOSPADM

## 2018-04-27 RX ORDER — LOSARTAN POTASSIUM 50 MG/1
100 TABLET ORAL DAILY
Status: DISCONTINUED | OUTPATIENT
Start: 2018-04-27 | End: 2018-04-29 | Stop reason: HOSPADM

## 2018-04-27 RX ORDER — ONDANSETRON 2 MG/ML
4 INJECTION INTRAMUSCULAR; INTRAVENOUS EVERY 12 HOURS PRN
Status: DISCONTINUED | OUTPATIENT
Start: 2018-04-27 | End: 2018-04-29 | Stop reason: HOSPADM

## 2018-04-27 RX ORDER — CLONIDINE HYDROCHLORIDE 0.1 MG/1
0.1 TABLET ORAL 3 TIMES DAILY
Status: DISCONTINUED | OUTPATIENT
Start: 2018-04-27 | End: 2018-04-29 | Stop reason: HOSPADM

## 2018-04-27 RX ORDER — INSULIN ASPART 100 [IU]/ML
0-5 INJECTION, SOLUTION INTRAVENOUS; SUBCUTANEOUS
Status: DISCONTINUED | OUTPATIENT
Start: 2018-04-27 | End: 2018-04-29 | Stop reason: HOSPADM

## 2018-04-27 RX ORDER — HYDRALAZINE HYDROCHLORIDE 50 MG/1
100 TABLET, FILM COATED ORAL EVERY 12 HOURS
Status: DISCONTINUED | OUTPATIENT
Start: 2018-04-27 | End: 2018-04-27

## 2018-04-27 RX ORDER — NAPROXEN SODIUM 220 MG/1
81 TABLET, FILM COATED ORAL DAILY
Status: DISCONTINUED | OUTPATIENT
Start: 2018-04-27 | End: 2018-04-27

## 2018-04-27 RX ORDER — SODIUM CHLORIDE 9 MG/ML
INJECTION, SOLUTION INTRAVENOUS
Status: DISCONTINUED | OUTPATIENT
Start: 2018-04-27 | End: 2018-04-29 | Stop reason: HOSPADM

## 2018-04-27 RX ORDER — HEPARIN SODIUM 5000 [USP'U]/ML
5000 INJECTION, SOLUTION INTRAVENOUS; SUBCUTANEOUS EVERY 8 HOURS
Status: DISCONTINUED | OUTPATIENT
Start: 2018-04-27 | End: 2018-04-29 | Stop reason: HOSPADM

## 2018-04-27 RX ORDER — ACETAMINOPHEN 325 MG/1
650 TABLET ORAL EVERY 8 HOURS PRN
Status: DISCONTINUED | OUTPATIENT
Start: 2018-04-27 | End: 2018-04-29 | Stop reason: HOSPADM

## 2018-04-27 RX ORDER — PANTOPRAZOLE SODIUM 40 MG/1
40 TABLET, DELAYED RELEASE ORAL DAILY
Status: DISCONTINUED | OUTPATIENT
Start: 2018-04-27 | End: 2018-04-29 | Stop reason: HOSPADM

## 2018-04-27 RX ORDER — FUROSEMIDE 10 MG/ML
80 INJECTION INTRAMUSCULAR; INTRAVENOUS
Status: COMPLETED | OUTPATIENT
Start: 2018-04-27 | End: 2018-04-27

## 2018-04-27 RX ORDER — LOSARTAN POTASSIUM 50 MG/1
100 TABLET ORAL DAILY
Status: DISCONTINUED | OUTPATIENT
Start: 2018-04-27 | End: 2018-04-27

## 2018-04-27 RX ORDER — METOPROLOL SUCCINATE 50 MG/1
100 TABLET, EXTENDED RELEASE ORAL DAILY
Status: DISCONTINUED | OUTPATIENT
Start: 2018-04-27 | End: 2018-04-29 | Stop reason: HOSPADM

## 2018-04-27 RX ORDER — NAPROXEN SODIUM 220 MG/1
81 TABLET, FILM COATED ORAL DAILY
Status: DISCONTINUED | OUTPATIENT
Start: 2018-04-27 | End: 2018-04-29 | Stop reason: HOSPADM

## 2018-04-27 RX ORDER — CLONIDINE HYDROCHLORIDE 0.2 MG/1
0.2 TABLET ORAL ONCE
Status: COMPLETED | OUTPATIENT
Start: 2018-04-27 | End: 2018-04-27

## 2018-04-27 RX ORDER — TAMSULOSIN HYDROCHLORIDE 0.4 MG/1
0.4 CAPSULE ORAL DAILY
Status: DISCONTINUED | OUTPATIENT
Start: 2018-04-27 | End: 2018-04-27

## 2018-04-27 RX ORDER — HYDRALAZINE HYDROCHLORIDE 50 MG/1
50 TABLET, FILM COATED ORAL EVERY 8 HOURS
Status: DISCONTINUED | OUTPATIENT
Start: 2018-04-27 | End: 2018-04-29 | Stop reason: HOSPADM

## 2018-04-27 RX ORDER — DILTIAZEM HYDROCHLORIDE 180 MG/1
360 CAPSULE, COATED, EXTENDED RELEASE ORAL DAILY
Status: DISCONTINUED | OUTPATIENT
Start: 2018-04-27 | End: 2018-04-27

## 2018-04-27 RX ORDER — PANTOPRAZOLE SODIUM 40 MG/1
40 TABLET, DELAYED RELEASE ORAL DAILY
Status: DISCONTINUED | OUTPATIENT
Start: 2018-04-27 | End: 2018-04-27

## 2018-04-27 RX ORDER — ZIPRASIDONE MESYLATE 20 MG/ML
20 INJECTION, POWDER, LYOPHILIZED, FOR SOLUTION INTRAMUSCULAR
Status: COMPLETED | OUTPATIENT
Start: 2018-04-27 | End: 2018-04-27

## 2018-04-27 RX ORDER — CLONIDINE HYDROCHLORIDE 0.2 MG/1
0.2 TABLET ORAL
Status: COMPLETED | OUTPATIENT
Start: 2018-04-27 | End: 2018-04-27

## 2018-04-27 RX ORDER — DILTIAZEM HYDROCHLORIDE 180 MG/1
360 CAPSULE, COATED, EXTENDED RELEASE ORAL DAILY
Status: DISCONTINUED | OUTPATIENT
Start: 2018-04-27 | End: 2018-04-29 | Stop reason: HOSPADM

## 2018-04-27 RX ORDER — METOPROLOL SUCCINATE 50 MG/1
100 TABLET, EXTENDED RELEASE ORAL DAILY
Status: DISCONTINUED | OUTPATIENT
Start: 2018-04-27 | End: 2018-04-27

## 2018-04-27 RX ORDER — HYDROCODONE BITARTRATE AND ACETAMINOPHEN 5; 325 MG/1; MG/1
1 TABLET ORAL EVERY 6 HOURS PRN
Status: DISCONTINUED | OUTPATIENT
Start: 2018-04-27 | End: 2018-04-29 | Stop reason: HOSPADM

## 2018-04-27 RX ORDER — CLONIDINE HYDROCHLORIDE 0.1 MG/1
0.1 TABLET ORAL 2 TIMES DAILY
Status: DISCONTINUED | OUTPATIENT
Start: 2018-04-27 | End: 2018-04-27

## 2018-04-27 RX ORDER — IBUPROFEN 200 MG
24 TABLET ORAL
Status: DISCONTINUED | OUTPATIENT
Start: 2018-04-27 | End: 2018-04-29 | Stop reason: HOSPADM

## 2018-04-27 RX ORDER — ALPRAZOLAM 0.5 MG/1
0.5 TABLET ORAL 2 TIMES DAILY PRN
Status: DISCONTINUED | OUTPATIENT
Start: 2018-04-27 | End: 2018-04-29 | Stop reason: HOSPADM

## 2018-04-27 RX ORDER — ATORVASTATIN CALCIUM 10 MG/1
20 TABLET, FILM COATED ORAL NIGHTLY
Status: DISCONTINUED | OUTPATIENT
Start: 2018-04-27 | End: 2018-04-29 | Stop reason: HOSPADM

## 2018-04-27 RX ORDER — HYDRALAZINE HYDROCHLORIDE 20 MG/ML
20 INJECTION INTRAMUSCULAR; INTRAVENOUS ONCE
Status: COMPLETED | OUTPATIENT
Start: 2018-04-27 | End: 2018-04-27

## 2018-04-27 RX ORDER — IBUPROFEN 200 MG
16 TABLET ORAL
Status: DISCONTINUED | OUTPATIENT
Start: 2018-04-27 | End: 2018-04-29 | Stop reason: HOSPADM

## 2018-04-27 RX ORDER — TAMSULOSIN HYDROCHLORIDE 0.4 MG/1
0.4 CAPSULE ORAL DAILY
Status: DISCONTINUED | OUTPATIENT
Start: 2018-04-27 | End: 2018-04-29 | Stop reason: HOSPADM

## 2018-04-27 RX ORDER — HYDRALAZINE HYDROCHLORIDE 20 MG/ML
10 INJECTION INTRAMUSCULAR; INTRAVENOUS
Status: COMPLETED | OUTPATIENT
Start: 2018-04-27 | End: 2018-04-27

## 2018-04-27 RX ADMIN — ASPIRIN 81 MG CHEWABLE TABLET 81 MG: 81 TABLET CHEWABLE at 01:04

## 2018-04-27 RX ADMIN — HYDRALAZINE HYDROCHLORIDE 10 MG: 20 INJECTION INTRAMUSCULAR; INTRAVENOUS at 05:04

## 2018-04-27 RX ADMIN — HYDRALAZINE HYDROCHLORIDE 50 MG: 50 TABLET, FILM COATED ORAL at 09:04

## 2018-04-27 RX ADMIN — PANTOPRAZOLE SODIUM 40 MG: 40 TABLET, DELAYED RELEASE ORAL at 01:04

## 2018-04-27 RX ADMIN — FUROSEMIDE 80 MG: 10 INJECTION, SOLUTION INTRAMUSCULAR; INTRAVENOUS at 02:04

## 2018-04-27 RX ADMIN — CLONIDINE HYDROCHLORIDE 0.1 MG: 0.1 TABLET ORAL at 02:04

## 2018-04-27 RX ADMIN — HYDRALAZINE HYDROCHLORIDE 20 MG: 20 INJECTION INTRAMUSCULAR; INTRAVENOUS at 05:04

## 2018-04-27 RX ADMIN — DILTIAZEM HYDROCHLORIDE 360 MG: 180 CAPSULE, COATED, EXTENDED RELEASE ORAL at 01:04

## 2018-04-27 RX ADMIN — CLONIDINE HYDROCHLORIDE 0.2 MG: 0.2 TABLET ORAL at 04:04

## 2018-04-27 RX ADMIN — METOPROLOL SUCCINATE 100 MG: 50 TABLET, EXTENDED RELEASE ORAL at 02:04

## 2018-04-27 RX ADMIN — CLONIDINE HYDROCHLORIDE 0.1 MG: 0.1 TABLET ORAL at 09:04

## 2018-04-27 RX ADMIN — TAMSULOSIN HYDROCHLORIDE 0.4 MG: 0.4 CAPSULE ORAL at 02:04

## 2018-04-27 RX ADMIN — LOSARTAN POTASSIUM 100 MG: 50 TABLET, FILM COATED ORAL at 01:04

## 2018-04-27 RX ADMIN — ZIPRASIDONE MESYLATE 20 MG: 20 INJECTION, POWDER, LYOPHILIZED, FOR SOLUTION INTRAMUSCULAR at 02:04

## 2018-04-27 RX ADMIN — CLONIDINE HYDROCHLORIDE 0.2 MG: 0.2 TABLET ORAL at 07:04

## 2018-04-27 RX ADMIN — ATORVASTATIN CALCIUM 20 MG: 10 TABLET, FILM COATED ORAL at 09:04

## 2018-04-27 NOTE — ED NOTES
Level of Consciousness: The patient is awake, alert, and oriented but drowsy with appropriate affect and speech; oriented to person, place and time.  Appearance: Sitting up in bed with no acute distress noted. Clothing and hygiene are clean and worn appropriately.  Skin: Skin is warm and dry with good skin turgor; intact; color consistent with ethnicity.  Mucous membranes are moist.   Musculoskeletal: Moves all extremities well in full range of motion. No obvious deformities or swelling noted. 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   Respiratory: Airway open and patent, respirations spontaneous, even and unlabored. No accessory muscles in use. Pt denies SOB at this time. O2 placed and at 2L/min.   Cardiac: Regular rate and rhythm, good pulses palpated peripherally, capillary refill < 3 seconds.  Abdomen: Soft, non-tender to palpation. No distention noted.  Neurologic: PERRLA, face exhibits symmetrical expression, hand grasps equal and even bilaterally, normal sensation noted to all extremities and face when touched by a finger.    Son at bedside. Patient updated on plan of care. Denies needs at this time. Will continue to monitor.

## 2018-04-27 NOTE — ED PROVIDER NOTES
SCRIBE #1 NOTE: IMadiha, am scribing for, and in the presence of, Lucho Leon Jr., MD. I have scribed the HPI, ROS, and PEx.     SCRIBE #2 NOTE: I, Alex Velarde, am scribing for, and in the presence of,  Endy Alford MD. I have scribed the remaining portions of the note not scribed by Scribe #1.     History      Chief Complaint   Patient presents with    Anxiety     cough       Review of patient's allergies indicates:   Allergen Reactions    Augmentin [amoxicillin-pot clavulanate] Edema    Lisinopril      cough    Sulfa (sulfonamide antibiotics)      swelling        HPI   HPI    4/27/2018, 1:40 AM   History obtained from the patient      History of Present Illness: Conner Perez III is a 68 y.o. male patient with PMHx of CKD, DM, and HTN who presents to the Emergency Department for cough, sob which onset gradually PTA. Patient states he was watching TV at onset of sxs. Symptoms are constant and moderate in severity. No mitigating or exacerbating factors reported. No associated sxs included. Patient denies any fever, chills, HA, dizziness, CP, leg swelling, palpitations, N/V, extremity weakness/numbness, and all other sxs at this time. Patient states he dialyzes on Mondays, Wednesdays, and Fridays. Patient states his nephrologist is Dr. Saldana. Patient states he has not missed any recent dialysis appointments. No further complaints or concerns at this time.     Arrival mode: Personal vehicle      PCP: Raciel Angela MD       Past Medical History:  Past Medical History:   Diagnosis Date    CKD (chronic kidney disease), stage IV     Diabetes mellitus     Diabetes mellitus, type 2     HTN (hypertension)        Past Surgical History:  Past Surgical History:   Procedure Laterality Date    AV FISTULA PLACEMENT Right 06/2017    CIRCUMCISION  1978    TOE AMPUTATION Left 01/24/2017    4th toe         Family History:  Family History   Problem Relation Age of Onset    Hypertension Father     No Known  Problems Mother        Social History:  Social History     Social History Main Topics    Smoking status: Former Smoker     Types: Cigarettes     Quit date: 1971    Smokeless tobacco: Never Used    Alcohol use No    Drug use: No    Sexual activity: Not on file       ROS   Review of Systems   Constitutional: Negative for chills and fever.   HENT: Negative for sore throat.    Respiratory: Positive for shortness of breath.    Cardiovascular: Negative for chest pain, palpitations and leg swelling.   Gastrointestinal: Negative for nausea and vomiting.   Genitourinary: Negative for dysuria.   Musculoskeletal: Negative for back pain.   Skin: Negative for rash.   Neurological: Negative for dizziness, weakness, numbness and headaches.   Hematological: Does not bruise/bleed easily.   Psychiatric/Behavioral: The patient is nervous/anxious.    All other systems reviewed and are negative.      Physical Exam      Initial Vitals   BP Pulse Resp Temp SpO2   04/27/18 0302 04/27/18 0130 04/27/18 0130 04/27/18 0130 04/27/18 0130   (!) 205/98 77 (!) 22 97.7 °F (36.5 °C) (!) 90 %      MAP       04/27/18 0302       133.67          Physical Exam  Nursing Notes and Vital Signs Reviewed.  Constitutional: Patient is in no acute distress. Well-developed and well-nourished.  Head: Atraumatic. Normocephalic.  Eyes: PERRL. EOM intact. Conjunctivae are not pale. No scleral icterus.  ENT: Mucous membranes are moist. Oropharynx is clear and symmetric.    Neck: Supple. Full ROM. No lymphadenopathy.  Cardiovascular: Regular rate. Regular rhythm. No murmurs, rubs, or gallops. Distal pulses are 2+ and symmetric.  Pulmonary/Chest: Tachypnea. Crackles bilaterally, worse at bases. No wheezing or rales.  Abdominal: Soft and non-distended.  There is no tenderness.  No rebound, guarding, or rigidity. Good bowel sounds.  Genitourinary: No CVA tenderness  Musculoskeletal: Moves all extremities. No obvious deformities. No edema. No calf tenderness.  Skin:  "Warm and dry.  Neurological:  Alert, awake, and appropriate.  Normal speech.  No acute focal neurological deficits are appreciated.  Psychiatric: Normal affect. Good eye contact. Appropriate in content.    ED Course    Procedures  ED Vital Signs:  Vitals:    04/27/18 0130 04/27/18 0302 04/27/18 0342 04/27/18 0441   BP:  (!) 205/98 (!) 200/96 (!) 206/98   Pulse: 77 67 64 67   Resp: (!) 22 (!) 26 (!) 22 (!) 31   Temp: 97.7 °F (36.5 °C)      TempSrc: Oral      SpO2: (!) 90% 95% 96% 97%   Weight: 103 kg (227 lb)      Height: 6' 6" (1.981 m)       04/27/18 0521 04/27/18 0541 04/27/18 0707   BP: (!) 204/98 (!) 197/93 (!) 223/102   Pulse: 64 63 72   Resp: (!) 24 (!) 24 (!) 21   Temp:   97.2 °F (36.2 °C)   TempSrc:      SpO2: 97% 97% 97%   Weight:      Height:          Abnormal Lab Results:  Labs Reviewed   COMPREHENSIVE METABOLIC PANEL - Abnormal; Notable for the following:        Result Value    Potassium 5.6 (*)     Glucose 151 (*)     BUN, Bld 34 (*)     Creatinine 8.6 (*)     AST 54 (*)     eGFR if  7 (*)     eGFR if non  6 (*)     All other components within normal limits   TROPONIN I - Abnormal; Notable for the following:     Troponin I 0.031 (*)     All other components within normal limits   CBC W/ AUTO DIFFERENTIAL - Abnormal; Notable for the following:     RBC 4.10 (*)     Hemoglobin 11.5 (*)     Hematocrit 36.1 (*)     MCHC 31.9 (*)     RDW 15.1 (*)     All other components within normal limits   B-TYPE NATRIURETIC PEPTIDE - Abnormal; Notable for the following:     BNP 1,220 (*)     All other components within normal limits   TROPONIN I - Abnormal; Notable for the following:     Troponin I 0.029 (*)     All other components within normal limits   COMPREHENSIVE METABOLIC PANEL - Abnormal; Notable for the following:     CO2 30 (*)     Glucose 153 (*)     BUN, Bld 34 (*)     Creatinine 8.7 (*)     Calcium 8.5 (*)     Albumin 3.3 (*)     ALT 5 (*)     eGFR if  7 (*)  "    eGFR if non  6 (*)     All other components within normal limits   TROPONIN I        All Lab Results:  Results for orders placed or performed during the hospital encounter of 04/27/18   Comprehensive metabolic panel   Result Value Ref Range    Sodium 141 136 - 145 mmol/L    Potassium 5.6 (H) 3.5 - 5.1 mmol/L    Chloride 100 95 - 110 mmol/L    CO2 26 23 - 29 mmol/L    Glucose 151 (H) 70 - 110 mg/dL    BUN, Bld 34 (H) 8 - 23 mg/dL    Creatinine 8.6 (H) 0.5 - 1.4 mg/dL    Calcium 8.9 8.7 - 10.5 mg/dL    Total Protein 8.4 6.0 - 8.4 g/dL    Albumin 3.6 3.5 - 5.2 g/dL    Total Bilirubin 0.6 0.1 - 1.0 mg/dL    Alkaline Phosphatase 73 55 - 135 U/L    AST 54 (H) 10 - 40 U/L    ALT 12 10 - 44 U/L    Anion Gap 15 8 - 16 mmol/L    eGFR if African American 7 (A) >60 mL/min/1.73 m^2    eGFR if non African American 6 (A) >60 mL/min/1.73 m^2   Troponin I #1   Result Value Ref Range    Troponin I 0.024 0.000 - 0.026 ng/mL   Troponin I #2   Result Value Ref Range    Troponin I 0.031 (H) 0.000 - 0.026 ng/mL   CBC auto differential   Result Value Ref Range    WBC 6.37 3.90 - 12.70 K/uL    RBC 4.10 (L) 4.60 - 6.20 M/uL    Hemoglobin 11.5 (L) 14.0 - 18.0 g/dL    Hematocrit 36.1 (L) 40.0 - 54.0 %    MCV 88 82 - 98 fL    MCH 28.0 27.0 - 31.0 pg    MCHC 31.9 (L) 32.0 - 36.0 g/dL    RDW 15.1 (H) 11.5 - 14.5 %    Platelets 188 150 - 350 K/uL    MPV 10.7 9.2 - 12.9 fL    Gran # (ANC) 3.8 1.8 - 7.7 K/uL    Lymph # 1.7 1.0 - 4.8 K/uL    Mono # 0.6 0.3 - 1.0 K/uL    Eos # 0.2 0.0 - 0.5 K/uL    Baso # 0.05 0.00 - 0.20 K/uL    Gran% 60.1 38.0 - 73.0 %    Lymph% 25.9 18.0 - 48.0 %    Mono% 9.6 4.0 - 15.0 %    Eosinophil% 3.6 0.0 - 8.0 %    Basophil% 0.8 0.0 - 1.9 %    Differential Method Automated    Brain natriuretic peptide   Result Value Ref Range    BNP 1,220 (H) 0 - 99 pg/mL   Troponin I   Result Value Ref Range    Troponin I 0.029 (H) 0.000 - 0.026 ng/mL   Comprehensive metabolic panel   Result Value Ref Range    Sodium 142  136 - 145 mmol/L    Potassium 4.2 3.5 - 5.1 mmol/L    Chloride 100 95 - 110 mmol/L    CO2 30 (H) 23 - 29 mmol/L    Glucose 153 (H) 70 - 110 mg/dL    BUN, Bld 34 (H) 8 - 23 mg/dL    Creatinine 8.7 (H) 0.5 - 1.4 mg/dL    Calcium 8.5 (L) 8.7 - 10.5 mg/dL    Total Protein 6.8 6.0 - 8.4 g/dL    Albumin 3.3 (L) 3.5 - 5.2 g/dL    Total Bilirubin 0.5 0.1 - 1.0 mg/dL    Alkaline Phosphatase 68 55 - 135 U/L    AST 12 10 - 40 U/L    ALT 5 (L) 10 - 44 U/L    Anion Gap 12 8 - 16 mmol/L    eGFR if African American 7 (A) >60 mL/min/1.73 m^2    eGFR if non African American 6 (A) >60 mL/min/1.73 m^2       Imaging Results:  Imaging Results          CT Head Without Contrast (Impression:)    No evidence of acute transcortical infarct or acute intracranial hemorrhage             X-Ray Chest AP Portable (Preliminary result)  Result time 04/27/18 03:02:45    ED Interpretation by Lucho Leon Jr., MD (04/27/18 03:02:45, [facility] - , Emergency Medicine)    Dependent pulmonary congestion bilaterally                             The EKG was ordered, reviewed, and independently interpreted by the ED provider.  Interpretation time: 0153  Rate: 72 BPM  Rhythm: normal sinus rhythm  Interpretation: Nonspecific T wave abnormality. Prolonged QT. Abnormal ECG. No STEMI.         The Emergency Provider reviewed the vital signs and test results, which are outlined above.    ED Discussion     5:12 AM: Re-evaluated pt. Pt is resting comfortably and is in no acute distress.  D/w pt all pertinent results. D/w pt any concerns expressed at this time. Answered all questions. Pt expresses understanding at this time.  Neurological:  Alert, awake, and appropriate.  Normal speech.  No acute focal neurological deficits are appreciated.    5:57 AM: Dr. Leon transfers care of pt to Dr. Alford, pending imaging results.    7:15 AM: Dr. Alford discussed the pt's case with Dr. Granados (nephrology) who recommends admitting the patient to hospital  medicine, and states that he will dialyze the patient.    7:16 AM: Discussed case with ANISHA Whitney ([facility]). Dr. Haider agrees with current care and management of pt and accepts admission.   Admitting Service: Hospital medicine   Admitting Physician: Dr. Haider  Admit to: Obs/ tele    7:16 AM: Re-evaluated pt. I have discussed test results, shared treatment plan, and the need for admission with patient and family at bedside. Pt and family express understanding at this time and agree with all information. All questions answered. Pt and family have no further questions or concerns at this time. Pt is ready for admit.      ED Medication(s):  Medications   ondansetron injection 4 mg (not administered)   promethazine (PHENERGAN) 6.25 mg in dextrose 5 % 50 mL IVPB (not administered)   acetaminophen tablet 650 mg (not administered)   cloNIDine tablet 0.2 mg (not administered)   furosemide injection 80 mg (80 mg Intravenous Given 4/27/18 0221)   ziprasidone injection 20 mg (20 mg Intramuscular Given 4/27/18 0222)   hydrALAZINE injection 10 mg (10 mg Intravenous Given 4/27/18 0528)       New Prescriptions    No medications on file             Medical Decision Making    Medical Decision Making:   Clinical Tests:   Lab Tests: Reviewed and Ordered  Radiological Study: Reviewed and Ordered  Medical Tests: Reviewed and Ordered           Scribe Attestation:   Scribe #1: I performed the above scribed service and the documentation accurately describes the services I performed. I attest to the accuracy of the note.    Attending:   Physician Attestation Statement for Scribe #1: I, Lucho Leon Jr., MD, personally performed the services described in this documentation, as scribed by Madiha Dia, in my presence, and it is both accurate and complete.       Scribe Attestation:   Scribe #2: I performed the above scribed service and the documentation accurately describes the services I performed. I attest to the accuracy  of the note.    Attending Attestation:           Physician Attestation for Scribe:    Physician Attestation Statement for Scribe #2: I, Endy Alford MD, reviewed documentation, as scribed by Alex Velarde in my presence, and it is both accurate and complete. I also acknowledge and confirm the content of the note done by Scribe #1.          Clinical Impression       ICD-10-CM ICD-9-CM   1. Hypervolemia, unspecified hypervolemia type E87.70 276.69   2. SOB (shortness of breath) R06.02 786.05   3. Hypertension I10 401.9   4. ESRD (end stage renal disease) on dialysis N18.6 585.6    Z99.2 V45.11       Disposition:   Disposition: Placed in Observation  Condition: Fair         Endy Alford MD  04/27/18 0723

## 2018-04-27 NOTE — HPI
Conner Perez is a 68 year old male who presented to the ED with complaints of SOB. He reports that symptoms began several hours prior to arrival. He denies associated symptoms including chest pain, fever, cough and lower extremity edema. The patient reports compliance with dialysis this week. He was seen in the ED on 4/24/18 and at that time it is documented that he missed his 4/23/18 treatment. However, after contacting his dialysis unit, they confirmed that he did attend dialysis on 4/23/18 and 4/25/18. The patient does admit to increased fluid intake. He reports compliance with a low sodium diet. In the ED, the patient did not respond to IV lasix, despite still making some urine. His chest x-ray was significant for mild vascular congestion. His initial troponin was negative and repeats were elevated to 0.029 and 0.031. His BNP was elevated to 1220 and his potassium was elevated to 5.6. Code status was discussed with the patient. He is a full code. His daughter, Corey Soto, is his surrogate medical decision maker.

## 2018-04-27 NOTE — HPI
Pt was seen and examined. H/o reviewed. Pt is a 67 y/o male with a h/o of ESRD on chronic HD q MWF ([facility]' pt) who presented to ER with SOB. Pt said that he drank a lot of water yesterday. Pt had no other issues, no fever, no CP, no cough. Salt and fluid intake reviewed with pt. Pt currently on HD. Pt feels better with fluid removal. No new c/o's since admission.

## 2018-04-27 NOTE — CONSULTS
[facility] -   Nephrology  Consult Note    Patient Name: Conner Perez III  MRN: [id number]  Admission Date: 4/27/2018  Hospital Length of Stay: 0 days  Attending Provider: Parisa Haider MD   Primary Care Physician: Raciel Angela MD  Principal Problem:Volume overload. ESRD on HD    Referring physician: GEORGE Haider  Reason for consult: ESRD on HD    Consults  Subjective:     HPI: Pt was seen and examined. H/o reviewed. Pt is a 67 y/o male with a h/o of ESRD on chronic HD q MWF ([facility] pt) who presented to ER with SOB. Pt said that he drank a lot of water yesterday. Pt had no other issues, no fever, no CP, no cough. Salt and fluid intake reviewed with pt. Pt currently on HD. Pt feels better with fluid removal. No new c/o's since admission.    Past Medical History:   Diagnosis Date    CKD (chronic kidney disease), stage IV     Diabetes mellitus, type 2     HTN (hypertension)        Past Surgical History:   Procedure Laterality Date    AV FISTULA PLACEMENT Right 06/2017    CIRCUMCISION  1978    TOE AMPUTATION Left 01/24/2017    4th toe       Review of patient's allergies indicates:   Allergen Reactions    Augmentin [amoxicillin-pot clavulanate] Edema    Lisinopril      cough    Sulfa (sulfonamide antibiotics)      swelling     Current Facility-Administered Medications   Medication Frequency    0.9%  NaCl infusion PRN    acetaminophen tablet 650 mg Q8H PRN    ALPRAZolam tablet 0.5 mg BID PRN    aspirin chewable tablet 81 mg Daily    atorvastatin tablet 20 mg QHS    cloNIDine tablet 0.1 mg BID    cloNIDine tablet 0.2 mg Once    dextrose 50% injection 12.5 g PRN    dextrose 50% injection 25 g PRN    diltiaZEM 24 hr capsule 360 mg Daily    glucagon (human recombinant) injection 1 mg PRN    glucose chewable tablet 16 g PRN    glucose chewable tablet 24 g PRN    heparin (porcine) injection 5,000 Units Q8H    hydrALAZINE tablet 100 mg Q12H    hydrocodone-acetaminophen  5-325mg per tablet 1 tablet Q6H PRN    insulin aspart U-100 pen 0-5 Units QID (AC + HS) PRN    losartan tablet 100 mg Daily    metoprolol succinate (TOPROL-XL) 24 hr tablet 100 mg Daily    ondansetron injection 4 mg Q12H PRN    pantoprazole EC tablet 40 mg Daily    tamsulosin 24 hr capsule 0.4 mg Daily    zolpidem tablet 5 mg Nightly PRN     Family History     Problem Relation (Age of Onset)    Hypertension Father    No Known Problems Mother        Social History Main Topics    Smoking status: Former Smoker     Types: Cigarettes     Quit date: 1971    Smokeless tobacco: Never Used    Alcohol use No    Drug use: No    Sexual activity: Not on file     Review of Systems   Constitutional: Negative.    HENT: Negative.    Respiratory: Positive for shortness of breath.    Genitourinary: Negative.    Musculoskeletal: Negative.    Neurological: Negative.    Psychiatric/Behavioral: Negative.      Objective:     Vital Signs (Most Recent):  Temp: 97.7 °F (36.5 °C) (04/27/18 1529)  Pulse: 72 (04/27/18 1529)  Resp: 18 (04/27/18 1529)  BP: (!) 218/104 (04/27/18 1529)  SpO2: 96 % (04/27/18 1529)  O2 Device (Oxygen Therapy): nasal cannula (04/27/18 1529) Vital Signs (24h Range):  Temp:  [97.2 °F (36.2 °C)-98.7 °F (37.1 °C)] 97.7 °F (36.5 °C)  Pulse:  [60-77] 72  Resp:  [18-31] 18  SpO2:  [90 %-98 %] 96 %  BP: (169-223)/() 218/104     Weight: 98.8 kg (217 lb 13 oz) (04/27/18 1529)  Body mass index is 25.17 kg/m².  Body surface area is 2.33 meters squared.    No intake/output data recorded.    Physical Exam   Constitutional: He is oriented to person, place, and time. He appears well-developed and well-nourished. No distress.   HENT:   Head: Normocephalic and atraumatic.   Neck: No JVD present.   Cardiovascular: Normal rate, regular rhythm and normal heart sounds.  Exam reveals no friction rub.    Pulmonary/Chest: Effort normal. He has rales.   Abdominal: Soft. There is no tenderness.   Musculoskeletal: He exhibits  edema.   Neurological: He is oriented to person, place, and time.   Skin: Skin is warm and dry.   Psychiatric: He has a normal mood and affect. His behavior is normal. Thought content normal.   Nursing note and vitals reviewed.      Significant Labs: reviewed  BMP  Lab Results   Component Value Date     04/27/2018    K 4.2 04/27/2018     04/27/2018    CO2 30 (H) 04/27/2018    BUN 34 (H) 04/27/2018    CREATININE 8.7 (H) 04/27/2018    CALCIUM 8.5 (L) 04/27/2018    ANIONGAP 12 04/27/2018    ESTGFRAFRICA 7 (A) 04/27/2018    EGFRNONAA 6 (A) 04/27/2018     Lab Results   Component Value Date    WBC 6.37 04/27/2018    HGB 11.5 (L) 04/27/2018    HCT 36.1 (L) 04/27/2018    MCV 88 04/27/2018     04/27/2018       Recent Labs  Lab 04/27/18  1450   TROPONINI 0.031*       Significant Imaging: CXR reviwed + pulmonary edema    Assessment/Plan:   69 y/o male with ESRD on chronic HD presented with SOB:    ESRD (end stage renal disease)    Renal: ESRD pt on q MWF HD schedule  Has pulmonary edema due to XS fluid intake  Good response to UF and fluid removal  Sx's improved  K normal  Tolerating HD well, continue HD    HTN: uncontrolled HT due to large fluid gain  Hypertensive urgency  Meds reviewed  Change hydralazine form 100 mg po bid to 50 mg po tid  Increase clonidine form 0.1 mg po bid to tid       * Volume overload associated with ESRD    Presented with dyspnea  Secondary to pulmonary edema  Receiving HD/UF  Pt was advised on salt and fluid restriction.                  Plans and recommendations:  As discussed above  Total time spent 70 minutes including time needed to review the records, the   patient evaluation, documentation, face-to-face discussion with the patient,   more than 50% of the time was spent on coordination of care and counseling.    Level V visit.  Pt received multiple visits and evaluations.    Lazara Granados MD  Nephrology  [facility]

## 2018-04-27 NOTE — PLAN OF CARE
Problem: Patient Care Overview  Goal: Plan of Care Review  Outcome: Ongoing (interventions implemented as appropriate)  Pt AAO x 3.  VSS.  Pt remained afebrile throughout this shift.   Pt remained free of falls this shift.   Pt had no c/o pain this shift.  Blood glucose monitored.  Pt hypertensive this shift, PRN medications given as scheduled.   Plan of care reviewed. Patient verbalizes understanding.   Pt moving/turing independently.   Patient SR on monitor.   Pt had HD this shift, 4 L removed.   Bed low, side rails up x 2, wheels locked, call light in reach.   Patient instructed to call for assistance.   Hourly rounding completed.   Will continue to monitor.

## 2018-04-27 NOTE — ASSESSMENT & PLAN NOTE
-Fluid removal with dialysis.   -Resume home medications including clonidine, diltiazem, hydralazine, metoprolol and losartan.

## 2018-04-27 NOTE — ED NOTES
Patient is lying in bed with eyes closed. Does awaken with verbal stimulation. Denies needs at this time. Updated patient on POC.

## 2018-04-27 NOTE — SUBJECTIVE & OBJECTIVE
Past Medical History:   Diagnosis Date    CKD (chronic kidney disease), stage IV     Diabetes mellitus, type 2     HTN (hypertension)        Past Surgical History:   Procedure Laterality Date    AV FISTULA PLACEMENT Right 06/2017    CIRCUMCISION  1978    TOE AMPUTATION Left 01/24/2017    4th toe       Review of patient's allergies indicates:   Allergen Reactions    Augmentin [amoxicillin-pot clavulanate] Edema    Lisinopril      cough    Sulfa (sulfonamide antibiotics)      swelling     Current Facility-Administered Medications   Medication Frequency    0.9%  NaCl infusion PRN    acetaminophen tablet 650 mg Q8H PRN    ALPRAZolam tablet 0.5 mg BID PRN    aspirin chewable tablet 81 mg Daily    atorvastatin tablet 20 mg QHS    cloNIDine tablet 0.1 mg BID    cloNIDine tablet 0.2 mg Once    dextrose 50% injection 12.5 g PRN    dextrose 50% injection 25 g PRN    diltiaZEM 24 hr capsule 360 mg Daily    glucagon (human recombinant) injection 1 mg PRN    glucose chewable tablet 16 g PRN    glucose chewable tablet 24 g PRN    heparin (porcine) injection 5,000 Units Q8H    hydrALAZINE tablet 100 mg Q12H    hydrocodone-acetaminophen 5-325mg per tablet 1 tablet Q6H PRN    insulin aspart U-100 pen 0-5 Units QID (AC + HS) PRN    losartan tablet 100 mg Daily    metoprolol succinate (TOPROL-XL) 24 hr tablet 100 mg Daily    ondansetron injection 4 mg Q12H PRN    pantoprazole EC tablet 40 mg Daily    tamsulosin 24 hr capsule 0.4 mg Daily    zolpidem tablet 5 mg Nightly PRN     Family History     Problem Relation (Age of Onset)    Hypertension Father    No Known Problems Mother        Social History Main Topics    Smoking status: Former Smoker     Types: Cigarettes     Quit date: 1971    Smokeless tobacco: Never Used    Alcohol use No    Drug use: No    Sexual activity: Not on file     Review of Systems   Constitutional: Negative.    HENT: Negative.    Respiratory: Positive for shortness of breath.     Genitourinary: Negative.    Musculoskeletal: Negative.    Neurological: Negative.    Psychiatric/Behavioral: Negative.      Objective:     Vital Signs (Most Recent):  Temp: 97.7 °F (36.5 °C) (04/27/18 1529)  Pulse: 72 (04/27/18 1529)  Resp: 18 (04/27/18 1529)  BP: (!) 218/104 (04/27/18 1529)  SpO2: 96 % (04/27/18 1529)  O2 Device (Oxygen Therapy): nasal cannula (04/27/18 1529) Vital Signs (24h Range):  Temp:  [97.2 °F (36.2 °C)-98.7 °F (37.1 °C)] 97.7 °F (36.5 °C)  Pulse:  [60-77] 72  Resp:  [18-31] 18  SpO2:  [90 %-98 %] 96 %  BP: (169-223)/() 218/104     Weight: 98.8 kg (217 lb 13 oz) (04/27/18 1529)  Body mass index is 25.17 kg/m².  Body surface area is 2.33 meters squared.    No intake/output data recorded.    Physical Exam   Constitutional: He is oriented to person, place, and time. He appears well-developed and well-nourished. No distress.   HENT:   Head: Normocephalic and atraumatic.   Neck: No JVD present.   Cardiovascular: Normal rate, regular rhythm and normal heart sounds.  Exam reveals no friction rub.    Pulmonary/Chest: Effort normal. He has rales.   Abdominal: Soft. There is no tenderness.   Musculoskeletal: He exhibits edema.   Neurological: He is oriented to person, place, and time.   Skin: Skin is warm and dry.   Psychiatric: He has a normal mood and affect. His behavior is normal. Thought content normal.   Nursing note and vitals reviewed.      Significant Labs: reviewed  BMP  Lab Results   Component Value Date     04/27/2018    K 4.2 04/27/2018     04/27/2018    CO2 30 (H) 04/27/2018    BUN 34 (H) 04/27/2018    CREATININE 8.7 (H) 04/27/2018    CALCIUM 8.5 (L) 04/27/2018    ANIONGAP 12 04/27/2018    ESTGFRAFRICA 7 (A) 04/27/2018    EGFRNONAA 6 (A) 04/27/2018     Lab Results   Component Value Date    WBC 6.37 04/27/2018    HGB 11.5 (L) 04/27/2018    HCT 36.1 (L) 04/27/2018    MCV 88 04/27/2018     04/27/2018       Recent Labs  Lab 04/27/18  1450   TROPONINI 0.031*        Significant Imaging: CXR reviwed + pulmonary edema

## 2018-04-27 NOTE — H&P
[facility] Hospital Medicine  History & Physical    Patient Name: Conner Perez III  MRN: [id number]  Admission Date: 4/27/2018  Attending Physician: Praisa Haider MD   Primary Care Provider: Raciel Angela MD         Patient information was obtained from patient, past medical records and ER records.     Subjective:     Principal Problem:Volume overload    Chief Complaint:   Chief Complaint   Patient presents with    Shortness of Breath        HPI: Conner Perez is a 68 year old male who presented to the ED with complaints of SOB. He reports that symptoms began several hours prior to arrival. He denies associated symptoms including chest pain, fever, cough and lower extremity edema. The patient reports compliance with dialysis this week. He was seen in the ED on 4/24/18 and at that time it is documented that he missed his 4/23/18 treatment. However, after contacting his dialysis unit, they confirmed that he did attend dialysis on 4/23/18 and 4/25/18. The patient does admit to increased fluid intake. He reports compliance with a low sodium diet. In the ED, the patient did not respond to IV lasix, despite still making some urine. His chest x-ray was significant for mild vascular congestion. His initial troponin was negative and repeats were elevated to 0.029 and 0.031. His BNP was elevated to 1220 and his potassium was elevated to 5.6. Code status was discussed with the patient. He is a full code. His daughter, Corey Soto, is his surrogate medical decision maker.     Past Medical History:   Diagnosis Date    CKD (chronic kidney disease), stage IV     Diabetes mellitus     Diabetes mellitus, type 2     HTN (hypertension)        Past Surgical History:   Procedure Laterality Date    AV FISTULA PLACEMENT Right 06/2017    CIRCUMCISION  1978    TOE AMPUTATION Left 01/24/2017    4th toe       Review of patient's allergies indicates:   Allergen Reactions    Augmentin [amoxicillin-pot clavulanate]  Edema    Lisinopril      cough    Sulfa (sulfonamide antibiotics)      swelling       No current facility-administered medications on file prior to encounter.      Current Outpatient Prescriptions on File Prior to Encounter   Medication Sig    aspirin 81 MG Chew Take 1 tablet (81 mg total) by mouth once daily.    atorvastatin (LIPITOR) 40 MG tablet Take 1 tablet (40 mg total) by mouth every evening. (Patient taking differently: Take 20 mg by mouth every evening. )    cloNIDine (CATAPRES) 0.1 MG tablet take 1 tablet by mouth twice a day    diltiaZEM (CARDIZEM CD) 360 MG 24 hr capsule Take 1 capsule (360 mg total) by mouth once daily.    furosemide (LASIX) 40 MG tablet Take 40 mg by mouth once daily.    hydrALAZINE (APRESOLINE) 50 MG tablet Take 2 tablets (100 mg total) by mouth every 12 (twelve) hours.    losartan (COZAAR) 100 MG tablet Take 1 tablet (100 mg total) by mouth once daily.    metoprolol succinate (TOPROL-XL) 50 MG 24 hr tablet Take 2 tablets (100 mg total) by mouth once daily.    pantoprazole (PROTONIX) 40 MG tablet Take 1 tablet (40 mg total) by mouth once daily.    tamsulosin (FLOMAX) 0.4 mg Cp24 Take 1 capsule (0.4 mg total) by mouth once daily.    albuterol (PROVENTIL) 2.5 mg /3 mL (0.083 %) nebulizer solution 2.5 mg.    albuterol 90 mcg/actuation inhaler Inhale into the lungs.    ALPRAZolam (XANAX) 0.5 MG tablet Take 1 tablet (0.5 mg total) by mouth 2 (two) times daily as needed for Anxiety (prn chest tightnsss/ anxiety).    lactulose (CHRONULAC) 10 gram/15 mL solution take 2 tablespoonful by mouth at bedtime    ondansetron (ZOFRAN-ODT) 4 MG TbDL Take 1 tablet (4 mg total) by mouth every 8 (eight) hours as needed.    zolpidem (AMBIEN) 5 MG Tab Take 1 tablet (5 mg total) by mouth nightly as needed.     Family History     Problem Relation (Age of Onset)    Hypertension Father    No Known Problems Mother        Social History Main Topics    Smoking status: Former Smoker     Types:  Cigarettes     Quit date: 1971    Smokeless tobacco: Never Used    Alcohol use No    Drug use: No    Sexual activity: Not on file     Review of Systems   Constitutional: Negative for appetite change, chills, diaphoresis, fatigue and fever.   HENT: Negative for congestion, ear pain, mouth sores, sore throat and trouble swallowing.    Eyes: Negative for pain and visual disturbance.   Respiratory: Positive for shortness of breath. Negative for cough and chest tightness.    Cardiovascular: Negative for chest pain, palpitations and leg swelling.   Gastrointestinal: Negative for abdominal pain, constipation, diarrhea and nausea.   Endocrine: Negative for cold intolerance, heat intolerance, polydipsia and polyuria.   Genitourinary: Negative for dysuria, frequency and hematuria.   Musculoskeletal: Negative for arthralgias, back pain, myalgias and neck pain.   Skin: Negative for pallor, rash and wound.   Allergic/Immunologic: Negative for environmental allergies and immunocompromised state.   Neurological: Negative for dizziness, seizures, syncope, weakness, numbness and headaches.   Hematological: Negative for adenopathy. Does not bruise/bleed easily.   Psychiatric/Behavioral: Negative for agitation, confusion and sleep disturbance.     Objective:     Vital Signs (Most Recent):  Temp: 97.2 °F (36.2 °C) (04/27/18 0707)  Pulse: 72 (04/27/18 0707)  Resp: (!) 21 (04/27/18 0707)  BP: (!) 190/91 (04/27/18 0820)  SpO2: 98 % (04/27/18 0800) Vital Signs (24h Range):  Temp:  [97.2 °F (36.2 °C)-97.7 °F (36.5 °C)] 97.2 °F (36.2 °C)  Pulse:  [63-77] 72  Resp:  [21-31] 21  SpO2:  [90 %-98 %] 98 %  BP: (190-223)/() 190/91     Weight: 103 kg (227 lb)  Body mass index is 26.23 kg/m².    Physical Exam        Significant Labs:   CBC:   Recent Labs  Lab 04/27/18  0306   WBC 6.37   HGB 11.5*   HCT 36.1*        CMP:   Recent Labs  Lab 04/27/18  0224 04/27/18  0306    142   K 5.6* 4.2    100   CO2 26 30*   *  153*   BUN 34* 34*   CREATININE 8.6* 8.7*   CALCIUM 8.9 8.5*   PROT 8.4 6.8   ALBUMIN 3.6 3.3*   BILITOT 0.6 0.5   ALKPHOS 73 68   AST 54* 12   ALT 12 5*   ANIONGAP 15 12   EGFRNONAA 6* 6*     All pertinent labs within the past 24 hours have been reviewed.    Significant Imaging: I have reviewed all pertinent imaging results/findings within the past 24 hours.   Imaging Results          CT Head Without Contrast (Final result)  Result time 04/27/18 07:17:33    Final result by Damien Carlson MD (04/27/18 07:17:33)                 Impression:         No acute findings        All CT scans at this facility use dose modulation, iterative reconstruction, and/or weight based dosing when appropriate to reduce radiation dose to as low as reasonably achievable.       Electronically signed by: DAMIEN CARLSON MD  Date:     04/27/18  Time:    07:17              Narrative:    CT HEAD WITHOUT CONTRAST    Date: 04/27/18 06:00:00    Clinical indication: Hypertension, anxiety, transient alteration of awareness     Technique:  Standard noncontrast CT scan of the brain. No previous for comparison.     Findings:  There is no evidence of intracranial hemorrhage. Mild white matter low density microangiopathy. No acute focal parenchymal abnormality. Ventricular system is unremarkable. The cranium is intact. Visualized paranasal sinuses and mastoid air cells are clear.                             X-Ray Chest AP Portable (Final result)  Result time 04/27/18 07:36:43    Final result by Damien Carlson MD (04/27/18 07:36:43)                 Impression:     Stable from 4/23/18.      Electronically signed by: DAMIEN CARLSON MD  Date:     04/27/18  Time:    07:36              Narrative:    History: Shortness of breath    Unchanged cardiomegaly. Right vas catheter at the cavoatrial junction. There may be mild vascular congestion. No evidence of overt failure. No infiltrates. No significant change from 4/23/18.                                 Assessment/Plan:     * Volume overload associated with ESRD    -Dialysis per Nephrology.   -Follow up 2D echo.           Hypertensive urgency    -Fluid removal with dialysis.   -Resume home medications including clonidine, diltiazem, hydralazine, metoprolol and losartan.           Elevated troponin    -Due to renal failure.   -Trend troponins.   -Continue ASA and statin daily.           ESRD (end stage renal disease)    Dialysis per Nephrology.           DM II (diabetes mellitus, type II), controlled    -NISS.   -Check hemoglobin A1C.   -ADA diet.             VTE Risk Mitigation         Ordered     heparin (porcine) injection 5,000 Units  Every 8 hours      04/27/18 1051     IP VTE HIGH RISK PATIENT  Once      04/27/18 4731             ANISHA Ferris  Department of Hospital Medicine   [facility]

## 2018-04-27 NOTE — ED NOTES
Pt resting comfortably with family at bedside. Pt cart locked in low position with both side rails up and call light within reach. Pt updated on plan of care and informed that we are awaiting ct results. Pt verbalized understanding and this time.

## 2018-04-27 NOTE — SUBJECTIVE & OBJECTIVE
Past Medical History:   Diagnosis Date    CKD (chronic kidney disease), stage IV     Diabetes mellitus     Diabetes mellitus, type 2     HTN (hypertension)        Past Surgical History:   Procedure Laterality Date    AV FISTULA PLACEMENT Right 06/2017    CIRCUMCISION  1978    TOE AMPUTATION Left 01/24/2017    4th toe       Review of patient's allergies indicates:   Allergen Reactions    Augmentin [amoxicillin-pot clavulanate] Edema    Lisinopril      cough    Sulfa (sulfonamide antibiotics)      swelling       No current facility-administered medications on file prior to encounter.      Current Outpatient Prescriptions on File Prior to Encounter   Medication Sig    aspirin 81 MG Chew Take 1 tablet (81 mg total) by mouth once daily.    atorvastatin (LIPITOR) 40 MG tablet Take 1 tablet (40 mg total) by mouth every evening. (Patient taking differently: Take 20 mg by mouth every evening. )    cloNIDine (CATAPRES) 0.1 MG tablet take 1 tablet by mouth twice a day    diltiaZEM (CARDIZEM CD) 360 MG 24 hr capsule Take 1 capsule (360 mg total) by mouth once daily.    furosemide (LASIX) 40 MG tablet Take 40 mg by mouth once daily.    hydrALAZINE (APRESOLINE) 50 MG tablet Take 2 tablets (100 mg total) by mouth every 12 (twelve) hours.    losartan (COZAAR) 100 MG tablet Take 1 tablet (100 mg total) by mouth once daily.    metoprolol succinate (TOPROL-XL) 50 MG 24 hr tablet Take 2 tablets (100 mg total) by mouth once daily.    pantoprazole (PROTONIX) 40 MG tablet Take 1 tablet (40 mg total) by mouth once daily.    tamsulosin (FLOMAX) 0.4 mg Cp24 Take 1 capsule (0.4 mg total) by mouth once daily.    albuterol (PROVENTIL) 2.5 mg /3 mL (0.083 %) nebulizer solution 2.5 mg.    albuterol 90 mcg/actuation inhaler Inhale into the lungs.    ALPRAZolam (XANAX) 0.5 MG tablet Take 1 tablet (0.5 mg total) by mouth 2 (two) times daily as needed for Anxiety (prn chest tightnsss/ anxiety).    lactulose (CHRONULAC) 10  gram/15 mL solution take 2 tablespoonful by mouth at bedtime    ondansetron (ZOFRAN-ODT) 4 MG TbDL Take 1 tablet (4 mg total) by mouth every 8 (eight) hours as needed.    zolpidem (AMBIEN) 5 MG Tab Take 1 tablet (5 mg total) by mouth nightly as needed.     Family History     Problem Relation (Age of Onset)    Hypertension Father    No Known Problems Mother        Social History Main Topics    Smoking status: Former Smoker     Types: Cigarettes     Quit date: 1971    Smokeless tobacco: Never Used    Alcohol use No    Drug use: No    Sexual activity: Not on file     Review of Systems   Constitutional: Negative for appetite change, chills, diaphoresis, fatigue and fever.   HENT: Negative for congestion, ear pain, mouth sores, sore throat and trouble swallowing.    Eyes: Negative for pain and visual disturbance.   Respiratory: Positive for shortness of breath. Negative for cough and chest tightness.    Cardiovascular: Negative for chest pain, palpitations and leg swelling.   Gastrointestinal: Negative for abdominal pain, constipation, diarrhea and nausea.   Endocrine: Negative for cold intolerance, heat intolerance, polydipsia and polyuria.   Genitourinary: Negative for dysuria, frequency and hematuria.   Musculoskeletal: Negative for arthralgias, back pain, myalgias and neck pain.   Skin: Negative for pallor, rash and wound.   Allergic/Immunologic: Negative for environmental allergies and immunocompromised state.   Neurological: Negative for dizziness, seizures, syncope, weakness, numbness and headaches.   Hematological: Negative for adenopathy. Does not bruise/bleed easily.   Psychiatric/Behavioral: Negative for agitation, confusion and sleep disturbance.     Objective:     Vital Signs (Most Recent):  Temp: 97.2 °F (36.2 °C) (04/27/18 0707)  Pulse: 72 (04/27/18 0707)  Resp: (!) 21 (04/27/18 0707)  BP: (!) 190/91 (04/27/18 0820)  SpO2: 98 % (04/27/18 0800) Vital Signs (24h Range):  Temp:  [97.2 °F (36.2 °C)-97.7  °F (36.5 °C)] 97.2 °F (36.2 °C)  Pulse:  [63-77] 72  Resp:  [21-31] 21  SpO2:  [90 %-98 %] 98 %  BP: (190-223)/() 190/91     Weight: 103 kg (227 lb)  Body mass index is 26.23 kg/m².    Physical Exam        Significant Labs:   CBC:   Recent Labs  Lab 04/27/18  0306   WBC 6.37   HGB 11.5*   HCT 36.1*        CMP:   Recent Labs  Lab 04/27/18  0224 04/27/18  0306    142   K 5.6* 4.2    100   CO2 26 30*   * 153*   BUN 34* 34*   CREATININE 8.6* 8.7*   CALCIUM 8.9 8.5*   PROT 8.4 6.8   ALBUMIN 3.6 3.3*   BILITOT 0.6 0.5   ALKPHOS 73 68   AST 54* 12   ALT 12 5*   ANIONGAP 15 12   EGFRNONAA 6* 6*     All pertinent labs within the past 24 hours have been reviewed.    Significant Imaging: I have reviewed all pertinent imaging results/findings within the past 24 hours.   Imaging Results          CT Head Without Contrast (Final result)  Result time 04/27/18 07:17:33    Final result by Damien Carlson MD (04/27/18 07:17:33)                 Impression:         No acute findings        All CT scans at this facility use dose modulation, iterative reconstruction, and/or weight based dosing when appropriate to reduce radiation dose to as low as reasonably achievable.       Electronically signed by: DAMIEN CARLSON MD  Date:     04/27/18  Time:    07:17              Narrative:    CT HEAD WITHOUT CONTRAST    Date: 04/27/18 06:00:00    Clinical indication: Hypertension, anxiety, transient alteration of awareness     Technique:  Standard noncontrast CT scan of the brain. No previous for comparison.     Findings:  There is no evidence of intracranial hemorrhage. Mild white matter low density microangiopathy. No acute focal parenchymal abnormality. Ventricular system is unremarkable. The cranium is intact. Visualized paranasal sinuses and mastoid air cells are clear.                             X-Ray Chest AP Portable (Final result)  Result time 04/27/18 07:36:43    Final result by Damien Carlson MD  (04/27/18 07:36:43)                 Impression:     Stable from 4/23/18.      Electronically signed by: PAULO RANGEL MD  Date:     04/27/18  Time:    07:36              Narrative:    History: Shortness of breath    Unchanged cardiomegaly. Right vas catheter at the cavoatrial junction. There may be mild vascular congestion. No evidence of overt failure. No infiltrates. No significant change from 4/23/18.

## 2018-04-27 NOTE — PROGRESS NOTES
Pt completed 4 hours of HD tx with 4 L net UF.  Pt tolerated tx well. No access issues. Dr. Granados rounded on pt at bedside during tx.  Post tx, blood rinsed back, needles removed, and hemostasis achieved. Report to nurse attending.

## 2018-04-27 NOTE — ASSESSMENT & PLAN NOTE
Presented with dyspnea  Secondary to pulmonary edema  Receiving HD/UF  Pt was advised on salt and fluid restriction.

## 2018-04-27 NOTE — PROGRESS NOTES
"Patient arrived to unit from ER via stretcher.   Patient in room 228.  Transferred into bed with 1 assist.   Bedside report given Ines LUNDBERG.  Charge nurse advised of patient arrival.   VS currently stable.   Tele monitored applied.   Patient oriented to room, rounding sheet and call bell.   Bed in lowest position, call light in reach.  Encouraged to notify of all needs.   Irina LANCASTER notified of pts admit b/p. New orders in chart.   Will continue to monitor.  BP (!) 218/104 (BP Location: Left arm, Patient Position: Lying)   Pulse 72   Temp 97.7 °F (36.5 °C) (Oral)   Resp 18   Ht 6' 6" (1.981 m)   Wt 98.8 kg (217 lb 13 oz)   SpO2 96%   BMI 25.17 kg/m²     "

## 2018-04-28 VITALS
OXYGEN SATURATION: 96 % | RESPIRATION RATE: 17 BRPM | HEART RATE: 63 BPM | WEIGHT: 217.81 LBS | HEIGHT: 78 IN | DIASTOLIC BLOOD PRESSURE: 80 MMHG | TEMPERATURE: 98 F | BODY MASS INDEX: 25.2 KG/M2 | SYSTOLIC BLOOD PRESSURE: 174 MMHG

## 2018-04-28 LAB
ANION GAP SERPL CALC-SCNC: 10 MMOL/L
BASOPHILS # BLD AUTO: 0.07 K/UL
BASOPHILS NFR BLD: 1.4 %
BUN SERPL-MCNC: 27 MG/DL
CALCIUM SERPL-MCNC: 8.9 MG/DL
CHLORIDE SERPL-SCNC: 103 MMOL/L
CO2 SERPL-SCNC: 26 MMOL/L
CREAT SERPL-MCNC: 7.1 MG/DL
DIFFERENTIAL METHOD: ABNORMAL
EOSINOPHIL # BLD AUTO: 0.3 K/UL
EOSINOPHIL NFR BLD: 6 %
ERYTHROCYTE [DISTWIDTH] IN BLOOD BY AUTOMATED COUNT: 15.3 %
EST. GFR  (AFRICAN AMERICAN): 8 ML/MIN/1.73 M^2
EST. GFR  (NON AFRICAN AMERICAN): 7 ML/MIN/1.73 M^2
GLUCOSE SERPL-MCNC: 141 MG/DL
HCT VFR BLD AUTO: 42.5 %
HGB BLD-MCNC: 13.5 G/DL
LYMPHOCYTES # BLD AUTO: 1.4 K/UL
LYMPHOCYTES NFR BLD: 29.6 %
MAGNESIUM SERPL-MCNC: 2.2 MG/DL
MCH RBC QN AUTO: 28.3 PG
MCHC RBC AUTO-ENTMCNC: 31.8 G/DL
MCV RBC AUTO: 89 FL
MONOCYTES # BLD AUTO: 0.5 K/UL
MONOCYTES NFR BLD: 9.9 %
NEUTROPHILS # BLD AUTO: 2.6 K/UL
NEUTROPHILS NFR BLD: 53.1 %
PHOSPHATE SERPL-MCNC: 4.5 MG/DL
PLATELET # BLD AUTO: 212 K/UL
PMV BLD AUTO: 10.6 FL
POCT GLUCOSE: 118 MG/DL (ref 70–110)
POCT GLUCOSE: 131 MG/DL (ref 70–110)
POTASSIUM SERPL-SCNC: 5.3 MMOL/L
RBC # BLD AUTO: 4.77 M/UL
SODIUM SERPL-SCNC: 139 MMOL/L
WBC # BLD AUTO: 4.87 K/UL

## 2018-04-28 PROCEDURE — 85025 COMPLETE CBC W/AUTO DIFF WBC: CPT

## 2018-04-28 PROCEDURE — 25000003 PHARM REV CODE 250: Performed by: INTERNAL MEDICINE

## 2018-04-28 PROCEDURE — 80048 BASIC METABOLIC PNL TOTAL CA: CPT

## 2018-04-28 PROCEDURE — 84100 ASSAY OF PHOSPHORUS: CPT

## 2018-04-28 PROCEDURE — G0378 HOSPITAL OBSERVATION PER HR: HCPCS

## 2018-04-28 PROCEDURE — G0257 UNSCHED DIALYSIS ESRD PT HOS: HCPCS

## 2018-04-28 PROCEDURE — 63600175 PHARM REV CODE 636 W HCPCS: Performed by: PHYSICIAN ASSISTANT

## 2018-04-28 PROCEDURE — 99214 OFFICE O/P EST MOD 30 MIN: CPT | Mod: ,,, | Performed by: INTERNAL MEDICINE

## 2018-04-28 PROCEDURE — 96374 THER/PROPH/DIAG INJ IV PUSH: CPT

## 2018-04-28 PROCEDURE — 36415 COLL VENOUS BLD VENIPUNCTURE: CPT

## 2018-04-28 PROCEDURE — 83735 ASSAY OF MAGNESIUM: CPT

## 2018-04-28 PROCEDURE — 80100016 HC MAINTENANCE HEMODIALYSIS

## 2018-04-28 PROCEDURE — 96376 TX/PRO/DX INJ SAME DRUG ADON: CPT

## 2018-04-28 RX ORDER — CLONIDINE HYDROCHLORIDE 0.1 MG/1
0.1 TABLET ORAL 3 TIMES DAILY
Qty: 90 TABLET | Refills: 0 | Status: SHIPPED | OUTPATIENT
Start: 2018-04-28 | End: 2018-05-10 | Stop reason: DRUGHIGH

## 2018-04-28 RX ORDER — HYDRALAZINE HYDROCHLORIDE 50 MG/1
50 TABLET, FILM COATED ORAL EVERY 8 HOURS
Qty: 90 TABLET | Refills: 0 | Status: SHIPPED | OUTPATIENT
Start: 2018-04-28 | End: 2018-05-10 | Stop reason: SDUPTHER

## 2018-04-28 RX ORDER — HYDRALAZINE HYDROCHLORIDE 20 MG/ML
10 INJECTION INTRAMUSCULAR; INTRAVENOUS EVERY 6 HOURS PRN
Status: DISCONTINUED | OUTPATIENT
Start: 2018-04-28 | End: 2018-04-29 | Stop reason: HOSPADM

## 2018-04-28 RX ORDER — HEPARIN SODIUM 1000 [USP'U]/ML
2000 INJECTION, SOLUTION INTRAVENOUS; SUBCUTANEOUS ONCE
Status: DISCONTINUED | OUTPATIENT
Start: 2018-04-28 | End: 2018-04-29 | Stop reason: HOSPADM

## 2018-04-28 RX ADMIN — CLONIDINE HYDROCHLORIDE 0.1 MG: 0.1 TABLET ORAL at 08:04

## 2018-04-28 RX ADMIN — METOPROLOL SUCCINATE 100 MG: 50 TABLET, EXTENDED RELEASE ORAL at 08:04

## 2018-04-28 RX ADMIN — HYDRALAZINE HYDROCHLORIDE 50 MG: 50 TABLET, FILM COATED ORAL at 05:04

## 2018-04-28 RX ADMIN — TAMSULOSIN HYDROCHLORIDE 0.4 MG: 0.4 CAPSULE ORAL at 08:04

## 2018-04-28 RX ADMIN — DILTIAZEM HYDROCHLORIDE 360 MG: 180 CAPSULE, COATED, EXTENDED RELEASE ORAL at 08:04

## 2018-04-28 RX ADMIN — PANTOPRAZOLE SODIUM 40 MG: 40 TABLET, DELAYED RELEASE ORAL at 08:04

## 2018-04-28 RX ADMIN — ASPIRIN 81 MG CHEWABLE TABLET 81 MG: 81 TABLET CHEWABLE at 08:04

## 2018-04-28 RX ADMIN — HYDRALAZINE HYDROCHLORIDE 10 MG: 20 INJECTION INTRAMUSCULAR; INTRAVENOUS at 06:04

## 2018-04-28 RX ADMIN — CLONIDINE HYDROCHLORIDE 0.1 MG: 0.1 TABLET ORAL at 05:04

## 2018-04-28 RX ADMIN — LOSARTAN POTASSIUM 100 MG: 50 TABLET, FILM COATED ORAL at 08:04

## 2018-04-28 NOTE — SUBJECTIVE & OBJECTIVE
Interval History: Pt was seen and examined. No new c/o's, receiving repeat HD today for volume overload and for hyperkalemia. No SOB, no CP, no new issues.    Review of patient's allergies indicates:   Allergen Reactions    Augmentin [amoxicillin-pot clavulanate] Edema    Lisinopril      cough    Sulfa (sulfonamide antibiotics)      swelling     Current Facility-Administered Medications   Medication Frequency    0.9%  NaCl infusion PRN    acetaminophen tablet 650 mg Q8H PRN    ALPRAZolam tablet 0.5 mg BID PRN    aspirin chewable tablet 81 mg Daily    atorvastatin tablet 20 mg QHS    cloNIDine tablet 0.1 mg TID    dextrose 50% injection 12.5 g PRN    dextrose 50% injection 25 g PRN    diltiaZEM 24 hr capsule 360 mg Daily    glucagon (human recombinant) injection 1 mg PRN    glucose chewable tablet 16 g PRN    glucose chewable tablet 24 g PRN    heparin (porcine) injection 2,000 Units Once    heparin (porcine) injection 5,000 Units Q8H    hydrALAZINE injection 10 mg Q6H PRN    hydrALAZINE tablet 50 mg Q8H    hydrocodone-acetaminophen 5-325mg per tablet 1 tablet Q6H PRN    insulin aspart U-100 pen 0-5 Units QID (AC + HS) PRN    losartan tablet 100 mg Daily    metoprolol succinate (TOPROL-XL) 24 hr tablet 100 mg Daily    ondansetron injection 4 mg Q12H PRN    pantoprazole EC tablet 40 mg Daily    tamsulosin 24 hr capsule 0.4 mg Daily    zolpidem tablet 5 mg Nightly PRN       Objective:     Vital Signs (Most Recent):  Temp: 98 °F (36.7 °C) (04/28/18 1230)  Pulse: (!) 53 (04/28/18 1500)  Resp: 17 (04/28/18 1114)  BP: (!) 144/73 (04/28/18 1500)  SpO2: (!) 94 % (04/28/18 1114)  O2 Device (Oxygen Therapy): room air (04/28/18 1230) Vital Signs (24h Range):  Temp:  [97.5 °F (36.4 °C)-98.3 °F (36.8 °C)] 98 °F (36.7 °C)  Pulse:  [53-70] 53  Resp:  [17-18] 17  SpO2:  [94 %-100 %] 94 %  BP: (143-206)/() 144/73     Weight: 98.8 kg (217 lb 13 oz) (04/27/18 1529)  Body mass index is 25.17 kg/m².  Body  surface area is 2.33 meters squared.    I/O last 3 completed shifts:  In: 620 [P.O.:120; Other:500]  Out: 4625 [Urine:125; Other:4500]    Physical Exam   Constitutional: He is oriented to person, place, and time. He appears well-developed and well-nourished. No distress.   HENT:   Head: Normocephalic and atraumatic.   Neck: No JVD present.   Cardiovascular: Normal rate, regular rhythm and normal heart sounds.  Exam reveals no friction rub.    Pulmonary/Chest: Effort normal. He has rales.   Abdominal: Soft. There is no tenderness.   Musculoskeletal: He exhibits edema.   Neurological: He is oriented to person, place, and time.   Skin: Skin is warm and dry.   Psychiatric: He has a normal mood and affect. His behavior is normal. Thought content normal.   Nursing note and vitals reviewed.      Significant Labs: reviewed  BMP  Lab Results   Component Value Date     04/28/2018    K 5.3 (H) 04/28/2018     04/28/2018    CO2 26 04/28/2018    BUN 27 (H) 04/28/2018    CREATININE 7.1 (H) 04/28/2018    CALCIUM 8.9 04/28/2018    ANIONGAP 10 04/28/2018    ESTGFRAFRICA 8 (A) 04/28/2018    EGFRNONAA 7 (A) 04/28/2018       Significant Imaging: reviewed

## 2018-04-28 NOTE — PLAN OF CARE
Problem: Patient Care Overview  Goal: Plan of Care Review  Outcome: Ongoing (interventions implemented as appropriate)  Patient doing well this shift. Free from falls and injury. VSS, sinus rhythm. Blood glucose monitored. No complaints of pain of s/s of distress. Will continue to monitor.

## 2018-04-28 NOTE — PLAN OF CARE
Problem: Patient Care Overview  Goal: Plan of Care Review  Outcome: Ongoing (interventions implemented as appropriate)  POC discussed w/patient, verbalized understanding. NSR/SB on monitor. VSS. Voids per urinal. No c/o pain. Frequent weight change encouraged. Patient turns independently in bed. Fall precautions in place, bed alarm on. Patient denies needs at this time.

## 2018-04-28 NOTE — PROGRESS NOTES
Pt tolerated HD well.  17g needles per pt request. Pt ran for 3.5 hours.  UF- 3 liters.  VSS.  Reported to Sirena Hannah RN.

## 2018-04-28 NOTE — ASSESSMENT & PLAN NOTE
69 y/o male with ESRD on chronic HD presented with SOB:         ESRD (end stage renal disease)     Renal: ESRD pt on q MWF HD schedule  Has pulmonary edema due to XS fluid intake  Good response to UF and fluid removal  Sx's improved  K normal  Tolerating HD well, continue HD     HTN: uncontrolled HT due to large fluid gain  Hypertensive urgency  Meds reviewed  Change hydralazine form 100 mg po bid to 50 mg po tid  Increase clonidine form 0.1 mg po bid to tid             * Volume overload associated with ESRD     Presented with dyspnea  Secondary to pulmonary edema  Receiving HD/UF  Pt was advised on salt and fluid restriction.                        Plans and recommendations:  As discussed above  Total time spent 70 minutes including time needed to review the records, the   patient evaluation, documentation, face-to-face discussion with the patient,   more than 50% of the time was spent on coordination of care and counseling.    Level V visit.  Pt received multiple visits and evaluations.

## 2018-04-28 NOTE — HOSPITAL COURSE
The patient was placed in Observation for hypertensive urgency, volume overload associated with dialysis and elevated troponin. He received two dialysis treatments and his SOB improved. The patient's blood pressure remained labile. Nephrology increased the frequency of his Clonidine and Hydralazine. He will be asked to follow up with his Nephrologist and PCP for further management. ACS was ruled out.

## 2018-04-28 NOTE — DISCHARGE SUMMARY
[facility] Hospital Medicine  Discharge Summary      Patient Name: Conner Perez III  MRN: [id number]  Admission Date: 4/27/2018  Hospital Length of Stay: 0 days  Discharge Date and Time:  04/28/2018 12:42 PM  Attending Physician: Damian Westfall MD   Discharging Provider: ANISHA Ferris  Primary Care Provider: Raciel Angela MD      HPI:   Conner Perez is a 68 year old male who presented to the ED with complaints of SOB. He reports that symptoms began several hours prior to arrival. He denies associated symptoms including chest pain, fever, cough and lower extremity edema. The patient reports compliance with dialysis this week. He was seen in the ED on 4/24/18 and at that time it is documented that he missed his 4/23/18 treatment. However, after contacting his dialysis unit, they confirmed that he did attend dialysis on 4/23/18 and 4/25/18. The patient does admit to increased fluid intake. He reports compliance with a low sodium diet. In the ED, the patient did not respond to IV lasix, despite still making some urine. His chest x-ray was significant for mild vascular congestion. His initial troponin was negative and repeats were elevated to 0.029 and 0.031. His BNP was elevated to 1220 and his potassium was elevated to 5.6. Code status was discussed with the patient. He is a full code. His daughter, Corey Soto, is his surrogate medical decision maker.     * No surgery found *      Hospital Course:   The patient was placed in Observation for hypertensive urgency, volume overload associated with dialysis and elevated troponin. He received two dialysis treatments and his SOB improved. The patient's blood pressure remained labile. Nephrology increased the frequency of his Clonidine and Hydralazine. He will be asked to follow up with his Nephrologist and PCP for further management. ACS was ruled out.      Consults:   Consults         Status Ordering Provider     Inpatient consult to Nephrology   Once     Provider:  Lazara Granados MD    Acknowledged RINA MCWILLIAMS          Final Active Diagnoses:    Diagnosis Date Noted POA    PRINCIPAL PROBLEM:  Volume overload associated with ESRD [E87.70] 04/27/2018 Yes    Hypertensive urgency [I16.0] 01/12/2017 Yes    Elevated troponin [R74.8] 04/07/2017 Yes    ESRD (end stage renal disease) [N18.6] 08/29/2017 Yes    DM II (diabetes mellitus, type II), controlled [E11.9] 01/19/2017 Yes    ESRD (end stage renal disease) on dialysis [N18.6, Z99.2]  Not Applicable    Hypertension [I10]  Unknown      Problems Resolved During this Admission:    Diagnosis Date Noted Date Resolved POA       Discharged Condition: stable    Disposition: Home or Self Care    Follow Up:  Follow-up Information     Dialysis On 4/30/2018.           Raciel Angela MD In 3 days.    Specialty:  Family Medicine  Contact information:  [address] [facility] [address]  [facility] [address] [phone]                 Patient Instructions:     Diet renal     Diet diabetic     Activity as tolerated         Significant Diagnostic Studies: Labs:   CMP     Recent Labs  Lab 04/27/18  0224 04/27/18  0306 04/28/18  0355    142 139   K 5.6* 4.2 5.3*    100 103   CO2 26 30* 26   * 153* 141*   BUN 34* 34* 27*   CREATININE 8.6* 8.7* 7.1*   CALCIUM 8.9 8.5* 8.9   PROT 8.4 6.8  --    ALBUMIN 3.6 3.3*  --    BILITOT 0.6 0.5  --    ALKPHOS 73 68  --    AST 54* 12  --    ALT 12 5*  --    ANIONGAP 15 12 10   ESTGFRAFRICA 7* 7* 8*   EGFRNONAA 6* 6* 7*   , CBC     Recent Labs  Lab 04/27/18  0306 04/28/18  0355   WBC 6.37 4.87   HGB 11.5* 13.5*   HCT 36.1* 42.5    212    and All labs within the past 24 hours have been reviewed    Pending Diagnostic Studies:     None         Medications:  Reconciled Home Medications:      Medication List      CHANGE how you take these medications    atorvastatin 40 MG tablet  Commonly known as:  LIPITOR  Take 1 tablet (40 mg total) by mouth every  evening.  What changed:  how much to take     cloNIDine 0.1 MG tablet  Commonly known as:  CATAPRES  Take 1 tablet (0.1 mg total) by mouth 3 (three) times daily.  What changed:  See the new instructions.     hydrALAZINE 50 MG tablet  Commonly known as:  APRESOLINE  Take 1 tablet (50 mg total) by mouth every 8 (eight) hours.  What changed:  · how much to take  · when to take this        CONTINUE taking these medications    * albuterol 90 mcg/actuation inhaler  Inhale into the lungs.     * albuterol 2.5 mg /3 mL (0.083 %) nebulizer solution  Commonly known as:  PROVENTIL  2.5 mg.     ALPRAZolam 0.5 MG tablet  Commonly known as:  XANAX  Take 1 tablet (0.5 mg total) by mouth 2 (two) times daily as needed for Anxiety (prn chest tightnsss/ anxiety).     aspirin 81 MG Chew  Take 1 tablet (81 mg total) by mouth once daily.     diltiaZEM 360 MG 24 hr capsule  Commonly known as:  CARDIZEM CD  Take 1 capsule (360 mg total) by mouth once daily.     furosemide 40 MG tablet  Commonly known as:  LASIX  Take 40 mg by mouth once daily.     lactulose 10 gram/15 mL solution  Commonly known as:  CHRONULAC  take 2 tablespoonful by mouth at bedtime     losartan 100 MG tablet  Commonly known as:  COZAAR  Take 1 tablet (100 mg total) by mouth once daily.     metoprolol succinate 50 MG 24 hr tablet  Commonly known as:  TOPROL-XL  Take 2 tablets (100 mg total) by mouth once daily.     ondansetron 4 MG Tbdl  Commonly known as:  ZOFRAN-ODT  Take 1 tablet (4 mg total) by mouth every 8 (eight) hours as needed.     pantoprazole 40 MG tablet  Commonly known as:  PROTONIX  Take 1 tablet (40 mg total) by mouth once daily.     tamsulosin 0.4 mg Cp24  Commonly known as:  FLOMAX  Take 1 capsule (0.4 mg total) by mouth once daily.     zolpidem 5 MG Tab  Commonly known as:  AMBIEN  Take 1 tablet (5 mg total) by mouth nightly as needed.        * This list has 2 medication(s) that are the same as other medications prescribed for you. Read the directions  carefully, and ask your doctor or other care provider to review them with you.                Indwelling Lines/Drains at time of discharge:   Lines/Drains/Airways     Central Venous Catheter Line                 Hemodialysis Catheter 03/04/18 0715 right subclavian 55 days          Drain                 Hemodialysis AV Fistula 12/18/17 0017 Right forearm 131 days                Time spent on the discharge of patient: Greater than 30 minutes. Patient was seen and examined on the date of discharge and determined to be suitable for discharge.         ANISHA Ferris  Department of Hospital Medicine  [facility]

## 2018-04-28 NOTE — PROGRESS NOTES
[facility]   Nephrology  Progress Note    Patient Name: Conner Perez III  MRN: [id number]  Admission Date: 4/27/2018  Hospital Length of Stay: 0 days  Attending Provider: Damian Westfall MD   Primary Care Physician: Raciel Angela MD  Principal Problem:Volume overload. ESRD    Subjective:     HPI: Pt was seen and examined. H/o reviewed. Pt is a 69 y/o male with a h/o of ESRD on chronic HD q MWF ([facility]' pt) who presented to ER with SOB. Pt said that he drank a lot of water yesterday. Pt had no other issues, no fever, no CP, no cough. Salt and fluid intake reviewed with pt. Pt currently on HD. Pt feels better with fluid removal. No new c/o's since admission.    Interval History: Pt was seen and examined. No new c/o's, receiving repeat HD today for volume overload and for hyperkalemia. No SOB, no CP, no new issues.    Review of patient's allergies indicates:   Allergen Reactions    Augmentin [amoxicillin-pot clavulanate] Edema    Lisinopril      cough    Sulfa (sulfonamide antibiotics)      swelling     Current Facility-Administered Medications   Medication Frequency    0.9%  NaCl infusion PRN    acetaminophen tablet 650 mg Q8H PRN    ALPRAZolam tablet 0.5 mg BID PRN    aspirin chewable tablet 81 mg Daily    atorvastatin tablet 20 mg QHS    cloNIDine tablet 0.1 mg TID    dextrose 50% injection 12.5 g PRN    dextrose 50% injection 25 g PRN    diltiaZEM 24 hr capsule 360 mg Daily    glucagon (human recombinant) injection 1 mg PRN    glucose chewable tablet 16 g PRN    glucose chewable tablet 24 g PRN    heparin (porcine) injection 2,000 Units Once    heparin (porcine) injection 5,000 Units Q8H    hydrALAZINE injection 10 mg Q6H PRN    hydrALAZINE tablet 50 mg Q8H    hydrocodone-acetaminophen 5-325mg per tablet 1 tablet Q6H PRN    insulin aspart U-100 pen 0-5 Units QID (AC + HS) PRN    losartan tablet 100 mg Daily    metoprolol succinate (TOPROL-XL) 24 hr tablet 100 mg  Daily    ondansetron injection 4 mg Q12H PRN    pantoprazole EC tablet 40 mg Daily    tamsulosin 24 hr capsule 0.4 mg Daily    zolpidem tablet 5 mg Nightly PRN       Objective:     Vital Signs (Most Recent):  Temp: 98 °F (36.7 °C) (04/28/18 1230)  Pulse: (!) 53 (04/28/18 1500)  Resp: 17 (04/28/18 1114)  BP: (!) 144/73 (04/28/18 1500)  SpO2: (!) 94 % (04/28/18 1114)  O2 Device (Oxygen Therapy): room air (04/28/18 1230) Vital Signs (24h Range):  Temp:  [97.5 °F (36.4 °C)-98.3 °F (36.8 °C)] 98 °F (36.7 °C)  Pulse:  [53-70] 53  Resp:  [17-18] 17  SpO2:  [94 %-100 %] 94 %  BP: (143-206)/() 144/73     Weight: 98.8 kg (217 lb 13 oz) (04/27/18 1529)  Body mass index is 25.17 kg/m².  Body surface area is 2.33 meters squared.    I/O last 3 completed shifts:  In: 620 [P.O.:120; Other:500]  Out: 4625 [Urine:125; Other:4500]    Physical Exam   Constitutional: He is oriented to person, place, and time. He appears well-developed and well-nourished. No distress.   HENT:   Head: Normocephalic and atraumatic.   Neck: No JVD present.   Cardiovascular: Normal rate, regular rhythm and normal heart sounds.  Exam reveals no friction rub.    Pulmonary/Chest: Effort normal. He has rales.   Abdominal: Soft. There is no tenderness.   Musculoskeletal: He exhibits edema.   Neurological: He is oriented to person, place, and time.   Skin: Skin is warm and dry.   Psychiatric: He has a normal mood and affect. His behavior is normal. Thought content normal.   Nursing note and vitals reviewed.      Significant Labs: reviewed  BMP  Lab Results   Component Value Date     04/28/2018    K 5.3 (H) 04/28/2018     04/28/2018    CO2 26 04/28/2018    BUN 27 (H) 04/28/2018    CREATININE 7.1 (H) 04/28/2018    CALCIUM 8.9 04/28/2018    ANIONGAP 10 04/28/2018    ESTGFRAFRICA 8 (A) 04/28/2018    EGFRNONAA 7 (A) 04/28/2018       Significant Imaging: reviewed    Assessment/Plan:         67 y/o male with ESRD on chronic HD presented with  SOB:         ESRD (end stage renal disease)     Renal: ESRD pt on q MWF HD schedule  Presented with pulmonary edema due to XS fluid intake  Good response to UF and fluid removal  HD being repeated today  Tolerating HD well, continue HD     HTN: uncontrolled HT due to large fluid gain, BP much improved with fluid removal and with medication changes done yesterday  Hypertensive urgency  Meds reviewed           * Volume overload associated with ESRD     Presented with dyspnea  Improved with UF/HD  Secondary to pulmonary edema  Pt was again advised on salt and fluid restriction.                        Plans and recommendations:  As discussed above  Continue HD  OK to d/c home post HD from our view            Lazara Granados MD  Nephrology  [facility]

## 2018-04-29 LAB — POCT GLUCOSE: 158 MG/DL (ref 70–110)

## 2018-04-29 NOTE — NURSING
Patient D/C HOME with family. Iv d/c off monitor patient in no distress stable walk out with family no wheelchair needed last b/p 174/ 89 patient asymptomatic

## 2018-05-04 ENCOUNTER — HOSPITAL ENCOUNTER (EMERGENCY)
Facility: HOSPITAL | Age: 68
Discharge: HOME | End: 2018-05-04
Attending: EMERGENCY MEDICINE
Payer: MEDICARE

## 2018-05-04 VITALS
WEIGHT: 217 LBS | DIASTOLIC BLOOD PRESSURE: 88 MMHG | BODY MASS INDEX: 25.11 KG/M2 | SYSTOLIC BLOOD PRESSURE: 178 MMHG | OXYGEN SATURATION: 95 % | HEIGHT: 78 IN | HEART RATE: 60 BPM | RESPIRATION RATE: 18 BRPM | TEMPERATURE: 98 F

## 2018-05-04 DIAGNOSIS — F41.9 ANXIETY: ICD-10-CM

## 2018-05-04 DIAGNOSIS — R06.02 SOB (SHORTNESS OF BREATH): Primary | ICD-10-CM

## 2018-05-04 DIAGNOSIS — N18.5 CHRONIC RENAL FAILURE, STAGE 5: ICD-10-CM

## 2018-05-04 LAB
ALBUMIN SERPL BCP-MCNC: 3.8 G/DL
ALP SERPL-CCNC: 72 U/L
ALT SERPL W/O P-5'-P-CCNC: 10 U/L
ANION GAP SERPL CALC-SCNC: 16 MMOL/L
AST SERPL-CCNC: 16 U/L
BASOPHILS # BLD AUTO: 0.06 K/UL
BASOPHILS NFR BLD: 1.1 %
BILIRUB SERPL-MCNC: 0.8 MG/DL
BNP SERPL-MCNC: 2787 PG/ML
BUN SERPL-MCNC: 40 MG/DL
CALCIUM SERPL-MCNC: 8.7 MG/DL
CHLORIDE SERPL-SCNC: 99 MMOL/L
CK MB SERPL-MCNC: 1.7 NG/ML
CK MB SERPL-RTO: 1.1 %
CK SERPL-CCNC: 159 U/L
CK SERPL-CCNC: 159 U/L
CO2 SERPL-SCNC: 26 MMOL/L
CREAT SERPL-MCNC: 9.4 MG/DL
DIFFERENTIAL METHOD: ABNORMAL
EOSINOPHIL # BLD AUTO: 0.2 K/UL
EOSINOPHIL NFR BLD: 2.8 %
ERYTHROCYTE [DISTWIDTH] IN BLOOD BY AUTOMATED COUNT: 15.2 %
EST. GFR  (AFRICAN AMERICAN): 6 ML/MIN/1.73 M^2
EST. GFR  (NON AFRICAN AMERICAN): 5 ML/MIN/1.73 M^2
GLUCOSE SERPL-MCNC: 117 MG/DL
HCT VFR BLD AUTO: 40 %
HGB BLD-MCNC: 13.5 G/DL
LYMPHOCYTES # BLD AUTO: 1.7 K/UL
LYMPHOCYTES NFR BLD: 31.3 %
MAGNESIUM SERPL-MCNC: 2.2 MG/DL
MCH RBC QN AUTO: 29 PG
MCHC RBC AUTO-ENTMCNC: 33.8 G/DL
MCV RBC AUTO: 86 FL
MONOCYTES # BLD AUTO: 0.6 K/UL
MONOCYTES NFR BLD: 11.3 %
NEUTROPHILS # BLD AUTO: 2.9 K/UL
NEUTROPHILS NFR BLD: 53.5 %
PHOSPHATE SERPL-MCNC: 5.7 MG/DL
PLATELET # BLD AUTO: 184 K/UL
PMV BLD AUTO: 10 FL
POTASSIUM SERPL-SCNC: 4.9 MMOL/L
PROT SERPL-MCNC: 7.9 G/DL
RBC # BLD AUTO: 4.65 M/UL
SODIUM SERPL-SCNC: 141 MMOL/L
TROPONIN I SERPL DL<=0.01 NG/ML-MCNC: 0.04 NG/ML
WBC # BLD AUTO: 5.4 K/UL

## 2018-05-04 PROCEDURE — 82550 ASSAY OF CK (CPK): CPT

## 2018-05-04 PROCEDURE — 84100 ASSAY OF PHOSPHORUS: CPT

## 2018-05-04 PROCEDURE — 84484 ASSAY OF TROPONIN QUANT: CPT

## 2018-05-04 PROCEDURE — 82553 CREATINE MB FRACTION: CPT

## 2018-05-04 PROCEDURE — 85025 COMPLETE CBC W/AUTO DIFF WBC: CPT

## 2018-05-04 PROCEDURE — 83735 ASSAY OF MAGNESIUM: CPT

## 2018-05-04 PROCEDURE — 99284 EMERGENCY DEPT VISIT MOD MDM: CPT | Mod: 25

## 2018-05-04 PROCEDURE — 93005 ELECTROCARDIOGRAM TRACING: CPT

## 2018-05-04 PROCEDURE — 83880 ASSAY OF NATRIURETIC PEPTIDE: CPT

## 2018-05-04 PROCEDURE — 93010 ELECTROCARDIOGRAM REPORT: CPT | Mod: ,,, | Performed by: INTERNAL MEDICINE

## 2018-05-04 PROCEDURE — 25000003 PHARM REV CODE 250: Performed by: EMERGENCY MEDICINE

## 2018-05-04 PROCEDURE — 80053 COMPREHEN METABOLIC PANEL: CPT

## 2018-05-04 RX ORDER — LORAZEPAM 1 MG/1
1 TABLET ORAL
Status: COMPLETED | OUTPATIENT
Start: 2018-05-04 | End: 2018-05-04

## 2018-05-04 RX ORDER — ALPRAZOLAM 0.5 MG/1
0.5 TABLET ORAL 2 TIMES DAILY PRN
Qty: 15 TABLET | Refills: 0 | Status: SHIPPED | OUTPATIENT
Start: 2018-05-04 | End: 2018-08-12

## 2018-05-04 RX ADMIN — LORAZEPAM 1 MG: 1 TABLET ORAL at 03:05

## 2018-05-04 NOTE — ED PROVIDER NOTES
"SCRIBE #1 NOTE: I, Madiha Dia, am scribing for, and in the presence of, Loyd Germain MD. I have scribed the entire note.      History      Chief Complaint   Patient presents with    Chest Pain     Pt states "My blood pressure is high and when that happens my chest gets tight and its difficult to breathe, it was 183/89 at home."       Review of patient's allergies indicates:   Allergen Reactions    Augmentin [amoxicillin-pot clavulanate] Edema    Lisinopril      cough    Sulfa (sulfonamide antibiotics)      swelling        HPI   HPI    5/4/2018, 2:53 AM   History obtained from the patient      History of Present Illness: Conner Perez III is a 68 y.o. male dialysis patient with PMHx of CKD stage IV, DM, and HTN who presents to the Emergency Department for SOB which onset gradually tonight. Patient reports /89 at home. Patient states he is compliant with his medications. Symptoms are constant and moderate in severity. Sxs described as "tightness". No mitigating or exacerbating factors reported. Associated sxs include CP. Patient denies any fever, chills, diaphoresis, palpitations, extremity weakness/numbness, leg swelling, dizziness, cough, N/V, and all other sxs at this time. Patient reports having dialysis on Mondays, Wednesdays, and Fridays. Patient states he has not missed any dialysis appointments. No further complaints or concerns at this time.     Arrival mode: Personal vehicle      PCP: Raciel Angela MD       Past Medical History:  Past Medical History:   Diagnosis Date    CKD (chronic kidney disease), stage IV     Diabetes mellitus, type 2     Dialysis patient     HTN (hypertension)        Past Surgical History:  Past Surgical History:   Procedure Laterality Date    AV FISTULA PLACEMENT Right 06/2017    CIRCUMCISION  1978    TOE AMPUTATION Left 01/24/2017    4th toe         Family History:  Family History   Problem Relation Age of Onset    Hypertension Father     No Known Problems " Mother        Social History:  Social History     Social History Main Topics    Smoking status: Former Smoker     Types: Cigarettes     Quit date: 1971    Smokeless tobacco: Never Used    Alcohol use No    Drug use: No    Sexual activity: Unknown       ROS   Review of Systems   Constitutional: Negative for chills, diaphoresis and fever.   HENT: Negative for sore throat.    Respiratory: Positive for shortness of breath. Negative for cough.    Cardiovascular: Positive for chest pain. Negative for palpitations and leg swelling.   Gastrointestinal: Negative for nausea and vomiting.   Genitourinary: Negative for dysuria.   Musculoskeletal: Negative for back pain.   Skin: Negative for rash.   Neurological: Negative for dizziness, weakness and numbness.   Hematological: Does not bruise/bleed easily.   All other systems reviewed and are negative.      Physical Exam      Initial Vitals [05/04/18 0239]   BP Pulse Resp Temp SpO2   (!) 180/88 65 18 97.8 °F (36.6 °C) 95 %      MAP       118.67          Physical Exam  Nursing Notes and Vital Signs Reviewed.  Constitutional: Patient is in no acute distress. Well-developed and well-nourished.  Head: Atraumatic. Normocephalic.  Eyes: PERRL. EOM intact. Conjunctivae are not pale. No scleral icterus.  ENT: Mucous membranes are moist. Oropharynx is clear and symmetric.    Neck: Supple. Full ROM. No lymphadenopathy.  Cardiovascular: Regular rate. Regular rhythm. No murmurs, rubs, or gallops. Distal pulses are 2+ and symmetric.  Pulmonary/Chest: No respiratory distress. Clear to auscultation bilaterall. No wheezing or rales.  Abdominal: Soft and non-distended.  There is no tenderness.  No rebound, guarding, or rigidity. Good bowel sounds.  Genitourinary: No CVA tenderness  Musculoskeletal: Moves all extremities. No obvious deformities. No edema. No calf tenderness.  Skin: Warm and dry.  Neurological:  Alert, awake, and appropriate.  Normal speech.  No acute focal neurological  "deficits are appreciated.  Psychiatric: Anxious. Good eye contact. Appropriate in content.    ED Course    Procedures  ED Vital Signs:  Vitals:    05/04/18 0239   BP: (!) 180/88   Pulse: 65   Resp: 18   Temp: 97.8 °F (36.6 °C)   TempSrc: Oral   SpO2: 95%   Weight: 98.4 kg (217 lb)   Height: 6' 6" (1.981 m)       Abnormal Lab Results:  Labs Reviewed   CBC W/ AUTO DIFFERENTIAL - Abnormal; Notable for the following:        Result Value    Hemoglobin 13.5 (*)     RDW 15.2 (*)     All other components within normal limits   COMPREHENSIVE METABOLIC PANEL - Abnormal; Notable for the following:     Glucose 117 (*)     BUN, Bld 40 (*)     Creatinine 9.4 (*)     eGFR if  6 (*)     eGFR if non  5 (*)     All other components within normal limits   PHOSPHORUS - Abnormal; Notable for the following:     Phosphorus 5.7 (*)     All other components within normal limits   TROPONIN I - Abnormal; Notable for the following:     Troponin I 0.041 (*)     All other components within normal limits   B-TYPE NATRIURETIC PEPTIDE - Abnormal; Notable for the following:     BNP 2,787 (*)     All other components within normal limits   MAGNESIUM   CK-MB   CK        All Lab Results:  Results for orders placed or performed during the hospital encounter of 05/04/18   CBC auto differential   Result Value Ref Range    WBC 5.40 3.90 - 12.70 K/uL    RBC 4.65 4.60 - 6.20 M/uL    Hemoglobin 13.5 (L) 14.0 - 18.0 g/dL    Hematocrit 40.0 40.0 - 54.0 %    MCV 86 82 - 98 fL    MCH 29.0 27.0 - 31.0 pg    MCHC 33.8 32.0 - 36.0 g/dL    RDW 15.2 (H) 11.5 - 14.5 %    Platelets 184 150 - 350 K/uL    MPV 10.0 9.2 - 12.9 fL    Gran # (ANC) 2.9 1.8 - 7.7 K/uL    Lymph # 1.7 1.0 - 4.8 K/uL    Mono # 0.6 0.3 - 1.0 K/uL    Eos # 0.2 0.0 - 0.5 K/uL    Baso # 0.06 0.00 - 0.20 K/uL    Gran% 53.5 38.0 - 73.0 %    Lymph% 31.3 18.0 - 48.0 %    Mono% 11.3 4.0 - 15.0 %    Eosinophil% 2.8 0.0 - 8.0 %    Basophil% 1.1 0.0 - 1.9 %    Differential " Method Automated    Comprehensive metabolic panel   Result Value Ref Range    Sodium 141 136 - 145 mmol/L    Potassium 4.9 3.5 - 5.1 mmol/L    Chloride 99 95 - 110 mmol/L    CO2 26 23 - 29 mmol/L    Glucose 117 (H) 70 - 110 mg/dL    BUN, Bld 40 (H) 8 - 23 mg/dL    Creatinine 9.4 (H) 0.5 - 1.4 mg/dL    Calcium 8.7 8.7 - 10.5 mg/dL    Total Protein 7.9 6.0 - 8.4 g/dL    Albumin 3.8 3.5 - 5.2 g/dL    Total Bilirubin 0.8 0.1 - 1.0 mg/dL    Alkaline Phosphatase 72 55 - 135 U/L    AST 16 10 - 40 U/L    ALT 10 10 - 44 U/L    Anion Gap 16 8 - 16 mmol/L    eGFR if African American 6 (A) >60 mL/min/1.73 m^2    eGFR if non African American 5 (A) >60 mL/min/1.73 m^2   Magnesium   Result Value Ref Range    Magnesium 2.2 1.6 - 2.6 mg/dL   Phosphorus   Result Value Ref Range    Phosphorus 5.7 (H) 2.7 - 4.5 mg/dL   CK-MB   Result Value Ref Range     20 - 200 U/L    CPK MB 1.7 0.1 - 6.5 ng/mL    MB% 1.1 0.0 - 5.0 %   CK   Result Value Ref Range     20 - 200 U/L   Troponin I   Result Value Ref Range    Troponin I 0.041 (H) 0.000 - 0.026 ng/mL   Brain natriuretic peptide   Result Value Ref Range    BNP 2,787 (H) 0 - 99 pg/mL       Imaging Results:  Imaging Results          X-Ray Chest 1 View (In process)                Ordered, reviewed, and independently interpreted by the ED provider.  Study: X-ray Chest  Findings: Cardiomegaly.     The EKG was ordered, reviewed, and independently interpreted by the ED provider.  Interpretation time: 0248  Rate: 69 BPM  Rhythm: normal sinus rhythm  Interpretation: Rightward axis. T wave abnormality, consider inferolateral ischemia. Prolonged QT. Abnormal ECG. No STEMI.         The Emergency Provider reviewed the vital signs and test results, which are outlined above.    ED Discussion     4:26 AM: Reassessed pt at this time. Discussed with pt all pertinent ED information and results. Discussed pt dx and plan of tx. Gave pt all f/u and return to the ED instructions. All questions and  concerns were addressed at this time. Pt expresses understanding of information and instructions, and is comfortable with plan to discharge. Pt is stable for discharge.    I discussed with patient and/or family/caretaker that evaluation in the ED does not suggest any emergent or life threatening medical conditions requiring immediate intervention beyond what was provided in the ED, and I believe patient is safe for discharge.  Regardless, an unremarkable evaluation in the ED does not preclude the development or presence of a serious of life threatening condition. As such, patient was instructed to return immediately for any worsening or change in current symptoms.      ED Medication(s):  Medications   LORazepam tablet 1 mg (1 mg Oral Given 5/4/18 0308)       Current Discharge Medication List          Follow-up Information     Raciel Angela MD In 2 days.    Specialty:  Family Medicine  Contact information:  [address]  [address]  [facility] [address] [phone]             [facility].    Specialty:  Emergency Medicine  Why:  If symptoms worsen  Contact information:  [address]  [facility] [address] [phone]           Dialysis Today.                   Medical Decision Making    Medical Decision Making:   Clinical Tests:   Lab Tests: Ordered and Reviewed  Radiological Study: Ordered and Reviewed  Medical Tests: Ordered and Reviewed           Scribe Attestation:   Scribe #1: I performed the above scribed service and the documentation accurately describes the services I performed. I attest to the accuracy of the note.    Attending:   Physician Attestation Statement for Scribe #1: I, Loyd Germain MD, personally performed the services described in this documentation, as scribed by Madiha Dia, in my presence, and it is both accurate and complete.          Clinical Impression       ICD-10-CM ICD-9-CM   1. SOB (shortness of breath) R06.02 786.05   2. Chronic  renal failure, stage 5 N18.5 585.5   3. Anxiety F41.9 300.00       Disposition:   Disposition: Discharged  Condition: Stable         Loyd Germain MD  05/04/18 0435

## 2018-05-04 NOTE — ED NOTES
Pt states he has been out of his Xanax for several days and has called his PCP but has not gotten a new rx yet.  Pt states has been very anxious. No c/o chest pain at this time.  Pt has dialysis shunt to right forearm with good bruit and thrill noted.

## 2018-05-09 ENCOUNTER — HOSPITAL ENCOUNTER (EMERGENCY)
Facility: HOSPITAL | Age: 68
Discharge: HOME | End: 2018-05-09
Attending: INTERNAL MEDICINE
Payer: MEDICARE

## 2018-05-09 VITALS
HEART RATE: 68 BPM | TEMPERATURE: 99 F | SYSTOLIC BLOOD PRESSURE: 166 MMHG | BODY MASS INDEX: 23.81 KG/M2 | OXYGEN SATURATION: 95 % | WEIGHT: 206 LBS | RESPIRATION RATE: 20 BRPM | DIASTOLIC BLOOD PRESSURE: 87 MMHG

## 2018-05-09 DIAGNOSIS — N18.6 ESRD (END STAGE RENAL DISEASE): ICD-10-CM

## 2018-05-09 DIAGNOSIS — M79.602 MUSCULOSKELETAL PAIN OF LEFT UPPER EXTREMITY: Primary | ICD-10-CM

## 2018-05-09 DIAGNOSIS — I10 ESSENTIAL HYPERTENSION: ICD-10-CM

## 2018-05-09 PROCEDURE — 99283 EMERGENCY DEPT VISIT LOW MDM: CPT | Mod: 25

## 2018-05-09 PROCEDURE — 25000003 PHARM REV CODE 250: Performed by: INTERNAL MEDICINE

## 2018-05-09 PROCEDURE — 93010 ELECTROCARDIOGRAM REPORT: CPT | Mod: ,,, | Performed by: INTERNAL MEDICINE

## 2018-05-09 PROCEDURE — 93005 ELECTROCARDIOGRAM TRACING: CPT

## 2018-05-09 RX ORDER — HYDROCODONE BITARTRATE AND ACETAMINOPHEN 7.5; 325 MG/1; MG/1
1 TABLET ORAL EVERY 6 HOURS PRN
Status: DISCONTINUED | OUTPATIENT
Start: 2018-05-09 | End: 2018-05-09 | Stop reason: HOSPADM

## 2018-05-09 RX ORDER — HYDROCODONE BITARTRATE AND ACETAMINOPHEN 10; 325 MG/1; MG/1
1 TABLET ORAL EVERY 4 HOURS PRN
Qty: 6 TABLET | Refills: 0 | Status: SHIPPED | OUTPATIENT
Start: 2018-05-09 | End: 2018-10-13

## 2018-05-09 RX ADMIN — HYDROCODONE BITARTRATE AND ACETAMINOPHEN 1 TABLET: 7.5; 325 TABLET ORAL at 08:05

## 2018-05-09 NOTE — ED PROVIDER NOTES
SCRIBE #1 NOTE: IZoë, am scribing for, and in the presence of, Jesus Colon MD. I have scribed the entire note.      History      Chief Complaint   Patient presents with    Chest Pain     left sided chest pain x 2 hours.  no associated symptoms       Review of patient's allergies indicates:   Allergen Reactions    Augmentin [amoxicillin-pot clavulanate] Edema    Lisinopril      cough    Sulfa (sulfonamide antibiotics)      swelling        HPI   HPI    5/9/2018, 8:40 AM   History obtained from the patient      History of Present Illness: Conner Perez III is a 68 y.o. male patient who presents to the Emergency Department for L sided CP which onset gradually today. Symptoms are constant and moderate in severity.  No mitigating or exacerbating factors reported. No associated sxs. Patient denies any leg swelling/numbness, palpitations, SOB, cough, fever, chills, abd pain, N/V/D, dizziness, HA, and all other sxs at this time. No prior Tx. No further complaints or concerns at this time.         Arrival mode: Personal vehicle      PCP: Raciel Angela MD       Past Medical History:  Past Medical History:   Diagnosis Date    CKD (chronic kidney disease), stage IV     Diabetes mellitus, type 2     Dialysis patient     HTN (hypertension)        Past Surgical History:  Past Surgical History:   Procedure Laterality Date    AV FISTULA PLACEMENT Right 06/2017    CIRCUMCISION  1978    TOE AMPUTATION Left 01/24/2017    4th toe         Family History:  Family History   Problem Relation Age of Onset    Hypertension Father     No Known Problems Mother        Social History:  Social History     Social History Main Topics    Smoking status: Former Smoker     Types: Cigarettes     Quit date: 1971    Smokeless tobacco: Never Used    Alcohol use No    Drug use: No    Sexual activity: Not on file       ROS   Review of Systems   Constitutional: Negative for fever.   HENT: Negative for sore throat.     Respiratory: Negative for cough and shortness of breath.    Cardiovascular: Positive for chest pain (L sided). Negative for palpitations and leg swelling.   Gastrointestinal: Negative for nausea.   Genitourinary: Negative for dysuria.   Musculoskeletal: Negative for back pain.   Skin: Negative for rash.   Neurological: Negative for weakness.   Hematological: Does not bruise/bleed easily.   All other systems reviewed and are negative.      Physical Exam      Initial Vitals [05/09/18 0829]   BP Pulse Resp Temp SpO2   (!) 166/87 68 20 98.7 °F (37.1 °C) 95 %      MAP       113.33          Physical Exam  Nursing Notes and Vital Signs Reviewed.  Constitutional: Patient is in no apparent distress. Well-developed and well-nourished.  Head: Atraumatic. Normocephalic.  Eyes: PERRL. EOM intact. Conjunctivae are not pale. No scleral icterus.  ENT: Mucous membranes are moist. Oropharynx is clear and symmetric.    Neck: Supple. Full ROM. No lymphadenopathy.  Cardiovascular: Regular rate. Regular rhythm. No murmurs, rubs, or gallops. Distal pulses are 2+ and symmetric.  Pulmonary/Chest: No respiratory distress. Clear to auscultation bilaterally. No wheezing or rales.  Abdominal: Soft and non-distended.  There is no tenderness.  No rebound, guarding, or rigidity. Good bowel sounds.  Genitourinary: No CVA tenderness  Musculoskeletal: Moves all extremities. No obvious deformities. No edema. No calf tenderness. Shunt in R arm. Pain over the L latissimus dorsi.  Skin: Warm and dry.  Neurological:  Alert, awake, and appropriate.  Normal speech.  No acute focal neurological deficits are appreciated.  Psychiatric: Normal affect. Good eye contact. Appropriate in content.    ED Course    Procedures  ED Vital Signs:  Vitals:    05/09/18 0829   BP: (!) 166/87   Pulse: 68   Resp: 20   Temp: 98.7 °F (37.1 °C)   TempSrc: Oral   SpO2: 95%   Weight: 93.4 kg (206 lb)       Abnormal Lab Results:  Labs Reviewed - No data to display     All Lab  Results:  none    Imaging Results:  Imaging Results    None        The EKG was ordered, reviewed, and independently interpreted by the ED provider.  Interpretation time: 8:33  Rate: 69 BPM  Rhythm: normal sinus rhythm  Interpretation: T wave abnormality. No STEMI.               The Emergency Provider reviewed the vital signs and test results, which are outlined above.    ED Discussion     8:48 AM:  Discussed with pt all pertinent ED information and results. Discussed pt dx and plan of tx. Gave pt all f/u and return to the ED instructions. All questions and concerns were addressed at this time. Pt expresses understanding of information and instructions, and is comfortable with plan to discharge. Pt is stable for discharge.    I discussed with patient and/or family/caretaker that evaluation in the ED does not suggest any emergent or life threatening medical conditions requiring immediate intervention beyond what was provided in the ED, and I believe patient is safe for discharge.  Regardless, an unremarkable evaluation in the ED does not preclude the development or presence of a serious of life threatening condition. As such, patient was instructed to return immediately for any worsening or change in current symptoms.      ED Medication(s):  Medications   hydrocodone-acetaminophen 7.5-325mg per tablet 1 tablet (1 tablet Oral Given 5/9/18 0851)       Discharge Medication List as of 5/9/2018  8:47 AM      START taking these medications    Details   hydrocodone-acetaminophen 10-325mg (NORCO)  mg Tab Take 1 tablet by mouth every 4 (four) hours as needed for Pain., Starting Wed 5/9/2018, Print             Follow-up Information     Go to  [facility] - .    Specialty:  Emergency Medicine  Why:  If symptoms worsen  Contact information:  [address]  [facility] [address] [phone]                   Medical Decision Making    Medical Decision Making:   Clinical Tests:   Medical  Tests: Reviewed and Ordered           Scribe Attestation:   Scribe #1: I performed the above scribed service and the documentation accurately describes the services I performed. I attest to the accuracy of the note.    Attending:   Physician Attestation Statement for Scribe #1: I, Jesus Colon MD, personally performed the services described in this documentation, as scribed by Zoë Salomon, in my presence, and it is both accurate and complete.          Clinical Impression       ICD-10-CM ICD-9-CM   1. Musculoskeletal pain of left upper extremity M79.602 729.5   2. ESRD (end stage renal disease) N18.6 585.6   3. Essential hypertension I10 401.9       Disposition:   Disposition: Discharged  Condition: Stable         Jesus Colon MD  05/09/18 1016

## 2018-05-10 ENCOUNTER — OFFICE VISIT (OUTPATIENT)
Dept: CARDIOLOGY | Facility: CLINIC | Age: 68
End: 2018-05-10
Payer: MEDICARE

## 2018-05-10 VITALS
DIASTOLIC BLOOD PRESSURE: 72 MMHG | SYSTOLIC BLOOD PRESSURE: 132 MMHG | WEIGHT: 206.38 LBS | HEIGHT: 78 IN | HEART RATE: 70 BPM | BODY MASS INDEX: 23.88 KG/M2

## 2018-05-10 DIAGNOSIS — Z79.4 CONTROLLED TYPE 2 DIABETES MELLITUS WITH DIABETIC NEPHROPATHY, WITH LONG-TERM CURRENT USE OF INSULIN: ICD-10-CM

## 2018-05-10 DIAGNOSIS — I10 ESSENTIAL HYPERTENSION: ICD-10-CM

## 2018-05-10 DIAGNOSIS — I50.33 ACUTE ON CHRONIC DIASTOLIC HEART FAILURE: ICD-10-CM

## 2018-05-10 DIAGNOSIS — I47.29 NSVT (NONSUSTAINED VENTRICULAR TACHYCARDIA): ICD-10-CM

## 2018-05-10 DIAGNOSIS — I25.10 CORONARY ARTERY DISEASE INVOLVING NATIVE CORONARY ARTERY OF NATIVE HEART WITHOUT ANGINA PECTORIS: Primary | ICD-10-CM

## 2018-05-10 DIAGNOSIS — R07.9 CHEST PAIN, UNSPECIFIED TYPE: ICD-10-CM

## 2018-05-10 DIAGNOSIS — N18.6 ESRD (END STAGE RENAL DISEASE): ICD-10-CM

## 2018-05-10 DIAGNOSIS — E11.21 CONTROLLED TYPE 2 DIABETES MELLITUS WITH DIABETIC NEPHROPATHY, WITH LONG-TERM CURRENT USE OF INSULIN: ICD-10-CM

## 2018-05-10 PROCEDURE — 99214 OFFICE O/P EST MOD 30 MIN: CPT | Mod: S$PBB,,, | Performed by: INTERNAL MEDICINE

## 2018-05-10 PROCEDURE — 99213 OFFICE O/P EST LOW 20 MIN: CPT | Mod: PBBFAC,PO | Performed by: INTERNAL MEDICINE

## 2018-05-10 PROCEDURE — 99999 PR PBB SHADOW E&M-EST. PATIENT-LVL III: CPT | Mod: PBBFAC,,, | Performed by: INTERNAL MEDICINE

## 2018-05-10 RX ORDER — HYDRALAZINE HYDROCHLORIDE 50 MG/1
100 TABLET, FILM COATED ORAL EVERY 12 HOURS
Qty: 90 TABLET | Refills: 0 | Status: SHIPPED | OUTPATIENT
Start: 2018-05-10 | End: 2018-08-16 | Stop reason: SDUPTHER

## 2018-05-10 RX ORDER — LABETALOL 300 MG/1
300 TABLET, FILM COATED ORAL 2 TIMES DAILY
Qty: 60 TABLET | Refills: 11 | Status: SHIPPED | OUTPATIENT
Start: 2018-05-10 | End: 2018-07-12 | Stop reason: SDUPTHER

## 2018-05-10 RX ORDER — TRAMADOL HYDROCHLORIDE 50 MG/1
TABLET ORAL
COMMUNITY
Start: 2018-05-04 | End: 2018-10-13

## 2018-05-10 RX ORDER — LOSARTAN POTASSIUM 100 MG/1
100 TABLET ORAL DAILY
Qty: 30 TABLET | Refills: 11 | Status: SHIPPED | OUTPATIENT
Start: 2018-05-10

## 2018-05-10 RX ORDER — CLONIDINE 0.3 MG/24H
1 PATCH, EXTENDED RELEASE TRANSDERMAL
Qty: 4 PATCH | Refills: 11 | Status: SHIPPED | OUTPATIENT
Start: 2018-05-10 | End: 2019-05-06 | Stop reason: SDUPTHER

## 2018-05-10 RX ORDER — AZELASTINE 1 MG/ML
SPRAY, METERED NASAL
COMMUNITY
Start: 2017-12-28 | End: 2018-10-13

## 2018-05-10 RX ORDER — ESCITALOPRAM OXALATE 10 MG/1
10 TABLET ORAL
COMMUNITY
Start: 2018-05-08 | End: 2018-10-13

## 2018-05-10 NOTE — PROGRESS NOTES
Subjective:   Patient ID:  Conner Perez III is a 68 y.o. male who presents for follow up of Coronary Artery Disease and Hypertension      66 yo, male, came in for f/u, candice vega of [facility].  PMH CAD, s/p twice LHC at [facility] and last on was . Did not have PCI. Was told no blockage, nonsustained VT, IDDM, ESRD on HD in , via fistula on rigth upper arm, HTN, HLD  He had chronic elevation of troponin to 0.05  Had numerous ER visits for uncontrolled HTN and chest pain.  EKG no acute change  ECHO in  EF normal.  Nuclear stress in  normal.    Denied dyspnea, palpitation, dizziness, orthopnea and syncope.  Lipid profile is good per pt checked at PCP' office  No smoking/drinking        Past Medical History:   Diagnosis Date    CKD (chronic kidney disease), stage IV     Diabetes mellitus, type 2     Dialysis patient     HTN (hypertension)        Past Surgical History:   Procedure Laterality Date    AV FISTULA PLACEMENT Right 06/2017    CIRCUMCISION  1978    TOE AMPUTATION Left 01/24/2017    4th toe       Social History   Substance Use Topics    Smoking status: Former Smoker     Types: Cigarettes     Quit date: 1971    Smokeless tobacco: Never Used    Alcohol use No       Family History   Problem Relation Age of Onset    Hypertension Father     No Known Problems Mother          Review of Systems   Constitution: Negative for decreased appetite, diaphoresis, fever, weakness, malaise/fatigue and night sweats.   HENT: Negative for nosebleeds.    Eyes: Negative for blurred vision and double vision.   Cardiovascular: Positive for chest pain. Negative for claudication, dyspnea on exertion, irregular heartbeat, leg swelling, near-syncope, orthopnea, palpitations, paroxysmal nocturnal dyspnea and syncope.   Respiratory: Negative for cough, shortness of breath, sleep disturbances due to breathing, snoring, sputum production and wheezing.    Endocrine: Negative for cold intolerance and polyuria.    Hematologic/Lymphatic: Does not bruise/bleed easily.   Skin: Negative for rash.   Musculoskeletal: Negative for back pain, falls, joint pain, joint swelling and neck pain.   Gastrointestinal: Negative for abdominal pain, heartburn, nausea and vomiting.   Genitourinary: Negative for dysuria, frequency and hematuria.   Neurological: Negative for difficulty with concentration, dizziness, focal weakness, headaches, light-headedness, numbness and seizures.   Psychiatric/Behavioral: Negative for depression, memory loss and substance abuse. The patient does not have insomnia.    Allergic/Immunologic: Negative for HIV exposure and hives.       Objective:   Physical Exam   Constitutional: He is oriented to person, place, and time. He appears well-nourished.   HENT:   Head: Normocephalic.   Eyes: Pupils are equal, round, and reactive to light.   Neck: Normal carotid pulses and no JVD present. Carotid bruit is not present. No thyromegaly present.   Cardiovascular: Normal rate, regular rhythm, normal heart sounds and normal pulses.   No extrasystoles are present. PMI is not displaced.  Exam reveals no gallop and no S3.    No murmur heard.  Pulmonary/Chest: Breath sounds normal. No stridor. No respiratory distress.   Abdominal: Soft. Bowel sounds are normal. There is no tenderness. There is no rebound.   Musculoskeletal: Normal range of motion.   Neurological: He is alert and oriented to person, place, and time.   Skin: Skin is intact. No rash noted.   Psychiatric: His behavior is normal.       No results found for: CHOL  No results found for: HDL  No results found for: LDLCALC  No results found for: TRIG  No results found for: CHOLHDL    Chemistry        Component Value Date/Time     05/04/2018 0310    K 4.9 05/04/2018 0310    CL 99 05/04/2018 0310    CO2 26 05/04/2018 0310    BUN 40 (H) 05/04/2018 0310    CREATININE 9.4 (H) 05/04/2018 0310     (H) 05/04/2018 0310        Component Value Date/Time    CALCIUM 8.7  05/04/2018 0310    ALKPHOS 72 05/04/2018 0310    AST 16 05/04/2018 0310    ALT 10 05/04/2018 0310    BILITOT 0.8 05/04/2018 0310    ESTGFRAFRICA 6 (A) 05/04/2018 0310    EGFRNONAA 5 (A) 05/04/2018 0310          Lab Results   Component Value Date    TSH 0.667 12/17/2017     Lab Results   Component Value Date    INR 1.0 04/23/2018    INR 1.1 04/12/2018    INR 1.1 12/17/2017     Lab Results   Component Value Date    WBC 5.40 05/04/2018    HGB 13.5 (L) 05/04/2018    HCT 40.0 05/04/2018    MCV 86 05/04/2018     05/04/2018     BMP  Sodium   Date Value Ref Range Status   05/04/2018 141 136 - 145 mmol/L Final     Potassium   Date Value Ref Range Status   05/04/2018 4.9 3.5 - 5.1 mmol/L Final     Chloride   Date Value Ref Range Status   05/04/2018 99 95 - 110 mmol/L Final     CO2   Date Value Ref Range Status   05/04/2018 26 23 - 29 mmol/L Final     BUN, Bld   Date Value Ref Range Status   05/04/2018 40 (H) 8 - 23 mg/dL Final     Creatinine   Date Value Ref Range Status   05/04/2018 9.4 (H) 0.5 - 1.4 mg/dL Final     Calcium   Date Value Ref Range Status   05/04/2018 8.7 8.7 - 10.5 mg/dL Final     Anion Gap   Date Value Ref Range Status   05/04/2018 16 8 - 16 mmol/L Final     eGFR if    Date Value Ref Range Status   05/04/2018 6 (A) >60 mL/min/1.73 m^2 Final     eGFR if non    Date Value Ref Range Status   05/04/2018 5 (A) >60 mL/min/1.73 m^2 Final     Comment:     Calculation used to obtain the estimated glomerular filtration  rate (eGFR) is the CKD-EPI equation.        Estimated Creatinine Clearance: 9.7 mL/min (A) (based on SCr of 9.4 mg/dL (H)).     Assessment:      1. Coronary artery disease involving native coronary artery of native heart without angina pectoris    2. Essential hypertension    3. NSVT (nonsustained ventricular tachycardia)    4. Acute on chronic diastolic heart failure    5. ESRD (end stage renal disease)    6. Controlled type 2 diabetes mellitus with diabetic  nephropathy, with long-term current use of insulin    7. Chest pain, unspecified type      Uncontrolled HTN.  Plan:   Switch clonidine from 0.1 mg tid to 0.3 patch weekly to improve compliance  D/c ToprolXl and add Labetololo 300 mg bid  Switch hydraalzine fro 50mg tid to 100 mg bid. May consider to wean off in the future.  Advise to check BP daily. Report to the office in two weeks,  DASH  No smart phone can not send to digit HTN program  RTC in 3 months

## 2018-07-12 ENCOUNTER — OFFICE VISIT (OUTPATIENT)
Dept: CARDIOLOGY | Facility: CLINIC | Age: 68
End: 2018-07-12
Payer: MEDICARE

## 2018-07-12 VITALS
WEIGHT: 214.31 LBS | HEIGHT: 78 IN | BODY MASS INDEX: 24.8 KG/M2 | SYSTOLIC BLOOD PRESSURE: 180 MMHG | DIASTOLIC BLOOD PRESSURE: 92 MMHG | HEART RATE: 80 BPM

## 2018-07-12 DIAGNOSIS — N18.6 ESRD (END STAGE RENAL DISEASE) ON DIALYSIS: ICD-10-CM

## 2018-07-12 DIAGNOSIS — E11.21 CONTROLLED TYPE 2 DIABETES MELLITUS WITH DIABETIC NEPHROPATHY, WITH LONG-TERM CURRENT USE OF INSULIN: ICD-10-CM

## 2018-07-12 DIAGNOSIS — Z79.4 CONTROLLED TYPE 2 DIABETES MELLITUS WITH DIABETIC NEPHROPATHY, WITH LONG-TERM CURRENT USE OF INSULIN: ICD-10-CM

## 2018-07-12 DIAGNOSIS — I10 ESSENTIAL HYPERTENSION: ICD-10-CM

## 2018-07-12 DIAGNOSIS — I25.10 CORONARY ARTERY DISEASE INVOLVING NATIVE CORONARY ARTERY OF NATIVE HEART WITHOUT ANGINA PECTORIS: ICD-10-CM

## 2018-07-12 DIAGNOSIS — R06.00 DYSPNEA, UNSPECIFIED TYPE: ICD-10-CM

## 2018-07-12 DIAGNOSIS — Z01.810 PREOP CARDIOVASCULAR EXAM: Primary | ICD-10-CM

## 2018-07-12 DIAGNOSIS — Z99.2 ESRD (END STAGE RENAL DISEASE) ON DIALYSIS: ICD-10-CM

## 2018-07-12 PROBLEM — I50.32 CHRONIC DIASTOLIC HEART FAILURE: Status: ACTIVE | Noted: 2017-08-25

## 2018-07-12 PROCEDURE — 99213 OFFICE O/P EST LOW 20 MIN: CPT | Mod: PBBFAC,PO | Performed by: INTERNAL MEDICINE

## 2018-07-12 PROCEDURE — 99999 PR PBB SHADOW E&M-EST. PATIENT-LVL III: CPT | Mod: PBBFAC,,, | Performed by: INTERNAL MEDICINE

## 2018-07-12 PROCEDURE — 99214 OFFICE O/P EST MOD 30 MIN: CPT | Mod: S$PBB,,, | Performed by: INTERNAL MEDICINE

## 2018-07-12 RX ORDER — LABETALOL 300 MG/1
600 TABLET, FILM COATED ORAL EVERY 12 HOURS
Qty: 120 TABLET | Refills: 11 | Status: SHIPPED | OUTPATIENT
Start: 2018-07-12 | End: 2018-08-16 | Stop reason: SDUPTHER

## 2018-07-12 NOTE — PROGRESS NOTES
Subjective:   Patient ID:  Conner Perez III is a 68 y.o. male who presents for follow up of Follow-up      66 yo, male, came in for f/u, retired  of [employer].  PMH CAD, s/p twice LHC at [facility] and last on was . Did not have PCI. Was told no blockage, nonsustained VT, IDDM, ESRD on HD in , via fistula on [facility] upper arm, HTN, HLD.  Has been on Kidney tx list at [facility]  He had chronic elevation of troponin to 0.05  Had numerous ER visits for uncontrolled HTN and chest pain.  EKG no acute change  ECHO in  EF normal, mildly dilated LV.  Nuclear stress in  normal.    Chronic [place], and retired recently.   Denied chest pain, palpitation, dizziness, orthopnea and syncope.  Lipid profile is good per pt checked at PCP' office  No smoking/drinking        Past Medical History:   Diagnosis Date    CKD (chronic kidney disease), stage IV     Diabetes mellitus, type 2     Dialysis patient     HTN (hypertension)        Past Surgical History:   Procedure Laterality Date    AV FISTULA PLACEMENT Right 06/2017    CIRCUMCISION  1978    TOE AMPUTATION Left 01/24/2017    4th toe       Social History   Substance Use Topics    Smoking status: Former Smoker     Types: Cigarettes     Quit date: 1971    Smokeless tobacco: Never Used    Alcohol use No       Family History   Problem Relation Age of Onset    Hypertension Father     No Known Problems Mother          Review of Systems   Constitution: Negative for decreased appetite, diaphoresis, fever, weakness, malaise/fatigue and night sweats.   HENT: Negative for nosebleeds.    Eyes: Negative for blurred vision and double vision.   Cardiovascular: Positive for dyspnea on exertion. Negative for chest pain, claudication, irregular heartbeat, leg swelling, near-syncope, orthopnea, palpitations, paroxysmal nocturnal dyspnea and syncope.   Respiratory: Negative for cough, shortness of breath, sleep disturbances due to breathing, snoring, sputum production and  wheezing.    Endocrine: Negative for cold intolerance and polyuria.   Hematologic/Lymphatic: Does not bruise/bleed easily.   Skin: Negative for rash.   Musculoskeletal: Negative for back pain, falls, joint pain, joint swelling and neck pain.   Gastrointestinal: Negative for abdominal pain, heartburn, nausea and vomiting.   Genitourinary: Negative for dysuria, frequency and hematuria.   Neurological: Negative for difficulty with concentration, dizziness, focal weakness, headaches, light-headedness, numbness and seizures.   Psychiatric/Behavioral: Negative for depression, memory loss and substance abuse. The patient does not have insomnia.    Allergic/Immunologic: Negative for HIV exposure and hives.       Objective:   Physical Exam   Constitutional: He is oriented to person, place, and time. He appears well-nourished.   HENT:   Head: Normocephalic.   Eyes: Pupils are equal, round, and reactive to light.   Neck: Normal carotid pulses and no JVD present. Carotid bruit is not present. No thyromegaly present.   Cardiovascular: Normal rate, regular rhythm, normal heart sounds and normal pulses.   No extrasystoles are present. PMI is not displaced.  Exam reveals no gallop and no S3.    No murmur heard.  Pulmonary/Chest: Breath sounds normal. No stridor. No respiratory distress.   Abdominal: Soft. Bowel sounds are normal. There is no tenderness. There is no rebound.   Musculoskeletal: Normal range of motion.   Neurological: He is alert and oriented to person, place, and time.   Skin: Skin is intact. No rash noted.   Psychiatric: His behavior is normal.       No results found for: CHOL  No results found for: HDL  No results found for: LDLCALC  No results found for: TRIG  No results found for: CHOLHDL    Chemistry        Component Value Date/Time     05/04/2018 0310    K 4.9 05/04/2018 0310    CL 99 05/04/2018 0310    CO2 26 05/04/2018 0310    BUN 40 (H) 05/04/2018 0310    CREATININE 9.4 (H) 05/04/2018 0310      (H) 05/04/2018 0310        Component Value Date/Time    CALCIUM 8.7 05/04/2018 0310    ALKPHOS 72 05/04/2018 0310    AST 16 05/04/2018 0310    ALT 10 05/04/2018 0310    BILITOT 0.8 05/04/2018 0310    ESTGFRAFRICA 6 (A) 05/04/2018 0310    EGFRNONAA 5 (A) 05/04/2018 0310          Lab Results   Component Value Date    HGBA1C 4.8 04/27/2018     Lab Results   Component Value Date    TSH 0.667 12/17/2017     Lab Results   Component Value Date    INR 1.0 04/23/2018    INR 1.1 04/12/2018    INR 1.1 12/17/2017     Lab Results   Component Value Date    WBC 5.40 05/04/2018    HGB 13.5 (L) 05/04/2018    HCT 40.0 05/04/2018    MCV 86 05/04/2018     05/04/2018     BMP  Sodium   Date Value Ref Range Status   05/04/2018 141 136 - 145 mmol/L Final     Potassium   Date Value Ref Range Status   05/04/2018 4.9 3.5 - 5.1 mmol/L Final     Chloride   Date Value Ref Range Status   05/04/2018 99 95 - 110 mmol/L Final     CO2   Date Value Ref Range Status   05/04/2018 26 23 - 29 mmol/L Final     BUN, Bld   Date Value Ref Range Status   05/04/2018 40 (H) 8 - 23 mg/dL Final     Creatinine   Date Value Ref Range Status   05/04/2018 9.4 (H) 0.5 - 1.4 mg/dL Final     Calcium   Date Value Ref Range Status   05/04/2018 8.7 8.7 - 10.5 mg/dL Final     Anion Gap   Date Value Ref Range Status   05/04/2018 16 8 - 16 mmol/L Final     eGFR if    Date Value Ref Range Status   05/04/2018 6 (A) >60 mL/min/1.73 m^2 Final     eGFR if non    Date Value Ref Range Status   05/04/2018 5 (A) >60 mL/min/1.73 m^2 Final     Comment:     Calculation used to obtain the estimated glomerular filtration  rate (eGFR) is the CKD-EPI equation.        CrCl cannot be calculated (Patient's most recent lab result is older than the maximum 7 days allowed.).  No results found in the last 24 hours.  No results found in the last 24 hours.  No results found in the last 24 hours.    Assessment:      1. Preop cardiovascular exam    2. Coronary  artery disease involving native coronary artery of native heart without angina pectoris    3. Essential hypertension    4. ESRD (end stage renal disease) on dialysis    5. Controlled type 2 diabetes mellitus with diabetic nephropathy, with long-term current use of insulin      Nonobstructive CAD, LHC done in   CHFpEF NYHA class III, compensated  BP high    Plan:   Obtained LHC report from [facility] and send to [facility]  Order carotid US, LE arterial and Venosu US with Pablo  Increase Labetolol to 600 mg bid  Will clarify the order of abd US from [facility].    Elevated periop risk of CV events for high risk procedure.  Good functional and exercise capacity.  No chest pain, active arrhythmia and active CHF symptoms.  Ok to proceed the scheduled surgery without further cardiac study.  OK to hold Aspirin 5 to 7 days before the procedure and resume ASAP postop.  Continue BB and Statin periop.  Avoid periop fluid overloaded.  RTC in 6 months

## 2018-07-13 ENCOUNTER — TELEPHONE (OUTPATIENT)
Dept: CARDIOLOGY | Facility: CLINIC | Age: 68
End: 2018-07-13

## 2018-07-13 DIAGNOSIS — N18.6 STAGE 5 CHRONIC KIDNEY DISEASE ON CHRONIC DIALYSIS: Primary | ICD-10-CM

## 2018-07-13 DIAGNOSIS — Z99.2 STAGE 5 CHRONIC KIDNEY DISEASE ON CHRONIC DIALYSIS: Primary | ICD-10-CM

## 2018-07-13 NOTE — TELEPHONE ENCOUNTER
----- Message from Virgen Laurent MA sent at 7/13/2018  2:43 PM CDT -----      ----- Message -----  From: Merary Johnson  Sent: 7/13/2018   2:00 PM  To: Obey Walter I call [facility] about Mr. Perez U/S. They want a u/s of the pelvic and also for the abdomin.

## 2018-07-18 ENCOUNTER — TELEPHONE (OUTPATIENT)
Dept: CARDIOLOGY | Facility: CLINIC | Age: 68
End: 2018-07-18

## 2018-07-23 ENCOUNTER — TELEPHONE (OUTPATIENT)
Dept: RADIOLOGY | Facility: HOSPITAL | Age: 68
End: 2018-07-23

## 2018-07-24 ENCOUNTER — HOSPITAL ENCOUNTER (OUTPATIENT)
Dept: RADIOLOGY | Facility: HOSPITAL | Age: 68
Discharge: HOME | End: 2018-07-24
Attending: INTERNAL MEDICINE
Payer: MEDICARE

## 2018-07-24 DIAGNOSIS — N18.6 STAGE 5 CHRONIC KIDNEY DISEASE ON CHRONIC DIALYSIS: ICD-10-CM

## 2018-07-24 DIAGNOSIS — Z99.2 STAGE 5 CHRONIC KIDNEY DISEASE ON CHRONIC DIALYSIS: ICD-10-CM

## 2018-07-24 PROCEDURE — 76700 US EXAM ABDOM COMPLETE: CPT | Mod: TC,PO

## 2018-07-24 PROCEDURE — 76856 US EXAM PELVIC COMPLETE: CPT | Mod: TC,PO

## 2018-07-24 PROCEDURE — 76700 US EXAM ABDOM COMPLETE: CPT | Mod: 26,,, | Performed by: RADIOLOGY

## 2018-07-24 PROCEDURE — 76856 US EXAM PELVIC COMPLETE: CPT | Mod: 26,,, | Performed by: RADIOLOGY

## 2018-07-25 ENCOUNTER — TELEPHONE (OUTPATIENT)
Dept: CARDIOLOGY | Facility: CLINIC | Age: 68
End: 2018-07-25

## 2018-07-25 NOTE — TELEPHONE ENCOUNTER
Attempted without success to contact pt with test results.  Left vm to return call.    ----- Message from Satya Barnhart MD sent at 7/25/2018  1:20 PM CDT -----  Abd us showed small ascites. Please talk to Tx team

## 2018-07-31 ENCOUNTER — TELEPHONE (OUTPATIENT)
Dept: CARDIOLOGY | Facility: CLINIC | Age: 68
End: 2018-07-31

## 2018-08-05 ENCOUNTER — HOSPITAL ENCOUNTER (INPATIENT)
Facility: HOSPITAL | Age: 68
LOS: 1 days | Discharge: HOME | DRG: 291 | End: 2018-08-05
Attending: EMERGENCY MEDICINE | Admitting: INTERNAL MEDICINE
Payer: MEDICARE

## 2018-08-05 VITALS
SYSTOLIC BLOOD PRESSURE: 167 MMHG | BODY MASS INDEX: 24.23 KG/M2 | HEIGHT: 78 IN | TEMPERATURE: 99 F | WEIGHT: 209.44 LBS | OXYGEN SATURATION: 98 % | RESPIRATION RATE: 18 BRPM | DIASTOLIC BLOOD PRESSURE: 93 MMHG | HEART RATE: 65 BPM

## 2018-08-05 DIAGNOSIS — Z99.2 ESRD (END STAGE RENAL DISEASE) ON DIALYSIS: ICD-10-CM

## 2018-08-05 DIAGNOSIS — I16.1 HYPERTENSIVE EMERGENCY: ICD-10-CM

## 2018-08-05 DIAGNOSIS — J81.0 ACUTE PULMONARY EDEMA: Primary | ICD-10-CM

## 2018-08-05 DIAGNOSIS — R06.02 SOB (SHORTNESS OF BREATH): ICD-10-CM

## 2018-08-05 DIAGNOSIS — I50.9 ACUTE ON CHRONIC CONGESTIVE HEART FAILURE, UNSPECIFIED HEART FAILURE TYPE: ICD-10-CM

## 2018-08-05 DIAGNOSIS — I16.0 HYPERTENSIVE URGENCY: ICD-10-CM

## 2018-08-05 DIAGNOSIS — N18.6 ESRD (END STAGE RENAL DISEASE) ON DIALYSIS: ICD-10-CM

## 2018-08-05 LAB
ALBUMIN SERPL BCP-MCNC: 3.6 G/DL
ALP SERPL-CCNC: 61 U/L
ALT SERPL W/O P-5'-P-CCNC: 11 U/L
ANION GAP SERPL CALC-SCNC: 13 MMOL/L
AST SERPL-CCNC: 20 U/L
BASOPHILS # BLD AUTO: 0.02 K/UL
BASOPHILS NFR BLD: 0.3 %
BILIRUB SERPL-MCNC: 0.8 MG/DL
BNP SERPL-MCNC: 1354 PG/ML
BUN SERPL-MCNC: 31 MG/DL
CALCIUM SERPL-MCNC: 9 MG/DL
CHLORIDE SERPL-SCNC: 100 MMOL/L
CO2 SERPL-SCNC: 28 MMOL/L
CREAT SERPL-MCNC: 6.3 MG/DL
DIFFERENTIAL METHOD: ABNORMAL
EOSINOPHIL # BLD AUTO: 0.2 K/UL
EOSINOPHIL NFR BLD: 3.5 %
ERYTHROCYTE [DISTWIDTH] IN BLOOD BY AUTOMATED COUNT: 15.4 %
EST. GFR  (AFRICAN AMERICAN): 10 ML/MIN/1.73 M^2
EST. GFR  (NON AFRICAN AMERICAN): 8 ML/MIN/1.73 M^2
GLUCOSE SERPL-MCNC: 100 MG/DL
HCT VFR BLD AUTO: 33.9 %
HGB BLD-MCNC: 11.2 G/DL
LYMPHOCYTES # BLD AUTO: 1.1 K/UL
LYMPHOCYTES NFR BLD: 19 %
MAGNESIUM SERPL-MCNC: 2 MG/DL
MCH RBC QN AUTO: 30 PG
MCHC RBC AUTO-ENTMCNC: 33 G/DL
MCV RBC AUTO: 91 FL
MONOCYTES # BLD AUTO: 0.6 K/UL
MONOCYTES NFR BLD: 10.5 %
NEUTROPHILS # BLD AUTO: 4 K/UL
NEUTROPHILS NFR BLD: 66.7 %
PHOSPHATE SERPL-MCNC: 4.4 MG/DL
PLATELET # BLD AUTO: 170 K/UL
PMV BLD AUTO: 10.1 FL
POTASSIUM SERPL-SCNC: 4.6 MMOL/L
PROT SERPL-MCNC: 7.2 G/DL
RBC # BLD AUTO: 3.73 M/UL
SODIUM SERPL-SCNC: 141 MMOL/L
TROPONIN I SERPL DL<=0.01 NG/ML-MCNC: 0.04 NG/ML
TROPONIN I SERPL DL<=0.01 NG/ML-MCNC: 0.04 NG/ML
WBC # BLD AUTO: 6 K/UL

## 2018-08-05 PROCEDURE — 83735 ASSAY OF MAGNESIUM: CPT

## 2018-08-05 PROCEDURE — 99285 EMERGENCY DEPT VISIT HI MDM: CPT | Mod: 25

## 2018-08-05 PROCEDURE — 99223 1ST HOSP IP/OBS HIGH 75: CPT | Mod: 25,,, | Performed by: INTERNAL MEDICINE

## 2018-08-05 PROCEDURE — 80100016 HC MAINTENANCE HEMODIALYSIS

## 2018-08-05 PROCEDURE — 94761 N-INVAS EAR/PLS OXIMETRY MLT: CPT

## 2018-08-05 PROCEDURE — 86706 HEP B SURFACE ANTIBODY: CPT

## 2018-08-05 PROCEDURE — 96374 THER/PROPH/DIAG INJ IV PUSH: CPT

## 2018-08-05 PROCEDURE — 25000003 PHARM REV CODE 250: Performed by: NURSE PRACTITIONER

## 2018-08-05 PROCEDURE — 85025 COMPLETE CBC W/AUTO DIFF WBC: CPT

## 2018-08-05 PROCEDURE — 63600175 PHARM REV CODE 636 W HCPCS: Performed by: EMERGENCY MEDICINE

## 2018-08-05 PROCEDURE — 96376 TX/PRO/DX INJ SAME DRUG ADON: CPT

## 2018-08-05 PROCEDURE — 87340 HEPATITIS B SURFACE AG IA: CPT

## 2018-08-05 PROCEDURE — 84484 ASSAY OF TROPONIN QUANT: CPT | Mod: 91

## 2018-08-05 PROCEDURE — 86706 HEP B SURFACE ANTIBODY: CPT | Mod: 91

## 2018-08-05 PROCEDURE — 25000003 PHARM REV CODE 250: Performed by: EMERGENCY MEDICINE

## 2018-08-05 PROCEDURE — 84100 ASSAY OF PHOSPHORUS: CPT

## 2018-08-05 PROCEDURE — 25000003 PHARM REV CODE 250: Performed by: INTERNAL MEDICINE

## 2018-08-05 PROCEDURE — 90935 HEMODIALYSIS ONE EVALUATION: CPT | Mod: ,,, | Performed by: INTERNAL MEDICINE

## 2018-08-05 PROCEDURE — 96375 TX/PRO/DX INJ NEW DRUG ADDON: CPT

## 2018-08-05 PROCEDURE — 83880 ASSAY OF NATRIURETIC PEPTIDE: CPT

## 2018-08-05 PROCEDURE — 93010 ELECTROCARDIOGRAM REPORT: CPT | Mod: ,,, | Performed by: INTERNAL MEDICINE

## 2018-08-05 PROCEDURE — 36415 COLL VENOUS BLD VENIPUNCTURE: CPT

## 2018-08-05 PROCEDURE — 21400001 HC TELEMETRY ROOM

## 2018-08-05 PROCEDURE — 80053 COMPREHEN METABOLIC PANEL: CPT

## 2018-08-05 RX ORDER — GUAIFENESIN 100 MG/5ML
200 SOLUTION ORAL EVERY 4 HOURS PRN
Status: DISCONTINUED | OUTPATIENT
Start: 2018-08-05 | End: 2018-08-05 | Stop reason: HOSPADM

## 2018-08-05 RX ORDER — IPRATROPIUM BROMIDE AND ALBUTEROL SULFATE 2.5; .5 MG/3ML; MG/3ML
3 SOLUTION RESPIRATORY (INHALATION) EVERY 4 HOURS PRN
Status: DISCONTINUED | OUTPATIENT
Start: 2018-08-05 | End: 2018-08-05 | Stop reason: HOSPADM

## 2018-08-05 RX ORDER — FUROSEMIDE 40 MG/1
40 TABLET ORAL DAILY
Status: DISCONTINUED | OUTPATIENT
Start: 2018-08-05 | End: 2018-08-05 | Stop reason: HOSPADM

## 2018-08-05 RX ORDER — ATORVASTATIN CALCIUM 10 MG/1
20 TABLET, FILM COATED ORAL NIGHTLY
Status: DISCONTINUED | OUTPATIENT
Start: 2018-08-05 | End: 2018-08-05 | Stop reason: HOSPADM

## 2018-08-05 RX ORDER — DILTIAZEM HYDROCHLORIDE 180 MG/1
360 CAPSULE, COATED, EXTENDED RELEASE ORAL DAILY
Status: DISCONTINUED | OUTPATIENT
Start: 2018-08-05 | End: 2018-08-05

## 2018-08-05 RX ORDER — TAMSULOSIN HYDROCHLORIDE 0.4 MG/1
0.4 CAPSULE ORAL DAILY
Status: DISCONTINUED | OUTPATIENT
Start: 2018-08-05 | End: 2018-08-05 | Stop reason: HOSPADM

## 2018-08-05 RX ORDER — LABETALOL HYDROCHLORIDE 5 MG/ML
10 INJECTION, SOLUTION INTRAVENOUS EVERY 6 HOURS PRN
Status: DISCONTINUED | OUTPATIENT
Start: 2018-08-05 | End: 2018-08-05 | Stop reason: HOSPADM

## 2018-08-05 RX ORDER — FUROSEMIDE 10 MG/ML
40 INJECTION INTRAMUSCULAR; INTRAVENOUS
Status: COMPLETED | OUTPATIENT
Start: 2018-08-05 | End: 2018-08-05

## 2018-08-05 RX ORDER — ZOLPIDEM TARTRATE 5 MG/1
5 TABLET ORAL NIGHTLY PRN
Status: DISCONTINUED | OUTPATIENT
Start: 2018-08-05 | End: 2018-08-05 | Stop reason: HOSPADM

## 2018-08-05 RX ORDER — NAPROXEN SODIUM 220 MG/1
81 TABLET, FILM COATED ORAL DAILY
Status: DISCONTINUED | OUTPATIENT
Start: 2018-08-05 | End: 2018-08-05 | Stop reason: HOSPADM

## 2018-08-05 RX ORDER — DILTIAZEM HYDROCHLORIDE 180 MG/1
360 CAPSULE, COATED, EXTENDED RELEASE ORAL DAILY
Status: DISCONTINUED | OUTPATIENT
Start: 2018-08-05 | End: 2018-08-05 | Stop reason: HOSPADM

## 2018-08-05 RX ORDER — ASPIRIN 325 MG
325 TABLET ORAL
Status: COMPLETED | OUTPATIENT
Start: 2018-08-05 | End: 2018-08-05

## 2018-08-05 RX ORDER — LORAZEPAM 1 MG/1
1 TABLET ORAL
Status: COMPLETED | OUTPATIENT
Start: 2018-08-05 | End: 2018-08-05

## 2018-08-05 RX ORDER — ACETAMINOPHEN 325 MG/1
650 TABLET ORAL EVERY 8 HOURS PRN
Status: DISCONTINUED | OUTPATIENT
Start: 2018-08-05 | End: 2018-08-05 | Stop reason: HOSPADM

## 2018-08-05 RX ORDER — HYDROCODONE BITARTRATE AND ACETAMINOPHEN 10; 325 MG/1; MG/1
1 TABLET ORAL EVERY 4 HOURS PRN
Status: DISCONTINUED | OUTPATIENT
Start: 2018-08-05 | End: 2018-08-05 | Stop reason: HOSPADM

## 2018-08-05 RX ORDER — HYDRALAZINE HYDROCHLORIDE 20 MG/ML
10 INJECTION INTRAMUSCULAR; INTRAVENOUS
Status: COMPLETED | OUTPATIENT
Start: 2018-08-05 | End: 2018-08-05

## 2018-08-05 RX ORDER — LOSARTAN POTASSIUM 50 MG/1
100 TABLET ORAL DAILY
Status: DISCONTINUED | OUTPATIENT
Start: 2018-08-05 | End: 2018-08-05

## 2018-08-05 RX ORDER — ESCITALOPRAM OXALATE 10 MG/1
10 TABLET ORAL DAILY
Status: DISCONTINUED | OUTPATIENT
Start: 2018-08-05 | End: 2018-08-05 | Stop reason: HOSPADM

## 2018-08-05 RX ORDER — ONDANSETRON 2 MG/ML
4 INJECTION INTRAMUSCULAR; INTRAVENOUS EVERY 8 HOURS PRN
Status: DISCONTINUED | OUTPATIENT
Start: 2018-08-05 | End: 2018-08-05 | Stop reason: HOSPADM

## 2018-08-05 RX ORDER — LABETALOL 200 MG/1
600 TABLET, FILM COATED ORAL EVERY 12 HOURS
Status: DISCONTINUED | OUTPATIENT
Start: 2018-08-05 | End: 2018-08-05

## 2018-08-05 RX ORDER — DIPHENHYDRAMINE HCL 25 MG
25 CAPSULE ORAL EVERY 6 HOURS PRN
Status: DISCONTINUED | OUTPATIENT
Start: 2018-08-05 | End: 2018-08-05 | Stop reason: HOSPADM

## 2018-08-05 RX ORDER — HEPARIN SODIUM 1000 [USP'U]/ML
3000 INJECTION, SOLUTION INTRAVENOUS; SUBCUTANEOUS ONCE
Status: DISCONTINUED | OUTPATIENT
Start: 2018-08-05 | End: 2018-08-05 | Stop reason: HOSPADM

## 2018-08-05 RX ORDER — LABETALOL HYDROCHLORIDE 5 MG/ML
10 INJECTION, SOLUTION INTRAVENOUS
Status: COMPLETED | OUTPATIENT
Start: 2018-08-05 | End: 2018-08-05

## 2018-08-05 RX ORDER — HEPARIN SODIUM 1000 [USP'U]/ML
3000 INJECTION, SOLUTION INTRAVENOUS; SUBCUTANEOUS ONCE
Status: CANCELLED | OUTPATIENT
Start: 2018-08-06

## 2018-08-05 RX ORDER — LABETALOL 200 MG/1
600 TABLET, FILM COATED ORAL EVERY 12 HOURS
Status: DISCONTINUED | OUTPATIENT
Start: 2018-08-05 | End: 2018-08-05 | Stop reason: HOSPADM

## 2018-08-05 RX ORDER — CLONIDINE 0.3 MG/24H
1 PATCH, EXTENDED RELEASE TRANSDERMAL
Status: DISCONTINUED | OUTPATIENT
Start: 2018-08-05 | End: 2018-08-05

## 2018-08-05 RX ORDER — CLONIDINE HYDROCHLORIDE 0.3 MG/1
0.3 TABLET ORAL 3 TIMES DAILY
Status: DISCONTINUED | OUTPATIENT
Start: 2018-08-05 | End: 2018-08-05 | Stop reason: HOSPADM

## 2018-08-05 RX ORDER — HYDRALAZINE HYDROCHLORIDE 50 MG/1
100 TABLET, FILM COATED ORAL EVERY 12 HOURS
Status: DISCONTINUED | OUTPATIENT
Start: 2018-08-05 | End: 2018-08-05 | Stop reason: HOSPADM

## 2018-08-05 RX ORDER — LOSARTAN POTASSIUM 50 MG/1
100 TABLET ORAL DAILY
Status: DISCONTINUED | OUTPATIENT
Start: 2018-08-05 | End: 2018-08-05 | Stop reason: HOSPADM

## 2018-08-05 RX ADMIN — FUROSEMIDE 40 MG: 40 TABLET ORAL at 09:08

## 2018-08-05 RX ADMIN — HYDRALAZINE HYDROCHLORIDE 10 MG: 20 INJECTION INTRAMUSCULAR; INTRAVENOUS at 05:08

## 2018-08-05 RX ADMIN — ASPIRIN 325 MG ORAL TABLET 325 MG: 325 PILL ORAL at 03:08

## 2018-08-05 RX ADMIN — ESCITALOPRAM OXALATE 10 MG: 10 TABLET, FILM COATED ORAL at 09:08

## 2018-08-05 RX ADMIN — LOSARTAN POTASSIUM 100 MG: 50 TABLET, FILM COATED ORAL at 06:08

## 2018-08-05 RX ADMIN — LORAZEPAM 1 MG: 1 TABLET ORAL at 05:08

## 2018-08-05 RX ADMIN — DILTIAZEM HYDROCHLORIDE 360 MG: 180 CAPSULE, COATED, EXTENDED RELEASE ORAL at 06:08

## 2018-08-05 RX ADMIN — LABETALOL HYDROCHLORIDE 10 MG: 5 INJECTION, SOLUTION INTRAVENOUS at 05:08

## 2018-08-05 RX ADMIN — HYDRALAZINE HYDROCHLORIDE 100 MG: 50 TABLET, FILM COATED ORAL at 09:08

## 2018-08-05 RX ADMIN — LABETALOL HCL 600 MG: 200 TABLET, FILM COATED ORAL at 06:08

## 2018-08-05 RX ADMIN — ASPIRIN 81 MG 81 MG: 81 TABLET ORAL at 09:08

## 2018-08-05 RX ADMIN — HYDRALAZINE HYDROCHLORIDE 10 MG: 20 INJECTION INTRAMUSCULAR; INTRAVENOUS at 03:08

## 2018-08-05 RX ADMIN — FUROSEMIDE 40 MG: 10 INJECTION, SOLUTION INTRAMUSCULAR; INTRAVENOUS at 05:08

## 2018-08-05 RX ADMIN — TAMSULOSIN HYDROCHLORIDE 0.4 MG: 0.4 CAPSULE ORAL at 09:08

## 2018-08-05 RX ADMIN — CLONIDINE HYDROCHLORIDE 0.3 MG: 0.3 TABLET ORAL at 06:08

## 2018-08-05 NOTE — ED PROVIDER NOTES
"SCRIBE #1 NOTE: I, Madiha Tompkinso, am scribing for, and in the presence of, Lucho Leon Jr., MD. I have scribed the entire note.      History      Chief Complaint   Patient presents with    Shortness of Breath     "chest tightness". Pt states "I drank too much fluid"       Review of patient's allergies indicates:   Allergen Reactions    Augmentin [amoxicillin-pot clavulanate] Edema    Lisinopril      cough    Sulfa (sulfonamide antibiotics)      swelling        HPI   HPI    8/5/2018, 3:16 AM   History obtained from the patient      History of Present Illness: Conner Perez III is a 68 y.o. male dialysis patient with PMHx of CKD, DM and HTN who presents to the Emergency Department for SOB which onset gradually a few hours ago. Symptoms are constant and moderate in severity. No mitigating or exacerbating factors reported. No associated sxs included. Patient states he has dialysis on Mondays, Wednesdays, and Fridays. Patient states he last had dialysis 2 days ago. Patient reports drinking too much fluid yesterday. Patient denies any fever, chills, CP, leg swelling, dizziness, cough, abd pain, N/V/D, extremity weakness/numbness, and all other sxs at this time. Patient reports seeing Dr. Dos Santos for nephrology. No further complaints or concerns at this time.     Arrival mode: Personal vehicle    PCP: Raciel Angela MD       Past Medical History:  Past Medical History:   Diagnosis Date    CKD (chronic kidney disease), stage IV     Diabetes mellitus, type 2     Dialysis patient     HTN (hypertension)        Past Surgical History:  Past Surgical History:   Procedure Laterality Date    AV FISTULA PLACEMENT Right 06/2017    CIRCUMCISION  1978    TOE AMPUTATION Left 01/24/2017    4th toe         Family History:  Family History   Problem Relation Age of Onset    Hypertension Father     No Known Problems Mother        Social History:  Social History     Social History Main Topics    Smoking status: Former Smoker     " Types: Cigarettes     Quit date: 1971    Smokeless tobacco: Never Used    Alcohol use No    Drug use: No    Sexual activity: Unknown       ROS   Review of Systems   Constitutional: Negative for chills and fever.   HENT: Negative for sore throat.    Respiratory: Positive for shortness of breath. Negative for cough.    Cardiovascular: Negative for chest pain and leg swelling.   Gastrointestinal: Negative for abdominal pain, diarrhea, nausea and vomiting.   Genitourinary: Negative for dysuria.   Musculoskeletal: Negative for back pain.   Skin: Negative for rash.   Neurological: Negative for dizziness, weakness and numbness.   Hematological: Does not bruise/bleed easily.   All other systems reviewed and are negative.    Physical Exam      Initial Vitals [08/05/18 0257]   BP Pulse Resp Temp SpO2   (!) 192/100 82 20 97.6 °F (36.4 °C) 97 %      MAP       --          Physical Exam  Nursing Notes and Vital Signs Reviewed.  Constitutional: Patient is in no acute distress. Well-developed and well-nourished.  Head: Atraumatic. Normocephalic.  Eyes: PERRL. EOM intact. Conjunctivae are not pale. No scleral icterus.  ENT: Mucous membranes are moist. Oropharynx is clear and symmetric.    Neck: Supple. Full ROM. No lymphadenopathy.  Cardiovascular: Regular rate. Regular rhythm. No murmurs, rubs, or gallops. Distal pulses are 2+ and symmetric.  Pulmonary/Chest: No respiratory distress. Crackles at bilat bases.  Abdominal: Soft and non-distended.  There is no tenderness.  No rebound, guarding, or rigidity. Good bowel sounds.  Genitourinary: No CVA tenderness  Musculoskeletal: Moves all extremities. No obvious deformities. No edema. No calf tenderness. R arm fistula, good thrill.  Skin: Warm and dry.  Neurological:  Alert, awake, and appropriate.  Normal speech.  No acute focal neurological deficits are appreciated.  Psychiatric: Normal affect. Good eye contact. Appropriate in content.    ED Course    Critical Care  Date/Time:  8/5/2018 5:19 AM  Performed by: MAINOR DAO JR  Authorized by: MAINOR DAO JR   Direct patient critical care time: 10 minutes  Additional history critical care time: 9 minutes  Ordering / reviewing critical care time: 9 minutes  Documentation critical care time: 9 minutes  Consulting other physicians critical care time: 8 minutes  Total critical care time (exclusive of procedural time) : 45 minutes  Critical care time was exclusive of separately billable procedures and treating other patients.  Critical care was necessary to treat or prevent imminent or life-threatening deterioration of the following conditions: hypertensive emergency, CHF.  Critical care was time spent personally by me on the following activities: blood draw for specimens, development of treatment plan with patient or surrogate, discussions with consultants, interpretation of cardiac output measurements, evaluation of patient's response to treatment, examination of patient, ordering and performing treatments and interventions, obtaining history from patient or surrogate, ordering and review of laboratory studies, ordering and review of radiographic studies, pulse oximetry, re-evaluation of patient's condition and review of old charts.        ED Vital Signs:  Vitals:    08/05/18 0813 08/05/18 0915 08/05/18 1025 08/05/18 1143   BP: (!) 196/92      Pulse: 77 78 71 61   Resp:   20    Temp:       TempSrc:       SpO2:   98%    Weight:       Height:        08/05/18 1215 08/05/18 1226 08/05/18 1258 08/05/18 1300   BP: (!) 140/74 (!) 144/76  123/71   Pulse: 64 61 68 65   Resp: 18 18 18   Temp: 97.6 °F (36.4 °C)      TempSrc:       SpO2:       Weight:       Height:        08/05/18 1330 08/05/18 1400 08/05/18 1430 08/05/18 1500   BP: 135/76 (!) 142/77 (!) 156/83 (!) 163/84   Pulse: 60 (!) 59 62 61   Resp: 18 18 18 18   Temp:       TempSrc:       SpO2:       Weight:       Height:        08/05/18 1530 08/05/18 1556 08/05/18 1625   BP: (!) 164/86 (!)  154/84 (!) 167/93   Pulse: 62 (!) 59 65   Resp: 18 18 18   Temp:   98.6 °F (37 °C)   TempSrc:  Oral Oral   SpO2:      Weight:      Height:          Abnormal Lab Results:  Labs Reviewed   CBC W/ AUTO DIFFERENTIAL - Abnormal; Notable for the following:        Result Value    RBC 3.73 (*)     Hemoglobin 11.2 (*)     Hematocrit 33.9 (*)     RDW 15.4 (*)     All other components within normal limits   COMPREHENSIVE METABOLIC PANEL - Abnormal; Notable for the following:     BUN, Bld 31 (*)     Creatinine 6.3 (*)     eGFR if  10 (*)     eGFR if non  8 (*)     All other components within normal limits   TROPONIN I - Abnormal; Notable for the following:     Troponin I 0.040 (*)     All other components within normal limits   B-TYPE NATRIURETIC PEPTIDE - Abnormal; Notable for the following:     BNP 1,354 (*)     All other components within normal limits   PHOSPHORUS   MAGNESIUM   PHOSPHORUS   MAGNESIUM        All Lab Results:  Results for orders placed or performed during the hospital encounter of 08/05/18   CBC auto differential   Result Value Ref Range    WBC 6.00 3.90 - 12.70 K/uL    RBC 3.73 (L) 4.60 - 6.20 M/uL    Hemoglobin 11.2 (L) 14.0 - 18.0 g/dL    Hematocrit 33.9 (L) 40.0 - 54.0 %    MCV 91 82 - 98 fL    MCH 30.0 27.0 - 31.0 pg    MCHC 33.0 32.0 - 36.0 g/dL    RDW 15.4 (H) 11.5 - 14.5 %    Platelets 170 150 - 350 K/uL    MPV 10.1 9.2 - 12.9 fL    Gran # (ANC) 4.0 1.8 - 7.7 K/uL    Lymph # 1.1 1.0 - 4.8 K/uL    Mono # 0.6 0.3 - 1.0 K/uL    Eos # 0.2 0.0 - 0.5 K/uL    Baso # 0.02 0.00 - 0.20 K/uL    Gran% 66.7 38.0 - 73.0 %    Lymph% 19.0 18.0 - 48.0 %    Mono% 10.5 4.0 - 15.0 %    Eosinophil% 3.5 0.0 - 8.0 %    Basophil% 0.3 0.0 - 1.9 %    Differential Method Automated    Comprehensive metabolic panel   Result Value Ref Range    Sodium 141 136 - 145 mmol/L    Potassium 4.6 3.5 - 5.1 mmol/L    Chloride 100 95 - 110 mmol/L    CO2 28 23 - 29 mmol/L    Glucose 100 70 - 110 mg/dL    BUN,  Bld 31 (H) 8 - 23 mg/dL    Creatinine 6.3 (H) 0.5 - 1.4 mg/dL    Calcium 9.0 8.7 - 10.5 mg/dL    Total Protein 7.2 6.0 - 8.4 g/dL    Albumin 3.6 3.5 - 5.2 g/dL    Total Bilirubin 0.8 0.1 - 1.0 mg/dL    Alkaline Phosphatase 61 55 - 135 U/L    AST 20 10 - 40 U/L    ALT 11 10 - 44 U/L    Anion Gap 13 8 - 16 mmol/L    eGFR if African American 10 (A) >60 mL/min/1.73 m^2    eGFR if non African American 8 (A) >60 mL/min/1.73 m^2   Troponin I #1   Result Value Ref Range    Troponin I 0.040 (H) 0.000 - 0.026 ng/mL   Troponin I #2   Result Value Ref Range    Troponin I 0.040 (H) 0.000 - 0.026 ng/mL   B-Type natriuretic peptide (BNP)   Result Value Ref Range    BNP 1,354 (H) 0 - 99 pg/mL   Phosphorus   Result Value Ref Range    Phosphorus 4.4 2.7 - 4.5 mg/dL   Magnesium   Result Value Ref Range    Magnesium 2.0 1.6 - 2.6 mg/dL       Imaging Results:  Imaging Results          X-Ray Chest AP Portable (Final result)  Result time 08/05/18 08:21:23    Final result by Rosales Al MD (08/05/18 08:21:23)                 Impression:      1.  Negative for acute process involving the chest.    2.  Numerous stable findings as noted above.      Electronically signed by: Rosales Al MD  Date:    08/05/2018  Time:    08:21             Narrative:    EXAMINATION:  XR CHEST AP PORTABLE    CLINICAL HISTORY:  Shortness of breath;    COMPARISON:  May 4 2018, as well as multiple studies dating back to April 11 2016    FINDINGS:  EKG leads overlie the chest.  The lungs are free of alveolar opacities.  The cardiac silhouette size is enlarged.  The trachea is midline and the mediastinal width is normal. Negative for focal infiltrate, effusion or pneumothorax. Pulmonary vasculature is chronically mildly congested.  Negative for osseous abnormalities. Tortuous aorta.  Marginal spondylosis.  Degenerative changes of the acromioclavicular joint.  Convex right curvature of the upper thoracic spine.                               The EKG was ordered,  reviewed, and independently interpreted by the ED provider.  Interpretation time: 0314  Rate: 83 BPM  Rhythm: normal sinus rhythm  Interpretation: Rightward axis. Septal infarct, age undetermined. Abnormal ECG. No STEMI.         The Emergency Provider reviewed the vital signs and test results, which are outlined above.    ED Discussion     5:13 AM: Re-evaluated pt. Pt is resting comfortably and is in no acute distress.  D/w pt all pertinent results. D/w pt any concerns expressed at this time. Answered all questions. Pt expresses understanding at this time.    5:18 AM: Dr. Leon discussed the pt's case with Dr. London (Nephrology) who recommends admit to hospital medicine and patient will be dialyzed during the day.    5:24 AM: Discussed case with Dr. Taylor ([facility]) who agrees with current care and management of pt and accepts admission. Dr. Taylor recommends 10mg Labetalol.   Admitting Service: Hospital medicine   Admitting Physician: Dr. Taylor  Admit to: Obs-tele    5:45 AM: Re-evaluated pt. I have discussed test results, shared treatment plan, and the need for admission with patient and family at bedside. Pt and family express understanding at this time and agree with all information. All questions answered. Pt and family have no further questions or concerns at this time. Pt is ready for admit.      ED Medication(s):  Medications   aspirin tablet 325 mg (325 mg Oral Given 8/5/18 0316)   hydrALAZINE injection 10 mg (10 mg Intravenous Given 8/5/18 0339)   hydrALAZINE injection 10 mg (10 mg Intravenous Given 8/5/18 0515)   furosemide injection 40 mg (40 mg Intravenous Given 8/5/18 0516)   LORazepam tablet 1 mg (1 mg Oral Given 8/5/18 0524)   labetalol injection 10 mg (10 mg Intravenous Given 8/5/18 0539)       Discharge Medication List as of 8/5/2018  5:17 PM          Follow-up Information     Raciel Angela MD In 1 week.    Specialty:  Family Medicine  Contact information:  [id number]  [address]  [facility] [id number] [phone]                     Medical Decision Making    Medical Decision Making:   Clinical Tests:   Lab Tests: Ordered and Reviewed  Radiological Study: Ordered and Reviewed  Medical Tests: Ordered and Reviewed           Scribe Attestation:   Scribe #1: I performed the above scribed service and the documentation accurately describes the services I performed. I attest to the accuracy of the note.    Attending:   Physician Attestation Statement for Scribe #1: I, Lucho Leon Jr., MD, personally performed the services described in this documentation, as scribed by Madiha Dia, in my presence, and it is both accurate and complete.          Clinical Impression       ICD-10-CM ICD-9-CM   1. Acute pulmonary edema J81.0 518.4   2. SOB (shortness of breath) R06.02 786.05   3. Hypertensive emergency I16.1 401.9   4. Acute on chronic congestive heart failure, unspecified heart failure type I50.9 428.0   5. ESRD (end stage renal disease) on dialysis N18.6 585.6    Z99.2 V45.11   6. Hypertensive urgency I16.0 401.9       Disposition:   Disposition: Placed in Observation (Obs-tele)  Condition: Fair         Lucho Leon Jr., MD  08/06/18 0055

## 2018-08-05 NOTE — HPI
Conner Perez III is a 68 y old AA man with h/o ESRD on HD ( [facility], [facility] ) , has his HD on Friday , but presented to the ER last evening for SOB likely due to noncompliance with fluid and salt restriction , Pt having HD this morning and tolerating well, denies CP , SOB improved,

## 2018-08-05 NOTE — SUBJECTIVE & OBJECTIVE
Past Medical History:   Diagnosis Date    CKD (chronic kidney disease), stage IV     Diabetes mellitus, type 2     Dialysis patient     HTN (hypertension)        Past Surgical History:   Procedure Laterality Date    AV FISTULA PLACEMENT Right 06/2017    CIRCUMCISION  1978    TOE AMPUTATION Left 01/24/2017    4th toe       Review of patient's allergies indicates:   Allergen Reactions    Augmentin [amoxicillin-pot clavulanate] Edema    Lisinopril      cough    Sulfa (sulfonamide antibiotics)      swelling       No current facility-administered medications on file prior to encounter.      Current Outpatient Prescriptions on File Prior to Encounter   Medication Sig    albuterol (PROVENTIL) 2.5 mg /3 mL (0.083 %) nebulizer solution 2.5 mg.    albuterol 90 mcg/actuation inhaler Inhale into the lungs.    aspirin 81 MG Chew Take 1 tablet (81 mg total) by mouth once daily.    atorvastatin (LIPITOR) 40 MG tablet Take 1 tablet (40 mg total) by mouth every evening. (Patient taking differently: Take 20 mg by mouth every evening. )    azelastine (ASTELIN) 137 mcg (0.1 %) nasal spray     cloNIDine 0.3 mg/24 hr td ptwk (CATAPRES) 0.3 mg/24 hr Place 1 patch onto the skin every 7 days.    diltiaZEM (CARDIZEM CD) 360 MG 24 hr capsule Take 1 capsule (360 mg total) by mouth once daily.    escitalopram oxalate (LEXAPRO) 10 MG tablet Take 10 mg by mouth.    furosemide (LASIX) 40 MG tablet Take 40 mg by mouth once daily.    hydrALAZINE (APRESOLINE) 50 MG tablet Take 2 tablets (100 mg total) by mouth every 12 (twelve) hours.    hydrocodone-acetaminophen 10-325mg (NORCO)  mg Tab Take 1 tablet by mouth every 4 (four) hours as needed for Pain.    labetalol (NORMODYNE) 300 MG tablet Take 2 tablets (600 mg total) by mouth every 12 (twelve) hours.    lactulose (CHRONULAC) 10 gram/15 mL solution take 2 tablespoonful by mouth at bedtime    losartan (COZAAR) 100 MG tablet Take 1 tablet (100 mg total) by mouth once  daily.    tamsulosin (FLOMAX) 0.4 mg Cp24 Take 1 capsule (0.4 mg total) by mouth once daily.    zolpidem (AMBIEN) 5 MG Tab Take 1 tablet (5 mg total) by mouth nightly as needed.    ALPRAZolam (XANAX) 0.5 MG tablet Take 1 tablet (0.5 mg total) by mouth 2 (two) times daily as needed for Anxiety (prn chest tightnsss/ anxiety).    ondansetron (ZOFRAN-ODT) 4 MG TbDL Take 1 tablet (4 mg total) by mouth every 8 (eight) hours as needed.    pantoprazole (PROTONIX) 40 MG tablet Take 1 tablet (40 mg total) by mouth once daily.    traMADol (ULTRAM) 50 mg tablet take 1 to 2 tablets by mouth twice a day if needed for pain     Family History     Problem Relation (Age of Onset)    Hypertension Father    No Known Problems Mother        Social History Main Topics    Smoking status: Former Smoker     Types: Cigarettes     Quit date: 1971    Smokeless tobacco: Never Used    Alcohol use No    Drug use: No    Sexual activity: Not on file     Review of Systems   Constitutional: Negative for chills and fatigue.   HENT: Negative for congestion, nosebleeds, sinus pressure and trouble swallowing.    Eyes: Negative for photophobia and visual disturbance.   Respiratory: Positive for shortness of breath. Negative for chest tightness and wheezing.    Cardiovascular: Positive for leg swelling. Negative for chest pain.   Gastrointestinal: Negative for abdominal pain, diarrhea, nausea and vomiting.   Endocrine: Negative for polyphagia and polyuria.   Genitourinary: Negative for dysuria and hematuria.   Musculoskeletal: Negative for back pain and neck pain.   Skin: Negative for color change and pallor.   Allergic/Immunologic: Negative for immunocompromised state.   Neurological: Negative for dizziness, speech difficulty, weakness and light-headedness.   Hematological: Negative for adenopathy. Does not bruise/bleed easily.   Psychiatric/Behavioral: Negative for confusion. The patient is not nervous/anxious.    All other systems reviewed and  are negative.    Objective:     Vital Signs (Most Recent):  Temp: 97.2 °F (36.2 °C) (08/05/18 0604)  Pulse: 86 (08/05/18 0604)  Resp: 20 (08/05/18 0604)  BP: (!) 218/107 (08/05/18 0604)  SpO2: (!) 94 % (08/05/18 0604) Vital Signs (24h Range):  Temp:  [97.2 °F (36.2 °C)-97.6 °F (36.4 °C)] 97.2 °F (36.2 °C)  Pulse:  [78-88] 86  Resp:  [8-25] 20  SpO2:  [94 %-98 %] 94 %  BP: (192-242)/() 218/107     Weight: 95 kg (209 lb 7 oz)  Body mass index is 24.2 kg/m².    Physical Exam   Constitutional: He is oriented to person, place, and time. No distress.   HENT:   Head: Normocephalic and atraumatic.   Eyes: Conjunctivae and EOM are normal. No scleral icterus.   Neck: Normal range of motion. No JVD present.   Cardiovascular: Normal rate, regular rhythm, normal heart sounds and intact distal pulses.    No murmur heard.  Pulmonary/Chest: Effort normal. He has rales.   Abdominal: Soft. He exhibits no distension. There is no tenderness.   Musculoskeletal: Normal range of motion. He exhibits edema. He exhibits no tenderness.   Neurological: He is alert and oriented to person, place, and time. No cranial nerve deficit. He exhibits normal muscle tone. Coordination normal.   Skin: Skin is warm. He is not diaphoretic. No erythema.   Psychiatric: He has a normal mood and affect. His behavior is normal. Thought content normal.   Nursing note and vitals reviewed.        CRANIAL NERVES     CN III, IV, VI   Extraocular motions are normal.        Significant Labs:   Bilirubin:   Recent Labs  Lab 08/05/18  0335   BILITOT 0.8     BMP:   Recent Labs  Lab 08/05/18  0335         K 4.6      CO2 28   BUN 31*   CREATININE 6.3*   CALCIUM 9.0   MG 2.0     CBC:   Recent Labs  Lab 08/05/18 0335   WBC 6.00   HGB 11.2*   HCT 33.9*        CMP:   Recent Labs  Lab 08/05/18  0335      K 4.6      CO2 28      BUN 31*   CREATININE 6.3*   CALCIUM 9.0   PROT 7.2   ALBUMIN 3.6   BILITOT 0.8   ALKPHOS 61   AST 20    ALT 11   ANIONGAP 13   EGFRNONAA 8*     Cardiac Markers:   Recent Labs  Lab 08/05/18  0335   BNP 1,354*     Coagulation: No results for input(s): PT, INR, APTT in the last 48 hours.  Troponin:   Recent Labs  Lab 08/05/18  0335   TROPONINI 0.040*     All pertinent labs within the past 24 hours have been reviewed.    Significant Imaging: I have reviewed and interpreted all pertinent imaging results/findings within the past 24 hours.     Imaging Results          X-Ray Chest AP Portable (Preliminary result)  Result time 08/05/18 05:11:23    ED Interpretation by Lucho Leon Jr., MD (08/05/18 05:11:23, [facility])    Bilateral dependent interstitial pulmonary edema                              I have independently reviewed and interpreted the EKG.     I have independently reviewed all pertinent labs within the past 24 hours.    I have independently reviewed, visualized and interpreted all pertinent imaging results within the past 24 hours and discussed the findings with the ED physician, Dr. Leon.

## 2018-08-05 NOTE — NURSING
Dialyzed x 3.5 hours. Tolerated well. Net UF of 4 liters. Right AVF worked well with good BFR for entire tx.  BP stable for entire tx.

## 2018-08-05 NOTE — HPI
Mr. Conner Perez is a 68-year-old  male with PMH significant for ESRD on HD M/W/F, HTN, presents to the ED complaining of worsening shortness of breath after drinking excessive amounts of fluid yesterday.  Last HD was on Friday (08/03/2018), he reports compliance with dialysis sessions.  In the ED he was found to be in pulmonary edema, chest x-ray consistent with vascular congestion. Patient still makes urine, he received Lasix 40 mg IV x1 in the ED.  Initial blood pressure 242/113.  He received hydralazine 10 mg IV x2, along with the labetalol 10 mg IV x1, blood pressure has improved to 198/92.  Nephrologist, Dr. London was consulted, recommended admission as observation for urgent hemodialysis this morning.  Patient has elevated BNP of 1300, much better than his previous numbers, elevated troponin of 0.040 which appears to be his baseline.  He denies chest pain or palpitations.

## 2018-08-05 NOTE — ASSESSMENT & PLAN NOTE
Troponin chronically elevated at 0.040, appears to be at baseline.  EKG with no significant changes from previous.  Denies chest pain.

## 2018-08-05 NOTE — PROGRESS NOTES
Discussed sbp 196 with dr mcdonald after oral meds this morning- orders not to give PRN at this time due to pt going to dialysis. Dialysis not here at this time, so oral meds given. Will continue to monitor.

## 2018-08-05 NOTE — HOSPITAL COURSE
8/5  Patient was seen and examined today . After dialysis his bp and shortness of breath improved . Patient on room air . He is has scheduled outpatient dialysis tomorrow . Patient denies of any sob,chest pain .patient was education on fluid restriction and diet /medications

## 2018-08-05 NOTE — ASSESSMENT & PLAN NOTE
Secondary to noncompliance with diet and fluid intake.  Lasix 40 mg IV x1 administered in ED (patient reports still making urine).  HD today for volume removal.

## 2018-08-05 NOTE — ASSESSMENT & PLAN NOTE
HD every M/W/F.  Patient reports compliance with his dialysis sessions.  Dr. London with Nephrology consulted, for HD today for volume removal.

## 2018-08-05 NOTE — ASSESSMENT & PLAN NOTE
1. ESRD on HD : (  F , [facility] ) , emergent HD today due to fluid overload and high BP ,      2. SOB : UF on HD as tolerated, will plan regular tx tomorrow,  if he clinically improves and  is discharged today he needs to f/u out pt HD tomorrow,      3. HTN : BP elevated, UF on HD as tolerated , resume home meds,      4. Anemia of CKD : epogen with HD when indicated      5. SHPT : renal diet and binders      6. D/c home when stable

## 2018-08-05 NOTE — ASSESSMENT & PLAN NOTE
Initially uncontrolled with a /113.  Received hydralazine 10 mg IV x2, labetalol 10 mg IV x1 in the ED.  Repeat blood pressure is still elevated at 215/104.  For urgent HD this morning for volume removal, hopefully blood pressure will improve.  Administer home medications, including hydralazine, labetalol, losartan, diltiazem at this time.  Start clonidine 0.3 mg p.o. t.i.d..  Discontinue clonidine patch.

## 2018-08-05 NOTE — DISCHARGE SUMMARY
[facility] Hospital Medicine  Discharge Summary      Patient Name: Conner Perez III  MRN: [id number]  Admission Date: 8/5/2018  Hospital Length of Stay: 0 days  Discharge Date and Time:  08/05/2018 4:52 PM  Attending Physician: Parisa Haider MD   Discharging Provider: Parisa Haider MD  Primary Care Provider: Raciel Angela MD      HPI:   Mr. Conner Perez is a 68-year-old  male with PMH significant for ESRD on HD M/W/F, HTN, presents to the ED complaining of worsening shortness of breath after drinking excessive amounts of fluid yesterday.  Last HD was on Friday (08/03/2018), he reports compliance with dialysis sessions.  In the ED he was found to be in pulmonary edema, chest x-ray consistent with vascular congestion. Patient still makes urine, he received Lasix 40 mg IV x1 in the ED.  Initial blood pressure 242/113.  He received hydralazine 10 mg IV x2, along with the labetalol 10 mg IV x1, blood pressure has improved to 198/92.  Nephrologist, Dr. London was consulted, recommended admission as observation for urgent hemodialysis this morning.  Patient has elevated BNP of 1300, much better than his previous numbers, elevated troponin of 0.040 which appears to be his baseline.  He denies chest pain or palpitations.    * No surgery found *      Hospital Course:   8/5  Patient was seen and examined today . After dialysis his bp and shortness of breath improved . Patient on room air . He is has scheduled outpatient dialysis tomorrow . Patient denies of any sob,chest pain .patient was education on fluid restriction and diet /medications      Consults:   Consults         Status Ordering Provider     Inpatient consult to Nephrology  Once     Provider:  Fabián London MD    Completed MAINOR DAO JR          No new Assessment & Plan notes have been filed under this hospital service since the last note was generated.  Service: Hospital Medicine    Final Active Diagnoses:     Diagnosis Date Noted POA    PRINCIPAL PROBLEM:  Acute pulmonary edema [J81.0] 08/25/2017 Yes    Hypertensive emergency [I16.1] 08/05/2018 Yes    Acute on chronic congestive heart failure [I50.9]  Unknown    ESRD (end stage renal disease) on dialysis [N18.6, Z99.2]  Not Applicable    Elevated troponin [R74.8] 04/07/2017 Yes    Hypertensive urgency [I16.0] 01/12/2017 Yes      Problems Resolved During this Admission:    Diagnosis Date Noted Date Resolved POA       Discharged Condition: stable    Disposition: Home or Self Care    Follow Up:  Follow-up Information     Raciel Angela MD In 1 week.    Specialty:  Family Medicine  Contact information:  [id number]  [address]  [facility] [address] [phone]                 Patient Instructions:     Activity as tolerated         Significant Diagnostic Studies: Labs:   BMP:   Recent Labs  Lab 08/05/18  0335         K 4.6      CO2 28   BUN 31*   CREATININE 6.3*   CALCIUM 9.0   MG 2.0   , CMP   Recent Labs  Lab 08/05/18  0335      K 4.6      CO2 28      BUN 31*   CREATININE 6.3*   CALCIUM 9.0   PROT 7.2   ALBUMIN 3.6   BILITOT 0.8   ALKPHOS 61   AST 20   ALT 11   ANIONGAP 13   ESTGFRAFRICA 10*   EGFRNONAA 8*    and CBC   Recent Labs  Lab 08/05/18  0335   WBC 6.00   HGB 11.2*   HCT 33.9*        Radiology: X-Ray: CXR: X-Ray Chest 1 View (CXR): No results found for this visit on 08/05/18.    Pending Diagnostic Studies:     Procedure Component Value Units Date/Time    Hepatitis B Surface Antibody, Qual/Quant [[id number]] Collected:  08/05/18 1345    Order Status:  Sent Lab Status:  In process Updated:  08/05/18 1347    Specimen:  Blood from Blood     Hepatitis B surface antibody [[id number]] Collected:  08/05/18 1345    Order Status:  Sent Lab Status:  In process Updated:  08/05/18 1347    Specimen:  Blood from Blood     Hepatitis B surface antigen [[id number]] Collected:  08/05/18 1345    Order Status:  Sent Lab Status:   In process Updated:  08/05/18 3857    Specimen:  Blood from Blood          Medications:  Reconciled Home Medications:      Medication List      CHANGE how you take these medications    atorvastatin 40 MG tablet  Commonly known as:  LIPITOR  Take 1 tablet (40 mg total) by mouth every evening.  What changed:  how much to take        CONTINUE taking these medications    * albuterol 90 mcg/actuation inhaler  Inhale into the lungs.     * albuterol 2.5 mg /3 mL (0.083 %) nebulizer solution  Commonly known as:  PROVENTIL  2.5 mg.     ALPRAZolam 0.5 MG tablet  Commonly known as:  XANAX  Take 1 tablet (0.5 mg total) by mouth 2 (two) times daily as needed for Anxiety (prn chest tightnsss/ anxiety).     aspirin 81 MG Chew  Take 1 tablet (81 mg total) by mouth once daily.     azelastine 137 mcg (0.1 %) nasal spray  Commonly known as:  ASTELIN     cloNIDine 0.3 mg/24 hr td ptwk 0.3 mg/24 hr  Commonly known as:  CATAPRES  Place 1 patch onto the skin every 7 days.     diltiaZEM 360 MG 24 hr capsule  Commonly known as:  CARDIZEM CD  Take 1 capsule (360 mg total) by mouth once daily.     escitalopram oxalate 10 MG tablet  Commonly known as:  LEXAPRO  Take 10 mg by mouth.     furosemide 40 MG tablet  Commonly known as:  LASIX  Take 40 mg by mouth once daily.     hydrALAZINE 50 MG tablet  Commonly known as:  APRESOLINE  Take 2 tablets (100 mg total) by mouth every 12 (twelve) hours.     HYDROcodone-acetaminophen  mg per tablet  Commonly known as:  NORCO  Take 1 tablet by mouth every 4 (four) hours as needed for Pain.     labetalol 300 MG tablet  Commonly known as:  NORMODYNE  Take 2 tablets (600 mg total) by mouth every 12 (twelve) hours.     lactulose 10 gram/15 mL solution  Commonly known as:  CHRONULAC  take 2 tablespoonful by mouth at bedtime     losartan 100 MG tablet  Commonly known as:  COZAAR  Take 1 tablet (100 mg total) by mouth once daily.     ondansetron 4 MG Tbdl  Commonly known as:  ZOFRAN-ODT  Take 1 tablet (4 mg  total) by mouth every 8 (eight) hours as needed.     pantoprazole 40 MG tablet  Commonly known as:  PROTONIX  Take 1 tablet (40 mg total) by mouth once daily.     tamsulosin 0.4 mg Cap  Commonly known as:  FLOMAX  Take 1 capsule (0.4 mg total) by mouth once daily.     traMADol 50 mg tablet  Commonly known as:  ULTRAM  take 1 to 2 tablets by mouth twice a day if needed for pain     zolpidem 5 MG Tab  Commonly known as:  AMBIEN  Take 1 tablet (5 mg total) by mouth nightly as needed.        * This list has 2 medication(s) that are the same as other medications prescribed for you. Read the directions carefully, and ask your doctor or other care provider to review them with you.                Indwelling Lines/Drains at time of discharge:   Lines/Drains/Airways     Drain                 Hemodialysis AV Fistula 12/18/17 0017 Right forearm 230 days                Time spent on the discharge of patient:40 minutes  Patient was seen and examined on the date of discharge and determined to be suitable for discharge.         Parisa Haider MD  Department of Hospital Medicine  [facility]

## 2018-08-05 NOTE — PROGRESS NOTES
Patient picked up from inpatient dialysis. No noted distress. VSS. No complaints voiced. 4l removed. Will monitor

## 2018-08-05 NOTE — SUBJECTIVE & OBJECTIVE
Past Medical History:   Diagnosis Date    CKD (chronic kidney disease), stage IV     Diabetes mellitus, type 2     Dialysis patient     HTN (hypertension)        Past Surgical History:   Procedure Laterality Date    AV FISTULA PLACEMENT Right 06/2017    CIRCUMCISION  1978    TOE AMPUTATION Left 01/24/2017    4th toe       Review of patient's allergies indicates:   Allergen Reactions    Augmentin [amoxicillin-pot clavulanate] Edema    Lisinopril      cough    Sulfa (sulfonamide antibiotics)      swelling     Current Facility-Administered Medications   Medication Frequency    acetaminophen tablet 650 mg Q8H PRN    albuterol-ipratropium 2.5 mg-0.5 mg/3 mL nebulizer solution 3 mL Q4H PRN    aspirin chewable tablet 81 mg Daily    atorvastatin tablet 20 mg QHS    cloNIDine tablet 0.3 mg TID    diltiaZEM 24 hr capsule 360 mg Daily    diphenhydrAMINE capsule 25 mg Q6H PRN    escitalopram oxalate tablet 10 mg Daily    furosemide tablet 40 mg Daily    guaifenesin 100 mg/5 ml syrup 200 mg Q4H PRN    heparin (porcine) injection 3,000 Units Once    hydrALAZINE tablet 100 mg Q12H    HYDROcodone-acetaminophen  mg per tablet 1 tablet Q4H PRN    labetalol injection 10 mg Q6H PRN    labetalol tablet 600 mg Q12H    losartan tablet 100 mg Daily    ondansetron injection 4 mg Q8H PRN    tamsulosin 24 hr capsule 0.4 mg Daily    zolpidem tablet 5 mg Nightly PRN     Family History     Problem Relation (Age of Onset)    Hypertension Father    No Known Problems Mother        Social History Main Topics    Smoking status: Former Smoker     Types: Cigarettes     Quit date: 1971    Smokeless tobacco: Never Used    Alcohol use No    Drug use: No    Sexual activity: Not on file     Review of Systems   Constitutional: Positive for fatigue. Negative for activity change and appetite change.   HENT: Negative for congestion and facial swelling.    Eyes: Negative for pain, discharge and redness.   Respiratory:  Positive for shortness of breath. Negative for apnea, cough and chest tightness.    Cardiovascular: Negative for chest pain, palpitations and leg swelling.   Gastrointestinal: Negative for abdominal distention.   Genitourinary: Negative for difficulty urinating, dysuria and frequency.   Musculoskeletal: Negative for neck pain and neck stiffness.   Skin: Negative for color change, rash and wound.   Neurological: Negative for dizziness, weakness and numbness.   Psychiatric/Behavioral: Negative for sleep disturbance.   All other systems reviewed and are negative.    Objective:     Vital Signs (Most Recent):  Temp: 97.6 °F (36.4 °C) (08/05/18 0728)  Pulse: 68 (08/05/18 1258)  Resp: 20 (08/05/18 1025)  BP: (!) 196/92 (08/05/18 0813)  SpO2: 98 % (08/05/18 1025)  O2 Device (Oxygen Therapy): (S) nasal cannula (placed on room air) (08/05/18 1025) Vital Signs (24h Range):  Temp:  [97.2 °F (36.2 °C)-97.6 °F (36.4 °C)] 97.6 °F (36.4 °C)  Pulse:  [61-88] 68  Resp:  [8-25] 20  SpO2:  [94 %-98 %] 98 %  BP: (192-242)/() 196/92     Weight: 95 kg (209 lb 7 oz) (08/05/18 0257)  Body mass index is 24.2 kg/m².  Body surface area is 2.29 meters squared.    No intake/output data recorded.    Physical Exam   Constitutional: He is oriented to person, place, and time. He appears well-developed. No distress.   HENT:   Head: Normocephalic and atraumatic.   Eyes: Pupils are equal, round, and reactive to light.   Neck: Normal range of motion. Neck supple. No tracheal deviation present. No thyromegaly present.   Cardiovascular: Normal rate, regular rhythm, normal heart sounds and intact distal pulses.  Exam reveals no gallop and no friction rub.    No murmur heard.  Pulmonary/Chest: Effort normal. He has no wheezes. He has rales.   Abdominal: Soft. He exhibits no mass. There is no tenderness. There is no rebound and no guarding.   Musculoskeletal: He exhibits no edema.   Lymphadenopathy:     He has no cervical adenopathy.   Neurological: He  is oriented to person, place, and time.   Skin: Skin is warm. No rash noted. He is not diaphoretic. No erythema.   Nursing note and vitals reviewed.      Significant Labs:  CBC:   Recent Labs  Lab 08/05/18  0335   WBC 6.00   RBC 3.73*   HGB 11.2*   HCT 33.9*      MCV 91   MCH 30.0   MCHC 33.0     CMP:   Recent Labs  Lab 08/05/18 0335      CALCIUM 9.0   ALBUMIN 3.6   PROT 7.2      K 4.6   CO2 28      BUN 31*   CREATININE 6.3*   ALKPHOS 61   ALT 11   AST 20   BILITOT 0.8     Coagulation: No results for input(s): PT, INR, APTT in the last 168 hours.  LFTs:   Recent Labs  Lab 08/05/18  0335   ALT 11   AST 20   ALKPHOS 61   BILITOT 0.8   PROT 7.2   ALBUMIN 3.6     All labs within the past 24 hours have been reviewed.    Lab Results   Component Value Date    .7 (H) 08/26/2017    CALCIUM 9.0 08/05/2018    PHOS 4.4 08/05/2018         Significant Imaging: reviewed

## 2018-08-05 NOTE — H&P
"[facility] Hospital Medicine  History & Physical    Patient Name: Conner Perez III  MRN: [id number]  Admission Date: 8/5/2018  Attending Physician: Miquel Taylor MD  Primary Care Provider: Raciel Angela MD         Patient information was obtained from patient, past medical records and ER records.     Subjective:     Principal Problem:Acute pulmonary edema    Chief Complaint:   Chief Complaint   Patient presents with    Shortness of Breath     "chest tightness". Pt states "I drank too much fluid"        HPI: Mr. Conner Perez is a 68-year-old  male with PMH significant for ESRD on HD M/W/F, HTN, presents to the ED complaining of worsening shortness of breath after drinking excessive amounts of fluid yesterday.  Last HD was on Friday (08/03/2018), he reports compliance with dialysis sessions.  In the ED he was found to be in pulmonary edema, chest x-ray consistent with vascular congestion. Patient still makes urine, he received Lasix 40 mg IV x1 in the ED.  Initial blood pressure 242/113.  He received hydralazine 10 mg IV x2, along with the labetalol 10 mg IV x1, blood pressure has improved to 198/92.  Nephrologist, Dr. London was consulted, recommended admission as observation for urgent hemodialysis this morning.  Patient has elevated BNP of 1300, much better than his previous numbers, elevated troponin of 0.040 which appears to be his baseline.  He denies chest pain or palpitations.    Past Medical History:   Diagnosis Date    CKD (chronic kidney disease), stage IV     Diabetes mellitus, type 2     Dialysis patient     HTN (hypertension)        Past Surgical History:   Procedure Laterality Date    AV FISTULA PLACEMENT Right 06/2017    CIRCUMCISION  1978    TOE AMPUTATION Left 01/24/2017    4th toe       Review of patient's allergies indicates:   Allergen Reactions    Augmentin [amoxicillin-pot clavulanate] Edema    Lisinopril      cough    Sulfa (sulfonamide antibiotics) "      swelling       No current facility-administered medications on file prior to encounter.      Current Outpatient Prescriptions on File Prior to Encounter   Medication Sig    albuterol (PROVENTIL) 2.5 mg /3 mL (0.083 %) nebulizer solution 2.5 mg.    albuterol 90 mcg/actuation inhaler Inhale into the lungs.    aspirin 81 MG Chew Take 1 tablet (81 mg total) by mouth once daily.    atorvastatin (LIPITOR) 40 MG tablet Take 1 tablet (40 mg total) by mouth every evening. (Patient taking differently: Take 20 mg by mouth every evening. )    azelastine (ASTELIN) 137 mcg (0.1 %) nasal spray     cloNIDine 0.3 mg/24 hr td ptwk (CATAPRES) 0.3 mg/24 hr Place 1 patch onto the skin every 7 days.    diltiaZEM (CARDIZEM CD) 360 MG 24 hr capsule Take 1 capsule (360 mg total) by mouth once daily.    escitalopram oxalate (LEXAPRO) 10 MG tablet Take 10 mg by mouth.    furosemide (LASIX) 40 MG tablet Take 40 mg by mouth once daily.    hydrALAZINE (APRESOLINE) 50 MG tablet Take 2 tablets (100 mg total) by mouth every 12 (twelve) hours.    hydrocodone-acetaminophen 10-325mg (NORCO)  mg Tab Take 1 tablet by mouth every 4 (four) hours as needed for Pain.    labetalol (NORMODYNE) 300 MG tablet Take 2 tablets (600 mg total) by mouth every 12 (twelve) hours.    lactulose (CHRONULAC) 10 gram/15 mL solution take 2 tablespoonful by mouth at bedtime    losartan (COZAAR) 100 MG tablet Take 1 tablet (100 mg total) by mouth once daily.    tamsulosin (FLOMAX) 0.4 mg Cp24 Take 1 capsule (0.4 mg total) by mouth once daily.    zolpidem (AMBIEN) 5 MG Tab Take 1 tablet (5 mg total) by mouth nightly as needed.    ALPRAZolam (XANAX) 0.5 MG tablet Take 1 tablet (0.5 mg total) by mouth 2 (two) times daily as needed for Anxiety (prn chest tightnsss/ anxiety).    ondansetron (ZOFRAN-ODT) 4 MG TbDL Take 1 tablet (4 mg total) by mouth every 8 (eight) hours as needed.    pantoprazole (PROTONIX) 40 MG tablet Take 1 tablet (40 mg total) by  mouth once daily.    traMADol (ULTRAM) 50 mg tablet take 1 to 2 tablets by mouth twice a day if needed for pain     Family History     Problem Relation (Age of Onset)    Hypertension Father    No Known Problems Mother        Social History Main Topics    Smoking status: Former Smoker     Types: Cigarettes     Quit date: 1971    Smokeless tobacco: Never Used    Alcohol use No    Drug use: No    Sexual activity: Not on file     Review of Systems   Constitutional: Negative for chills and fatigue.   HENT: Negative for congestion, nosebleeds, sinus pressure and trouble swallowing.    Eyes: Negative for photophobia and visual disturbance.   Respiratory: Positive for shortness of breath. Negative for chest tightness and wheezing.    Cardiovascular: Positive for leg swelling. Negative for chest pain.   Gastrointestinal: Negative for abdominal pain, diarrhea, nausea and vomiting.   Endocrine: Negative for polyphagia and polyuria.   Genitourinary: Negative for dysuria and hematuria.   Musculoskeletal: Negative for back pain and neck pain.   Skin: Negative for color change and pallor.   Allergic/Immunologic: Negative for immunocompromised state.   Neurological: Negative for dizziness, speech difficulty, weakness and light-headedness.   Hematological: Negative for adenopathy. Does not bruise/bleed easily.   Psychiatric/Behavioral: Negative for confusion. The patient is not nervous/anxious.    All other systems reviewed and are negative.    Objective:     Vital Signs (Most Recent):  Temp: 97.2 °F (36.2 °C) (08/05/18 0604)  Pulse: 86 (08/05/18 0604)  Resp: 20 (08/05/18 0604)  BP: (!) 218/107 (08/05/18 0604)  SpO2: (!) 94 % (08/05/18 0604) Vital Signs (24h Range):  Temp:  [97.2 °F (36.2 °C)-97.6 °F (36.4 °C)] 97.2 °F (36.2 °C)  Pulse:  [78-88] 86  Resp:  [8-25] 20  SpO2:  [94 %-98 %] 94 %  BP: (192-242)/() 218/107     Weight: 95 kg (209 lb 7 oz)  Body mass index is 24.2 kg/m².    Physical Exam   Constitutional: He is  oriented to person, place, and time. No distress.   HENT:   Head: Normocephalic and atraumatic.   Eyes: Conjunctivae and EOM are normal. No scleral icterus.   Neck: Normal range of motion. No JVD present.   Cardiovascular: Normal rate, regular rhythm, normal heart sounds and intact distal pulses.    No murmur heard.  Pulmonary/Chest: Effort normal. He has rales.   Abdominal: Soft. He exhibits no distension. There is no tenderness.   Musculoskeletal: Normal range of motion. He exhibits edema. He exhibits no tenderness.   Neurological: He is alert and oriented to person, place, and time. No cranial nerve deficit. He exhibits normal muscle tone. Coordination normal.   Skin: Skin is warm. He is not diaphoretic. No erythema.   Psychiatric: He has a normal mood and affect. His behavior is normal. Thought content normal.   Nursing note and vitals reviewed.        CRANIAL NERVES     CN III, IV, VI   Extraocular motions are normal.        Significant Labs:   Bilirubin:   Recent Labs  Lab 08/05/18 0335   BILITOT 0.8     BMP:   Recent Labs  Lab 08/05/18 0335         K 4.6      CO2 28   BUN 31*   CREATININE 6.3*   CALCIUM 9.0   MG 2.0     CBC:   Recent Labs  Lab 08/05/18 0335   WBC 6.00   HGB 11.2*   HCT 33.9*        CMP:   Recent Labs  Lab 08/05/18 0335      K 4.6      CO2 28      BUN 31*   CREATININE 6.3*   CALCIUM 9.0   PROT 7.2   ALBUMIN 3.6   BILITOT 0.8   ALKPHOS 61   AST 20   ALT 11   ANIONGAP 13   EGFRNONAA 8*     Cardiac Markers:   Recent Labs  Lab 08/05/18 0335   BNP 1,354*     Coagulation: No results for input(s): PT, INR, APTT in the last 48 hours.  Troponin:   Recent Labs  Lab 08/05/18 0335   TROPONINI 0.040*     All pertinent labs within the past 24 hours have been reviewed.    Significant Imaging: I have reviewed and interpreted all pertinent imaging results/findings within the past 24 hours.     Imaging Results          X-Ray Chest AP Portable (Preliminary  result)  Result time 08/05/18 05:11:23    ED Interpretation by Lucho Leon Jr., MD (08/05/18 05:11:23, [facility], Emergency Medicine)    Bilateral dependent interstitial pulmonary edema                              I have independently reviewed and interpreted the EKG.     I have independently reviewed all pertinent labs within the past 24 hours.    I have independently reviewed, visualized and interpreted all pertinent imaging results within the past 24 hours and discussed the findings with the ED physician, Dr. Leon.            Assessment/Plan:     * Acute pulmonary edema    Secondary to noncompliance with diet and fluid intake.  Lasix 40 mg IV x1 administered in ED (patient reports still making urine).  HD today for volume removal.          Hypertensive urgency    Initially uncontrolled with a /113.  Received hydralazine 10 mg IV x2, labetalol 10 mg IV x1 in the ED.  Repeat blood pressure is still elevated at 215/104.  For urgent HD this morning for volume removal, hopefully blood pressure will improve.  Administer home medications, including hydralazine, labetalol, losartan, diltiazem at this time.  Start clonidine 0.3 mg p.o. t.i.d..  Discontinue clonidine patch.            ESRD (end stage renal disease) on dialysis    HD every M/W/F.  Patient reports compliance with his dialysis sessions.  Dr. London with Nephrology consulted, for HD today for volume removal.          Elevated troponin    Troponin chronically elevated at 0.040, appears to be at baseline.  EKG with no significant changes from previous.  Denies chest pain.            VTE Risk Mitigation         Ordered     IP VTE HIGH RISK PATIENT  Once      08/05/18 0610     Place AKIN hose  Until discontinued      08/05/18 0610     Place sequential compression device  Until discontinued      08/05/18 0610             Miquel Taylor MD  Department of Hospital Medicine   [facility]

## 2018-08-06 LAB
HBV SURFACE AB SER-ACNC: NEGATIVE M[IU]/ML
HBV SURFACE AG SERPL QL IA: NEGATIVE

## 2018-08-06 NOTE — PLAN OF CARE
D/C home with Se;f care, Chart reviewed and ready for coding. ERNST Floyd RN, CM     08/06/18 1640   Final Note   Assessment Type Final Discharge Note   Discharge Disposition Home   Discharge plans and expectations educations in teach back method with documentation complete? Yes   Right Care Referral Info   Post Acute Recommendation No Care

## 2018-08-06 NOTE — PLAN OF CARE
08/06/18 1640   Final Note   Assessment Type Final Discharge Note   Discharge Disposition Home   Discharge plans and expectations educations in teach back method with documentation complete? Yes   Right Care Referral Info   Post Acute Recommendation No Care

## 2018-08-08 LAB
HEP. B SURF AB, QUAL: NEGATIVE
HEP. B SURF AB, QUANT.: <3 MIU/ML

## 2018-08-09 ENCOUNTER — CLINICAL SUPPORT (OUTPATIENT)
Dept: CARDIOLOGY | Facility: CLINIC | Age: 68
End: 2018-08-09
Attending: INTERNAL MEDICINE
Payer: MEDICARE

## 2018-08-09 DIAGNOSIS — Z01.810 PREOP CARDIOVASCULAR EXAM: ICD-10-CM

## 2018-08-09 DIAGNOSIS — R06.00 DYSPNEA, UNSPECIFIED TYPE: ICD-10-CM

## 2018-08-09 DIAGNOSIS — I25.10 CORONARY ARTERY DISEASE INVOLVING NATIVE CORONARY ARTERY OF NATIVE HEART WITHOUT ANGINA PECTORIS: ICD-10-CM

## 2018-08-09 LAB
INTERNAL CAROTID STENOSIS: NORMAL
VASCULAR ANKLE BRACHIAL INDEX (ABI) LEFT: 1.33 (ref 0.9–1.2)

## 2018-08-09 PROCEDURE — 93922 UPR/L XTREMITY ART 2 LEVELS: CPT | Mod: PBBFAC,PO | Performed by: INTERNAL MEDICINE

## 2018-08-09 PROCEDURE — 93880 EXTRACRANIAL BILAT STUDY: CPT | Mod: PBBFAC,PO | Performed by: INTERNAL MEDICINE

## 2018-08-09 PROCEDURE — 93970 EXTREMITY STUDY: CPT | Mod: PBBFAC,PO | Performed by: INTERNAL MEDICINE

## 2018-08-09 PROCEDURE — 93925 LOWER EXTREMITY STUDY: CPT | Mod: PBBFAC,PO | Performed by: INTERNAL MEDICINE

## 2018-08-10 ENCOUNTER — TELEPHONE (OUTPATIENT)
Dept: CARDIOLOGY | Facility: CLINIC | Age: 68
End: 2018-08-10

## 2018-08-10 NOTE — TELEPHONE ENCOUNTER
Spoke with patient regarding results of Carotid ultrasound and lower extremity venous and arterial studies.  Advised that venous studies show no evidence of DVT.  Carotid and arterial studies show mild disease.  Patient denies any questions or concerns.  Instructed to keep follow-up appointment on 8/16/18, and to notify office with any questions or concerns.  Patient verbalized understanding.

## 2018-08-10 NOTE — TELEPHONE ENCOUNTER
Spoke with patient regarding results of lower extremity studies.  Advised patient of no DVT findings.  Patient has return appointment on 8/16/18 with Dr. Barnhart.  Patient denies any questions or concerns.  Instructed to keep appointment, and to notify office with any concerns or questions.  Patient verbalized understanding.

## 2018-08-12 ENCOUNTER — HOSPITAL ENCOUNTER (EMERGENCY)
Facility: HOSPITAL | Age: 68
Discharge: HOME | End: 2018-08-12
Attending: EMERGENCY MEDICINE
Payer: MEDICARE

## 2018-08-12 VITALS
DIASTOLIC BLOOD PRESSURE: 78 MMHG | HEART RATE: 66 BPM | HEIGHT: 78 IN | OXYGEN SATURATION: 95 % | BODY MASS INDEX: 23.5 KG/M2 | WEIGHT: 203.06 LBS | SYSTOLIC BLOOD PRESSURE: 154 MMHG | TEMPERATURE: 97 F | RESPIRATION RATE: 18 BRPM

## 2018-08-12 DIAGNOSIS — R07.9 CHEST PAIN: ICD-10-CM

## 2018-08-12 LAB
ALBUMIN SERPL BCP-MCNC: 3.5 G/DL
ALP SERPL-CCNC: 63 U/L
ALT SERPL W/O P-5'-P-CCNC: 10 U/L
ANION GAP SERPL CALC-SCNC: 12 MMOL/L
AST SERPL-CCNC: 16 U/L
BASOPHILS # BLD AUTO: 0.03 K/UL
BASOPHILS NFR BLD: 0.6 %
BILIRUB SERPL-MCNC: 0.7 MG/DL
BNP SERPL-MCNC: 1231 PG/ML
BUN SERPL-MCNC: 31 MG/DL
CALCIUM SERPL-MCNC: 9.2 MG/DL
CHLORIDE SERPL-SCNC: 99 MMOL/L
CO2 SERPL-SCNC: 29 MMOL/L
CREAT SERPL-MCNC: 6.7 MG/DL
DIFFERENTIAL METHOD: ABNORMAL
EOSINOPHIL # BLD AUTO: 0.1 K/UL
EOSINOPHIL NFR BLD: 2.2 %
ERYTHROCYTE [DISTWIDTH] IN BLOOD BY AUTOMATED COUNT: 14.4 %
EST. GFR  (AFRICAN AMERICAN): 9 ML/MIN/1.73 M^2
EST. GFR  (NON AFRICAN AMERICAN): 8 ML/MIN/1.73 M^2
GLUCOSE SERPL-MCNC: 120 MG/DL
HCT VFR BLD AUTO: 32.4 %
HGB BLD-MCNC: 10.7 G/DL
LYMPHOCYTES # BLD AUTO: 1.3 K/UL
LYMPHOCYTES NFR BLD: 27.6 %
MCH RBC QN AUTO: 29.8 PG
MCHC RBC AUTO-ENTMCNC: 33 G/DL
MCV RBC AUTO: 90 FL
MONOCYTES # BLD AUTO: 0.5 K/UL
MONOCYTES NFR BLD: 11.7 %
NEUTROPHILS # BLD AUTO: 2.7 K/UL
NEUTROPHILS NFR BLD: 57.9 %
PLATELET # BLD AUTO: 208 K/UL
PMV BLD AUTO: 10 FL
POTASSIUM SERPL-SCNC: 4.4 MMOL/L
PROT SERPL-MCNC: 7.1 G/DL
RBC # BLD AUTO: 3.59 M/UL
SODIUM SERPL-SCNC: 140 MMOL/L
TROPONIN I SERPL DL<=0.01 NG/ML-MCNC: 0.03 NG/ML
WBC # BLD AUTO: 4.63 K/UL

## 2018-08-12 PROCEDURE — 85025 COMPLETE CBC W/AUTO DIFF WBC: CPT

## 2018-08-12 PROCEDURE — 25000003 PHARM REV CODE 250: Performed by: EMERGENCY MEDICINE

## 2018-08-12 PROCEDURE — 93010 ELECTROCARDIOGRAM REPORT: CPT | Mod: ,,, | Performed by: INTERNAL MEDICINE

## 2018-08-12 PROCEDURE — 84484 ASSAY OF TROPONIN QUANT: CPT

## 2018-08-12 PROCEDURE — 99284 EMERGENCY DEPT VISIT MOD MDM: CPT | Mod: 25

## 2018-08-12 PROCEDURE — 83880 ASSAY OF NATRIURETIC PEPTIDE: CPT

## 2018-08-12 PROCEDURE — 80053 COMPREHEN METABOLIC PANEL: CPT

## 2018-08-12 RX ORDER — CLONAZEPAM 0.5 MG/1
0.5 TABLET ORAL 2 TIMES DAILY PRN
Qty: 15 TABLET | Refills: 0 | Status: SHIPPED | OUTPATIENT
Start: 2018-08-12 | End: 2018-10-13

## 2018-08-12 RX ORDER — ASPIRIN 325 MG
325 TABLET ORAL
Status: COMPLETED | OUTPATIENT
Start: 2018-08-12 | End: 2018-08-12

## 2018-08-12 RX ADMIN — ASPIRIN 325 MG ORAL TABLET 325 MG: 325 PILL ORAL at 02:08

## 2018-08-12 NOTE — ED PROVIDER NOTES
SCRIBE #1 NOTE: I, Marisela Johnson, am scribing for, and in the presence of, Norma Salomon MD. I have scribed the entire note.      History      Chief Complaint   Patient presents with    Chest Pain     with sob       Review of patient's allergies indicates:   Allergen Reactions    Augmentin [amoxicillin-pot clavulanate] Edema    Lisinopril      cough    Sulfa (sulfonamide antibiotics)      swelling        HPI   HPI    8/12/2018, 1:40 PM   History obtained from the patient      History of Present Illness: Conner Perez III is a 68 y.o. male patient with DM, and CKD on Mon/Wed/Fri dialysis who presents to the Emergency Department for chest tightness which onset suddenly this morning while lying down. He believes that his blood pressure was elevated because that's when he usually experiences chest tightness. He did not check his blood pressure this AM. Symptoms are constant and moderate in severity. No mitigating or exacerbating factors reported. Associated sx include mild SOB. Patient denies fever, chills, diaphoresis, CP, N/V, leg pain/swelling, abd pain, dizziness, HA, extremity weakness/numbness, and all other sxs at this time. He last dialyzed on Friday and is scheduled to again on tomorrow. No further complaints or concerns at this time.       Arrival mode: Personal vehicle    PCP: Raciel Angela MD       Past Medical History:  Past Medical History:   Diagnosis Date    CKD (chronic kidney disease), stage IV     Diabetes mellitus, type 2     Dialysis patient     HTN (hypertension)        Past Surgical History:  Past Surgical History:   Procedure Laterality Date    AV FISTULA PLACEMENT Right 06/2017    CIRCUMCISION  1978    TOE AMPUTATION Left 01/24/2017    4th toe         Family History:  Family History   Problem Relation Age of Onset    Hypertension Father     No Known Problems Mother        Social History:  Social History     Tobacco Use    Smoking status: Former Smoker     Types: Cigarettes      Last attempt to quit: 1971     Years since quittin.6    Smokeless tobacco: Never Used   Substance and Sexual Activity    Alcohol use: No    Drug use: No    Sexual activity: Unknown       ROS   Review of Systems   Constitutional: Negative for chills, diaphoresis, fatigue and fever.   HENT: Negative for sore throat.    Eyes: Negative for visual disturbance.   Respiratory: Positive for chest tightness and shortness of breath.    Cardiovascular: Negative for chest pain, palpitations and leg swelling.   Gastrointestinal: Negative for abdominal pain, nausea and vomiting.   Genitourinary: Negative for dysuria.   Musculoskeletal: Negative for back pain.   Skin: Negative for rash.   Neurological: Negative for dizziness, weakness, numbness and headaches.   Hematological: Does not bruise/bleed easily.   All other systems reviewed and are negative.      Physical Exam      Initial Vitals [18 1336]   BP Pulse Resp Temp SpO2   (!) 154/78 66 18 97.4 °F (36.3 °C) 95 %      MAP       --          Physical Exam  Nursing Notes and Vital Signs Reviewed.  Constitutional: Patient is in no acute distress. Awake and alert. Well-developed and well-nourished.  Head: Atraumatic. Normocephalic.  Eyes: PERRL. EOM intact. Conjunctivae are not pale. No scleral icterus.  ENT: Mucous membranes are moist. Oropharynx is clear and symmetric.    Neck: Supple. Full ROM. No lymphadenopathy.  Cardiovascular: Regular rate. Regular rhythm. No murmurs, rubs, or gallops. Distal pulses are 2+ and symmetric.  Pulmonary/Chest: No respiratory distress. Clear to auscultation bilaterally. No wheezing, rales, or rhonchi.  Abdominal: Soft and non-distended.  There is no tenderness.  No rebound, guarding, or rigidity.  Good bowel sounds.    Musculoskeletal: Moves all extremities. No obvious deformities. No edema. No calf tenderness.  Skin: Warm and dry.  Neurological:  Alert, awake, and appropriate.  Normal speech.  No acute focal neurological deficits  "are appreciated.  Psychiatric: Normal affect. Good eye contact. Appropriate in content.    ED Course    Procedures  ED Vital Signs:  Vitals:    08/12/18 1336   BP: (!) 154/78   Pulse: 66   Resp: 18   Temp: 97.4 °F (36.3 °C)   TempSrc: Oral   SpO2: 95%   Weight: 92.1 kg (203 lb 0.7 oz)   Height: 6' 6" (1.981 m)       Abnormal Lab Results:  Labs Reviewed   CBC W/ AUTO DIFFERENTIAL - Abnormal; Notable for the following components:       Result Value    RBC 3.59 (*)     Hemoglobin 10.7 (*)     Hematocrit 32.4 (*)     All other components within normal limits   COMPREHENSIVE METABOLIC PANEL - Abnormal; Notable for the following components:    Glucose 120 (*)     BUN, Bld 31 (*)     Creatinine 6.7 (*)     eGFR if  9 (*)     eGFR if non  8 (*)     All other components within normal limits   TROPONIN I - Abnormal; Notable for the following components:    Troponin I 0.027 (*)     All other components within normal limits   B-TYPE NATRIURETIC PEPTIDE - Abnormal; Notable for the following components:    BNP 1,231 (*)     All other components within normal limits        All Lab Results:  Results for orders placed or performed during the hospital encounter of 08/12/18   CBC auto differential   Result Value Ref Range    WBC 4.63 3.90 - 12.70 K/uL    RBC 3.59 (L) 4.60 - 6.20 M/uL    Hemoglobin 10.7 (L) 14.0 - 18.0 g/dL    Hematocrit 32.4 (L) 40.0 - 54.0 %    MCV 90 82 - 98 fL    MCH 29.8 27.0 - 31.0 pg    MCHC 33.0 32.0 - 36.0 g/dL    RDW 14.4 11.5 - 14.5 %    Platelets 208 150 - 350 K/uL    MPV 10.0 9.2 - 12.9 fL    Gran # (ANC) 2.7 1.8 - 7.7 K/uL    Lymph # 1.3 1.0 - 4.8 K/uL    Mono # 0.5 0.3 - 1.0 K/uL    Eos # 0.1 0.0 - 0.5 K/uL    Baso # 0.03 0.00 - 0.20 K/uL    Gran% 57.9 38.0 - 73.0 %    Lymph% 27.6 18.0 - 48.0 %    Mono% 11.7 4.0 - 15.0 %    Eosinophil% 2.2 0.0 - 8.0 %    Basophil% 0.6 0.0 - 1.9 %    Differential Method Automated    Comprehensive metabolic panel   Result Value Ref Range    " Sodium 140 136 - 145 mmol/L    Potassium 4.4 3.5 - 5.1 mmol/L    Chloride 99 95 - 110 mmol/L    CO2 29 23 - 29 mmol/L    Glucose 120 (H) 70 - 110 mg/dL    BUN, Bld 31 (H) 8 - 23 mg/dL    Creatinine 6.7 (H) 0.5 - 1.4 mg/dL    Calcium 9.2 8.7 - 10.5 mg/dL    Total Protein 7.1 6.0 - 8.4 g/dL    Albumin 3.5 3.5 - 5.2 g/dL    Total Bilirubin 0.7 0.1 - 1.0 mg/dL    Alkaline Phosphatase 63 55 - 135 U/L    AST 16 10 - 40 U/L    ALT 10 10 - 44 U/L    Anion Gap 12 8 - 16 mmol/L    eGFR if African American 9 (A) >60 mL/min/1.73 m^2    eGFR if non African American 8 (A) >60 mL/min/1.73 m^2   Troponin I #1   Result Value Ref Range    Troponin I 0.027 (H) 0.000 - 0.026 ng/mL   B-Type natriuretic peptide (BNP)   Result Value Ref Range    BNP 1,231 (H) 0 - 99 pg/mL     Imaging Results:  Imaging Results          X-Ray Chest AP Portable (Final result)  Result time 08/12/18 14:28:30    Final result by Damien Carlson MD (08/12/18 14:28:30)                 Impression:      Negative      Electronically signed by: Damien Carlson MD  Date:    08/12/2018  Time:    14:28             Narrative:    EXAMINATION:  XR CHEST AP PORTABLE    CLINICAL HISTORY:  Chest Pain;    COMPARISON:  None    FINDINGS:  Normal heart size. Clear lungs.                               The EKG was ordered, reviewed, and independently interpreted by the ED provider.  Interpretation time: 13:43  Rate: 65 BPM  Rhythm: normal sinus rhythm  Interpretation: Nonspecific T wave abnormality. No STEMI.    The Emergency Provider reviewed the vital signs and test results, which are outlined above.    ED Discussion     3:20 PM: Reassessed pt at this time. Pt states his condition has improved at this time. Discussed with pt all pertinent ED information and results. Discussed pt dx and plan of tx. Pt is requesting something to help him relax. Gave pt all f/u and return to the ED instructions. All questions and concerns were addressed at this time. Pt expresses understanding of  information and instructions, and is comfortable with plan to discharge. Pt is stable for discharge.    I discussed with patient and/or family/caretaker that evaluation in the ED does not suggest any emergent or life threatening medical conditions requiring immediate intervention beyond what was provided in the ED, and I believe patient is safe for discharge.  Regardless, an unremarkable evaluation in the ED does not preclude the development or presence of a serious of life threatening condition. As such, patient was instructed to return immediately for any worsening or change in current symptoms.  ED Medication(s):  Medications   aspirin tablet 325 mg (325 mg Oral Given 8/12/18 1415)       Discharge Medication List as of 8/12/2018  3:19 PM      START taking these medications    Details   clonazePAM (KLONOPIN) 0.5 MG tablet Take 1 tablet (0.5 mg total) by mouth 2 (two) times daily as needed for Anxiety., Starting Sun 8/12/2018, Until Mon 8/12/2019, Print             Follow-up Information     Raciel Angela MD In 2 days.    Specialty:  Family Medicine  Contact information:  [address]  [address]  [facility] [address] [phone]             [facility]    Specialty:  Emergency Medicine  Why:  As needed, If symptoms worsen  Contact information:  [address]  [facility] [address] [phone]                  Medical Decision Making    Medical Decision Making:   Clinical Tests:   Lab Tests: Reviewed and Ordered  Radiological Study: Ordered and Reviewed  Medical Tests: Reviewed and Ordered           Scribe Attestation:   Scribe #1: I performed the above scribed service and the documentation accurately describes the services I performed. I attest to the accuracy of the note.    Attending:   Physician Attestation Statement for Scribe #1: I, Norma Salomon MD, personally performed the services described in this documentation, as scribed by Marisela Johnson, in my  presence, and it is both accurate and complete.          Clinical Impression       ICD-10-CM ICD-9-CM   1. Chest pain R07.9 786.50       Disposition:   Disposition: Discharged  Condition: Stable         Norma Salomon MD  08/16/18 0001

## 2018-08-16 ENCOUNTER — OFFICE VISIT (OUTPATIENT)
Dept: CARDIOLOGY | Facility: CLINIC | Age: 68
End: 2018-08-16
Payer: MEDICARE

## 2018-08-16 VITALS
DIASTOLIC BLOOD PRESSURE: 80 MMHG | SYSTOLIC BLOOD PRESSURE: 180 MMHG | HEART RATE: 80 BPM | HEIGHT: 78 IN | WEIGHT: 204.38 LBS | BODY MASS INDEX: 23.65 KG/M2

## 2018-08-16 DIAGNOSIS — I10 ESSENTIAL HYPERTENSION: ICD-10-CM

## 2018-08-16 DIAGNOSIS — I25.10 CORONARY ARTERY DISEASE INVOLVING NATIVE CORONARY ARTERY OF NATIVE HEART WITHOUT ANGINA PECTORIS: ICD-10-CM

## 2018-08-16 DIAGNOSIS — Z79.4 CONTROLLED TYPE 2 DIABETES MELLITUS WITH DIABETIC NEPHROPATHY, WITH LONG-TERM CURRENT USE OF INSULIN: ICD-10-CM

## 2018-08-16 DIAGNOSIS — N18.6 ESRD (END STAGE RENAL DISEASE) ON DIALYSIS: ICD-10-CM

## 2018-08-16 DIAGNOSIS — E11.21 CONTROLLED TYPE 2 DIABETES MELLITUS WITH DIABETIC NEPHROPATHY, WITH LONG-TERM CURRENT USE OF INSULIN: ICD-10-CM

## 2018-08-16 DIAGNOSIS — I50.32 CHRONIC DIASTOLIC HEART FAILURE: ICD-10-CM

## 2018-08-16 DIAGNOSIS — Z99.2 ESRD (END STAGE RENAL DISEASE) ON DIALYSIS: ICD-10-CM

## 2018-08-16 DIAGNOSIS — I16.1 HYPERTENSIVE EMERGENCY: Primary | ICD-10-CM

## 2018-08-16 PROCEDURE — 99213 OFFICE O/P EST LOW 20 MIN: CPT | Mod: PBBFAC,PO | Performed by: INTERNAL MEDICINE

## 2018-08-16 PROCEDURE — 99999 PR PBB SHADOW E&M-EST. PATIENT-LVL III: CPT | Mod: PBBFAC,,, | Performed by: INTERNAL MEDICINE

## 2018-08-16 PROCEDURE — 99214 OFFICE O/P EST MOD 30 MIN: CPT | Mod: S$PBB,,, | Performed by: INTERNAL MEDICINE

## 2018-08-16 RX ORDER — ISOSORBIDE DINITRATE 10 MG/1
10 TABLET ORAL 3 TIMES DAILY
Qty: 90 TABLET | Refills: 11 | Status: SHIPPED | OUTPATIENT
Start: 2018-08-16 | End: 2018-08-30 | Stop reason: SDUPTHER

## 2018-08-16 RX ORDER — HYDRALAZINE HYDROCHLORIDE 100 MG/1
100 TABLET, FILM COATED ORAL EVERY 8 HOURS
Qty: 90 TABLET | Refills: 3 | Status: SHIPPED | OUTPATIENT
Start: 2018-08-16 | End: 2019-05-21 | Stop reason: SDUPTHER

## 2018-08-16 RX ORDER — LABETALOL 300 MG/1
600 TABLET, FILM COATED ORAL 3 TIMES DAILY
Qty: 180 TABLET | Refills: 11 | Status: SHIPPED | OUTPATIENT
Start: 2018-08-16 | End: 2019-08-16

## 2018-08-16 NOTE — PHYSICIAN QUERY
"PT Name: Conner Perez III  MR #: [id number]    Physician Query Form - Heart  Condition Clarification     CDS/: Brenda Miguel               Contact information:[email or web address]  This form is a permanent document in the medical record.     Query Date: August 16, 2018    By submitting this query, we are merely seeking further clarification of documentation. Please utilize your independent clinical judgment when addressing the question(s) below.    The medical record contains the following   Indicators     Supporting Clinical Findings Location in Medical Record   x BNP Patient has elevated BNP of 1300   DS 8/5    EF     x Radiology findings chest x-ray consistent with vascular congestion   DS 8/5    Echo Results      "Ascites" documented     x "SOB" or "MONTGOMERY" documented presents to the Emergency Department for SOB which onset gradually a few hours ago   ED Provider Notes 8/5    "Hypoxia" documented     x Heart Failure documented Acute on chronic congestive heart failure    ED Provider Notes 8/5, DS 8/5   x "Edema" documented In the ED he was found to be in pulmonary edema   DS 8/5   x Diuretics/Meds Furosemide IV  Labetalol IV  Losartan PO MAR    Treatment:      Other:      Heart failure (HF) can be acute, chronic or both. It is generally further specificed as systolic, diastolic, or combined. Lastly, it is important to identify an underlying etiology if known or suspected.     Common clues to acute exacerbation:  Rapidly progressive symptoms (w/in 2 weeks of presentation), using IV diuretics to treat, using supplemental O2, pulmonary edema on Xray, MI w/in 4 weeks, and/or BNP >500    Systolic Heart Failure: is defined as chart documentation of a left ventricular ejection fraction (LVEF) less than 40%     Diastolic Heart Failure: is defined as a left ventricular ejection fraction (LVEF) greater than 40%   +      Evidence of diastolic dysfunction on echocardiography OR    Right heart catheterization wedge " pressure above 12 mm Hg OR    Left heart catheterization left ventricular end diastolic pressure 18 mm Hg or above.    References: *American Heart Association    The clinical guidelines noted below are only system guidelines, and do not replace the providers clinical judgment.     Provider, please specify the diagnosis associated with above clinical findings  [   ] Acute on Chronic Systolic Heart Failure- Pre-existing systolic HF diagnosis.  EF < 40%  and acute HF symptoms documented  [   ] Acute on Chronic Diastolic Heart Failure -    Pre-existing diastoic HF diagnosis.  EF > 40%  and acute HF symptoms documented    [   ] Acute on Chronic Combined Systolic and Diastolic Heart Failure      [ x  ] Other Type of Heart Failure (please specify type): _Acute on chronic CHF with preserved EF________________________  [   ] Other (please specify): ___________________________________  [   ] Clinically Undetermined                          Please document in your progress notes daily for the duration of treatment until resolved and include in your discharge summary.

## 2018-08-16 NOTE — PROGRESS NOTES
Subjective:   Patient ID:  Conner Perez III is a 68 y.o. male who presents for follow up of Follow-up      66 yo, male, came in for f/u, retired  of [employer].  PMH CAD, s/p twice LHC at [facility] and last on was . Did not have PCI. Was told no blockage, nonsustained VT, IDDM, ESRD on HD in , via fistula on rig upper arm, HTN, HLD.  Has been on Kidney tx list at [facility]  He had chronic elevation of troponin to 0.05  Had numerous ER visits for uncontrolled HTN and chest pain. Admitted in  for  and CHF.  EKG no acute change  ECHO in  EF normal, mildly dilated LV.  Nuclear stress in  normal.    Chronic MONTGOMERY, and retired recently.   Denied chest pain, palpitation, dizziness, orthopnea and syncope.  Lipid profile is good per pt checked at PCP' office  Compliant with med and diet.  No smoking/drinking        Past Medical History:   Diagnosis Date    CKD (chronic kidney disease), stage IV     Diabetes mellitus, type 2     Dialysis patient     HTN (hypertension)        Past Surgical History:   Procedure Laterality Date    AV FISTULA PLACEMENT Right 2017    CIRCUMCISION      TOE AMPUTATION Left 2017    4th toe       Social History     Tobacco Use    Smoking status: Former Smoker     Types: Cigarettes     Last attempt to quit: 1971     Years since quittin.6    Smokeless tobacco: Never Used   Substance Use Topics    Alcohol use: No    Drug use: No       Family History   Problem Relation Age of Onset    Hypertension Father     No Known Problems Mother          Review of Systems   Constitution: Negative for decreased appetite, diaphoresis, fever, weakness, malaise/fatigue and night sweats.   HENT: Negative for nosebleeds.    Eyes: Negative for blurred vision and double vision.   Cardiovascular: Positive for dyspnea on exertion. Negative for chest pain, claudication, irregular heartbeat, leg swelling, near-syncope, orthopnea, palpitations, paroxysmal nocturnal  dyspnea and syncope.   Respiratory: Negative for cough, shortness of breath, sleep disturbances due to breathing, snoring, sputum production and wheezing.    Endocrine: Negative for cold intolerance and polyuria.   Hematologic/Lymphatic: Does not bruise/bleed easily.   Skin: Negative for rash.   Musculoskeletal: Negative for back pain, falls, joint pain, joint swelling and neck pain.   Gastrointestinal: Negative for abdominal pain, heartburn, nausea and vomiting.   Genitourinary: Negative for dysuria, frequency and hematuria.   Neurological: Negative for difficulty with concentration, dizziness, focal weakness, headaches, light-headedness, numbness and seizures.   Psychiatric/Behavioral: Negative for depression, memory loss and substance abuse. The patient does not have insomnia.    Allergic/Immunologic: Negative for HIV exposure and hives.       Objective:   Physical Exam   Constitutional: He is oriented to person, place, and time. He appears well-nourished.   HENT:   Head: Normocephalic.   Eyes: Pupils are equal, round, and reactive to light.   Neck: Normal carotid pulses and no JVD present. Carotid bruit is not present. No thyromegaly present.   Cardiovascular: Normal rate, regular rhythm, normal heart sounds and normal pulses.  No extrasystoles are present. PMI is not displaced. Exam reveals no gallop and no S3.   No murmur heard.  Pulmonary/Chest: Breath sounds normal. No stridor. No respiratory distress.   Abdominal: Soft. Bowel sounds are normal. There is no tenderness. There is no rebound.   Musculoskeletal: Normal range of motion.   Neurological: He is alert and oriented to person, place, and time.   Skin: Skin is intact. No rash noted.   Psychiatric: His behavior is normal.       No results found for: CHOL  No results found for: HDL  No results found for: LDLCALC  No results found for: TRIG  No results found for: CHOLHDL    Chemistry        Component Value Date/Time     08/12/2018 1409    K 4.4  08/12/2018 1409    CL 99 08/12/2018 1409    CO2 29 08/12/2018 1409    BUN 31 (H) 08/12/2018 1409    CREATININE 6.7 (H) 08/12/2018 1409     (H) 08/12/2018 1409        Component Value Date/Time    CALCIUM 9.2 08/12/2018 1409    ALKPHOS 63 08/12/2018 1409    AST 16 08/12/2018 1409    ALT 10 08/12/2018 1409    BILITOT 0.7 08/12/2018 1409    ESTGFRAFRICA 9 (A) 08/12/2018 1409    EGFRNONAA 8 (A) 08/12/2018 1409          Lab Results   Component Value Date    HGBA1C 4.8 04/27/2018     Lab Results   Component Value Date    TSH 0.667 12/17/2017     Lab Results   Component Value Date    INR 1.0 04/23/2018    INR 1.1 04/12/2018    INR 1.1 12/17/2017     Lab Results   Component Value Date    WBC 4.63 08/12/2018    HGB 10.7 (L) 08/12/2018    HCT 32.4 (L) 08/12/2018    MCV 90 08/12/2018     08/12/2018     BMP  Sodium   Date Value Ref Range Status   08/12/2018 140 136 - 145 mmol/L Final     Potassium   Date Value Ref Range Status   08/12/2018 4.4 3.5 - 5.1 mmol/L Final     Chloride   Date Value Ref Range Status   08/12/2018 99 95 - 110 mmol/L Final     CO2   Date Value Ref Range Status   08/12/2018 29 23 - 29 mmol/L Final     BUN, Bld   Date Value Ref Range Status   08/12/2018 31 (H) 8 - 23 mg/dL Final     Creatinine   Date Value Ref Range Status   08/12/2018 6.7 (H) 0.5 - 1.4 mg/dL Final     Calcium   Date Value Ref Range Status   08/12/2018 9.2 8.7 - 10.5 mg/dL Final     Anion Gap   Date Value Ref Range Status   08/12/2018 12 8 - 16 mmol/L Final     eGFR if    Date Value Ref Range Status   08/12/2018 9 (A) >60 mL/min/1.73 m^2 Final     eGFR if non    Date Value Ref Range Status   08/12/2018 8 (A) >60 mL/min/1.73 m^2 Final     Comment:     Calculation used to obtain the estimated glomerular filtration  rate (eGFR) is the CKD-EPI equation.        BNP  @LABRCNTIP(BNP,BNPTRIAGEBLO)@  @LABRCNTIP(troponini)@  Estimated Creatinine Clearance: 13.6 mL/min (A) (based on SCr of 6.7 mg/dL  (H)).  No results found in the last 24 hours.  No results found in the last 24 hours.  No results found in the last 24 hours.    Assessment:      1. Hypertensive emergency    2. Chronic diastolic heart failure    3. ESRD (end stage renal disease) on dialysis    4. Controlled type 2 diabetes mellitus with diabetic nephropathy, with long-term current use of insulin      Multiple episodes of uncontrolled BP with CHF.     Plan:   Increase Labetalol to 600 mg tid  Add Isodil 10 mg tid  Continue Hydralazien 100 mg tid  Continue Losartan 100mg daily and Cardizem 360 mg daily  Continue ASA and lipitor  Discussed DASH  Nurse check BP in two weeks.  F/u with PCP for anxiety  RTC in 2 months

## 2018-08-21 ENCOUNTER — HOSPITAL ENCOUNTER (EMERGENCY)
Facility: HOSPITAL | Age: 68
Discharge: HOME | End: 2018-08-21
Attending: EMERGENCY MEDICINE
Payer: MEDICARE

## 2018-08-21 VITALS
DIASTOLIC BLOOD PRESSURE: 105 MMHG | OXYGEN SATURATION: 96 % | TEMPERATURE: 98 F | SYSTOLIC BLOOD PRESSURE: 215 MMHG | HEIGHT: 78 IN | BODY MASS INDEX: 23.95 KG/M2 | HEART RATE: 80 BPM | RESPIRATION RATE: 11 BRPM | WEIGHT: 207 LBS

## 2018-08-21 DIAGNOSIS — R07.9 CHEST PAIN: ICD-10-CM

## 2018-08-21 DIAGNOSIS — F41.9 ANXIETY: Primary | ICD-10-CM

## 2018-08-21 LAB
ALBUMIN SERPL BCP-MCNC: 3.4 G/DL
ALP SERPL-CCNC: 60 U/L
ALT SERPL W/O P-5'-P-CCNC: 14 U/L
ANION GAP SERPL CALC-SCNC: 9 MMOL/L
AST SERPL-CCNC: 27 U/L
BASOPHILS # BLD AUTO: 0.01 K/UL
BASOPHILS NFR BLD: 0.2 %
BILIRUB SERPL-MCNC: 0.5 MG/DL
BNP SERPL-MCNC: 829 PG/ML
BUN SERPL-MCNC: 23 MG/DL
CALCIUM SERPL-MCNC: 8.7 MG/DL
CHLORIDE SERPL-SCNC: 100 MMOL/L
CO2 SERPL-SCNC: 31 MMOL/L
CREAT SERPL-MCNC: 4.9 MG/DL
DIFFERENTIAL METHOD: ABNORMAL
EOSINOPHIL # BLD AUTO: 0.2 K/UL
EOSINOPHIL NFR BLD: 3.4 %
ERYTHROCYTE [DISTWIDTH] IN BLOOD BY AUTOMATED COUNT: 14.5 %
EST. GFR  (AFRICAN AMERICAN): 13 ML/MIN/1.73 M^2
EST. GFR  (NON AFRICAN AMERICAN): 11 ML/MIN/1.73 M^2
GLUCOSE SERPL-MCNC: 98 MG/DL
HCT VFR BLD AUTO: 31.6 %
HGB BLD-MCNC: 10.3 G/DL
LYMPHOCYTES # BLD AUTO: 1.1 K/UL
LYMPHOCYTES NFR BLD: 25.4 %
MCH RBC QN AUTO: 29.2 PG
MCHC RBC AUTO-ENTMCNC: 32.6 G/DL
MCV RBC AUTO: 90 FL
MONOCYTES # BLD AUTO: 0.5 K/UL
MONOCYTES NFR BLD: 10.8 %
NEUTROPHILS # BLD AUTO: 2.7 K/UL
NEUTROPHILS NFR BLD: 60.2 %
PLATELET # BLD AUTO: 164 K/UL
PMV BLD AUTO: 10.1 FL
POTASSIUM SERPL-SCNC: 5.4 MMOL/L
PROT SERPL-MCNC: 7.3 G/DL
RBC # BLD AUTO: 3.53 M/UL
SODIUM SERPL-SCNC: 140 MMOL/L
TROPONIN I SERPL DL<=0.01 NG/ML-MCNC: 0.02 NG/ML
TROPONIN I SERPL DL<=0.01 NG/ML-MCNC: 0.03 NG/ML
WBC # BLD AUTO: 4.45 K/UL

## 2018-08-21 PROCEDURE — 25000003 PHARM REV CODE 250: Performed by: EMERGENCY MEDICINE

## 2018-08-21 PROCEDURE — 63600175 PHARM REV CODE 636 W HCPCS: Performed by: EMERGENCY MEDICINE

## 2018-08-21 PROCEDURE — 93010 ELECTROCARDIOGRAM REPORT: CPT | Mod: ,,, | Performed by: INTERNAL MEDICINE

## 2018-08-21 PROCEDURE — 96374 THER/PROPH/DIAG INJ IV PUSH: CPT

## 2018-08-21 PROCEDURE — 80053 COMPREHEN METABOLIC PANEL: CPT

## 2018-08-21 PROCEDURE — 84484 ASSAY OF TROPONIN QUANT: CPT | Mod: 91

## 2018-08-21 PROCEDURE — 83880 ASSAY OF NATRIURETIC PEPTIDE: CPT

## 2018-08-21 PROCEDURE — 85025 COMPLETE CBC W/AUTO DIFF WBC: CPT

## 2018-08-21 PROCEDURE — 99284 EMERGENCY DEPT VISIT MOD MDM: CPT | Mod: 25

## 2018-08-21 RX ORDER — ASPIRIN 325 MG
325 TABLET ORAL
Status: COMPLETED | OUTPATIENT
Start: 2018-08-21 | End: 2018-08-21

## 2018-08-21 RX ORDER — LORAZEPAM 1 MG/1
0.5 TABLET ORAL EVERY 6 HOURS PRN
Qty: 10 TABLET | Refills: 0 | Status: SHIPPED | OUTPATIENT
Start: 2018-08-21 | End: 2018-10-13

## 2018-08-21 RX ORDER — FUROSEMIDE 10 MG/ML
40 INJECTION INTRAMUSCULAR; INTRAVENOUS
Status: COMPLETED | OUTPATIENT
Start: 2018-08-21 | End: 2018-08-21

## 2018-08-21 RX ADMIN — FUROSEMIDE 40 MG: 10 INJECTION, SOLUTION INTRAMUSCULAR; INTRAVENOUS at 07:08

## 2018-08-21 RX ADMIN — ASPIRIN 325 MG ORAL TABLET 325 MG: 325 PILL ORAL at 04:08

## 2018-08-21 NOTE — ED PROVIDER NOTES
"SCRIBE #1 NOTE: I, Madiha Dia, am scribing for, and in the presence of, Norma Salomon MD. I have scribed the HPI, ROS, and PEx.     SCRIBE #2 NOTE: I, Maury Draper, am scribing for, and in the presence of,  Endy Alford MD. I have scribed the remaining portions of the note not scribed by Scribe #1.     History      Chief Complaint   Patient presents with    Chest Pain     tightness, mild SOB per pt , dialysis was yesterday       Review of patient's allergies indicates:   Allergen Reactions    Augmentin [amoxicillin-pot clavulanate] Edema    Lisinopril      cough    Sulfa (sulfonamide antibiotics)      swelling        HPI   HPI    8/21/2018, 4:36 AM   History obtained from the patient      History of Present Illness: Conner Perez III is a 68 y.o. male dialysis patient with PMHx of CKD, DM, and HTN who presents to the Emergency Department for chest tightness which onset gradually PTA. Patient reports waking from sleep with sxs. Symptoms are constant and moderate in severity. The patient describes the sxs as "feels like an anxiety attack". No mitigating or exacerbating factors reported. Associated sxs include anxious. Patient denies any fever, chills, CP, SOB, diaphoresis, palpitations, extremity weakness/numbness, leg swelling, dizziness, cough, N/V, and all other sxs at this time. No further complaints or concerns at this time.     Arrival mode: Personal vehicle      PCP: Raciel Angela MD       Past Medical History:  Past Medical History:   Diagnosis Date    CKD (chronic kidney disease), stage IV     Diabetes mellitus, type 2     Dialysis patient     HTN (hypertension)        Past Surgical History:  Past Surgical History:   Procedure Laterality Date    AV FISTULA PLACEMENT Right 06/2017    CIRCUMCISION  1978    TOE AMPUTATION Left 01/24/2017    4th toe         Family History:  Family History   Problem Relation Age of Onset    Hypertension Father     No Known Problems Mother        Social " History:  Social History     Tobacco Use    Smoking status: Former Smoker     Types: Cigarettes     Last attempt to quit: 1971     Years since quittin.6    Smokeless tobacco: Never Used   Substance and Sexual Activity    Alcohol use: No    Drug use: No    Sexual activity: Unknown       ROS   Review of Systems   Constitutional: Negative for diaphoresis and fever.   HENT: Negative for sore throat.    Respiratory: Positive for chest tightness. Negative for cough and shortness of breath.    Cardiovascular: Negative for chest pain, palpitations and leg swelling.   Gastrointestinal: Negative for nausea and vomiting.   Genitourinary: Negative for dysuria.   Musculoskeletal: Negative for back pain.   Skin: Negative for rash.   Neurological: Negative for dizziness, weakness and numbness.   Hematological: Does not bruise/bleed easily.   Psychiatric/Behavioral: The patient is nervous/anxious.    All other systems reviewed and are negative.      Physical Exam      Initial Vitals [18 0419]   BP Pulse Resp Temp SpO2   (!) 182/98 77 18 98.4 °F (36.9 °C) 95 %      MAP       --          Physical Exam  Nursing Notes and Vital Signs Reviewed.  Constitutional: Patient is in no acute distress. Well-developed and well-nourished.  Head: Atraumatic. Normocephalic.  Eyes: PERRL. EOM intact. Conjunctivae are not pale. No scleral icterus.  ENT: Mucous membranes are moist. Oropharynx is clear and symmetric.    Neck: Supple. Full ROM. No lymphadenopathy.  Cardiovascular: Regular rate. Regular rhythm. No murmurs, rubs, or gallops. Distal pulses are 2+ and symmetric.  Pulmonary/Chest: No respiratory distress. Clear to auscultation bilaterally. No wheezing or rales.  Abdominal: Soft and non-distended.  There is no tenderness.  No rebound, guarding, or rigidity. Good bowel sounds.  Genitourinary: No CVA tenderness  Musculoskeletal: Moves all extremities. No obvious deformities. No edema. No calf tenderness.  Skin: Warm and  "dry.  Neurological:  Alert, awake, and appropriate.  Normal speech.  No acute focal neurological deficits are appreciated.  Psychiatric: Normal affect. Good eye contact. Appropriate in content.    ED Course    Procedures  ED Vital Signs:  Vitals:    08/21/18 0419 08/21/18 0426 08/21/18 0430 08/21/18 0500   BP: (!) 182/98  (!) 198/95 (!) 202/96   Pulse: 77 75 79 77   Resp: 18  (!) 26 18   Temp: 98.4 °F (36.9 °C)  97.7 °F (36.5 °C)    TempSrc: Oral  Oral    SpO2: 95%  98%    Weight: 94.3 kg (207 lb 14.3 oz) 93.9 kg (207 lb)     Height: 6' 6" (1.981 m) 6' 6" (1.981 m)      08/21/18 0530 08/21/18 0600 08/21/18 0630 08/21/18 0701   BP: (!) 195/100 (!) 211/101 (!) 210/103 (!) 199/101   Pulse: 80 79 80 80   Resp: (!) 21 (!) 23 19 20   Temp:       TempSrc:       SpO2: 96%      Weight:       Height:        08/21/18 0751   BP: (!) 196/95   Pulse: 80   Resp: 18   Temp:    TempSrc:    SpO2:    Weight:    Height:        Abnormal Lab Results:  Labs Reviewed   CBC W/ AUTO DIFFERENTIAL - Abnormal; Notable for the following components:       Result Value    RBC 3.53 (*)     Hemoglobin 10.3 (*)     Hematocrit 31.6 (*)     All other components within normal limits   COMPREHENSIVE METABOLIC PANEL - Abnormal; Notable for the following components:    Potassium 5.4 (*)     CO2 31 (*)     Creatinine 4.9 (*)     Albumin 3.4 (*)     eGFR if  13 (*)     eGFR if non  11 (*)     All other components within normal limits   B-TYPE NATRIURETIC PEPTIDE - Abnormal; Notable for the following components:     (*)     All other components within normal limits   TROPONIN I - Abnormal; Notable for the following components:    Troponin I 0.029 (*)     All other components within normal limits   TROPONIN I        All Lab Results:  Results for orders placed or performed during the hospital encounter of 08/21/18   CBC auto differential   Result Value Ref Range    WBC 4.45 3.90 - 12.70 K/uL    RBC 3.53 (L) 4.60 - 6.20 " M/uL    Hemoglobin 10.3 (L) 14.0 - 18.0 g/dL    Hematocrit 31.6 (L) 40.0 - 54.0 %    MCV 90 82 - 98 fL    MCH 29.2 27.0 - 31.0 pg    MCHC 32.6 32.0 - 36.0 g/dL    RDW 14.5 11.5 - 14.5 %    Platelets 164 150 - 350 K/uL    MPV 10.1 9.2 - 12.9 fL    Gran # (ANC) 2.7 1.8 - 7.7 K/uL    Lymph # 1.1 1.0 - 4.8 K/uL    Mono # 0.5 0.3 - 1.0 K/uL    Eos # 0.2 0.0 - 0.5 K/uL    Baso # 0.01 0.00 - 0.20 K/uL    Gran% 60.2 38.0 - 73.0 %    Lymph% 25.4 18.0 - 48.0 %    Mono% 10.8 4.0 - 15.0 %    Eosinophil% 3.4 0.0 - 8.0 %    Basophil% 0.2 0.0 - 1.9 %    Differential Method Automated    Comprehensive metabolic panel   Result Value Ref Range    Sodium 140 136 - 145 mmol/L    Potassium 5.4 (H) 3.5 - 5.1 mmol/L    Chloride 100 95 - 110 mmol/L    CO2 31 (H) 23 - 29 mmol/L    Glucose 98 70 - 110 mg/dL    BUN, Bld 23 8 - 23 mg/dL    Creatinine 4.9 (H) 0.5 - 1.4 mg/dL    Calcium 8.7 8.7 - 10.5 mg/dL    Total Protein 7.3 6.0 - 8.4 g/dL    Albumin 3.4 (L) 3.5 - 5.2 g/dL    Total Bilirubin 0.5 0.1 - 1.0 mg/dL    Alkaline Phosphatase 60 55 - 135 U/L    AST 27 10 - 40 U/L    ALT 14 10 - 44 U/L    Anion Gap 9 8 - 16 mmol/L    eGFR if African American 13 (A) >60 mL/min/1.73 m^2    eGFR if non African American 11 (A) >60 mL/min/1.73 m^2   Troponin I #1   Result Value Ref Range    Troponin I 0.020 0.000 - 0.026 ng/mL   B-Type natriuretic peptide (BNP)   Result Value Ref Range     (H) 0 - 99 pg/mL   Troponin I #2   Result Value Ref Range    Troponin I 0.029 (H) 0.000 - 0.026 ng/mL       Imaging Results:  Imaging Results          X-Ray Chest AP Portable (Final result)  Result time 08/21/18 07:20:35    Final result by TERESA Kumar Sr., MD (08/21/18 07:20:35)                 Impression:      1. There is a mild amount of interstitial opacities seen in both lungs.  This is characteristic of pulmonary edema.  2. The left costophrenic angle is blunted.  This is characteristic of a tiny pleural effusion.  3. There are several calcific densities  projected over the inferior aspect of the right glenohumeral joint. The larger 1 measures 4 mm.  These are characteristic of intra-articular loose bodies.  4. The superior aspect of the thoracic spine is tilted towards the right.  This may be positional.  5. The size of the heart is prominent.  This may be secondary to magnification.  .      Electronically signed by: Damien Kumar MD  Date:    08/21/2018  Time:    07:20             Narrative:    EXAMINATION:  XR CHEST AP PORTABLE    CLINICAL HISTORY:  Chest Pain;    COMPARISON:  08/12/2018    FINDINGS:  The size of the heart is prominent.  There is a mild amount of interstitial opacities seen in both lungs.  There is no pneumothorax.  The right costophrenic angle is sharp.  The left costophrenic angle is blunted.  The superior aspect of the thoracic spine is tilted towards the right.  There are several calcific densities projected over the inferior aspect of the right glenohumeral joint.  The larger 1 measures 4 mm.                               Ordered, reviewed, and independently interpreted by the ED provider.  Study: X-ray Chest  Findings: Cardiomegaly     The EKG was ordered, reviewed, and independently interpreted by the ED provider.  Interpretation time: 0423  Rate: 78 BPM  Rhythm: normal sinus rhythm  Interpretation: Nonspecific T wave abnormality. Abnormal ECG. No STEMI.         The Emergency Provider reviewed the vital signs and test results, which are outlined above.    ED Discussion     5:40 AM: Reassessed pt at this time. Pt is resting comfortably and is in no acute distress.  D/w pt all pertinent results. D/w pt any concerns expressed at this time. Answered all questions. Pt expresses understanding at this time. Discussed with patient troponin will be repeated at 0800.    6:00 AM: Dr. Salomon transfers care of pt to Dr. Alford, pending lab results.    8:23 AM: Re-evaluated pt. Pt is resting comfortably and is in no acute distress.  D/w pt all  pertinent results and plan to admit pt for further evaluation. Pt states he believes his sxs are all anxiety related and does not want to be admitted. D/w pt any concerns expressed at this time. Answered all questions. Pt expresses understanding at this time.    8:33 AM: Reassessed pt at this time. Pt is awake, alert, and in NAD. Pt states his condition has improved at this time. Discussed with pt all pertinent ED information and results. Discussed pt dx and plan of tx. Gave pt all f/u and return to the ED instructions. All questions and concerns were addressed at this time. Pt expresses understanding of information and instructions, and is comfortable with plan to discharge. Pt is stable for discharge.      ED Medication(s):  Medications   aspirin tablet 325 mg (325 mg Oral Given 8/21/18 7319)   furosemide injection 40 mg (40 mg Intravenous Given 8/21/18 5579)       New Prescriptions    LORAZEPAM (ATIVAN) 1 MG TABLET    Take 0.5 tablets (0.5 mg total) by mouth every 6 (six) hours as needed for Anxiety.       Follow-up Information     Raciel Angela MD.    Specialty:  Family Medicine  Contact information:  [address]  [address]  [facility] [address] [phone]                     Medical Decision Making    Medical Decision Making:   Clinical Tests:   Lab Tests: Ordered and Reviewed  Radiological Study: Ordered and Reviewed  Medical Tests: Ordered and Reviewed           Scribe Attestation:   Scribe #1: I performed the above scribed service and the documentation accurately describes the services I performed. I attest to the accuracy of the note.    Attending:   Physician Attestation Statement for Scribe #1: I, Norma Salomon MD, personally performed the services described in this documentation, as scribed by Madiha Dia, in my presence, and it is both accurate and complete.       Scribe Attestation:   Scribe #2: I performed the above scribed service and the documentation accurately describes the services I  performed. I attest to the accuracy of the note.    Attending Attestation:           Physician Attestation for Scribe:    Physician Attestation Statement for Scribe #2: I, Endy Alford MD, reviewed documentation, as scribed by Maury Draper in my presence, and it is both accurate and complete. I also acknowledge and confirm the content of the note done by Morganibe #1.          Clinical Impression       ICD-10-CM ICD-9-CM   1. Anxiety F41.9 300.00   2. Chest pain R07.9 786.50       Disposition:   Disposition: Discharged  Condition: Stable         Endy Alford MD  08/21/18 0858

## 2018-08-21 NOTE — ED NOTES
"MD Salomon at bedside assessing patient.  States "I think I'm having an anxiety attack."  Denies CP.  Did c/o chest tightness.  HR 77 bpm. NSR.  EKG done and reviewed per MD.  AAO x 4.  HTN - see vitals.  AV fistula in R Forearm with + thrill and + bruit.    "

## 2018-08-21 NOTE — ED NOTES
Lying on stretcher in NAD.  Denies pain at present. Watching TV.  Vitals stable. Report given to on comming shift RN

## 2018-08-30 ENCOUNTER — TELEPHONE (OUTPATIENT)
Dept: CARDIOLOGY | Facility: CLINIC | Age: 68
End: 2018-08-30

## 2018-08-30 DIAGNOSIS — I16.1 HYPERTENSIVE EMERGENCY: ICD-10-CM

## 2018-08-30 DIAGNOSIS — I10 ESSENTIAL HYPERTENSION: Primary | ICD-10-CM

## 2018-08-30 RX ORDER — ISOSORBIDE DINITRATE 30 MG/1
30 TABLET ORAL 3 TIMES DAILY
Qty: 90 TABLET | Refills: 11 | Status: ON HOLD | OUTPATIENT
Start: 2018-08-30 | End: 2018-09-07 | Stop reason: HOSPADM

## 2018-08-30 NOTE — TELEPHONE ENCOUNTER
Spoke with pt with new instructions for Isordil and new Rx sent.  Also scheduled nurse visit for pt.

## 2018-08-30 NOTE — TELEPHONE ENCOUNTER
Pt came in for a BP check.  BP was 180/80.  Pt stated his BP was 190/96 at home earlier this morning.  Will ask dr. Barnhart to advise.

## 2018-09-03 ENCOUNTER — HOSPITAL ENCOUNTER (INPATIENT)
Facility: HOSPITAL | Age: 68
LOS: 1 days | Discharge: HOME | DRG: 291 | End: 2018-09-07
Attending: EMERGENCY MEDICINE | Admitting: INTERNAL MEDICINE
Payer: MEDICARE

## 2018-09-03 DIAGNOSIS — I10 ACCELERATED HYPERTENSION: ICD-10-CM

## 2018-09-03 DIAGNOSIS — R07.9 CHEST PAIN: ICD-10-CM

## 2018-09-03 DIAGNOSIS — J18.9 PNEUMONIA OF LEFT LOWER LOBE DUE TO INFECTIOUS ORGANISM: Primary | ICD-10-CM

## 2018-09-03 DIAGNOSIS — I50.9 CHF (CONGESTIVE HEART FAILURE): ICD-10-CM

## 2018-09-03 DIAGNOSIS — R09.02 HYPOXEMIA: ICD-10-CM

## 2018-09-03 DIAGNOSIS — R07.89 CHEST TIGHTNESS: ICD-10-CM

## 2018-09-03 PROBLEM — R19.7 DIARRHEA: Status: ACTIVE | Noted: 2018-09-03

## 2018-09-03 PROBLEM — E86.0 DEHYDRATION: Status: ACTIVE | Noted: 2018-09-03

## 2018-09-03 PROBLEM — E87.6 HYPOKALEMIA: Status: ACTIVE | Noted: 2018-09-03

## 2018-09-03 LAB
ALBUMIN SERPL BCP-MCNC: 3.6 G/DL
ALP SERPL-CCNC: 70 U/L
ALT SERPL W/O P-5'-P-CCNC: 13 U/L
ANION GAP SERPL CALC-SCNC: 13 MMOL/L
AST SERPL-CCNC: 34 U/L
BASOPHILS # BLD AUTO: 0.02 K/UL
BASOPHILS NFR BLD: 0.5 %
BILIRUB SERPL-MCNC: 0.6 MG/DL
BNP SERPL-MCNC: 1137 PG/ML
BUN SERPL-MCNC: 21 MG/DL
CALCIUM SERPL-MCNC: 9.1 MG/DL
CHLORIDE SERPL-SCNC: 103 MMOL/L
CO2 SERPL-SCNC: 25 MMOL/L
CREAT SERPL-MCNC: 5.2 MG/DL
DIFFERENTIAL METHOD: ABNORMAL
EOSINOPHIL # BLD AUTO: 0.2 K/UL
EOSINOPHIL NFR BLD: 4.4 %
ERYTHROCYTE [DISTWIDTH] IN BLOOD BY AUTOMATED COUNT: 14.4 %
EST. GFR  (AFRICAN AMERICAN): 12 ML/MIN/1.73 M^2
EST. GFR  (NON AFRICAN AMERICAN): 10 ML/MIN/1.73 M^2
GLUCOSE SERPL-MCNC: 100 MG/DL
HCT VFR BLD AUTO: 33 %
HGB BLD-MCNC: 10.9 G/DL
LACTATE SERPL-SCNC: 0.6 MMOL/L
LYMPHOCYTES # BLD AUTO: 1 K/UL
LYMPHOCYTES NFR BLD: 28.2 %
MCH RBC QN AUTO: 29.3 PG
MCHC RBC AUTO-ENTMCNC: 33 G/DL
MCV RBC AUTO: 89 FL
MONOCYTES # BLD AUTO: 0.4 K/UL
MONOCYTES NFR BLD: 11.5 %
NEUTROPHILS # BLD AUTO: 2 K/UL
NEUTROPHILS NFR BLD: 55.4 %
PLATELET # BLD AUTO: 154 K/UL
PMV BLD AUTO: 10 FL
POTASSIUM SERPL-SCNC: 5 MMOL/L
PROT SERPL-MCNC: 7.6 G/DL
RBC # BLD AUTO: 3.72 M/UL
SODIUM SERPL-SCNC: 141 MMOL/L
TROPONIN I SERPL DL<=0.01 NG/ML-MCNC: 0.04 NG/ML
WBC # BLD AUTO: 3.65 K/UL

## 2018-09-03 PROCEDURE — 87040 BLOOD CULTURE FOR BACTERIA: CPT | Mod: 59

## 2018-09-03 PROCEDURE — 96365 THER/PROPH/DIAG IV INF INIT: CPT

## 2018-09-03 PROCEDURE — 85025 COMPLETE CBC W/AUTO DIFF WBC: CPT

## 2018-09-03 PROCEDURE — 83605 ASSAY OF LACTIC ACID: CPT

## 2018-09-03 PROCEDURE — 94640 AIRWAY INHALATION TREATMENT: CPT

## 2018-09-03 PROCEDURE — 27000221 HC OXYGEN, UP TO 24 HOURS

## 2018-09-03 PROCEDURE — 63600175 PHARM REV CODE 636 W HCPCS: Performed by: EMERGENCY MEDICINE

## 2018-09-03 PROCEDURE — 25000242 PHARM REV CODE 250 ALT 637 W/ HCPCS: Performed by: EMERGENCY MEDICINE

## 2018-09-03 PROCEDURE — 84484 ASSAY OF TROPONIN QUANT: CPT

## 2018-09-03 PROCEDURE — 80053 COMPREHEN METABOLIC PANEL: CPT

## 2018-09-03 PROCEDURE — 94761 N-INVAS EAR/PLS OXIMETRY MLT: CPT

## 2018-09-03 PROCEDURE — 25000003 PHARM REV CODE 250: Performed by: EMERGENCY MEDICINE

## 2018-09-03 PROCEDURE — 93010 ELECTROCARDIOGRAM REPORT: CPT | Mod: ,,, | Performed by: INTERNAL MEDICINE

## 2018-09-03 PROCEDURE — 99285 EMERGENCY DEPT VISIT HI MDM: CPT | Mod: 25

## 2018-09-03 PROCEDURE — G0378 HOSPITAL OBSERVATION PER HR: HCPCS

## 2018-09-03 PROCEDURE — 93005 ELECTROCARDIOGRAM TRACING: CPT

## 2018-09-03 PROCEDURE — 83880 ASSAY OF NATRIURETIC PEPTIDE: CPT

## 2018-09-03 RX ORDER — IPRATROPIUM BROMIDE AND ALBUTEROL SULFATE 2.5; .5 MG/3ML; MG/3ML
3 SOLUTION RESPIRATORY (INHALATION) ONCE
Status: COMPLETED | OUTPATIENT
Start: 2018-09-03 | End: 2018-09-03

## 2018-09-03 RX ORDER — ASPIRIN 325 MG
325 TABLET ORAL
Status: COMPLETED | OUTPATIENT
Start: 2018-09-03 | End: 2018-09-03

## 2018-09-03 RX ADMIN — MOXIFLOXACIN HYDROCHLORIDE 400 MG: 400 INJECTION, SOLUTION INTRAVENOUS at 11:09

## 2018-09-03 RX ADMIN — ASPIRIN 325 MG ORAL TABLET 325 MG: 325 PILL ORAL at 09:09

## 2018-09-03 RX ADMIN — IPRATROPIUM BROMIDE AND ALBUTEROL SULFATE 3 ML: .5; 3 SOLUTION RESPIRATORY (INHALATION) at 10:09

## 2018-09-04 PROBLEM — N18.9 ANEMIA OF RENAL DISEASE: Chronic | Status: ACTIVE | Noted: 2017-01-18

## 2018-09-04 PROBLEM — D63.1 ANEMIA OF RENAL DISEASE: Chronic | Status: ACTIVE | Noted: 2017-01-18

## 2018-09-04 PROBLEM — E11.9 DM II (DIABETES MELLITUS, TYPE II), CONTROLLED: Chronic | Status: ACTIVE | Noted: 2017-01-19

## 2018-09-04 PROBLEM — I25.10 CORONARY ARTERY DISEASE INVOLVING NATIVE CORONARY ARTERY OF NATIVE HEART WITHOUT ANGINA PECTORIS: Chronic | Status: ACTIVE | Noted: 2017-11-09

## 2018-09-04 LAB
CHOLEST SERPL-MCNC: 128 MG/DL
CHOLEST/HDLC SERPL: 2.6 {RATIO}
ESTIMATED AVG GLUCOSE: 80 MG/DL
HBA1C MFR BLD HPLC: 4.4 %
HDLC SERPL-MCNC: 50 MG/DL
HDLC SERPL: 39.1 %
LDLC SERPL CALC-MCNC: 70.6 MG/DL
NONHDLC SERPL-MCNC: 78 MG/DL
POCT GLUCOSE: 88 MG/DL (ref 70–110)
PROCALCITONIN SERPL IA-MCNC: 0.76 NG/ML
TRIGL SERPL-MCNC: 37 MG/DL
TROPONIN I SERPL DL<=0.01 NG/ML-MCNC: 0.02 NG/ML
TROPONIN I SERPL DL<=0.01 NG/ML-MCNC: 0.03 NG/ML

## 2018-09-04 PROCEDURE — 63600175 PHARM REV CODE 636 W HCPCS: Performed by: NURSE PRACTITIONER

## 2018-09-04 PROCEDURE — 84484 ASSAY OF TROPONIN QUANT: CPT

## 2018-09-04 PROCEDURE — 25000003 PHARM REV CODE 250: Performed by: HOSPITALIST

## 2018-09-04 PROCEDURE — 83036 HEMOGLOBIN GLYCOSYLATED A1C: CPT

## 2018-09-04 PROCEDURE — 84145 PROCALCITONIN (PCT): CPT

## 2018-09-04 PROCEDURE — 99215 OFFICE O/P EST HI 40 MIN: CPT | Mod: ,,, | Performed by: INTERNAL MEDICINE

## 2018-09-04 PROCEDURE — 96375 TX/PRO/DX INJ NEW DRUG ADDON: CPT

## 2018-09-04 PROCEDURE — 25000242 PHARM REV CODE 250 ALT 637 W/ HCPCS: Performed by: HOSPITALIST

## 2018-09-04 PROCEDURE — 80061 LIPID PANEL: CPT

## 2018-09-04 PROCEDURE — 36415 COLL VENOUS BLD VENIPUNCTURE: CPT

## 2018-09-04 PROCEDURE — 27000221 HC OXYGEN, UP TO 24 HOURS

## 2018-09-04 PROCEDURE — 25000003 PHARM REV CODE 250: Performed by: NURSE PRACTITIONER

## 2018-09-04 PROCEDURE — 94640 AIRWAY INHALATION TREATMENT: CPT

## 2018-09-04 PROCEDURE — G0378 HOSPITAL OBSERVATION PER HR: HCPCS

## 2018-09-04 PROCEDURE — 63600175 PHARM REV CODE 636 W HCPCS: Performed by: HOSPITALIST

## 2018-09-04 PROCEDURE — 84484 ASSAY OF TROPONIN QUANT: CPT | Mod: 91

## 2018-09-04 PROCEDURE — 96372 THER/PROPH/DIAG INJ SC/IM: CPT

## 2018-09-04 PROCEDURE — 96376 TX/PRO/DX INJ SAME DRUG ADON: CPT

## 2018-09-04 PROCEDURE — 94761 N-INVAS EAR/PLS OXIMETRY MLT: CPT

## 2018-09-04 RX ORDER — ESCITALOPRAM OXALATE 10 MG/1
10 TABLET ORAL DAILY
Status: DISCONTINUED | OUTPATIENT
Start: 2018-09-04 | End: 2018-09-07 | Stop reason: HOSPADM

## 2018-09-04 RX ORDER — DILTIAZEM HYDROCHLORIDE 180 MG/1
360 CAPSULE, COATED, EXTENDED RELEASE ORAL DAILY
Status: DISCONTINUED | OUTPATIENT
Start: 2018-09-04 | End: 2018-09-06

## 2018-09-04 RX ORDER — CLONAZEPAM 0.5 MG/1
0.5 TABLET ORAL 2 TIMES DAILY PRN
Status: DISCONTINUED | OUTPATIENT
Start: 2018-09-04 | End: 2018-09-07 | Stop reason: HOSPADM

## 2018-09-04 RX ORDER — AZELASTINE 1 MG/ML
1 SPRAY, METERED NASAL 2 TIMES DAILY
Status: DISCONTINUED | OUTPATIENT
Start: 2018-09-04 | End: 2018-09-07 | Stop reason: HOSPADM

## 2018-09-04 RX ORDER — LABETALOL 200 MG/1
600 TABLET, FILM COATED ORAL 3 TIMES DAILY
Status: DISCONTINUED | OUTPATIENT
Start: 2018-09-04 | End: 2018-09-07 | Stop reason: HOSPADM

## 2018-09-04 RX ORDER — ACETAMINOPHEN 325 MG/1
650 TABLET ORAL EVERY 8 HOURS PRN
Status: DISCONTINUED | OUTPATIENT
Start: 2018-09-04 | End: 2018-09-04

## 2018-09-04 RX ORDER — PANTOPRAZOLE SODIUM 40 MG/1
40 TABLET, DELAYED RELEASE ORAL DAILY
Status: DISCONTINUED | OUTPATIENT
Start: 2018-09-04 | End: 2018-09-07 | Stop reason: HOSPADM

## 2018-09-04 RX ORDER — GUAIFENESIN 600 MG/1
600 TABLET, EXTENDED RELEASE ORAL 2 TIMES DAILY
Status: DISCONTINUED | OUTPATIENT
Start: 2018-09-04 | End: 2018-09-04

## 2018-09-04 RX ORDER — ONDANSETRON 2 MG/ML
4 INJECTION INTRAMUSCULAR; INTRAVENOUS EVERY 8 HOURS PRN
Status: DISCONTINUED | OUTPATIENT
Start: 2018-09-04 | End: 2018-09-07 | Stop reason: HOSPADM

## 2018-09-04 RX ORDER — IPRATROPIUM BROMIDE AND ALBUTEROL SULFATE 2.5; .5 MG/3ML; MG/3ML
3 SOLUTION RESPIRATORY (INHALATION) EVERY 4 HOURS PRN
Status: DISCONTINUED | OUTPATIENT
Start: 2018-09-04 | End: 2018-09-07 | Stop reason: HOSPADM

## 2018-09-04 RX ORDER — HYDROCODONE BITARTRATE AND ACETAMINOPHEN 7.5; 325 MG/1; MG/1
1 TABLET ORAL EVERY 6 HOURS PRN
Status: DISCONTINUED | OUTPATIENT
Start: 2018-09-04 | End: 2018-09-07 | Stop reason: HOSPADM

## 2018-09-04 RX ORDER — HYDRALAZINE HYDROCHLORIDE 20 MG/ML
10 INJECTION INTRAMUSCULAR; INTRAVENOUS EVERY 6 HOURS PRN
Status: DISCONTINUED | OUTPATIENT
Start: 2018-09-04 | End: 2018-09-04

## 2018-09-04 RX ORDER — HYDRALAZINE HYDROCHLORIDE 50 MG/1
100 TABLET, FILM COATED ORAL EVERY 8 HOURS
Status: DISCONTINUED | OUTPATIENT
Start: 2018-09-04 | End: 2018-09-07 | Stop reason: HOSPADM

## 2018-09-04 RX ORDER — HYDRALAZINE HYDROCHLORIDE 20 MG/ML
INJECTION INTRAMUSCULAR; INTRAVENOUS
Status: COMPLETED
Start: 2018-09-04 | End: 2018-09-04

## 2018-09-04 RX ORDER — MOXIFLOXACIN HYDROCHLORIDE 400 MG/1
400 TABLET ORAL DAILY
Status: DISCONTINUED | OUTPATIENT
Start: 2018-09-04 | End: 2018-09-04

## 2018-09-04 RX ORDER — HYDRALAZINE HYDROCHLORIDE 20 MG/ML
10 INJECTION INTRAMUSCULAR; INTRAVENOUS EVERY 6 HOURS PRN
Status: DISCONTINUED | OUTPATIENT
Start: 2018-09-04 | End: 2018-09-07 | Stop reason: HOSPADM

## 2018-09-04 RX ORDER — CLONIDINE 0.3 MG/24H
1 PATCH, EXTENDED RELEASE TRANSDERMAL
Status: DISCONTINUED | OUTPATIENT
Start: 2018-09-04 | End: 2018-09-07 | Stop reason: HOSPADM

## 2018-09-04 RX ORDER — LOSARTAN POTASSIUM 50 MG/1
100 TABLET ORAL DAILY
Status: DISCONTINUED | OUTPATIENT
Start: 2018-09-04 | End: 2018-09-04

## 2018-09-04 RX ORDER — ZOLPIDEM TARTRATE 5 MG/1
5 TABLET ORAL NIGHTLY PRN
Status: DISCONTINUED | OUTPATIENT
Start: 2018-09-04 | End: 2018-09-07 | Stop reason: HOSPADM

## 2018-09-04 RX ORDER — MORPHINE SULFATE 4 MG/ML
2 INJECTION, SOLUTION INTRAMUSCULAR; INTRAVENOUS EVERY 4 HOURS PRN
Status: DISCONTINUED | OUTPATIENT
Start: 2018-09-04 | End: 2018-09-04

## 2018-09-04 RX ORDER — LOSARTAN POTASSIUM 50 MG/1
100 TABLET ORAL DAILY
Status: DISCONTINUED | OUTPATIENT
Start: 2018-09-04 | End: 2018-09-07 | Stop reason: HOSPADM

## 2018-09-04 RX ORDER — HEPARIN SODIUM 5000 [USP'U]/ML
5000 INJECTION, SOLUTION INTRAVENOUS; SUBCUTANEOUS EVERY 8 HOURS
Status: DISCONTINUED | OUTPATIENT
Start: 2018-09-04 | End: 2018-09-07 | Stop reason: HOSPADM

## 2018-09-04 RX ORDER — NAPROXEN SODIUM 220 MG/1
81 TABLET, FILM COATED ORAL DAILY
Status: DISCONTINUED | OUTPATIENT
Start: 2018-09-04 | End: 2018-09-07 | Stop reason: HOSPADM

## 2018-09-04 RX ORDER — CLONIDINE HYDROCHLORIDE 0.1 MG/1
0.1 TABLET ORAL EVERY 4 HOURS PRN
Status: DISCONTINUED | OUTPATIENT
Start: 2018-09-04 | End: 2018-09-07 | Stop reason: HOSPADM

## 2018-09-04 RX ORDER — ISOSORBIDE DINITRATE 10 MG/1
30 TABLET ORAL 3 TIMES DAILY
Status: DISCONTINUED | OUTPATIENT
Start: 2018-09-04 | End: 2018-09-06

## 2018-09-04 RX ORDER — FUROSEMIDE 10 MG/ML
40 INJECTION INTRAMUSCULAR; INTRAVENOUS 2 TIMES DAILY
Status: DISCONTINUED | OUTPATIENT
Start: 2018-09-04 | End: 2018-09-05

## 2018-09-04 RX ORDER — FUROSEMIDE 40 MG/1
40 TABLET ORAL DAILY
Status: DISCONTINUED | OUTPATIENT
Start: 2018-09-04 | End: 2018-09-04

## 2018-09-04 RX ORDER — ACETAMINOPHEN 325 MG/1
650 TABLET ORAL EVERY 6 HOURS PRN
Status: DISCONTINUED | OUTPATIENT
Start: 2018-09-04 | End: 2018-09-07 | Stop reason: HOSPADM

## 2018-09-04 RX ORDER — TAMSULOSIN HYDROCHLORIDE 0.4 MG/1
0.4 CAPSULE ORAL DAILY
Status: DISCONTINUED | OUTPATIENT
Start: 2018-09-04 | End: 2018-09-05

## 2018-09-04 RX ORDER — ATORVASTATIN CALCIUM 40 MG/1
40 TABLET, FILM COATED ORAL NIGHTLY
Status: DISCONTINUED | OUTPATIENT
Start: 2018-09-04 | End: 2018-09-07 | Stop reason: HOSPADM

## 2018-09-04 RX ADMIN — HYDRALAZINE HYDROCHLORIDE 100 MG: 50 TABLET, FILM COATED ORAL at 06:09

## 2018-09-04 RX ADMIN — PANTOPRAZOLE SODIUM 40 MG: 40 TABLET, DELAYED RELEASE ORAL at 09:09

## 2018-09-04 RX ADMIN — ESCITALOPRAM OXALATE 10 MG: 10 TABLET, FILM COATED ORAL at 09:09

## 2018-09-04 RX ADMIN — LABETALOL HCL 600 MG: 200 TABLET, FILM COATED ORAL at 06:09

## 2018-09-04 RX ADMIN — DILTIAZEM HYDROCHLORIDE 360 MG: 180 CAPSULE, COATED, EXTENDED RELEASE ORAL at 09:09

## 2018-09-04 RX ADMIN — ISOSORBIDE DINITRATE 30 MG: 10 TABLET ORAL at 09:09

## 2018-09-04 RX ADMIN — CLONIDINE HYDROCHLORIDE 0.1 MG: 0.1 TABLET ORAL at 11:09

## 2018-09-04 RX ADMIN — LABETALOL HCL 600 MG: 200 TABLET, FILM COATED ORAL at 02:09

## 2018-09-04 RX ADMIN — ISOSORBIDE DINITRATE 30 MG: 10 TABLET ORAL at 02:09

## 2018-09-04 RX ADMIN — IPRATROPIUM BROMIDE AND ALBUTEROL SULFATE 3 ML: .5; 3 SOLUTION RESPIRATORY (INHALATION) at 05:09

## 2018-09-04 RX ADMIN — CLONIDINE HYDROCHLORIDE 0.1 MG: 0.1 TABLET ORAL at 04:09

## 2018-09-04 RX ADMIN — HYDRALAZINE HYDROCHLORIDE 100 MG: 50 TABLET, FILM COATED ORAL at 02:09

## 2018-09-04 RX ADMIN — AZELASTINE HYDROCHLORIDE 137 MCG: 137 SPRAY, METERED NASAL at 09:09

## 2018-09-04 RX ADMIN — ATORVASTATIN CALCIUM 40 MG: 40 TABLET, FILM COATED ORAL at 09:09

## 2018-09-04 RX ADMIN — HEPARIN SODIUM 5000 UNITS: 5000 INJECTION, SOLUTION INTRAVENOUS; SUBCUTANEOUS at 02:09

## 2018-09-04 RX ADMIN — ISOSORBIDE DINITRATE 30 MG: 10 TABLET ORAL at 06:09

## 2018-09-04 RX ADMIN — HYDRALAZINE HYDROCHLORIDE 100 MG: 50 TABLET, FILM COATED ORAL at 09:09

## 2018-09-04 RX ADMIN — LOSARTAN POTASSIUM 100 MG: 50 TABLET, FILM COATED ORAL at 05:09

## 2018-09-04 RX ADMIN — LABETALOL HCL 600 MG: 200 TABLET, FILM COATED ORAL at 09:09

## 2018-09-04 RX ADMIN — FUROSEMIDE 40 MG: 40 TABLET ORAL at 09:09

## 2018-09-04 RX ADMIN — FUROSEMIDE 40 MG: 10 INJECTION, SOLUTION INTRAMUSCULAR; INTRAVENOUS at 05:09

## 2018-09-04 RX ADMIN — ASPIRIN 81 MG CHEWABLE TABLET 81 MG: 81 TABLET CHEWABLE at 09:09

## 2018-09-04 RX ADMIN — HYDRALAZINE HYDROCHLORIDE 10 MG: 20 INJECTION INTRAMUSCULAR; INTRAVENOUS at 12:09

## 2018-09-04 RX ADMIN — HEPARIN SODIUM 5000 UNITS: 5000 INJECTION, SOLUTION INTRAVENOUS; SUBCUTANEOUS at 06:09

## 2018-09-04 RX ADMIN — CLONIDINE 1 PATCH: 0.3 PATCH TRANSDERMAL at 06:09

## 2018-09-04 RX ADMIN — TAMSULOSIN HYDROCHLORIDE 0.4 MG: 0.4 CAPSULE ORAL at 09:09

## 2018-09-04 NOTE — HPI
Mr. Perez is a 68-year-old  male with PMHx of ESRD on HD M/W/F, uncontrolled HTN, and long-standing h/o medication and dietary noncompliance, who presented to the ED with c/o shortness of breath that has progressively worsened over the past few days.  Associated chest tightness and nonproductive cough.  No aggravating or alleviating factors.  Denies any HA, visual disturbance, AMS, lightheadedness/dizziness, syncope, focal deficits, palpitations, orthopnea, PND, edema, weight gain, ABD pain, N/V/D, dysuria, hematuria, back pain, diaphoresis, weakness, fever, or chills.  Patient reports being complaint with medications and dialysis.  In the ED, he was found to be in pulmonary edema, with chest x-ray showing pulmonary vascular congestion.  Patient still makes urine.  Initial blood pressure 200s/100s.  EKG showed new T wave inversions.  Followed by Dr. London (Nephrologist) and Dr. Barnhart (Cardiologist).  BNP (1100) and troponin (0.035) at baseline.  Hospital Medicine was called for admission.

## 2018-09-04 NOTE — SUBJECTIVE & OBJECTIVE
Past Medical History:   Diagnosis Date    CKD (chronic kidney disease), stage IV     Diabetes mellitus, type 2     Dialysis patient     HTN (hypertension)        Past Surgical History:   Procedure Laterality Date    AV FISTULA PLACEMENT Right 2017    CIRCUMCISION      TOE AMPUTATION Left 2017    4th toe       Review of patient's allergies indicates:   Allergen Reactions    Augmentin [amoxicillin-pot clavulanate] Edema    Lisinopril      cough    Sulfa (sulfonamide antibiotics)      swelling     Current Facility-Administered Medications   Medication Frequency    acetaminophen tablet 650 mg Q6H PRN    albuterol-ipratropium 2.5 mg-0.5 mg/3 mL nebulizer solution 3 mL Q4H PRN    aspirin chewable tablet 81 mg Daily    atorvastatin tablet 40 mg QHS    azelastine 137 mcg (0.1 %) nasal spray 137 mcg BID    clonazePAM tablet 0.5 mg BID PRN    cloNIDine 0.3 mg/24 hr td ptwk 1 patch Q7 Days    cloNIDine tablet 0.1 mg Q4H PRN    diltiaZEM 24 hr capsule 360 mg Daily    escitalopram oxalate tablet 10 mg Daily    furosemide injection 40 mg BID    heparin (porcine) injection 5,000 Units Q8H    hydrALAZINE injection 10 mg Q6H PRN    hydrALAZINE tablet 100 mg Q8H    HYDROcodone-acetaminophen 7.5-325 mg per tablet 1 tablet Q6H PRN    isosorbide dinitrate tablet 30 mg TID    labetalol tablet 600 mg TID    losartan tablet 100 mg Daily    ondansetron injection 4 mg Q8H PRN    pantoprazole EC tablet 40 mg Daily    tamsulosin 24 hr capsule 0.4 mg Daily    zolpidem tablet 5 mg Nightly PRN     Family History     Problem Relation (Age of Onset)    Hypertension Father    No Known Problems Mother        Tobacco Use    Smoking status: Former Smoker     Types: Cigarettes     Last attempt to quit: 1971     Years since quittin.7    Smokeless tobacco: Never Used   Substance and Sexual Activity    Alcohol use: No    Drug use: No    Sexual activity: Not on file     Review of Systems    Constitutional: Positive for activity change. Negative for appetite change.   HENT: Negative for congestion and facial swelling.    Eyes: Negative for pain, discharge and redness.   Respiratory: Positive for chest tightness. Negative for apnea and cough.    Cardiovascular: Negative for chest pain, palpitations and leg swelling.   Gastrointestinal: Negative for abdominal distention.   Genitourinary: Negative for difficulty urinating, dysuria and frequency.   Musculoskeletal: Negative for neck pain and neck stiffness.   Skin: Negative for color change, rash and wound.   Neurological: Positive for weakness. Negative for dizziness and numbness.   Psychiatric/Behavioral: Negative for sleep disturbance.   All other systems reviewed and are negative.    Objective:     Vital Signs (Most Recent):  Temp: 98.1 °F (36.7 °C) (09/04/18 1613)  Pulse: 76 (09/04/18 1613)  Resp: 18 (09/04/18 1613)  BP: (!) 186/90 (09/04/18 1613)  SpO2: 98 % (09/04/18 1613)  O2 Device (Oxygen Therapy): room air (09/04/18 0800) Vital Signs (24h Range):  Temp:  [97.4 °F (36.3 °C)-98.7 °F (37.1 °C)] 98.1 °F (36.7 °C)  Pulse:  [68-78] 76  Resp:  [13-20] 18  SpO2:  [91 %-99 %] 98 %  BP: (120-211)/(53-98) 186/90     Weight: 90.9 kg (200 lb 6.4 oz) (09/04/18 0222)  Body mass index is 23.16 kg/m².  Body surface area is 2.24 meters squared.    I/O last 3 completed shifts:  In: 250 [IV Piggyback:250]  Out: -     Physical Exam   Constitutional: He is oriented to person, place, and time. He appears well-developed and well-nourished. No distress.   HENT:   Head: Normocephalic and atraumatic.   Eyes: Pupils are equal, round, and reactive to light.   Neck: Normal range of motion. Neck supple. No tracheal deviation present. No thyromegaly present.   Cardiovascular: Normal rate, regular rhythm and intact distal pulses. Exam reveals no gallop and no friction rub.   Murmur heard.  Pulmonary/Chest: Effort normal. He has no wheezes. He has rales.   Abdominal: Soft. He  exhibits no mass. There is no tenderness. There is no rebound and no guarding.   Musculoskeletal: Normal range of motion. He exhibits no edema.   Lymphadenopathy:     He has no cervical adenopathy.   Neurological: He is alert and oriented to person, place, and time.   Skin: Skin is warm. No rash noted. He is not diaphoretic. No erythema.   Nursing note and vitals reviewed.      Significant Labs:  CBC:   Recent Labs   Lab  09/03/18 2156   WBC  3.65*   RBC  3.72*   HGB  10.9*   HCT  33.0*   PLT  154   MCV  89   MCH  29.3   MCHC  33.0     CMP:   Recent Labs   Lab  09/03/18 2156   GLU  100   CALCIUM  9.1   ALBUMIN  3.6   PROT  7.6   NA  141   K  5.0   CO2  25   CL  103   BUN  21   CREATININE  5.2*   ALKPHOS  70   ALT  13   AST  34   BILITOT  0.6     Coagulation: No results for input(s): PT, INR, APTT in the last 168 hours.  LFTs:   Recent Labs   Lab  09/03/18 2156   ALT  13   AST  34   ALKPHOS  70   BILITOT  0.6   PROT  7.6   ALBUMIN  3.6     All labs within the past 24 hours have been reviewed.    Lab Results   Component Value Date    .7 (H) 08/26/2017    CALCIUM 9.1 09/03/2018    PHOS 4.4 08/05/2018         Significant Imaging: reviewed

## 2018-09-04 NOTE — ED NOTES
Okay for patient to eat per ED MD. Pt. Provided with sandwich, apple juice, and cranberry juice. Denies further needs at this time. WCTM.

## 2018-09-04 NOTE — ASSESSMENT & PLAN NOTE
- Likely secondary to noncompliance.  Resume home labetalol, long-acting nitrate, ARB, CCB, diuretics, hydralazine, and clonidine patch.  Na/fluid restriction.  Will likely need HD.

## 2018-09-04 NOTE — HOSPITAL COURSE
The pt was placed in observation for chest pain, uncontrolled HTN, and elevated troponin. In the ED, initial blood pressure 200s/100s.  EKG showed new T wave inversions. CXR showed subtle amount of haziness scattered throughout the left lung. +productive cough. WBC normal, afebrile. Oral Avelox added  Pt continues to complain of chest tightness. Trop 0.035>0.023>0.028.  Likely secondary to uncontrolled HTN but consulted with  Cardiology who recommended nuclear stress test which was negative, however, it showed EF 35%. Cardiac echo ordered  Pt counseled on low sodium fluid restriction diet.    On day 3, BP remains uncontrolled- discussed with Nephrology. Metanephrines, Aldosterones, and Renin ordered. Diltiazem discontinued. Procardia 60mg daily added. Imdur 30mg TID changed to Isosorbide mononitrate 60mg daily.     9/7/18   Patient's blood pressure has significantly improved with medication adjustment. Patient's EF during stress test was reevaluated by Dr. Chavez and was EF 60% with diastolic dysfunction. He will continued Lexapro and Klonopin for CASSANDRA. Patient will be dialyzed after HD today. Next HD session will be on Tuesday 9/11/18. Patient seen and examined on date of discharge and deemed suitable.

## 2018-09-04 NOTE — PROGRESS NOTES
[facility] Hospital Medicine  Progress Note    Patient Name: Conner Perez III  MRN: [id number]  Patient Class: OP- Observation   Admission Date: 9/3/2018  Length of Stay: 0 days  Attending Physician: Keith Wesley MD  Primary Care Provider: Raciel Angela MD        Subjective:     Principal Problem:Accelerated hypertension    HPI:  Mr. Perez is a 68-year-old  male with PMHx of ESRD on HD M/W/F, uncontrolled HTN, and long-standing h/o medication and dietary noncompliance, who presented to the ED with c/o shortness of breath that has progressively worsened over the past few days.  Associated chest tightness and nonproductive cough.  No aggravating or alleviating factors.  Denies any HA, visual disturbance, AMS, lightheadedness/dizziness, syncope, focal deficits, palpitations, orthopnea, PND, edema, weight gain, ABD pain, N/V/D, dysuria, hematuria, back pain, diaphoresis, weakness, fever, or chills.  Patient reports being complaint with medications and dialysis.  In the ED, he was found to be in pulmonary edema, with chest x-ray showing pulmonary vascular congestion.  Patient still makes urine.  Initial blood pressure 200s/100s.  EKG showed new T wave inversions.   Followed by Dr. London (Nephrologist) and Dr. Barnhart (Cardiologist).  BNP (1100) and troponin (0.035) at baseline.  Hospital Medicine was called for admission.    Hospital Course:  The pt was placed in observation for chest pain, uncontrolled HTN, and elevated troponin. In the ED, initial blood pressure 200s/100s.  EKG showed new T wave inversions. CXR showed subtle amount of haziness scattered throughout the left lung.  WBC normal, afebrile. Denies cough. Pt continues to complain of chest tightness. Trop 0.035>0.023>0.028.  Likely secondary to uncontrolled HTN but will consult Cardiology.   BP medications restarted. Pt reports compliance with medications and HD.             Review of Systems   Constitutional: Negative  for appetite change, chills, diaphoresis, fatigue and fever.   HENT: Negative for congestion, nosebleeds, sore throat and trouble swallowing.    Eyes: Negative for pain, discharge and visual disturbance.   Respiratory: Negative for apnea, cough, chest tightness, shortness of breath, wheezing and stridor.    Cardiovascular: Positive for chest pain. Negative for palpitations and leg swelling.   Gastrointestinal: Negative for abdominal distention, abdominal pain, blood in stool, constipation, diarrhea, nausea and vomiting.   Endocrine: Negative for cold intolerance and heat intolerance.   Genitourinary: Negative for difficulty urinating, dysuria, flank pain, frequency and urgency.   Musculoskeletal: Negative for arthralgias, back pain, joint swelling, myalgias, neck pain and neck stiffness.   Skin: Negative for rash and wound.   Allergic/Immunologic: Negative for food allergies and immunocompromised state.   Neurological: Positive for weakness. Negative for dizziness, seizures, syncope, facial asymmetry, light-headedness and headaches.   Hematological: Negative for adenopathy.   Psychiatric/Behavioral: Negative for agitation, behavioral problems and confusion. The patient is nervous/anxious.      Objective:     Vital Signs (Most Recent):  Temp: 98.1 °F (36.7 °C) (09/04/18 1613)  Pulse: 76 (09/04/18 1613)  Resp: 18 (09/04/18 1613)  BP: (!) 186/90 (09/04/18 1613)  SpO2: 98 % (09/04/18 1613) Vital Signs (24h Range):  Temp:  [97.4 °F (36.3 °C)-98.7 °F (37.1 °C)] 98.1 °F (36.7 °C)  Pulse:  [68-78] 76  Resp:  [13-20] 18  SpO2:  [91 %-99 %] 98 %  BP: (120-211)/(53-98) 186/90     Weight: 90.9 kg (200 lb 6.4 oz)  Body mass index is 23.16 kg/m².    Intake/Output Summary (Last 24 hours) at 9/4/2018 4916  Last data filed at 9/4/2018 1300  Gross per 24 hour   Intake 610 ml   Output --   Net 610 ml      Physical Exam   Constitutional: He is oriented to person, place, and time. He appears well-developed and well-nourished. No  distress.   HENT:   Head: Normocephalic and atraumatic.   Nose: Nose normal.   Mouth/Throat: Oropharynx is clear and moist.   Eyes: Conjunctivae and EOM are normal. No scleral icterus.   Neck: Normal range of motion. Neck supple.   Cardiovascular: Normal rate, regular rhythm, normal heart sounds and intact distal pulses. Exam reveals no gallop and no friction rub.   No murmur heard.  Pulmonary/Chest: Effort normal. No stridor. No respiratory distress. He has no wheezes. He has rales (bilaterally ). He exhibits no tenderness.   Abdominal: Soft. Bowel sounds are normal. He exhibits no distension. There is no tenderness. There is no rebound and no guarding.   Musculoskeletal: Normal range of motion. He exhibits no edema, tenderness or deformity.   Neurological: He is alert and oriented to person, place, and time. He has normal reflexes. No cranial nerve deficit. He exhibits normal muscle tone. Coordination normal.   Skin: Skin is warm and dry. No rash noted. He is not diaphoretic. No erythema. No pallor.   Psychiatric: He has a normal mood and affect. His behavior is normal. Thought content normal.   Nursing note and vitals reviewed.      Significant Labs:   BMP:   Recent Labs   Lab  09/03/18   2156   GLU  100   NA  141   K  5.0   CL  103   CO2  25   BUN  21   CREATININE  5.2*   CALCIUM  9.1     CBC:   Recent Labs   Lab  09/03/18   2156   WBC  3.65*   HGB  10.9*   HCT  33.0*   PLT  154     CMP:   Recent Labs   Lab  09/03/18   2156   NA  141   K  5.0   CL  103   CO2  25   GLU  100   BUN  21   CREATININE  5.2*   CALCIUM  9.1   PROT  7.6   ALBUMIN  3.6   BILITOT  0.6   ALKPHOS  70   AST  34   ALT  13   ANIONGAP  13   EGFRNONAA  10*     All pertinent labs within the past 24 hours have been reviewed.    Significant Imaging:   Imaging Results          X-Ray Chest AP Portable (Final result)  Result time 09/03/18 21:50:19    Final result by TERESA Kumar Sr., MD (09/03/18 21:50:19)                 Impression:      1. There  is a subtle amount of haziness scattered throughout the left lung.  This is characteristic of atelectasis or pneumonia.  2. The right costophrenic angle is blunted.  This is characteristic of a tiny pleural effusion.  3. There is mild cardiomegaly.  .      Electronically signed by: Damien Kumar MD  Date:    09/03/2018  Time:    21:50             Narrative:    EXAMINATION:  XR CHEST AP PORTABLE    CLINICAL HISTORY:  Chest Pain;    COMPARISON:  08/21/2018    FINDINGS:  There is mild cardiomegaly.  There is a subtle amount of haziness scattered throughout the left lung.  There is no pneumothorax.  The right costophrenic angle is blunted.  The left costophrenic angle is sharp.                              Assessment/Plan:      * Accelerated hypertension    Resume home labetalol, long-acting nitrate, ARB, CCB, diuretics, hydralazine, and clonidine patch.  Na/fluid restriction.    Will likely need HD for UF        Acute on chronic diastolic HFpEF of 55-60%    - BP control as above.    Continue IV lasix  Pt does still urinate   - Strict I&O's, daily weights, Na/fluid restrictions.  - Nephrology consult for HD.        ESRD (end stage renal disease) on dialysis    - Nephrology consult for HD.        Elevated troponin    -- Continue ASA, BB, Imdur, and statin.   Consult cardiology .        Anemia of renal disease    - H&H stable, follow daily CBC and transfuse as needed.          VTE Risk Mitigation (From admission, onward)        Ordered     heparin (porcine) injection 5,000 Units  Every 8 hours      09/04/18 0433     Place sequential compression device  Until discontinued      09/04/18 0433     IP VTE HIGH RISK PATIENT  Once      09/04/18 0216              Susan Retana NP  Department of Hospital Medicine   [facility]

## 2018-09-04 NOTE — PROGRESS NOTES
09/04/2018@0244--Patient vitals assessed upon admit. SBP greater than 170. Last elevated BP was addressed by administering Hydralazine in Emergency Room at approx. 0100. At this time it is too early to administer PRN Hydralazine in Patient's MAR. Shared findings and vital information with hospital medicine.    09/04/2018@0319---New Order: clonidine 0.1mg Q4H PO for SBP over 170. Continue to monitor patient.

## 2018-09-04 NOTE — CONSULTS
[facility]   Nephrology  Consult Note      Patient Name: Conner Perez III  MRN: [id number]  Admission Date: 9/3/2018  Hospital Length of Stay: 0 days  Attending Provider: Keith Wesley MD   Primary Care Physician: Raciel Angela MD  Principal Problem:Accelerated hypertension    Consults  Subjective:     HPI: Conner Perez III is a 68 y old AA man with h/o ESRD on HD ( [facility] ,  but presented to the ER last evening for SOB likely due to noncompliance with fluid and salt restriction , Pt reports having dialysis yesterday ,   He also complains of mild chest pain on the left side, slight increase in troponin levels noted on the lab work, awaiting cards eval,           Past Medical History:   Diagnosis Date    CKD (chronic kidney disease), stage IV     Diabetes mellitus, type 2     Dialysis patient     HTN (hypertension)        Past Surgical History:   Procedure Laterality Date    AV FISTULA PLACEMENT Right 06/2017    CIRCUMCISION  1978    TOE AMPUTATION Left 01/24/2017    4th toe       Review of patient's allergies indicates:   Allergen Reactions    Augmentin [amoxicillin-pot clavulanate] Edema    Lisinopril      cough    Sulfa (sulfonamide antibiotics)      swelling     Current Facility-Administered Medications   Medication Frequency    acetaminophen tablet 650 mg Q6H PRN    albuterol-ipratropium 2.5 mg-0.5 mg/3 mL nebulizer solution 3 mL Q4H PRN    aspirin chewable tablet 81 mg Daily    atorvastatin tablet 40 mg QHS    azelastine 137 mcg (0.1 %) nasal spray 137 mcg BID    clonazePAM tablet 0.5 mg BID PRN    cloNIDine 0.3 mg/24 hr td ptwk 1 patch Q7 Days    cloNIDine tablet 0.1 mg Q4H PRN    diltiaZEM 24 hr capsule 360 mg Daily    escitalopram oxalate tablet 10 mg Daily    furosemide injection 40 mg BID    heparin (porcine) injection 5,000 Units Q8H    hydrALAZINE injection 10 mg Q6H PRN    hydrALAZINE tablet 100 mg Q8H     HYDROcodone-acetaminophen 7.5-325 mg per tablet 1 tablet Q6H PRN    isosorbide dinitrate tablet 30 mg TID    labetalol tablet 600 mg TID    losartan tablet 100 mg Daily    ondansetron injection 4 mg Q8H PRN    pantoprazole EC tablet 40 mg Daily    tamsulosin 24 hr capsule 0.4 mg Daily    zolpidem tablet 5 mg Nightly PRN     Family History     Problem Relation (Age of Onset)    Hypertension Father    No Known Problems Mother        Tobacco Use    Smoking status: Former Smoker     Types: Cigarettes     Last attempt to quit: 1971     Years since quittin.7    Smokeless tobacco: Never Used   Substance and Sexual Activity    Alcohol use: No    Drug use: No    Sexual activity: Not on file     Review of Systems   Constitutional: Positive for activity change. Negative for appetite change.   HENT: Negative for congestion and facial swelling.    Eyes: Negative for pain, discharge and redness.   Respiratory: Positive for chest tightness. Negative for apnea and cough.    Cardiovascular: Negative for chest pain, palpitations and leg swelling.   Gastrointestinal: Negative for abdominal distention.   Genitourinary: Negative for difficulty urinating, dysuria and frequency.   Musculoskeletal: Negative for neck pain and neck stiffness.   Skin: Negative for color change, rash and wound.   Neurological: Positive for weakness. Negative for dizziness and numbness.   Psychiatric/Behavioral: Negative for sleep disturbance.   All other systems reviewed and are negative.    Objective:     Vital Signs (Most Recent):  Temp: 98.1 °F (36.7 °C) (18)  Pulse: 76 (18)  Resp: 18 (18)  BP: (!) 186/90 (18)  SpO2: 98 % (18)  O2 Device (Oxygen Therapy): room air (18 0800) Vital Signs (24h Range):  Temp:  [97.4 °F (36.3 °C)-98.7 °F (37.1 °C)] 98.1 °F (36.7 °C)  Pulse:  [68-78] 76  Resp:  [13-20] 18  SpO2:  [91 %-99 %] 98 %  BP: (120-211)/(53-98) 186/90     Weight: 90.9 kg (200 lb  6.4 oz) (09/04/18 0222)  Body mass index is 23.16 kg/m².  Body surface area is 2.24 meters squared.    I/O last 3 completed shifts:  In: 250 [IV Piggyback:250]  Out: -     Physical Exam   Constitutional: He is oriented to person, place, and time. He appears well-developed and well-nourished. No distress.   HENT:   Head: Normocephalic and atraumatic.   Eyes: Pupils are equal, round, and reactive to light.   Neck: Normal range of motion. Neck supple. No tracheal deviation present. No thyromegaly present.   Cardiovascular: Normal rate, regular rhythm and intact distal pulses. Exam reveals no gallop and no friction rub.   Murmur heard.  Pulmonary/Chest: Effort normal. He has no wheezes. He has rales.   Abdominal: Soft. He exhibits no mass. There is no tenderness. There is no rebound and no guarding.   Musculoskeletal: Normal range of motion. He exhibits no edema.   Lymphadenopathy:     He has no cervical adenopathy.   Neurological: He is alert and oriented to person, place, and time.   Skin: Skin is warm. No rash noted. He is not diaphoretic. No erythema.   Nursing note and vitals reviewed.      Significant Labs:  CBC:   Recent Labs   Lab  09/03/18 2156   WBC  3.65*   RBC  3.72*   HGB  10.9*   HCT  33.0*   PLT  154   MCV  89   MCH  29.3   MCHC  33.0     CMP:   Recent Labs   Lab  09/03/18   2156   GLU  100   CALCIUM  9.1   ALBUMIN  3.6   PROT  7.6   NA  141   K  5.0   CO2  25   CL  103   BUN  21   CREATININE  5.2*   ALKPHOS  70   ALT  13   AST  34   BILITOT  0.6     Coagulation: No results for input(s): PT, INR, APTT in the last 168 hours.  LFTs:   Recent Labs   Lab  09/03/18   2156   ALT  13   AST  34   ALKPHOS  70   BILITOT  0.6   PROT  7.6   ALBUMIN  3.6     All labs within the past 24 hours have been reviewed.    Lab Results   Component Value Date    .7 (H) 08/26/2017    CALCIUM 9.1 09/03/2018    PHOS 4.4 08/05/2018         Significant Imaging: reviewed    Assessment/Plan:     ESRD (end stage renal disease)  on dialysis    1. ESRD on HD : (  [place] , [facility] ) ,  Madeline was admitted to the hospital yesterday with  Some left-sided chest pain, questionable for early pneumonia on the x-ray, mild troponin leak noted, cardiology evaluation pending, no urgent indication for dialysis, will schedule for the same tomorrow,     2.  HTN : BP elevated, UF on HD as tolerated , resume home meds,      3. Anemia of CKD : epogen with HD when indicated      4. SHPT : renal diet and binders      5. D/c home when stable     6. Angina - Cards eval, ? Stress test             Thank you for your consult. I will follow-up with patient. Please contact us if you have any additional questions.     Total time spent 70 minutes including time needed to review the records,  patient  evaluation, documentation, face-to-face discussion with the patient,  primary team,   more than 50% of the time was spent on coordination of care and counseling.       Fabián London MD   Nephrology  [facility]

## 2018-09-04 NOTE — PLAN OF CARE
Patient's BP continues to trend downward. Last BP medications administered around 6am. Pt has been afebrile, AAOx4, RR: Clear and unlabored. C/o SOB addressed with nebulizer treatment. O2 with NC shows SATs 94% and above. Will continue to monitor patient.

## 2018-09-04 NOTE — SUBJECTIVE & OBJECTIVE
Review of Systems   Constitutional: Negative for appetite change, chills, diaphoresis, fatigue and fever.   HENT: Negative for congestion, nosebleeds, sore throat and trouble swallowing.    Eyes: Negative for pain, discharge and visual disturbance.   Respiratory: Negative for apnea, cough, chest tightness, shortness of breath, wheezing and stridor.    Cardiovascular: Positive for chest pain. Negative for palpitations and leg swelling.   Gastrointestinal: Negative for abdominal distention, abdominal pain, blood in stool, constipation, diarrhea, nausea and vomiting.   Endocrine: Negative for cold intolerance and heat intolerance.   Genitourinary: Negative for difficulty urinating, dysuria, flank pain, frequency and urgency.   Musculoskeletal: Negative for arthralgias, back pain, joint swelling, myalgias, neck pain and neck stiffness.   Skin: Negative for rash and wound.   Allergic/Immunologic: Negative for food allergies and immunocompromised state.   Neurological: Positive for weakness. Negative for dizziness, seizures, syncope, facial asymmetry, light-headedness and headaches.   Hematological: Negative for adenopathy.   Psychiatric/Behavioral: Negative for agitation, behavioral problems and confusion. The patient is nervous/anxious.      Objective:     Vital Signs (Most Recent):  Temp: 98.1 °F (36.7 °C) (09/04/18 1613)  Pulse: 76 (09/04/18 1613)  Resp: 18 (09/04/18 1613)  BP: (!) 186/90 (09/04/18 1613)  SpO2: 98 % (09/04/18 1613) Vital Signs (24h Range):  Temp:  [97.4 °F (36.3 °C)-98.7 °F (37.1 °C)] 98.1 °F (36.7 °C)  Pulse:  [68-78] 76  Resp:  [13-20] 18  SpO2:  [91 %-99 %] 98 %  BP: (120-211)/(53-98) 186/90     Weight: 90.9 kg (200 lb 6.4 oz)  Body mass index is 23.16 kg/m².    Intake/Output Summary (Last 24 hours) at 9/4/2018 1012  Last data filed at 9/4/2018 1300  Gross per 24 hour   Intake 610 ml   Output --   Net 610 ml      Physical Exam   Constitutional: He is oriented to person, place, and time. He  appears well-developed and well-nourished. No distress.   HENT:   Head: Normocephalic and atraumatic.   Nose: Nose normal.   Mouth/Throat: Oropharynx is clear and moist.   Eyes: Conjunctivae and EOM are normal. No scleral icterus.   Neck: Normal range of motion. Neck supple.   Cardiovascular: Normal rate, regular rhythm, normal heart sounds and intact distal pulses. Exam reveals no gallop and no friction rub.   No murmur heard.  Pulmonary/Chest: Effort normal. No stridor. No respiratory distress. He has no wheezes. He has rales (bilaterally ). He exhibits no tenderness.   Abdominal: Soft. Bowel sounds are normal. He exhibits no distension. There is no tenderness. There is no rebound and no guarding.   Musculoskeletal: Normal range of motion. He exhibits no edema, tenderness or deformity.   Neurological: He is alert and oriented to person, place, and time. He has normal reflexes. No cranial nerve deficit. He exhibits normal muscle tone. Coordination normal.   Skin: Skin is warm and dry. No rash noted. He is not diaphoretic. No erythema. No pallor.   Psychiatric: He has a normal mood and affect. His behavior is normal. Thought content normal.   Nursing note and vitals reviewed.      Significant Labs:   BMP:   Recent Labs   Lab  09/03/18   2156   GLU  100   NA  141   K  5.0   CL  103   CO2  25   BUN  21   CREATININE  5.2*   CALCIUM  9.1     CBC:   Recent Labs   Lab  09/03/18   2156   WBC  3.65*   HGB  10.9*   HCT  33.0*   PLT  154     CMP:   Recent Labs   Lab  09/03/18   2156   NA  141   K  5.0   CL  103   CO2  25   GLU  100   BUN  21   CREATININE  5.2*   CALCIUM  9.1   PROT  7.6   ALBUMIN  3.6   BILITOT  0.6   ALKPHOS  70   AST  34   ALT  13   ANIONGAP  13   EGFRNONAA  10*     All pertinent labs within the past 24 hours have been reviewed.    Significant Imaging:   Imaging Results          X-Ray Chest AP Portable (Final result)  Result time 09/03/18 21:50:19    Final result by TERESA Kumar Sr., MD (09/03/18  21:50:19)                 Impression:      1. There is a subtle amount of haziness scattered throughout the left lung.  This is characteristic of atelectasis or pneumonia.  2. The right costophrenic angle is blunted.  This is characteristic of a tiny pleural effusion.  3. There is mild cardiomegaly.  .      Electronically signed by: Damien Kumar MD  Date:    09/03/2018  Time:    21:50             Narrative:    EXAMINATION:  XR CHEST AP PORTABLE    CLINICAL HISTORY:  Chest Pain;    COMPARISON:  08/21/2018    FINDINGS:  There is mild cardiomegaly.  There is a subtle amount of haziness scattered throughout the left lung.  There is no pneumothorax.  The right costophrenic angle is blunted.  The left costophrenic angle is sharp.

## 2018-09-04 NOTE — ASSESSMENT & PLAN NOTE
- BP control as above.    Continue IV lasix  Pt does still urinate   - Strict I&O's, daily weights, Na/fluid restrictions.  - Nephrology consult for HD.

## 2018-09-04 NOTE — ASSESSMENT & PLAN NOTE
1. ESRD on HD : (  [facility] ) ,  Madeline was admitted to the hospital yesterday with  Some left-sided chest pain, questionable for early pneumonia on the x-ray, mild troponin leak noted, cardiology evaluation pending, no urgent indication for dialysis, will schedule for the same tomorrow,     2.  HTN : BP elevated, UF on HD as tolerated , resume home meds,      3. Anemia of CKD : epogen with HD when indicated      4. SHPT : renal diet and binders      5. D/c home when stable     6. Angina - Cards eval, ? Stress test

## 2018-09-04 NOTE — H&P
"[facility] Hospital Medicine  History & Physical    Patient Name: Conner Perez III  MRN: [id number]  Admission Date: 9/3/2018  Attending Physician: Keith Wesley MD   Primary Care Provider: Raciel Angela MD         Patient information was obtained from patient, past medical records and ER records.     Subjective:     Principal Problem:Accelerated hypertension    Chief Complaint:   Chief Complaint   Patient presents with    Chest Pain     with chest congestion, cough. "I think I'm having an anxiety attack."        HPI: Mr. Perez is a 68-year-old  male with PMHx of ESRD on HD M/W/F, uncontrolled HTN, and long-standing h/o medication and dietary noncompliance, who presented to the ED with c/o shortness of breath that has progressively worsened over the past few days.  Associated chest tightness and nonproductive cough.  No aggravating or alleviating factors.  Denies any HA, visual disturbance, AMS, lightheadedness/dizziness, syncope, focal deficits, palpitations, orthopnea, PND, edema, weight gain, ABD pain, N/V/D, dysuria, hematuria, back pain, diaphoresis, weakness, fever, or chills.  Patient reports being complaint with medications and dialysis.  In the ED, he was found to be in pulmonary edema, with chest x-ray showing pulmonary vascular congestion.  Patient still makes urine.  Initial blood pressure 200s/100s.  EKG showed new T wave inversions.   Followed by Dr. London (Nephrologist) and Dr. Barnhart (Cardiologist).  BNP (1100) and troponin (0.035) at baseline.  Hospital Medicine was called for admission.      Past Medical History:   Diagnosis Date    CKD (chronic kidney disease), stage IV     Dialysis patient     HTN (hypertension)        Past Surgical History:   Procedure Laterality Date    AV FISTULA PLACEMENT Right 06/2017    CIRCUMCISION  1978    TOE AMPUTATION Left 01/24/2017    4th toe       Review of patient's allergies indicates:   Allergen Reactions    " Augmentin [amoxicillin-pot clavulanate] Edema    Lisinopril      cough    Sulfa (sulfonamide antibiotics)      swelling       No current facility-administered medications on file prior to encounter.      Current Outpatient Medications on File Prior to Encounter   Medication Sig    albuterol (PROVENTIL) 2.5 mg /3 mL (0.083 %) nebulizer solution 2.5 mg.    albuterol 90 mcg/actuation inhaler Inhale into the lungs.    aspirin 81 MG Chew Take 1 tablet (81 mg total) by mouth once daily.    atorvastatin (LIPITOR) 40 MG tablet Take 1 tablet (40 mg total) by mouth every evening. (Patient taking differently: Take 20 mg by mouth every evening. )    azelastine (ASTELIN) 137 mcg (0.1 %) nasal spray     clonazePAM (KLONOPIN) 0.5 MG tablet Take 1 tablet (0.5 mg total) by mouth 2 (two) times daily as needed for Anxiety.    cloNIDine 0.3 mg/24 hr td ptwk (CATAPRES) 0.3 mg/24 hr Place 1 patch onto the skin every 7 days.    diltiaZEM (CARDIZEM CD) 360 MG 24 hr capsule Take 1 capsule (360 mg total) by mouth once daily.    escitalopram oxalate (LEXAPRO) 10 MG tablet Take 10 mg by mouth.    furosemide (LASIX) 40 MG tablet Take 40 mg by mouth once daily.    hydrALAZINE (APRESOLINE) 100 MG tablet Take 1 tablet (100 mg total) by mouth every 8 (eight) hours.    hydrocodone-acetaminophen 10-325mg (NORCO)  mg Tab Take 1 tablet by mouth every 4 (four) hours as needed for Pain.    isosorbide dinitrate (ISORDIL) 30 MG Tab Take 1 tablet (30 mg total) by mouth 3 (three) times daily.    labetalol (NORMODYNE) 300 MG tablet Take 2 tablets (600 mg total) by mouth 3 (three) times daily.    lactulose (CHRONULAC) 10 gram/15 mL solution take 2 tablespoonful by mouth at bedtime    losartan (COZAAR) 100 MG tablet Take 1 tablet (100 mg total) by mouth once daily.    tamsulosin (FLOMAX) 0.4 mg Cp24 Take 1 capsule (0.4 mg total) by mouth once daily.    traMADol (ULTRAM) 50 mg tablet take 1 to 2 tablets by mouth twice a day if needed  for pain    zolpidem (AMBIEN) 5 MG Tab Take 1 tablet (5 mg total) by mouth nightly as needed.    LORazepam (ATIVAN) 1 MG tablet Take 0.5 tablets (0.5 mg total) by mouth every 6 (six) hours as needed for Anxiety.    ondansetron (ZOFRAN-ODT) 4 MG TbDL Take 1 tablet (4 mg total) by mouth every 8 (eight) hours as needed.    pantoprazole (PROTONIX) 40 MG tablet Take 1 tablet (40 mg total) by mouth once daily.     Family History     Problem Relation (Age of Onset)    Hypertension Father    No Known Problems Mother        Tobacco Use    Smoking status: Former Smoker     Types: Cigarettes     Last attempt to quit: 1971     Years since quittin.7    Smokeless tobacco: Never Used   Substance and Sexual Activity    Alcohol use: No    Drug use: No    Sexual activity: Not on file     Review of Systems   Constitutional: Negative for activity change, chills, diaphoresis, fatigue, fever and unexpected weight change.   HENT: Negative for congestion, sore throat and trouble swallowing.    Eyes: Negative for visual disturbance.   Respiratory: Positive for cough and chest tightness. Negative for shortness of breath and wheezing.    Cardiovascular: Positive for chest pain. Negative for palpitations and leg swelling.   Gastrointestinal: Negative for abdominal distention, abdominal pain, blood in stool, constipation, diarrhea, nausea and vomiting.   Genitourinary: Negative for decreased urine volume, difficulty urinating, dysuria, frequency, hematuria and urgency.   Musculoskeletal: Negative for arthralgias, back pain and myalgias.   Skin: Negative for pallor, rash and wound.   Neurological: Negative for dizziness, syncope, facial asymmetry, speech difficulty, weakness, light-headedness, numbness and headaches.   Psychiatric/Behavioral: Negative for confusion. The patient is nervous/anxious.    All other systems reviewed and are negative.    Objective:     Vital Signs (Most Recent):  Temp: 97.9 °F (36.6 °C) (18  0222)  Pulse: 72 (09/04/18 0222)  Resp: 16 (09/04/18 0222)  BP: (!) 209/93 (09/04/18 0222)  SpO2: 98 % (09/04/18 0222) Vital Signs (24h Range):  Temp:  [97.4 °F (36.3 °C)-97.9 °F (36.6 °C)] 97.9 °F (36.6 °C)  Pulse:  [68-75] 72  Resp:  [13-20] 16  SpO2:  [91 %-99 %] 98 %  BP: (120-209)/(53-98) 209/93     Weight: 90.9 kg (200 lb 6.4 oz)  Body mass index is 23.16 kg/m².    Physical Exam   Constitutional: He is oriented to person, place, and time. He appears well-developed and well-nourished. No distress.   HENT:   Head: Normocephalic and atraumatic.   Eyes: Conjunctivae are normal.   PERRL; EOM intact.   Neck: Normal range of motion. Neck supple. JVD present.   Cardiovascular: Normal rate, regular rhythm, S1 normal, S2 normal and intact distal pulses.  No extrasystoles are present. Exam reveals no gallop.   No murmur heard.  Pulses:       Radial pulses are 2+ on the right side, and 2+ on the left side.        Dorsalis pedis pulses are 2+ on the right side, and 2+ on the left side.        Posterior tibial pulses are 2+ on the right side, and 2+ on the left side.   Pulmonary/Chest: Effort normal. No accessory muscle usage. No tachypnea. No respiratory distress. He has no wheezes. He has no rhonchi. He has rales (coarse) in the right lower field and the left lower field.   Abdominal: Soft. Bowel sounds are normal. He exhibits no distension. There is no tenderness. There is no rebound, no guarding and no CVA tenderness.   Musculoskeletal: Normal range of motion. He exhibits no edema, tenderness or deformity.   Neurological: He is alert and oriented to person, place, and time. No cranial nerve deficit or sensory deficit.   Skin: Skin is warm, dry and intact. Capillary refill takes less than 2 seconds. No rash noted. He is not diaphoretic. No cyanosis or erythema.   Psychiatric: He has a normal mood and affect. His speech is normal and behavior is normal. Cognition and memory are normal.   Nursing note and vitals  reviewed.          Significant Labs:   Results for orders placed or performed during the hospital encounter of 09/03/18   CBC auto differential   Result Value Ref Range    WBC 3.65 (L) 3.90 - 12.70 K/uL    RBC 3.72 (L) 4.60 - 6.20 M/uL    Hemoglobin 10.9 (L) 14.0 - 18.0 g/dL    Hematocrit 33.0 (L) 40.0 - 54.0 %    MCV 89 82 - 98 fL    MCH 29.3 27.0 - 31.0 pg    MCHC 33.0 32.0 - 36.0 g/dL    RDW 14.4 11.5 - 14.5 %    Platelets 154 150 - 350 K/uL    MPV 10.0 9.2 - 12.9 fL    Gran # (ANC) 2.0 1.8 - 7.7 K/uL    Lymph # 1.0 1.0 - 4.8 K/uL    Mono # 0.4 0.3 - 1.0 K/uL    Eos # 0.2 0.0 - 0.5 K/uL    Baso # 0.02 0.00 - 0.20 K/uL    Gran% 55.4 38.0 - 73.0 %    Lymph% 28.2 18.0 - 48.0 %    Mono% 11.5 4.0 - 15.0 %    Eosinophil% 4.4 0.0 - 8.0 %    Basophil% 0.5 0.0 - 1.9 %    Differential Method Automated    Comprehensive metabolic panel   Result Value Ref Range    Sodium 141 136 - 145 mmol/L    Potassium 5.0 3.5 - 5.1 mmol/L    Chloride 103 95 - 110 mmol/L    CO2 25 23 - 29 mmol/L    Glucose 100 70 - 110 mg/dL    BUN, Bld 21 8 - 23 mg/dL    Creatinine 5.2 (H) 0.5 - 1.4 mg/dL    Calcium 9.1 8.7 - 10.5 mg/dL    Total Protein 7.6 6.0 - 8.4 g/dL    Albumin 3.6 3.5 - 5.2 g/dL    Total Bilirubin 0.6 0.1 - 1.0 mg/dL    Alkaline Phosphatase 70 55 - 135 U/L    AST 34 10 - 40 U/L    ALT 13 10 - 44 U/L    Anion Gap 13 8 - 16 mmol/L    eGFR if African American 12 (A) >60 mL/min/1.73 m^2    eGFR if non African American 10 (A) >60 mL/min/1.73 m^2   Troponin I #1   Result Value Ref Range    Troponin I 0.035 (H) 0.000 - 0.026 ng/mL   B-Type natriuretic peptide (BNP)   Result Value Ref Range    BNP 1,137 (H) 0 - 99 pg/mL   Troponin I #2   Result Value Ref Range    Troponin I 0.023 0.000 - 0.026 ng/mL   Lactic acid, plasma   Result Value Ref Range    Lactate (Lactic Acid) 0.6 0.5 - 2.2 mmol/L    All pertinent labs within the past 24 hours have been reviewed.    Significant Imaging:   Imaging Results          X-Ray Chest AP Portable (Final  result)  Result time 09/03/18 21:50:19    Final result by TERESA Kumar Sr., MD (09/03/18 21:50:19)                 Impression:      1. There is a subtle amount of haziness scattered throughout the left lung.  This is characteristic of atelectasis or pneumonia.  2. The right costophrenic angle is blunted.  This is characteristic of a tiny pleural effusion.  3. There is mild cardiomegaly.  .      Electronically signed by: Damien Kumar MD  Date:    09/03/2018  Time:    21:50             Narrative:    EXAMINATION:  XR CHEST AP PORTABLE    CLINICAL HISTORY:  Chest Pain;    COMPARISON:  08/21/2018    FINDINGS:  There is mild cardiomegaly.  There is a subtle amount of haziness scattered throughout the left lung.  There is no pneumothorax.  The right costophrenic angle is blunted.  The left costophrenic angle is sharp.                               I have reviewed all pertinent imaging results/findings within the past 24 hours.     EKG: (personally reviewed)  Normal sinus rhythm, incomplete LBBB, LAFB, T wave inversions in inferior and anterolateral leads.          Assessment/Plan:     * Accelerated hypertension    - Likely secondary to noncompliance.  Resume home labetalol, long-acting nitrate, ARB, CCB, diuretics, hydralazine, and clonidine patch.  Na/fluid restriction.  Will likely need HD.        ESRD (end stage renal disease) on dialysis    - Nephrology consult for HD.        Elevated troponin    - Initial troponin at baseline of 0.035, likely secondary to above + ESRD. No angina.  EKG with new T wave inversions.  Follow serial CE's and EKG.  - Continue ASA, BB, Imdur, and statin.   - Further recs pending clinical course.        Acute on chronic diastolic HFpEF of 55-60%    - BP control as above.  Continue diuretic.  - Strict I&O's, daily weights, Na/fluid restrictions.  - Nephrology consult for HD.        Anemia of renal disease    - H&H stable, follow daily CBC and transfuse as needed.          VTE Risk  Mitigation (From admission, onward)        Ordered     heparin (porcine) injection 5,000 Units  Every 8 hours      09/04/18 0433     Place sequential compression device  Until discontinued      09/04/18 0433     IP VTE HIGH RISK PATIENT  Once      09/04/18 0216             Annia Myers NP  Department of Hospital Medicine   [facility]

## 2018-09-04 NOTE — HPI
Conner Perez III is a 68 y old AA man with h/o ESRD on HD ( [facility] ) ,  but presented to the ER last evening for SOB likely due to noncompliance with fluid and salt restriction , Pt reports having dialysis yesterday ,   He also complains of mild chest pain on the left side, slight increase in troponin levels noted on the lab work, awaiting cards eval,

## 2018-09-04 NOTE — ED PROVIDER NOTES
"SCRIBE #1 NOTE: I, Zenia Looney, am scribing for, and in the presence of, Lucho Leon Jr., MD. I have scribed the entire note.      History      Chief Complaint   Patient presents with    Chest Pain     with chest congestion, cough. "I think I'm having an anxiety attack."       Review of patient's allergies indicates:   Allergen Reactions    Augmentin [amoxicillin-pot clavulanate] Edema    Lisinopril      cough    Sulfa (sulfonamide antibiotics)      swelling        HPI   HPI    9/3/2018, 9:33 PM   History obtained from the patient      History of Present Illness: Conner Perez III is a 68 y.o. male patient with a PMHx of HTN, DM2, and Heart Cath who presents to the Emergency Department for chest tightness which onset gradually a couple of hours ago. Pt believes that his complaint might be due to an anxiety attack. Symptoms are constant and moderate in severity.  No mitigating or exacerbating factors reported. Associated sxs include congestion and cough. Patient denies any leg swelling/pain, rhinorrhea, SOB, diaphoresis, palpitations, extremity weakness/numbness, dizziness, cough, N/V/D, HA, fever chills, abd pain, and all other sxs at this time. Pt's last stress test was last year and his cardiologist is Dr. Barnhart. Pt  No tx PTA. No further complaints or concerns at this time.       Arrival mode: Personal vehicle      PCP: Raciel Angela MD       Past Medical History:  Past Medical History:   Diagnosis Date    CKD (chronic kidney disease), stage IV     Diabetes mellitus, type 2     Dialysis patient     HTN (hypertension)        Past Surgical History:  Past Surgical History:   Procedure Laterality Date    AV FISTULA PLACEMENT Right 06/2017    CIRCUMCISION  1978    TOE AMPUTATION Left 01/24/2017    4th toe         Family History:  Family History   Problem Relation Age of Onset    Hypertension Father     No Known Problems Mother        Social History:  Social History     Tobacco Use    Smoking status: " Former Smoker     Types: Cigarettes     Last attempt to quit: 1971     Years since quittin.7    Smokeless tobacco: Never Used   Substance and Sexual Activity    Alcohol use: No    Drug use: No    Sexual activity: unknown       ROS   Review of Systems   Constitutional: Negative for chills, diaphoresis and fever.   HENT: Positive for congestion. Negative for rhinorrhea and sore throat.    Respiratory: Positive for cough and chest tightness. Negative for shortness of breath.    Cardiovascular: Negative for chest pain, palpitations and leg swelling.   Gastrointestinal: Negative for abdominal pain, diarrhea, nausea and vomiting.   Genitourinary: Negative for dysuria.   Musculoskeletal: Negative for back pain.   Skin: Negative for rash.   Neurological: Negative for dizziness, weakness and numbness.   Hematological: Does not bruise/bleed easily.   All other systems reviewed and are negative.      Physical Exam      Initial Vitals [18]   BP Pulse Resp Temp SpO2   (!) 120/53 71 20 97.4 °F (36.3 °C) (!) 91 %      MAP       --          Physical Exam  Nursing Notes and Vital Signs Reviewed.  Constitutional: Patient is in no acute distress. Well-developed and well-nourished.  Head: Atraumatic. Normocephalic.  Eyes: PERRL. EOM intact. Conjunctivae are not pale. No scleral icterus.  ENT: Mucous membranes are moist. Oropharynx is clear and symmetric.    Neck: Supple. Full ROM. No lymphadenopathy.  Cardiovascular: Regular rate. Regular rhythm. No murmurs, rubs, or gallops. Distal pulses are 2+ and symmetric. Dorsalis pedis are equal bilaterally. Radial pulses equal bilaterally.  Pulmonary/Chest: No respiratory distress. Crackles on R side worse in the bases. No wheezing or rales.  Abdominal: Soft and non-distended.  There is no tenderness.  No rebound, guarding, or rigidity. Good bowel sounds.  Genitourinary: No CVA tenderness  Musculoskeletal: Moves all extremities. No obvious deformities. No edema. No calf  tenderness.  Skin: Warm and dry.  Neurological:  Alert, awake, and appropriate.  Normal speech.  No acute focal neurological deficits are appreciated. GCS 15.   Psychiatric: Normal affect. Good eye contact. Appropriate in content.    ED Course    Procedures  ED Vital Signs:  Vitals:    09/04/18 1944 09/04/18 2034 09/04/18 2100 09/04/18 2144   BP: (!) 193/90      Pulse: 69 73  76   Resp: 20      Temp: 98.4 °F (36.9 °C)      TempSrc: Oral      SpO2: 98%  97%    Weight:       Height:        09/04/18 2250 09/04/18 2339 09/05/18 0040 09/05/18 0118   BP:  (!) 195/92 (!) 176/84    Pulse: 81 69 68 76   Resp:  20     Temp:  98.3 °F (36.8 °C)     TempSrc:  Oral     SpO2:  97%     Weight:       Height:        09/05/18 0125 09/05/18 0308 09/05/18 0538 09/05/18 0540   BP: (!) 163/75 (!) 177/84 (!) 180/84 (!) 180/84   Pulse: 69 72 67 70   Resp:  18     Temp:  99.2 °F (37.3 °C)     TempSrc:  Oral     SpO2:  96%     Weight:       Height:        09/05/18 0628 09/05/18 0705 09/05/18 0800   BP: (!) 179/84 (!) 178/84    Pulse: 69 71 74   Resp:  18    Temp:  98.7 °F (37.1 °C)    TempSrc:  Oral    SpO2:  96% 98%   Weight:      Height:          Abnormal Lab Results:  Labs Reviewed   CBC W/ AUTO DIFFERENTIAL - Abnormal; Notable for the following components:       Result Value    WBC 3.65 (*)     RBC 3.72 (*)     Hemoglobin 10.9 (*)     Hematocrit 33.0 (*)     All other components within normal limits   COMPREHENSIVE METABOLIC PANEL - Abnormal; Notable for the following components:    Creatinine 5.2 (*)     eGFR if  12 (*)     eGFR if non  10 (*)     All other components within normal limits   TROPONIN I - Abnormal; Notable for the following components:    Troponin I 0.035 (*)     All other components within normal limits   B-TYPE NATRIURETIC PEPTIDE - Abnormal; Notable for the following components:    BNP 1,137 (*)     All other components within normal limits   TROPONIN I   LACTIC ACID, PLASMA        All  Lab Results:  Results for orders placed or performed during the hospital encounter of 09/03/18   Blood culture   Result Value Ref Range    Blood Culture, Routine No Growth to date     Blood Culture, Routine No Growth to date    Blood culture   Result Value Ref Range    Blood Culture, Routine No Growth to date     Blood Culture, Routine No Growth to date    CBC auto differential   Result Value Ref Range    WBC 3.65 (L) 3.90 - 12.70 K/uL    RBC 3.72 (L) 4.60 - 6.20 M/uL    Hemoglobin 10.9 (L) 14.0 - 18.0 g/dL    Hematocrit 33.0 (L) 40.0 - 54.0 %    MCV 89 82 - 98 fL    MCH 29.3 27.0 - 31.0 pg    MCHC 33.0 32.0 - 36.0 g/dL    RDW 14.4 11.5 - 14.5 %    Platelets 154 150 - 350 K/uL    MPV 10.0 9.2 - 12.9 fL    Gran # (ANC) 2.0 1.8 - 7.7 K/uL    Lymph # 1.0 1.0 - 4.8 K/uL    Mono # 0.4 0.3 - 1.0 K/uL    Eos # 0.2 0.0 - 0.5 K/uL    Baso # 0.02 0.00 - 0.20 K/uL    Gran% 55.4 38.0 - 73.0 %    Lymph% 28.2 18.0 - 48.0 %    Mono% 11.5 4.0 - 15.0 %    Eosinophil% 4.4 0.0 - 8.0 %    Basophil% 0.5 0.0 - 1.9 %    Differential Method Automated    Comprehensive metabolic panel   Result Value Ref Range    Sodium 141 136 - 145 mmol/L    Potassium 5.0 3.5 - 5.1 mmol/L    Chloride 103 95 - 110 mmol/L    CO2 25 23 - 29 mmol/L    Glucose 100 70 - 110 mg/dL    BUN, Bld 21 8 - 23 mg/dL    Creatinine 5.2 (H) 0.5 - 1.4 mg/dL    Calcium 9.1 8.7 - 10.5 mg/dL    Total Protein 7.6 6.0 - 8.4 g/dL    Albumin 3.6 3.5 - 5.2 g/dL    Total Bilirubin 0.6 0.1 - 1.0 mg/dL    Alkaline Phosphatase 70 55 - 135 U/L    AST 34 10 - 40 U/L    ALT 13 10 - 44 U/L    Anion Gap 13 8 - 16 mmol/L    eGFR if African American 12 (A) >60 mL/min/1.73 m^2    eGFR if non African American 10 (A) >60 mL/min/1.73 m^2   Troponin I #1   Result Value Ref Range    Troponin I 0.035 (H) 0.000 - 0.026 ng/mL   B-Type natriuretic peptide (BNP)   Result Value Ref Range    BNP 1,137 (H) 0 - 99 pg/mL   Troponin I #2   Result Value Ref Range    Troponin I 0.023 0.000 - 0.026 ng/mL   Lactic  acid, plasma   Result Value Ref Range    Lactate (Lactic Acid) 0.6 0.5 - 2.2 mmol/L   Troponin I   Result Value Ref Range    Troponin I 0.028 (H) 0.000 - 0.026 ng/mL   Lipid panel   Result Value Ref Range    Cholesterol 128 120 - 199 mg/dL    Triglycerides 37 30 - 150 mg/dL    HDL 50 40 - 75 mg/dL    LDL Cholesterol 70.6 63.0 - 159.0 mg/dL    HDL/Chol Ratio 39.1 20.0 - 50.0 %    Total Cholesterol/HDL Ratio 2.6 2.0 - 5.0    Non-HDL Cholesterol 78 mg/dL   Hemoglobin A1c   Result Value Ref Range    Hemoglobin A1C 4.4 4.0 - 5.6 %    Estimated Avg Glucose 80 68 - 131 mg/dL   Procalcitonin   Result Value Ref Range    Procalcitonin 0.76 (H) <0.25 ng/mL   CBC auto differential   Result Value Ref Range    WBC 3.96 3.90 - 12.70 K/uL    RBC 3.64 (L) 4.60 - 6.20 M/uL    Hemoglobin 10.5 (L) 14.0 - 18.0 g/dL    Hematocrit 32.5 (L) 40.0 - 54.0 %    MCV 89 82 - 98 fL    MCH 28.8 27.0 - 31.0 pg    MCHC 32.3 32.0 - 36.0 g/dL    RDW 14.4 11.5 - 14.5 %    Platelets 143 (L) 150 - 350 K/uL    MPV 10.0 9.2 - 12.9 fL    Gran # (ANC) 2.2 1.8 - 7.7 K/uL    Lymph # 1.2 1.0 - 4.8 K/uL    Mono # 0.4 0.3 - 1.0 K/uL    Eos # 0.2 0.0 - 0.5 K/uL    Baso # 0.02 0.00 - 0.20 K/uL    Gran% 54.4 38.0 - 73.0 %    Lymph% 29.5 18.0 - 48.0 %    Mono% 11.1 4.0 - 15.0 %    Eosinophil% 4.5 0.0 - 8.0 %    Basophil% 0.5 0.0 - 1.9 %    Differential Method Automated    Basic metabolic panel   Result Value Ref Range    Sodium 139 136 - 145 mmol/L    Potassium 5.1 3.5 - 5.1 mmol/L    Chloride 101 95 - 110 mmol/L    CO2 25 23 - 29 mmol/L    Glucose 85 70 - 110 mg/dL    BUN, Bld 40 (H) 8 - 23 mg/dL    Creatinine 7.4 (H) 0.5 - 1.4 mg/dL    Calcium 8.8 8.7 - 10.5 mg/dL    Anion Gap 13 8 - 16 mmol/L    eGFR if African American 8 (A) >60 mL/min/1.73 m^2    eGFR if non African American 7 (A) >60 mL/min/1.73 m^2   Renal function panel   Result Value Ref Range    Glucose 86 70 - 110 mg/dL    Sodium 139 136 - 145 mmol/L    Potassium 5.2 (H) 3.5 - 5.1 mmol/L    Chloride 101 95  - 110 mmol/L    CO2 26 23 - 29 mmol/L    BUN, Bld 40 (H) 8 - 23 mg/dL    Calcium 9.0 8.7 - 10.5 mg/dL    Creatinine 7.5 (H) 0.5 - 1.4 mg/dL    Albumin 3.3 (L) 3.5 - 5.2 g/dL    Phosphorus 5.2 (H) 2.7 - 4.5 mg/dL    eGFR if African American 8 (A) >60 mL/min/1.73 m^2    eGFR if non African American 7 (A) >60 mL/min/1.73 m^2    Anion Gap 12 8 - 16 mmol/L   POCT glucose   Result Value Ref Range    POCT Glucose 88 70 - 110 mg/dL         Imaging Results:  Imaging Results          X-Ray Chest AP Portable (Final result)  Result time 09/03/18 21:50:19    Final result by TERESA Kumar Sr., MD (09/03/18 21:50:19)                 Impression:      1. There is a subtle amount of haziness scattered throughout the left lung.  This is characteristic of atelectasis or pneumonia.  2. The right costophrenic angle is blunted.  This is characteristic of a tiny pleural effusion.  3. There is mild cardiomegaly.  .      Electronically signed by: Damien Kumar MD  Date:    09/03/2018  Time:    21:50             Narrative:    EXAMINATION:  XR CHEST AP PORTABLE    CLINICAL HISTORY:  Chest Pain;    COMPARISON:  08/21/2018    FINDINGS:  There is mild cardiomegaly.  There is a subtle amount of haziness scattered throughout the left lung.  There is no pneumothorax.  The right costophrenic angle is blunted.  The left costophrenic angle is sharp.                                      The EKG was ordered, reviewed, and independently interpreted by the ED provider.  Interpretation time: 2127  Rate: 70 BPM  Rhythm: normal sinus rhythm  Interpretation: Incomplete LBBB. L posterior fascicular block. St & T wave abnormality, consider inferolateral ischemia. No STEMI.        The Emergency Provider reviewed the vital signs and test results, which are outlined above.    ED Discussion     11:00 PM: Re-evaluated pt. Pt is currently on oxygen and O2 stat has improved. I have discussed test results, shared treatment plan, and the need for admission with  patient at bedside. Pt  express understanding at this time and agree with all information. All questions answered. Pt have no further questions or concerns at this time. Pt is ready for admit.      11:06 PM: Discussed case with Annia Myers NP ([facility]) and agrees with current care and management of pt and accepts admission.   Admitting Service: Hospital medicine   Admitting Physician: Dr. Salazar  Admit to: Obs Telemetry      ED Medication(s):  Medications   ondansetron injection 4 mg (not administered)   cloNIDine tablet 0.1 mg (0.1 mg Oral Given 9/5/18 0540)   acetaminophen tablet 650 mg (not administered)   hydrALAZINE injection 10 mg (10 mg Intravenous Given 9/5/18 0044)   HYDROcodone-acetaminophen 7.5-325 mg per tablet 1 tablet (not administered)   albuterol-ipratropium 2.5 mg-0.5 mg/3 mL nebulizer solution 3 mL (3 mLs Nebulization Given 9/4/18 0533)   heparin (porcine) injection 5,000 Units (5,000 Units Subcutaneous Given 9/5/18 0538)   aspirin chewable tablet 81 mg (81 mg Oral Given 9/4/18 0909)   atorvastatin tablet 40 mg (40 mg Oral Given 9/4/18 2112)   clonazePAM tablet 0.5 mg (not administered)   cloNIDine 0.3 mg/24 hr td ptwk 1 patch (1 patch Transdermal Patch Applied 9/4/18 0635)   hydrALAZINE tablet 100 mg (100 mg Oral Given 9/5/18 0538)   isosorbide dinitrate tablet 30 mg (30 mg Oral Given 9/4/18 2112)   labetalol tablet 600 mg (600 mg Oral Given 9/4/18 2112)   tamsulosin 24 hr capsule 0.4 mg (0.4 mg Oral Given 9/4/18 0909)   zolpidem tablet 5 mg (5 mg Oral Given 9/5/18 0540)   azelastine 137 mcg (0.1 %) nasal spray 137 mcg (137 mcg Nasal Not Given 9/4/18 2113)   escitalopram oxalate tablet 10 mg (10 mg Oral Given 9/4/18 0909)   pantoprazole EC tablet 40 mg (40 mg Oral Given 9/4/18 0909)   diltiaZEM 24 hr capsule 360 mg (360 mg Oral Given 9/4/18 0909)   furosemide injection 40 mg (40 mg Intravenous Given 9/4/18 1755)   losartan tablet 100 mg (100 mg Oral Given 9/4/18 1755)   epoetin ovi  injection 10,000 Units (not administered)   heparin (porcine) injection 3,000 Units (not administered)   heparin (porcine) injection 3,000 Units (not administered)   aspirin tablet 325 mg (325 mg Oral Given 9/3/18 2153)   albuterol-ipratropium 2.5 mg-0.5 mg/3 mL nebulizer solution 3 mL (3 mLs Nebulization Given 9/3/18 2249)   moxifloxacin 400 mg/250 mL IVPB 400 mg (0 mg Intravenous Stopped 9/4/18 0030)   hydrALAZINE (APRESOLINE) 20 mg/mL injection (  Override Pull 9/4/18 0059)   nitroGLYCERIN 2% TD oint ointment 1 inch (1 inch Topical (Top) Given 9/5/18 0334)       Current Discharge Medication List                Medical Decision Making    Medical Decision Making:   Clinical Tests:   Lab Tests: Ordered and Reviewed  Radiological Study: Ordered and Reviewed  Medical Tests: Ordered and Reviewed           Scribe Attestation:   Scribe #1: I performed the above scribed service and the documentation accurately describes the services I performed. I attest to the accuracy of the note.    Attending:   Physician Attestation Statement for Scribe #1: I, Lucho Leon Jr., MD, personally performed the services described in this documentation, as scribed by Zenia Looney, in my presence, and it is both accurate and complete.          Clinical Impression       ICD-10-CM ICD-9-CM   1. Pneumonia of left lower lobe due to infectious organism J18.1 486   2. Chest tightness R07.89 786.59   3. Chest pain R07.9 786.50   4. Hypoxemia R09.02 799.02       Disposition:   Disposition: Admitted  Condition: Stable         Lucho Leon Jr., MD  09/05/18 1008

## 2018-09-04 NOTE — ASSESSMENT & PLAN NOTE
- Initial troponin at baseline of 0.035, likely secondary to above + ESRD. No angina.  EKG with new T wave inversions.  Follow serial CE's and EKG.  - Continue ASA, BB, Imdur, and statin.   - Further recs pending clinical course.

## 2018-09-04 NOTE — SUBJECTIVE & OBJECTIVE
Past Medical History:   Diagnosis Date    CKD (chronic kidney disease), stage IV     Dialysis patient     HTN (hypertension)        Past Surgical History:   Procedure Laterality Date    AV FISTULA PLACEMENT Right 06/2017    CIRCUMCISION  1978    TOE AMPUTATION Left 01/24/2017    4th toe       Review of patient's allergies indicates:   Allergen Reactions    Augmentin [amoxicillin-pot clavulanate] Edema    Lisinopril      cough    Sulfa (sulfonamide antibiotics)      swelling       No current facility-administered medications on file prior to encounter.      Current Outpatient Medications on File Prior to Encounter   Medication Sig    albuterol (PROVENTIL) 2.5 mg /3 mL (0.083 %) nebulizer solution 2.5 mg.    albuterol 90 mcg/actuation inhaler Inhale into the lungs.    aspirin 81 MG Chew Take 1 tablet (81 mg total) by mouth once daily.    atorvastatin (LIPITOR) 40 MG tablet Take 1 tablet (40 mg total) by mouth every evening. (Patient taking differently: Take 20 mg by mouth every evening. )    azelastine (ASTELIN) 137 mcg (0.1 %) nasal spray     clonazePAM (KLONOPIN) 0.5 MG tablet Take 1 tablet (0.5 mg total) by mouth 2 (two) times daily as needed for Anxiety.    cloNIDine 0.3 mg/24 hr td ptwk (CATAPRES) 0.3 mg/24 hr Place 1 patch onto the skin every 7 days.    diltiaZEM (CARDIZEM CD) 360 MG 24 hr capsule Take 1 capsule (360 mg total) by mouth once daily.    escitalopram oxalate (LEXAPRO) 10 MG tablet Take 10 mg by mouth.    furosemide (LASIX) 40 MG tablet Take 40 mg by mouth once daily.    hydrALAZINE (APRESOLINE) 100 MG tablet Take 1 tablet (100 mg total) by mouth every 8 (eight) hours.    hydrocodone-acetaminophen 10-325mg (NORCO)  mg Tab Take 1 tablet by mouth every 4 (four) hours as needed for Pain.    isosorbide dinitrate (ISORDIL) 30 MG Tab Take 1 tablet (30 mg total) by mouth 3 (three) times daily.    labetalol (NORMODYNE) 300 MG tablet Take 2 tablets (600 mg total) by mouth 3  (three) times daily.    lactulose (CHRONULAC) 10 gram/15 mL solution take 2 tablespoonful by mouth at bedtime    losartan (COZAAR) 100 MG tablet Take 1 tablet (100 mg total) by mouth once daily.    tamsulosin (FLOMAX) 0.4 mg Cp24 Take 1 capsule (0.4 mg total) by mouth once daily.    traMADol (ULTRAM) 50 mg tablet take 1 to 2 tablets by mouth twice a day if needed for pain    zolpidem (AMBIEN) 5 MG Tab Take 1 tablet (5 mg total) by mouth nightly as needed.    LORazepam (ATIVAN) 1 MG tablet Take 0.5 tablets (0.5 mg total) by mouth every 6 (six) hours as needed for Anxiety.    ondansetron (ZOFRAN-ODT) 4 MG TbDL Take 1 tablet (4 mg total) by mouth every 8 (eight) hours as needed.    pantoprazole (PROTONIX) 40 MG tablet Take 1 tablet (40 mg total) by mouth once daily.     Family History     Problem Relation (Age of Onset)    Hypertension Father    No Known Problems Mother        Tobacco Use    Smoking status: Former Smoker     Types: Cigarettes     Last attempt to quit: 1971     Years since quittin.7    Smokeless tobacco: Never Used   Substance and Sexual Activity    Alcohol use: No    Drug use: No    Sexual activity: Not on file     Review of Systems   Constitutional: Negative for activity change, chills, diaphoresis, fatigue, fever and unexpected weight change.   HENT: Negative for congestion, sore throat and trouble swallowing.    Eyes: Negative for visual disturbance.   Respiratory: Positive for cough and chest tightness. Negative for shortness of breath and wheezing.    Cardiovascular: Positive for chest pain. Negative for palpitations and leg swelling.   Gastrointestinal: Negative for abdominal distention, abdominal pain, blood in stool, constipation, diarrhea, nausea and vomiting.   Genitourinary: Negative for decreased urine volume, difficulty urinating, dysuria, frequency, hematuria and urgency.   Musculoskeletal: Negative for arthralgias, back pain and myalgias.   Skin: Negative for pallor,  rash and wound.   Neurological: Negative for dizziness, syncope, facial asymmetry, speech difficulty, weakness, light-headedness, numbness and headaches.   Psychiatric/Behavioral: Negative for confusion. The patient is nervous/anxious.    All other systems reviewed and are negative.    Objective:     Vital Signs (Most Recent):  Temp: 97.9 °F (36.6 °C) (09/04/18 0222)  Pulse: 72 (09/04/18 0222)  Resp: 16 (09/04/18 0222)  BP: (!) 209/93 (09/04/18 0222)  SpO2: 98 % (09/04/18 0222) Vital Signs (24h Range):  Temp:  [97.4 °F (36.3 °C)-97.9 °F (36.6 °C)] 97.9 °F (36.6 °C)  Pulse:  [68-75] 72  Resp:  [13-20] 16  SpO2:  [91 %-99 %] 98 %  BP: (120-209)/(53-98) 209/93     Weight: 90.9 kg (200 lb 6.4 oz)  Body mass index is 23.16 kg/m².    Physical Exam   Constitutional: He is oriented to person, place, and time. He appears well-developed and well-nourished. No distress.   HENT:   Head: Normocephalic and atraumatic.   Eyes: Conjunctivae are normal.   PERRL; EOM intact.   Neck: Normal range of motion. Neck supple. JVD present.   Cardiovascular: Normal rate, regular rhythm, S1 normal, S2 normal and intact distal pulses.  No extrasystoles are present. Exam reveals no gallop.   No murmur heard.  Pulses:       Radial pulses are 2+ on the right side, and 2+ on the left side.        Dorsalis pedis pulses are 2+ on the right side, and 2+ on the left side.        Posterior tibial pulses are 2+ on the right side, and 2+ on the left side.   Pulmonary/Chest: Effort normal. No accessory muscle usage. No tachypnea. No respiratory distress. He has no wheezes. He has no rhonchi. He has rales (coarse) in the right lower field and the left lower field.   Abdominal: Soft. Bowel sounds are normal. He exhibits no distension. There is no tenderness. There is no rebound, no guarding and no CVA tenderness.   Musculoskeletal: Normal range of motion. He exhibits no edema, tenderness or deformity.   Neurological: He is alert and oriented to person,  place, and time. No cranial nerve deficit or sensory deficit.   Skin: Skin is warm, dry and intact. Capillary refill takes less than 2 seconds. No rash noted. He is not diaphoretic. No cyanosis or erythema.   Psychiatric: He has a normal mood and affect. His speech is normal and behavior is normal. Cognition and memory are normal.   Nursing note and vitals reviewed.          Significant Labs:   Results for orders placed or performed during the hospital encounter of 09/03/18   CBC auto differential   Result Value Ref Range    WBC 3.65 (L) 3.90 - 12.70 K/uL    RBC 3.72 (L) 4.60 - 6.20 M/uL    Hemoglobin 10.9 (L) 14.0 - 18.0 g/dL    Hematocrit 33.0 (L) 40.0 - 54.0 %    MCV 89 82 - 98 fL    MCH 29.3 27.0 - 31.0 pg    MCHC 33.0 32.0 - 36.0 g/dL    RDW 14.4 11.5 - 14.5 %    Platelets 154 150 - 350 K/uL    MPV 10.0 9.2 - 12.9 fL    Gran # (ANC) 2.0 1.8 - 7.7 K/uL    Lymph # 1.0 1.0 - 4.8 K/uL    Mono # 0.4 0.3 - 1.0 K/uL    Eos # 0.2 0.0 - 0.5 K/uL    Baso # 0.02 0.00 - 0.20 K/uL    Gran% 55.4 38.0 - 73.0 %    Lymph% 28.2 18.0 - 48.0 %    Mono% 11.5 4.0 - 15.0 %    Eosinophil% 4.4 0.0 - 8.0 %    Basophil% 0.5 0.0 - 1.9 %    Differential Method Automated    Comprehensive metabolic panel   Result Value Ref Range    Sodium 141 136 - 145 mmol/L    Potassium 5.0 3.5 - 5.1 mmol/L    Chloride 103 95 - 110 mmol/L    CO2 25 23 - 29 mmol/L    Glucose 100 70 - 110 mg/dL    BUN, Bld 21 8 - 23 mg/dL    Creatinine 5.2 (H) 0.5 - 1.4 mg/dL    Calcium 9.1 8.7 - 10.5 mg/dL    Total Protein 7.6 6.0 - 8.4 g/dL    Albumin 3.6 3.5 - 5.2 g/dL    Total Bilirubin 0.6 0.1 - 1.0 mg/dL    Alkaline Phosphatase 70 55 - 135 U/L    AST 34 10 - 40 U/L    ALT 13 10 - 44 U/L    Anion Gap 13 8 - 16 mmol/L    eGFR if African American 12 (A) >60 mL/min/1.73 m^2    eGFR if non African American 10 (A) >60 mL/min/1.73 m^2   Troponin I #1   Result Value Ref Range    Troponin I 0.035 (H) 0.000 - 0.026 ng/mL   B-Type natriuretic peptide (BNP)   Result Value Ref  Range    BNP 1,137 (H) 0 - 99 pg/mL   Troponin I #2   Result Value Ref Range    Troponin I 0.023 0.000 - 0.026 ng/mL   Lactic acid, plasma   Result Value Ref Range    Lactate (Lactic Acid) 0.6 0.5 - 2.2 mmol/L    All pertinent labs within the past 24 hours have been reviewed.    Significant Imaging:   Imaging Results          X-Ray Chest AP Portable (Final result)  Result time 09/03/18 21:50:19    Final result by TERESA Kumar Sr., MD (09/03/18 21:50:19)                 Impression:      1. There is a subtle amount of haziness scattered throughout the left lung.  This is characteristic of atelectasis or pneumonia.  2. The right costophrenic angle is blunted.  This is characteristic of a tiny pleural effusion.  3. There is mild cardiomegaly.  .      Electronically signed by: Damien Kumar MD  Date:    09/03/2018  Time:    21:50             Narrative:    EXAMINATION:  XR CHEST AP PORTABLE    CLINICAL HISTORY:  Chest Pain;    COMPARISON:  08/21/2018    FINDINGS:  There is mild cardiomegaly.  There is a subtle amount of haziness scattered throughout the left lung.  There is no pneumothorax.  The right costophrenic angle is blunted.  The left costophrenic angle is sharp.                               I have reviewed all pertinent imaging results/findings within the past 24 hours.     EKG: (personally reviewed)  Normal sinus rhythm, incomplete LBBB, LAFB, T wave inversions in inferior and anterolateral leads.

## 2018-09-04 NOTE — ASSESSMENT & PLAN NOTE
- BP control as above.  Continue diuretic.  - Strict I&O's, daily weights, Na/fluid restrictions.  - Nephrology consult for HD.

## 2018-09-04 NOTE — PLAN OF CARE
09/04/18 1610   ROSAS Message   Medicare Outpatient and Observation Notification regarding financial responsibility Given to patient/caregiver;Explained to patient/caregiver;Signed/date by patient/caregiver   Date ROSAS was signed 09/04/18   Time ROSAS was signed 1608

## 2018-09-04 NOTE — ASSESSMENT & PLAN NOTE
Resume home labetalol, long-acting nitrate, ARB, CCB, diuretics, hydralazine, and clonidine patch.  Na/fluid restriction.    Will likely need HD for UF

## 2018-09-05 LAB
ALBUMIN SERPL BCP-MCNC: 3.3 G/DL
ANION GAP SERPL CALC-SCNC: 12 MMOL/L
ANION GAP SERPL CALC-SCNC: 13 MMOL/L
BASOPHILS # BLD AUTO: 0.02 K/UL
BASOPHILS NFR BLD: 0.5 %
BUN SERPL-MCNC: 40 MG/DL
BUN SERPL-MCNC: 40 MG/DL
CALCIUM SERPL-MCNC: 8.8 MG/DL
CALCIUM SERPL-MCNC: 9 MG/DL
CHLORIDE SERPL-SCNC: 101 MMOL/L
CHLORIDE SERPL-SCNC: 101 MMOL/L
CO2 SERPL-SCNC: 25 MMOL/L
CO2 SERPL-SCNC: 26 MMOL/L
CREAT SERPL-MCNC: 7.4 MG/DL
CREAT SERPL-MCNC: 7.5 MG/DL
DIFFERENTIAL METHOD: ABNORMAL
EOSINOPHIL # BLD AUTO: 0.2 K/UL
EOSINOPHIL NFR BLD: 4.5 %
ERYTHROCYTE [DISTWIDTH] IN BLOOD BY AUTOMATED COUNT: 14.4 %
EST. GFR  (AFRICAN AMERICAN): 8 ML/MIN/1.73 M^2
EST. GFR  (AFRICAN AMERICAN): 8 ML/MIN/1.73 M^2
EST. GFR  (NON AFRICAN AMERICAN): 7 ML/MIN/1.73 M^2
EST. GFR  (NON AFRICAN AMERICAN): 7 ML/MIN/1.73 M^2
GLUCOSE SERPL-MCNC: 85 MG/DL
GLUCOSE SERPL-MCNC: 86 MG/DL
HCT VFR BLD AUTO: 32.5 %
HGB BLD-MCNC: 10.5 G/DL
LYMPHOCYTES # BLD AUTO: 1.2 K/UL
LYMPHOCYTES NFR BLD: 29.5 %
MCH RBC QN AUTO: 28.8 PG
MCHC RBC AUTO-ENTMCNC: 32.3 G/DL
MCV RBC AUTO: 89 FL
MONOCYTES # BLD AUTO: 0.4 K/UL
MONOCYTES NFR BLD: 11.1 %
NEUTROPHILS # BLD AUTO: 2.2 K/UL
NEUTROPHILS NFR BLD: 54.4 %
PHOSPHATE SERPL-MCNC: 5.2 MG/DL
PLATELET # BLD AUTO: 143 K/UL
PMV BLD AUTO: 10 FL
POTASSIUM SERPL-SCNC: 5.1 MMOL/L
POTASSIUM SERPL-SCNC: 5.2 MMOL/L
RBC # BLD AUTO: 3.64 M/UL
SODIUM SERPL-SCNC: 139 MMOL/L
SODIUM SERPL-SCNC: 139 MMOL/L
WBC # BLD AUTO: 3.96 K/UL

## 2018-09-05 PROCEDURE — 93017 CV STRESS TEST TRACING ONLY: CPT

## 2018-09-05 PROCEDURE — 63600175 PHARM REV CODE 636 W HCPCS: Performed by: INTERNAL MEDICINE

## 2018-09-05 PROCEDURE — 93016 CV STRESS TEST SUPVJ ONLY: CPT | Mod: ,,, | Performed by: INTERNAL MEDICINE

## 2018-09-05 PROCEDURE — 90935 HEMODIALYSIS ONE EVALUATION: CPT | Mod: ,,, | Performed by: INTERNAL MEDICINE

## 2018-09-05 PROCEDURE — 94761 N-INVAS EAR/PLS OXIMETRY MLT: CPT

## 2018-09-05 PROCEDURE — 25000003 PHARM REV CODE 250: Performed by: NURSE PRACTITIONER

## 2018-09-05 PROCEDURE — 63600175 PHARM REV CODE 636 W HCPCS: Performed by: HOSPITALIST

## 2018-09-05 PROCEDURE — 87340 HEPATITIS B SURFACE AG IA: CPT

## 2018-09-05 PROCEDURE — 63600175 PHARM REV CODE 636 W HCPCS: Performed by: PHYSICIAN ASSISTANT

## 2018-09-05 PROCEDURE — 63600175 PHARM REV CODE 636 W HCPCS: Performed by: NURSE PRACTITIONER

## 2018-09-05 PROCEDURE — 25000003 PHARM REV CODE 250: Performed by: INTERNAL MEDICINE

## 2018-09-05 PROCEDURE — 86706 HEP B SURFACE ANTIBODY: CPT

## 2018-09-05 PROCEDURE — 80100016 HC MAINTENANCE HEMODIALYSIS

## 2018-09-05 PROCEDURE — 78452 HT MUSCLE IMAGE SPECT MULT: CPT | Mod: 26,,, | Performed by: INTERNAL MEDICINE

## 2018-09-05 PROCEDURE — 99214 OFFICE O/P EST MOD 30 MIN: CPT | Mod: 25,,, | Performed by: INTERNAL MEDICINE

## 2018-09-05 PROCEDURE — 80048 BASIC METABOLIC PNL TOTAL CA: CPT

## 2018-09-05 PROCEDURE — 27000221 HC OXYGEN, UP TO 24 HOURS

## 2018-09-05 PROCEDURE — 80069 RENAL FUNCTION PANEL: CPT

## 2018-09-05 PROCEDURE — G0378 HOSPITAL OBSERVATION PER HR: HCPCS

## 2018-09-05 PROCEDURE — 85025 COMPLETE CBC W/AUTO DIFF WBC: CPT

## 2018-09-05 PROCEDURE — 93018 CV STRESS TEST I&R ONLY: CPT | Mod: ,,, | Performed by: INTERNAL MEDICINE

## 2018-09-05 PROCEDURE — 25000003 PHARM REV CODE 250: Performed by: HOSPITALIST

## 2018-09-05 PROCEDURE — 36415 COLL VENOUS BLD VENIPUNCTURE: CPT

## 2018-09-05 RX ORDER — TAMSULOSIN HYDROCHLORIDE 0.4 MG/1
0.4 CAPSULE ORAL NIGHTLY
Status: DISCONTINUED | OUTPATIENT
Start: 2018-09-05 | End: 2018-09-07 | Stop reason: HOSPADM

## 2018-09-05 RX ORDER — HEPARIN SODIUM 1000 [USP'U]/ML
3000 INJECTION, SOLUTION INTRAVENOUS; SUBCUTANEOUS ONCE
Status: DISCONTINUED | OUTPATIENT
Start: 2018-09-05 | End: 2018-09-06

## 2018-09-05 RX ORDER — REGADENOSON 0.08 MG/ML
0.4 INJECTION, SOLUTION INTRAVENOUS ONCE
Status: COMPLETED | OUTPATIENT
Start: 2018-09-05 | End: 2018-09-05

## 2018-09-05 RX ORDER — MOXIFLOXACIN HYDROCHLORIDE 400 MG/1
400 TABLET ORAL DAILY
Status: DISCONTINUED | OUTPATIENT
Start: 2018-09-05 | End: 2018-09-07 | Stop reason: HOSPADM

## 2018-09-05 RX ADMIN — HEPARIN SODIUM 5000 UNITS: 5000 INJECTION, SOLUTION INTRAVENOUS; SUBCUTANEOUS at 09:09

## 2018-09-05 RX ADMIN — FUROSEMIDE 40 MG: 10 INJECTION, SOLUTION INTRAMUSCULAR; INTRAVENOUS at 12:09

## 2018-09-05 RX ADMIN — LOSARTAN POTASSIUM 100 MG: 50 TABLET, FILM COATED ORAL at 12:09

## 2018-09-05 RX ADMIN — LABETALOL HCL 600 MG: 200 TABLET, FILM COATED ORAL at 09:09

## 2018-09-05 RX ADMIN — NITROGLYCERIN 1 INCH: 20 OINTMENT TOPICAL at 03:09

## 2018-09-05 RX ADMIN — ISOSORBIDE DINITRATE 30 MG: 10 TABLET ORAL at 12:09

## 2018-09-05 RX ADMIN — ZOLPIDEM TARTRATE 5 MG: 5 TABLET ORAL at 09:09

## 2018-09-05 RX ADMIN — TAMSULOSIN HYDROCHLORIDE 0.4 MG: 0.4 CAPSULE ORAL at 09:09

## 2018-09-05 RX ADMIN — HYDRALAZINE HYDROCHLORIDE 10 MG: 20 INJECTION INTRAMUSCULAR; INTRAVENOUS at 12:09

## 2018-09-05 RX ADMIN — REGADENOSON 0.4 MG: 0.08 INJECTION, SOLUTION INTRAVENOUS at 03:09

## 2018-09-05 RX ADMIN — ERYTHROPOIETIN 10000 UNITS: 10000 INJECTION, SOLUTION INTRAVENOUS; SUBCUTANEOUS at 12:09

## 2018-09-05 RX ADMIN — LABETALOL HCL 600 MG: 200 TABLET, FILM COATED ORAL at 03:09

## 2018-09-05 RX ADMIN — AZELASTINE HYDROCHLORIDE 137 MCG: 137 SPRAY, METERED NASAL at 09:09

## 2018-09-05 RX ADMIN — CLONIDINE HYDROCHLORIDE 0.1 MG: 0.1 TABLET ORAL at 05:09

## 2018-09-05 RX ADMIN — ESCITALOPRAM OXALATE 10 MG: 10 TABLET, FILM COATED ORAL at 12:09

## 2018-09-05 RX ADMIN — ASPIRIN 81 MG CHEWABLE TABLET 81 MG: 81 TABLET CHEWABLE at 12:09

## 2018-09-05 RX ADMIN — CLONAZEPAM 0.5 MG: 0.5 TABLET ORAL at 09:09

## 2018-09-05 RX ADMIN — ISOSORBIDE DINITRATE 30 MG: 10 TABLET ORAL at 03:09

## 2018-09-05 RX ADMIN — ZOLPIDEM TARTRATE 5 MG: 5 TABLET ORAL at 05:09

## 2018-09-05 RX ADMIN — DILTIAZEM HYDROCHLORIDE 360 MG: 180 CAPSULE, COATED, EXTENDED RELEASE ORAL at 12:09

## 2018-09-05 RX ADMIN — HYDRALAZINE HYDROCHLORIDE 100 MG: 50 TABLET, FILM COATED ORAL at 05:09

## 2018-09-05 RX ADMIN — LABETALOL HCL 600 MG: 200 TABLET, FILM COATED ORAL at 12:09

## 2018-09-05 RX ADMIN — HYDRALAZINE HYDROCHLORIDE 100 MG: 50 TABLET, FILM COATED ORAL at 01:09

## 2018-09-05 RX ADMIN — ISOSORBIDE DINITRATE 30 MG: 10 TABLET ORAL at 09:09

## 2018-09-05 RX ADMIN — HEPARIN SODIUM 5000 UNITS: 5000 INJECTION, SOLUTION INTRAVENOUS; SUBCUTANEOUS at 05:09

## 2018-09-05 RX ADMIN — ATORVASTATIN CALCIUM 40 MG: 40 TABLET, FILM COATED ORAL at 09:09

## 2018-09-05 RX ADMIN — PANTOPRAZOLE SODIUM 40 MG: 40 TABLET, DELAYED RELEASE ORAL at 12:09

## 2018-09-05 RX ADMIN — HYDRALAZINE HYDROCHLORIDE 100 MG: 50 TABLET, FILM COATED ORAL at 09:09

## 2018-09-05 RX ADMIN — MOXIFLOXACIN HYDROCHLORIDE 400 MG: 400 TABLET, FILM COATED ORAL at 03:09

## 2018-09-05 NOTE — PLAN OF CARE
Problem: Patient Care Overview  Goal: Plan of Care Review  Outcome: Ongoing (interventions implemented as appropriate)  Pt AAO x4.  VSS.  Pt remained afebrile throughout this shift.   Pt remained free of falls this shift.   Pt has no complaints of pain this shift.  Plan of care reviewed. Patient verbalizes understanding.   Pt moving/turing independently . Frequent weight shifting encouraged.  Patient SR on monitor.   Having troubles with high BP.  Bed low, side rails up x 2, wheels locked, call light in reach.   Patient instructed to call for assistance.   Hourly rounding completed.   24 hour chart check completed.  Will continue to monitor.

## 2018-09-05 NOTE — ASSESSMENT & PLAN NOTE
-Suspect secondary to dietary non-compliance/over indulging in salt/fluid  -Continue current regimen

## 2018-09-05 NOTE — SUBJECTIVE & OBJECTIVE
Past Medical History:   Diagnosis Date    CKD (chronic kidney disease), stage IV     Diabetes mellitus, type 2     Dialysis patient     HTN (hypertension)        Past Surgical History:   Procedure Laterality Date    AV FISTULA PLACEMENT Right 06/2017    CIRCUMCISION  1978    TOE AMPUTATION Left 01/24/2017    4th toe       Review of patient's allergies indicates:   Allergen Reactions    Augmentin [amoxicillin-pot clavulanate] Edema    Lisinopril      cough    Sulfa (sulfonamide antibiotics)      swelling       No current facility-administered medications on file prior to encounter.      Current Outpatient Medications on File Prior to Encounter   Medication Sig    albuterol (PROVENTIL) 2.5 mg /3 mL (0.083 %) nebulizer solution 2.5 mg.    albuterol 90 mcg/actuation inhaler Inhale into the lungs.    aspirin 81 MG Chew Take 1 tablet (81 mg total) by mouth once daily.    atorvastatin (LIPITOR) 40 MG tablet Take 1 tablet (40 mg total) by mouth every evening. (Patient taking differently: Take 20 mg by mouth every evening. )    azelastine (ASTELIN) 137 mcg (0.1 %) nasal spray     clonazePAM (KLONOPIN) 0.5 MG tablet Take 1 tablet (0.5 mg total) by mouth 2 (two) times daily as needed for Anxiety.    cloNIDine 0.3 mg/24 hr td ptwk (CATAPRES) 0.3 mg/24 hr Place 1 patch onto the skin every 7 days.    diltiaZEM (CARDIZEM CD) 360 MG 24 hr capsule Take 1 capsule (360 mg total) by mouth once daily.    escitalopram oxalate (LEXAPRO) 10 MG tablet Take 10 mg by mouth.    furosemide (LASIX) 40 MG tablet Take 40 mg by mouth once daily.    hydrALAZINE (APRESOLINE) 100 MG tablet Take 1 tablet (100 mg total) by mouth every 8 (eight) hours.    hydrocodone-acetaminophen 10-325mg (NORCO)  mg Tab Take 1 tablet by mouth every 4 (four) hours as needed for Pain.    isosorbide dinitrate (ISORDIL) 30 MG Tab Take 1 tablet (30 mg total) by mouth 3 (three) times daily.    labetalol (NORMODYNE) 300 MG tablet Take 2 tablets  (600 mg total) by mouth 3 (three) times daily.    lactulose (CHRONULAC) 10 gram/15 mL solution take 2 tablespoonful by mouth at bedtime    losartan (COZAAR) 100 MG tablet Take 1 tablet (100 mg total) by mouth once daily.    tamsulosin (FLOMAX) 0.4 mg Cp24 Take 1 capsule (0.4 mg total) by mouth once daily.    traMADol (ULTRAM) 50 mg tablet take 1 to 2 tablets by mouth twice a day if needed for pain    zolpidem (AMBIEN) 5 MG Tab Take 1 tablet (5 mg total) by mouth nightly as needed.    LORazepam (ATIVAN) 1 MG tablet Take 0.5 tablets (0.5 mg total) by mouth every 6 (six) hours as needed for Anxiety.    ondansetron (ZOFRAN-ODT) 4 MG TbDL Take 1 tablet (4 mg total) by mouth every 8 (eight) hours as needed.    pantoprazole (PROTONIX) 40 MG tablet Take 1 tablet (40 mg total) by mouth once daily.     Family History     Problem Relation (Age of Onset)    Hypertension Father    No Known Problems Mother        Tobacco Use    Smoking status: Former Smoker     Types: Cigarettes     Last attempt to quit: 1971     Years since quittin.7    Smokeless tobacco: Never Used   Substance and Sexual Activity    Alcohol use: No    Drug use: No    Sexual activity: Not on file     Review of Systems   Constitution: Negative.   HENT: Negative.    Eyes: Negative.    Cardiovascular: Positive for chest pain, dyspnea on exertion and orthopnea.   Respiratory: Positive for cough and shortness of breath.    Endocrine: Negative.    Hematologic/Lymphatic: Negative.    Skin: Negative.    Musculoskeletal: Negative.    Gastrointestinal: Negative.    Genitourinary: Negative.    Neurological: Negative.    Psychiatric/Behavioral: Negative.    Allergic/Immunologic: Negative.      Objective:     Vital Signs (Most Recent):  Temp: 98.7 °F (37.1 °C) (18)  Pulse: 74 (18 0800)  Resp: 18 (18)  BP: (!) 178/84 (18)  SpO2: 98 % (18) Vital Signs (24h Range):  Temp:  [98.1 °F (36.7 °C)-99.2 °F (37.3 °C)]  98.7 °F (37.1 °C)  Pulse:  [63-81] 74  Resp:  [18-20] 18  SpO2:  [96 %-98 %] 98 %  BP: (158-211)/(75-98) 178/84     Weight: 90.9 kg (200 lb 6.4 oz)  Body mass index is 23.16 kg/m².    SpO2: 98 %  O2 Device (Oxygen Therapy): nasal cannula      Intake/Output Summary (Last 24 hours) at 9/5/2018 0947  Last data filed at 9/5/2018 0600  Gross per 24 hour   Intake 420 ml   Output 650 ml   Net -230 ml       Lines/Drains/Airways     Drain                 Hemodialysis AV Fistula 12/18/17 0017 Right forearm 261 days          Peripheral Intravenous Line                 Peripheral IV - Single Lumen 09/03/18 2156 Left Forearm 1 day                Physical Exam   Constitutional: He is oriented to person, place, and time. He appears well-developed and well-nourished. No distress.   HENT:   Head: Normocephalic and atraumatic.   Eyes: Pupils are equal, round, and reactive to light. Right eye exhibits no discharge. Left eye exhibits no discharge.   Neck: Neck supple. JVD present. No thyromegaly present.   Cardiovascular: Normal rate, regular rhythm, S1 normal, S2 normal and normal heart sounds.   No murmur heard.  Pulmonary/Chest: Effort normal. No respiratory distress. He has no wheezes. He has rales (faint at bases (resolving)).   Abdominal: Soft. He exhibits no distension. There is no tenderness. There is no rebound.   Musculoskeletal: He exhibits no edema.   Neurological: He is alert and oriented to person, place, and time.   Skin: Skin is warm and dry. He is not diaphoretic. No erythema.   Psychiatric: He has a normal mood and affect. His behavior is normal. Thought content normal.   Nursing note and vitals reviewed.      Significant Labs:   CMP   Recent Labs   Lab  09/03/18 2156 09/05/18   0458   NA  141  139  139   K  5.0  5.2*  5.1   CL  103  101  101   CO2  25  26  25   GLU  100  86  85   BUN  21  40*  40*   CREATININE  5.2*  7.5*  7.4*   CALCIUM  9.1  9.0  8.8   PROT  7.6   --    ALBUMIN  3.6  3.3*   BILITOT  0.6    --    ALKPHOS  70   --    AST  34   --    ALT  13   --    ANIONGAP  13  12  13   ESTGFRAFRICA  12*  8*  8*   EGFRNONAA  10*  7*  7*   , CBC   Recent Labs   Lab  09/03/18 2156 09/05/18   0458   WBC  3.65*  3.96   HGB  10.9*  10.5*   HCT  33.0*  32.5*   PLT  154  143*   , Troponin   Recent Labs   Lab  09/03/18 2156 09/04/18   0035  09/04/18   0710   TROPONINI  0.035*  0.023  0.028*    and All pertinent lab results from the last 24 hours have been reviewed.    Significant Imaging: Echocardiogram:   2D echo with color flow doppler:   Results for orders placed or performed during the hospital encounter of 04/27/18   2D echo with color flow doppler   Result Value Ref Range    EF 55 55 - 65    Diastolic Dysfunction No     Mitral Valve Mobility NORMAL     Tricuspid Valve Regurgitation TRIVIAL    , EKG: Reviewed and X-Ray: CXR: X-Ray Chest 1 View (CXR): No results found for this visit on 09/03/18. and X-Ray Chest PA and Lateral (CXR): No results found for this visit on 09/03/18.

## 2018-09-05 NOTE — PROGRESS NOTES
Pt completed 4 hours of HD removing 3.0L. BP remained stable throughout tx (see flowsheet). Pt tolerated HD well. No signs of distress noted. De accessed per p&p  Each site held for 10mins, no post bleeding noted. Dr London made rounds during tx.

## 2018-09-05 NOTE — ASSESSMENT & PLAN NOTE
- BP control as above.    Pt had HD today   Pt does still urinate   - Strict I&O's, daily weights, Na/fluid restrictions.

## 2018-09-05 NOTE — SUBJECTIVE & OBJECTIVE
Review of Systems   Constitutional: Negative for chills, fatigue and fever.   HENT: Negative for congestion, nosebleeds, sore throat and trouble swallowing.    Eyes: Negative for pain, discharge and visual disturbance.   Respiratory: Positive for cough. Negative for apnea, chest tightness, shortness of breath, wheezing and stridor.    Cardiovascular: Positive for chest pain. Negative for palpitations and leg swelling.   Gastrointestinal: Negative for abdominal distention, abdominal pain, blood in stool, constipation, diarrhea, nausea and vomiting.   Endocrine: Negative for cold intolerance and heat intolerance.   Genitourinary: Negative for difficulty urinating, dysuria, flank pain, frequency and urgency.   Musculoskeletal: Negative for arthralgias, back pain, joint swelling, myalgias, neck pain and neck stiffness.   Skin: Negative for rash and wound.   Allergic/Immunologic: Negative for food allergies and immunocompromised state.   Neurological: Positive for headaches. Negative for dizziness, seizures, syncope, facial asymmetry, weakness and light-headedness.   Hematological: Negative for adenopathy.   Psychiatric/Behavioral: Negative for agitation, behavioral problems and confusion. The patient is not nervous/anxious.      Objective:     Vital Signs (Most Recent):  Temp: 96.8 °F (36 °C) (09/05/18 1341)  Pulse: 77 (09/05/18 1434)  Resp: 17 (09/05/18 1341)  BP: (!) 160/78 (09/05/18 1459)  SpO2: (!) 94 % (09/05/18 1341) Vital Signs (24h Range):  Temp:  [96.8 °F (36 °C)-99.2 °F (37.3 °C)] 96.8 °F (36 °C)  Pulse:  [63-81] 77  Resp:  [17-20] 17  SpO2:  [94 %-98 %] 94 %  BP: (149-210)/() 160/78     Weight: 90.9 kg (200 lb 6.4 oz)  Body mass index is 23.16 kg/m².    Intake/Output Summary (Last 24 hours) at 9/5/2018 1503  Last data filed at 9/5/2018 1252  Gross per 24 hour   Intake 300 ml   Output 4104 ml   Net -3804 ml      Physical Exam   Constitutional: He is oriented to person, place, and time. He appears  well-developed and well-nourished. No distress.   HENT:   Head: Normocephalic and atraumatic.   Nose: Nose normal.   Mouth/Throat: Oropharynx is clear and moist.   Eyes: Conjunctivae and EOM are normal. No scleral icterus.   Neck: Normal range of motion. Neck supple.   Cardiovascular: Normal rate, regular rhythm, normal heart sounds and intact distal pulses. Exam reveals no gallop and no friction rub.   No murmur heard.  Pulmonary/Chest: Effort normal. No stridor. No respiratory distress. He has no wheezes. He has no rales (bilaterally ). He exhibits no tenderness.   Abdominal: Soft. Bowel sounds are normal. He exhibits no distension. There is no tenderness. There is no rebound and no guarding.   Musculoskeletal: Normal range of motion. He exhibits no edema, tenderness or deformity.   Neurological: He is alert and oriented to person, place, and time. He has normal reflexes. No cranial nerve deficit. He exhibits normal muscle tone. Coordination normal.   Skin: Skin is warm and dry. No rash noted. He is not diaphoretic. No erythema. No pallor.   Psychiatric: He has a normal mood and affect. His behavior is normal. Thought content normal.   Nursing note and vitals reviewed.      Significant Labs:   BMP:   Recent Labs   Lab  09/05/18   0458   GLU  86  85   NA  139  139   K  5.2*  5.1   CL  101  101   CO2  26  25   BUN  40*  40*   CREATININE  7.5*  7.4*   CALCIUM  9.0  8.8     CBC:   Recent Labs   Lab  09/03/18 2156 09/05/18   0458   WBC  3.65*  3.96   HGB  10.9*  10.5*   HCT  33.0*  32.5*   PLT  154  143*     CMP:   Recent Labs   Lab  09/03/18 2156 09/05/18   0458   NA  141  139  139   K  5.0  5.2*  5.1   CL  103  101  101   CO2  25  26  25   GLU  100  86  85   BUN  21  40*  40*   CREATININE  5.2*  7.5*  7.4*   CALCIUM  9.1  9.0  8.8   PROT  7.6   --    ALBUMIN  3.6  3.3*   BILITOT  0.6   --    ALKPHOS  70   --    AST  34   --    ALT  13   --    ANIONGAP  13  12  13   EGFRNONAA  10*  7*  7*      All pertinent labs within the past 24 hours have been reviewed.    Significant Imaging:   Imaging Results          X-Ray Chest AP Portable (Final result)  Result time 09/03/18 21:50:19    Final result by TERESA Kumar Sr., MD (09/03/18 21:50:19)                 Impression:      1. There is a subtle amount of haziness scattered throughout the left lung.  This is characteristic of atelectasis or pneumonia.  2. The right costophrenic angle is blunted.  This is characteristic of a tiny pleural effusion.  3. There is mild cardiomegaly.  .      Electronically signed by: Damien Kumar MD  Date:    09/03/2018  Time:    21:50             Narrative:    EXAMINATION:  XR CHEST AP PORTABLE    CLINICAL HISTORY:  Chest Pain;    COMPARISON:  08/21/2018    FINDINGS:  There is mild cardiomegaly.  There is a subtle amount of haziness scattered throughout the left lung.  There is no pneumothorax.  The right costophrenic angle is blunted.  The left costophrenic angle is sharp.

## 2018-09-05 NOTE — SUBJECTIVE & OBJECTIVE
Interval History:  No acute events , seen on HD , tolerating well, CP better ,     Review of patient's allergies indicates:   Allergen Reactions    Augmentin [amoxicillin-pot clavulanate] Edema    Lisinopril      cough    Sulfa (sulfonamide antibiotics)      swelling     Current Facility-Administered Medications   Medication Frequency    acetaminophen tablet 650 mg Q6H PRN    albuterol-ipratropium 2.5 mg-0.5 mg/3 mL nebulizer solution 3 mL Q4H PRN    aspirin chewable tablet 81 mg Daily    atorvastatin tablet 40 mg QHS    azelastine 137 mcg (0.1 %) nasal spray 137 mcg BID    clonazePAM tablet 0.5 mg BID PRN    cloNIDine 0.3 mg/24 hr td ptwk 1 patch Q7 Days    cloNIDine tablet 0.1 mg Q4H PRN    diltiaZEM 24 hr capsule 360 mg Daily    epoetin ovi injection 10,000 Units Every Mon, Wed, Fri    escitalopram oxalate tablet 10 mg Daily    furosemide injection 40 mg BID    heparin (porcine) injection 3,000 Units Once    heparin (porcine) injection 3,000 Units Once    heparin (porcine) injection 5,000 Units Q8H    hydrALAZINE injection 10 mg Q6H PRN    hydrALAZINE tablet 100 mg Q8H    HYDROcodone-acetaminophen 7.5-325 mg per tablet 1 tablet Q6H PRN    isosorbide dinitrate tablet 30 mg TID    labetalol tablet 600 mg TID    losartan tablet 100 mg Daily    ondansetron injection 4 mg Q8H PRN    pantoprazole EC tablet 40 mg Daily    tamsulosin 24 hr capsule 0.4 mg Daily    zolpidem tablet 5 mg Nightly PRN       Objective:     Vital Signs (Most Recent):  Temp: 97.6 °F (36.4 °C) (09/05/18 0839)  Pulse: 72 (09/05/18 0850)  Resp: 18 (09/05/18 0850)  BP: (!) 167/88 (09/05/18 0850)  SpO2: 98 % (09/05/18 0800)  O2 Device (Oxygen Therapy): nasal cannula (09/05/18 0800) Vital Signs (24h Range):  Temp:  [97.6 °F (36.4 °C)-99.2 °F (37.3 °C)] 97.6 °F (36.4 °C)  Pulse:  [63-81] 72  Resp:  [18-20] 18  SpO2:  [96 %-98 %] 98 %  BP: (158-211)/(75-98) 167/88     Weight: 90.9 kg (200 lb 6.4 oz) (09/04/18 0222)  Body mass  index is 23.16 kg/m².  Body surface area is 2.24 meters squared.    I/O last 3 completed shifts:  In: 910 [P.O.:660; IV Piggyback:250]  Out: 650 [Urine:650]    Physical Exam   Constitutional: He is oriented to person, place, and time. He appears well-developed and well-nourished. No distress.   HENT:   Head: Normocephalic and atraumatic.   Eyes: Pupils are equal, round, and reactive to light.   Neck: Normal range of motion. Neck supple. No tracheal deviation present. No thyromegaly present.   Cardiovascular: Normal rate, regular rhythm and intact distal pulses. Exam reveals no gallop and no friction rub.   Murmur heard.  Pulmonary/Chest: Effort normal. He has no wheezes. He has rales.   Abdominal: Soft. He exhibits no mass. There is no tenderness. There is no rebound and no guarding.   Musculoskeletal: Normal range of motion. He exhibits no edema.   Lymphadenopathy:     He has no cervical adenopathy.   Neurological: He is alert and oriented to person, place, and time.   Skin: Skin is warm. No rash noted. He is not diaphoretic. No erythema.   Nursing note and vitals reviewed.      Significant Labs:  CBC:   Recent Labs   Lab  09/05/18   0458   WBC  3.96   RBC  3.64*   HGB  10.5*   HCT  32.5*   PLT  143*   MCV  89   MCH  28.8   MCHC  32.3     CMP:   Recent Labs   Lab  09/03/18 2156 09/05/18   0458   GLU  100  86  85   CALCIUM  9.1  9.0  8.8   ALBUMIN  3.6  3.3*   PROT  7.6   --    NA  141  139  139   K  5.0  5.2*  5.1   CO2  25  26  25   CL  103  101  101   BUN  21  40*  40*   CREATININE  5.2*  7.5*  7.4*   ALKPHOS  70   --    ALT  13   --    AST  34   --    BILITOT  0.6   --      Coagulation: No results for input(s): PT, INR, APTT in the last 168 hours.  LFTs:   Recent Labs   Lab  09/03/18 2156 09/05/18   0458   ALT  13   --    AST  34   --    ALKPHOS  70   --    BILITOT  0.6   --    PROT  7.6   --    ALBUMIN  3.6  3.3*     All labs within the past 24 hours have been reviewed.     Lab Results   Component  Value Date    .7 (H) 08/26/2017    CALCIUM 8.8 09/05/2018    CALCIUM 9.0 09/05/2018    PHOS 5.2 (H) 09/05/2018         Significant Imaging: reviewed

## 2018-09-05 NOTE — PLAN OF CARE
Problem: Patient Care Overview  Goal: Plan of Care Review  Outcome: Ongoing (interventions implemented as appropriate)  Uneventful day  BP continues to treated.  Telemonitoring continues  Pt voiced no c/o pain or discomforts.  Dialysis in am  Lasix 40mg given IV lasix  Fistula patent to WYATT  Positive thrill and bruit

## 2018-09-05 NOTE — CONSULTS
"[facility]  Cardiology  Consult Note    Patient Name: Conner Perez III  MRN: [id number]  Admission Date: 9/3/2018  Hospital Length of Stay: 0 days  Code Status: Full Code   Attending Provider: Keith Wesley MD   Consulting Provider: Jocelyn Aiken PA-C  Primary Care Physician: Raciel Angela MD  Principal Problem:Accelerated hypertension    Patient information was obtained from patient, past medical records and ER records.     Inpatient consult to Cardiology  Consult performed by: Jocelyn Aiken PA-C  Consult ordered by: Susan Retana NP        Subjective:     Chief Complaint:  SOB    HPI:   Mr. Perez is a 68 year old male patient with a PMHx of ERSD on HD, uncontrolled HTN, DM, noncompliance, and diastolic CHF who presented to [facility] ED yesterday with a chief complaint of progressively worsening SOB over the past 4-5 days. Associated symptoms included chest tightness, orthopnea, and intermittent cough. Patient denied any fever, chills, PND, palpitations, lightheadedness, dizziness, near syncope, or syncope. Initial workup in ED revealed markedly elevated BP and initial troponin of 0.035.. CXR showed findings consistent with pulmonary edema and patient subsequently admitted for BP control and dialysis. Cardiology consulted to assist with management. Patient seen and examined today. Feels ok. Still complains of intermittent chest tightness that "comes and goes". Cannot identify any precipitating factors. Unsure if nitro helps. Denies any prior history of CAD or MI. Claims compliance with his medications. Chart reviewed. Troponin 0.035>0.023>0.028. EKG reviewed and showed new T wave inversions in inferior leads. 2D echo 4/18 showed normal EF.     Past Medical History:   Diagnosis Date    CKD (chronic kidney disease), stage IV     Diabetes mellitus, type 2     Dialysis patient     HTN (hypertension)        Past Surgical History:   Procedure Laterality Date    AV FISTULA PLACEMENT " Right 06/2017    CIRCUMCISION  1978    TOE AMPUTATION Left 01/24/2017    4th toe       Review of patient's allergies indicates:   Allergen Reactions    Augmentin [amoxicillin-pot clavulanate] Edema    Lisinopril      cough    Sulfa (sulfonamide antibiotics)      swelling       No current facility-administered medications on file prior to encounter.      Current Outpatient Medications on File Prior to Encounter   Medication Sig    albuterol (PROVENTIL) 2.5 mg /3 mL (0.083 %) nebulizer solution 2.5 mg.    albuterol 90 mcg/actuation inhaler Inhale into the lungs.    aspirin 81 MG Chew Take 1 tablet (81 mg total) by mouth once daily.    atorvastatin (LIPITOR) 40 MG tablet Take 1 tablet (40 mg total) by mouth every evening. (Patient taking differently: Take 20 mg by mouth every evening. )    azelastine (ASTELIN) 137 mcg (0.1 %) nasal spray     clonazePAM (KLONOPIN) 0.5 MG tablet Take 1 tablet (0.5 mg total) by mouth 2 (two) times daily as needed for Anxiety.    cloNIDine 0.3 mg/24 hr td ptwk (CATAPRES) 0.3 mg/24 hr Place 1 patch onto the skin every 7 days.    diltiaZEM (CARDIZEM CD) 360 MG 24 hr capsule Take 1 capsule (360 mg total) by mouth once daily.    escitalopram oxalate (LEXAPRO) 10 MG tablet Take 10 mg by mouth.    furosemide (LASIX) 40 MG tablet Take 40 mg by mouth once daily.    hydrALAZINE (APRESOLINE) 100 MG tablet Take 1 tablet (100 mg total) by mouth every 8 (eight) hours.    hydrocodone-acetaminophen 10-325mg (NORCO)  mg Tab Take 1 tablet by mouth every 4 (four) hours as needed for Pain.    isosorbide dinitrate (ISORDIL) 30 MG Tab Take 1 tablet (30 mg total) by mouth 3 (three) times daily.    labetalol (NORMODYNE) 300 MG tablet Take 2 tablets (600 mg total) by mouth 3 (three) times daily.    lactulose (CHRONULAC) 10 gram/15 mL solution take 2 tablespoonful by mouth at bedtime    losartan (COZAAR) 100 MG tablet Take 1 tablet (100 mg total) by mouth once daily.    tamsulosin  (FLOMAX) 0.4 mg Cp24 Take 1 capsule (0.4 mg total) by mouth once daily.    traMADol (ULTRAM) 50 mg tablet take 1 to 2 tablets by mouth twice a day if needed for pain    zolpidem (AMBIEN) 5 MG Tab Take 1 tablet (5 mg total) by mouth nightly as needed.    LORazepam (ATIVAN) 1 MG tablet Take 0.5 tablets (0.5 mg total) by mouth every 6 (six) hours as needed for Anxiety.    ondansetron (ZOFRAN-ODT) 4 MG TbDL Take 1 tablet (4 mg total) by mouth every 8 (eight) hours as needed.    pantoprazole (PROTONIX) 40 MG tablet Take 1 tablet (40 mg total) by mouth once daily.     Family History     Problem Relation (Age of Onset)    Hypertension Father    No Known Problems Mother        Tobacco Use    Smoking status: Former Smoker     Types: Cigarettes     Last attempt to quit: 1971     Years since quittin.7    Smokeless tobacco: Never Used   Substance and Sexual Activity    Alcohol use: No    Drug use: No    Sexual activity: Not on file     Review of Systems   Constitution: Negative.   HENT: Negative.    Eyes: Negative.    Cardiovascular: Positive for chest pain, dyspnea on exertion and orthopnea.   Respiratory: Positive for cough and shortness of breath.    Endocrine: Negative.    Hematologic/Lymphatic: Negative.    Skin: Negative.    Musculoskeletal: Negative.    Gastrointestinal: Negative.    Genitourinary: Negative.    Neurological: Negative.    Psychiatric/Behavioral: Negative.    Allergic/Immunologic: Negative.      Objective:     Vital Signs (Most Recent):  Temp: 98.7 °F (37.1 °C) (18 0705)  Pulse: 74 (18 0800)  Resp: 18 (18 07)  BP: (!) 178/84 (18 07)  SpO2: 98 % (18 0800) Vital Signs (24h Range):  Temp:  [98.1 °F (36.7 °C)-99.2 °F (37.3 °C)] 98.7 °F (37.1 °C)  Pulse:  [63-81] 74  Resp:  [18-20] 18  SpO2:  [96 %-98 %] 98 %  BP: (158-211)/(75-98) 178/84     Weight: 90.9 kg (200 lb 6.4 oz)  Body mass index is 23.16 kg/m².    SpO2: 98 %  O2 Device (Oxygen Therapy): nasal  cannula      Intake/Output Summary (Last 24 hours) at 9/5/2018 0947  Last data filed at 9/5/2018 0600  Gross per 24 hour   Intake 420 ml   Output 650 ml   Net -230 ml       Lines/Drains/Airways     Drain                 Hemodialysis AV Fistula 12/18/17 0017 Right forearm 261 days          Peripheral Intravenous Line                 Peripheral IV - Single Lumen 09/03/18 2156 Left Forearm 1 day                Physical Exam   Constitutional: He is oriented to person, place, and time. He appears well-developed and well-nourished. No distress.   HENT:   Head: Normocephalic and atraumatic.   Eyes: Pupils are equal, round, and reactive to light. Right eye exhibits no discharge. Left eye exhibits no discharge.   Neck: Neck supple. JVD present. No thyromegaly present.   Cardiovascular: Normal rate, regular rhythm, S1 normal, S2 normal and normal heart sounds.   No murmur heard.  Pulmonary/Chest: Effort normal. No respiratory distress. He has no wheezes. He has rales (faint at bases (resolving)).   Abdominal: Soft. He exhibits no distension. There is no tenderness. There is no rebound.   Musculoskeletal: He exhibits no edema.   Neurological: He is alert and oriented to person, place, and time.   Skin: Skin is warm and dry. He is not diaphoretic. No erythema.   Psychiatric: He has a normal mood and affect. His behavior is normal. Thought content normal.   Nursing note and vitals reviewed.      Significant Labs:   CMP   Recent Labs   Lab  09/03/18 2156 09/05/18   0458   NA  141  139  139   K  5.0  5.2*  5.1   CL  103  101  101   CO2  25  26  25   GLU  100  86  85   BUN  21  40*  40*   CREATININE  5.2*  7.5*  7.4*   CALCIUM  9.1  9.0  8.8   PROT  7.6   --    ALBUMIN  3.6  3.3*   BILITOT  0.6   --    ALKPHOS  70   --    AST  34   --    ALT  13   --    ANIONGAP  13  12  13   ESTGFRAFRICA  12*  8*  8*   EGFRNONAA  10*  7*  7*   , CBC   Recent Labs   Lab  09/03/18 2156 09/05/18   0458   WBC  3.65*  3.96   HGB  10.9*   10.5*   HCT  33.0*  32.5*   PLT  154  143*   , Troponin   Recent Labs   Lab  09/03/18   2156  09/04/18   0035  09/04/18   0710   TROPONINI  0.035*  0.023  0.028*    and All pertinent lab results from the last 24 hours have been reviewed.    Significant Imaging: Echocardiogram:   2D echo with color flow doppler:   Results for orders placed or performed during the hospital encounter of 04/27/18   2D echo with color flow doppler   Result Value Ref Range    EF 55 55 - 65    Diastolic Dysfunction No     Mitral Valve Mobility NORMAL     Tricuspid Valve Regurgitation TRIVIAL    , EKG: Reviewed and X-Ray: CXR: X-Ray Chest 1 View (CXR): No results found for this visit on 09/03/18. and X-Ray Chest PA and Lateral (CXR): No results found for this visit on 09/03/18.    Assessment and Plan:   Patient who presents with accelerated HTN and fluid overload. Clinically improving. Elevated troponin likely secondary to demand ischemia however patient with intermittent chest tightness and significant risk factors for CAD. Continue current regimen. Stress test today. Further rec's to follow.    * Accelerated hypertension    -Suspect secondary to dietary non-compliance/over indulging in salt/fluid  -Continue current regimen          Acute on chronic diastolic HFpEF of 55-60%    -Presented with pulmonary edema  -Currently being dialyzed  -BP control  -Low salt diet/adherence to fluid restrictions        Elevated troponin    -Troponin 0.035>0.023>0.028  -Elevation likely secondary to demand ischemia from accelerated HTN/fluid overload  -Still complains of intermittent chest tightness  -No known history of CAD but significant risk factors  -EKG with T wave inversions inferiorly  -Continue ASA, statin, BB, ARB, Imdur  -Stress test today for further evaluation          Chest pain    -Complains of intermittent chest tightness  -Continue current meds  -Stress test today            VTE Risk Mitigation (From admission, onward)        Ordered      heparin (porcine) injection 3,000 Units  Once      09/05/18 0728     heparin (porcine) injection 3,000 Units  Once      09/05/18 0727     heparin (porcine) injection 5,000 Units  Every 8 hours      09/04/18 0433     Place sequential compression device  Until discontinued      09/04/18 0433     IP VTE HIGH RISK PATIENT  Once      09/04/18 0216          Thank you for your consult. I will follow-up with patient. Please contact us if you have any additional questions.    Jocelyn Aiken PA-C  Cardiology   [facility]     Chart reviewed. Dr. Chavez examined patient and agrees with plan as outlined above.

## 2018-09-05 NOTE — PROGRESS NOTES
[facility] Hospital Medicine  Progress Note    Patient Name: Conner Perez III  MRN: [id number]  Patient Class: OP- Observation   Admission Date: 9/3/2018  Length of Stay: 0 days  Attending Physician: Keith Wesley MD  Primary Care Provider: Raciel Angela MD        Subjective:     Principal Problem:Accelerated hypertension    HPI:  Mr. Perez is a 68-year-old  male with PMHx of ESRD on HD M/W/F, uncontrolled HTN, and long-standing h/o medication and dietary noncompliance, who presented to the ED with c/o shortness of breath that has progressively worsened over the past few days.  Associated chest tightness and nonproductive cough.  No aggravating or alleviating factors.  Denies any HA, visual disturbance, AMS, lightheadedness/dizziness, syncope, focal deficits, palpitations, orthopnea, PND, edema, weight gain, ABD pain, N/V/D, dysuria, hematuria, back pain, diaphoresis, weakness, fever, or chills.  Patient reports being complaint with medications and dialysis.  In the ED, he was found to be in pulmonary edema, with chest x-ray showing pulmonary vascular congestion.  Patient still makes urine.  Initial blood pressure 200s/100s.  EKG showed new T wave inversions.   Followed by Dr. London (Nephrologist) and Dr. Barnhart (Cardiologist).  BNP (1100) and troponin (0.035) at baseline.  Hospital Medicine was called for admission.    Hospital Course:  The pt was placed in observation for chest pain, uncontrolled HTN, and elevated troponin. In the ED, initial blood pressure 200s/100s.  EKG showed new T wave inversions. CXR showed subtle amount of haziness scattered throughout the left lung.  WBC normal, afebrile. Denies cough. Pt continues to complain of chest tightness. Trop 0.035>0.023>0.028.  Likely secondary to uncontrolled HTN but consulted with  Cardiology who recommended nuclear stress test which is pending.   BP medications restarted. Pt reports compliance with medications and HD.    BP improved after HD. Pt counseled on low sodium fluid restriction diet.   CXR showed possible PNA. +HA +productive cough- will add Avelox.             Review of Systems   Constitutional: Negative for chills, fatigue and fever.   HENT: Negative for congestion, nosebleeds, sore throat and trouble swallowing.    Eyes: Negative for pain, discharge and visual disturbance.   Respiratory: Positive for cough. Negative for apnea, chest tightness, shortness of breath, wheezing and stridor.    Cardiovascular: Positive for chest pain. Negative for palpitations and leg swelling.   Gastrointestinal: Negative for abdominal distention, abdominal pain, blood in stool, constipation, diarrhea, nausea and vomiting.   Endocrine: Negative for cold intolerance and heat intolerance.   Genitourinary: Negative for difficulty urinating, dysuria, flank pain, frequency and urgency.   Musculoskeletal: Negative for arthralgias, back pain, joint swelling, myalgias, neck pain and neck stiffness.   Skin: Negative for rash and wound.   Allergic/Immunologic: Negative for food allergies and immunocompromised state.   Neurological: Positive for headaches. Negative for dizziness, seizures, syncope, facial asymmetry, weakness and light-headedness.   Hematological: Negative for adenopathy.   Psychiatric/Behavioral: Negative for agitation, behavioral problems and confusion. The patient is not nervous/anxious.      Objective:     Vital Signs (Most Recent):  Temp: 96.8 °F (36 °C) (09/05/18 1341)  Pulse: 77 (09/05/18 1434)  Resp: 17 (09/05/18 1341)  BP: (!) 160/78 (09/05/18 1459)  SpO2: (!) 94 % (09/05/18 1341) Vital Signs (24h Range):  Temp:  [96.8 °F (36 °C)-99.2 °F (37.3 °C)] 96.8 °F (36 °C)  Pulse:  [63-81] 77  Resp:  [17-20] 17  SpO2:  [94 %-98 %] 94 %  BP: (149-210)/() 160/78     Weight: 90.9 kg (200 lb 6.4 oz)  Body mass index is 23.16 kg/m².    Intake/Output Summary (Last 24 hours) at 9/5/2018 1503  Last data filed at 9/5/2018 1252  Gross per  24 hour   Intake 300 ml   Output 4104 ml   Net -3804 ml      Physical Exam   Constitutional: He is oriented to person, place, and time. He appears well-developed and well-nourished. No distress.   HENT:   Head: Normocephalic and atraumatic.   Nose: Nose normal.   Mouth/Throat: Oropharynx is clear and moist.   Eyes: Conjunctivae and EOM are normal. No scleral icterus.   Neck: Normal range of motion. Neck supple.   Cardiovascular: Normal rate, regular rhythm, normal heart sounds and intact distal pulses. Exam reveals no gallop and no friction rub.   No murmur heard.  Pulmonary/Chest: Effort normal. No stridor. No respiratory distress. He has no wheezes. He has no rales (bilaterally ). He exhibits no tenderness.   Abdominal: Soft. Bowel sounds are normal. He exhibits no distension. There is no tenderness. There is no rebound and no guarding.   Musculoskeletal: Normal range of motion. He exhibits no edema, tenderness or deformity.   Neurological: He is alert and oriented to person, place, and time. He has normal reflexes. No cranial nerve deficit. He exhibits normal muscle tone. Coordination normal.   Skin: Skin is warm and dry. No rash noted. He is not diaphoretic. No erythema. No pallor.   Psychiatric: He has a normal mood and affect. His behavior is normal. Thought content normal.   Nursing note and vitals reviewed.      Significant Labs:   BMP:   Recent Labs   Lab  09/05/18 0458   GLU  86  85   NA  139  139   K  5.2*  5.1   CL  101  101   CO2  26  25   BUN  40*  40*   CREATININE  7.5*  7.4*   CALCIUM  9.0  8.8     CBC:   Recent Labs   Lab  09/03/18 2156 09/05/18 0458   WBC  3.65*  3.96   HGB  10.9*  10.5*   HCT  33.0*  32.5*   PLT  154  143*     CMP:   Recent Labs   Lab  09/03/18 2156 09/05/18 0458   NA  141  139  139   K  5.0  5.2*  5.1   CL  103  101  101   CO2  25  26  25   GLU  100  86  85   BUN  21  40*  40*   CREATININE  5.2*  7.5*  7.4*   CALCIUM  9.1  9.0  8.8   PROT  7.6   --     ALBUMIN  3.6  3.3*   BILITOT  0.6   --    ALKPHOS  70   --    AST  34   --    ALT  13   --    ANIONGAP  13  12  13   EGFRNONAA  10*  7*  7*     All pertinent labs within the past 24 hours have been reviewed.    Significant Imaging:   Imaging Results          X-Ray Chest AP Portable (Final result)  Result time 09/03/18 21:50:19    Final result by TERESA Kumar Sr., MD (09/03/18 21:50:19)                 Impression:      1. There is a subtle amount of haziness scattered throughout the left lung.  This is characteristic of atelectasis or pneumonia.  2. The right costophrenic angle is blunted.  This is characteristic of a tiny pleural effusion.  3. There is mild cardiomegaly.  .      Electronically signed by: Damien Kumar MD  Date:    09/03/2018  Time:    21:50             Narrative:    EXAMINATION:  XR CHEST AP PORTABLE    CLINICAL HISTORY:  Chest Pain;    COMPARISON:  08/21/2018    FINDINGS:  There is mild cardiomegaly.  There is a subtle amount of haziness scattered throughout the left lung.  There is no pneumothorax.  The right costophrenic angle is blunted.  The left costophrenic angle is sharp.                              Assessment/Plan:      * Accelerated hypertension    Continued home medications labetalol,Imdur,Losartan,Ditiazem, hydralazine, and clonidine patch.    Care discussed with Nephrology who felt accelerated HTN likely secondary to NA/fluidindiscretion  Pt counseled on importance of Na/fluid restriction.            Chest pain    Persistent CP  Nuclear stress test today           Acute on chronic diastolic HFpEF of 55-60%    - BP control as above.    Pt had HD today   Pt does still urinate   - Strict I&O's, daily weights, Na/fluid restrictions.          ESRD (end stage renal disease) on dialysis    - Nephrology consult for HD.        Elevated troponin    -- Continue ASA, BB, Imdur, and statin.   Stress test today .        Anemia of renal disease    - H&H stable, follow daily CBC and  transfuse as needed.          VTE Risk Mitigation (From admission, onward)        Ordered     heparin (porcine) injection 3,000 Units  Once      09/05/18 0728     heparin (porcine) injection 3,000 Units  Once      09/05/18 0727     heparin (porcine) injection 5,000 Units  Every 8 hours      09/04/18 0433     Place sequential compression device  Until discontinued      09/04/18 0433     IP VTE HIGH RISK PATIENT  Once      09/04/18 0216              Susan Retana NP  Department of Hospital Medicine   [facility]

## 2018-09-05 NOTE — ASSESSMENT & PLAN NOTE
-Presented with pulmonary edema  -Currently being dialyzed  -BP control  -Low salt diet/adherence to fluid restrictions

## 2018-09-05 NOTE — PROGRESS NOTES
[facility] -   Nephrology  Progress Note    Patient Name: Conner Perez III  MRN: [id number]  Admission Date: 9/3/2018  Hospital Length of Stay: 0 days  Attending Provider: Keith Wesley MD   Primary Care Physician: Raciel Angela MD  Principal Problem:Accelerated hypertension    Subjective:     HPI: Conner Perez III is a 68 y old AA man with h/o ESRD on HD ( [facility] ) ,  but presented to the ER last evening for SOB likely due to noncompliance with fluid and salt restriction , Pt reports having dialysis yesterday ,   He also complains of mild chest pain on the left side, slight increase in troponin levels noted on the lab work, awaiting cards eval,           Interval History:  No acute events , seen on HD , tolerating well, CP better ,     Review of patient's allergies indicates:   Allergen Reactions    Augmentin [amoxicillin-pot clavulanate] Edema    Lisinopril      cough    Sulfa (sulfonamide antibiotics)      swelling     Current Facility-Administered Medications   Medication Frequency    acetaminophen tablet 650 mg Q6H PRN    albuterol-ipratropium 2.5 mg-0.5 mg/3 mL nebulizer solution 3 mL Q4H PRN    aspirin chewable tablet 81 mg Daily    atorvastatin tablet 40 mg QHS    azelastine 137 mcg (0.1 %) nasal spray 137 mcg BID    clonazePAM tablet 0.5 mg BID PRN    cloNIDine 0.3 mg/24 hr td ptwk 1 patch Q7 Days    cloNIDine tablet 0.1 mg Q4H PRN    diltiaZEM 24 hr capsule 360 mg Daily    epoetin ovi injection 10,000 Units Every Mon, Wed, Fri    escitalopram oxalate tablet 10 mg Daily    furosemide injection 40 mg BID    heparin (porcine) injection 3,000 Units Once    heparin (porcine) injection 3,000 Units Once    heparin (porcine) injection 5,000 Units Q8H    hydrALAZINE injection 10 mg Q6H PRN    hydrALAZINE tablet 100 mg Q8H    HYDROcodone-acetaminophen 7.5-325 mg per tablet 1 tablet Q6H PRN    isosorbide dinitrate tablet 30 mg TID    labetalol  tablet 600 mg TID    losartan tablet 100 mg Daily    ondansetron injection 4 mg Q8H PRN    pantoprazole EC tablet 40 mg Daily    tamsulosin 24 hr capsule 0.4 mg Daily    zolpidem tablet 5 mg Nightly PRN       Objective:     Vital Signs (Most Recent):  Temp: 97.6 °F (36.4 °C) (09/05/18 0839)  Pulse: 72 (09/05/18 0850)  Resp: 18 (09/05/18 0850)  BP: (!) 167/88 (09/05/18 0850)  SpO2: 98 % (09/05/18 0800)  O2 Device (Oxygen Therapy): nasal cannula (09/05/18 0800) Vital Signs (24h Range):  Temp:  [97.6 °F (36.4 °C)-99.2 °F (37.3 °C)] 97.6 °F (36.4 °C)  Pulse:  [63-81] 72  Resp:  [18-20] 18  SpO2:  [96 %-98 %] 98 %  BP: (158-211)/(75-98) 167/88     Weight: 90.9 kg (200 lb 6.4 oz) (09/04/18 0222)  Body mass index is 23.16 kg/m².  Body surface area is 2.24 meters squared.    I/O last 3 completed shifts:  In: 910 [P.O.:660; IV Piggyback:250]  Out: 650 [Urine:650]    Physical Exam   Constitutional: He is oriented to person, place, and time. He appears well-developed and well-nourished. No distress.   HENT:   Head: Normocephalic and atraumatic.   Eyes: Pupils are equal, round, and reactive to light.   Neck: Normal range of motion. Neck supple. No tracheal deviation present. No thyromegaly present.   Cardiovascular: Normal rate, regular rhythm and intact distal pulses. Exam reveals no gallop and no friction rub.   Murmur heard.  Pulmonary/Chest: Effort normal. He has no wheezes. He has rales.   Abdominal: Soft. He exhibits no mass. There is no tenderness. There is no rebound and no guarding.   Musculoskeletal: Normal range of motion. He exhibits no edema.   Lymphadenopathy:     He has no cervical adenopathy.   Neurological: He is alert and oriented to person, place, and time.   Skin: Skin is warm. No rash noted. He is not diaphoretic. No erythema.   Nursing note and vitals reviewed.      Significant Labs:  CBC:   Recent Labs   Lab  09/05/18   0458   WBC  3.96   RBC  3.64*   HGB  10.5*   HCT  32.5*   PLT  143*   MCV  89    MCH  28.8   MCHC  32.3     CMP:   Recent Labs   Lab  09/03/18 2156 09/05/18   0458   GLU  100  86  85   CALCIUM  9.1  9.0  8.8   ALBUMIN  3.6  3.3*   PROT  7.6   --    NA  141  139  139   K  5.0  5.2*  5.1   CO2  25  26  25   CL  103  101  101   BUN  21  40*  40*   CREATININE  5.2*  7.5*  7.4*   ALKPHOS  70   --    ALT  13   --    AST  34   --    BILITOT  0.6   --      Coagulation: No results for input(s): PT, INR, APTT in the last 168 hours.  LFTs:   Recent Labs   Lab  09/03/18 2156 09/05/18   0458   ALT  13   --    AST  34   --    ALKPHOS  70   --    BILITOT  0.6   --    PROT  7.6   --    ALBUMIN  3.6  3.3*     All labs within the past 24 hours have been reviewed.     Lab Results   Component Value Date    .7 (H) 08/26/2017    CALCIUM 8.8 09/05/2018    CALCIUM 9.0 09/05/2018    PHOS 5.2 (H) 09/05/2018         Significant Imaging: reviewed     Assessment/Plan:     ESRD (end stage renal disease) on dialysis    1. ESRD on HD : (  [facility] , [facility] ) ,  Seen on HD , tolerating well, LUE AVF ,      2.  HTN : BP elevated, UF on HD as tolerated , resume home meds,      3. Anemia of CKD : epogen with HD ,      4. SHPT : renal diet and binders      5. D/c home when stable     6. Angina - Cards eval, ? Stress test              I will follow-up with patient. Please contact us if you have any additional questions.     Total time spent 30 minutes including time needed to review the records,  patient  evaluation, documentation, face-to-face discussion with the patient,  primary team,   more than 50% of the time was spent on coordination of care and counseling.       Fabián London MD  Nephrology  [facility] -

## 2018-09-05 NOTE — ASSESSMENT & PLAN NOTE
Continued home medications labetalol,Imdur,Losartan,Ditiazem, hydralazine, and clonidine patch.    Care discussed with Nephrology who felt accelerated HTN likely secondary to NA/fluidindiscretion  Pt counseled on importance of Na/fluid restriction.

## 2018-09-05 NOTE — PLAN OF CARE
Problem: Patient Care Overview  Goal: Plan of Care Review  Outcome: Ongoing (interventions implemented as appropriate)  Pt in bed AAOx4.   Plan of Care reviewed, Verbalized understanding.  Patient remained free from falls, fall precautions in place.   Pt is NSR on monitor. VSS.   PIV CDI.   Blood pressure remained elevated throughout the shift.   See MAR for PRN meds given.   Call bell and personal belongings within reach.   Hourly rounding complete. Reminded to call for assistance.   No complaints at this time.   Will continue to monitor.

## 2018-09-05 NOTE — HPI
"Mr. Perez is a 68 year old male patient with a PMHx of ERSD on HD, uncontrolled HTN, DM, noncompliance, and diastolic CHF who presented to [facility] ED yesterday with a chief complaint of progressively worsening SOB over the past 4-5 days. Associated symptoms included chest tightness, orthopnea, and intermittent cough. Patient denied any fever, chills, PND, palpitations, lightheadedness, dizziness, near syncope, or syncope. Initial workup in ED revealed markedly elevated BP and initial troponin of 0.035.. CXR showed findings consistent with pulmonary edema and patient subsequently admitted for BP control and dialysis. Cardiology consulted to assist with management. Patient seen and examined today. Feels ok. Still complains of intermittent chest tightness that "comes and goes". Cannot identify any precipitating factors. Unsure if nitro helps. Denies any prior history of CAD or MI. Claims compliance with his medications. Chart reviewed. Troponin 0.035>0.023>0.028. EKG reviewed and showed new T wave inversions in inferior leads. 2D echo 4/18 showed normal EF.   "

## 2018-09-05 NOTE — ASSESSMENT & PLAN NOTE
-Troponin 0.035>0.023>0.028  -Elevation likely secondary to demand ischemia from accelerated HTN/fluid overload  -Still complains of intermittent chest tightness  -No known history of CAD but significant risk factors  -EKG with T wave inversions inferiorly  -Continue ASA, statin, BB, ARB, Imdur  -Stress test today for further evaluation

## 2018-09-06 LAB
ANION GAP SERPL CALC-SCNC: 11 MMOL/L
BASOPHILS # BLD AUTO: 0.02 K/UL
BASOPHILS NFR BLD: 0.4 %
BUN SERPL-MCNC: 26 MG/DL
CALCIUM SERPL-MCNC: 8.7 MG/DL
CHLORIDE SERPL-SCNC: 103 MMOL/L
CO2 SERPL-SCNC: 25 MMOL/L
CREAT SERPL-MCNC: 5.8 MG/DL
DIFFERENTIAL METHOD: ABNORMAL
EOSINOPHIL # BLD AUTO: 0.2 K/UL
EOSINOPHIL NFR BLD: 3.9 %
ERYTHROCYTE [DISTWIDTH] IN BLOOD BY AUTOMATED COUNT: 14.6 %
EST. GFR  (AFRICAN AMERICAN): 11 ML/MIN/1.73 M^2
EST. GFR  (NON AFRICAN AMERICAN): 9 ML/MIN/1.73 M^2
GLUCOSE SERPL-MCNC: 86 MG/DL
HBV SURFACE AG SERPL QL IA: NEGATIVE
HCT VFR BLD AUTO: 33.8 %
HGB BLD-MCNC: 11 G/DL
LYMPHOCYTES # BLD AUTO: 1.2 K/UL
LYMPHOCYTES NFR BLD: 25.1 %
MCH RBC QN AUTO: 29.2 PG
MCHC RBC AUTO-ENTMCNC: 32.5 G/DL
MCV RBC AUTO: 90 FL
MONOCYTES # BLD AUTO: 0.6 K/UL
MONOCYTES NFR BLD: 13.3 %
NEUTROPHILS # BLD AUTO: 2.7 K/UL
NEUTROPHILS NFR BLD: 57.3 %
PLATELET # BLD AUTO: 181 K/UL
PMV BLD AUTO: 10.9 FL
POTASSIUM SERPL-SCNC: 4.6 MMOL/L
RBC # BLD AUTO: 3.77 M/UL
SODIUM SERPL-SCNC: 139 MMOL/L
WBC # BLD AUTO: 4.67 K/UL

## 2018-09-06 PROCEDURE — 93307 TTE W/O DOPPLER COMPLETE: CPT

## 2018-09-06 PROCEDURE — 80048 BASIC METABOLIC PNL TOTAL CA: CPT

## 2018-09-06 PROCEDURE — 25000003 PHARM REV CODE 250: Performed by: INTERNAL MEDICINE

## 2018-09-06 PROCEDURE — 94761 N-INVAS EAR/PLS OXIMETRY MLT: CPT

## 2018-09-06 PROCEDURE — 99222 1ST HOSP IP/OBS MODERATE 55: CPT | Mod: ,,, | Performed by: INTERNAL MEDICINE

## 2018-09-06 PROCEDURE — 11000001 HC ACUTE MED/SURG PRIVATE ROOM

## 2018-09-06 PROCEDURE — 84244 ASSAY OF RENIN: CPT

## 2018-09-06 PROCEDURE — 85025 COMPLETE CBC W/AUTO DIFF WBC: CPT

## 2018-09-06 PROCEDURE — 36415 COLL VENOUS BLD VENIPUNCTURE: CPT

## 2018-09-06 PROCEDURE — 63600175 PHARM REV CODE 636 W HCPCS: Performed by: HOSPITALIST

## 2018-09-06 PROCEDURE — 25000003 PHARM REV CODE 250: Performed by: NURSE PRACTITIONER

## 2018-09-06 PROCEDURE — 25000003 PHARM REV CODE 250: Performed by: HOSPITALIST

## 2018-09-06 PROCEDURE — 93307 TTE W/O DOPPLER COMPLETE: CPT | Mod: 26,,, | Performed by: INTERNAL MEDICINE

## 2018-09-06 PROCEDURE — 99900035 HC TECH TIME PER 15 MIN (STAT)

## 2018-09-06 PROCEDURE — 83835 ASSAY OF METANEPHRINES: CPT

## 2018-09-06 PROCEDURE — 82088 ASSAY OF ALDOSTERONE: CPT

## 2018-09-06 RX ORDER — ISOSORBIDE MONONITRATE 60 MG/1
120 TABLET, EXTENDED RELEASE ORAL DAILY
Status: DISCONTINUED | OUTPATIENT
Start: 2018-09-06 | End: 2018-09-06

## 2018-09-06 RX ORDER — ISOSORBIDE MONONITRATE 60 MG/1
60 TABLET, EXTENDED RELEASE ORAL DAILY
Status: DISCONTINUED | OUTPATIENT
Start: 2018-09-07 | End: 2018-09-07 | Stop reason: HOSPADM

## 2018-09-06 RX ORDER — NIFEDIPINE 30 MG/1
60 TABLET, EXTENDED RELEASE ORAL DAILY
Status: DISCONTINUED | OUTPATIENT
Start: 2018-09-06 | End: 2018-09-07 | Stop reason: HOSPADM

## 2018-09-06 RX ORDER — HEPARIN SODIUM 1000 [USP'U]/ML
2000 INJECTION, SOLUTION INTRAVENOUS; SUBCUTANEOUS ONCE
Status: CANCELLED | OUTPATIENT
Start: 2018-09-07

## 2018-09-06 RX ADMIN — DILTIAZEM HYDROCHLORIDE 360 MG: 180 CAPSULE, COATED, EXTENDED RELEASE ORAL at 08:09

## 2018-09-06 RX ADMIN — AZELASTINE HYDROCHLORIDE 137 MCG: 137 SPRAY, METERED NASAL at 08:09

## 2018-09-06 RX ADMIN — ATORVASTATIN CALCIUM 40 MG: 40 TABLET, FILM COATED ORAL at 08:09

## 2018-09-06 RX ADMIN — HYDRALAZINE HYDROCHLORIDE 10 MG: 20 INJECTION INTRAMUSCULAR; INTRAVENOUS at 04:09

## 2018-09-06 RX ADMIN — HEPARIN SODIUM 5000 UNITS: 5000 INJECTION, SOLUTION INTRAVENOUS; SUBCUTANEOUS at 09:09

## 2018-09-06 RX ADMIN — HEPARIN SODIUM 5000 UNITS: 5000 INJECTION, SOLUTION INTRAVENOUS; SUBCUTANEOUS at 06:09

## 2018-09-06 RX ADMIN — HYDRALAZINE HYDROCHLORIDE 100 MG: 50 TABLET, FILM COATED ORAL at 03:09

## 2018-09-06 RX ADMIN — ISOSORBIDE DINITRATE 30 MG: 10 TABLET ORAL at 08:09

## 2018-09-06 RX ADMIN — ASPIRIN 81 MG CHEWABLE TABLET 81 MG: 81 TABLET CHEWABLE at 08:09

## 2018-09-06 RX ADMIN — CLONAZEPAM 0.5 MG: 0.5 TABLET ORAL at 08:09

## 2018-09-06 RX ADMIN — NIFEDIPINE 60 MG: 30 TABLET, FILM COATED, EXTENDED RELEASE ORAL at 12:09

## 2018-09-06 RX ADMIN — LABETALOL HCL 600 MG: 200 TABLET, FILM COATED ORAL at 03:09

## 2018-09-06 RX ADMIN — PANTOPRAZOLE SODIUM 40 MG: 40 TABLET, DELAYED RELEASE ORAL at 08:09

## 2018-09-06 RX ADMIN — LABETALOL HCL 600 MG: 200 TABLET, FILM COATED ORAL at 08:09

## 2018-09-06 RX ADMIN — HYDRALAZINE HYDROCHLORIDE 100 MG: 50 TABLET, FILM COATED ORAL at 09:09

## 2018-09-06 RX ADMIN — ESCITALOPRAM OXALATE 10 MG: 10 TABLET, FILM COATED ORAL at 08:09

## 2018-09-06 RX ADMIN — TAMSULOSIN HYDROCHLORIDE 0.4 MG: 0.4 CAPSULE ORAL at 08:09

## 2018-09-06 RX ADMIN — ZOLPIDEM TARTRATE 5 MG: 5 TABLET ORAL at 08:09

## 2018-09-06 RX ADMIN — LOSARTAN POTASSIUM 100 MG: 50 TABLET, FILM COATED ORAL at 08:09

## 2018-09-06 RX ADMIN — HYDRALAZINE HYDROCHLORIDE 100 MG: 50 TABLET, FILM COATED ORAL at 06:09

## 2018-09-06 RX ADMIN — MOXIFLOXACIN HYDROCHLORIDE 400 MG: 400 TABLET, FILM COATED ORAL at 08:09

## 2018-09-06 NOTE — PLAN OF CARE
Problem: Patient Care Overview  Goal: Plan of Care Review  Outcome: Ongoing (interventions implemented as appropriate)  Pt AAO x4.  VSS.  Pt remained afebrile throughout this shift.   Pt remained free of falls this shift.   Pt has no complaints of pain this shift.   Plan of care reviewed. Patient verbalizes understanding.   Pt moving/turing independently. Frequent weight shifting encouraged.  Patient SR on monitor.   Bed low, side rails up x 2, wheels locked, call light in reach.   Patient instructed to call for assistance.   Hourly rounding completed.   24 hour chart check completed.  Will continue to monitor.

## 2018-09-06 NOTE — HOSPITAL COURSE
9/6/18-Patient seen and examined today. Feels ok. Complains of cough. No chest tightness at time of exam. BP still high. Stress test pending.

## 2018-09-06 NOTE — PROGRESS NOTES
[facility] -   Nephrology  Progress Note    Patient Name: Conner Perez III  MRN: [id number]  Admission Date: 9/3/2018  Hospital Length of Stay: 0 days  Attending Provider: Keith Wesley MD   Primary Care Physician: Raciel Angela MD  Principal Problem:Accelerated hypertension    Subjective:     HPI: Conner Perez III is a 68 y old AA man with h/o ESRD on HD ( [facility] ) ,  but presented to the ER last evening for SOB likely due to noncompliance with fluid and salt restriction , Pt reports having dialysis yesterday ,   He also complains of mild chest pain on the left side, slight increase in troponin levels noted on the lab work, awaiting cards eval,           Interval History:  No acute events ,  CP better , awaiting cardiac stress test ,     Review of patient's allergies indicates:   Allergen Reactions    Augmentin [amoxicillin-pot clavulanate] Edema    Lisinopril      cough    Sulfa (sulfonamide antibiotics)      swelling     Current Facility-Administered Medications   Medication Frequency    acetaminophen tablet 650 mg Q6H PRN    albuterol-ipratropium 2.5 mg-0.5 mg/3 mL nebulizer solution 3 mL Q4H PRN    aspirin chewable tablet 81 mg Daily    atorvastatin tablet 40 mg QHS    azelastine 137 mcg (0.1 %) nasal spray 137 mcg BID    clonazePAM tablet 0.5 mg BID PRN    cloNIDine 0.3 mg/24 hr td ptwk 1 patch Q7 Days    cloNIDine tablet 0.1 mg Q4H PRN    escitalopram oxalate tablet 10 mg Daily    heparin (porcine) injection 5,000 Units Q8H    hydrALAZINE injection 10 mg Q6H PRN    hydrALAZINE tablet 100 mg Q8H    HYDROcodone-acetaminophen 7.5-325 mg per tablet 1 tablet Q6H PRN    isosorbide dinitrate tablet 30 mg TID    labetalol tablet 600 mg TID    losartan tablet 100 mg Daily    moxifloxacin tablet 400 mg Daily    NIFEdipine 24 hr tablet 60 mg Daily    ondansetron injection 4 mg Q8H PRN    pantoprazole EC tablet 40 mg Daily    tamsulosin 24 hr capsule  0.4 mg QHS    zolpidem tablet 5 mg Nightly PRN       Objective:     Vital Signs (Most Recent):  Temp: 97.8 °F (36.6 °C) (09/06/18 1120)  Pulse: 74 (09/06/18 1120)  Resp: 17 (09/06/18 1120)  BP: (!) 158/85 (09/06/18 1120)  SpO2: 95 % (09/06/18 1120)  O2 Device (Oxygen Therapy): room air (09/06/18 1120) Vital Signs (24h Range):  Temp:  [96.1 °F (35.6 °C)-97.8 °F (36.6 °C)] 97.8 °F (36.6 °C)  Pulse:  [30-81] 74  Resp:  [15-18] 17  SpO2:  [94 %-98 %] 95 %  BP: (149-210)/() 158/85     Weight: 90 kg (198 lb 6.6 oz) (09/06/18 0427)  Body mass index is 22.93 kg/m².  Body surface area is 2.23 meters squared.    I/O last 3 completed shifts:  In: 1100 [P.O.:1100]  Out: 4129 [Urine:675; Other:3454]    Physical Exam   Constitutional: He is oriented to person, place, and time. He appears well-developed and well-nourished. No distress.   HENT:   Head: Normocephalic and atraumatic.   Eyes: Pupils are equal, round, and reactive to light.   Neck: Normal range of motion. Neck supple. No tracheal deviation present. No thyromegaly present.   Cardiovascular: Normal rate, regular rhythm and intact distal pulses. Exam reveals no gallop and no friction rub.   Murmur heard.  Pulmonary/Chest: Effort normal. He has no wheezes. He has rales.   Abdominal: Soft. He exhibits no mass. There is no tenderness. There is no rebound and no guarding.   Musculoskeletal: Normal range of motion. He exhibits no edema.   Lymphadenopathy:     He has no cervical adenopathy.   Neurological: He is alert and oriented to person, place, and time.   Skin: Skin is warm. No rash noted. He is not diaphoretic. No erythema.   Nursing note and vitals reviewed.      Significant Labs:  CBC:   Recent Labs   Lab  09/06/18   0424   WBC  4.67   RBC  3.77*   HGB  11.0*   HCT  33.8*   PLT  181   MCV  90   MCH  29.2   MCHC  32.5     CMP:   Recent Labs   Lab  09/03/18   2156  09/05/18   0458  09/06/18   0424   GLU  100  86  85  86   CALCIUM  9.1  9.0  8.8  8.7   ALBUMIN   3.6  3.3*   --    PROT  7.6   --    --    NA  141  139  139  139   K  5.0  5.2*  5.1  4.6   CO2  25  26  25  25   CL  103  101  101  103   BUN  21  40*  40*  26*   CREATININE  5.2*  7.5*  7.4*  5.8*   ALKPHOS  70   --    --    ALT  13   --    --    AST  34   --    --    BILITOT  0.6   --    --      Coagulation: No results for input(s): PT, INR, APTT in the last 168 hours.  LFTs:   Recent Labs   Lab  09/03/18   2156  09/05/18   0458   ALT  13   --    AST  34   --    ALKPHOS  70   --    BILITOT  0.6   --    PROT  7.6   --    ALBUMIN  3.6  3.3*     All labs within the past 24 hours have been reviewed.     Lab Results   Component Value Date    .7 (H) 08/26/2017    CALCIUM 8.7 09/06/2018    PHOS 5.2 (H) 09/05/2018         Significant Imaging: reviewed     Assessment/Plan:     ESRD (end stage renal disease) on dialysis    1. ESRD on HD : (  [facility] , [facility] ) , Plan HD in AM , LUE AVF ,      2.  HTN : BP elevated, UF on HD as tolerated , resume home meds, changed to Nifedipine follow, doubt secondary causes of HTN,      3. Anemia of CKD : stable Hb, above goal, stop Epogen ,      4. SHPT : renal diet and binders      5. D/c home when stable     6. Angina - Cards eval, ? Stress test              I will follow-up with patient. Please contact us if you have any additional questions.     Total time spent 40 minutes including time needed to review the records,  patient  evaluation, documentation, face-to-face discussion with the patient, primary team,     more than 50% of the time was spent on coordination of care and counseling.       Fabián London MD  Nephrology  [facility] -

## 2018-09-06 NOTE — ASSESSMENT & PLAN NOTE
Continued home medications labetalol, Losartan,hydralazine, and clonidine patch.    Imdur changed to Mononitrate 60mg daily  Procardia added   Care discussed with Nephrology -doubt secondary causes but Metanephrines, Aldosterone, and Renin ordered   Pt counseled on importance of Na/fluid restriction.

## 2018-09-06 NOTE — PLAN OF CARE
CM met with patient at bedside to assess discharge needs. Patient lives at home alone and is independent with needs. Patient ambulates safely independently and denies any recent falls. HD MWF @ [facility]. No dc needs identified at this time. CM provided a transitional care folder, information on advanced directives, information on pharmacy bedside delivery, and discharge planning begins on admission with contact information for any needs/questions.    DC Plan: Home/Selfcare  Patient agrees with plan.    PCP: Raciel Angela MD    Primary Payor/Plan: Medicare  Secondary:         09/06/18 1549   Discharge Assessment   Assessment Type Discharge Planning Assessment   Confirmed/corrected address and phone number on facesheet? Yes   Assessment information obtained from? Patient   Prior to hospitilization cognitive status: Alert/Oriented   Prior to hospitalization functional status: Independent   Current cognitive status: Alert/Oriented   Current Functional Status: Independent   Lives With alone   Able to Return to Prior Arrangements yes   Is patient able to care for self after discharge? Yes   Patient's perception of discharge disposition admitted as an inpatient   Readmission Within The Last 30 Days current reason for admission unrelated to previous admission  (anxiety)   Patient currently being followed by outpatient case management? No   Patient currently receives any other outside agency services? No   Equipment Currently Used at Home none   Do you have any problems affording any of your prescribed medications? No   Is the patient taking medications as prescribed? yes   Does the patient have transportation home? Yes   Transportation Available family or friend will provide   Dialysis Name and Scheduled days ([facility])   Does the patient receive services at the Coumadin Clinic? Yes   Discharge Plan A Home   Patient/Family In Agreement With Plan yes

## 2018-09-06 NOTE — PLAN OF CARE
Problem: Patient Care Overview  Goal: Interdisciplinary Rounds/Family Conf  Outcome: Ongoing (interventions implemented as appropriate)  Pt in bed AAOx4. Plan of Care reviewed, Verbalized understanding.   Patient remained free from falls, fall precautions in place.  Patient is NSR on monitor.VSS.  PIV CDI.   Call bell and personal belongings within reach. Hourly rounding complete. Reminded to call for assistance. No complaints at this time. Will continue to monitor.

## 2018-09-06 NOTE — ASSESSMENT & PLAN NOTE
-Presented with pulmonary edema  -BP control  -Dialysis as per schedule  -Low salt diet/adherence to fluid restrictions

## 2018-09-06 NOTE — ASSESSMENT & PLAN NOTE
1. ESRD on HD : (  [facility] , [facility] ) , Plan HD in AM , LUE AVF ,      2.  HTN : BP elevated, UF on HD as tolerated , resume home meds, changed to Nifedipine follow, doubt secondary causes of HTN,      3. Anemia of CKD : stable Hb, above goal, stop Epogen ,      4. SHPT : renal diet and binders      5. D/c home when stable     6. Angina - Cards eval, ? Stress test

## 2018-09-06 NOTE — SUBJECTIVE & OBJECTIVE
Review of Systems   Constitution: Negative.   HENT: Negative.    Eyes: Negative.    Cardiovascular: Negative.    Respiratory: Positive for cough and sputum production.    Endocrine: Negative.    Hematologic/Lymphatic: Negative.    Skin: Negative.    Musculoskeletal: Negative.    Gastrointestinal: Negative.    Genitourinary: Negative.    Neurological: Negative.    Psychiatric/Behavioral: Negative.    Allergic/Immunologic: Negative.      Objective:     Vital Signs (Most Recent):  Temp: 97.8 °F (36.6 °C) (09/06/18 1120)  Pulse: 74 (09/06/18 1120)  Resp: 17 (09/06/18 1120)  BP: (!) 158/85 (09/06/18 1120)  SpO2: 95 % (09/06/18 1120) Vital Signs (24h Range):  Temp:  [96.1 °F (35.6 °C)-97.8 °F (36.6 °C)] 97.8 °F (36.6 °C)  Pulse:  [30-81] 74  Resp:  [15-18] 17  SpO2:  [94 %-98 %] 95 %  BP: (149-210)/() 158/85     Weight: 90 kg (198 lb 6.6 oz)  Body mass index is 22.93 kg/m².     SpO2: 95 %  O2 Device (Oxygen Therapy): room air      Intake/Output Summary (Last 24 hours) at 9/6/2018 1200  Last data filed at 9/6/2018 0422  Gross per 24 hour   Intake 980 ml   Output 3479 ml   Net -2499 ml       Lines/Drains/Airways     Drain                 Hemodialysis AV Fistula 12/18/17 0017 Right forearm 262 days          Peripheral Intravenous Line                 Peripheral IV - Single Lumen 09/03/18 2156 Left Forearm 2 days                Physical Exam   Constitutional: He is oriented to person, place, and time. He appears well-developed and well-nourished. No distress.   HENT:   Head: Normocephalic and atraumatic.   Eyes: Pupils are equal, round, and reactive to light. Right eye exhibits no discharge. Left eye exhibits no discharge.   Neck: Neck supple. No JVD present. No thyromegaly present.   Cardiovascular: Normal rate, regular rhythm, S1 normal, S2 normal and normal heart sounds.   No murmur heard.  Pulmonary/Chest: Effort normal and breath sounds normal. No respiratory distress. He has no wheezes. He has no rales.    Abdominal: Soft. He exhibits no distension. There is no tenderness. There is no rebound.   Musculoskeletal: He exhibits no edema.   Neurological: He is alert and oriented to person, place, and time.   Skin: Skin is warm and dry. He is not diaphoretic. No erythema.   Psychiatric: He has a normal mood and affect. His behavior is normal. Thought content normal.   Nursing note and vitals reviewed.      Significant Labs:   CMP   Recent Labs   Lab  09/05/18 0458 09/06/18   0424   NA  139  139  139   K  5.2*  5.1  4.6   CL  101  101  103   CO2  26  25  25   GLU  86  85  86   BUN  40*  40*  26*   CREATININE  7.5*  7.4*  5.8*   CALCIUM  9.0  8.8  8.7   ALBUMIN  3.3*   --    ANIONGAP  12  13  11   ESTGFRAFRICA  8*  8*  11*   EGFRNONAA  7*  7*  9*   , CBC   Recent Labs   Lab  09/05/18 0458 09/06/18   0424   WBC  3.96  4.67   HGB  10.5*  11.0*   HCT  32.5*  33.8*   PLT  143*  181   , Troponin No results for input(s): TROPONINI in the last 48 hours. and All pertinent lab results from the last 24 hours have been reviewed.    Significant Imaging: Echocardiogram:   2D echo with color flow doppler:   Results for orders placed or performed during the hospital encounter of 04/27/18   2D echo with color flow doppler   Result Value Ref Range    EF 55 55 - 65    Diastolic Dysfunction No     Mitral Valve Mobility NORMAL     Tricuspid Valve Regurgitation TRIVIAL    , EKG: Reviewed and X-Ray: CXR: X-Ray Chest 1 View (CXR): No results found for this visit on 09/03/18. and X-Ray Chest PA and Lateral (CXR): No results found for this visit on 09/03/18.

## 2018-09-06 NOTE — PROGRESS NOTES
"[facility] -   Cardiology  Progress Note    Patient Name: Conner Perez III  MRN: [id number]  Admission Date: 9/3/2018  Hospital Length of Stay: 0 days  Code Status: Full Code   Attending Physician: Keith Wesley MD   Primary Care Physician: Raciel Angela MD  Expected Discharge Date: 9/6/2018  Principal Problem:Accelerated hypertension    Subjective:   HPI:  Mr. Perez is a 68 year old male patient with a PMHx of ERSD on HD, uncontrolled HTN, DM, noncompliance, and diastolic CHF who presented to [facility] ED yesterday with a chief complaint of progressively worsening SOB over the past 4-5 days. Associated symptoms included chest tightness, orthopnea, and intermittent cough. Patient denied any fever, chills, PND, palpitations, lightheadedness, dizziness, near syncope, or syncope. Initial workup in ED revealed markedly elevated BP and initial troponin of 0.035.. CXR showed findings consistent with pulmonary edema and patient subsequently admitted for BP control and dialysis. Cardiology consulted to assist with management. Patient seen and examined today. Feels ok. Still complains of intermittent chest tightness that "comes and goes". Cannot identify any precipitating factors. Unsure if nitro helps. Denies any prior history of CAD or MI. Claims compliance with his medications. Chart reviewed. Troponin 0.035>0.023>0.028. EKG reviewed and showed new T wave inversions in inferior leads. 2D echo 4/18 showed normal EF.     Hospital Course:   9/6/18-Patient seen and examined today. Feels ok. Complains of cough. No chest tightness at time of exam. BP still high. Stress test pending.        Review of Systems   Constitution: Negative.   HENT: Negative.    Eyes: Negative.    Cardiovascular: Negative.    Respiratory: Positive for cough and sputum production.    Endocrine: Negative.    Hematologic/Lymphatic: Negative.    Skin: Negative.    Musculoskeletal: Negative.    Gastrointestinal: Negative.    Genitourinary: " Negative.    Neurological: Negative.    Psychiatric/Behavioral: Negative.    Allergic/Immunologic: Negative.      Objective:     Vital Signs (Most Recent):  Temp: 97.8 °F (36.6 °C) (09/06/18 1120)  Pulse: 74 (09/06/18 1120)  Resp: 17 (09/06/18 1120)  BP: (!) 158/85 (09/06/18 1120)  SpO2: 95 % (09/06/18 1120) Vital Signs (24h Range):  Temp:  [96.1 °F (35.6 °C)-97.8 °F (36.6 °C)] 97.8 °F (36.6 °C)  Pulse:  [30-81] 74  Resp:  [15-18] 17  SpO2:  [94 %-98 %] 95 %  BP: (149-210)/() 158/85     Weight: 90 kg (198 lb 6.6 oz)  Body mass index is 22.93 kg/m².     SpO2: 95 %  O2 Device (Oxygen Therapy): room air      Intake/Output Summary (Last 24 hours) at 9/6/2018 1200  Last data filed at 9/6/2018 0422  Gross per 24 hour   Intake 980 ml   Output 3479 ml   Net -2499 ml       Lines/Drains/Airways     Drain                 Hemodialysis AV Fistula 12/18/17 0017 Right forearm 262 days          Peripheral Intravenous Line                 Peripheral IV - Single Lumen 09/03/18 2156 Left Forearm 2 days                Physical Exam   Constitutional: He is oriented to person, place, and time. He appears well-developed and well-nourished. No distress.   HENT:   Head: Normocephalic and atraumatic.   Eyes: Pupils are equal, round, and reactive to light. Right eye exhibits no discharge. Left eye exhibits no discharge.   Neck: Neck supple. No JVD present. No thyromegaly present.   Cardiovascular: Normal rate, regular rhythm, S1 normal, S2 normal and normal heart sounds.   No murmur heard.  Pulmonary/Chest: Effort normal and breath sounds normal. No respiratory distress. He has no wheezes. He has no rales.   Abdominal: Soft. He exhibits no distension. There is no tenderness. There is no rebound.   Musculoskeletal: He exhibits no edema.   Neurological: He is alert and oriented to person, place, and time.   Skin: Skin is warm and dry. He is not diaphoretic. No erythema.   Psychiatric: He has a normal mood and affect. His behavior is  normal. Thought content normal.   Nursing note and vitals reviewed.      Significant Labs:   CMP   Recent Labs   Lab  09/05/18 0458 09/06/18   0424   NA  139  139  139   K  5.2*  5.1  4.6   CL  101  101  103   CO2  26  25  25   GLU  86  85  86   BUN  40*  40*  26*   CREATININE  7.5*  7.4*  5.8*   CALCIUM  9.0  8.8  8.7   ALBUMIN  3.3*   --    ANIONGAP  12  13  11   ESTGFRAFRICA  8*  8*  11*   EGFRNONAA  7*  7*  9*   , CBC   Recent Labs   Lab  09/05/18 0458 09/06/18   0424   WBC  3.96  4.67   HGB  10.5*  11.0*   HCT  32.5*  33.8*   PLT  143*  181   , Troponin No results for input(s): TROPONINI in the last 48 hours. and All pertinent lab results from the last 24 hours have been reviewed.    Significant Imaging: Echocardiogram:   2D echo with color flow doppler:   Results for orders placed or performed during the hospital encounter of 04/27/18   2D echo with color flow doppler   Result Value Ref Range    EF 55 55 - 65    Diastolic Dysfunction No     Mitral Valve Mobility NORMAL     Tricuspid Valve Regurgitation TRIVIAL    , EKG: Reviewed and X-Ray: CXR: X-Ray Chest 1 View (CXR): No results found for this visit on 09/03/18. and X-Ray Chest PA and Lateral (CXR): No results found for this visit on 09/03/18.    Assessment and Plan:   Patient who presents with accelerated HTN and intermittent chest tightness. Meds further adjusted due to refractory BP. Stress test results pending. Further rec's to follow.    * Accelerated hypertension    -Suspect secondary to dietary non-compliance/over indulging in salt/fluid  -BP still above goal  -Discussed with hospital medicine and nephrology; will stop cardizem and start nifedipine and assess response  -Continue other meds          Acute on chronic diastolic HFpEF of 55-60%    -Presented with pulmonary edema  -BP control  -Dialysis as per schedule  -Low salt diet/adherence to fluid restrictions        Elevated troponin    -Troponin 0.035>0.023>0.028  -Elevation likely  secondary to demand ischemia from accelerated HTN/fluid overload  -Still complains of intermittent chest tightness  -No known history of CAD but significant risk factors  -EKG with T wave inversions inferiorly  -Continue ASA, statin, BB, ARB, Imdur  -Stress test pending  -Further rec's to follow          Chest pain    -Complains of intermittent chest tightness  -Continue current meds  -Stress test pending            VTE Risk Mitigation (From admission, onward)        Ordered     heparin (porcine) injection 5,000 Units  Every 8 hours      09/04/18 0433     Place sequential compression device  Until discontinued      09/04/18 0433     IP VTE HIGH RISK PATIENT  Once      09/04/18 0216          Jocelyn Aiken PA-C  Cardiology  [facility] -     Chart reviewed. Dr. Chavez examined patient and agrees with  plan as outlined above.

## 2018-09-06 NOTE — SUBJECTIVE & OBJECTIVE
Review of Systems   Constitutional: Negative for chills, fatigue and fever.   HENT: Negative for congestion, nosebleeds, sore throat and trouble swallowing.    Eyes: Negative for pain, discharge and visual disturbance.   Respiratory: Positive for cough. Negative for apnea, chest tightness, shortness of breath, wheezing and stridor.    Cardiovascular: Negative for chest pain, palpitations and leg swelling.   Gastrointestinal: Negative for abdominal distention, abdominal pain, blood in stool, constipation, diarrhea, nausea and vomiting.   Endocrine: Negative for cold intolerance and heat intolerance.   Genitourinary: Negative for difficulty urinating, dysuria, flank pain, frequency and urgency.   Musculoskeletal: Negative for arthralgias, back pain, joint swelling, myalgias, neck pain and neck stiffness.   Skin: Negative for rash and wound.   Allergic/Immunologic: Negative for food allergies and immunocompromised state.   Neurological: Positive for headaches. Negative for dizziness, seizures, syncope, facial asymmetry, weakness and light-headedness.   Hematological: Negative for adenopathy.   Psychiatric/Behavioral: Negative for agitation, behavioral problems and confusion. The patient is not nervous/anxious.      Objective:     Vital Signs (Most Recent):  Temp: 97.8 °F (36.6 °C) (09/06/18 1120)  Pulse: 64 (09/06/18 1700)  Resp: 17 (09/06/18 1120)  BP: (!) 144/72 (09/06/18 1505)  SpO2: 95 % (09/06/18 1120) Vital Signs (24h Range):  Temp:  [96.1 °F (35.6 °C)-97.8 °F (36.6 °C)] 97.8 °F (36.6 °C)  Pulse:  [30-81] 64  Resp:  [15-18] 17  SpO2:  [94 %-98 %] 95 %  BP: (144-182)/(72-95) 144/72     Weight: 90 kg (198 lb 6.6 oz)  Body mass index is 22.93 kg/m².    Intake/Output Summary (Last 24 hours) at 9/6/2018 1717  Last data filed at 9/6/2018 1200  Gross per 24 hour   Intake 1460 ml   Output 25 ml   Net 1435 ml      Physical Exam   Constitutional: He is oriented to person, place, and time. He appears well-developed and  well-nourished. No distress.   HENT:   Head: Normocephalic and atraumatic.   Nose: Nose normal.   Mouth/Throat: Oropharynx is clear and moist.   Eyes: Conjunctivae and EOM are normal. No scleral icterus.   Neck: Normal range of motion. Neck supple.   Cardiovascular: Normal rate, regular rhythm, normal heart sounds and intact distal pulses. Exam reveals no gallop and no friction rub.   No murmur heard.  Pulmonary/Chest: Effort normal. No stridor. No respiratory distress. He has no wheezes. He has rales (bilaterally ). He exhibits no tenderness.   Abdominal: Soft. Bowel sounds are normal. He exhibits no distension. There is no tenderness. There is no rebound and no guarding.   Musculoskeletal: Normal range of motion. He exhibits no edema, tenderness or deformity.   Neurological: He is alert and oriented to person, place, and time. He has normal reflexes. No cranial nerve deficit. He exhibits normal muscle tone. Coordination normal.   Skin: Skin is warm and dry. No rash noted. He is not diaphoretic. No erythema. No pallor.   Psychiatric: He has a normal mood and affect. His behavior is normal. Thought content normal.   Nursing note and vitals reviewed.      Significant Labs:   BMP:   Recent Labs   Lab  09/06/18 0424   GLU  86   NA  139   K  4.6   CL  103   CO2  25   BUN  26*   CREATININE  5.8*   CALCIUM  8.7     CBC:   Recent Labs   Lab  09/05/18 0458 09/06/18 0424   WBC  3.96  4.67   HGB  10.5*  11.0*   HCT  32.5*  33.8*   PLT  143*  181     CMP:   Recent Labs   Lab  09/05/18 0458 09/06/18 0424   NA  139  139  139   K  5.2*  5.1  4.6   CL  101  101  103   CO2  26  25  25   GLU  86  85  86   BUN  40*  40*  26*   CREATININE  7.5*  7.4*  5.8*   CALCIUM  9.0  8.8  8.7   ALBUMIN  3.3*   --    ANIONGAP  12  13  11   EGFRNONAA  7*  7*  9*     All pertinent labs within the past 24 hours have been reviewed.    Significant Imaging:   Imaging Results          X-Ray Chest AP Portable (Final result)  Result  time 09/03/18 21:50:19    Final result by TERESA Kumar Sr., MD (09/03/18 21:50:19)                 Impression:      1. There is a subtle amount of haziness scattered throughout the left lung.  This is characteristic of atelectasis or pneumonia.  2. The right costophrenic angle is blunted.  This is characteristic of a tiny pleural effusion.  3. There is mild cardiomegaly.  .      Electronically signed by: Damien Kumar MD  Date:    09/03/2018  Time:    21:50             Narrative:    EXAMINATION:  XR CHEST AP PORTABLE    CLINICAL HISTORY:  Chest Pain;    COMPARISON:  08/21/2018    FINDINGS:  There is mild cardiomegaly.  There is a subtle amount of haziness scattered throughout the left lung.  There is no pneumothorax.  The right costophrenic angle is blunted.  The left costophrenic angle is sharp.

## 2018-09-06 NOTE — ASSESSMENT & PLAN NOTE
-Suspect secondary to dietary non-compliance/over indulging in salt/fluid  -BP still above goal  -Discussed with hospital medicine and nephrology; will stop cardizem and start nifedipine and assess response  -Continue other meds

## 2018-09-06 NOTE — PROGRESS NOTES
[facility] Hospital Medicine  Progress Note    Patient Name: Conner Perez III  MRN: [id number]  Patient Class: IP- Inpatient   Admission Date: 9/3/2018  Length of Stay: 0 days  Attending Physician: Keith Wesley MD  Primary Care Provider: Raciel Angela MD        Subjective:     Principal Problem:Accelerated hypertension    HPI:  Mr. Perez is a 68-year-old  male with PMHx of ESRD on HD M/W/F, uncontrolled HTN, and long-standing h/o medication and dietary noncompliance, who presented to the ED with c/o shortness of breath that has progressively worsened over the past few days.  Associated chest tightness and nonproductive cough.  No aggravating or alleviating factors.  Denies any HA, visual disturbance, AMS, lightheadedness/dizziness, syncope, focal deficits, palpitations, orthopnea, PND, edema, weight gain, ABD pain, N/V/D, dysuria, hematuria, back pain, diaphoresis, weakness, fever, or chills.  Patient reports being complaint with medications and dialysis.  In the ED, he was found to be in pulmonary edema, with chest x-ray showing pulmonary vascular congestion.  Patient still makes urine.  Initial blood pressure 200s/100s.  EKG showed new T wave inversions.   Followed by Dr. London (Nephrologist) and Dr. Barnhart (Cardiologist).  BNP (1100) and troponin (0.035) at baseline.  Hospital Medicine was called for admission.    Hospital Course:  The pt was placed in observation for chest pain, uncontrolled HTN, and elevated troponin. In the ED, initial blood pressure 200s/100s.  EKG showed new T wave inversions. CXR showed subtle amount of haziness scattered throughout the left lung. +productive cough. WBC normal, afebrile. Oral Avelox added  Pt continues to complain of chest tightness. Trop 0.035>0.023>0.028.  Likely secondary to uncontrolled HTN but consulted with  Cardiology who recommended nuclear stress test which was negative, however, it showed EF 35%. Cardiac echo ordered  Pt  counseled on low sodium fluid restriction diet.    On day 3, BP remains uncontrolled- discussed with Nephrology. Metanephrines, Aldosterones, and Renin ordered. Diltiazem discontinued. Procardia 60mg daily added. Imdur 30mg TID changed to Isosorbide mononitrate 60mg daily.           Review of Systems   Constitutional: Negative for chills, fatigue and fever.   HENT: Negative for congestion, nosebleeds, sore throat and trouble swallowing.    Eyes: Negative for pain, discharge and visual disturbance.   Respiratory: Positive for cough. Negative for apnea, chest tightness, shortness of breath, wheezing and stridor.    Cardiovascular: Negative for chest pain, palpitations and leg swelling.   Gastrointestinal: Negative for abdominal distention, abdominal pain, blood in stool, constipation, diarrhea, nausea and vomiting.   Endocrine: Negative for cold intolerance and heat intolerance.   Genitourinary: Negative for difficulty urinating, dysuria, flank pain, frequency and urgency.   Musculoskeletal: Negative for arthralgias, back pain, joint swelling, myalgias, neck pain and neck stiffness.   Skin: Negative for rash and wound.   Allergic/Immunologic: Negative for food allergies and immunocompromised state.   Neurological: Positive for headaches. Negative for dizziness, seizures, syncope, facial asymmetry, weakness and light-headedness.   Hematological: Negative for adenopathy.   Psychiatric/Behavioral: Negative for agitation, behavioral problems and confusion. The patient is not nervous/anxious.      Objective:     Vital Signs (Most Recent):  Temp: 97.8 °F (36.6 °C) (09/06/18 1120)  Pulse: 64 (09/06/18 1700)  Resp: 17 (09/06/18 1120)  BP: (!) 144/72 (09/06/18 1505)  SpO2: 95 % (09/06/18 1120) Vital Signs (24h Range):  Temp:  [96.1 °F (35.6 °C)-97.8 °F (36.6 °C)] 97.8 °F (36.6 °C)  Pulse:  [30-81] 64  Resp:  [15-18] 17  SpO2:  [94 %-98 %] 95 %  BP: (144-182)/(72-95) 144/72     Weight: 90 kg (198 lb 6.6 oz)  Body mass index is  22.93 kg/m².    Intake/Output Summary (Last 24 hours) at 9/6/2018 1717  Last data filed at 9/6/2018 1200  Gross per 24 hour   Intake 1460 ml   Output 25 ml   Net 1435 ml      Physical Exam   Constitutional: He is oriented to person, place, and time. He appears well-developed and well-nourished. No distress.   HENT:   Head: Normocephalic and atraumatic.   Nose: Nose normal.   Mouth/Throat: Oropharynx is clear and moist.   Eyes: Conjunctivae and EOM are normal. No scleral icterus.   Neck: Normal range of motion. Neck supple.   Cardiovascular: Normal rate, regular rhythm, normal heart sounds and intact distal pulses. Exam reveals no gallop and no friction rub.   No murmur heard.  Pulmonary/Chest: Effort normal. No stridor. No respiratory distress. He has no wheezes. He has rales (bilaterally ). He exhibits no tenderness.   Abdominal: Soft. Bowel sounds are normal. He exhibits no distension. There is no tenderness. There is no rebound and no guarding.   Musculoskeletal: Normal range of motion. He exhibits no edema, tenderness or deformity.   Neurological: He is alert and oriented to person, place, and time. He has normal reflexes. No cranial nerve deficit. He exhibits normal muscle tone. Coordination normal.   Skin: Skin is warm and dry. No rash noted. He is not diaphoretic. No erythema. No pallor.   Psychiatric: He has a normal mood and affect. His behavior is normal. Thought content normal.   Nursing note and vitals reviewed.      Significant Labs:   BMP:   Recent Labs   Lab  09/06/18 0424   GLU  86   NA  139   K  4.6   CL  103   CO2  25   BUN  26*   CREATININE  5.8*   CALCIUM  8.7     CBC:   Recent Labs   Lab  09/05/18 0458 09/06/18 0424   WBC  3.96  4.67   HGB  10.5*  11.0*   HCT  32.5*  33.8*   PLT  143*  181     CMP:   Recent Labs   Lab  09/05/18 0458 09/06/18 0424   NA  139  139  139   K  5.2*  5.1  4.6   CL  101  101  103   CO2  26  25  25   GLU  86  85  86   BUN  40*  40*  26*   CREATININE   7.5*  7.4*  5.8*   CALCIUM  9.0  8.8  8.7   ALBUMIN  3.3*   --    ANIONGAP  12  13  11   EGFRNONAA  7*  7*  9*     All pertinent labs within the past 24 hours have been reviewed.    Significant Imaging:   Imaging Results          X-Ray Chest AP Portable (Final result)  Result time 09/03/18 21:50:19    Final result by TERESA Kumar Sr., MD (09/03/18 21:50:19)                 Impression:      1. There is a subtle amount of haziness scattered throughout the left lung.  This is characteristic of atelectasis or pneumonia.  2. The right costophrenic angle is blunted.  This is characteristic of a tiny pleural effusion.  3. There is mild cardiomegaly.  .      Electronically signed by: Damien Kumar MD  Date:    09/03/2018  Time:    21:50             Narrative:    EXAMINATION:  XR CHEST AP PORTABLE    CLINICAL HISTORY:  Chest Pain;    COMPARISON:  08/21/2018    FINDINGS:  There is mild cardiomegaly.  There is a subtle amount of haziness scattered throughout the left lung.  There is no pneumothorax.  The right costophrenic angle is blunted.  The left costophrenic angle is sharp.                              Assessment/Plan:      * Accelerated hypertension    Continued home medications labetalol, Losartan,hydralazine, and clonidine patch.    Imdur changed to Mononitrate 60mg daily  Procardia added   Care discussed with Nephrology -doubt secondary causes but Metanephrines, Aldosterone, and Renin ordered   Pt counseled on importance of Na/fluid restriction.            Chest pain    Nuclear stress test negative but showed EF 35%  Will obtain cardiac echo           Acute on chronic diastolic HFpEF of 55-60%    See above   Cont Losartan           ESRD (end stage renal disease) on dialysis     HD as scheduled         Elevated troponin    -- Continue ASA, BB, Imdur, and statin.   See above         Anemia of renal disease    - H&H stable, follow daily CBC and transfuse as needed.          VTE Risk Mitigation (From admission,  onward)        Ordered     heparin (porcine) injection 5,000 Units  Every 8 hours      09/04/18 0433     Place sequential compression device  Until discontinued      09/04/18 0433     IP VTE HIGH RISK PATIENT  Once      09/04/18 0216              Susan Retana NP  Department of Hospital Medicine   [facility] -    pre op teaching surgical scrub pain management instructions given

## 2018-09-06 NOTE — ASSESSMENT & PLAN NOTE
-Troponin 0.035>0.023>0.028  -Elevation likely secondary to demand ischemia from accelerated HTN/fluid overload  -Still complains of intermittent chest tightness  -No known history of CAD but significant risk factors  -EKG with T wave inversions inferiorly  -Continue ASA, statin, BB, ARB, Imdur  -Stress test pending  -Further rec's to follow

## 2018-09-06 NOTE — SUBJECTIVE & OBJECTIVE
Interval History:  No acute events ,  CP better , awaiting cardiac stress test ,     Review of patient's allergies indicates:   Allergen Reactions    Augmentin [amoxicillin-pot clavulanate] Edema    Lisinopril      cough    Sulfa (sulfonamide antibiotics)      swelling     Current Facility-Administered Medications   Medication Frequency    acetaminophen tablet 650 mg Q6H PRN    albuterol-ipratropium 2.5 mg-0.5 mg/3 mL nebulizer solution 3 mL Q4H PRN    aspirin chewable tablet 81 mg Daily    atorvastatin tablet 40 mg QHS    azelastine 137 mcg (0.1 %) nasal spray 137 mcg BID    clonazePAM tablet 0.5 mg BID PRN    cloNIDine 0.3 mg/24 hr td ptwk 1 patch Q7 Days    cloNIDine tablet 0.1 mg Q4H PRN    escitalopram oxalate tablet 10 mg Daily    heparin (porcine) injection 5,000 Units Q8H    hydrALAZINE injection 10 mg Q6H PRN    hydrALAZINE tablet 100 mg Q8H    HYDROcodone-acetaminophen 7.5-325 mg per tablet 1 tablet Q6H PRN    isosorbide dinitrate tablet 30 mg TID    labetalol tablet 600 mg TID    losartan tablet 100 mg Daily    moxifloxacin tablet 400 mg Daily    NIFEdipine 24 hr tablet 60 mg Daily    ondansetron injection 4 mg Q8H PRN    pantoprazole EC tablet 40 mg Daily    tamsulosin 24 hr capsule 0.4 mg QHS    zolpidem tablet 5 mg Nightly PRN       Objective:     Vital Signs (Most Recent):  Temp: 97.8 °F (36.6 °C) (09/06/18 1120)  Pulse: 74 (09/06/18 1120)  Resp: 17 (09/06/18 1120)  BP: (!) 158/85 (09/06/18 1120)  SpO2: 95 % (09/06/18 1120)  O2 Device (Oxygen Therapy): room air (09/06/18 1120) Vital Signs (24h Range):  Temp:  [96.1 °F (35.6 °C)-97.8 °F (36.6 °C)] 97.8 °F (36.6 °C)  Pulse:  [30-81] 74  Resp:  [15-18] 17  SpO2:  [94 %-98 %] 95 %  BP: (149-210)/() 158/85     Weight: 90 kg (198 lb 6.6 oz) (09/06/18 0427)  Body mass index is 22.93 kg/m².  Body surface area is 2.23 meters squared.    I/O last 3 completed shifts:  In: 1100 [P.O.:1100]  Out: 4129 [Urine:675;  Other:3454]    Physical Exam   Constitutional: He is oriented to person, place, and time. He appears well-developed and well-nourished. No distress.   HENT:   Head: Normocephalic and atraumatic.   Eyes: Pupils are equal, round, and reactive to light.   Neck: Normal range of motion. Neck supple. No tracheal deviation present. No thyromegaly present.   Cardiovascular: Normal rate, regular rhythm and intact distal pulses. Exam reveals no gallop and no friction rub.   Murmur heard.  Pulmonary/Chest: Effort normal. He has no wheezes. He has rales.   Abdominal: Soft. He exhibits no mass. There is no tenderness. There is no rebound and no guarding.   Musculoskeletal: Normal range of motion. He exhibits no edema.   Lymphadenopathy:     He has no cervical adenopathy.   Neurological: He is alert and oriented to person, place, and time.   Skin: Skin is warm. No rash noted. He is not diaphoretic. No erythema.   Nursing note and vitals reviewed.      Significant Labs:  CBC:   Recent Labs   Lab  09/06/18 0424   WBC  4.67   RBC  3.77*   HGB  11.0*   HCT  33.8*   PLT  181   MCV  90   MCH  29.2   MCHC  32.5     CMP:   Recent Labs   Lab  09/03/18 2156 09/05/18 0458  09/06/18   0424   GLU  100  86  85  86   CALCIUM  9.1  9.0  8.8  8.7   ALBUMIN  3.6  3.3*   --    PROT  7.6   --    --    NA  141  139  139  139   K  5.0  5.2*  5.1  4.6   CO2  25  26  25  25   CL  103  101  101  103   BUN  21  40*  40*  26*   CREATININE  5.2*  7.5*  7.4*  5.8*   ALKPHOS  70   --    --    ALT  13   --    --    AST  34   --    --    BILITOT  0.6   --    --      Coagulation: No results for input(s): PT, INR, APTT in the last 168 hours.  LFTs:   Recent Labs   Lab  09/03/18 2156 09/05/18 0458   ALT  13   --    AST  34   --    ALKPHOS  70   --    BILITOT  0.6   --    PROT  7.6   --    ALBUMIN  3.6  3.3*     All labs within the past 24 hours have been reviewed.     Lab Results   Component Value Date    .7 (H) 08/26/2017    CALCIUM 8.7  09/06/2018    PHOS 5.2 (H) 09/05/2018         Significant Imaging: reviewed

## 2018-09-06 NOTE — NURSING
"New scenario presented itself to the patient. Patient's transportation: son, Flavio, is currently at the [facility] as inpatient. As of now, patient has no transportation. Pt states "if I get discharged Friday, can it PLEASE be after a round of dialysis?".  "

## 2018-09-07 VITALS
HEIGHT: 78 IN | BODY MASS INDEX: 24.41 KG/M2 | OXYGEN SATURATION: 97 % | TEMPERATURE: 98 F | HEART RATE: 80 BPM | RESPIRATION RATE: 18 BRPM | DIASTOLIC BLOOD PRESSURE: 84 MMHG | WEIGHT: 211 LBS | SYSTOLIC BLOOD PRESSURE: 179 MMHG

## 2018-09-07 LAB
ANION GAP SERPL CALC-SCNC: 11 MMOL/L
BASOPHILS # BLD AUTO: 0.02 K/UL
BASOPHILS NFR BLD: 0.5 %
BUN SERPL-MCNC: 39 MG/DL
CALCIUM SERPL-MCNC: 8.5 MG/DL
CHLORIDE SERPL-SCNC: 104 MMOL/L
CO2 SERPL-SCNC: 23 MMOL/L
CREAT SERPL-MCNC: 7.5 MG/DL
DIASTOLIC DYSFUNCTION: NO
DIASTOLIC DYSFUNCTION: YES
DIFFERENTIAL METHOD: ABNORMAL
EOSINOPHIL # BLD AUTO: 0.3 K/UL
EOSINOPHIL NFR BLD: 6.2 %
ERYTHROCYTE [DISTWIDTH] IN BLOOD BY AUTOMATED COUNT: 15 %
EST. GFR  (AFRICAN AMERICAN): 8 ML/MIN/1.73 M^2
EST. GFR  (NON AFRICAN AMERICAN): 7 ML/MIN/1.73 M^2
ESTIMATED PA SYSTOLIC PRESSURE: 41.73
GLUCOSE SERPL-MCNC: 113 MG/DL
HCT VFR BLD AUTO: 33.7 %
HGB BLD-MCNC: 10.9 G/DL
LYMPHOCYTES # BLD AUTO: 1.1 K/UL
LYMPHOCYTES NFR BLD: 25.8 %
MCH RBC QN AUTO: 29 PG
MCHC RBC AUTO-ENTMCNC: 32.3 G/DL
MCV RBC AUTO: 90 FL
MONOCYTES # BLD AUTO: 0.5 K/UL
MONOCYTES NFR BLD: 12.2 %
NEUTROPHILS # BLD AUTO: 2.3 K/UL
NEUTROPHILS NFR BLD: 55.3 %
PLATELET # BLD AUTO: 179 K/UL
PMV BLD AUTO: 11.1 FL
POTASSIUM SERPL-SCNC: 4.6 MMOL/L
RBC # BLD AUTO: 3.76 M/UL
RETIRED EF AND QEF - SEE NOTES: 60 (ref 55–65)
SODIUM SERPL-SCNC: 138 MMOL/L
WBC # BLD AUTO: 4.18 K/UL

## 2018-09-07 PROCEDURE — 25000003 PHARM REV CODE 250: Performed by: NURSE PRACTITIONER

## 2018-09-07 PROCEDURE — 25000003 PHARM REV CODE 250: Performed by: HOSPITALIST

## 2018-09-07 PROCEDURE — 85025 COMPLETE CBC W/AUTO DIFF WBC: CPT

## 2018-09-07 PROCEDURE — 36415 COLL VENOUS BLD VENIPUNCTURE: CPT

## 2018-09-07 PROCEDURE — 80048 BASIC METABOLIC PNL TOTAL CA: CPT

## 2018-09-07 PROCEDURE — 94761 N-INVAS EAR/PLS OXIMETRY MLT: CPT

## 2018-09-07 PROCEDURE — 80100016 HC MAINTENANCE HEMODIALYSIS

## 2018-09-07 PROCEDURE — 99232 SBSQ HOSP IP/OBS MODERATE 35: CPT | Mod: ,,, | Performed by: INTERNAL MEDICINE

## 2018-09-07 RX ORDER — NIFEDIPINE 60 MG/1
60 TABLET, EXTENDED RELEASE ORAL DAILY
Qty: 30 TABLET | Refills: 1 | Status: SHIPPED | OUTPATIENT
Start: 2018-09-07 | End: 2019-06-26 | Stop reason: SDUPTHER

## 2018-09-07 RX ORDER — ISOSORBIDE MONONITRATE 60 MG/1
60 TABLET, EXTENDED RELEASE ORAL DAILY
Qty: 30 TABLET | Refills: 1 | Status: SHIPPED | OUTPATIENT
Start: 2018-09-07 | End: 2018-09-13 | Stop reason: ALTCHOICE

## 2018-09-07 RX ORDER — MOXIFLOXACIN HYDROCHLORIDE 400 MG/1
400 TABLET ORAL DAILY
Qty: 5 TABLET | Refills: 0 | Status: SHIPPED | OUTPATIENT
Start: 2018-09-08 | End: 2018-10-06 | Stop reason: CLARIF

## 2018-09-07 RX ADMIN — ESCITALOPRAM OXALATE 10 MG: 10 TABLET, FILM COATED ORAL at 08:09

## 2018-09-07 RX ADMIN — HYDRALAZINE HYDROCHLORIDE 100 MG: 50 TABLET, FILM COATED ORAL at 05:09

## 2018-09-07 RX ADMIN — HYDRALAZINE HYDROCHLORIDE 100 MG: 50 TABLET, FILM COATED ORAL at 07:09

## 2018-09-07 RX ADMIN — PANTOPRAZOLE SODIUM 40 MG: 40 TABLET, DELAYED RELEASE ORAL at 08:09

## 2018-09-07 RX ADMIN — ASPIRIN 81 MG CHEWABLE TABLET 81 MG: 81 TABLET CHEWABLE at 08:09

## 2018-09-07 RX ADMIN — MOXIFLOXACIN HYDROCHLORIDE 400 MG: 400 TABLET, FILM COATED ORAL at 08:09

## 2018-09-07 RX ADMIN — AZELASTINE HYDROCHLORIDE 137 MCG: 137 SPRAY, METERED NASAL at 08:09

## 2018-09-07 NOTE — PLAN OF CARE
Problem: Patient Care Overview  Goal: Plan of Care Review  Outcome: Ongoing (interventions implemented as appropriate)  Pt in bed AAOx4. Plan of Care reviewed, Verbalized understanding.   Patient remained free from falls, fall precautions in place.  Patient I Sinus mario on monitor.VSS.  PIV CDI.   Call bell and personal belongings within reach. Hourly rounding complete. Reminded to call for assistance. No complaints at this time. Will continue to monitor.

## 2018-09-07 NOTE — PROGRESS NOTES
Pt completed 4 hours of HD removing 3.5L. Pt tolerated HD well. No signs of distress noted. De accessed per p&p  Each site held for 10mins, no post bleeding noted. Primary HD clinic closed unable to call and give report

## 2018-09-07 NOTE — PROGRESS NOTES
Pt wheeled to Dialysis Unit by staff for approximately a 4 hour treatment. No s/s of distress noted.

## 2018-09-07 NOTE — DISCHARGE SUMMARY
[facility] Hospital Medicine  Discharge Summary      Patient Name: Conner Perez III  MRN: [id number]  Admission Date: 9/3/2018  Hospital Length of Stay: 1 days  Discharge Date and Time:  09/07/2018 5:03 PM  Attending Physician: Keith Wesley MD   Discharging Provider: Susan oM NP  Primary Care Provider: Raciel Angela MD      HPI:   Mr. Perez is a 68-year-old  male with PMHx of ESRD on HD M/W/F, uncontrolled HTN, and long-standing h/o medication and dietary noncompliance, who presented to the ED with c/o shortness of breath that has progressively worsened over the past few days.  Associated chest tightness and nonproductive cough.  No aggravating or alleviating factors.  Denies any HA, visual disturbance, AMS, lightheadedness/dizziness, syncope, focal deficits, palpitations, orthopnea, PND, edema, weight gain, ABD pain, N/V/D, dysuria, hematuria, back pain, diaphoresis, weakness, fever, or chills.  Patient reports being complaint with medications and dialysis.  In the ED, he was found to be in pulmonary edema, with chest x-ray showing pulmonary vascular congestion.  Patient still makes urine.  Initial blood pressure 200s/100s.  EKG showed new T wave inversions.   Followed by Dr. London (Nephrologist) and Dr. Barnhart (Cardiologist).  BNP (1100) and troponin (0.035) at baseline.  Hospital Medicine was called for admission.    * No surgery found *      Hospital Course:   The pt was placed in observation for chest pain, uncontrolled HTN, and elevated troponin. In the ED, initial blood pressure 200s/100s.  EKG showed new T wave inversions. CXR showed subtle amount of haziness scattered throughout the left lung. +productive cough. WBC normal, afebrile. Oral Avelox added  Pt continues to complain of chest tightness. Trop 0.035>0.023>0.028.  Likely secondary to uncontrolled HTN but consulted with  Cardiology who recommended nuclear stress test which was negative, however, it  showed EF 35%. Cardiac echo ordered  Pt counseled on low sodium fluid restriction diet.    On day 3, BP remains uncontrolled- discussed with Nephrology. Metanephrines, Aldosterones, and Renin ordered. Diltiazem discontinued. Procardia 60mg daily added. Imdur 30mg TID changed to Isosorbide mononitrate 60mg daily.     9/7/18   Patient's blood pressure has significantly improved with medication adjustment. Patient's EF during stress test was reevaluated by Dr. Chavez and was EF 60% with diastolic dysfunction. He will continued Lexapro and Klonopin for CASSANDRA. Patient will be dialyzed after HD today. Next HD session will be on Tuesday 9/11/18. Patient seen and examined on date of discharge and deemed suitable.      Consults:   Consults (From admission, onward)        Status Ordering Provider     Inpatient consult to Cardiology  Once     Provider:  Winston Chavez MD    Completed REED RAMIREZ     Inpatient consult to Nephrology  Once     Provider:  Fabián London MD    Completed SANTOS COLLADO     Inpatient consult to Respiratory Care  Once     Provider:  (Not yet assigned)    MAINOR Nicholas JR          No new Assessment & Plan notes have been filed under this hospital service since the last note was generated.  Service: Hospital Medicine    Final Active Diagnoses:    Diagnosis Date Noted POA    PRINCIPAL PROBLEM:  Accelerated hypertension [I10] 01/12/2017 Yes    Acute on chronic diastolic HFpEF of 55-60% [I50.33]  Yes    ESRD (end stage renal disease) on dialysis [N18.6, Z99.2]  Not Applicable     Chronic    Elevated troponin [I25.10] 11/09/2017 Yes     Chronic    Anemia of renal disease [D63.1] 01/18/2017 Yes     Chronic    Chest pain [R07.9] 01/12/2017 Yes      Problems Resolved During this Admission:       Discharged Condition: stable    Disposition: Home or Self Care    Follow Up:    Patient Instructions:   No discharge procedures on file.    Significant Diagnostic Studies: Labs:   CMP    Recent Labs   Lab  09/06/18   0424  09/07/18   0513   NA  139  138   K  4.6  4.6   CL  103  104   CO2  25  23   GLU  86  113*   BUN  26*  39*   CREATININE  5.8*  7.5*   CALCIUM  8.7  8.5*   ANIONGAP  11  11   ESTGFRAFRICA  11*  8*   EGFRNONAA  9*  7*    and CBC   Recent Labs   Lab  09/06/18 0424 09/07/18   0513   WBC  4.67  4.18   HGB  11.0*  10.9*   HCT  33.8*  33.7*   PLT  181  179       Pending Diagnostic Studies:     Procedure Component Value Units Date/Time    Aldosterone [[id number]] Collected:  09/06/18 1116    Order Status:  Sent Lab Status:  In process Updated:  09/06/18 2048    Specimen:  Blood     Hepatitis B Surface Antibody, Qual/Quant [[id number]] Collected:  09/05/18 1217    Order Status:  Sent Lab Status:  In process Updated:  09/05/18 2022    Specimen:  Blood     Metanephrines, Plasma Free [[id number]] Collected:  09/06/18 1116    Order Status:  Sent Lab Status:  In process Updated:  09/06/18 2113    Specimen:  Blood     Renin [[id number]] Collected:  09/06/18 1116    Order Status:  Sent Lab Status:  In process Updated:  09/06/18 2113    Specimen:  Blood          Medications:  Reconciled Home Medications:      Medication List      START taking these medications    isosorbide mononitrate 60 MG 24 hr tablet  Commonly known as:  IMDUR  Take 1 tablet (60 mg total) by mouth once daily.     moxifloxacin 400 mg tablet  Commonly known as:  AVELOX  Take 1 tablet (400 mg total) by mouth once daily.     NIFEdipine 60 MG (OSM) 24 hr tablet  Commonly known as:  PROCARDIA-XL  Take 1 tablet (60 mg total) by mouth once daily.        CHANGE how you take these medications    atorvastatin 40 MG tablet  Commonly known as:  LIPITOR  Take 1 tablet (40 mg total) by mouth every evening.  What changed:  how much to take        CONTINUE taking these medications    * albuterol 90 mcg/actuation inhaler  Inhale into the lungs.     * albuterol 2.5 mg /3 mL (0.083 %) nebulizer solution  Commonly known as:  PROVENTIL  2.5 mg.      aspirin 81 MG Chew  Take 1 tablet (81 mg total) by mouth once daily.     azelastine 137 mcg (0.1 %) nasal spray  Commonly known as:  ASTELIN     clonazePAM 0.5 MG tablet  Commonly known as:  KLONOPIN  Take 1 tablet (0.5 mg total) by mouth 2 (two) times daily as needed for Anxiety.     cloNIDine 0.3 mg/24 hr td ptwk 0.3 mg/24 hr  Commonly known as:  CATAPRES  Place 1 patch onto the skin every 7 days.     escitalopram oxalate 10 MG tablet  Commonly known as:  LEXAPRO  Take 10 mg by mouth.     furosemide 40 MG tablet  Commonly known as:  LASIX  Take 40 mg by mouth once daily.     hydrALAZINE 100 MG tablet  Commonly known as:  APRESOLINE  Take 1 tablet (100 mg total) by mouth every 8 (eight) hours.     HYDROcodone-acetaminophen  mg per tablet  Commonly known as:  NORCO  Take 1 tablet by mouth every 4 (four) hours as needed for Pain.     labetalol 300 MG tablet  Commonly known as:  NORMODYNE  Take 2 tablets (600 mg total) by mouth 3 (three) times daily.     lactulose 10 gram/15 mL solution  Commonly known as:  CHRONULAC  take 2 tablespoonful by mouth at bedtime     LORazepam 1 MG tablet  Commonly known as:  ATIVAN  Take 0.5 tablets (0.5 mg total) by mouth every 6 (six) hours as needed for Anxiety.     losartan 100 MG tablet  Commonly known as:  COZAAR  Take 1 tablet (100 mg total) by mouth once daily.     ondansetron 4 MG Tbdl  Commonly known as:  ZOFRAN-ODT  Take 1 tablet (4 mg total) by mouth every 8 (eight) hours as needed.     pantoprazole 40 MG tablet  Commonly known as:  PROTONIX  Take 1 tablet (40 mg total) by mouth once daily.     tamsulosin 0.4 mg Cap  Commonly known as:  FLOMAX  Take 1 capsule (0.4 mg total) by mouth once daily.     traMADol 50 mg tablet  Commonly known as:  ULTRAM  take 1 to 2 tablets by mouth twice a day if needed for pain     zolpidem 5 MG Tab  Commonly known as:  AMBIEN  Take 1 tablet (5 mg total) by mouth nightly as needed.         * This list has 2 medication(s) that are the same  as other medications prescribed for you. Read the directions carefully, and ask your doctor or other care provider to review them with you.            STOP taking these medications    diltiaZEM 360 MG 24 hr capsule  Commonly known as:  CARDIZEM CD     isosorbide dinitrate 30 MG Tab  Commonly known as:  ISORDIL            Indwelling Lines/Drains at time of discharge:   Lines/Drains/Airways     Drain                 Hemodialysis AV Fistula 12/18/17 0017 Right forearm 263 days                Time spent on the discharge of patient: >35 minutes  Patient was seen and examined on the date of discharge and determined to be suitable for discharge.       Susan Mo NP  Department of Hospital Medicine  [facility]

## 2018-09-07 NOTE — SUBJECTIVE & OBJECTIVE
Interval History:  Blood pressure fluctuating.  Patient's son has heart issues and has been admitted in the [facility].  Pending dialysis today.  Stress test negative. EF is 30% pending echo    Review of patient's allergies indicates:   Allergen Reactions    Augmentin [amoxicillin-pot clavulanate] Edema    Lisinopril      cough    Sulfa (sulfonamide antibiotics)      swelling     Current Facility-Administered Medications   Medication Frequency    acetaminophen tablet 650 mg Q6H PRN    albuterol-ipratropium 2.5 mg-0.5 mg/3 mL nebulizer solution 3 mL Q4H PRN    aspirin chewable tablet 81 mg Daily    atorvastatin tablet 40 mg QHS    azelastine 137 mcg (0.1 %) nasal spray 137 mcg BID    clonazePAM tablet 0.5 mg BID PRN    cloNIDine 0.3 mg/24 hr td ptwk 1 patch Q7 Days    cloNIDine tablet 0.1 mg Q4H PRN    escitalopram oxalate tablet 10 mg Daily    heparin (porcine) injection 5,000 Units Q8H    hydrALAZINE injection 10 mg Q6H PRN    hydrALAZINE tablet 100 mg Q8H    HYDROcodone-acetaminophen 7.5-325 mg per tablet 1 tablet Q6H PRN    isosorbide mononitrate 24 hr tablet 60 mg Daily    labetalol tablet 600 mg TID    losartan tablet 100 mg Daily    moxifloxacin tablet 400 mg Daily    NIFEdipine 24 hr tablet 60 mg Daily    ondansetron injection 4 mg Q8H PRN    pantoprazole EC tablet 40 mg Daily    tamsulosin 24 hr capsule 0.4 mg QHS    zolpidem tablet 5 mg Nightly PRN       Objective:     Vital Signs (Most Recent):  Temp: 98.2 °F (36.8 °C) (09/07/18 1156)  Pulse: 71 (09/07/18 1324)  Resp: 18 (09/07/18 1156)  BP: (!) 147/70 (09/07/18 1156)  SpO2: 95 % (09/07/18 1156)  O2 Device (Oxygen Therapy): room air (09/07/18 1156) Vital Signs (24h Range):  Temp:  [97.5 °F (36.4 °C)-98.4 °F (36.9 °C)] 98.2 °F (36.8 °C)  Pulse:  [53-71] 71  Resp:  [12-18] 18  SpO2:  [94 %-98 %] 95 %  BP: (106-147)/(56-72) 147/70     Weight: 95.7 kg (211 lb) (09/07/18 0547)  Body mass index is 24.38 kg/m².  Body surface  area is 2.29 meters squared.    I/O last 3 completed shifts:  In: 1460 [P.O.:1460]  Out: 125 [Urine:125]    Physical Exam   Constitutional: He is oriented to person, place, and time. He appears well-developed and well-nourished. No distress.   HENT:   Head: Normocephalic and atraumatic.   Nose: Nose normal.   Mouth/Throat: Oropharynx is clear and moist.   Eyes: Conjunctivae and EOM are normal. No scleral icterus.   Neck: Normal range of motion. Neck supple.   Cardiovascular: Normal rate, regular rhythm, normal heart sounds and intact distal pulses. Exam reveals no gallop and no friction rub.   No murmur heard.  Pulmonary/Chest: Effort normal. No stridor. No respiratory distress. He has no wheezes. He has rales (bilaterally ). He exhibits no tenderness.   Abdominal: Soft. Bowel sounds are normal. He exhibits no distension. There is no tenderness. There is no rebound and no guarding.   Musculoskeletal: Normal range of motion. He exhibits no edema, tenderness or deformity.   Neurological: He is alert and oriented to person, place, and time. He has normal reflexes. No cranial nerve deficit. He exhibits normal muscle tone. Coordination normal.   Skin: Skin is warm and dry. No rash noted. He is not diaphoretic. No erythema. No pallor.   Psychiatric: He has a normal mood and affect. His behavior is normal. Thought content normal.   Nursing note and vitals reviewed.      Significant Labs:  All labs within the past 24 hours have been reviewed.     Significant Imaging:  Labs: Reviewed

## 2018-09-07 NOTE — PROGRESS NOTES
[facility]   Nephrology  Progress Note    Patient Name: Conner Perez III  MRN: [id number]  Admission Date: 9/3/2018  Hospital Length of Stay: 1 days  Attending Provider: Keith Wesley MD   Primary Care Physician: Raciel Angela MD  Principal Problem:Accelerated hypertension    Subjective:     HPI: Conner Perez III is a 68 y old AA man with h/o ESRD on HD ( [facility] ,  but presented to the ER last evening for SOB likely due to noncompliance with fluid and salt restriction , Pt reports having dialysis yesterday ,   He also complains of mild chest pain on the left side, slight increase in troponin levels noted on the lab work, awaiting cards eval,           Interval History:  Blood pressure fluctuating.  Patient's son has heart issues and has been admitted in the [facility].  Pending dialysis today.  Stress test negative. EF is 30% pending echo    Review of patient's allergies indicates:   Allergen Reactions    Augmentin [amoxicillin-pot clavulanate] Edema    Lisinopril      cough    Sulfa (sulfonamide antibiotics)      swelling     Current Facility-Administered Medications   Medication Frequency    acetaminophen tablet 650 mg Q6H PRN    albuterol-ipratropium 2.5 mg-0.5 mg/3 mL nebulizer solution 3 mL Q4H PRN    aspirin chewable tablet 81 mg Daily    atorvastatin tablet 40 mg QHS    azelastine 137 mcg (0.1 %) nasal spray 137 mcg BID    clonazePAM tablet 0.5 mg BID PRN    cloNIDine 0.3 mg/24 hr td ptwk 1 patch Q7 Days    cloNIDine tablet 0.1 mg Q4H PRN    escitalopram oxalate tablet 10 mg Daily    heparin (porcine) injection 5,000 Units Q8H    hydrALAZINE injection 10 mg Q6H PRN    hydrALAZINE tablet 100 mg Q8H    HYDROcodone-acetaminophen 7.5-325 mg per tablet 1 tablet Q6H PRN    isosorbide mononitrate 24 hr tablet 60 mg Daily    labetalol tablet 600 mg TID    losartan tablet 100 mg Daily    moxifloxacin tablet 400 mg Daily    NIFEdipine 24  hr tablet 60 mg Daily    ondansetron injection 4 mg Q8H PRN    pantoprazole EC tablet 40 mg Daily    tamsulosin 24 hr capsule 0.4 mg QHS    zolpidem tablet 5 mg Nightly PRN       Objective:     Vital Signs (Most Recent):  Temp: 98.2 °F (36.8 °C) (09/07/18 1156)  Pulse: 71 (09/07/18 1324)  Resp: 18 (09/07/18 1156)  BP: (!) 147/70 (09/07/18 1156)  SpO2: 95 % (09/07/18 1156)  O2 Device (Oxygen Therapy): room air (09/07/18 1156) Vital Signs (24h Range):  Temp:  [97.5 °F (36.4 °C)-98.4 °F (36.9 °C)] 98.2 °F (36.8 °C)  Pulse:  [53-71] 71  Resp:  [12-18] 18  SpO2:  [94 %-98 %] 95 %  BP: (106-147)/(56-72) 147/70     Weight: 95.7 kg (211 lb) (09/07/18 0547)  Body mass index is 24.38 kg/m².  Body surface area is 2.29 meters squared.    I/O last 3 completed shifts:  In: 1460 [P.O.:1460]  Out: 125 [Urine:125]    Physical Exam   Constitutional: He is oriented to person, place, and time. He appears well-developed and well-nourished. No distress.   HENT:   Head: Normocephalic and atraumatic.   Nose: Nose normal.   Mouth/Throat: Oropharynx is clear and moist.   Eyes: Conjunctivae and EOM are normal. No scleral icterus.   Neck: Normal range of motion. Neck supple.   Cardiovascular: Normal rate, regular rhythm, normal heart sounds and intact distal pulses. Exam reveals no gallop and no friction rub.   No murmur heard.  Pulmonary/Chest: Effort normal. No stridor. No respiratory distress. He has no wheezes. He has rales (bilaterally ). He exhibits no tenderness.   Abdominal: Soft. Bowel sounds are normal. He exhibits no distension. There is no tenderness. There is no rebound and no guarding.   Musculoskeletal: Normal range of motion. He exhibits no edema, tenderness or deformity.   Neurological: He is alert and oriented to person, place, and time. He has normal reflexes. No cranial nerve deficit. He exhibits normal muscle tone. Coordination normal.   Skin: Skin is warm and dry. No rash noted. He is not diaphoretic. No erythema. No  pallor.   Psychiatric: He has a normal mood and affect. His behavior is normal. Thought content normal.   Nursing note and vitals reviewed.      Significant Labs:  All labs within the past 24 hours have been reviewed.     Significant Imaging:  Labs: Reviewed    Assessment/Plan:     ESRD (end stage renal disease) on dialysis    1. ESRD on HD : (  F , [facility] ) , Plan HD  Today  , LUE AVF ,      2.  HTN : BP elevated, UF on HD as tolerated , resume home meds, changed to Nifedipine follow, doubt secondary causes of HTN,      3. Anemia of CKD : stable Hb, above goal, stop Epogen ,      4. SHPT : renal diet and binders      5. D/c home when stable     6.  Agree with Xanax for anxiety.  Patient's home situation discussed in detail with the patient and the hospital Medicine              Thank you for your consult.     Jesus Colon MD  Nephrology  [facility]

## 2018-09-08 LAB
HEP. B SURF AB, QUAL: NEGATIVE
HEP. B SURF AB, QUANT.: <3 MIU/ML

## 2018-09-08 NOTE — PROGRESS NOTES
"Went over discharge instructions with patient and brother.  Stressed importance of making and keeping all follow ups.   Patient verbalized understanding and has no questions in regards to discharge.  IV removed, catheter intact.  Telemetry box removed.  Patient wheeled down by staff to waiting vehicle. No s/s of distress noted.  BP (!) 179/84 (BP Location: Left arm, Patient Position: Sitting)   Pulse 80   Temp 97.9 °F (36.6 °C) (Oral)   Resp 18   Ht 6' 6" (1.981 m)   Wt 95.7 kg (211 lb)   SpO2 97%   BMI 24.38 kg/m²     "

## 2018-09-09 LAB
BACTERIA BLD CULT: NORMAL
BACTERIA BLD CULT: NORMAL

## 2018-09-10 ENCOUNTER — TELEPHONE (OUTPATIENT)
Dept: CARDIOLOGY | Facility: CLINIC | Age: 68
End: 2018-09-10

## 2018-09-10 ENCOUNTER — PATIENT OUTREACH (OUTPATIENT)
Dept: ADMINISTRATIVE | Facility: CLINIC | Age: 68
End: 2018-09-10

## 2018-09-10 LAB — ALDOST SERPL-MCNC: 3.8 NG/DL

## 2018-09-10 NOTE — TELEPHONE ENCOUNTER
Spoke with pt appt has been scheduled to see Dr. Barnhart this Thursday 9/12/19 [facility] clinic.     I have canceled pt's nurse visit for same day.

## 2018-09-10 NOTE — TELEPHONE ENCOUNTER
----- Message from Jocelyn Aiken PA-C sent at 9/10/2018  7:56 AM CDT -----  Needs appt with me or Dr. Barnhart for hospital f/u    Please arrange    Thanks

## 2018-09-10 NOTE — PROGRESS NOTES
aKryn Ward RN attempted to contact patient. The following occurred:   C3 nurse attempted to contact Conner Perez III for a TCC post hospital discharge follow up call. The patient is unable to conduct the call @ this time. The patient requested a callback.    The patient has a scheduled CARDIO FU appointment with Dr. Barnhart on 9/13/18 @ [id number]. No HOSPFU appt and unable to route message to PCP staff / non [facility] provider.

## 2018-09-11 LAB — RENIN PLAS-CCNC: <0.6 NG/ML/H

## 2018-09-11 NOTE — PATIENT INSTRUCTIONS
When You Have Pneumonia  You have been diagnosed with pneumonia. This is a serious lung infection. Most cases of pneumonia are caused by bacteria. Pneumonia most often occurs in older adults, young children, and people with chronic health problems.  Home care  · Take your medicine exactly as directed. Dont skip doses. Continue taking your antibiotics as until they are all gone, even if you start to feel better. This will prevent the pneumonia from coming back.  · Drink at least 8 glasses of water daily, unless directed otherwise. This helps to loosen and thin secretions so that you can cough them up.  · Use a cool-mist humidifier in your bedroom. Be sure to clean the humidifier daily.  · Dont use medicines to suppress your cough unless your cough is dry, painful, or interferes with your sleep. Coughing up mucus is normal. You may use an expectorant if your healthcare provider says its okay.  · You can use warm compresses or a heating pad on the lowest setting to relieve chest discomfort. Use several times a day for 15-20 minutes at a time. To prevent injury to your skin, set the temperature to warm, not hot. Dont put the compress or pad directly on your skin. Make certain it has a cover or wrap it in a towel. This is to prevent skin burns.  · Get plenty of rest until your fever, shortness of breath, and chest pain go away.  · Plan to get a flu shot every year. The flu is a common cause of pneumonia. Getting a flu shot every year can help prevent both the flu and pneumonia.  Getting the pneumococcal vaccine  Talk with your healthcare provider about getting the pneumococcalvaccine. Pneumococcal pneumonia is caused by bacteria that spread from person to person. It can cause minor problems, such as ear infections. But it can also turn into life-threatening illnesses of the lungs (pneumonia), the covering of the brain and spinal cord (meningitis), and the blood (bacteremia).  Children under 2 years of age, adults  over age 65, people with certain health conditions, and smokers are at the highest risk of pneumococcal disease. This vaccine can help prevent pneumococcal disease in both adults and children. Some people should not have the vaccine. Make sure to ask your healthcare provider if you should have the vaccine.   Follow-up care  Make a follow-up appointment as directed by our staff.  When to call your healthcare provider  Call your healthcare provider if you have any of the following:  · Fever above 100.4°F (38°C), or as directed by your healthcare provider  · Mucus from the lungs (sputum) thats yellow, green, bloody, or smells bad  · A large amount of sputum  · Vomiting  · Symptoms that get worse  When to call 911  Call 911 right away if you have any of the following:  · Chest pain  · Trouble breathing  · Blue lips or fingernails   Date Last Reviewed: 11/1/2016  © 0565-0169 [employer]. [address] [place] [address], [place] [address]. All rights reserved. This information is not intended as a substitute for professional medical care. Always follow your healthcare professional's instructions.

## 2018-09-13 ENCOUNTER — OFFICE VISIT (OUTPATIENT)
Dept: CARDIOLOGY | Facility: CLINIC | Age: 68
End: 2018-09-13
Payer: MEDICARE

## 2018-09-13 VITALS
DIASTOLIC BLOOD PRESSURE: 68 MMHG | WEIGHT: 205 LBS | HEIGHT: 78 IN | SYSTOLIC BLOOD PRESSURE: 120 MMHG | BODY MASS INDEX: 23.72 KG/M2 | HEART RATE: 68 BPM

## 2018-09-13 DIAGNOSIS — Z99.2 ESRD (END STAGE RENAL DISEASE) ON DIALYSIS: Chronic | ICD-10-CM

## 2018-09-13 DIAGNOSIS — I10 ACCELERATED HYPERTENSION: Primary | ICD-10-CM

## 2018-09-13 DIAGNOSIS — E43: Chronic | ICD-10-CM

## 2018-09-13 DIAGNOSIS — E78.2 MIXED HYPERLIPIDEMIA: ICD-10-CM

## 2018-09-13 DIAGNOSIS — I50.33 ACUTE ON CHRONIC DIASTOLIC CONGESTIVE HEART FAILURE: ICD-10-CM

## 2018-09-13 DIAGNOSIS — E11.69: Chronic | ICD-10-CM

## 2018-09-13 DIAGNOSIS — N18.6 ESRD (END STAGE RENAL DISEASE) ON DIALYSIS: Chronic | ICD-10-CM

## 2018-09-13 DIAGNOSIS — I25.118 CORONARY ARTERY DISEASE OF NATIVE ARTERY OF NATIVE HEART WITH STABLE ANGINA PECTORIS: ICD-10-CM

## 2018-09-13 PROCEDURE — 99214 OFFICE O/P EST MOD 30 MIN: CPT | Mod: S$PBB,,, | Performed by: INTERNAL MEDICINE

## 2018-09-13 PROCEDURE — 99999 PR PBB SHADOW E&M-EST. PATIENT-LVL III: CPT | Mod: PBBFAC,,, | Performed by: INTERNAL MEDICINE

## 2018-09-13 PROCEDURE — 99213 OFFICE O/P EST LOW 20 MIN: CPT | Mod: PBBFAC,PO | Performed by: INTERNAL MEDICINE

## 2018-09-13 RX ORDER — ISOSORBIDE DINITRATE 40 MG/1
40 TABLET ORAL 3 TIMES DAILY
Qty: 90 TABLET | Refills: 11 | Status: SHIPPED | OUTPATIENT
Start: 2018-09-13 | End: 2019-02-19 | Stop reason: SDUPTHER

## 2018-09-13 NOTE — PROGRESS NOTES
Subjective:   Patient ID:  Conner Perez III is a 68 y.o. male who presents for follow up of Hospital Follow Up      68 yo, male, came in for discharged f/u. retired  of [employer].  PMH CHFpEF,  nonobstructive CAD, s/p twice LHC at [facility] and last on was . Did not have PCI. Was told no blockage, nonsustained VT, IDDM, ESRD on HD in , via fistula on rigth upper arm, HTN, HLD.  Has been on Kidney tx list at [facility]  ECHO in  EF normal, mildly dilated LV.  Nuclear stress in  normal.      Had numerous ER visits for uncontrolled HTN and chest pain. Admitted in  for  and CHF.  Admitted again on 2018 for uncontrolled HTN, chest pain, elevated troponin and flat. EKG no acute change.MPI and ECHO unremarkable.  Chronic MONTGOMERY, and retired recently.   Denied chest pain, palpitation, dizziness, orthopnea and syncope.  Lipid profile is good per pt checked at PCP' office  Compliant with med and diet.  No smoking/drinking        Past Medical History:   Diagnosis Date    CKD (chronic kidney disease), stage IV     Diabetes mellitus, type 2     Dialysis patient     HTN (hypertension)        Past Surgical History:   Procedure Laterality Date    AMPUTATION-TOE Left 2017    Performed by Bandar Gomes DPM at [facility] OR    AV FISTULA PLACEMENT Right 2017    CIRCUMCISION  1978    TOE AMPUTATION Left 2017    4th toe    VASCULAR CATH INSERTION N/A 2017    Performed by Jeremias Peralta MD at [facility] CATH LAB       Social History     Tobacco Use    Smoking status: Former Smoker     Types: Cigarettes     Last attempt to quit: 1971     Years since quittin.7    Smokeless tobacco: Never Used   Substance Use Topics    Alcohol use: No    Drug use: No       Family History   Problem Relation Age of Onset    Hypertension Father     No Known Problems Mother          Review of Systems   Constitution: Negative for decreased appetite, diaphoresis, fever, weakness, malaise/fatigue  and night sweats.   HENT: Negative for nosebleeds.    Eyes: Negative for blurred vision and double vision.   Cardiovascular: Positive for dyspnea on exertion. Negative for chest pain, claudication, irregular heartbeat, leg swelling, near-syncope, orthopnea, palpitations, paroxysmal nocturnal dyspnea and syncope.   Respiratory: Negative for cough, shortness of breath, sleep disturbances due to breathing, snoring, sputum production and wheezing.    Endocrine: Negative for cold intolerance and polyuria.   Hematologic/Lymphatic: Does not bruise/bleed easily.   Skin: Negative for rash.   Musculoskeletal: Negative for back pain, falls, joint pain, joint swelling and neck pain.   Gastrointestinal: Negative for abdominal pain, heartburn, nausea and vomiting.   Genitourinary: Negative for dysuria, frequency and hematuria.   Neurological: Negative for difficulty with concentration, dizziness, focal weakness, headaches, light-headedness, numbness and seizures.   Psychiatric/Behavioral: Negative for depression, memory loss and substance abuse. The patient does not have insomnia.    Allergic/Immunologic: Negative for HIV exposure and hives.       Objective:   Physical Exam   Constitutional: He is oriented to person, place, and time. He appears well-nourished.   HENT:   Head: Normocephalic.   Eyes: Pupils are equal, round, and reactive to light.   Neck: Normal carotid pulses and no JVD present. Carotid bruit is not present. No thyromegaly present.   Cardiovascular: Normal rate, regular rhythm, normal heart sounds and normal pulses.  No extrasystoles are present. PMI is not displaced. Exam reveals no gallop and no S3.   No murmur heard.  Pulmonary/Chest: Breath sounds normal. No stridor. No respiratory distress.   Abdominal: Soft. Bowel sounds are normal. There is no tenderness. There is no rebound.   Musculoskeletal: Normal range of motion.   Neurological: He is alert and oriented to person, place, and time.   Skin: Skin is  intact. No rash noted.   Psychiatric: His behavior is normal.       Lab Results   Component Value Date    CHOL 128 09/04/2018     Lab Results   Component Value Date    HDL 50 09/04/2018     Lab Results   Component Value Date    LDLCALC 70.6 09/04/2018     Lab Results   Component Value Date    TRIG 37 09/04/2018     Lab Results   Component Value Date    CHOLHDL 39.1 09/04/2018       Chemistry        Component Value Date/Time     09/07/2018 0513    K 4.6 09/07/2018 0513     09/07/2018 0513    CO2 23 09/07/2018 0513    BUN 39 (H) 09/07/2018 0513    CREATININE 7.5 (H) 09/07/2018 0513     (H) 09/07/2018 0513        Component Value Date/Time    CALCIUM 8.5 (L) 09/07/2018 0513    ALKPHOS 70 09/03/2018 2156    AST 34 09/03/2018 2156    ALT 13 09/03/2018 2156    BILITOT 0.6 09/03/2018 2156    ESTGFRAFRICA 8 (A) 09/07/2018 0513    EGFRNONAA 7 (A) 09/07/2018 0513          Lab Results   Component Value Date    HGBA1C 4.4 09/04/2018     Lab Results   Component Value Date    TSH 0.667 12/17/2017     Lab Results   Component Value Date    INR 1.0 04/23/2018    INR 1.1 04/12/2018    INR 1.1 12/17/2017     Lab Results   Component Value Date    WBC 4.18 09/07/2018    HGB 10.9 (L) 09/07/2018    HCT 33.7 (L) 09/07/2018    MCV 90 09/07/2018     09/07/2018     BMP  Sodium   Date Value Ref Range Status   09/07/2018 138 136 - 145 mmol/L Final     Potassium   Date Value Ref Range Status   09/07/2018 4.6 3.5 - 5.1 mmol/L Final     Chloride   Date Value Ref Range Status   09/07/2018 104 95 - 110 mmol/L Final     CO2   Date Value Ref Range Status   09/07/2018 23 23 - 29 mmol/L Final     BUN, Bld   Date Value Ref Range Status   09/07/2018 39 (H) 8 - 23 mg/dL Final     Creatinine   Date Value Ref Range Status   09/07/2018 7.5 (H) 0.5 - 1.4 mg/dL Final     Calcium   Date Value Ref Range Status   09/07/2018 8.5 (L) 8.7 - 10.5 mg/dL Final     Anion Gap   Date Value Ref Range Status   09/07/2018 11 8 - 16 mmol/L Final      eGFR if    Date Value Ref Range Status   09/07/2018 8 (A) >60 mL/min/1.73 m^2 Final     eGFR if non    Date Value Ref Range Status   09/07/2018 7 (A) >60 mL/min/1.73 m^2 Final     Comment:     Calculation used to obtain the estimated glomerular filtration  rate (eGFR) is the CKD-EPI equation.        BNP  @LABRCNTIP(BNP,BNPTRIAGEBLO)@  @LABRCNTIP(troponini)@  Estimated Creatinine Clearance: 12.2 mL/min (A) (based on SCr of 7.5 mg/dL (H)).  No results found in the last 24 hours.  No results found in the last 24 hours.  No results found in the last 24 hours.    Assessment:      1. Accelerated hypertension    2. Acute on chronic diastolic HFpEF of 55-60%    3. Coronary artery disease of native artery of native heart with stable angina pectoris    4. Mixed hyperlipidemia    5. ESRD (end stage renal disease) on dialysis    6. Severe malnutrition due to type 2 diabetes mellitus      Recent admission for HTN emergency    Plan:   Continue Labetalol and Hydralazine tid  D/c Imdur, and add ISORDIL 40 mg tid.  Continue procardia, clonidine patch, losartan, lasix, asa ad lipitor.  DASH and fluid restriction  Check BP at home and report in one week.  RTC in  2 months

## 2018-09-14 LAB
METANEPH FREE SERPL-MCNC: 49 PG/ML
METANEPHS SERPL-MCNC: 132 PG/ML
NORMETANEPH FREE SERPL-MCNC: 83 PG/ML

## 2018-10-06 ENCOUNTER — HOSPITAL ENCOUNTER (EMERGENCY)
Facility: HOSPITAL | Age: 68
Discharge: HOME | End: 2018-10-06
Attending: EMERGENCY MEDICINE
Payer: MEDICARE

## 2018-10-06 VITALS
BODY MASS INDEX: 23.36 KG/M2 | TEMPERATURE: 98 F | HEIGHT: 78 IN | DIASTOLIC BLOOD PRESSURE: 71 MMHG | WEIGHT: 201.94 LBS | RESPIRATION RATE: 18 BRPM | HEART RATE: 73 BPM | SYSTOLIC BLOOD PRESSURE: 133 MMHG | OXYGEN SATURATION: 95 %
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
## 2018-10-06 DIAGNOSIS — F41.9 ANXIETY: ICD-10-CM

## 2018-10-06 PROCEDURE — 99283 EMERGENCY DEPT VISIT LOW MDM: CPT

## 2018-10-06 PROCEDURE — 25000003 PHARM REV CODE 250: Performed by: PHYSICIAN ASSISTANT

## 2018-10-06 RX ORDER — HYDROXYZINE PAMOATE 25 MG/1
25 CAPSULE ORAL
Status: COMPLETED | OUTPATIENT
Start: 2018-10-06 | End: 2018-10-06

## 2018-10-06 RX ORDER — ALPRAZOLAM 1 MG/1
1 TABLET ORAL 2 TIMES DAILY
COMMUNITY
End: 2018-10-13

## 2018-10-06 RX ADMIN — HYDROXYZINE PAMOATE 25 MG: 25 CAPSULE ORAL at 03:10
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
## 2018-10-13 ENCOUNTER — HOSPITAL ENCOUNTER (EMERGENCY)
Facility: HOSPITAL | Age: 68
Discharge: HOME | End: 2018-10-13
Attending: FAMILY MEDICINE
Payer: MEDICARE

## 2018-10-13 VITALS
WEIGHT: 193 LBS | OXYGEN SATURATION: 95 % | SYSTOLIC BLOOD PRESSURE: 168 MMHG | BODY MASS INDEX: 22.33 KG/M2 | HEIGHT: 78 IN | DIASTOLIC BLOOD PRESSURE: 88 MMHG | HEART RATE: 74 BPM | RESPIRATION RATE: 10 BRPM | TEMPERATURE: 98 F

## 2018-10-13 DIAGNOSIS — R07.9 CHEST PAIN: Primary | ICD-10-CM

## 2018-10-13 LAB
ALBUMIN SERPL BCP-MCNC: 3.5 G/DL
ALP SERPL-CCNC: 59 U/L
ALT SERPL W/O P-5'-P-CCNC: 16 U/L
ANION GAP SERPL CALC-SCNC: 13 MMOL/L
AST SERPL-CCNC: 27 U/L
BASOPHILS # BLD AUTO: 0.03 K/UL
BASOPHILS NFR BLD: 0.7 %
BILIRUB SERPL-MCNC: 0.7 MG/DL
BNP SERPL-MCNC: 1921 PG/ML
BUN SERPL-MCNC: 30 MG/DL
CALCIUM SERPL-MCNC: 8.9 MG/DL
CHLORIDE SERPL-SCNC: 99 MMOL/L
CO2 SERPL-SCNC: 27 MMOL/L
CREAT SERPL-MCNC: 5.4 MG/DL
DIFFERENTIAL METHOD: ABNORMAL
EOSINOPHIL # BLD AUTO: 0.1 K/UL
EOSINOPHIL NFR BLD: 3 %
ERYTHROCYTE [DISTWIDTH] IN BLOOD BY AUTOMATED COUNT: 15.2 %
EST. GFR  (AFRICAN AMERICAN): 12 ML/MIN/1.73 M^2
EST. GFR  (NON AFRICAN AMERICAN): 10 ML/MIN/1.73 M^2
GLUCOSE SERPL-MCNC: 149 MG/DL
HCT VFR BLD AUTO: 28.9 %
HGB BLD-MCNC: 9.5 G/DL
LYMPHOCYTES # BLD AUTO: 0.9 K/UL
LYMPHOCYTES NFR BLD: 21.9 %
MCH RBC QN AUTO: 28.4 PG
MCHC RBC AUTO-ENTMCNC: 32.9 G/DL
MCV RBC AUTO: 86 FL
MONOCYTES # BLD AUTO: 0.4 K/UL
MONOCYTES NFR BLD: 10.8 %
NEUTROPHILS # BLD AUTO: 2.6 K/UL
NEUTROPHILS NFR BLD: 63.6 %
PLATELET # BLD AUTO: 159 K/UL
PMV BLD AUTO: 10.6 FL
POTASSIUM SERPL-SCNC: 4.4 MMOL/L
PROT SERPL-MCNC: 7 G/DL
RBC # BLD AUTO: 3.35 M/UL
SODIUM SERPL-SCNC: 139 MMOL/L
TROPONIN I SERPL DL<=0.01 NG/ML-MCNC: 0.03 NG/ML
TROPONIN I SERPL DL<=0.01 NG/ML-MCNC: 0.05 NG/ML
WBC # BLD AUTO: 4.06 K/UL

## 2018-10-13 PROCEDURE — 84484 ASSAY OF TROPONIN QUANT: CPT

## 2018-10-13 PROCEDURE — 99285 EMERGENCY DEPT VISIT HI MDM: CPT

## 2018-10-13 PROCEDURE — 80053 COMPREHEN METABOLIC PANEL: CPT

## 2018-10-13 PROCEDURE — 83880 ASSAY OF NATRIURETIC PEPTIDE: CPT

## 2018-10-13 PROCEDURE — 93010 ELECTROCARDIOGRAM REPORT: CPT | Mod: ,,, | Performed by: INTERNAL MEDICINE

## 2018-10-13 PROCEDURE — 25000003 PHARM REV CODE 250: Performed by: FAMILY MEDICINE

## 2018-10-13 PROCEDURE — 85025 COMPLETE CBC W/AUTO DIFF WBC: CPT

## 2018-10-13 RX ORDER — LORAZEPAM 1 MG/1
1 TABLET ORAL
Status: COMPLETED | OUTPATIENT
Start: 2018-10-13 | End: 2018-10-13

## 2018-10-13 RX ADMIN — LORAZEPAM 1 MG: 1 TABLET ORAL at 05:10

## 2018-10-13 NOTE — ED PROVIDER NOTES
SCRIBE #1 NOTE: I, Marisela Johnson, am scribing for, and in the presence of, Virgen Gibbs MD. I have scribed the entire note.      History      Chief Complaint   Patient presents with    Chest Pain     pt reports chest tightness thinks he has anxiety        Review of patient's allergies indicates:   Allergen Reactions    Augmentin [amoxicillin-pot clavulanate] Edema    Lisinopril      cough    Sulfa (sulfonamide antibiotics)      swelling        HPI   HPI    10/13/2018, 4:26 PM   History obtained from the patient      History of Present Illness: Conner Perez III is a 68 y.o. male patient with HTN, DM Type 2, CKD stage IV on dialysis (last dialyzed yesterday with 3 L removed) who presents to the Emergency Department for chest tightness located across his chest which onset gradually 3-4 hours ago. Symptoms are constant and moderate in severity. No mitigating or exacerbating factors reported. Associated sxs include cough and SOB. Patient denies any fever, chills, leg pain/swelling, N/V, dizziness, extremity weakness/numbness, and all other sxs at this time. He states that he was dx with PNA about 4-5 weeks ago but is feeling better. No further complaints or concerns at this time.       Arrival mode: Personal vehicle    PCP: Raciel Angela MD       Past Medical History:  Past Medical History:   Diagnosis Date    CKD (chronic kidney disease), stage IV     COPD (chronic obstructive pulmonary disease)     Coronary artery disease     Depression     Dialysis patient     GERD (gastroesophageal reflux disease)     HTN (hypertension)        Past Surgical History:  Past Surgical History:   Procedure Laterality Date    AMPUTATION-TOE Left 1/24/2017    Performed by [facility] D[facility] at [facility] OR    AV FISTULA PLACEMENT Right 06/2017    CIRCUMCISION  1978    TOE AMPUTATION Left 01/24/2017    4th toe    VASCULAR CATH INSERTION N/A 8/28/2017    Performed by Jeremias Peralta MD at [facility] CATH LAB         Family  History:  Family History   Problem Relation Age of Onset    Hypertension Father     No Known Problems Mother        Social History:  Social History     Tobacco Use    Smoking status: Former Smoker     Types: Cigarettes     Last attempt to quit: 1971     Years since quittin.8    Smokeless tobacco: Never Used   Substance and Sexual Activity    Alcohol use: No    Drug use: No    Sexual activity: Unknown       ROS   Review of Systems   Constitutional: Negative for chills and fever.   HENT: Negative for sore throat.    Respiratory: Positive for cough, chest tightness and shortness of breath. Negative for wheezing.    Cardiovascular: Negative for chest pain and palpitations.   Gastrointestinal: Negative for abdominal pain, nausea and vomiting.   Genitourinary: Negative for dysuria.   Musculoskeletal: Negative for back pain.   Skin: Negative for rash.   Neurological: Negative for dizziness, weakness, numbness and headaches.   Hematological: Does not bruise/bleed easily.   All other systems reviewed and are negative.      Physical Exam      Initial Vitals [10/13/18 1626]   BP Pulse Resp Temp SpO2   (!) 149/69 73 20 98 °F (36.7 °C) 98 %      MAP       --          Physical Exam  Nursing Notes and Vital Signs Reviewed.  Constitutional: Patient is in no acute distress. Awake and alert. Well-developed and well-nourished.  Head: Atraumatic. Normocephalic.  Eyes: PERRL. EOM intact. Conjunctivae are not pale. No scleral icterus.  ENT: Mucous membranes are moist. Oropharynx is clear and symmetric.    Neck: Supple. Full ROM. No lymphadenopathy.  Cardiovascular: Regular rate. Regular rhythm. No murmurs, rubs, or gallops. Distal pulses are 2+ and symmetric.  Pulmonary/Chest: No respiratory distress. Occasional wheeze with rales in bilateral lungs. No rhonchi.  Abdominal: Soft and non-distended.  There is no tenderness.  No rebound, guarding, or rigidity.  Good bowel sounds.    Musculoskeletal: Moves all extremities. No  "obvious deformities. No edema. No calf tenderness.  Skin: Warm and dry.   Neurological: GCS 15. Alert, awake, and appropriate.  Normal speech.  No acute focal neurological deficits are appreciated.  Psychiatric: Normal affect. Good eye contact. Appropriate in content.    ED Course    Procedures  ED Vital Signs:  Vitals:    10/13/18 1626 10/13/18 1633 10/13/18 1721 10/13/18 1731   BP: (!) 149/69   (!) 147/75   Pulse: 73 73 72 71   Resp: 20   16   Temp: 98 °F (36.7 °C)      SpO2: 98%   95%   Weight: 87.5 kg (193 lb)      Height: 6' 6" (1.981 m)       10/13/18 1916 10/13/18 2132   BP: (!) 171/81 (!) 168/88   Pulse: 71 74   Resp: 16 10   Temp:     SpO2: 95% 95%   Weight:     Height:         Abnormal Lab Results:  Labs Reviewed   CBC W/ AUTO DIFFERENTIAL - Abnormal; Notable for the following components:       Result Value    RBC 3.35 (*)     Hemoglobin 9.5 (*)     Hematocrit 28.9 (*)     RDW 15.2 (*)     Lymph # 0.9 (*)     All other components within normal limits   COMPREHENSIVE METABOLIC PANEL - Abnormal; Notable for the following components:    Glucose 149 (*)     BUN, Bld 30 (*)     Creatinine 5.4 (*)     eGFR if  12 (*)     eGFR if non  10 (*)     All other components within normal limits   TROPONIN I - Abnormal; Notable for the following components:    Troponin I 0.047 (*)     All other components within normal limits   B-TYPE NATRIURETIC PEPTIDE - Abnormal; Notable for the following components:    BNP 1,921 (*)     All other components within normal limits   TROPONIN I - Abnormal; Notable for the following components:    Troponin I 0.029 (*)     All other components within normal limits        All Lab Results:  Results for orders placed or performed during the hospital encounter of 10/13/18   CBC auto differential   Result Value Ref Range    WBC 4.06 3.90 - 12.70 K/uL    RBC 3.35 (L) 4.60 - 6.20 M/uL    Hemoglobin 9.5 (L) 14.0 - 18.0 g/dL    Hematocrit 28.9 (L) 40.0 - 54.0 %    MCV " 86 82 - 98 fL    MCH 28.4 27.0 - 31.0 pg    MCHC 32.9 32.0 - 36.0 g/dL    RDW 15.2 (H) 11.5 - 14.5 %    Platelets 159 150 - 350 K/uL    MPV 10.6 9.2 - 12.9 fL    Gran # (ANC) 2.6 1.8 - 7.7 K/uL    Lymph # 0.9 (L) 1.0 - 4.8 K/uL    Mono # 0.4 0.3 - 1.0 K/uL    Eos # 0.1 0.0 - 0.5 K/uL    Baso # 0.03 0.00 - 0.20 K/uL    Gran% 63.6 38.0 - 73.0 %    Lymph% 21.9 18.0 - 48.0 %    Mono% 10.8 4.0 - 15.0 %    Eosinophil% 3.0 0.0 - 8.0 %    Basophil% 0.7 0.0 - 1.9 %    Differential Method Automated    Comprehensive metabolic panel   Result Value Ref Range    Sodium 139 136 - 145 mmol/L    Potassium 4.4 3.5 - 5.1 mmol/L    Chloride 99 95 - 110 mmol/L    CO2 27 23 - 29 mmol/L    Glucose 149 (H) 70 - 110 mg/dL    BUN, Bld 30 (H) 8 - 23 mg/dL    Creatinine 5.4 (H) 0.5 - 1.4 mg/dL    Calcium 8.9 8.7 - 10.5 mg/dL    Total Protein 7.0 6.0 - 8.4 g/dL    Albumin 3.5 3.5 - 5.2 g/dL    Total Bilirubin 0.7 0.1 - 1.0 mg/dL    Alkaline Phosphatase 59 55 - 135 U/L    AST 27 10 - 40 U/L    ALT 16 10 - 44 U/L    Anion Gap 13 8 - 16 mmol/L    eGFR if African American 12 (A) >60 mL/min/1.73 m^2    eGFR if non African American 10 (A) >60 mL/min/1.73 m^2   Troponin I   Result Value Ref Range    Troponin I 0.047 (H) 0.000 - 0.026 ng/mL   B-Type natriuretic peptide (BNP)   Result Value Ref Range    BNP 1,921 (H) 0 - 99 pg/mL   Troponin I   Result Value Ref Range    Troponin I 0.029 (H) 0.000 - 0.026 ng/mL     Imaging Results:  Imaging Results          X-Ray Chest AP Portable (Final result)  Result time 10/13/18 17:20:53    Final result by Santo Marrero MD (10/13/18 17:20:53)                 Impression:      In comparison to the prior study, there is no adverse interval changes      Electronically signed by: Santo Marrero MD  Date:    10/13/2018  Time:    17:20             Narrative:    EXAMINATION:  XR CHEST AP PORTABLE    CLINICAL HISTORY:  Chest Pain;    TECHNIQUE:  Single frontal view of the chest was  "performed.    COMPARISON:  09/03/2018    FINDINGS:  Stable mild cardiomegaly.  Aorta demonstrates atherosclerotic disease..  No consolidation.  Bilateral hazy interstitial opacities could reflect multifocal interstitial pneumonia and appears largely stable.  No pleural effusion or pneumothorax.    In comparison to the prior study, there is no adverse interval changes.                               The EKG was ordered, reviewed, and independently interpreted by the ED provider.  Interpretation time: 16:24  Rate: 73 BPM  Rhythm: normal sinus rhythm  Interpretation: Possible left atrial enlargement. Incomplete LBBB. ST & T wave abnormality, consider lateral ischemia. Prolonged QT. No STEMI.    The Emergency Provider reviewed the vital signs and test results, which are outlined above.    ED Discussion     7:43 PM: Reassessed pt at this time. He is feeling better after Ativan. His troponin is elevated at 0.047. His Troponin are always elevated but today it is slightly more elevated than his baseline. Ordered a repeat Troponin. Patient will be disposition pending repeat Troponin.    Care handed off to Dr. Mensah with repeat troponin pending. Repeat troponin not rising arguing against MI; on re-exam, patient states " I feel great. I think it was anxiety because the ativan stopped it." Patient comfortable with discharge at this point. EKG reviewed with no significant changes from prior EKG    Pt has been informed of all pertinent ED results. Discussed pt dx and plan of tx. Gave pt all f/u and return to the ED instructions. All questions and concerns were addressed at this time. Pt expresses understanding of information and instructions, and is comfortable with plan to discharge. Pt is stable for discharge.    I discussed with patient and/or family/caretaker that evaluation in the ED does not suggest any emergent or life threatening medical conditions requiring immediate intervention beyond what was provided in the ED, and I " believe patient is safe for discharge.  Regardless, an unremarkable evaluation in the ED does not preclude the development or presence of a serious of life threatening condition. As such, patient was instructed to return immediately for any worsening or change in current symptoms.     ED Medication(s):  Medications   LORazepam tablet 1 mg (1 mg Oral Given 10/13/18 6384)     Follow-up Information     Raciel Angela MD In 2 days.    Specialty:  Family Medicine  Contact information:  [address]  [address]  [facility] [address] [phone]             [facility]    Specialty:  Emergency Medicine  Why:  As needed, If symptoms worsen  Contact information:  [address]  [place] [address] [phone]               Medical Decision Making    Medical Decision Making:   Clinical Tests:   Lab Tests: Ordered and Reviewed  Radiological Study: Reviewed and Ordered  Medical Tests: Ordered and Reviewed           Scribe Attestation:   Scribe #1: I performed the above scribed service and the documentation accurately describes the services I performed. I attest to the accuracy of the note.      Attending:   Physician Attestation Statement for Scribe #1: I, Virgen Gibbs MD, personally performed the services described in this documentation, as scribed by Marisela Johnson, in my presence, and it is both accurate and complete.          Clinical Impression       ICD-10-CM ICD-9-CM   1. Chest pain R07.9 786.50       Disposition:   Disposition: Discharged  Condition: Stable         Santo Mensah MD  10/13/18 1130

## 2018-10-14 NOTE — ED NOTES
Patient examined, evaluated, and educated on discharge prescriptions and instructions by MD. Patient discharged to lobby by nurse.

## 2018-10-14 NOTE — PROVIDER PROGRESS NOTES - EMERGENCY DEPT.
"Encounter Date: 10/13/2018    ED Physician Progress Notes       SCRIBE NOTE: I, Madiha Dia, am scribing for, and in the presence of,  Santo Mensah MD.  Physician Statement: I, Santo Mensah MD, personally performed the services described in this documentation as scribed by Madiha Dia in my presence, and it is both accurate and complete.          9:21 PM: Dr. Mensah assessed pt at this time.  Pt states his condition has improved at this time. Patient states "I think it was a panic attack". Patient states he felt immediately better after Ativan. Discussed with pt all pertinent ED information and results. Discussed pt dx and plan of tx. Gave pt all f/u and return to the ED instructions. All questions and concerns were addressed at this time. Pt expresses understanding of information and instructions, and is comfortable with plan to discharge. Pt is stable for discharge.    I discussed with patient and/or family/caretaker that evaluation in the ED does not suggest any emergent or life threatening medical conditions requiring immediate intervention beyond what was provided in the ED, and I believe patient is safe for discharge.  Regardless, an unremarkable evaluation in the ED does not preclude the development or presence of a serious of life threatening condition. As such, patient was instructed to return immediately for any worsening or change in current symptoms.    "

## 2018-10-23 ENCOUNTER — OFFICE VISIT (OUTPATIENT)
Dept: CARDIOLOGY | Facility: CLINIC | Age: 68
End: 2018-10-23
Payer: MEDICARE

## 2018-10-23 VITALS
HEIGHT: 78 IN | WEIGHT: 201.94 LBS | DIASTOLIC BLOOD PRESSURE: 76 MMHG | HEART RATE: 76 BPM | BODY MASS INDEX: 23.36 KG/M2 | SYSTOLIC BLOOD PRESSURE: 150 MMHG

## 2018-10-23 DIAGNOSIS — E78.2 MIXED HYPERLIPIDEMIA: ICD-10-CM

## 2018-10-23 DIAGNOSIS — Z99.2 ESRD (END STAGE RENAL DISEASE) ON DIALYSIS: Chronic | ICD-10-CM

## 2018-10-23 DIAGNOSIS — I10 ESSENTIAL HYPERTENSION: Primary | ICD-10-CM

## 2018-10-23 DIAGNOSIS — N18.6 ESRD (END STAGE RENAL DISEASE) ON DIALYSIS: Chronic | ICD-10-CM

## 2018-10-23 DIAGNOSIS — I25.118 CORONARY ARTERY DISEASE OF NATIVE ARTERY OF NATIVE HEART WITH STABLE ANGINA PECTORIS: ICD-10-CM

## 2018-10-23 DIAGNOSIS — I50.33 ACUTE ON CHRONIC DIASTOLIC CONGESTIVE HEART FAILURE: ICD-10-CM

## 2018-10-23 PROCEDURE — 99999 PR PBB SHADOW E&M-EST. PATIENT-LVL III: CPT | Mod: PBBFAC,,, | Performed by: INTERNAL MEDICINE

## 2018-10-23 PROCEDURE — 99214 OFFICE O/P EST MOD 30 MIN: CPT | Mod: S$PBB,,, | Performed by: INTERNAL MEDICINE

## 2018-10-23 PROCEDURE — 99213 OFFICE O/P EST LOW 20 MIN: CPT | Mod: PBBFAC,PO | Performed by: INTERNAL MEDICINE

## 2018-10-23 NOTE — PROGRESS NOTES
Subjective:   Patient ID:  Conner Perez III is a 68 y.o. male who presents for follow up of Follow-up and Hypertension      66 yo, male, came in for f/u retired  of [employer].  PMH CHFpEF,  nonobstructive CAD, s/p twice LHC at [facility] and last on was . Did not have PCI. Was told no blockage, nonsustained VT, IDDM, ESRD on HD in , via fistula on rigth upper arm, HTN, HLD.  Has been on Kidney tx list at [facility]  ECHO in  EF normal, mildly dilated LV.  Nuclear stress in  normal.      Had numerous ER visits for uncontrolled HTN and chest pain. Admitted in  for  and CHF.  Admitted again on 2018 for uncontrolled HTN, chest pain, elevated troponin and flat. EKG no acute change.MPI and ECHO unremarkable.  Chronic MONTGOMERY, and retired recently.   Lipid profile is good per pt checked at PCP' office  Compliant with med and diet.  No smoking/drinking  Added Isodil 40 mg tid.  BP better controlled at HD day and still high at unHD days.  No dizziness, chest pain and dyspnea        Past Medical History:   Diagnosis Date    CKD (chronic kidney disease), stage IV     COPD (chronic obstructive pulmonary disease)     Coronary artery disease     Depression     Dialysis patient     GERD (gastroesophageal reflux disease)     HTN (hypertension)        Past Surgical History:   Procedure Laterality Date    AMPUTATION-TOE Left 2017    Performed by Bandar Gomes D[facility] at [facility] OR    AV FISTULA PLACEMENT Right 2017    CIRCUMCISION  1978    TOE AMPUTATION Left 2017    4th toe    VASCULAR CATH INSERTION N/A 2017    Performed by Jeremias Peralta MD at [facility] CATH LAB       Social History     Tobacco Use    Smoking status: Former Smoker     Types: Cigarettes     Last attempt to quit: 1971     Years since quittin.8    Smokeless tobacco: Never Used   Substance Use Topics    Alcohol use: No    Drug use: No       Family History   Problem Relation Age of Onset     Hypertension Father     No Known Problems Mother          Review of Systems   Constitution: Negative for decreased appetite, diaphoresis, fever, weakness, malaise/fatigue and night sweats.   HENT: Negative for nosebleeds.    Eyes: Negative for blurred vision and double vision.   Cardiovascular: Positive for dyspnea on exertion. Negative for chest pain, claudication, irregular heartbeat, leg swelling, near-syncope, orthopnea, palpitations, paroxysmal nocturnal dyspnea and syncope.   Respiratory: Negative for cough, shortness of breath, sleep disturbances due to breathing, snoring, sputum production and wheezing.    Endocrine: Negative for cold intolerance and polyuria.   Hematologic/Lymphatic: Does not bruise/bleed easily.   Skin: Negative for rash.   Musculoskeletal: Negative for back pain, falls, joint pain, joint swelling and neck pain.   Gastrointestinal: Negative for abdominal pain, heartburn, nausea and vomiting.   Genitourinary: Negative for dysuria, frequency and hematuria.   Neurological: Negative for difficulty with concentration, dizziness, focal weakness, headaches, light-headedness, numbness and seizures.   Psychiatric/Behavioral: Negative for depression, memory loss and substance abuse. The patient does not have insomnia.    Allergic/Immunologic: Negative for HIV exposure and hives.       Objective:   Physical Exam   Constitutional: He is oriented to person, place, and time. He appears well-nourished.   HENT:   Head: Normocephalic.   Eyes: Pupils are equal, round, and reactive to light.   Neck: Normal carotid pulses and no JVD present. Carotid bruit is not present. No thyromegaly present.   Cardiovascular: Normal rate, regular rhythm, normal heart sounds and normal pulses.  No extrasystoles are present. PMI is not displaced. Exam reveals no gallop and no S3.   No murmur heard.  Pulmonary/Chest: Breath sounds normal. No stridor. No respiratory distress.   Abdominal: Soft. Bowel sounds are normal. There  is no tenderness. There is no rebound.   Musculoskeletal: Normal range of motion.   Neurological: He is alert and oriented to person, place, and time.   Skin: Skin is intact. No rash noted.   Psychiatric: His behavior is normal.       Lab Results   Component Value Date    CHOL 128 09/04/2018     Lab Results   Component Value Date    HDL 50 09/04/2018     Lab Results   Component Value Date    LDLCALC 70.6 09/04/2018     Lab Results   Component Value Date    TRIG 37 09/04/2018     Lab Results   Component Value Date    CHOLHDL 39.1 09/04/2018       Chemistry        Component Value Date/Time     10/13/2018 1630    K 4.4 10/13/2018 1630    CL 99 10/13/2018 1630    CO2 27 10/13/2018 1630    BUN 30 (H) 10/13/2018 1630    CREATININE 5.4 (H) 10/13/2018 1630     (H) 10/13/2018 1630        Component Value Date/Time    CALCIUM 8.9 10/13/2018 1630    ALKPHOS 59 10/13/2018 1630    AST 27 10/13/2018 1630    ALT 16 10/13/2018 1630    BILITOT 0.7 10/13/2018 1630    ESTGFRAFRICA 12 (A) 10/13/2018 1630    EGFRNONAA 10 (A) 10/13/2018 1630          Lab Results   Component Value Date    HGBA1C 4.4 09/04/2018     Lab Results   Component Value Date    TSH 0.667 12/17/2017     Lab Results   Component Value Date    INR 1.0 04/23/2018    INR 1.1 04/12/2018    INR 1.1 12/17/2017     Lab Results   Component Value Date    WBC 4.06 10/13/2018    HGB 9.5 (L) 10/13/2018    HCT 28.9 (L) 10/13/2018    MCV 86 10/13/2018     10/13/2018     BMP  Sodium   Date Value Ref Range Status   10/13/2018 139 136 - 145 mmol/L Final     Potassium   Date Value Ref Range Status   10/13/2018 4.4 3.5 - 5.1 mmol/L Final     Chloride   Date Value Ref Range Status   10/13/2018 99 95 - 110 mmol/L Final     CO2   Date Value Ref Range Status   10/13/2018 27 23 - 29 mmol/L Final     BUN, Bld   Date Value Ref Range Status   10/13/2018 30 (H) 8 - 23 mg/dL Final     Creatinine   Date Value Ref Range Status   10/13/2018 5.4 (H) 0.5 - 1.4 mg/dL Final      Calcium   Date Value Ref Range Status   10/13/2018 8.9 8.7 - 10.5 mg/dL Final     Anion Gap   Date Value Ref Range Status   10/13/2018 13 8 - 16 mmol/L Final     eGFR if    Date Value Ref Range Status   10/13/2018 12 (A) >60 mL/min/1.73 m^2 Final     eGFR if non    Date Value Ref Range Status   10/13/2018 10 (A) >60 mL/min/1.73 m^2 Final     Comment:     Calculation used to obtain the estimated glomerular filtration  rate (eGFR) is the CKD-EPI equation.        BNP  @LABRCNTIP(BNP,BNPTRIAGEBLO)@  @LABRCNTIP(troponini)@  CrCl cannot be calculated (Patient's most recent lab result is older than the maximum 7 days allowed.).  No results found in the last 24 hours.  No results found in the last 24 hours.  No results found in the last 24 hours.    Assessment:      1. Essential hypertension    2. Mixed hyperlipidemia    3. Coronary artery disease of native artery of native heart with stable angina pectoris    4. Acute on chronic diastolic HFpEF of 55-60%    5. ESRD (end stage renal disease) on dialysis      HTN better controlled  CHF pEF compensated  No CP  Compliance with medications    Plan:   Continue ARB, BB, CCB, Hydralazine, Clonidine and Isordil.  Recommend heart-healthy diet, weight control and regular exercise.  Singh. Risk modification.   RTC in 3 months    I have reviewed all pertinent labs and cardiac studies. Plans and recommendations have been formulated under my direct supervision. All questions answered and patient voiced understanding. Patient to continue current medications.

## 2018-10-27 ENCOUNTER — HOSPITAL ENCOUNTER (INPATIENT)
Facility: HOSPITAL | Age: 68
LOS: 11 days | Discharge: NURSING, REHAB OR LONG-TERM CARE | DRG: 280 | End: 2018-11-07
Attending: EMERGENCY MEDICINE | Admitting: INTERNAL MEDICINE
Payer: MEDICARE

## 2018-10-27 DIAGNOSIS — Z99.2 ESRD (END STAGE RENAL DISEASE) ON DIALYSIS: Chronic | ICD-10-CM

## 2018-10-27 DIAGNOSIS — I21.4 NSTEMI (NON-ST ELEVATED MYOCARDIAL INFARCTION): ICD-10-CM

## 2018-10-27 DIAGNOSIS — R07.9 CHEST PAIN: ICD-10-CM

## 2018-10-27 DIAGNOSIS — J96.90 RESPIRATORY FAILURE: ICD-10-CM

## 2018-10-27 DIAGNOSIS — N18.6 ESRD (END STAGE RENAL DISEASE) ON DIALYSIS: Chronic | ICD-10-CM

## 2018-10-27 DIAGNOSIS — I21.3 STEMI (ST ELEVATION MYOCARDIAL INFARCTION): ICD-10-CM

## 2018-10-27 DIAGNOSIS — J81.0 ACUTE PULMONARY EDEMA: ICD-10-CM

## 2018-10-27 DIAGNOSIS — R79.89 ELEVATED TROPONIN: ICD-10-CM

## 2018-10-27 DIAGNOSIS — J96.02 ACUTE HYPERCAPNIC RESPIRATORY FAILURE: ICD-10-CM

## 2018-10-27 DIAGNOSIS — I21.19 ACUTE ST ELEVATION MYOCARDIAL INFARCTION (STEMI) OF INFERIOR WALL: ICD-10-CM

## 2018-10-27 DIAGNOSIS — I10 ACCELERATED HYPERTENSION: ICD-10-CM

## 2018-10-27 DIAGNOSIS — Z01.818 ENCOUNTER FOR INTUBATION: ICD-10-CM

## 2018-10-27 DIAGNOSIS — I21.3 ST ELEVATION MYOCARDIAL INFARCTION (STEMI), UNSPECIFIED ARTERY: ICD-10-CM

## 2018-10-27 DIAGNOSIS — I10 ESSENTIAL HYPERTENSION: ICD-10-CM

## 2018-10-27 DIAGNOSIS — E87.70 HYPERVOLEMIA, UNSPECIFIED HYPERVOLEMIA TYPE: ICD-10-CM

## 2018-10-27 DIAGNOSIS — R06.02 SOB (SHORTNESS OF BREATH): ICD-10-CM

## 2018-10-27 DIAGNOSIS — I50.33 ACUTE ON CHRONIC DIASTOLIC CONGESTIVE HEART FAILURE: Primary | ICD-10-CM

## 2018-10-27 DIAGNOSIS — Z99.11 ON MECHANICALLY ASSISTED VENTILATION: ICD-10-CM

## 2018-10-27 DIAGNOSIS — I21.02 ST ELEVATION MYOCARDIAL INFARCTION INVOLVING LEFT ANTERIOR DESCENDING (LAD) CORONARY ARTERY: ICD-10-CM

## 2018-10-27 LAB
ALBUMIN SERPL BCP-MCNC: 3.9 G/DL
ALLENS TEST: ABNORMAL
ALLENS TEST: ABNORMAL
ALP SERPL-CCNC: 66 U/L
ALT SERPL W/O P-5'-P-CCNC: 19 U/L
ANION GAP SERPL CALC-SCNC: 14 MMOL/L
APTT BLDCRRT: 30.5 SEC
AST SERPL-CCNC: 30 U/L
BACTERIA #/AREA URNS HPF: ABNORMAL /HPF
BASOPHILS # BLD AUTO: 0.03 K/UL
BASOPHILS # BLD AUTO: 0.05 K/UL
BASOPHILS NFR BLD: 0.3 %
BASOPHILS NFR BLD: 1 %
BILIRUB SERPL-MCNC: 0.8 MG/DL
BILIRUB UR QL STRIP: NEGATIVE
BNP SERPL-MCNC: 2547 PG/ML
BUN SERPL-MCNC: 18 MG/DL
CALCIUM SERPL-MCNC: 9.3 MG/DL
CHLORIDE SERPL-SCNC: 95 MMOL/L
CLARITY UR: CLEAR
CO2 SERPL-SCNC: 30 MMOL/L
COLOR UR: YELLOW
CREAT SERPL-MCNC: 4.3 MG/DL
DELSYS: ABNORMAL
DELSYS: ABNORMAL
DIASTOLIC DYSFUNCTION: YES
DIFFERENTIAL METHOD: ABNORMAL
DIFFERENTIAL METHOD: ABNORMAL
EOSINOPHIL # BLD AUTO: 0.1 K/UL
EOSINOPHIL # BLD AUTO: 0.2 K/UL
EOSINOPHIL NFR BLD: 1.3 %
EOSINOPHIL NFR BLD: 3.9 %
ERYTHROCYTE [DISTWIDTH] IN BLOOD BY AUTOMATED COUNT: 15.8 %
ERYTHROCYTE [DISTWIDTH] IN BLOOD BY AUTOMATED COUNT: 15.8 %
EST. GFR  (AFRICAN AMERICAN): 15 ML/MIN/1.73 M^2
EST. GFR  (NON AFRICAN AMERICAN): 13 ML/MIN/1.73 M^2
FIO2: 44
FLOW: 6
GLUCOSE SERPL-MCNC: 109 MG/DL
GLUCOSE UR QL STRIP: ABNORMAL
HCO3 UR-SCNC: 34.9 MMOL/L (ref 24–28)
HCO3 UR-SCNC: 35 MMOL/L (ref 24–28)
HCT VFR BLD AUTO: 29.8 %
HCT VFR BLD AUTO: 29.8 %
HGB BLD-MCNC: 9.4 G/DL
HGB BLD-MCNC: 9.7 G/DL
HGB UR QL STRIP: ABNORMAL
HYALINE CASTS #/AREA URNS LPF: 0 /LPF
INR PPP: 1.1
KETONES UR QL STRIP: NEGATIVE
LEUKOCYTE ESTERASE UR QL STRIP: NEGATIVE
LYMPHOCYTES # BLD AUTO: 0.6 K/UL
LYMPHOCYTES # BLD AUTO: 0.9 K/UL
LYMPHOCYTES NFR BLD: 18.1 %
LYMPHOCYTES NFR BLD: 6.6 %
MCH RBC QN AUTO: 28.1 PG
MCH RBC QN AUTO: 28.6 PG
MCHC RBC AUTO-ENTMCNC: 31.5 G/DL
MCHC RBC AUTO-ENTMCNC: 32.6 G/DL
MCV RBC AUTO: 88 FL
MCV RBC AUTO: 89 FL
MICROSCOPIC COMMENT: ABNORMAL
MODE: ABNORMAL
MODE: ABNORMAL
MONOCYTES # BLD AUTO: 0.4 K/UL
MONOCYTES # BLD AUTO: 0.4 K/UL
MONOCYTES NFR BLD: 4.1 %
MONOCYTES NFR BLD: 8.1 %
NEUTROPHILS # BLD AUTO: 3.6 K/UL
NEUTROPHILS # BLD AUTO: 8.4 K/UL
NEUTROPHILS NFR BLD: 68.9 %
NEUTROPHILS NFR BLD: 87.7 %
NITRITE UR QL STRIP: NEGATIVE
PCO2 BLDA: 53.8 MMHG (ref 35–45)
PCO2 BLDA: 54.9 MMHG (ref 35–45)
PEEP: 5
PH SMN: 7.41 [PH] (ref 7.35–7.45)
PH SMN: 7.42 [PH] (ref 7.35–7.45)
PH UR STRIP: 8 [PH] (ref 5–8)
PLATELET # BLD AUTO: 131 K/UL
PLATELET # BLD AUTO: 155 K/UL
PMV BLD AUTO: 10.5 FL
PMV BLD AUTO: 10.7 FL
PO2 BLDA: 102 MMHG (ref 80–100)
PO2 BLDA: 70 MMHG (ref 80–100)
POC BE: 10 MMOL/L
POC BE: 11 MMOL/L
POC SATURATED O2: 94 % (ref 95–100)
POC SATURATED O2: 98 % (ref 95–100)
POCT GLUCOSE: 100 MG/DL (ref 70–110)
POCT GLUCOSE: 106 MG/DL (ref 70–110)
POCT GLUCOSE: 50 MG/DL (ref 70–110)
POCT GLUCOSE: 53 MG/DL (ref 70–110)
POCT GLUCOSE: 57 MG/DL (ref 70–110)
POTASSIUM SERPL-SCNC: 4.5 MMOL/L
PROT SERPL-MCNC: 7.9 G/DL
PROT UR QL STRIP: ABNORMAL
PROTHROMBIN TIME: 11.8 SEC
RBC # BLD AUTO: 3.34 M/UL
RBC # BLD AUTO: 3.39 M/UL
RBC #/AREA URNS HPF: 5 /HPF (ref 0–4)
RETIRED EF AND QEF - SEE NOTES: 45 (ref 55–65)
SAMPLE: ABNORMAL
SAMPLE: ABNORMAL
SITE: ABNORMAL
SITE: ABNORMAL
SODIUM SERPL-SCNC: 139 MMOL/L
SP GR UR STRIP: 1.01 (ref 1–1.03)
SQUAMOUS #/AREA URNS HPF: 2 /HPF
TROPONIN I SERPL DL<=0.01 NG/ML-MCNC: 0.02 NG/ML
TROPONIN I SERPL DL<=0.01 NG/ML-MCNC: 3.5 NG/ML
URN SPEC COLLECT METH UR: ABNORMAL
UROBILINOGEN UR STRIP-ACNC: NEGATIVE EU/DL
VT: 500
WBC # BLD AUTO: 5.19 K/UL
WBC # BLD AUTO: 9.6 K/UL
WBC #/AREA URNS HPF: 1 /HPF (ref 0–5)

## 2018-10-27 PROCEDURE — 25000003 PHARM REV CODE 250: Performed by: NURSE PRACTITIONER

## 2018-10-27 PROCEDURE — 25000003 PHARM REV CODE 250: Performed by: INTERNAL MEDICINE

## 2018-10-27 PROCEDURE — 20000000 HC ICU ROOM

## 2018-10-27 PROCEDURE — 81000 URINALYSIS NONAUTO W/SCOPE: CPT

## 2018-10-27 PROCEDURE — 25000003 PHARM REV CODE 250

## 2018-10-27 PROCEDURE — 93307 TTE W/O DOPPLER COMPLETE: CPT | Mod: 26,,, | Performed by: INTERNAL MEDICINE

## 2018-10-27 PROCEDURE — 90935 HEMODIALYSIS ONE EVALUATION: CPT | Mod: ,,, | Performed by: INTERNAL MEDICINE

## 2018-10-27 PROCEDURE — 82803 BLOOD GASES ANY COMBINATION: CPT

## 2018-10-27 PROCEDURE — 99900035 HC TECH TIME PER 15 MIN (STAT)

## 2018-10-27 PROCEDURE — 93307 TTE W/O DOPPLER COMPLETE: CPT

## 2018-10-27 PROCEDURE — 63600175 PHARM REV CODE 636 W HCPCS: Performed by: NURSE PRACTITIONER

## 2018-10-27 PROCEDURE — 94660 CPAP INITIATION&MGMT: CPT

## 2018-10-27 PROCEDURE — 94002 VENT MGMT INPAT INIT DAY: CPT

## 2018-10-27 PROCEDURE — 63600175 PHARM REV CODE 636 W HCPCS

## 2018-10-27 PROCEDURE — 84484 ASSAY OF TROPONIN QUANT: CPT

## 2018-10-27 PROCEDURE — 93010 ELECTROCARDIOGRAM REPORT: CPT | Mod: ,,, | Performed by: INTERNAL MEDICINE

## 2018-10-27 PROCEDURE — 99291 CRITICAL CARE FIRST HOUR: CPT | Mod: ,,, | Performed by: NURSE PRACTITIONER

## 2018-10-27 PROCEDURE — 27000190 HC CPAP FULL FACE MASK W/VALVE

## 2018-10-27 PROCEDURE — 85730 THROMBOPLASTIN TIME PARTIAL: CPT

## 2018-10-27 PROCEDURE — 99291 CRITICAL CARE FIRST HOUR: CPT | Mod: 25

## 2018-10-27 PROCEDURE — C9113 INJ PANTOPRAZOLE SODIUM, VIA: HCPCS | Performed by: NURSE PRACTITIONER

## 2018-10-27 PROCEDURE — 63600175 PHARM REV CODE 636 W HCPCS: Performed by: INTERNAL MEDICINE

## 2018-10-27 PROCEDURE — 84484 ASSAY OF TROPONIN QUANT: CPT | Mod: 91

## 2018-10-27 PROCEDURE — 36600 WITHDRAWAL OF ARTERIAL BLOOD: CPT

## 2018-10-27 PROCEDURE — 80053 COMPREHEN METABOLIC PANEL: CPT

## 2018-10-27 PROCEDURE — 36415 COLL VENOUS BLD VENIPUNCTURE: CPT

## 2018-10-27 PROCEDURE — 80100016 HC MAINTENANCE HEMODIALYSIS

## 2018-10-27 PROCEDURE — 83880 ASSAY OF NATRIURETIC PEPTIDE: CPT

## 2018-10-27 PROCEDURE — 99223 1ST HOSP IP/OBS HIGH 75: CPT | Mod: 25,,, | Performed by: INTERNAL MEDICINE

## 2018-10-27 PROCEDURE — 31500 INSERT EMERGENCY AIRWAY: CPT

## 2018-10-27 PROCEDURE — 85025 COMPLETE CBC W/AUTO DIFF WBC: CPT

## 2018-10-27 PROCEDURE — 85610 PROTHROMBIN TIME: CPT

## 2018-10-27 RX ORDER — CHLORHEXIDINE GLUCONATE ORAL RINSE 1.2 MG/ML
15 SOLUTION DENTAL 2 TIMES DAILY
Status: DISCONTINUED | OUTPATIENT
Start: 2018-10-27 | End: 2018-11-07 | Stop reason: HOSPADM

## 2018-10-27 RX ORDER — HEPARIN SODIUM,PORCINE/D5W 25000/250
12 INTRAVENOUS SOLUTION INTRAVENOUS CONTINUOUS
Status: DISCONTINUED | OUTPATIENT
Start: 2018-10-27 | End: 2018-10-28

## 2018-10-27 RX ORDER — CLONIDINE 0.3 MG/24H
1 PATCH, EXTENDED RELEASE TRANSDERMAL
Status: DISCONTINUED | OUTPATIENT
Start: 2018-10-27 | End: 2018-11-07 | Stop reason: HOSPADM

## 2018-10-27 RX ORDER — PANTOPRAZOLE SODIUM 40 MG/10ML
40 INJECTION, POWDER, LYOPHILIZED, FOR SOLUTION INTRAVENOUS DAILY
Status: DISCONTINUED | OUTPATIENT
Start: 2018-10-27 | End: 2018-11-02

## 2018-10-27 RX ORDER — ETOMIDATE 2 MG/ML
20 INJECTION INTRAVENOUS
Status: COMPLETED | OUTPATIENT
Start: 2018-10-27 | End: 2018-10-27

## 2018-10-27 RX ORDER — ASPIRIN 325 MG
325 TABLET ORAL ONCE
Status: COMPLETED | OUTPATIENT
Start: 2018-10-27 | End: 2018-10-27

## 2018-10-27 RX ORDER — PROPOFOL 10 MG/ML
INJECTION, EMULSION INTRAVENOUS
Status: COMPLETED
Start: 2018-10-27 | End: 2018-10-27

## 2018-10-27 RX ORDER — HEPARIN SODIUM 5000 [USP'U]/ML
5000 INJECTION, SOLUTION INTRAVENOUS; SUBCUTANEOUS EVERY 8 HOURS
Status: DISCONTINUED | OUTPATIENT
Start: 2018-10-27 | End: 2018-10-27

## 2018-10-27 RX ORDER — HEPARIN SODIUM 1000 [USP'U]/ML
2000 INJECTION, SOLUTION INTRAVENOUS; SUBCUTANEOUS ONCE
Status: DISCONTINUED | OUTPATIENT
Start: 2018-10-27 | End: 2018-10-30

## 2018-10-27 RX ORDER — PROPOFOL 10 MG/ML
20 VIAL (ML) INTRAVENOUS
Status: DISCONTINUED | OUTPATIENT
Start: 2018-10-27 | End: 2018-10-27

## 2018-10-27 RX ORDER — HYDRALAZINE HYDROCHLORIDE 50 MG/1
100 TABLET, FILM COATED ORAL EVERY 8 HOURS
Status: DISCONTINUED | OUTPATIENT
Start: 2018-10-27 | End: 2018-10-28

## 2018-10-27 RX ORDER — PROPOFOL 10 MG/ML
50 INJECTION, EMULSION INTRAVENOUS CONTINUOUS
Status: DISCONTINUED | OUTPATIENT
Start: 2018-10-27 | End: 2018-10-30

## 2018-10-27 RX ORDER — ASPIRIN 81 MG/1
81 TABLET ORAL DAILY
Status: DISCONTINUED | OUTPATIENT
Start: 2018-10-28 | End: 2018-11-07 | Stop reason: HOSPADM

## 2018-10-27 RX ORDER — HEPARIN SODIUM 1000 [USP'U]/ML
3000 INJECTION, SOLUTION INTRAVENOUS; SUBCUTANEOUS ONCE
Status: DISCONTINUED | OUTPATIENT
Start: 2018-10-27 | End: 2018-10-30

## 2018-10-27 RX ORDER — ROCURONIUM BROMIDE 10 MG/ML
50 INJECTION, SOLUTION INTRAVENOUS ONCE
Status: COMPLETED | OUTPATIENT
Start: 2018-10-27 | End: 2018-10-27

## 2018-10-27 RX ORDER — FENTANYL CITRAT/DEXTROSE 5%/PF 100 MCG/10
PATIENT CONTROLLED ANALGESIA SYRINGE INTRAVENOUS CONTINUOUS
Status: DISCONTINUED | OUTPATIENT
Start: 2018-10-27 | End: 2018-10-30

## 2018-10-27 RX ORDER — ISOSORBIDE DINITRATE 10 MG/1
40 TABLET ORAL 3 TIMES DAILY
Status: DISCONTINUED | OUTPATIENT
Start: 2018-10-27 | End: 2018-11-07 | Stop reason: HOSPADM

## 2018-10-27 RX ORDER — ATORVASTATIN CALCIUM 40 MG/1
80 TABLET, FILM COATED ORAL DAILY
Status: DISCONTINUED | OUTPATIENT
Start: 2018-10-27 | End: 2018-11-07 | Stop reason: HOSPADM

## 2018-10-27 RX ORDER — PROPOFOL 10 MG/ML
20 INJECTION, EMULSION INTRAVENOUS
Status: COMPLETED | OUTPATIENT
Start: 2018-10-27 | End: 2018-10-27

## 2018-10-27 RX ADMIN — PROPOFOL 20 MG: 10 INJECTION, EMULSION INTRAVENOUS at 07:10

## 2018-10-27 RX ADMIN — HEPARIN SODIUM AND DEXTROSE 12 UNITS/KG/HR: 10000; 5 INJECTION INTRAVENOUS at 07:10

## 2018-10-27 RX ADMIN — ROCURONIUM BROMIDE 50 MG: 10 INJECTION, SOLUTION INTRAVENOUS at 07:10

## 2018-10-27 RX ADMIN — ETOMIDATE 20 MG: 2 INJECTION, SOLUTION INTRAVENOUS at 07:10

## 2018-10-27 RX ADMIN — CLONIDINE 1 PATCH: 0.3 PATCH TRANSDERMAL at 01:10

## 2018-10-27 RX ADMIN — CHLORHEXIDINE GLUCONATE 15 ML: 1.2 RINSE ORAL at 09:10

## 2018-10-27 RX ADMIN — PROPOFOL 35 MCG/KG/MIN: 10 INJECTION, EMULSION INTRAVENOUS at 06:10

## 2018-10-27 RX ADMIN — HYDRALAZINE HYDROCHLORIDE 100 MG: 50 TABLET, FILM COATED ORAL at 09:10

## 2018-10-27 RX ADMIN — DEXTROSE MONOHYDRATE 25 G: 25 INJECTION, SOLUTION INTRAVENOUS at 06:10

## 2018-10-27 RX ADMIN — NITROGLYCERIN 2 INCH: 20 OINTMENT TOPICAL at 06:10

## 2018-10-27 RX ADMIN — PANTOPRAZOLE SODIUM 40 MG: 40 INJECTION, POWDER, FOR SOLUTION INTRAVENOUS at 11:10

## 2018-10-27 RX ADMIN — PROPOFOL 35 MCG/KG/MIN: 10 INJECTION, EMULSION INTRAVENOUS at 02:10

## 2018-10-27 RX ADMIN — HYDRALAZINE HYDROCHLORIDE 100 MG: 50 TABLET, FILM COATED ORAL at 01:10

## 2018-10-27 RX ADMIN — HEPARIN SODIUM 5000 UNITS: 5000 INJECTION, SOLUTION INTRAVENOUS; SUBCUTANEOUS at 01:10

## 2018-10-27 RX ADMIN — PROPOFOL 50 MCG/KG/MIN: 10 INJECTION, EMULSION INTRAVENOUS at 10:10

## 2018-10-27 RX ADMIN — ATORVASTATIN CALCIUM 80 MG: 40 TABLET, FILM COATED ORAL at 07:10

## 2018-10-27 RX ADMIN — ISOSORBIDE DINITRATE 40 MG: 20 TABLET ORAL at 03:10

## 2018-10-27 RX ADMIN — Medication 50 MCG/HR: at 01:10

## 2018-10-27 RX ADMIN — CHLORHEXIDINE GLUCONATE 15 ML: 1.2 RINSE ORAL at 11:10

## 2018-10-27 RX ADMIN — ISOSORBIDE DINITRATE 40 MG: 20 TABLET ORAL at 09:10

## 2018-10-27 RX ADMIN — ASPIRIN 325 MG ORAL TABLET 325 MG: 325 PILL ORAL at 07:10

## 2018-10-27 NOTE — PROGRESS NOTES
"Pt exhibiting moderate increased WOB with moderate use of abdominal muscles.Pt placed on AVAPS and ABG obtained.MD at bedside. Pt states that "machine is helping a little bit". Pt has mild WOB on AVAPS.  AVAPS settings documented in flowsheet and mepilex in place on bridge of nose. RT to continue to monitor.  "

## 2018-10-27 NOTE — ASSESSMENT & PLAN NOTE
S/t pulmonary edema; intravascular volume overload with suspected acute on chronic right heart failure  Vent settings reviewed and adjusted for optimal gas exchange  VAP prophylaxis  SAT/SBT post HD

## 2018-10-27 NOTE — SUBJECTIVE & OBJECTIVE
Past Medical History:   Diagnosis Date    CKD (chronic kidney disease), stage IV     COPD (chronic obstructive pulmonary disease)     Coronary artery disease     Depression     Dialysis patient     GERD (gastroesophageal reflux disease)     HTN (hypertension)     Pneumonia        Past Surgical History:   Procedure Laterality Date    AMPUTATION-TOE Left 1/24/2017    Performed by Bandar Gomes DPM at [facility] OR    AV FISTULA PLACEMENT Right 06/2017    CIRCUMCISION  1978    TOE AMPUTATION Left 01/24/2017    4th toe    VASCULAR CATH INSERTION N/A 8/28/2017    Performed by Jeremias Peralta MD at [facility] CATH LAB       Review of patient's allergies indicates:   Allergen Reactions    Augmentin [amoxicillin-pot clavulanate] Edema    Sulfa (sulfonamide antibiotics)      swelling    Lisinopril      cough       No current facility-administered medications on file prior to encounter.      Current Outpatient Medications on File Prior to Encounter   Medication Sig    albuterol (PROVENTIL) 2.5 mg /3 mL (0.083 %) nebulizer solution 2.5 mg.    albuterol 90 mcg/actuation inhaler Inhale into the lungs.    aspirin 81 MG Chew Take 1 tablet (81 mg total) by mouth once daily.    atorvastatin (LIPITOR) 40 MG tablet Take 1 tablet (40 mg total) by mouth every evening. (Patient taking differently: Take 20 mg by mouth every evening. )    cloNIDine 0.3 mg/24 hr td ptwk (CATAPRES) 0.3 mg/24 hr Place 1 patch onto the skin every 7 days.    furosemide (LASIX) 40 MG tablet Take 40 mg by mouth once daily.    hydrALAZINE (APRESOLINE) 100 MG tablet Take 1 tablet (100 mg total) by mouth every 8 (eight) hours.    isosorbide dinitrate (ISORDIL) 40 MG Tab Take 1 tablet (40 mg total) by mouth 3 (three) times daily.    labetalol (NORMODYNE) 300 MG tablet Take 2 tablets (600 mg total) by mouth 3 (three) times daily.    losartan (COZAAR) 100 MG tablet Take 1 tablet (100 mg total) by mouth once daily.    NIFEdipine (PROCARDIA-XL) 60 MG  (OSM) 24 hr tablet Take 1 tablet (60 mg total) by mouth once daily.    pantoprazole (PROTONIX) 40 MG tablet Take 1 tablet (40 mg total) by mouth once daily.    tamsulosin (FLOMAX) 0.4 mg Cp24 Take 1 capsule (0.4 mg total) by mouth once daily.     Family History     Problem Relation (Age of Onset)    Hypertension Father    No Known Problems Mother        Tobacco Use    Smoking status: Former Smoker     Types: Cigarettes     Last attempt to quit: 1971     Years since quittin.8    Smokeless tobacco: Never Used   Substance and Sexual Activity    Alcohol use: No    Drug use: No    Sexual activity: Not on file     Review of Systems   Unable to perform ROS: Intubated     Objective:     Vital Signs (Most Recent):  Temp: 97.6 °F (36.4 °C) (10/27/18 0457)  Pulse: 81 (10/27/18 0832)  Resp: 18 (10/27/18 0832)  BP: (!) 200/101 (10/27/18 0832)  SpO2: 96 % (10/27/18 0832) Vital Signs (24h Range):  Temp:  [97.6 °F (36.4 °C)] 97.6 °F (36.4 °C)  Pulse:  [79-84] 81  Resp:  [16-38] 18  SpO2:  [85 %-100 %] 96 %  BP: (175-214)/() 200/101     Weight: 91.2 kg (201 lb)  Body mass index is 23.23 kg/m².    Physical Exam   Constitutional: He is oriented to person, place, and time. He appears well-developed and well-nourished. No distress.   HENT:   Head: Normocephalic and atraumatic.   Mouth/Throat: Oropharynx is clear and moist.   Eyes: Conjunctivae and EOM are normal. Pupils are equal, round, and reactive to light.   Pupils 2 mm and symmetric.   Neck: No JVD present. No thyromegaly present.   Cardiovascular: Normal rate, regular rhythm and normal heart sounds. Exam reveals no gallop and no friction rub.   No murmur heard.  HD access by RUE AV fistula.  Palpable thrill.   Pulmonary/Chest: Effort normal. No respiratory distress. He has no wheezes. He has no rales.   Coarse crackles upper and lower bilaterally.  Mild amount of bloody saliva around ETT.  ETT is otherwise clear.   Abdominal: Soft. Bowel sounds are normal. He  exhibits no distension. There is no tenderness. There is no rebound and no guarding.   Musculoskeletal: Normal range of motion. He exhibits no edema or tenderness.   Lymphadenopathy:     He has no cervical adenopathy.   Neurological: He is alert and oriented to person, place, and time. He has normal reflexes. He displays normal reflexes. No cranial nerve deficit.   Skin: Skin is warm and dry. No rash noted. He is not diaphoretic. No erythema.   Psychiatric: He has a normal mood and affect. His behavior is normal. Judgment and thought content normal.         CRANIAL NERVES     CN III, IV, VI   Pupils are equal, round, and reactive to light.  Extraocular motions are normal.        Significant Labs: All pertinent labs within the past 24 hours have been reviewed.    Significant Imaging: I have reviewed all pertinent imaging results/findings within the past 24 hours.

## 2018-10-27 NOTE — HPI
Came to the emergency room overnight from home with shortness of breath and hypertension.  He was initially stabilized with supplemental oxygen and continuous BiPAP but decompensated.  He was increasingly hypercapnic and short of breath, so was intubated in the ER.  Ponit-of-care ultrasound showed decreased LV function.  He is a [facility] [address] [employer].  He dialyzed successfully yesterday with 3 L ultrafiltration.  Denied chest pain on admission.  POC is his Son Conner.  He frequently comes to the ER complaining of shortness of breath hypertensive responds to dialysis.

## 2018-10-27 NOTE — PROGRESS NOTES
Hemodialysis Completed.  Net UF: 4L.  Rt arm AVF was cannulated and used without difficulty.  Treatment time: 3 hrs.  Pt tolerated well.

## 2018-10-27 NOTE — ED NOTES
Pt reports recent pneumonia, approx 1.5months ago.  Reports Dialysis MWF, did go yesterday and states it went well, he finished and had 3-4L taken off, unsure of exact amount.

## 2018-10-27 NOTE — PROGRESS NOTES
"Pt intubated and placed on  vent after failed AVAPS. Pt continued to tripod and had mild/moderate abdominal muscle use. Pt started pulling at AVAPS mask and stated that he "couldn't breathe". Pt also noted to still have BBS rhonchi bilaterally. Pt tolerated intubation and current vent settings well. ETT advanced 3cm after x ray. MD confirmed placement. ABG to be obtained on current settings.   "

## 2018-10-27 NOTE — CONSULTS
[facility] -   Critical Care Medicine  Consult Note    Patient Name: Conner Perez III  MRN: [id number]  Admission Date: 10/27/2018  Hospital Length of Stay: 0 days  Code Status: Full Code  Attending Physician: Keith Wesley MD   Primary Care Provider: Raciel Angela MD   Principal Problem: Acute hypercapnic respiratory failure      Subjective:     HPI:  68 year old male with ESRD and multiple admissions for volume overload/HTN requiring additional HD; HTN, GERD, CAD, COPD   Presented to ED 10/27 with SOB  Evaluation revealed evidence of fluid overload with hypertensive urgency and hypoxia; decline despite trial of NIPPV required intubation for mechanical ventilation    Hospital/ICU Course:  Admitted to ICU with OETT on mechanical ventilation with propofol sedation    Past Medical History:   Diagnosis Date    CKD (chronic kidney disease), stage IV     COPD (chronic obstructive pulmonary disease)     Coronary artery disease     Depression     Dialysis patient     GERD (gastroesophageal reflux disease)     HTN (hypertension)     Pneumonia        Past Surgical History:   Procedure Laterality Date    AMPUTATION-TOE Left 2017    Performed by Bandar [facility] OR    AV FISTULA PLACEMENT Right 2017    CIRCUMCISION  1978    TOE AMPUTATION Left 2017    4th toe    VASCULAR CATH INSERTION N/A 2017    Performed by Jeremias Peralta MD at [facility] CATH LAB       Review of patient's allergies indicates:   Allergen Reactions    Augmentin [amoxicillin-pot clavulanate] Edema    Sulfa (sulfonamide antibiotics)      swelling    Lisinopril      cough       Family History     Problem Relation (Age of Onset)    Hypertension Father    No Known Problems Mother        Tobacco Use    Smoking status: Former Smoker     Types: Cigarettes     Last attempt to quit: 1971     Years since quittin.8    Smokeless tobacco: Never Used   Substance and Sexual Activity    Alcohol use: No     Drug use: No    Sexual activity: Not on file         Review of Systems   Unable to perform ROS: Intubated     Objective:     Vital Signs (Most Recent):  Temp: 97.7 °F (36.5 °C) (10/27/18 1110)  Pulse: 65 (10/27/18 1216)  Resp: 16 (10/27/18 1216)  BP: (!) 170/76 (10/27/18 1215)  SpO2: 100 % (10/27/18 1216) Vital Signs (24h Range):  Temp:  [97.6 °F (36.4 °C)-97.9 °F (36.6 °C)] 97.7 °F (36.5 °C)  Pulse:  [64-84] 65  Resp:  [12-38] 16  SpO2:  [85 %-100 %] 100 %  BP: (158-214)/() 170/76     Weight: 91.2 kg (201 lb)  Body mass index is 23.23 kg/m².      Intake/Output Summary (Last 24 hours) at 10/27/2018 1221  Last data filed at 10/27/2018 1100  Gross per 24 hour   Intake 53.1 ml   Output 3 ml   Net 50.1 ml       Physical Exam   Constitutional: He appears ill. No distress. He is sedated, intubated and restrained.   HENT:   Head: Atraumatic.       Eyes: Conjunctivae are normal. Pupils are equal, round, and reactive to light.   Neck: JVD present. No neck rigidity. No tracheal deviation present.   Cardiovascular: Normal rate and regular rhythm.   No murmur heard.  Pulses:       Radial pulses are 2+ on the right side, and 2+ on the left side.        Dorsalis pedis pulses are 2+ on the right side, and 2+ on the left side.   Pulmonary/Chest: No accessory muscle usage. He is intubated. No respiratory distress. He has decreased breath sounds. He has no wheezes. He has rales in the right lower field and the left lower field.   Abdominal: Soft. Bowel sounds are normal. He exhibits no distension.   Musculoskeletal: He exhibits no edema.        Arms:       Feet:    Skin: Skin is warm and dry. Capillary refill takes less than 2 seconds. No cyanosis.       Vents:  Vent Mode: A/C (10/27/18 1216)  Ventilator Initiated: Yes (10/27/18 0800)  Set Rate: 16 bmp (10/27/18 1216)  Vt Set: 450 mL (10/27/18 1216)  Pressure Support: 0 cmH20 (10/27/18 1216)  PEEP/CPAP: 5 cmH20 (10/27/18 1216)  Oxygen Concentration (%): 40 (10/27/18  1216)  Peak Airway Pressure: 21 cmH2O (10/27/18 1216)  Plateau Pressure: 0 cmH20 (10/27/18 1216)  Total Ve: 7.35 mL (10/27/18 1216)  F/VT Ratio<105 (RSBI): (!) 34.86 (10/27/18 1216)    Lines/Drains/Airways     Drain                 Hemodialysis AV Fistula Right forearm -- days         Hemodialysis AV Fistula 12/18/17 0017 Right forearm 313 days         NG/OG Tube 10/27/18 0807 nasogastric 16 Fr. Left nostril less than 1 day         Urethral Catheter 10/27/18 0825 16 Fr. less than 1 day          Airway                 Airway - Non-Surgical 10/27/18 0745 Endotracheal Tube less than 1 day          Peripheral Intravenous Line                 Peripheral IV - Single Lumen 10/27/18 0640 Left Antecubital less than 1 day         Peripheral IV - Single Lumen 10/27/18 0930 Left Forearm less than 1 day                Significant Labs:    CBC/Anemia Profile:  Recent Labs   Lab 10/27/18  0548   WBC 5.19   HGB 9.7*   HCT 29.8*      MCV 88   RDW 15.8*        Chemistries:  Recent Labs   Lab 10/27/18  0548      K 4.5   CL 95   CO2 30*   BUN 18   CREATININE 4.3*   CALCIUM 9.3   ALBUMIN 3.9   PROT 7.9   BILITOT 0.8   ALKPHOS 66   ALT 19   AST 30       Cardiac Markers: No results for input(s): CKMB, TROPONINT, MYOGLOBIN in the last 48 hours.  All pertinent labs within the past 24 hours have been reviewed.  ABG  Recent Labs   Lab 10/27/18  0823   PH 7.421   PO2 102*   PCO2 53.8*   HCO3 35.0*   BE 11     BNP  Recent Labs   Lab 10/27/18  0548   BNP 2,547*         Significant Imaging:   I have reviewed all pertinent imaging results/findings within the past 24 hours.    Assessment/Plan:     Pulmonary   * Acute hypercapnic respiratory failure on mechanical ventilation    S/t pulmonary edema; intravascular volume overload with suspected acute on chronic right heart failure  Vent settings reviewed and adjusted for optimal gas exchange  VAP prophylaxis  SAT/SBT post HD     Acute pulmonary edema    HD for volume removal      Cardiac/Vascular   Acute on chronic diastolic congestive heart failure    Planned 4L volume removal with HD  Trend troponin and check echo post HD     Hypertension    Uncontrolled likely exacerbated by volume overload  ICU hemodynamic monitoring  Continue clonidine patch; hydralazine, isordil per NG     Renal/   ESRD (end stage renal disease) on dialysis    Emergent HD for large volume removal  Nephrology following         Critical Care Daily Checklist:    A: Awake: RASS Goal/Actual Goal: RASS Goal: -2-->light sedation  Actual: Dowell Agitation Sedation Scale (RASS): Moderate sedation   B: Spontaneous Breathing Trial Performed?  planned post HD   C: SAT & SBT Coordinated?  planned                      D: Delirium: CAM-ICU Overall CAM-ICU: Positive   E: Early Mobility Performed? ROM   F: Feeding Goal:    Status:     Current Diet Order   Procedures    Diet NPO      AS: Analgesia/Sedation Fentanyl and propofol   T: Thromboembolic Prophylaxis heparin   H: HOB > 300 Yes   U: Stress Ulcer Prophylaxis (if needed) PPI   G: Glucose Control monitoring   B: Bowel Function Stool Occurrence: 1   I: Indwelling Catheter (Lines & Rai) Necessity reviewed   D: De-escalation of Antimicrobials/Pharmacotherapies reviewed    Plan for the day/ETD Vent support and volume removal with HD    Code Status:  Family/Goals of Care: Full Code  Intubated; no family present   I have discussed case and plan of care in detail with Dr Casper and Dr Wesley; Status and plan of care were discussed with team on multidisciplinary rounds.    Critical Care Time: 58 minutes  Critical secondary to acute respiratory failure s/t volume overload/pulmonary edema in ESRD   Critical care was time spent personally by me on the following activities: development of treatment plan with patient or surrogate and bedside caregivers, discussions with consultants, evaluation of patient's response to treatment, examination of patient, ordering and performing  treatments and interventions, ordering and review of laboratory studies, ordering and review of radiographic studies, pulse oximetry, re-evaluation of patient's condition. This critical care time did not overlap with that of any other provider or involve time for any procedures.    Thank you for your consult.     SHERWIN Velasco-BC  Critical Care Medicine  [facility]

## 2018-10-27 NOTE — ASSESSMENT & PLAN NOTE
1. ESRD on HD : (  [facility] , [facility] ) ,  emergent dialysis today for fluid removal, patient seen during dialysis and has been tolerating well,     2.  HTN : BP elevated, UF on HD as tolerated , resume home meds,      3. Anemia of CKD :  follow hemoglobin, Procrit with dialysis,     4. SHPT : renal diet when stable, will resume home binders at that point,     5. CAD :  Will discuss with Cardiology about possible left heart catheterization when stable, patient with recurrent presentations of shortness of breath and fluid overload to the hospital,    6.  Repeat imaging studies after  dialysis to rule out pneumonia,

## 2018-10-27 NOTE — PLAN OF CARE
10/27/18 1352   Discharge Assessment   Assessment Type Discharge Planning Assessment   Confirmed/corrected address and phone number on facesheet? Yes   Assessment information obtained from? Medical Record   Prior to hospitilization cognitive status: Alert/Oriented   Prior to hospitalization functional status: Independent   Current cognitive status: Coma/Sedated/Intubated   Current Functional Status: Independent   Lives With alone   Able to Return to Prior Arrangements unable to determine at this time (comments)   Is patient able to care for self after discharge? Unable to determine at this time (comments)   Who are your caregiver(s) and their phone number(s)? Phillip Perez, son [phone]   Readmission Within The Last 30 Days no previous admission in last 30 days   Patient currently being followed by outpatient case management? No   Patient currently receives any other outside agency services? No   Equipment Currently Used at Home none   Does the patient have transportation home? Yes   Transportation Available car;family or friend will provide   Dialysis Name and Scheduled days [facility]   Discharge Plan A Home with family;Home   Patient/Family In Agreement With Plan unable to assess

## 2018-10-27 NOTE — SUBJECTIVE & OBJECTIVE
Past Medical History:   Diagnosis Date    CKD (chronic kidney disease), stage IV     COPD (chronic obstructive pulmonary disease)     Coronary artery disease     Depression     Dialysis patient     GERD (gastroesophageal reflux disease)     HTN (hypertension)     Pneumonia        Past Surgical History:   Procedure Laterality Date    AMPUTATION-TOE Left 2017    Performed by Bandar Gomes DPM at [facility] OR    AV FISTULA PLACEMENT Right 2017    CIRCUMCISION  1978    TOE AMPUTATION Left 2017    4th toe    VASCULAR CATH INSERTION N/A 2017    Performed by Jeremias Peralta MD at [facility] CATH LAB       Review of patient's allergies indicates:   Allergen Reactions    Augmentin [amoxicillin-pot clavulanate] Edema    Sulfa (sulfonamide antibiotics)      swelling    Lisinopril      cough       Family History     Problem Relation (Age of Onset)    Hypertension Father    No Known Problems Mother        Tobacco Use    Smoking status: Former Smoker     Types: Cigarettes     Last attempt to quit: 1971     Years since quittin.8    Smokeless tobacco: Never Used   Substance and Sexual Activity    Alcohol use: No    Drug use: No    Sexual activity: Not on file         Review of Systems   Unable to perform ROS: Intubated     Objective:     Vital Signs (Most Recent):  Temp: 97.7 °F (36.5 °C) (10/27/18 1110)  Pulse: 65 (10/27/18 1216)  Resp: 16 (10/27/18 1216)  BP: (!) 170/76 (10/27/18 1215)  SpO2: 100 % (10/27/18 1216) Vital Signs (24h Range):  Temp:  [97.6 °F (36.4 °C)-97.9 °F (36.6 °C)] 97.7 °F (36.5 °C)  Pulse:  [64-84] 65  Resp:  [12-38] 16  SpO2:  [85 %-100 %] 100 %  BP: (158-214)/() 170/76     Weight: 91.2 kg (201 lb)  Body mass index is 23.23 kg/m².      Intake/Output Summary (Last 24 hours) at 10/27/2018 1221  Last data filed at 10/27/2018 1100  Gross per 24 hour   Intake 53.1 ml   Output 3 ml   Net 50.1 ml       Physical Exam   Constitutional: He appears ill. No distress. He is  sedated, intubated and restrained.   HENT:   Head: Atraumatic.       Eyes: Conjunctivae are normal. Pupils are equal, round, and reactive to light.   Neck: JVD present. No neck rigidity. No tracheal deviation present.   Cardiovascular: Normal rate and regular rhythm.   No murmur heard.  Pulses:       Radial pulses are 2+ on the right side, and 2+ on the left side.        Dorsalis pedis pulses are 2+ on the right side, and 2+ on the left side.   Pulmonary/Chest: No accessory muscle usage. He is intubated. No respiratory distress. He has decreased breath sounds. He has no wheezes. He has rales in the right lower field and the left lower field.   Abdominal: Soft. Bowel sounds are normal. He exhibits no distension.   Musculoskeletal: He exhibits no edema.        Arms:       Feet:    Skin: Skin is warm and dry. Capillary refill takes less than 2 seconds. No cyanosis.       Vents:  Vent Mode: A/C (10/27/18 1216)  Ventilator Initiated: Yes (10/27/18 0800)  Set Rate: 16 bmp (10/27/18 1216)  Vt Set: 450 mL (10/27/18 1216)  Pressure Support: 0 cmH20 (10/27/18 1216)  PEEP/CPAP: 5 cmH20 (10/27/18 1216)  Oxygen Concentration (%): 40 (10/27/18 1216)  Peak Airway Pressure: 21 cmH2O (10/27/18 1216)  Plateau Pressure: 0 cmH20 (10/27/18 1216)  Total Ve: 7.35 mL (10/27/18 1216)  F/VT Ratio<105 (RSBI): (!) 34.86 (10/27/18 1216)    Lines/Drains/Airways     Drain                 Hemodialysis AV Fistula Right forearm -- days         Hemodialysis AV Fistula 12/18/17 0017 Right forearm 313 days         NG/OG Tube 10/27/18 0807 nasogastric 16 Fr. Left nostril less than 1 day         Urethral Catheter 10/27/18 0825 16 Fr. less than 1 day          Airway                 Airway - Non-Surgical 10/27/18 0745 Endotracheal Tube less than 1 day          Peripheral Intravenous Line                 Peripheral IV - Single Lumen 10/27/18 0640 Left Antecubital less than 1 day         Peripheral IV - Single Lumen 10/27/18 0930 Left Forearm less than 1 day                 Significant Labs:    CBC/Anemia Profile:  Recent Labs   Lab 10/27/18  0548   WBC 5.19   HGB 9.7*   HCT 29.8*      MCV 88   RDW 15.8*        Chemistries:  Recent Labs   Lab 10/27/18  0548      K 4.5   CL 95   CO2 30*   BUN 18   CREATININE 4.3*   CALCIUM 9.3   ALBUMIN 3.9   PROT 7.9   BILITOT 0.8   ALKPHOS 66   ALT 19   AST 30       Cardiac Markers: No results for input(s): CKMB, TROPONINT, MYOGLOBIN in the last 48 hours.  All pertinent labs within the past 24 hours have been reviewed.  ABG  Recent Labs   Lab 10/27/18  0823   PH 7.421   PO2 102*   PCO2 53.8*   HCO3 35.0*   BE 11     BNP  Recent Labs   Lab 10/27/18  0548   BNP 2,547*         Significant Imaging:   I have reviewed all pertinent imaging results/findings within the past 24 hours.

## 2018-10-27 NOTE — ED NOTES
Report received from TAMIKA Reyes. Pt increased wob, decreased sats on 6L (between 88-91%). MD aware and at bs. Respiratory at bs setting up bipap. Will continue to monitor patient closely.

## 2018-10-27 NOTE — SUBJECTIVE & OBJECTIVE
Past Medical History:   Diagnosis Date    CKD (chronic kidney disease), stage IV     COPD (chronic obstructive pulmonary disease)     Coronary artery disease     Depression     Dialysis patient     GERD (gastroesophageal reflux disease)     HTN (hypertension)     Pneumonia        Past Surgical History:   Procedure Laterality Date    AMPUTATION-TOE Left 2017    Performed by Bandar Gomes DPM at [facility] OR    AV FISTULA PLACEMENT Right 2017    CIRCUMCISION  1978    TOE AMPUTATION Left 2017    4th toe    VASCULAR CATH INSERTION N/A 2017    Performed by Jeremias Peralta MD at [facility] [place] CATH LAB       Review of patient's allergies indicates:   Allergen Reactions    Augmentin [amoxicillin-pot clavulanate] Edema    Sulfa (sulfonamide antibiotics)      swelling    Lisinopril      cough     Current Facility-Administered Medications   Medication Frequency    chlorhexidine 0.12 % solution 15 mL BID    cloNIDine 0.3 mg/24 hr td ptwk 1 patch Q7 Days    fentaNYL 2500 mcg in D5W 250 mL infusion premix (titrating) (conc: 10 mcg/mL) Continuous    heparin (porcine) injection 2,000 Units Once    heparin (porcine) injection 3,000 Units Once    heparin (porcine) injection 5,000 Units Q8H    hydrALAZINE tablet 100 mg Q8H    isosorbide dinitrate tablet 40 mg TID    lorazepam (ATIVAN) injection 0.5 mg ED 1 Time    pantoprazole injection 40 mg Daily    propofol (DIPRIVAN) 10 mg/mL infusion Continuous     Family History     Problem Relation (Age of Onset)    Hypertension Father    No Known Problems Mother        Tobacco Use    Smoking status: Former Smoker     Types: Cigarettes     Last attempt to quit: 1971     Years since quittin.8    Smokeless tobacco: Never Used   Substance and Sexual Activity    Alcohol use: No    Drug use: No    Sexual activity: Not on file     Review of Systems   Unable to perform ROS: Intubated     Objective:     Vital Signs (Most Recent):  Temp: 97.7 °F (36.5 °C)  (10/27/18 1110)  Pulse: 71 (10/27/18 1400)  Resp: 17 (10/27/18 1237)  BP: (!) 184/82 (10/27/18 1400)  SpO2: 100 % (10/27/18 1237)  O2 Device (Oxygen Therapy): ventilator (10/27/18 1237) Vital Signs (24h Range):  Temp:  [97.6 °F (36.4 °C)-97.9 °F (36.6 °C)] 97.7 °F (36.5 °C)  Pulse:  [64-84] 71  Resp:  [12-38] 17  SpO2:  [85 %-100 %] 100 %  BP: (158-214)/() 184/82     Weight: 91.2 kg (201 lb) (10/27/18 0457)  Body mass index is 23.23 kg/m².  Body surface area is 2.24 meters squared.    No intake/output data recorded.    Physical Exam   Constitutional: He appears well-developed. No distress.   HENT:   Head: Normocephalic and atraumatic.   ETT in place    Eyes: Pupils are equal, round, and reactive to light.   Neck: Normal range of motion. Neck supple. No tracheal deviation present. No thyromegaly present.   Cardiovascular: Normal rate, regular rhythm and intact distal pulses. Exam reveals no gallop and no friction rub.   Murmur heard.  Pulmonary/Chest: He has no wheezes. He has rales.   Abdominal: Soft. He exhibits no mass. There is no tenderness. There is no rebound and no guarding.   Musculoskeletal: He exhibits no edema.   Lymphadenopathy:     He has no cervical adenopathy.   Skin: Skin is warm. No rash noted. He is not diaphoretic. No erythema.   Nursing note and vitals reviewed.      Significant Labs:  Cardiac Markers: No results for input(s): CKMB, TROPONINT, MYOGLOBIN in the last 168 hours.  CBC:   Recent Labs   Lab 10/27/18  0548   WBC 5.19   RBC 3.39*   HGB 9.7*   HCT 29.8*      MCV 88   MCH 28.6   MCHC 32.6     CMP:   Recent Labs   Lab 10/27/18  0548      CALCIUM 9.3   ALBUMIN 3.9   PROT 7.9      K 4.5   CO2 30*   CL 95   BUN 18   CREATININE 4.3*   ALKPHOS 66   ALT 19   AST 30   BILITOT 0.8     Coagulation: No results for input(s): PT, INR, APTT in the last 168 hours.  All labs within the past 24 hours have been reviewed.    Recent Labs   Lab 10/27/18  0548   TROPONINI 0.021          Significant Imaging:  Reviewed     Lab Results   Component Value Date    .7 (H) 08/26/2017    CALCIUM 9.3 10/27/2018    PHOS 5.2 (H) 09/05/2018

## 2018-10-27 NOTE — HPI
68 year old male with ESRD and multiple admissions for volume overload/HTN requiring additional HD; HTN, GERD, CAD, COPD   Presented to ED 10/27 with SOB  Evaluation revealed evidence of fluid overload with hypertensive urgency and hypoxia; decline despite trial of NIPPV required intubation for mechanical ventilation

## 2018-10-27 NOTE — H&P
[facility] Hospital Medicine  History & Physical    Patient Name: Conner Perez III  MRN: [id number]  Admission Date: 10/27/2018  Attending Physician: Keith Wesley MD   Primary Care Provider: Raciel Angela MD         Patient information was obtained from EMS personnel, past medical records and ER records.     Subjective:     Principal Problem:Acute hypercapnic respiratory failure    Chief Complaint:   Chief Complaint   Patient presents with    Shortness of Breath        HPI: Came to the emergency room overnight from home with shortness of breath and hypertension.  He was initially stabilized with supplemental oxygen and continuous BiPAP but decompensated.  He was increasingly hypercapnic and short of breath, so was intubated in the ER.  Ponit-of-care ultrasound showed decreased LV function.  He is a [employer] [place].  He dialyzed successfully yesterday with 3 L ultrafiltration.  Denied chest pain on admission.  POC is his Son Conenr.  He frequently comes to the ER complaining of shortness of breath hypertensive responds to dialysis.    Past Medical History:   Diagnosis Date    CKD (chronic kidney disease), stage IV     COPD (chronic obstructive pulmonary disease)     Coronary artery disease     Depression     Dialysis patient     GERD (gastroesophageal reflux disease)     HTN (hypertension)     Pneumonia        Past Surgical History:   Procedure Laterality Date    AMPUTATION-TOE Left 1/24/2017    Performed by Bandar Gomes DPM at [facility] OR    AV FISTULA PLACEMENT Right 06/2017    CIRCUMCISION  1978    TOE AMPUTATION Left 01/24/2017    4th toe    VASCULAR CATH INSERTION N/A 8/28/2017    Performed by Jeremias Peralta MD at [facility] CATH LAB       Review of patient's allergies indicates:   Allergen Reactions    Augmentin [amoxicillin-pot clavulanate] Edema    Sulfa (sulfonamide antibiotics)      swelling    Lisinopril      cough       No current facility-administered  medications on file prior to encounter.      Current Outpatient Medications on File Prior to Encounter   Medication Sig    albuterol (PROVENTIL) 2.5 mg /3 mL (0.083 %) nebulizer solution 2.5 mg.    albuterol 90 mcg/actuation inhaler Inhale into the lungs.    aspirin 81 MG Chew Take 1 tablet (81 mg total) by mouth once daily.    atorvastatin (LIPITOR) 40 MG tablet Take 1 tablet (40 mg total) by mouth every evening. (Patient taking differently: Take 20 mg by mouth every evening. )    cloNIDine 0.3 mg/24 hr td ptwk (CATAPRES) 0.3 mg/24 hr Place 1 patch onto the skin every 7 days.    furosemide (LASIX) 40 MG tablet Take 40 mg by mouth once daily.    hydrALAZINE (APRESOLINE) 100 MG tablet Take 1 tablet (100 mg total) by mouth every 8 (eight) hours.    isosorbide dinitrate (ISORDIL) 40 MG Tab Take 1 tablet (40 mg total) by mouth 3 (three) times daily.    labetalol (NORMODYNE) 300 MG tablet Take 2 tablets (600 mg total) by mouth 3 (three) times daily.    losartan (COZAAR) 100 MG tablet Take 1 tablet (100 mg total) by mouth once daily.    NIFEdipine (PROCARDIA-XL) 60 MG (OSM) 24 hr tablet Take 1 tablet (60 mg total) by mouth once daily.    pantoprazole (PROTONIX) 40 MG tablet Take 1 tablet (40 mg total) by mouth once daily.    tamsulosin (FLOMAX) 0.4 mg Cp24 Take 1 capsule (0.4 mg total) by mouth once daily.     Family History     Problem Relation (Age of Onset)    Hypertension Father    No Known Problems Mother        Tobacco Use    Smoking status: Former Smoker     Types: Cigarettes     Last attempt to quit: 1971     Years since quittin.8    Smokeless tobacco: Never Used   Substance and Sexual Activity    Alcohol use: No    Drug use: No    Sexual activity: Not on file     Review of Systems   Unable to perform ROS: Intubated     Objective:     Vital Signs (Most Recent):  Temp: 97.6 °F (36.4 °C) (10/27/18 0457)  Pulse: 81 (10/27/18 0832)  Resp: 18 (10/27/18 0832)  BP: (!) 200/101 (10/27/18  0832)  SpO2: 96 % (10/27/18 0832) Vital Signs (24h Range):  Temp:  [97.6 °F (36.4 °C)] 97.6 °F (36.4 °C)  Pulse:  [79-84] 81  Resp:  [16-38] 18  SpO2:  [85 %-100 %] 96 %  BP: (175-214)/() 200/101     Weight: 91.2 kg (201 lb)  Body mass index is 23.23 kg/m².    Physical Exam   Constitutional: He is oriented to person, place, and time. He appears well-developed and well-nourished. No distress.   HENT:   Head: Normocephalic and atraumatic.   Mouth/Throat: Oropharynx is clear and moist.   Eyes: Conjunctivae and EOM are normal. Pupils are equal, round, and reactive to light.   Pupils 2 mm and symmetric.   Neck: No JVD present. No thyromegaly present.   Cardiovascular: Normal rate, regular rhythm and normal heart sounds. Exam reveals no gallop and no friction rub.   No murmur heard.  HD access by RUE AV fistula.  Palpable thrill.   Pulmonary/Chest: Effort normal. No respiratory distress. He has no wheezes. He has no rales.   Coarse crackles upper and lower bilaterally.  Mild amount of bloody saliva around ETT.  ETT is otherwise clear.   Abdominal: Soft. Bowel sounds are normal. He exhibits no distension. There is no tenderness. There is no rebound and no guarding.   Musculoskeletal: Normal range of motion. He exhibits no edema or tenderness.   Lymphadenopathy:     He has no cervical adenopathy.   Neurological: He is alert and oriented to person, place, and time. He has normal reflexes. He displays normal reflexes. No cranial nerve deficit.   Skin: Skin is warm and dry. No rash noted. He is not diaphoretic. No erythema.   Psychiatric: He has a normal mood and affect. His behavior is normal. Judgment and thought content normal.         CRANIAL NERVES     CN III, IV, VI   Pupils are equal, round, and reactive to light.  Extraocular motions are normal.        Significant Labs: All pertinent labs within the past 24 hours have been reviewed.    Significant Imaging: I have reviewed all pertinent imaging results/findings  within the past 24 hours.    Assessment/Plan:     * Acute hypercapnic respiratory failure on mechanical ventilation    Probably due to pulmonary congestion from volume overload.  Intubated in the ED for hypercapnia and worsening respiratory distress.  Urgent hemodialysis.     ESRD (end stage renal disease) on dialysis    Nephrology following and plan for urgent HD with ultrafiltration.  He reportedly had 3 L removed yesterday and has been compliant with HD but may still be volume overloaded.     Chronic diastolic heart failure    Repeat cardiac echo.  There may be a cardiac component.     Anemia of renal disease    Follow blood counts and transfuse if needed.     Coronary artery disease of native artery of native heart with stable angina pectoris    Control blood pressure.  Trend cardiac enzymes.       VTE Risk Mitigation (From admission, onward)        Ordered     heparin (porcine) injection 5,000 Units  Every 8 hours      10/27/18 0805     IP VTE HIGH RISK PATIENT  Once      10/27/18 0805     heparin (porcine) injection 3,000 Units  Once      10/27/18 0659     heparin (porcine) injection 2,000 Units  Once      10/27/18 0659        Critical care time spent on the evaluation and treatment of severe organ dysfunction, review of pertinent labs and imaging studies, discussions with consulting providers and discussions with patient/family: 30 minutes.     Keith Wesley MD  Department of Hospital Medicine   [facility]

## 2018-10-27 NOTE — ED PROVIDER NOTES
SCRIBE #1 NOTE: I, Madiha Dia, am scribing for, and in the presence of, Lambert Medrano MD. I have scribed the HPI, ROS, and PEx.     SCRIBE #2 NOTE: I, Angella Gill, am scribing for, and in the presence of,  Jesus Colon MD. I have scribed the remaining portions of the note not scribed by Scribe #1.        History     Chief Complaint   Patient presents with    Shortness of Breath       Review of patient's allergies indicates:   Allergen Reactions    Augmentin [amoxicillin-pot clavulanate] Edema    Sulfa (sulfonamide antibiotics)      swelling    Lisinopril      cough         History of Present Illness   HPI    10/27/2018, 5:19 AM  History obtained from the patient      History of Present Illness: Conner Perez III is a 68 y.o. male dialysis patient with PMHx of CKD, COPD, CAD, and DM who presents to the Emergency Department for SOB which onset gradually today. Patient reports he last had dialysis yesterday. Symptoms are constant and moderate in severity. No mitigating or exacerbating factors reported. No associated sxs included. Patient denies any fever, chills, cough, CP, leg swelling, dizziness, N/V, extremity weakness/numbness, recent travel, long car rides, and all other sxs at this time. No further complaints or concerns at this time.     Arrival mode: Personal vehicle      PCP: Raciel Angela MD        Past Medical History:  Past Medical History:   Diagnosis Date    CKD (chronic kidney disease), stage IV     COPD (chronic obstructive pulmonary disease)     Coronary artery disease     Depression     Dialysis patient     GERD (gastroesophageal reflux disease)     HTN (hypertension)     Pneumonia        Past Surgical History:  Past Surgical History:   Procedure Laterality Date    AMPUTATION-TOE Left 1/24/2017    [facility] OR    AV FISTULA PLACEMENT Right 06/2017    CIRCUMCISION  1978    TOE AMPUTATION Left 01/24/2017    4th toe    VASCULAR CATH  INSERTION N/A 2017    Performed by Jeremias Peralta MD at [facility] CATH LAB         Family History:  Family History   Problem Relation Age of Onset    Hypertension Father     No Known Problems Mother        Social History:  Social History     Tobacco Use    Smoking status: Former Smoker     Types: Cigarettes     Last attempt to quit: 1971     Years since quittin.8    Smokeless tobacco: Never Used   Substance and Sexual Activity    Alcohol use: No    Drug use: No    Sexual activity: Not on file        Review of Systems   Review of Systems   Constitutional: Negative for chills and fever.   HENT: Negative for sore throat.    Respiratory: Positive for shortness of breath. Negative for cough.    Cardiovascular: Negative for chest pain and leg swelling.   Gastrointestinal: Negative for nausea and vomiting.   Genitourinary: Negative for dysuria.   Musculoskeletal: Negative for back pain.   Skin: Negative for rash.   Neurological: Negative for dizziness, weakness and numbness.   Hematological: Does not bruise/bleed easily.   All other systems reviewed and are negative.     Physical Exam     Initial Vitals [10/27/18 0457]   BP Pulse Resp Temp SpO2   (!) 177/93 79 20 97.6 °F (36.4 °C) (!) 85 %      MAP       --          Physical Exam  Nursing Notes and Vital Signs Reviewed.  Constitutional: Patient is in no acute distress. Well-developed and well-nourished.  Head: Atraumatic. Normocephalic.  Eyes: PERRL. EOM intact. Conjunctivae are not pale. No scleral icterus.  ENT: Mucous membranes are moist. Oropharynx is clear and symmetric.    Neck: Supple. Full ROM. No lymphadenopathy.  Cardiovascular: Regular rate. Regular rhythm. No murmurs, rubs, or gallops. Distal pulses are 2+ and symmetric.  Pulmonary/Chest: No respiratory distress. Coarse breath sounds. Clear to auscultation bilaterally. No wheezing or rales.  Abdominal: Soft and non-distended.  There is no tenderness.  No rebound, guarding, or rigidity.    Musculoskeletal: Moves all extremities. No obvious deformities. No edema. No calf tenderness.  Skin: Warm and dry.  Neurological:  Alert, awake, and appropriate.  Normal speech.  No acute focal neurological deficits are appreciated.  Psychiatric: Normal affect. Good eye contact. Appropriate in content.     ED Course   Critical Care  Date/Time: 10/27/2018 6:51 AM  Performed by: Jesus Colon MD  Authorized by: Jesus Colon MD   Direct patient critical care time: 25 minutes  Additional history critical care time: 5 minutes  Ordering / reviewing critical care time: 10 minutes  Documentation critical care time: 5 minutes  Consulting other physicians critical care time: 5 minutes  Consult with family critical care time: 10 minutes  Total critical care time (exclusive of procedural time) : 60 minutes  Critical care time was exclusive of separately billable procedures and treating other patients and teaching time.  Critical care was necessary to treat or prevent imminent or life-threatening deterioration of the following conditions: Respiratory Distress requiring BiPAP.  Critical care was time spent personally by me on the following activities: blood draw for specimens, development of treatment plan with patient or surrogate, discussions with consultants, interpretation of cardiac output measurements, evaluation of patient's response to treatment, examination of patient, obtaining history from patient or surrogate, ordering and performing treatments and interventions, ordering and review of laboratory studies, ordering and review of radiographic studies, re-evaluation of patient's condition, pulse oximetry, review of old charts, ventilator management and gastric intubation.    Intubation  Date/Time: 10/27/2018 7:51 AM  Performed by: Jesus Colon MD  Authorized by: Jesus Colon MD   Consent Done: Yes  Consent: Verbal consent obtained. Written consent obtained.  Risks and benefits: risks,  "benefits and alternatives were discussed  Consent given by: patient  Patient understanding: patient states understanding of the procedure being performed  Patient consent: the patient's understanding of the procedure matches consent given  Procedure consent: procedure consent matches procedure scheduled  Relevant documents: relevant documents present and verified  Required items: required blood products, implants, devices, and special equipment available  Patient identity confirmed: , name and verbally with patient  Indications: respiratory distress and  respiratory failure  Intubation method: video-assisted  Sedatives: propofol  Paralytic: none  Tube size: 7.5 mm  Tube type: cuffed  Ventilation between attempts: BVM  Cricoid pressure: no  Cords visualized: yes  Post-procedure assessment: chest rise and ETCO2 monitor  Breath sounds: clear and equal  Cuff inflated: yes  ETT to teeth: 23 cm  Tube secured with: umbilical tape  Chest x-ray interpreted by me and radiologist.  Chest x-ray findings: endotracheal tube too high  Tube repositioned: tube repositioned successfully (Advanced to 27cm to teeth)  Patient tolerance: Patient tolerated the procedure well with no immediate complications  Complications: No        ED Vital Signs:  Vitals:    10/27/18 0457 10/27/18 0527 10/27/18 0545 10/27/18 0551   BP: (!) 177/93      Pulse: 79 80 80 80   Resp: 20 20     Temp: 97.6 °F (36.4 °C)      SpO2: (!) 85% 97%     Weight: 91.2 kg (201 lb)      Height: 6' 6" (1.981 m)       10/27/18 0613 10/27/18 0651 10/27/18 0655 10/27/18 0800   BP: (!) 180/91 (!) 198/97     Pulse: 80 83 83 79   Resp: (!) 28 (!) 27 (!) 30 16   Temp:       SpO2: 97% (!) 90% 96% 100%   Weight:       Height:           Abnormal Lab Results:  Labs Reviewed   CBC W/ AUTO DIFFERENTIAL - Abnormal; Notable for the following components:       Result Value    RBC 3.39 (*)     Hemoglobin 9.7 (*)     Hematocrit 29.8 (*)     RDW 15.8 (*)     Lymph # 0.9 (*)     All other " components within normal limits   B-TYPE NATRIURETIC PEPTIDE - Abnormal; Notable for the following components:    BNP 2,547 (*)     All other components within normal limits   COMPREHENSIVE METABOLIC PANEL - Abnormal; Notable for the following components:    CO2 30 (*)     Creatinine 4.3 (*)     eGFR if  15 (*)     eGFR if non  13 (*)     All other components within normal limits   ISTAT PROCEDURE - Abnormal; Notable for the following components:    POC PCO2 54.9 (*)     POC PO2 70 (*)     POC HCO3 34.9 (*)     POC SATURATED O2 94 (*)     All other components within normal limits   TROPONIN I        All Lab Results:  Results for orders placed or performed during the hospital encounter of 10/27/18   CBC auto differential   Result Value Ref Range    WBC 5.19 3.90 - 12.70 K/uL    RBC 3.39 (L) 4.60 - 6.20 M/uL    Hemoglobin 9.7 (L) 14.0 - 18.0 g/dL    Hematocrit 29.8 (L) 40.0 - 54.0 %    MCV 88 82 - 98 fL    MCH 28.6 27.0 - 31.0 pg    MCHC 32.6 32.0 - 36.0 g/dL    RDW 15.8 (H) 11.5 - 14.5 %    Platelets 155 150 - 350 K/uL    MPV 10.5 9.2 - 12.9 fL    Gran # (ANC) 3.6 1.8 - 7.7 K/uL    Lymph # 0.9 (L) 1.0 - 4.8 K/uL    Mono # 0.4 0.3 - 1.0 K/uL    Eos # 0.2 0.0 - 0.5 K/uL    Baso # 0.05 0.00 - 0.20 K/uL    Gran% 68.9 38.0 - 73.0 %    Lymph% 18.1 18.0 - 48.0 %    Mono% 8.1 4.0 - 15.0 %    Eosinophil% 3.9 0.0 - 8.0 %    Basophil% 1.0 0.0 - 1.9 %    Differential Method Automated    Brain natriuretic peptide   Result Value Ref Range    BNP 2,547 (H) 0 - 99 pg/mL   Comprehensive metabolic panel   Result Value Ref Range    Sodium 139 136 - 145 mmol/L    Potassium 4.5 3.5 - 5.1 mmol/L    Chloride 95 95 - 110 mmol/L    CO2 30 (H) 23 - 29 mmol/L    Glucose 109 70 - 110 mg/dL    BUN, Bld 18 8 - 23 mg/dL    Creatinine 4.3 (H) 0.5 - 1.4 mg/dL    Calcium 9.3 8.7 - 10.5 mg/dL    Total Protein 7.9 6.0 - 8.4 g/dL    Albumin 3.9 3.5 - 5.2 g/dL    Total Bilirubin 0.8 0.1 - 1.0 mg/dL    Alkaline Phosphatase  66 55 - 135 U/L    AST 30 10 - 40 U/L    ALT 19 10 - 44 U/L    Anion Gap 14 8 - 16 mmol/L    eGFR if African American 15 (A) >60 mL/min/1.73 m^2    eGFR if non African American 13 (A) >60 mL/min/1.73 m^2   Troponin I   Result Value Ref Range    Troponin I 0.021 0.000 - 0.026 ng/mL   ISTAT PROCEDURE   Result Value Ref Range    POC PH 7.411 7.35 - 7.45    POC PCO2 54.9 (H) 35 - 45 mmHg    POC PO2 70 (L) 80 - 100 mmHg    POC HCO3 34.9 (H) 24 - 28 mmol/L    POC BE 10 -2 to 2 mmol/L    POC SATURATED O2 94 (L) 95 - 100 %    Sample ARTERIAL     Site LR     Allens Test Pass     DelSys Nasal Can     Mode SPONT     Flow 6     FiO2 44        Imaging Results:  Imaging Results          X-Ray Chest AP Portable (In process)                X-Ray Chest 1 View (In process)                6:09 AM: Per ED physician, pt's CXR results show: pulmonary edema. Pt appears to have chronic bilateral lung infiltrates.     The EKG was ordered, reviewed, and independently interpreted by the ED provider.  Interpretation time: 0532  Rate: 79 BPM  Rhythm: normal sinus rhythm  Interpretation: Nonspecific T wave abnormality. Abnormal ECG. No STEMI.          The Emergency Provider reviewed the vital signs and test results, which are outlined above.     ED Discussion     6:00 AM: Dr. Medrano transfers care of pt to Dr. Colon, pending lab and imaging results.    6:10 AM: Evaluated pt at this time. BS US done at this time.   Patient was in supine position     Pulmonary ultrasound was evaluated in multiple sites including anterior chest bilaterally, multiple stones of the lungs bilaterally including a right and left lower chest was evaluated. Positive B lines, negative A lines, consistent with pulmonary edema. No evidence of pneumothorax.      Point of care ultrasound of the heart: Small pericardial effusion, no cardiac tamponade. Mild to moderate LV dysfunction. Aorta is mildly calcified.      Point of care ultrasound of the inferior vena cava shows  non-collapsible IVC. CVP is 15-20.      Echo appears to have worsened since last echo last month.     6:35 AM: Secure chats sent to  and Dr. London (Nephrology). Dr. London has responded, he is aware of pt and will place dialysis orders.     6:42 AM: Re-evaluated pt. Pt appears to be tiring, his breathing is more labored and he appears to be clinically declining quickly.O2 sat's 89-91% on O2. Will place pt on BiPAP. Pt will be moved to ED bed 4 in case pt requires intubation.    6:50 AM: Discussed case with Annia Myers NP (Hospital Medicine) via secure chat. Dr. Wesley agrees with current care and management of pt and accepts admission.   Admitting Service: Hospital medicine   Admitting Physician: Dr. Wesley  Admit to: ICU    6:53 AM: Re-evaluated pt. I have discussed test results, shared treatment plan, and the need for admission with patient and family at bedside. Pt and family express understanding at this time and agree with all information. All questions answered. Pt and family have no further questions or concerns at this time. Pt is ready for admit.    7:04 AM: Re-evaluated pt. Pt's condition has mildly improved. His work of breathing has decreased, he is still tripoding. BiPAP just adjusted. Will re-evaluate pt in 5-10 minutes.     7:20 AM: Re-evaluated pt. Pt is tiring out even on BiPAP, he has worsening respiratory failure. Will intubate pt. Discussed need for patient and family at BS, risks and benefits discussed at length, consents signed, patient verbalized understanding.     ED Medication(s):  Medications   heparin (porcine) injection 3,000 Units (not administered)   heparin (porcine) injection 2,000 Units (not administered)   lorazepam (ATIVAN) injection 0.5 mg ( Intravenous Canceled Entry 10/27/18 7070)   heparin (porcine) injection 5,000 Units (not administered)   nitroGLYCERIN 2% TD oint ointment 2 inch (2 inches Topical (Top) Given 10/27/18 1789)   propofol (DIPRIVAN) 10 mg/mL  infusion (20 mg Intravenous New Bag 10/27/18 0750)     Current Discharge Medication List             Medical Decision Making     Medical Decision Making:   Clinical Tests:   Lab Tests: Ordered and Reviewed  Radiological Study: Reviewed and Ordered  Medical Tests: Reviewed and Ordered             Scribe Attestation:   Scribe #1: I performed the above scribed service and the documentation accurately describes the services I performed. I attest to the accuracy of the note.     Attending:   Physician Attestation Statement for Scribe #1: I, Lambert Medrano MD, personally performed the services described in this documentation, as scribed by Madiha Dia, in my presence, and it is both accurate and complete.       Scribe Attestation:   Scribe #2: I performed the above scribed service and the documentation accurately describes the services I performed. I attest to the accuracy of the note.    Attending Attestation:           Physician Attestation for Scribe:    Physician Attestation Statement for Scribe #2: I, Jesus Colon MD, reviewed documentation, as scribed by Angella Gill in my presence, and it is both accurate and complete. I also acknowledge and confirm the content of the note done by Scribe #1.           Clinical Impression       ICD-10-CM ICD-9-CM   1. Acute on chronic diastolic HFpEF of 55-60% I50.33 428.33     428.0   2. SOB (shortness of breath) R06.02 786.05   3. Hypervolemia, unspecified hypervolemia type E87.70 276.69   4. Essential hypertension I10 401.9   5. Accelerated hypertension I10 401.0   6. Acute pulmonary edema J81.0 518.4   7. Encounter for intubation Z01.818 V72.83       Disposition:   Disposition: Admitted  Condition: Serious         Jesus Colon MD  10/29/18 0744

## 2018-10-27 NOTE — HOSPITAL COURSE
Admitted to ICU with OETT on mechanical ventilation with propofol sedation  10/28 Stable overnight, heart cath revealed multivessel disease  10/29 remains intubated and sedated for cardiac rest after LHC with multivessel disease not amenable to intervention or bypass; am troponin still rising; plan HD today  10/30 troponin trending down; tolerating SBT; BP trending higher off sedation  10/31 - awake, tolerating scheduled HD this morning; remains hypertensive but improved

## 2018-10-27 NOTE — ASSESSMENT & PLAN NOTE
Nephrology following and plan for urgent HD with ultrafiltration.  He reportedly had 3 L removed yesterday and has been compliant with HD but may still be volume overloaded.

## 2018-10-27 NOTE — ED TRIAGE NOTES
Pt to ED for c/o SOB and Congested/productive cough, reports it woke him up this AM.  Reports +chest tightness with inspiration.  Pt is dialysis pt MWF, +went yesterday as scheduled and finished without issue.  Pt denies recent s/s illness or fevers at home.

## 2018-10-27 NOTE — CONSULTS
[facility] -   Nephrology  Consult Note      Patient Name: Conner Perez III  MRN: [id number]  Admission Date: 10/27/2018  Hospital Length of Stay: 0 days  Attending Provider: Keith Wesley MD   Primary Care Physician: Raciel Angela MD  Principal Problem:Acute hypercapnic respiratory failure    Consults  Subjective:     HPI: Conner Perez III  is a 68 y old AA man with h/o ESRD on HD ( [facility], [facility] ) presented to the ER this morning  for SOB, bilateral pulmonary edema noted on the chest x-ray, patient had his dialysis treatment yesterday, patient has been having recurrent episodes of shortness of breath and similar presentations to the emergency room in the last few months, he also has been losing weight, patient had to be emergently intubated in the emergency room and was transferred to ICU, we were consulted for emergent dialysis for volume removal,       Past Medical History:   Diagnosis Date    CKD (chronic kidney disease), stage IV     COPD (chronic obstructive pulmonary disease)     Coronary artery disease     Depression     Dialysis patient     GERD (gastroesophageal reflux disease)     HTN (hypertension)     Pneumonia        Past Surgical History:   Procedure Laterality Date    AMPUTATION-TOE Left 1/24/2017    Performed by Bandar Gomes DPM at [facility] OR    AV FISTULA PLACEMENT Right 06/2017    CIRCUMCISION  1978    TOE AMPUTATION Left 01/24/2017    4th toe    VASCULAR CATH INSERTION N/A 8/28/2017    Performed by Jeremias Peralta MD at [facility] CATH LAB       Review of patient's allergies indicates:   Allergen Reactions    Augmentin [amoxicillin-pot clavulanate] Edema    Sulfa (sulfonamide antibiotics)      swelling    Lisinopril      cough     Current Facility-Administered Medications   Medication Frequency    chlorhexidine 0.12 % solution 15 mL BID    cloNIDine 0.3 mg/24 hr td ptwk 1 patch Q7 Days    fentaNYL 2500 mcg in D5W 250 mL infusion premix  (titrating) (conc: 10 mcg/mL) Continuous    heparin (porcine) injection 2,000 Units Once    heparin (porcine) injection 3,000 Units Once    heparin (porcine) injection 5,000 Units Q8H    hydrALAZINE tablet 100 mg Q8H    isosorbide dinitrate tablet 40 mg TID    lorazepam (ATIVAN) injection 0.5 mg ED 1 Time    pantoprazole injection 40 mg Daily    propofol (DIPRIVAN) 10 mg/mL infusion Continuous     Family History     Problem Relation (Age of Onset)    Hypertension Father    No Known Problems Mother        Tobacco Use    Smoking status: Former Smoker     Types: Cigarettes     Last attempt to quit: 1971     Years since quittin.8    Smokeless tobacco: Never Used   Substance and Sexual Activity    Alcohol use: No    Drug use: No    Sexual activity: Not on file     Review of Systems   Unable to perform ROS: Intubated     Objective:     Vital Signs (Most Recent):  Temp: 97.7 °F (36.5 °C) (10/27/18 1110)  Pulse: 71 (10/27/18 1400)  Resp: 17 (10/27/18 1237)  BP: (!) 184/82 (10/27/18 1400)  SpO2: 100 % (10/27/18 1237)  O2 Device (Oxygen Therapy): ventilator (10/27/18 1237) Vital Signs (24h Range):  Temp:  [97.6 °F (36.4 °C)-97.9 °F (36.6 °C)] 97.7 °F (36.5 °C)  Pulse:  [64-84] 71  Resp:  [12-38] 17  SpO2:  [85 %-100 %] 100 %  BP: (158-214)/() 184/82     Weight: 91.2 kg (201 lb) (10/27/18 0457)  Body mass index is 23.23 kg/m².  Body surface area is 2.24 meters squared.    No intake/output data recorded.    Physical Exam   Constitutional: He appears well-developed. No distress.   HENT:   Head: Normocephalic and atraumatic.   ETT in place    Eyes: Pupils are equal, round, and reactive to light.   Neck: Normal range of motion. Neck supple. No tracheal deviation present. No thyromegaly present.   Cardiovascular: Normal rate, regular rhythm and intact distal pulses. Exam reveals no gallop and no friction rub.   Murmur heard.  Pulmonary/Chest: He has no wheezes. He has rales.   Abdominal: Soft. He exhibits no  mass. There is no tenderness. There is no rebound and no guarding.   Musculoskeletal: He exhibits no edema.   Lymphadenopathy:     He has no cervical adenopathy.   Skin: Skin is warm. No rash noted. He is not diaphoretic. No erythema.   Nursing note and vitals reviewed.      Significant Labs:  Cardiac Markers: No results for input(s): CKMB, TROPONINT, MYOGLOBIN in the last 168 hours.  CBC:   Recent Labs   Lab 10/27/18  0548   WBC 5.19   RBC 3.39*   HGB 9.7*   HCT 29.8*      MCV 88   MCH 28.6   MCHC 32.6     CMP:   Recent Labs   Lab 10/27/18  0548      CALCIUM 9.3   ALBUMIN 3.9   PROT 7.9      K 4.5   CO2 30*   CL 95   BUN 18   CREATININE 4.3*   ALKPHOS 66   ALT 19   AST 30   BILITOT 0.8     Coagulation: No results for input(s): PT, INR, APTT in the last 168 hours.  All labs within the past 24 hours have been reviewed.    Recent Labs   Lab 10/27/18  0548   TROPONINI 0.021         Significant Imaging:  Reviewed     Lab Results   Component Value Date    .7 (H) 08/26/2017    CALCIUM 9.3 10/27/2018    PHOS 5.2 (H) 09/05/2018         Assessment/Plan:     ESRD (end stage renal disease) on dialysis    1. ESRD on HD : (  [facility] ) ,  emergent dialysis today for fluid removal, patient seen during dialysis and has been tolerating well,     2.  HTN : BP elevated, UF on HD as tolerated , resume home meds,      3. Anemia of CKD :  follow hemoglobin, Procrit with dialysis,     4. SHPT : renal diet when stable, will resume home binders at that point,     5. CAD :  Will discuss with Cardiology about possible left heart catheterization when stable, patient with recurrent presentations of shortness of breath and fluid overload to the hospital,    6.  Repeat imaging studies after  dialysis to rule out pneumonia,            Thank you for your consult. I will follow-up with patient. Please contact us if you have any additional questions.     Total time spent 70 minutes including time  needed to review the records,  patient  evaluation, documentation, face-to-face discussion with the ER, Critical care and primary teams,   more than 50% of the time was spent on coordination of care and counseling.       Fabián London MD   Nephrology  [facility] [place]

## 2018-10-27 NOTE — HPI
Conner Perez III  is a 68 y old AA man with h/o ESRD on HD ( [facility], [facility] ) presented to the ER this morning  for SOB, bilateral pulmonary edema noted on the chest x-ray, patient had his dialysis treatment yesterday, patient has been having recurrent episodes of shortness of breath and similar presentations to the emergency room in the last few months, he also has been losing weight, patient had to be emergently intubated in the emergency room and was transferred to ICU, we were consulted for emergent dialysis for volume removal,

## 2018-10-27 NOTE — ED NOTES
In with MD Colon for bedside U/S exam; pt respiratory effort continues with distress, RR 28-32/min, O2sat 93% 4L, +coarse congested LS audible at bedside.  Plan per MD for Chest CTA and urgent Dialysis.

## 2018-10-27 NOTE — ASSESSMENT & PLAN NOTE
Uncontrolled likely exacerbated by volume overload  ICU hemodynamic monitoring  Continue clonidine patch; hydralazine, isordil per NG

## 2018-10-28 PROBLEM — R94.31 ABNORMAL ECG: Status: ACTIVE | Noted: 2018-10-28

## 2018-10-28 PROBLEM — I21.19 ACUTE ST ELEVATION MYOCARDIAL INFARCTION (STEMI) OF INFERIOR WALL: Status: ACTIVE | Noted: 2018-10-28

## 2018-10-28 LAB
ALLENS TEST: ABNORMAL
ANION GAP SERPL CALC-SCNC: 11 MMOL/L
APTT BLDCRRT: 118.1 SEC
APTT BLDCRRT: 38 SEC
APTT BLDCRRT: 46.7 SEC
APTT BLDCRRT: 72.8 SEC
BUN SERPL-MCNC: 21 MG/DL
CALCIUM SERPL-MCNC: 8.5 MG/DL
CHLORIDE SERPL-SCNC: 99 MMOL/L
CO2 SERPL-SCNC: 29 MMOL/L
CORONARY STENOSIS: ABNORMAL
CREAT SERPL-MCNC: 4.4 MG/DL
DELSYS: ABNORMAL
ERYTHROCYTE [DISTWIDTH] IN BLOOD BY AUTOMATED COUNT: 16 %
ERYTHROCYTE [SEDIMENTATION RATE] IN BLOOD BY WESTERGREN METHOD: 16 MM/H
EST. GFR  (AFRICAN AMERICAN): 15 ML/MIN/1.73 M^2
EST. GFR  (NON AFRICAN AMERICAN): 13 ML/MIN/1.73 M^2
FIO2: 40
GLUCOSE SERPL-MCNC: 59 MG/DL
HCO3 UR-SCNC: 32.6 MMOL/L (ref 24–28)
HCT VFR BLD AUTO: 27.6 %
HGB BLD-MCNC: 8.9 G/DL
MAGNESIUM SERPL-MCNC: 1.7 MG/DL
MCH RBC QN AUTO: 28.6 PG
MCHC RBC AUTO-ENTMCNC: 32.2 G/DL
MCV RBC AUTO: 89 FL
MODE: ABNORMAL
PCO2 BLDA: 48.6 MMHG (ref 35–45)
PEEP: 5
PH SMN: 7.43 [PH] (ref 7.35–7.45)
PHOSPHATE SERPL-MCNC: 2.3 MG/DL
PLATELET # BLD AUTO: 152 K/UL
PMV BLD AUTO: 11.1 FL
PO2 BLDA: 110 MMHG (ref 80–100)
POC ACTIVATED CLOTTING TIME K: 158 SEC (ref 74–137)
POC BE: 8 MMOL/L
POC SATURATED O2: 98 % (ref 95–100)
POCT GLUCOSE: 68 MG/DL (ref 70–110)
POCT GLUCOSE: 74 MG/DL (ref 70–110)
POCT GLUCOSE: 83 MG/DL (ref 70–110)
POCT GLUCOSE: 90 MG/DL (ref 70–110)
POTASSIUM SERPL-SCNC: 4.3 MMOL/L
RBC # BLD AUTO: 3.11 M/UL
SAMPLE: ABNORMAL
SAMPLE: ABNORMAL
SITE: ABNORMAL
SODIUM SERPL-SCNC: 139 MMOL/L
TROPONIN I SERPL DL<=0.01 NG/ML-MCNC: 17.54 NG/ML
TROPONIN I SERPL DL<=0.01 NG/ML-MCNC: 24.8 NG/ML
VT: 450
WBC # BLD AUTO: 12.03 K/UL

## 2018-10-28 PROCEDURE — 84484 ASSAY OF TROPONIN QUANT: CPT

## 2018-10-28 PROCEDURE — 63600175 PHARM REV CODE 636 W HCPCS

## 2018-10-28 PROCEDURE — 99223 1ST HOSP IP/OBS HIGH 75: CPT | Mod: 25,,, | Performed by: INTERNAL MEDICINE

## 2018-10-28 PROCEDURE — 94003 VENT MGMT INPAT SUBQ DAY: CPT

## 2018-10-28 PROCEDURE — 83735 ASSAY OF MAGNESIUM: CPT

## 2018-10-28 PROCEDURE — 25000003 PHARM REV CODE 250: Performed by: NURSE PRACTITIONER

## 2018-10-28 PROCEDURE — 25000003 PHARM REV CODE 250: Performed by: INTERNAL MEDICINE

## 2018-10-28 PROCEDURE — B2151ZZ FLUOROSCOPY OF LEFT HEART USING LOW OSMOLAR CONTRAST: ICD-10-PCS | Performed by: INTERNAL MEDICINE

## 2018-10-28 PROCEDURE — 25000003 PHARM REV CODE 250: Performed by: STUDENT IN AN ORGANIZED HEALTH CARE EDUCATION/TRAINING PROGRAM

## 2018-10-28 PROCEDURE — B2111ZZ FLUOROSCOPY OF MULTIPLE CORONARY ARTERIES USING LOW OSMOLAR CONTRAST: ICD-10-PCS | Performed by: INTERNAL MEDICINE

## 2018-10-28 PROCEDURE — 4A023N7 MEASUREMENT OF CARDIAC SAMPLING AND PRESSURE, LEFT HEART, PERCUTANEOUS APPROACH: ICD-10-PCS | Performed by: INTERNAL MEDICINE

## 2018-10-28 PROCEDURE — 99900035 HC TECH TIME PER 15 MIN (STAT)

## 2018-10-28 PROCEDURE — C9113 INJ PANTOPRAZOLE SODIUM, VIA: HCPCS | Performed by: NURSE PRACTITIONER

## 2018-10-28 PROCEDURE — 82803 BLOOD GASES ANY COMBINATION: CPT

## 2018-10-28 PROCEDURE — 36600 WITHDRAWAL OF ARTERIAL BLOOD: CPT

## 2018-10-28 PROCEDURE — 84100 ASSAY OF PHOSPHORUS: CPT

## 2018-10-28 PROCEDURE — 93010 ELECTROCARDIOGRAM REPORT: CPT | Mod: 59,,, | Performed by: INTERNAL MEDICINE

## 2018-10-28 PROCEDURE — 25500020 PHARM REV CODE 255

## 2018-10-28 PROCEDURE — 63600175 PHARM REV CODE 636 W HCPCS: Performed by: INTERNAL MEDICINE

## 2018-10-28 PROCEDURE — 80048 BASIC METABOLIC PNL TOTAL CA: CPT

## 2018-10-28 PROCEDURE — 85730 THROMBOPLASTIN TIME PARTIAL: CPT

## 2018-10-28 PROCEDURE — 85347 COAGULATION TIME ACTIVATED: CPT

## 2018-10-28 PROCEDURE — 93005 ELECTROCARDIOGRAM TRACING: CPT

## 2018-10-28 PROCEDURE — 93458 L HRT ARTERY/VENTRICLE ANGIO: CPT | Mod: 26,,, | Performed by: INTERNAL MEDICINE

## 2018-10-28 PROCEDURE — 99291 CRITICAL CARE FIRST HOUR: CPT | Mod: ,,, | Performed by: INTERNAL MEDICINE

## 2018-10-28 PROCEDURE — 85027 COMPLETE CBC AUTOMATED: CPT

## 2018-10-28 PROCEDURE — 63600175 PHARM REV CODE 636 W HCPCS: Performed by: NURSE PRACTITIONER

## 2018-10-28 PROCEDURE — 20000000 HC ICU ROOM

## 2018-10-28 PROCEDURE — 36415 COLL VENOUS BLD VENIPUNCTURE: CPT

## 2018-10-28 PROCEDURE — 25000003 PHARM REV CODE 250

## 2018-10-28 RX ORDER — HEPARIN SODIUM,PORCINE/D5W 25000/250
12 INTRAVENOUS SOLUTION INTRAVENOUS CONTINUOUS
Status: DISCONTINUED | OUTPATIENT
Start: 2018-10-28 | End: 2018-10-30

## 2018-10-28 RX ORDER — CLOPIDOGREL 300 MG/1
600 TABLET, FILM COATED ORAL ONCE
Status: COMPLETED | OUTPATIENT
Start: 2018-10-28 | End: 2018-10-28

## 2018-10-28 RX ORDER — CLOPIDOGREL BISULFATE 75 MG/1
75 TABLET ORAL DAILY
Status: DISCONTINUED | OUTPATIENT
Start: 2018-10-29 | End: 2018-11-07 | Stop reason: HOSPADM

## 2018-10-28 RX ORDER — ALBUTEROL SULFATE 0.83 MG/ML
2.5 SOLUTION RESPIRATORY (INHALATION) EVERY 6 HOURS
Status: DISCONTINUED | OUTPATIENT
Start: 2018-10-28 | End: 2018-10-28

## 2018-10-28 RX ORDER — SODIUM,POTASSIUM PHOSPHATES 280-250MG
2 POWDER IN PACKET (EA) ORAL ONCE
Status: COMPLETED | OUTPATIENT
Start: 2018-10-28 | End: 2018-10-28

## 2018-10-28 RX ORDER — HYDRALAZINE HYDROCHLORIDE 50 MG/1
50 TABLET, FILM COATED ORAL EVERY 8 HOURS
Status: DISCONTINUED | OUTPATIENT
Start: 2018-10-28 | End: 2018-10-30

## 2018-10-28 RX ORDER — ALBUTEROL SULFATE 0.83 MG/ML
2.5 SOLUTION RESPIRATORY (INHALATION) EVERY 6 HOURS PRN
Status: DISCONTINUED | OUTPATIENT
Start: 2018-10-28 | End: 2018-11-07 | Stop reason: HOSPADM

## 2018-10-28 RX ADMIN — POTASSIUM & SODIUM PHOSPHATES POWDER PACK 280-160-250 MG 2 PACKET: 280-160-250 PACK at 12:10

## 2018-10-28 RX ADMIN — PROPOFOL 25 MCG/KG/MIN: 10 INJECTION, EMULSION INTRAVENOUS at 06:10

## 2018-10-28 RX ADMIN — DEXTROSE MONOHYDRATE 25 G: 25 INJECTION, SOLUTION INTRAVENOUS at 05:10

## 2018-10-28 RX ADMIN — ISOSORBIDE DINITRATE 40 MG: 20 TABLET ORAL at 08:10

## 2018-10-28 RX ADMIN — CLOPIDOGREL BISULFATE 600 MG: 300 TABLET, FILM COATED ORAL at 08:10

## 2018-10-28 RX ADMIN — PANTOPRAZOLE SODIUM 40 MG: 40 INJECTION, POWDER, FOR SOLUTION INTRAVENOUS at 08:10

## 2018-10-28 RX ADMIN — CHLORHEXIDINE GLUCONATE 15 ML: 1.2 RINSE ORAL at 08:10

## 2018-10-28 RX ADMIN — DEXTROSE MONOHYDRATE 25 G: 25 INJECTION, SOLUTION INTRAVENOUS at 12:10

## 2018-10-28 RX ADMIN — ASPIRIN 81 MG: 81 TABLET, COATED ORAL at 08:10

## 2018-10-28 RX ADMIN — PROPOFOL 30 MCG/KG/MIN: 10 INJECTION, EMULSION INTRAVENOUS at 12:10

## 2018-10-28 RX ADMIN — ISOSORBIDE DINITRATE 40 MG: 20 TABLET ORAL at 04:10

## 2018-10-28 RX ADMIN — HEPARIN SODIUM AND DEXTROSE 12 UNITS/KG/HR: 10000; 5 INJECTION INTRAVENOUS at 03:10

## 2018-10-28 RX ADMIN — ATORVASTATIN CALCIUM 80 MG: 40 TABLET, FILM COATED ORAL at 08:10

## 2018-10-28 RX ADMIN — PROPOFOL 25 MCG/KG/MIN: 10 INJECTION, EMULSION INTRAVENOUS at 12:10

## 2018-10-28 RX ADMIN — PROPOFOL 30 MCG/KG/MIN: 10 INJECTION, EMULSION INTRAVENOUS at 06:10

## 2018-10-28 RX ADMIN — HYDRALAZINE HYDROCHLORIDE 50 MG: 50 TABLET, FILM COATED ORAL at 10:10

## 2018-10-28 RX ADMIN — HYDRALAZINE HYDROCHLORIDE 100 MG: 50 TABLET, FILM COATED ORAL at 05:10

## 2018-10-28 RX ADMIN — HYDRALAZINE HYDROCHLORIDE 50 MG: 50 TABLET, FILM COATED ORAL at 03:10

## 2018-10-28 NOTE — ASSESSMENT & PLAN NOTE
S/t pulmonary edema; intravascular volume overload with suspected acute on chronic right heart failure  Vent settings reviewed and adjusted for optimal gas exchange  VAP prophylaxis  SAT/SBT post HD  10/28 Stable but needs more fluid pulled off. Possible extubation after dialysis tomorrow

## 2018-10-28 NOTE — SUBJECTIVE & OBJECTIVE
Past Medical History:   Diagnosis Date    CKD (chronic kidney disease), stage IV     COPD (chronic obstructive pulmonary disease)     Coronary artery disease     Depression     Dialysis patient     GERD (gastroesophageal reflux disease)     HTN (hypertension)     Pneumonia        Past Surgical History:   Procedure Laterality Date    AMPUTATION-TOE Left 1/24/2017    Performed by Bandar Gomes DPM at [facility] OR    AV FISTULA PLACEMENT Right 06/2017    CIRCUMCISION  1978    TOE AMPUTATION Left 01/24/2017    4th toe    VASCULAR CATH INSERTION N/A 8/28/2017    Performed by Jeremias Peralta MD at [facility] CATH LAB       Review of patient's allergies indicates:   Allergen Reactions    Augmentin [amoxicillin-pot clavulanate] Edema    Sulfa (sulfonamide antibiotics)      swelling    Lisinopril      cough       No current facility-administered medications on file prior to encounter.      Current Outpatient Medications on File Prior to Encounter   Medication Sig    albuterol (PROVENTIL) 2.5 mg /3 mL (0.083 %) nebulizer solution 2.5 mg.    albuterol 90 mcg/actuation inhaler Inhale into the lungs.    aspirin 81 MG Chew Take 1 tablet (81 mg total) by mouth once daily.    atorvastatin (LIPITOR) 40 MG tablet Take 1 tablet (40 mg total) by mouth every evening. (Patient taking differently: Take 20 mg by mouth every evening. )    cloNIDine 0.3 mg/24 hr td ptwk (CATAPRES) 0.3 mg/24 hr Place 1 patch onto the skin every 7 days.    furosemide (LASIX) 40 MG tablet Take 40 mg by mouth once daily.    hydrALAZINE (APRESOLINE) 100 MG tablet Take 1 tablet (100 mg total) by mouth every 8 (eight) hours.    isosorbide dinitrate (ISORDIL) 40 MG Tab Take 1 tablet (40 mg total) by mouth 3 (three) times daily.    labetalol (NORMODYNE) 300 MG tablet Take 2 tablets (600 mg total) by mouth 3 (three) times daily.    losartan (COZAAR) 100 MG tablet Take 1 tablet (100 mg total) by mouth once daily.    NIFEdipine (PROCARDIA-XL) 60 MG  (OSM) 24 hr tablet Take 1 tablet (60 mg total) by mouth once daily.    pantoprazole (PROTONIX) 40 MG tablet Take 1 tablet (40 mg total) by mouth once daily.    tamsulosin (FLOMAX) 0.4 mg Cp24 Take 1 capsule (0.4 mg total) by mouth once daily.     Family History     Problem Relation (Age of Onset)    Hypertension Father    No Known Problems Mother        Tobacco Use    Smoking status: Former Smoker     Types: Cigarettes     Last attempt to quit: 1971     Years since quittin.8    Smokeless tobacco: Never Used   Substance and Sexual Activity    Alcohol use: No    Drug use: No    Sexual activity: Not on file     Review of Systems   Unable to perform ROS: intubated     Objective:     Vital Signs (Most Recent):  Temp: 100.1 °F (37.8 °C) (10/28/18 0302)  Pulse: 67 (10/28/18 0600)  Resp: 16 (10/28/18 0600)  BP: (!) 127/53 (10/28/18 0600)  SpO2: 99 % (10/28/18 0600) Vital Signs (24h Range):  Temp:  [97.5 °F (36.4 °C)-100.5 °F (38.1 °C)] 100.1 °F (37.8 °C)  Pulse:  [64-84] 67  Resp:  [5-38] 16  SpO2:  [90 %-100 %] 99 %  BP: ()/() 127/53     Weight: 91.2 kg (201 lb)  Body mass index is 23.23 kg/m².    SpO2: 99 %  O2 Device (Oxygen Therapy): ventilator      Intake/Output Summary (Last 24 hours) at 10/28/2018 0628  Last data filed at 10/28/2018 0600  Gross per 24 hour   Intake 1571.19 ml   Output 4520 ml   Net -2948.81 ml       Lines/Drains/Airways     Drain                 Hemodialysis AV Fistula Right forearm -- days         Hemodialysis AV Fistula 17 0017 Right forearm 314 days         NG/OG Tube 10/27/18 0807 nasogastric 16 Fr. Left nostril less than 1 day         Urethral Catheter 10/27/18 0825 16 Fr. less than 1 day          Airway                 Airway - Non-Surgical 10/27/18 0745 Endotracheal Tube less than 1 day          Peripheral Intravenous Line                 Peripheral IV - Single Lumen 10/27/18 0640 Left Antecubital less than 1 day         Peripheral IV - Single Lumen 10/27/18 0922  Left Forearm less than 1 day                Physical Exam   Constitutional: He appears well-developed and well-nourished. He is intubated.   HENT:   Head: Normocephalic.   Neck: Normal range of motion. Neck supple. Normal carotid pulses, no hepatojugular reflux and no JVD present. Carotid bruit is not present. No thyromegaly present.   Cardiovascular: Normal rate, regular rhythm, S1 normal and S2 normal. PMI is not displaced. Exam reveals no S3, no S4, no distant heart sounds, no friction rub, no midsystolic click and no opening snap.   No murmur heard.  Pulses:       Radial pulses are 2+ on the right side, and 2+ on the left side.   Pulmonary/Chest: Effort normal and breath sounds normal. He is intubated. He has no wheezes. He has no rales.   Abdominal: Soft. Bowel sounds are normal. He exhibits no distension, no abdominal bruit, no ascites and no mass. There is no tenderness.   Musculoskeletal: He exhibits no edema.   Skin: Skin is warm.   Nursing note and vitals reviewed.      Significant Labs:   CMP   Recent Labs   Lab 10/27/18  0548 10/28/18  0407    139   K 4.5 4.3   CL 95 99   CO2 30* 29    59*   BUN 18 21   CREATININE 4.3* 4.4*   CALCIUM 9.3 8.5*   PROT 7.9  --    ALBUMIN 3.9  --    BILITOT 0.8  --    ALKPHOS 66  --    AST 30  --    ALT 19  --    ANIONGAP 14 11   ESTGFRAFRICA 15* 15*   EGFRNONAA 13* 13*   , CBC   Recent Labs   Lab 10/27/18  0548 10/27/18  1923 10/28/18  0407   WBC 5.19 9.60 12.03   HGB 9.7* 9.4* 8.9*   HCT 29.8* 29.8* 27.6*    131* 152   , INR   Recent Labs   Lab 10/27/18  1923   INR 1.1    and Troponin   Recent Labs   Lab 10/27/18  1623 10/27/18  2357 10/28/18  0407   TROPONINI 3.499* 17.542* 24.798*       Significant Imaging: Echocardiogram:   2D echo with color flow doppler:   Results for orders placed or performed during the hospital encounter of 04/27/18   2D echo with color flow doppler   Result Value Ref Range    Calculated EF 55 55 - 65    Diastolic Dysfunction No      Mitral Valve Mobility NORMAL     Tricuspid Valve Regurgitation TRIVIAL

## 2018-10-28 NOTE — PROGRESS NOTES
[facility] -   Nephrology  Progress Note    Patient Name: Conner Perez III  MRN: [id number]  Admission Date: 10/27/2018  Hospital Length of Stay: 1 days  Attending Provider: Keith Wesley MD   Primary Care Physician: Raciel Angela MD  Principal Problem:Acute ST elevation myocardial infarction (STEMI) of inferior wall    Subjective:     HPI: Conner Perez III  is a 68 y old AA man with h/o ESRD on HD ( [facility], [facility] ) presented to the ER this morning  for SOB, bilateral pulmonary edema noted on the chest x-ray, patient had his dialysis treatment yesterday, patient has been having recurrent episodes of shortness of breath and similar presentations to the emergency room in the last few months, he also has been losing weight, patient had to be emergently intubated in the emergency room and was transferred to ICU, we were consulted for emergent dialysis for volume removal,       Interval History:    10/28/18 - Pt s/p C for acute MI . Severe CAD , Cards suggest medical management,     Review of patient's allergies indicates:   Allergen Reactions    Augmentin [amoxicillin-pot clavulanate] Edema    Sulfa (sulfonamide antibiotics)      swelling    Lisinopril      cough     Current Facility-Administered Medications   Medication Frequency    aspirin EC tablet 81 mg Daily    atorvastatin tablet 80 mg Daily    chlorhexidine 0.12 % solution 15 mL BID    cloNIDine 0.3 mg/24 hr td ptwk 1 patch Q7 Days    [START ON 10/29/2018] clopidogrel tablet 75 mg Daily    dextrose 50% injection 25 g Q6H PRN    fentaNYL 2500 mcg in D5W 250 mL infusion premix (titrating) (conc: 10 mcg/mL) Continuous    heparin (porcine) injection 2,000 Units Once    heparin (porcine) injection 3,000 Units Once    heparin 25,000 units in dextrose 5% (100 units/ml) IV bolus from bag - ADDITIONAL PRN BOLUS - 30 units/kg (max bolus 4000 units) PRN    heparin 25,000 units in dextrose 5% (100 units/ml) IV bolus  from bag - ADDITIONAL PRN BOLUS - 60 units/kg (max bolus 4000 units) PRN    heparin 25,000 units in dextrose 5% 250 mL (100 units/mL) infusion LOW INTENSITY nomogram - [facility] Continuous    hydrALAZINE tablet 50 mg Q8H    isosorbide dinitrate tablet 40 mg TID    pantoprazole injection 40 mg Daily    potassium, sodium phosphates 280-160-250 mg packet 2 packet Once    propofol (DIPRIVAN) 10 mg/mL infusion Continuous       Objective:     Vital Signs (Most Recent):  Temp: 98.9 °F (37.2 °C) (10/28/18 0800)  Pulse: 63 (10/28/18 1115)  Resp: 16 (10/28/18 1115)  BP: (!) 113/57 (10/28/18 1115)  SpO2: 100 % (10/28/18 1115)  O2 Device (Oxygen Therapy): ventilator (10/28/18 1010) Vital Signs (24h Range):  Temp:  [97.5 °F (36.4 °C)-100.5 °F (38.1 °C)] 98.9 °F (37.2 °C)  Pulse:  [62-80] 63  Resp:  [5-27] 16  SpO2:  [93 %-100 %] 100 %  BP: ()/(36-93) 113/57     Weight: 91.2 kg (201 lb) (10/27/18 0457)  Body mass index is 23.23 kg/m².  Body surface area is 2.24 meters squared.    I/O last 3 completed shifts:  In: 1571.2 [I.V.:1032; Other:500; IV Piggyback:39.2]  Out: 4520 [Urine:20; Other:4500]    Physical Exam   Constitutional: He appears well-developed. No distress.   HENT:   Head: Normocephalic and atraumatic.   ETT in place    Neck: Neck supple. No tracheal deviation present. No thyromegaly present.   Cardiovascular: Normal rate, regular rhythm, normal heart sounds and intact distal pulses. Exam reveals no gallop and no friction rub.   No murmur heard.  Pulmonary/Chest: He has no wheezes. He has rales.   Abdominal: Soft. He exhibits no mass. There is no tenderness. There is no rebound and no guarding.   Musculoskeletal: He exhibits no edema.   Lymphadenopathy:     He has no cervical adenopathy.   Skin: Skin is warm. No rash noted. He is not diaphoretic. No erythema.   Nursing note and vitals reviewed.      Significant Labs:    CBC:     Recent Labs   Lab 10/28/18  0407   WBC 12.03   RBC 3.11*   HGB 8.9*   HCT 27.6*   PLT  152   MCV 89   MCH 28.6   MCHC 32.2     CMP:   Recent Labs   Lab 10/27/18  0548 10/28/18  0407    59*   CALCIUM 9.3 8.5*   ALBUMIN 3.9  --    PROT 7.9  --     139   K 4.5 4.3   CO2 30* 29   CL 95 99   BUN 18 21   CREATININE 4.3* 4.4*   ALKPHOS 66  --    ALT 19  --    AST 30  --    BILITOT 0.8  --      Coagulation:   Recent Labs   Lab 10/27/18  1923 10/28/18  0147   INR 1.1  --    APTT 30.5 72.8*     LFTs:   Recent Labs   Lab 10/27/18  0548   ALT 19   AST 30   ALKPHOS 66   BILITOT 0.8   PROT 7.9   ALBUMIN 3.9     All labs within the past 24 hours have been reviewed.     Significant Imaging:  Reviewed     Lab Results   Component Value Date    .7 (H) 08/26/2017    CALCIUM 8.5 (L) 10/28/2018    PHOS 2.3 (L) 10/28/2018       Lab Results   Component Value Date    ALBUMIN 3.9 10/27/2018         Assessment/Plan:     ESRD (end stage renal disease) on dialysis    1. ESRD on HD : (  [facility] , [facility] ) ,  emergent dialysis yesterday  for fluid removal, plan HD tomorrow,     Pt non adherent with fluid restriction per family,      2.  HTN : BP better after adjusting meds, infact on low side, will reduce hydralazine dose,       3. Anemia of CKD :  follow hemoglobin, Procrit with dialysis,     4. SHPT : renal diet when stable, will replete Phos,      5. CAD / acute MI - s/p C, optimize medical management per Cards               I will follow-up with patient. Please contact us if you have any additional questions.     Total critical care time spent 40 minutes including time needed to review the records,  patient  evaluation, documentation, face-to-face discussion with the primary and critical care teams,    more than 50% of the time was spent on coordination of care and counseling.       Fabián London MD  Nephrology  [facility] -

## 2018-10-28 NOTE — EICU
Bedside nurse called to report pt's glucose is 57 with no prn meds for hypoglycemia. Informed Dr. Phoenix.

## 2018-10-28 NOTE — H&P (VIEW-ONLY)
[facility] -   Cardiology  Consult Note    Patient Name: Conner Perez III  MRN: [id number]  Admission Date: 10/27/2018  Hospital Length of Stay: 1 days  Code Status: Full Code   Attending Provider: Keith Wesley MD   Consulting Provider: Damian Fairbanks MD  Primary Care Physician: Raciel Angela MD  Principal Problem:NSTEMI (non-ST elevated myocardial infarction)    Patient information was obtained from past medical records and ER records.     Inpatient consult to Cardiology  Consult performed by: Damian Fairbanks MD  Consult ordered by: Keith Wesley MD  Reason for consult: Elevated troponin        Subjective:     Chief Complaint:  Dyspnea     HPI:   Pt admitted with dyspnea/respiratory failure.  Pt has ESRD, HTN.    Admitted with acute dyspnea, respiratory failure requiring intubation.  ecg on admit showed NSR, nonspecific st-t abnl.   Troponin levels increased, started on IV heparin, asa for NSTEMI by Hospital [facility].  Anemia noted on labs today, Hg 8.9  This am routine am ECG showed inferior ST elevation concerning for STEMI and marked anterolateral st-t abnormalities, worsening.  Cardiology on call was contacted about ECG and decision made to activate cath lab for emergent cath.  Pt is intubated, sedated and stable otherwise and there are no reports of chest pain sxs.  Echo yesterday showed EF 45%, apical WMA.  He was admitted last month, dyspnea/cp and had negative stress test.  Echo in earlier 4/7/17 showed similar WMA.  Reportedly has had LHC 2016 with no major blockages (no report available).          Past Medical History:   Diagnosis Date    CKD (chronic kidney disease), stage IV     COPD (chronic obstructive pulmonary disease)     Coronary artery disease     Depression     Dialysis patient     GERD (gastroesophageal reflux disease)     HTN (hypertension)     Pneumonia        Past Surgical History:   Procedure Laterality Date    AMPUTATION-TOE Left 1/24/2017    Performed by  Bandar Gomes DPM at [facility] OR    AV FISTULA PLACEMENT Right 06/2017    CIRCUMCISION  1978    TOE AMPUTATION Left 01/24/2017    4th toe    VASCULAR CATH INSERTION N/A 8/28/2017    Performed by Jeremias Peralta MD at [facility] CATH LAB       Review of patient's allergies indicates:   Allergen Reactions    Augmentin [amoxicillin-pot clavulanate] Edema    Sulfa (sulfonamide antibiotics)      swelling    Lisinopril      cough       No current facility-administered medications on file prior to encounter.      Current Outpatient Medications on File Prior to Encounter   Medication Sig    albuterol (PROVENTIL) 2.5 mg /3 mL (0.083 %) nebulizer solution 2.5 mg.    albuterol 90 mcg/actuation inhaler Inhale into the lungs.    aspirin 81 MG Chew Take 1 tablet (81 mg total) by mouth once daily.    atorvastatin (LIPITOR) 40 MG tablet Take 1 tablet (40 mg total) by mouth every evening. (Patient taking differently: Take 20 mg by mouth every evening. )    cloNIDine 0.3 mg/24 hr td ptwk (CATAPRES) 0.3 mg/24 hr Place 1 patch onto the skin every 7 days.    furosemide (LASIX) 40 MG tablet Take 40 mg by mouth once daily.    hydrALAZINE (APRESOLINE) 100 MG tablet Take 1 tablet (100 mg total) by mouth every 8 (eight) hours.    isosorbide dinitrate (ISORDIL) 40 MG Tab Take 1 tablet (40 mg total) by mouth 3 (three) times daily.    labetalol (NORMODYNE) 300 MG tablet Take 2 tablets (600 mg total) by mouth 3 (three) times daily.    losartan (COZAAR) 100 MG tablet Take 1 tablet (100 mg total) by mouth once daily.    NIFEdipine (PROCARDIA-XL) 60 MG (OSM) 24 hr tablet Take 1 tablet (60 mg total) by mouth once daily.    pantoprazole (PROTONIX) 40 MG tablet Take 1 tablet (40 mg total) by mouth once daily.    tamsulosin (FLOMAX) 0.4 mg Cp24 Take 1 capsule (0.4 mg total) by mouth once daily.     Family History     Problem Relation (Age of Onset)    Hypertension Father    No Known Problems Mother        Tobacco Use    Smoking status:  Former Smoker     Types: Cigarettes     Last attempt to quit: 1971     Years since quittin.8    Smokeless tobacco: Never Used   Substance and Sexual Activity    Alcohol use: No    Drug use: No    Sexual activity: Not on file     Review of Systems   Unable to perform ROS: intubated     Objective:     Vital Signs (Most Recent):  Temp: 100.1 °F (37.8 °C) (10/28/18 0302)  Pulse: 67 (10/28/18 0600)  Resp: 16 (10/28/18 0600)  BP: (!) 127/53 (10/28/18 0600)  SpO2: 99 % (10/28/18 0600) Vital Signs (24h Range):  Temp:  [97.5 °F (36.4 °C)-100.5 °F (38.1 °C)] 100.1 °F (37.8 °C)  Pulse:  [64-84] 67  Resp:  [5-38] 16  SpO2:  [90 %-100 %] 99 %  BP: ()/() 127/53     Weight: 91.2 kg (201 lb)  Body mass index is 23.23 kg/m².    SpO2: 99 %  O2 Device (Oxygen Therapy): ventilator      Intake/Output Summary (Last 24 hours) at 10/28/2018 0628  Last data filed at 10/28/2018 06  Gross per 24 hour   Intake 1571.19 ml   Output 4520 ml   Net -2948.81 ml       Lines/Drains/Airways     Drain                 Hemodialysis AV Fistula Right forearm -- days         Hemodialysis AV Fistula 17 0017 Right forearm 314 days         NG/OG Tube 10/27/18 0807 nasogastric 16 Fr. Left nostril less than 1 day         Urethral Catheter 10/27/18 0825 16 Fr. less than 1 day          Airway                 Airway - Non-Surgical 10/27/18 0745 Endotracheal Tube less than 1 day          Peripheral Intravenous Line                 Peripheral IV - Single Lumen 10/27/18 0640 Left Antecubital less than 1 day         Peripheral IV - Single Lumen 10/27/18 0930 Left Forearm less than 1 day                Physical Exam   Constitutional: He appears well-developed and well-nourished. He is intubated.   HENT:   Head: Normocephalic.   Neck: Normal range of motion. Neck supple. Normal carotid pulses, no hepatojugular reflux and no JVD present. Carotid bruit is not present. No thyromegaly present.   Cardiovascular: Normal rate, regular rhythm, S1  normal and S2 normal. PMI is not displaced. Exam reveals no S3, no S4, no distant heart sounds, no friction rub, no midsystolic click and no opening snap.   No murmur heard.  Pulses:       Radial pulses are 2+ on the right side, and 2+ on the left side.   Pulmonary/Chest: Effort normal and breath sounds normal. He is intubated. He has no wheezes. He has no rales.   Abdominal: Soft. Bowel sounds are normal. He exhibits no distension, no abdominal bruit, no ascites and no mass. There is no tenderness.   Musculoskeletal: He exhibits no edema.   Skin: Skin is warm.   Nursing note and vitals reviewed.      Significant Labs:   CMP   Recent Labs   Lab 10/27/18  0548 10/28/18  0407    139   K 4.5 4.3   CL 95 99   CO2 30* 29    59*   BUN 18 21   CREATININE 4.3* 4.4*   CALCIUM 9.3 8.5*   PROT 7.9  --    ALBUMIN 3.9  --    BILITOT 0.8  --    ALKPHOS 66  --    AST 30  --    ALT 19  --    ANIONGAP 14 11   ESTGFRAFRICA 15* 15*   EGFRNONAA 13* 13*   , CBC   Recent Labs   Lab 10/27/18  0548 10/27/18  1923 10/28/18  0407   WBC 5.19 9.60 12.03   HGB 9.7* 9.4* 8.9*   HCT 29.8* 29.8* 27.6*    131* 152   , INR   Recent Labs   Lab 10/27/18  1923   INR 1.1    and Troponin   Recent Labs   Lab 10/27/18  1623 10/27/18  2357 10/28/18  0407   TROPONINI 3.499* 17.542* 24.798*       Significant Imaging: Echocardiogram:   2D echo with color flow doppler:   Results for orders placed or performed during the hospital encounter of 04/27/18   2D echo with color flow doppler   Result Value Ref Range    Calculated EF 55 55 - 65    Diastolic Dysfunction No     Mitral Valve Mobility NORMAL     Tricuspid Valve Regurgitation TRIVIAL      Assessment and Plan:     NSTEMI ON ADMIT WITH SXS INVOLVING DYSPNEA AND PULMONARY EDEMA REQUIRING INTUBATION.  ECG NOW WITH INFERIOR ST ELEVATION AND MARKED ANTEROLATERAL ST-T ABNL.  PT NEEDS LHC NOW, REVASCULARIZATION AS NEEDED.  DISCUSSED CONSENT WITH DAUGHTER ON PHONE, ALL INDICATIONS OF LHC POSSIBLE  PCI, ALL RISKS/BENEFITS DISCUSSED.   DAUGHTER PROVIDED CONSENT SINCE PT WAS NOT ABLE TO CONSENT DUE TO BEING INTUBATED.  CONTINUE CURRENT MEDS.  FURTHER RECS TO FOLLOW CATH.      * NSTEMI (non-ST elevated myocardial infarction)    See management plan detailed above.        Abnormal ECG    See management plan detailed above.        Acute ST elevation myocardial infarction (STEMI) of inferior wall    See management plan detailed above.        Acute pulmonary edema    See management plan detailed above.        On mechanically assisted ventilation    See management plan detailed above.        Acute on chronic diastolic congestive heart failure    See management plan detailed above.        Coronary artery disease of native artery of native heart with stable angina pectoris    See management plan detailed above.        Hypertension    See management plan detailed above.        ESRD (end stage renal disease) on dialysis    See management plan detailed above.            VTE Risk Mitigation (From admission, onward)        Ordered     heparin 25,000 units in dextrose 5% 250 mL (100 units/mL) infusion LOW INTENSITY nomogram - [facility]  Continuous      10/27/18 1915     heparin 25,000 units in dextrose 5% (100 units/ml) IV bolus from bag - ADDITIONAL PRN BOLUS - 60 units/kg (max bolus 4000 units)  As needed (PRN)      10/27/18 1915     heparin 25,000 units in dextrose 5% (100 units/ml) IV bolus from bag - ADDITIONAL PRN BOLUS - 30 units/kg (max bolus 4000 units)  As needed (PRN)      10/27/18 1915     IP VTE HIGH RISK PATIENT  Once      10/27/18 0805     heparin (porcine) injection 3,000 Units  Once      10/27/18 0659     heparin (porcine) injection 2,000 Units  Once      10/27/18 0659          Thank you for your consult.    Damian Fairbanks MD  Cardiology   [facility] -

## 2018-10-28 NOTE — CONSULTS
[facility] -   Cardiology  Consult Note    Patient Name: Conner Perez III  MRN: [id number]  Admission Date: 10/27/2018  Hospital Length of Stay: 1 days  Code Status: Full Code   Attending Provider: Keith Wesley MD   Consulting Provider: Damian Fairbanks MD  Primary Care Physician: Raciel Angela MD  Principal Problem:NSTEMI (non-ST elevated myocardial infarction)    Patient information was obtained from past medical records and ER records.     Inpatient consult to Cardiology  Consult performed by: Damian Fairbanks MD  Consult ordered by: Keith Wesley MD  Reason for consult: Elevated troponin        Subjective:     Chief Complaint:  Dyspnea     HPI:   Pt admitted with dyspnea/respiratory failure.  Pt has ESRD, HTN.    Admitted with acute dyspnea, respiratory failure requiring intubation.  ecg on admit showed NSR, nonspecific st-t abnl.   Troponin levels increased, started on IV heparin, asa for NSTEMI by Hospital Med.  Anemia noted on labs today, Hg 8.9  This am routine am ECG showed inferior ST elevation concerning for STEMI and marked anterolateral st-t abnormalities, worsening.  Cardiology on call was contacted about ECG and decision made to activate cath lab for emergent cath.  Pt is intubated, sedated and stable otherwise and there are no reports of chest pain sxs.  Echo yesterday showed EF 45%, apical WMA.  He was admitted last month, dyspnea/cp and had negative stress test.  Echo in earlier 4/7/17 showed similar WMA.  Reportedly has had LHC 2016 with no major blockages (no report available).          Past Medical History:   Diagnosis Date    CKD (chronic kidney disease), stage IV     COPD (chronic obstructive pulmonary disease)     Coronary artery disease     Depression     Dialysis patient     GERD (gastroesophageal reflux disease)     HTN (hypertension)     Pneumonia        Past Surgical History:   Procedure Laterality Date    AMPUTATION-TOE Left 1/24/2017    Performed by  Bandar Gomes DPM at [facility] OR    AV FISTULA PLACEMENT Right 06/2017    CIRCUMCISION  1978    TOE AMPUTATION Left 01/24/2017    4th toe    VASCULAR CATH INSERTION N/A 8/28/2017    Performed by Jeremias Peralta MD at [facility] CATH LAB       Review of patient's allergies indicates:   Allergen Reactions    Augmentin [amoxicillin-pot clavulanate] Edema    Sulfa (sulfonamide antibiotics)      swelling    Lisinopril      cough       No current facility-administered medications on file prior to encounter.      Current Outpatient Medications on File Prior to Encounter   Medication Sig    albuterol (PROVENTIL) 2.5 mg /3 mL (0.083 %) nebulizer solution 2.5 mg.    albuterol 90 mcg/actuation inhaler Inhale into the lungs.    aspirin 81 MG Chew Take 1 tablet (81 mg total) by mouth once daily.    atorvastatin (LIPITOR) 40 MG tablet Take 1 tablet (40 mg total) by mouth every evening. (Patient taking differently: Take 20 mg by mouth every evening. )    cloNIDine 0.3 mg/24 hr td ptwk (CATAPRES) 0.3 mg/24 hr Place 1 patch onto the skin every 7 days.    furosemide (LASIX) 40 MG tablet Take 40 mg by mouth once daily.    hydrALAZINE (APRESOLINE) 100 MG tablet Take 1 tablet (100 mg total) by mouth every 8 (eight) hours.    isosorbide dinitrate (ISORDIL) 40 MG Tab Take 1 tablet (40 mg total) by mouth 3 (three) times daily.    labetalol (NORMODYNE) 300 MG tablet Take 2 tablets (600 mg total) by mouth 3 (three) times daily.    losartan (COZAAR) 100 MG tablet Take 1 tablet (100 mg total) by mouth once daily.    NIFEdipine (PROCARDIA-XL) 60 MG (OSM) 24 hr tablet Take 1 tablet (60 mg total) by mouth once daily.    pantoprazole (PROTONIX) 40 MG tablet Take 1 tablet (40 mg total) by mouth once daily.    tamsulosin (FLOMAX) 0.4 mg Cp24 Take 1 capsule (0.4 mg total) by mouth once daily.     Family History     Problem Relation (Age of Onset)    Hypertension Father    No Known Problems Mother        Tobacco Use    Smoking status:  Former Smoker     Types: Cigarettes     Last attempt to quit: 1971     Years since quittin.8    Smokeless tobacco: Never Used   Substance and Sexual Activity    Alcohol use: No    Drug use: No    Sexual activity: Not on file     Review of Systems   Unable to perform ROS: intubated     Objective:     Vital Signs (Most Recent):  Temp: 100.1 °F (37.8 °C) (10/28/18 0302)  Pulse: 67 (10/28/18 0600)  Resp: 16 (10/28/18 0600)  BP: (!) 127/53 (10/28/18 0600)  SpO2: 99 % (10/28/18 0600) Vital Signs (24h Range):  Temp:  [97.5 °F (36.4 °C)-100.5 °F (38.1 °C)] 100.1 °F (37.8 °C)  Pulse:  [64-84] 67  Resp:  [5-38] 16  SpO2:  [90 %-100 %] 99 %  BP: ()/() 127/53     Weight: 91.2 kg (201 lb)  Body mass index is 23.23 kg/m².    SpO2: 99 %  O2 Device (Oxygen Therapy): ventilator      Intake/Output Summary (Last 24 hours) at 10/28/2018 0628  Last data filed at 10/28/2018 06  Gross per 24 hour   Intake 1571.19 ml   Output 4520 ml   Net -2948.81 ml       Lines/Drains/Airways     Drain                 Hemodialysis AV Fistula Right forearm -- days         Hemodialysis AV Fistula 17 0017 Right forearm 314 days         NG/OG Tube 10/27/18 0807 nasogastric 16 Fr. Left nostril less than 1 day         Urethral Catheter 10/27/18 0825 16 Fr. less than 1 day          Airway                 Airway - Non-Surgical 10/27/18 0745 Endotracheal Tube less than 1 day          Peripheral Intravenous Line                 Peripheral IV - Single Lumen 10/27/18 0640 Left Antecubital less than 1 day         Peripheral IV - Single Lumen 10/27/18 0930 Left Forearm less than 1 day                Physical Exam   Constitutional: He appears well-developed and well-nourished. He is intubated.   HENT:   Head: Normocephalic.   Neck: Normal range of motion. Neck supple. Normal carotid pulses, no hepatojugular reflux and no JVD present. Carotid bruit is not present. No thyromegaly present.   Cardiovascular: Normal rate, regular rhythm, S1  normal and S2 normal. PMI is not displaced. Exam reveals no S3, no S4, no distant heart sounds, no friction rub, no midsystolic click and no opening snap.   No murmur heard.  Pulses:       Radial pulses are 2+ on the right side, and 2+ on the left side.   Pulmonary/Chest: Effort normal and breath sounds normal. He is intubated. He has no wheezes. He has no rales.   Abdominal: Soft. Bowel sounds are normal. He exhibits no distension, no abdominal bruit, no ascites and no mass. There is no tenderness.   Musculoskeletal: He exhibits no edema.   Skin: Skin is warm.   Nursing note and vitals reviewed.      Significant Labs:   CMP   Recent Labs   Lab 10/27/18  0548 10/28/18  0407    139   K 4.5 4.3   CL 95 99   CO2 30* 29    59*   BUN 18 21   CREATININE 4.3* 4.4*   CALCIUM 9.3 8.5*   PROT 7.9  --    ALBUMIN 3.9  --    BILITOT 0.8  --    ALKPHOS 66  --    AST 30  --    ALT 19  --    ANIONGAP 14 11   ESTGFRAFRICA 15* 15*   EGFRNONAA 13* 13*   , CBC   Recent Labs   Lab 10/27/18  0548 10/27/18  1923 10/28/18  0407   WBC 5.19 9.60 12.03   HGB 9.7* 9.4* 8.9*   HCT 29.8* 29.8* 27.6*    131* 152   , INR   Recent Labs   Lab 10/27/18  1923   INR 1.1    and Troponin   Recent Labs   Lab 10/27/18  1623 10/27/18  2357 10/28/18  0407   TROPONINI 3.499* 17.542* 24.798*       Significant Imaging: Echocardiogram:   2D echo with color flow doppler:   Results for orders placed or performed during the hospital encounter of 04/27/18   2D echo with color flow doppler   Result Value Ref Range    Calculated EF 55 55 - 65    Diastolic Dysfunction No     Mitral Valve Mobility NORMAL     Tricuspid Valve Regurgitation TRIVIAL      Assessment and Plan:     NSTEMI ON ADMIT WITH SXS INVOLVING DYSPNEA AND PULMONARY EDEMA REQUIRING INTUBATION.  ECG NOW WITH INFERIOR ST ELEVATION AND MARKED ANTEROLATERAL ST-T ABNL.  PT NEEDS LHC NOW, REVASCULARIZATION AS NEEDED.  DISCUSSED CONSENT WITH DAUGHTER ON PHONE, ALL INDICATIONS OF LHC POSSIBLE  PCI, ALL RISKS/BENEFITS DISCUSSED.   DAUGHTER PROVIDED CONSENT SINCE PT WAS NOT ABLE TO CONSENT DUE TO BEING INTUBATED.  CONTINUE CURRENT MEDS.  FURTHER RECS TO FOLLOW CATH.      * NSTEMI (non-ST elevated myocardial infarction)    See management plan detailed above.        Abnormal ECG    See management plan detailed above.        Acute ST elevation myocardial infarction (STEMI) of inferior wall    See management plan detailed above.        Acute pulmonary edema    See management plan detailed above.        On mechanically assisted ventilation    See management plan detailed above.        Acute on chronic diastolic congestive heart failure    See management plan detailed above.        Coronary artery disease of native artery of native heart with stable angina pectoris    See management plan detailed above.        Hypertension    See management plan detailed above.        ESRD (end stage renal disease) on dialysis    See management plan detailed above.            VTE Risk Mitigation (From admission, onward)        Ordered     heparin 25,000 units in dextrose 5% 250 mL (100 units/mL) infusion LOW INTENSITY nomogram - [facility]  Continuous      10/27/18 1915     heparin 25,000 units in dextrose 5% (100 units/ml) IV bolus from bag - ADDITIONAL PRN BOLUS - 60 units/kg (max bolus 4000 units)  As needed (PRN)      10/27/18 1915     heparin 25,000 units in dextrose 5% (100 units/ml) IV bolus from bag - ADDITIONAL PRN BOLUS - 30 units/kg (max bolus 4000 units)  As needed (PRN)      10/27/18 1915     IP VTE HIGH RISK PATIENT  Once      10/27/18 0805     heparin (porcine) injection 3,000 Units  Once      10/27/18 0659     heparin (porcine) injection 2,000 Units  Once      10/27/18 0659          Thank you for your consult.    Damian Fairbanks MD  Cardiology   [facility] -

## 2018-10-28 NOTE — HPI
Pt admitted with dyspnea/respiratory failure.  Pt has ESRD, HTN.    Admitted with acute dyspnea, respiratory failure requiring intubation.  ecg on admit showed NSR, nonspecific st-t abnl.   Troponin levels increased, started on IV heparin, asa for NSTEMI by Hospital Med.  Anemia noted on labs today, Hg 8.9  This am routine am ECG showed inferior ST elevation concerning for STEMI and marked anterolateral st-t abnormalities, worsening.  Cardiology on call was contacted about ECG and decision made to activate cath lab for emergent cath.  Pt is intubated, sedated and stable otherwise and there are no reports of chest pain sxs.  Echo yesterday showed EF 45%, apical WMA.  He was admitted last month, dyspnea/cp and had negative stress test.  Echo in earlier 4/7/17 showed similar WMA.  Reportedly has had LHC 2016 with no major blockages (no report available).

## 2018-10-28 NOTE — SUBJECTIVE & OBJECTIVE
Interval History:    10/28/18 - Pt s/p LHC for acute MI . Severe CAD , Cards suggest medical management,     Review of patient's allergies indicates:   Allergen Reactions    Augmentin [amoxicillin-pot clavulanate] Edema    Sulfa (sulfonamide antibiotics)      swelling    Lisinopril      cough     Current Facility-Administered Medications   Medication Frequency    aspirin EC tablet 81 mg Daily    atorvastatin tablet 80 mg Daily    chlorhexidine 0.12 % solution 15 mL BID    cloNIDine 0.3 mg/24 hr td ptwk 1 patch Q7 Days    [START ON 10/29/2018] clopidogrel tablet 75 mg Daily    dextrose 50% injection 25 g Q6H PRN    fentaNYL 2500 mcg in D5W 250 mL infusion premix (titrating) (conc: 10 mcg/mL) Continuous    heparin (porcine) injection 2,000 Units Once    heparin (porcine) injection 3,000 Units Once    heparin 25,000 units in dextrose 5% (100 units/ml) IV bolus from bag - ADDITIONAL PRN BOLUS - 30 units/kg (max bolus 4000 units) PRN    heparin 25,000 units in dextrose 5% (100 units/ml) IV bolus from bag - ADDITIONAL PRN BOLUS - 60 units/kg (max bolus 4000 units) PRN    heparin 25,000 units in dextrose 5% 250 mL (100 units/mL) infusion LOW INTENSITY nomogram - [facility] Continuous    hydrALAZINE tablet 50 mg Q8H    isosorbide dinitrate tablet 40 mg TID    pantoprazole injection 40 mg Daily    potassium, sodium phosphates 280-160-250 mg packet 2 packet Once    propofol (DIPRIVAN) 10 mg/mL infusion Continuous       Objective:     Vital Signs (Most Recent):  Temp: 98.9 °F (37.2 °C) (10/28/18 0800)  Pulse: 63 (10/28/18 1115)  Resp: 16 (10/28/18 1115)  BP: (!) 113/57 (10/28/18 1115)  SpO2: 100 % (10/28/18 1115)  O2 Device (Oxygen Therapy): ventilator (10/28/18 1010) Vital Signs (24h Range):  Temp:  [97.5 °F (36.4 °C)-100.5 °F (38.1 °C)] 98.9 °F (37.2 °C)  Pulse:  [62-80] 63  Resp:  [5-27] 16  SpO2:  [93 %-100 %] 100 %  BP: ()/(36-93) 113/57     Weight: 91.2 kg (201 lb) (10/27/18 0627)  Body mass index is  23.23 kg/m².  Body surface area is 2.24 meters squared.    I/O last 3 completed shifts:  In: 1571.2 [I.V.:1032; Other:500; IV Piggyback:39.2]  Out: 4520 [Urine:20; Other:4500]    Physical Exam   Constitutional: He appears well-developed. No distress.   HENT:   Head: Normocephalic and atraumatic.   ETT in place    Neck: Neck supple. No tracheal deviation present. No thyromegaly present.   Cardiovascular: Normal rate, regular rhythm, normal heart sounds and intact distal pulses. Exam reveals no gallop and no friction rub.   No murmur heard.  Pulmonary/Chest: He has no wheezes. He has rales.   Abdominal: Soft. He exhibits no mass. There is no tenderness. There is no rebound and no guarding.   Musculoskeletal: He exhibits no edema.   Lymphadenopathy:     He has no cervical adenopathy.   Skin: Skin is warm. No rash noted. He is not diaphoretic. No erythema.   Nursing note and vitals reviewed.      Significant Labs:    CBC:     Recent Labs   Lab 10/28/18  0407   WBC 12.03   RBC 3.11*   HGB 8.9*   HCT 27.6*      MCV 89   MCH 28.6   MCHC 32.2     CMP:   Recent Labs   Lab 10/27/18  0548 10/28/18  0407    59*   CALCIUM 9.3 8.5*   ALBUMIN 3.9  --    PROT 7.9  --     139   K 4.5 4.3   CO2 30* 29   CL 95 99   BUN 18 21   CREATININE 4.3* 4.4*   ALKPHOS 66  --    ALT 19  --    AST 30  --    BILITOT 0.8  --      Coagulation:   Recent Labs   Lab 10/27/18  1923 10/28/18  0147   INR 1.1  --    APTT 30.5 72.8*     LFTs:   Recent Labs   Lab 10/27/18  0548   ALT 19   AST 30   ALKPHOS 66   BILITOT 0.8   PROT 7.9   ALBUMIN 3.9     All labs within the past 24 hours have been reviewed.     Significant Imaging:  Reviewed     Lab Results   Component Value Date    .7 (H) 08/26/2017    CALCIUM 8.5 (L) 10/28/2018    PHOS 2.3 (L) 10/28/2018       Lab Results   Component Value Date    ALBUMIN 3.9 10/27/2018

## 2018-10-28 NOTE — PROGRESS NOTES
[facility] Hospital Medicine  Progress Note    Patient Name: Conner Perez III  MRN: [id number]  Patient Class: IP- Inpatient   Admission Date: 10/27/2018  Length of Stay: 1 days  Attending Physician: Keith Wesley MD  Primary Care Provider: Raciel Angela MD        Subjective:     Principal Problem:Acute ST elevation myocardial infarction (STEMI) of inferior wall    HPI:  Came to the emergency room overnight from home with shortness of breath and hypertension.  He was initially stabilized with supplemental oxygen and continuous BiPAP but decompensated.  He was increasingly hypercapnic and short of breath, so was intubated in the ER.  Ponit-of-care ultrasound showed decreased LV function.  He is a [employer].  He dialyzed successfully yesterday with 3 L ultrafiltration.  Denied chest pain on admission.  POC is his Son Conner.  He frequently comes to the ER complaining of shortness of breath hypertensive responds to dialysis.    Hospital Course:  Admitted to ICU on mechanical ventilation.  Urgent hemodialysis per Nephrology.  Troponin increased from 0.02 to 3.5 with new wall motion abnormalities and reduction in EF on echo.  Cardiology informed.  Given 325 mg aspirin and Atorvastatin 80 mg and start Heparin infusion ACS protocol.  Troponin peaked at 24.8 and repeat EKG showed and Anterior STEMI.  The Cardiologist took him to the catheterization lab and performed an angiogram which showed diffuse disease without bypass targets.  Recommendation was to optimize medical management.  He returned to the ICU.    Interval History:  Urgently taken for [facility] this morning for STEMI.  Remained stable throughout.  Returned to ICU intubated.  Optimizing medical management.    Review of Systems   Unable to perform ROS: Intubated     Objective:     Vital Signs (Most Recent):  Temp: 98.9 °F (37.2 °C) (10/28/18 0800)  Pulse: 63 (10/28/18 1229)  Resp: 16 (10/28/18 1229)  BP: (!) 106/54 (10/28/18  1134)  SpO2: 100 % (10/28/18 1229) Vital Signs (24h Range):  Temp:  [97.5 °F (36.4 °C)-100.5 °F (38.1 °C)] 98.9 °F (37.2 °C)  Pulse:  [62-80] 63  Resp:  [5-27] 16  SpO2:  [93 %-100 %] 100 %  BP: ()/(36-93) 106/54     Weight: 91.2 kg (201 lb)  Body mass index is 23.23 kg/m².    Intake/Output Summary (Last 24 hours) at 10/28/2018 1342  Last data filed at 10/28/2018 1100  Gross per 24 hour   Intake 1903.77 ml   Output 4517 ml   Net -2613.23 ml      Physical Exam   Constitutional: He is oriented to person, place, and time. He appears well-developed and well-nourished. No distress.   HENT:   Head: Normocephalic and atraumatic.   Mouth/Throat: Oropharynx is clear and moist.   Eyes: Conjunctivae and EOM are normal. Pupils are equal, round, and reactive to light.   Neck: No JVD present. No thyromegaly present.   Cardiovascular: Normal rate, regular rhythm and normal heart sounds. Exam reveals no gallop and no friction rub.   No murmur heard.  Right groin arterial access site dressed clean and intact.   Pulmonary/Chest: Effort normal and breath sounds normal. No respiratory distress. He has no wheezes. He has no rales.   Abdominal: Soft. Bowel sounds are normal. He exhibits no distension. There is no tenderness. There is no rebound and no guarding.   Musculoskeletal: Normal range of motion. He exhibits no edema or tenderness.   RUE AV fistula with palpable thrill.   Lymphadenopathy:     He has no cervical adenopathy.   Neurological: He is alert and oriented to person, place, and time. He has normal reflexes. He displays normal reflexes. No cranial nerve deficit.   Skin: Skin is warm and dry. No rash noted. He is not diaphoretic. No erythema.   Psychiatric: He has a normal mood and affect. His behavior is normal. Judgment and thought content normal.       Significant Labs: All pertinent labs within the past 24 hours have been reviewed.    Significant Imaging: I have reviewed all pertinent imaging results/findings within  the past 24 hours.    Assessment/Plan:      * Acute ST elevation myocardial infarction (STEMI) of inferior wall    Troponin increased from 0.02 to 3.5 with new wall motion abnormalities and reduction in EF on echo.  Cardiology informed.  Given 325 mg aspirin and Atorvastatin 80 mg and start Heparin infusion ACS protocol.  Troponin increased to 24.8 and follow up EKG showed Anterior STEMI.  Urgent LHC revealed diffuse disease without appropriate bypass targets.  Optimizing medical management.     Acute hypercapnic respiratory failure on mechanical ventilation    Probably due to pulmonary congestion from volume overload.  Intubated in the ED for hypercapnia and worsening respiratory distress.  Urgent hemodialysis.     ESRD (end stage renal disease) on dialysis    Nephrology following and plan for urgent HD with ultrafiltration.  He reportedly had 3 L removed yesterday and has been compliant with HD but may still be volume overloaded.     Chronic diastolic heart failure    Repeat cardiac echo.  There may be a cardiac component.     Anemia of renal disease    Follow blood counts and transfuse if needed.     Coronary artery disease of native artery of native heart with stable angina pectoris    Control blood pressure.  Trend cardiac enzymes.       VTE Risk Mitigation (From admission, onward)        Ordered     heparin 25,000 units in dextrose 5% 250 mL (100 units/mL) infusion LOW INTENSITY nomogram - [facility]  Continuous      10/28/18 0806     heparin 25,000 units in dextrose 5% (100 units/ml) IV bolus from bag - ADDITIONAL PRN BOLUS - 60 units/kg (max bolus 4000 units)  As needed (PRN)      10/27/18 1915     heparin 25,000 units in dextrose 5% (100 units/ml) IV bolus from bag - ADDITIONAL PRN BOLUS - 30 units/kg (max bolus 4000 units)  As needed (PRN)      10/27/18 1915     IP VTE HIGH RISK PATIENT  Once      10/27/18 0805     heparin (porcine) injection 3,000 Units  Once      10/27/18 0659     heparin (porcine) injection  2,000 Units  Once      10/27/18 0659          Critical care time spent on the evaluation and treatment of severe organ dysfunction, review of pertinent labs and imaging studies, discussions with consulting providers and discussions with patient/family: 30 minutes.    Keith Wesley MD  Department of Hospital Medicine   [facility]

## 2018-10-28 NOTE — ASSESSMENT & PLAN NOTE
1. ESRD on HD : (  MWF , [facility] ) ,  emergent dialysis yesterday  for fluid removal, plan HD tomorrow,     Pt non adherent with fluid restriction per family,      2.  HTN : BP better after adjusting meds, infact on low side, will reduce hydralazine dose,       3. Anemia of CKD :  follow hemoglobin, Procrit with dialysis,     4. SHPT : renal diet when stable, will replete Phos,      5. CAD / acute MI - s/p C, optimize medical management per Cards

## 2018-10-28 NOTE — PROGRESS NOTES
[facility] -   Critical Care Medicine  Progress Note    Patient Name: Conner Perez III  MRN: [id number]  Admission Date: 10/27/2018  Hospital Length of Stay: 1 days  Code Status: Full Code  Attending Provider: Keith Wesley MD  Primary Care Provider: Raciel Angela MD   Principal Problem: Acute ST elevation myocardial infarction (STEMI) of inferior wall    Subjective: sedated on ventilator     HPI:  68 year old male with ESRD and multiple admissions for volume overload/HTN requiring additional HD; HTN, GERD, CAD, COPD   Presented to ED 10/27 with SOB  Evaluation revealed evidence of fluid overload with hypertensive urgency and hypoxia; decline despite trial of NIPPV required intubation for mechanical ventilation    Hospital/ICU Course:  Admitted to ICU with OETT on mechanical ventilation with propofol sedation  10/28 Stable overnight, heart cath revealed multivessel disease    Interval History:heart cath    Objective:     Vital Signs (Most Recent):  Temp: 98.9 °F (37.2 °C) (10/28/18 0800)  Pulse: 63 (10/28/18 1229)  Resp: 16 (10/28/18 1229)  BP: (!) 106/54 (10/28/18 1134)  SpO2: 100 % (10/28/18 1229) Vital Signs (24h Range):  Temp:  [97.5 °F (36.4 °C)-100.5 °F (38.1 °C)] 98.9 °F (37.2 °C)  Pulse:  [62-80] 63  Resp:  [5-27] 16  SpO2:  [93 %-100 %] 100 %  BP: ()/(36-93) 106/54     Weight: 91.2 kg (201 lb)  Body mass index is 23.23 kg/m².      Intake/Output Summary (Last 24 hours) at 10/28/2018 1343  Last data filed at 10/28/2018 1100  Gross per 24 hour   Intake 1903.77 ml   Output 4517 ml   Net -2613.23 ml       Physical Exam   Constitutional: He is oriented to person, place, and time. He appears well-developed and well-nourished. He appears distressed.   HENT:   Head: Normocephalic and atraumatic.   Eyes: Conjunctivae are normal. Pupils are equal, round, and reactive to light.   Neck: Neck supple. No JVD present. No tracheal deviation present. No thyromegaly present.   Cardiovascular: Normal  rate, regular rhythm and normal heart sounds.   Pulmonary/Chest: Effort normal. He has rales.   Abdominal: Soft.   Musculoskeletal: Normal range of motion.   Lymphadenopathy:     He has no cervical adenopathy.   Neurological: He is alert and oriented to person, place, and time.   Skin: Skin is warm and dry.   Nursing note and vitals reviewed.      Vents:  Vent Mode: A/C (10/28/18 1229)  Ventilator Initiated: Yes (10/27/18 0800)  Set Rate: 16 bmp (10/28/18 1229)  Vt Set: 450 mL (10/28/18 1229)  Pressure Support: 0 cmH20 (10/28/18 1229)  PEEP/CPAP: 5 cmH20 (10/28/18 1229)  Oxygen Concentration (%): 40 (10/28/18 1229)  Peak Airway Pressure: 22 cmH2O (10/28/18 1229)  Plateau Pressure: 0 cmH20 (10/28/18 1229)  Total Ve: 7.37 mL (10/28/18 1229)  F/VT Ratio<105 (RSBI): (!) 34.63 (10/28/18 1229)    Lines/Drains/Airways     Drain                 Hemodialysis AV Fistula Right forearm -- days         Hemodialysis AV Fistula 12/18/17 0017 Right forearm 314 days         NG/OG Tube 10/27/18 0807 nasogastric 16 Fr. Left nostril 1 day         Urethral Catheter 10/27/18 0825 16 Fr. 1 day          Airway                 Airway - Non-Surgical 10/27/18 0745 Endotracheal Tube 1 day          Peripheral Intravenous Line                 Peripheral IV - Single Lumen 10/27/18 0640 Left Antecubital 1 day         Peripheral IV - Single Lumen 10/27/18 0930 Left Forearm 1 day                Significant Labs:    CBC/Anemia Profile:  Recent Labs   Lab 10/27/18  0548 10/27/18  1923 10/28/18  0407   WBC 5.19 9.60 12.03   HGB 9.7* 9.4* 8.9*   HCT 29.8* 29.8* 27.6*    131* 152   MCV 88 89 89   RDW 15.8* 15.8* 16.0*        Chemistries:  Recent Labs   Lab 10/27/18  0548 10/28/18  0407    139   K 4.5 4.3   CL 95 99   CO2 30* 29   BUN 18 21   CREATININE 4.3* 4.4*   CALCIUM 9.3 8.5*   ALBUMIN 3.9  --    PROT 7.9  --    BILITOT 0.8  --    ALKPHOS 66  --    ALT 19  --    AST 30  --    MG  --  1.7   PHOS  --  2.3*       BMP:   Recent Labs   Lab  10/28/18  0407   GLU 59*      K 4.3   CL 99   CO2 29   BUN 21   CREATININE 4.4*   CALCIUM 8.5*   MG 1.7     CMP:   Recent Labs   Lab 10/27/18  0548 10/28/18  0407    139   K 4.5 4.3   CL 95 99   CO2 30* 29    59*   BUN 18 21   CREATININE 4.3* 4.4*   CALCIUM 9.3 8.5*   PROT 7.9  --    ALBUMIN 3.9  --    BILITOT 0.8  --    ALKPHOS 66  --    AST 30  --    ALT 19  --    ANIONGAP 14 11   EGFRNONAA 13* 13*     Cardiac Markers: No results for input(s): CKMB, TROPONINT, MYOGLOBIN in the last 48 hours.  All pertinent labs within the past 24 hours have been reviewed.    Significant Imaging:  CXR: I have reviewed all pertinent results/findings within the past 24 hours and my personal findings are:  bilateral effusions persist    Assessment/Plan:     Pulmonary   Acute pulmonary edema    HD for volume removal  10/28 needs further volume removal before extubation     Acute hypercapnic respiratory failure on mechanical ventilation    S/t pulmonary edema; intravascular volume overload with suspected acute on chronic right heart failure  Vent settings reviewed and adjusted for optimal gas exchange  VAP prophylaxis  SAT/SBT post HD  10/28 Stable but needs more fluid pulled off. Possible extubation after dialysis tomorrow     Cardiac/Vascular   Acute on chronic diastolic congestive heart failure    Planned 4L volume removal with HD  Trend troponin and check echo post HD     Hypertension    Uncontrolled likely exacerbated by volume overload  ICU hemodynamic monitoring  Continue clonidine patch; hydralazine, isordil per NG     Renal/   ESRD (end stage renal disease) on dialysis    Emergent HD for large volume removal  Nephrology following        Critical Care Daily Checklist:    A: Awake: RASS Goal/Actual Goal: RASS Goal: -2-->light sedation  Actual: Dowell Agitation Sedation Scale (RASS): Light sedation   B: Spontaneous Breathing Trial Performed?     C: SAT & SBT Coordinated?  na                      D: Delirium:  CAM-ICU Overall CAM-ICU: Positive   E: Early Mobility Performed? No   F: Feeding Goal:    Status:     Current Diet Order   Procedures    Diet NPO      AS: Analgesia/Sedation adequate   T: Thromboembolic Prophylaxis yes   H: HOB > 300 Yes   U: Stress Ulcer Prophylaxis (if needed) y   G: Glucose Control adequate   B: Bowel Function Stool Occurrence: 1   I: Indwelling Catheter (Lines & Rai) Necessity needed   D: De-escalation of Antimicrobials/Pharmacotherapies na    Plan for the day/ETD Support  Wean after dialysis tomorrow    Code Status:  Family/Goals of Care: Full Code  discussed     Critical Care Time: 40 minutes  Critical secondary to Patient has a condition that poses threat to life and bodily function: Severe Respiratory Distress and Acute Renal Failure      Critical care was time spent personally by me on the following activities: development of treatment plan with patient or surrogate and bedside caregivers, discussions with consultants, evaluation of patient's response to treatment, examination of patient, ordering and performing treatments and interventions, ordering and review of laboratory studies, ordering and review of radiographic studies, pulse oximetry, re-evaluation of patient's condition. This critical care time did not overlap with that of any other provider or involve time for any procedures.     Humza Casper MD  Critical Care Medicine  [facility] -

## 2018-10-28 NOTE — PROGRESS NOTES
Routine EKG shows Acute MI/STEMI.  Notified MD Fairbanks.  He reviewed EKG and does state patient is having an acute MI.  CODE STEMI Activated.  Groin prepped.  Heparin drip infusing.  Consent received from daughter ms Corey carty to take patient to cath lab.  Advised her to come to hospital MD Fairbanks will speak with her after the procedure

## 2018-10-28 NOTE — SUBJECTIVE & OBJECTIVE
Interval History:heart cath    Objective:     Vital Signs (Most Recent):  Temp: 98.9 °F (37.2 °C) (10/28/18 0800)  Pulse: 63 (10/28/18 1229)  Resp: 16 (10/28/18 1229)  BP: (!) 106/54 (10/28/18 1134)  SpO2: 100 % (10/28/18 1229) Vital Signs (24h Range):  Temp:  [97.5 °F (36.4 °C)-100.5 °F (38.1 °C)] 98.9 °F (37.2 °C)  Pulse:  [62-80] 63  Resp:  [5-27] 16  SpO2:  [93 %-100 %] 100 %  BP: ()/(36-93) 106/54     Weight: 91.2 kg (201 lb)  Body mass index is 23.23 kg/m².      Intake/Output Summary (Last 24 hours) at 10/28/2018 1343  Last data filed at 10/28/2018 1100  Gross per 24 hour   Intake 1903.77 ml   Output 4517 ml   Net -2613.23 ml       Physical Exam   Constitutional: He is oriented to person, place, and time. He appears well-developed and well-nourished. He appears distressed.   HENT:   Head: Normocephalic and atraumatic.   Eyes: Conjunctivae are normal. Pupils are equal, round, and reactive to light.   Neck: Neck supple. No JVD present. No tracheal deviation present. No thyromegaly present.   Cardiovascular: Normal rate, regular rhythm and normal heart sounds.   Pulmonary/Chest: Effort normal. He has rales.   Abdominal: Soft.   Musculoskeletal: Normal range of motion.   Lymphadenopathy:     He has no cervical adenopathy.   Neurological: He is alert and oriented to person, place, and time.   Skin: Skin is warm and dry.   Nursing note and vitals reviewed.      Vents:  Vent Mode: A/C (10/28/18 1229)  Ventilator Initiated: Yes (10/27/18 0800)  Set Rate: 16 bmp (10/28/18 1229)  Vt Set: 450 mL (10/28/18 1229)  Pressure Support: 0 cmH20 (10/28/18 1229)  PEEP/CPAP: 5 cmH20 (10/28/18 1229)  Oxygen Concentration (%): 40 (10/28/18 1229)  Peak Airway Pressure: 22 cmH2O (10/28/18 1229)  Plateau Pressure: 0 cmH20 (10/28/18 1229)  Total Ve: 7.37 mL (10/28/18 1229)  F/VT Ratio<105 (RSBI): (!) 34.63 (10/28/18 1229)    Lines/Drains/Airways     Drain                 Hemodialysis AV Fistula Right forearm -- days          Hemodialysis AV Fistula 12/18/17 0017 Right forearm 314 days         NG/OG Tube 10/27/18 0807 nasogastric 16 Fr. Left nostril 1 day         Urethral Catheter 10/27/18 0825 16 Fr. 1 day          Airway                 Airway - Non-Surgical 10/27/18 0745 Endotracheal Tube 1 day          Peripheral Intravenous Line                 Peripheral IV - Single Lumen 10/27/18 0640 Left Antecubital 1 day         Peripheral IV - Single Lumen 10/27/18 0930 Left Forearm 1 day                Significant Labs:    CBC/Anemia Profile:  Recent Labs   Lab 10/27/18  0548 10/27/18  1923 10/28/18  0407   WBC 5.19 9.60 12.03   HGB 9.7* 9.4* 8.9*   HCT 29.8* 29.8* 27.6*    131* 152   MCV 88 89 89   RDW 15.8* 15.8* 16.0*        Chemistries:  Recent Labs   Lab 10/27/18  0548 10/28/18  0407    139   K 4.5 4.3   CL 95 99   CO2 30* 29   BUN 18 21   CREATININE 4.3* 4.4*   CALCIUM 9.3 8.5*   ALBUMIN 3.9  --    PROT 7.9  --    BILITOT 0.8  --    ALKPHOS 66  --    ALT 19  --    AST 30  --    MG  --  1.7   PHOS  --  2.3*       BMP:   Recent Labs   Lab 10/28/18  0407   GLU 59*      K 4.3   CL 99   CO2 29   BUN 21   CREATININE 4.4*   CALCIUM 8.5*   MG 1.7     CMP:   Recent Labs   Lab 10/27/18  0548 10/28/18  0407    139   K 4.5 4.3   CL 95 99   CO2 30* 29    59*   BUN 18 21   CREATININE 4.3* 4.4*   CALCIUM 9.3 8.5*   PROT 7.9  --    ALBUMIN 3.9  --    BILITOT 0.8  --    ALKPHOS 66  --    AST 30  --    ALT 19  --    ANIONGAP 14 11   EGFRNONAA 13* 13*     Cardiac Markers: No results for input(s): CKMB, TROPONINT, MYOGLOBIN in the last 48 hours.  All pertinent labs within the past 24 hours have been reviewed.    Significant Imaging:  CXR: I have reviewed all pertinent results/findings within the past 24 hours and my personal findings are:  bilateral effusions persist

## 2018-10-28 NOTE — ASSESSMENT & PLAN NOTE
Troponin increased from 0.02 to 3.5 with new wall motion abnormalities and reduction in EF on echo.  Cardiology informed.  Given 325 mg aspirin and Atorvastatin 80 mg and start Heparin infusion ACS protocol.

## 2018-10-28 NOTE — PLAN OF CARE
Problem: Patient Care Overview  Goal: Plan of Care Review  Outcome: Ongoing (interventions implemented as appropriate)  Vitals signs closely monitored. Fall precautions in place. Patient turned every two hours. Heels floated. Sizewise bed ordered. Pain assessed every two hours. Safety promoted. Infection risks reduced. HD today. 4 L removed. Restraints for safety. Fentanyl and propofol for sedation. Troponin increased. Otherwise stable. MD Jasmine aware. Will consult cardiology in am.

## 2018-10-28 NOTE — OP NOTE
[facility] -   Cardiac Catheterization  Procedure Note    SUMMARY     Conner Perez III  [id number]  Raciel Angela MD    Date of Procedure: 10/28/2018    Procedure:  1.  LHC  2. LEFT VENTRICULOGRAM  3. CORONARY ANGIOGRAM    Provider: Damian Fairbanks MD    Assisting Provider:  Maury Orellana    Indications: He was referred for cardiac catheterization to further evaluate inferior STEMI.    Pre-Procedure Diagnosis:  Acute inferior STEMI    Post-Procedure Diagnosis:  Same + diffuse CAD    Anesthesia: RN IV Sedation    Description of the Findings of the Procedure:     The risks, benefits, complications, treatment options, and expected outcomes were discussed with the patient. The patient and/or family concurred with the proposed plan, giving informed consent. Patient was brought to the cath lab after IV hydration was begun and oral premedication was given.     Findings:  Diffuse diabetic CAD.  Not amenable to optimal PCI and no targets for CABG.  Diffuse LAD disease with distal occlusion (small, severe LAD disease.  Medical mgt advised).  EF 40%  Manual pressure R CFA  See report    Complications: None; patient tolerated the procedure well.    Estimated Blood Loss (EBL): Minimal           Implants: None    Specimens: none    Condition: stable    Disposition: PACU - hemodynamically stable.    Attestation: I was present and scrubbed for the entire procedure.     Recommendations:    Optimize medical tx for CAD/ICM.  Add plavix  IV heparin gtt for 24 - 48 hours

## 2018-10-28 NOTE — PLAN OF CARE
Problem: Patient Care Overview  Goal: Plan of Care Review  Outcome: Ongoing (interventions implemented as appropriate)  Vitals signs closely monitored. Fall precautions in place. Patient turned every two hours. Pain assessed every two hours. Safety promoted. Infection risks reduced. Pt back from heart cath at 0800. R groin site c/d/i. Off bedrest at 1400. Heparin restarted at 1500 per md granados. Ptt ordered for 2100. Blood sugars runing low. Nova source renal started. Goal 40 ml/hr. At 30 ml/hr tolerating well. Personal items sent home with sister. Plan to do HD tomorrow.  Family updated.

## 2018-10-28 NOTE — EICU
Notified of hypotension    BP now improved to 105/48 without intervention  May need to hold BP meds

## 2018-10-28 NOTE — ASSESSMENT & PLAN NOTE
Troponin increased from 0.02 to 3.5 with new wall motion abnormalities and reduction in EF on echo.  Cardiology informed.  Given 325 mg aspirin and Atorvastatin 80 mg and start Heparin infusion ACS protocol.  Troponin increased to 24.8 and follow up EKG showed Anterior STEMI.  Urgent LHC revealed diffuse disease without appropriate bypass targets.  Optimizing medical management.

## 2018-10-28 NOTE — SUBJECTIVE & OBJECTIVE
Interval History:  Urgently taken for C this morning for STEMI.  Remained stable throughout.  Returned to ICU intubated.  Optimizing medical management.    Review of Systems   Unable to perform ROS: Intubated     Objective:     Vital Signs (Most Recent):  Temp: 98.9 °F (37.2 °C) (10/28/18 0800)  Pulse: 63 (10/28/18 1229)  Resp: 16 (10/28/18 1229)  BP: (!) 106/54 (10/28/18 1134)  SpO2: 100 % (10/28/18 1229) Vital Signs (24h Range):  Temp:  [97.5 °F (36.4 °C)-100.5 °F (38.1 °C)] 98.9 °F (37.2 °C)  Pulse:  [62-80] 63  Resp:  [5-27] 16  SpO2:  [93 %-100 %] 100 %  BP: ()/(36-93) 106/54     Weight: 91.2 kg (201 lb)  Body mass index is 23.23 kg/m².    Intake/Output Summary (Last 24 hours) at 10/28/2018 1342  Last data filed at 10/28/2018 1100  Gross per 24 hour   Intake 1903.77 ml   Output 4517 ml   Net -2613.23 ml      Physical Exam   Constitutional: He is oriented to person, place, and time. He appears well-developed and well-nourished. No distress.   HENT:   Head: Normocephalic and atraumatic.   Mouth/Throat: Oropharynx is clear and moist.   Eyes: Conjunctivae and EOM are normal. Pupils are equal, round, and reactive to light.   Neck: No JVD present. No thyromegaly present.   Cardiovascular: Normal rate, regular rhythm and normal heart sounds. Exam reveals no gallop and no friction rub.   No murmur heard.  Right groin arterial access site dressed clean and intact.   Pulmonary/Chest: Effort normal and breath sounds normal. No respiratory distress. He has no wheezes. He has no rales.   Abdominal: Soft. Bowel sounds are normal. He exhibits no distension. There is no tenderness. There is no rebound and no guarding.   Musculoskeletal: Normal range of motion. He exhibits no edema or tenderness.   RUE AV fistula with palpable thrill.   Lymphadenopathy:     He has no cervical adenopathy.   Neurological: He is alert and oriented to person, place, and time. He has normal reflexes. He displays normal reflexes. No cranial  nerve deficit.   Skin: Skin is warm and dry. No rash noted. He is not diaphoretic. No erythema.   Psychiatric: He has a normal mood and affect. His behavior is normal. Judgment and thought content normal.       Significant Labs: All pertinent labs within the past 24 hours have been reviewed.    Significant Imaging: I have reviewed all pertinent imaging results/findings within the past 24 hours.

## 2018-10-28 NOTE — INTERVAL H&P NOTE
The patient has been examined and the H&P has been reviewed:    I concur with the findings and no changes have occurred since H&P was written.    Anesthesia/Surgery risks, benefits and alternative options discussed and understood by patient/family.          Active Hospital Problems    Diagnosis  POA    *NSTEMI (non-ST elevated myocardial infarction) [I21.4]  Yes    Acute ST elevation myocardial infarction (STEMI) of inferior wall [I21.19]  No    Abnormal ECG [R94.31]  Yes    Acute hypercapnic respiratory failure on mechanical ventilation [J96.02]  Yes    Acute on chronic diastolic congestive heart failure [I50.33]  Yes    On mechanically assisted ventilation [Z99.11]  Not Applicable    Acute pulmonary edema [J81.0]  Yes    Coronary artery disease of native artery of native heart with stable angina pectoris [I25.118]  Yes    ESRD (end stage renal disease) on dialysis [N18.6, Z99.2]  Not Applicable     Chronic    Hypertension [I10]  Yes    Chronic diastolic heart failure [I50.32]  Yes    Anemia of renal disease [D63.1]  Yes     Chronic      Resolved Hospital Problems   No resolved problems to display.

## 2018-10-28 NOTE — EICU
Bedside nurse called to report a glucose of  68.  Would like md to order a prn d50 order. Informed Dr. Phoenix.

## 2018-10-28 NOTE — HOSPITAL COURSE
Admitted to ICU on mechanical ventilation.  Emergent hemodialysis per Nephrology.  Troponin increased from 0.02 to 3.5 with new wall motion abnormalities and reduction in EF on echo.  Cardiology informed.  Given 325 mg aspirin and Atorvastatin 80 mg and start Heparin infusion ACS protocol.  Troponin peaked at 24.8 and repeat EKG showed and Anterior STEMI.  The Cardiologist took him to the catheterization lab and performed an angiogram which showed diffuse disease without bypass targets.  Recommendation was to optimize medical management.  He returned to the ICU.  Successfully extubated 30 October.  Remained hemodynamically stable.  Dialysis MWF. Nephro on board and followed during hospitalizations. Seen and examined. Accepted to [facility]. Deemed suitable to be transferred.

## 2018-10-28 NOTE — PLAN OF CARE
Problem: Patient Care Overview  Goal: Plan of Care Review  Outcome: Ongoing (interventions implemented as appropriate)  Patient sedated on Propofol and Fentanyl.  Localizes pain.  Will not follow commands.  Remains in restraints. Comfortable on Vent.  Remains NSR on monitor.  Troponin elevated on shift. MD Wseley contacted MD Fairbanks. Heparin drip initiated and maintained. Bleeding precautions in place.   MD Fairbanks notified of 0000 troponin.   Patient NPO.  NG to L nare.  No s/s of abdominal discomfort, nausea, vomiting, or diarrhea.  No UOP on shift.  Patient transferred to specialty bed.  Repositioning Q2H to prevent skin breakdown.  Family aware of POC.

## 2018-10-29 LAB
ALLENS TEST: ABNORMAL
ANION GAP SERPL CALC-SCNC: 9 MMOL/L
APTT BLDCRRT: 55.5 SEC
BASOPHILS # BLD AUTO: 0.01 K/UL
BASOPHILS NFR BLD: 0.1 %
BUN SERPL-MCNC: 41 MG/DL
CALCIUM SERPL-MCNC: 8.2 MG/DL
CHLORIDE SERPL-SCNC: 96 MMOL/L
CO2 SERPL-SCNC: 28 MMOL/L
CREAT SERPL-MCNC: 6.2 MG/DL
DELSYS: ABNORMAL
DIFFERENTIAL METHOD: ABNORMAL
EOSINOPHIL # BLD AUTO: 0.3 K/UL
EOSINOPHIL NFR BLD: 3.4 %
ERYTHROCYTE [DISTWIDTH] IN BLOOD BY AUTOMATED COUNT: 16.3 %
ERYTHROCYTE [SEDIMENTATION RATE] IN BLOOD BY WESTERGREN METHOD: 16 MM/H
EST. GFR  (AFRICAN AMERICAN): 10 ML/MIN/1.73 M^2
EST. GFR  (NON AFRICAN AMERICAN): 8 ML/MIN/1.73 M^2
FIO2: 40
GLUCOSE SERPL-MCNC: 98 MG/DL
HCO3 UR-SCNC: 29.6 MMOL/L (ref 24–28)
HCT VFR BLD AUTO: 26.6 %
HGB BLD-MCNC: 8.6 G/DL
LYMPHOCYTES # BLD AUTO: 0.6 K/UL
LYMPHOCYTES NFR BLD: 7.6 %
MAGNESIUM SERPL-MCNC: 1.9 MG/DL
MCH RBC QN AUTO: 28.3 PG
MCHC RBC AUTO-ENTMCNC: 32.3 G/DL
MCV RBC AUTO: 88 FL
MODE: ABNORMAL
MONOCYTES # BLD AUTO: 0.4 K/UL
MONOCYTES NFR BLD: 5.4 %
NEUTROPHILS # BLD AUTO: 6.8 K/UL
NEUTROPHILS NFR BLD: 83.5 %
PCO2 BLDA: 47.7 MMHG (ref 35–45)
PEEP: 5
PH SMN: 7.4 [PH] (ref 7.35–7.45)
PHOSPHATE SERPL-MCNC: 4.6 MG/DL
PLATELET # BLD AUTO: 130 K/UL
PMV BLD AUTO: 9.9 FL
PO2 BLDA: 119 MMHG (ref 80–100)
POC BE: 5 MMOL/L
POC SATURATED O2: 99 % (ref 95–100)
POCT GLUCOSE: 105 MG/DL (ref 70–110)
POCT GLUCOSE: 110 MG/DL (ref 70–110)
POCT GLUCOSE: 116 MG/DL (ref 70–110)
POCT GLUCOSE: 125 MG/DL (ref 70–110)
POCT GLUCOSE: 131 MG/DL (ref 70–110)
POTASSIUM SERPL-SCNC: 4.6 MMOL/L
RBC # BLD AUTO: 3.04 M/UL
SAMPLE: ABNORMAL
SITE: ABNORMAL
SODIUM SERPL-SCNC: 133 MMOL/L
TROPONIN I SERPL DL<=0.01 NG/ML-MCNC: 40.9 NG/ML
VT: 450
WBC # BLD AUTO: 8.16 K/UL

## 2018-10-29 PROCEDURE — 84100 ASSAY OF PHOSPHORUS: CPT

## 2018-10-29 PROCEDURE — 82803 BLOOD GASES ANY COMBINATION: CPT

## 2018-10-29 PROCEDURE — 99900035 HC TECH TIME PER 15 MIN (STAT)

## 2018-10-29 PROCEDURE — 20000000 HC ICU ROOM

## 2018-10-29 PROCEDURE — 83735 ASSAY OF MAGNESIUM: CPT

## 2018-10-29 PROCEDURE — 25000003 PHARM REV CODE 250: Performed by: NURSE PRACTITIONER

## 2018-10-29 PROCEDURE — 80048 BASIC METABOLIC PNL TOTAL CA: CPT

## 2018-10-29 PROCEDURE — 63600175 PHARM REV CODE 636 W HCPCS: Performed by: INTERNAL MEDICINE

## 2018-10-29 PROCEDURE — 25000003 PHARM REV CODE 250: Performed by: INTERNAL MEDICINE

## 2018-10-29 PROCEDURE — 94003 VENT MGMT INPAT SUBQ DAY: CPT

## 2018-10-29 PROCEDURE — C9113 INJ PANTOPRAZOLE SODIUM, VIA: HCPCS | Performed by: NURSE PRACTITIONER

## 2018-10-29 PROCEDURE — 36600 WITHDRAWAL OF ARTERIAL BLOOD: CPT

## 2018-10-29 PROCEDURE — 97802 MEDICAL NUTRITION INDIV IN: CPT

## 2018-10-29 PROCEDURE — 87340 HEPATITIS B SURFACE AG IA: CPT

## 2018-10-29 PROCEDURE — 63600175 PHARM REV CODE 636 W HCPCS: Performed by: NURSE PRACTITIONER

## 2018-10-29 PROCEDURE — 84484 ASSAY OF TROPONIN QUANT: CPT

## 2018-10-29 PROCEDURE — 86704 HEP B CORE ANTIBODY TOTAL: CPT

## 2018-10-29 PROCEDURE — 99900026 HC AIRWAY MAINTENANCE (STAT)

## 2018-10-29 PROCEDURE — 86706 HEP B SURFACE ANTIBODY: CPT

## 2018-10-29 PROCEDURE — 36415 COLL VENOUS BLD VENIPUNCTURE: CPT

## 2018-10-29 PROCEDURE — 99291 CRITICAL CARE FIRST HOUR: CPT | Mod: ,,, | Performed by: INTERNAL MEDICINE

## 2018-10-29 PROCEDURE — 85730 THROMBOPLASTIN TIME PARTIAL: CPT

## 2018-10-29 PROCEDURE — 85025 COMPLETE CBC W/AUTO DIFF WBC: CPT

## 2018-10-29 PROCEDURE — 99232 SBSQ HOSP IP/OBS MODERATE 35: CPT | Mod: ,,, | Performed by: INTERNAL MEDICINE

## 2018-10-29 PROCEDURE — 80100016 HC MAINTENANCE HEMODIALYSIS

## 2018-10-29 PROCEDURE — 99291 CRITICAL CARE FIRST HOUR: CPT | Mod: ,,, | Performed by: NURSE PRACTITIONER

## 2018-10-29 RX ADMIN — PROPOFOL 25 MCG/KG/MIN: 10 INJECTION, EMULSION INTRAVENOUS at 11:10

## 2018-10-29 RX ADMIN — CHLORHEXIDINE GLUCONATE 15 ML: 1.2 RINSE ORAL at 08:10

## 2018-10-29 RX ADMIN — HYDRALAZINE HYDROCHLORIDE 50 MG: 50 TABLET, FILM COATED ORAL at 03:10

## 2018-10-29 RX ADMIN — Medication 50 MCG/HR: at 10:10

## 2018-10-29 RX ADMIN — HEPARIN SODIUM AND DEXTROSE 12 UNITS/KG/HR: 10000; 5 INJECTION INTRAVENOUS at 06:10

## 2018-10-29 RX ADMIN — ISOSORBIDE DINITRATE 40 MG: 20 TABLET ORAL at 08:10

## 2018-10-29 RX ADMIN — HYDRALAZINE HYDROCHLORIDE 50 MG: 50 TABLET, FILM COATED ORAL at 05:10

## 2018-10-29 RX ADMIN — PANTOPRAZOLE SODIUM 40 MG: 40 INJECTION, POWDER, FOR SOLUTION INTRAVENOUS at 08:10

## 2018-10-29 RX ADMIN — PROPOFOL 25 MCG/KG/MIN: 10 INJECTION, EMULSION INTRAVENOUS at 05:10

## 2018-10-29 RX ADMIN — PROPOFOL 30 MCG/KG/MIN: 10 INJECTION, EMULSION INTRAVENOUS at 04:10

## 2018-10-29 RX ADMIN — HYDRALAZINE HYDROCHLORIDE 50 MG: 50 TABLET, FILM COATED ORAL at 10:10

## 2018-10-29 RX ADMIN — ISOSORBIDE DINITRATE 40 MG: 20 TABLET ORAL at 04:10

## 2018-10-29 RX ADMIN — ASPIRIN 81 MG: 81 TABLET, COATED ORAL at 08:10

## 2018-10-29 RX ADMIN — PROPOFOL 25 MCG/KG/MIN: 10 INJECTION, EMULSION INTRAVENOUS at 12:10

## 2018-10-29 RX ADMIN — CLOPIDOGREL BISULFATE 75 MG: 75 TABLET ORAL at 08:10

## 2018-10-29 RX ADMIN — ATORVASTATIN CALCIUM 80 MG: 40 TABLET, FILM COATED ORAL at 08:10

## 2018-10-29 NOTE — PLAN OF CARE
Patient is presently intubated and no family at the bedside. CM to f/u with safe transition     10/29/18 4915   Discharge Reassessment   Assessment Type Discharge Planning Reassessment   Provided patient/caregiver education on the expected discharge date and the discharge plan No   Do you have any problems affording any of your prescribed medications? No   Discharge Plan A Skilled Nursing Facility   Discharge Plan B Home with family;Home Health   Patient choice form signed by patient/caregiver N/A   Can the patient answer the patient profile reliably? No historian available;No, cognitively impaired   Describe the patient's ability to walk at the present time. Major restrictions/daily assistance from another person   Number of comorbid conditions (as recorded on the chart) Two

## 2018-10-29 NOTE — HOSPITAL COURSE
10/29/18-Patient seen and examined in room, intubated and sedated. IV heparin gtt in place. Pending dialysis treatment today. Labs reviewed, stable.     10/30/18-Patient seen and examined in room, intubated. On SBT today to possible extubation. IV heparin gtt in place. Labs reviewed, stable. BP remains elevated, will adjust BP meds as tolerated.     10/31/18-Patient seen and examined in room, extubated and awake and alert. HD in progress. BP remains elevated. Labs reviewed, stable.     11/1/18-Patient seen and examined in room, denies chest pain or shortness of breath today. Labs reviewed, stable. Would benefit from PT/OT evaluation.     11/2/18-Patient seen and examined during dialysis this AM. Denies chest pain or shortness of breath. Labs reviewed, K+ 4.2, Na 138.    11/3/18-Patient seen and examined in room, lying in bed. Denies any new complaints today. Labs reviewed, stable. Awaiting SNF bed.     11/4/18-Patient seen and examined in room, lying in bed. Denies any new complaints. Pending SNF placement.     11/6/18-Patient seen and examined today, sitting up in bed. No chest pain or SOB. No acute events overnight. Awaiting SNF placement.

## 2018-10-29 NOTE — PROGRESS NOTES
[facility] -   Nephrology  Progress Note    Patient Name: Conner Perez III  MRN: [id number]  Admission Date: 10/27/2018  Hospital Length of Stay: 2 days  Attending Provider: Keith Wesley MD   Primary Care Physician: Raciel Angela MD  Principal Problem:Acute ST elevation myocardial infarction (STEMI) of inferior wall    Subjective:     HPI: Conner Perez III  is a 68 y old AA man with h/o ESRD on HD ( [facility], [facility] ) presented to the ER this morning  for SOB, bilateral pulmonary edema noted on the chest x-ray, patient had his dialysis treatment yesterday, patient has been having recurrent episodes of shortness of breath and similar presentations to the emergency room in the last few months, he also has been losing weight, patient had to be emergently intubated in the emergency room and was transferred to ICU, we were consulted for emergent dialysis for volume removal,       Interval History:    10/28/18 - Pt s/p C for acute MI . Severe CAD , Cards suggest medical management,       10/29/18 - seen on HD, tolerating well, troponin continues to get worse     Review of patient's allergies indicates:   Allergen Reactions    Augmentin [amoxicillin-pot clavulanate] Edema    Sulfa (sulfonamide antibiotics)      swelling    Lisinopril      cough     Current Facility-Administered Medications   Medication Frequency    albuterol nebulizer solution 2.5 mg Q6H PRN    aspirin EC tablet 81 mg Daily    atorvastatin tablet 80 mg Daily    chlorhexidine 0.12 % solution 15 mL BID    cloNIDine 0.3 mg/24 hr td ptwk 1 patch Q7 Days    clopidogrel tablet 75 mg Daily    dextrose 50% injection 25 g Q6H PRN    fentaNYL 2500 mcg in D5W 250 mL infusion premix (titrating) (conc: 10 mcg/mL) Continuous    heparin (porcine) injection 2,000 Units Once    heparin (porcine) injection 3,000 Units Once    heparin 25,000 units in dextrose 5% (100 units/ml) IV bolus from bag - ADDITIONAL PRN BOLUS -  30 units/kg (max bolus 4000 units) PRN    heparin 25,000 units in dextrose 5% (100 units/ml) IV bolus from bag - ADDITIONAL PRN BOLUS - 60 units/kg (max bolus 4000 units) PRN    heparin 25,000 units in dextrose 5% 250 mL (100 units/mL) infusion LOW INTENSITY nomogram - [facility] Continuous    hydrALAZINE tablet 50 mg Q8H    isosorbide dinitrate tablet 40 mg TID    pantoprazole injection 40 mg Daily    propofol (DIPRIVAN) 10 mg/mL infusion Continuous       Objective:     Vital Signs (Most Recent):  Temp: 98.5 °F (36.9 °C) (10/29/18 0302)  Pulse: 66 (10/29/18 1040)  Resp: (!) 0 (10/29/18 1040)  BP: (!) 104/52 (10/29/18 1040)  SpO2: 99 % (10/29/18 1040)  O2 Device (Oxygen Therapy): ventilator (10/29/18 0856) Vital Signs (24h Range):  Temp:  [98.5 °F (36.9 °C)-99.9 °F (37.7 °C)] 98.5 °F (36.9 °C)  Pulse:  [58-77] 66  Resp:  [0-25] 0  SpO2:  [97 %-100 %] 99 %  BP: ()/() 104/52     Weight: 91.2 kg (201 lb) (10/27/18 0457)  Body mass index is 23.23 kg/m².  Body surface area is 2.24 meters squared.    I/O last 3 completed shifts:  In: 1980.3 [I.V.:1001.1; NG/GT:940; IV Piggyback:39.2]  Out: 20 [Urine:20]    Physical Exam   Constitutional: He appears well-developed. No distress.   HENT:   Head: Normocephalic and atraumatic.   ETT in place    Neck: Neck supple. No tracheal deviation present. No thyromegaly present.   Cardiovascular: Normal rate, regular rhythm, normal heart sounds and intact distal pulses. Exam reveals no gallop and no friction rub.   No murmur heard.  Pulmonary/Chest: He has no wheezes. He has rales.   Abdominal: Soft. He exhibits no mass. There is no tenderness. There is no rebound and no guarding.   Musculoskeletal: He exhibits no edema.   Lymphadenopathy:     He has no cervical adenopathy.   Skin: Skin is warm. No rash noted. He is not diaphoretic. No erythema.   Nursing note and vitals reviewed.      Significant Labs:    CBC:     Recent Labs   Lab 10/29/18  0413   WBC 8.16   RBC 3.04*   HGB  8.6*   HCT 26.6*   *   MCV 88   MCH 28.3   MCHC 32.3     CMP:   Recent Labs   Lab 10/27/18  0548  10/29/18  0413      < > 98   CALCIUM 9.3   < > 8.2*   ALBUMIN 3.9  --   --    PROT 7.9  --   --       < > 133*   K 4.5   < > 4.6   CO2 30*   < > 28   CL 95   < > 96   BUN 18   < > 41*   CREATININE 4.3*   < > 6.2*   ALKPHOS 66  --   --    ALT 19  --   --    AST 30  --   --    BILITOT 0.8  --   --     < > = values in this interval not displayed.     Coagulation:   Recent Labs   Lab 10/27/18  1923  10/29/18  0413   INR 1.1  --   --    APTT 30.5   < > 55.5*    < > = values in this interval not displayed.     LFTs:   Recent Labs   Lab 10/27/18  0548   ALT 19   AST 30   ALKPHOS 66   BILITOT 0.8   PROT 7.9   ALBUMIN 3.9     All labs within the past 24 hours have been reviewed.     Significant Imaging:  Reviewed     Lab Results   Component Value Date    .7 (H) 08/26/2017    CALCIUM 8.2 (L) 10/29/2018    PHOS 4.6 (H) 10/29/2018       Lab Results   Component Value Date    ALBUMIN 3.9 10/27/2018         Assessment/Plan:     ESRD (end stage renal disease) on dialysis    1. ESRD on HD : (  [facility] ) ,  seen on HD , tolerating well,     As out pt , he is not adherent with fluid restriction per family,      2.  HTN : BP better after adjusting meds, UF on HD as tolerated      3. Anemia of CKD :  follow hemoglobin, Procrit with dialysis, pRBC tx per [facility],      4. SHPT : renal diet when stable,       5. CAD / acute MI - s/p LHC, optimize medical management per [facility] ,               I will follow-up with patient. Please contact us if you have any additional questions.     Total critical care time spent 40 minutes including time needed to review the records,  patient  evaluation, documentation, face-to-face discussion with the primary and Critical care teams,   more than 50% of the time was spent on coordination of care and counseling.       Fabián London MD  Nephrology  [facility] -

## 2018-10-29 NOTE — ASSESSMENT & PLAN NOTE
Diffuse CAD not amenable to interventions per cards; medical management  On ASA, plavix, statin, hydralazine, imdur  Add BB as able with BP low s/t sedation  No ACEI/ARB at this point with sedational low BP  Plan heparin drip for at least full 48 hours

## 2018-10-29 NOTE — SUBJECTIVE & OBJECTIVE
Interval History:    10/28/18 - Pt s/p LHC for acute MI . Severe CAD , Cards suggest medical management,       10/29/18 - seen on HD, tolerating well, troponin continues to get worse     Review of patient's allergies indicates:   Allergen Reactions    Augmentin [amoxicillin-pot clavulanate] Edema    Sulfa (sulfonamide antibiotics)      swelling    Lisinopril      cough     Current Facility-Administered Medications   Medication Frequency    albuterol nebulizer solution 2.5 mg Q6H PRN    aspirin EC tablet 81 mg Daily    atorvastatin tablet 80 mg Daily    chlorhexidine 0.12 % solution 15 mL BID    cloNIDine 0.3 mg/24 hr td ptwk 1 patch Q7 Days    clopidogrel tablet 75 mg Daily    dextrose 50% injection 25 g Q6H PRN    fentaNYL 2500 mcg in D5W 250 mL infusion premix (titrating) (conc: 10 mcg/mL) Continuous    heparin (porcine) injection 2,000 Units Once    heparin (porcine) injection 3,000 Units Once    heparin 25,000 units in dextrose 5% (100 units/ml) IV bolus from bag - ADDITIONAL PRN BOLUS - 30 units/kg (max bolus 4000 units) PRN    heparin 25,000 units in dextrose 5% (100 units/ml) IV bolus from bag - ADDITIONAL PRN BOLUS - 60 units/kg (max bolus 4000 units) PRN    heparin 25,000 units in dextrose 5% 250 mL (100 units/mL) infusion LOW INTENSITY nomogram - [facility] Continuous    hydrALAZINE tablet 50 mg Q8H    isosorbide dinitrate tablet 40 mg TID    pantoprazole injection 40 mg Daily    propofol (DIPRIVAN) 10 mg/mL infusion Continuous       Objective:     Vital Signs (Most Recent):  Temp: 98.5 °F (36.9 °C) (10/29/18 0302)  Pulse: 66 (10/29/18 1040)  Resp: (!) 0 (10/29/18 1040)  BP: (!) 104/52 (10/29/18 1040)  SpO2: 99 % (10/29/18 1040)  O2 Device (Oxygen Therapy): ventilator (10/29/18 0856) Vital Signs (24h Range):  Temp:  [98.5 °F (36.9 °C)-99.9 °F (37.7 °C)] 98.5 °F (36.9 °C)  Pulse:  [58-77] 66  Resp:  [0-25] 0  SpO2:  [97 %-100 %] 99 %  BP: ()/() 104/52     Weight: 91.2 kg (201 lb)  (10/27/18 0457)  Body mass index is 23.23 kg/m².  Body surface area is 2.24 meters squared.    I/O last 3 completed shifts:  In: 1980.3 [I.V.:1001.1; NG/GT:940; IV Piggyback:39.2]  Out: 20 [Urine:20]    Physical Exam   Constitutional: He appears well-developed. No distress.   HENT:   Head: Normocephalic and atraumatic.   ETT in place    Neck: Neck supple. No tracheal deviation present. No thyromegaly present.   Cardiovascular: Normal rate, regular rhythm, normal heart sounds and intact distal pulses. Exam reveals no gallop and no friction rub.   No murmur heard.  Pulmonary/Chest: He has no wheezes. He has rales.   Abdominal: Soft. He exhibits no mass. There is no tenderness. There is no rebound and no guarding.   Musculoskeletal: He exhibits no edema.   Lymphadenopathy:     He has no cervical adenopathy.   Skin: Skin is warm. No rash noted. He is not diaphoretic. No erythema.   Nursing note and vitals reviewed.      Significant Labs:    CBC:     Recent Labs   Lab 10/29/18  0413   WBC 8.16   RBC 3.04*   HGB 8.6*   HCT 26.6*   *   MCV 88   MCH 28.3   MCHC 32.3     CMP:   Recent Labs   Lab 10/27/18  0548  10/29/18  0413      < > 98   CALCIUM 9.3   < > 8.2*   ALBUMIN 3.9  --   --    PROT 7.9  --   --       < > 133*   K 4.5   < > 4.6   CO2 30*   < > 28   CL 95   < > 96   BUN 18   < > 41*   CREATININE 4.3*   < > 6.2*   ALKPHOS 66  --   --    ALT 19  --   --    AST 30  --   --    BILITOT 0.8  --   --     < > = values in this interval not displayed.     Coagulation:   Recent Labs   Lab 10/27/18  1923  10/29/18  0413   INR 1.1  --   --    APTT 30.5   < > 55.5*    < > = values in this interval not displayed.     LFTs:   Recent Labs   Lab 10/27/18  0548   ALT 19   AST 30   ALKPHOS 66   BILITOT 0.8   PROT 7.9   ALBUMIN 3.9     All labs within the past 24 hours have been reviewed.     Significant Imaging:  Reviewed     Lab Results   Component Value Date    .7 (H) 08/26/2017    CALCIUM 8.2 (L)  10/29/2018    PHOS 4.6 (H) 10/29/2018       Lab Results   Component Value Date    ALBUMIN 3.9 10/27/2018

## 2018-10-29 NOTE — PROGRESS NOTES
Pt completed 3 hours of HD tx with 2.5 L net UF.  Pt tolerated tx well. No access issues.  Dr. London rounded on pt at bedside during tx.  Post tx, blood rinsed back, needles removed, and hemostasis achieved. Report to nurse attending.  Report to [facility] to TAMIKA Mcdonald.

## 2018-10-29 NOTE — PLAN OF CARE
Patient intubated and no family at the bedside     10/29/18 1532   Medicare Message   Important Message from Medicare regarding Discharge Appeal Rights Other (comments)   Date IMM was signed 10/29/18   Time IMM was signed 1532

## 2018-10-29 NOTE — PROGRESS NOTES
Repeat PTT came back therapeutic.  Will restart at previous infusion rate.  Will continue to monitor

## 2018-10-29 NOTE — PROGRESS NOTES
[facility] -   Adult Nutrition  Progress Note    SUMMARY     Recommendations    Recommendation/Intervention: 1.Consider changing current TF regimen to Peptamen Intense VHP at 70 ml/hr to better meet EEN/EPN. With current propofol infusion, provides 2041 calories, 155 g protein, and 1411 ml water. Flushes as needed with medications and additional per MD/NP for hydration. 2. Check residuals Q4 hours. Hold if > 300 cc.3. Will monitor propofol infusion and adjust recs prn.   Goals: Meet > 85 % EEN/EPN while admitted  Nutrition Goal Status: new  Communication of RD Recs: (Reviewed w/ RN)    Reason for Assessment    Reason for Assessment: new tube feeding  Dx:  1. Acute on chronic diastolic HFpEF of 55-60%    2. SOB (shortness of breath)    3. Hypervolemia, unspecified hypervolemia type    4. Essential hypertension    5. Accelerated hypertension    6. Acute pulmonary edema    7. Encounter for intubation    8. Acute hypercapnic respiratory failure    9. ESRD (end stage renal disease) on dialysis    10. Respiratory failure    11. Elevated troponin    12. NSTEMI (non-ST elevated myocardial infarction)    13. STEMI (ST elevation myocardial infarction)    14. Chest pain    15. Acute ST elevation myocardial infarction (STEMI) of inferior wall      Past Medical History:   Diagnosis Date    CKD (chronic kidney disease), stage IV     COPD (chronic obstructive pulmonary disease)     Coronary artery disease     Depression     Dialysis patient     GERD (gastroesophageal reflux disease)     HTN (hypertension)     Pneumonia        Interdisciplinary Rounds: attended  General Information Comments: Pt currently intubated and sedated w/ Propofol. No family members at bedside at time of visit. Pt on dialysis.  Per epic records pt weighed 193 lbs on 10/13/18, current weight = 201 lbs (no wt loss since last admit). Per [facility]PE (10.29.18): no significant muscle wasting/fat depletion noted. Pt currently on TF, tolerating.  "Reviewed TF recs w/ NP via secure chat this am.  Nutrition Discharge Planning: too soon to determine    Nutrition Risk Screen    Nutrition Risk Screen: no indicators present    Nutrition/Diet History     Food preferences: JAKE    Anthropometrics    Temp: 97.4 °F (36.3 °C)  Height Method: Stated  Height: 6' 6" (198.1 cm)  Height (inches): 78 in  Weight Method: Bed Scale  Weight: 91.2 kg (201 lb)  Weight (lb): 201 lb  Ideal Body Weight (IBW), Male: 214 lb  % Ideal Body Weight, Male (lb): 93.93 lb  BMI (Calculated): 23.3  BMI Grade: 18.5-24.9 - normal       Lab/Procedures/Meds     Pertinent Labs Reviewed: reviewed  BMP  Lab Results   Component Value Date     (L) 10/29/2018    K 4.6 10/29/2018    CL 96 10/29/2018    CO2 28 10/29/2018    BUN 41 (H) 10/29/2018    CREATININE 6.2 (H) 10/29/2018    CALCIUM 8.2 (L) 10/29/2018    ANIONGAP 9 10/29/2018    ESTGFRAFRICA 10 (A) 10/29/2018    EGFRNONAA 8 (A) 10/29/2018     Lab Results   Component Value Date    CALCIUM 8.2 (L) 10/29/2018    PHOS 4.6 (H) 10/29/2018     Lab Results   Component Value Date    ALBUMIN 3.9 10/27/2018     Recent Labs   Lab 10/29/18  0518   POCTGLUCOSE 125*       Pertinent Medications Reviewed: reviewed    Physical Findings/Assessment    Overall Physical Appearance: on ventilator support  Tubes: (OG)  Oral/Mouth Cavity: tooth/teeth missing  Skin: (wound foot )    Estimated/Assessed Needs    Weight Used For Calorie Calculations: 91.2 kg (201 lb 1 oz)  Energy Calorie Requirements (kcal): 2051.86  Energy Need Method: [facility]  Protein Requirements: 109 - 182 g  Weight Used For Protein Calculations: 91.2 kg (201 lb 1 oz)     Fluid Need Method: RDA Method(or per MD)  RDA Method (mL): 2051.86         Nutrition Prescription Ordered    Current Diet Order: NPO  Current Nutrition Support Formula Ordered: (Novasource at 40 ml/hr )    Evaluation of Received Nutrient/Fluid Intake    Enteral Calories (kcal): 1920  Enteral Protein (gm): 86  Other Calories (kcal): " 361.68(Propofol at 13.7 ml/hr)  Total Calories (kcal/kg): 2282  % Kcal Needs: 111  % Protein Needs: 78    Intake/Output Summary (Last 24 hours) at 10/29/2018 1154  Last data filed at 10/29/2018 0700  Gross per 24 hour   Intake 1152.42 ml   Output 10 ml   Net 1142.42 ml         Nutrition Risk    Level of Risk/Frequency of Follow-up: (2xweekly)     Assessment and Plan    Nutrition Problem  Inadequate oral intake     Related to (etiology):   Mechanical ventilation     Signs and Symptoms (as evidenced by):   NPO status     Interventions/Recommendations (treatment strategy):  See above     Nutrition Diagnosis Status:   New      Monitor and Evaluation    Food and Nutrient Intake: enteral nutrition intake, energy intake  Food and Nutrient Adminstration: enteral and parenteral nutrition administration  Anthropometric Measurements: weight  Biochemical Data, Medical Tests and Procedures: electrolyte and renal panel, glucose/endocrine profile  Nutrition-Focused Physical Findings: overall appearance, skin     Nutrition Follow-Up    RD Follow-up?: Yes(2xweekly)

## 2018-10-29 NOTE — PROGRESS NOTES
[facility] -   Critical Care Medicine  Progress Note    Patient Name: Conner Perez III  MRN: [id number]  Admission Date: 10/27/2018  Hospital Length of Stay: 2 days  Code Status: Full Code  Attending Provider: Keith Wesley MD  Primary Care Provider: Raciel Angela MD   Principal Problem: Acute ST elevation myocardial infarction (STEMI) of inferior wall    Subjective:     HPI:  68 year old male with ESRD and multiple admissions for volume overload/HTN requiring additional HD; HTN, GERD, CAD, COPD   Presented to ED 10/27 with SOB  Evaluation revealed evidence of fluid overload with hypertensive urgency and hypoxia; decline despite trial of NIPPV required intubation for mechanical ventilation    Hospital/ICU Course:  Admitted to ICU with OETT on mechanical ventilation with propofol sedation  10/28 Stable overnight, heart cath revealed multivessel disease  10/29 remains intubated and sedated for cardiac rest after LHC with multivessel disease not amenable to intervention or bypass; am troponin still rising; plan HD today    Review of Systems   Unable to perform ROS: Intubated       Objective:     Vital Signs (Most Recent):  Temp: 97.4 °F (36.3 °C) (10/29/18 0830)  Pulse: 65 (10/29/18 1127)  Resp: 16 (10/29/18 1127)  BP: (!) 107/54 (10/29/18 1120)  SpO2: 100 % (10/29/18 1127) Vital Signs (24h Range):  Temp:  [97.4 °F (36.3 °C)-99.2 °F (37.3 °C)] 97.4 °F (36.3 °C)  Pulse:  [58-77] 65  Resp:  [10-25] 16  SpO2:  [97 %-100 %] 100 %  BP: ()/() 107/54     Weight: 91.2 kg (201 lb)  Body mass index is 23.23 kg/m².      Intake/Output Summary (Last 24 hours) at 10/29/2018 1139  Last data filed at 10/29/2018 0700  Gross per 24 hour   Intake 1152.42 ml   Output 10 ml   Net 1142.42 ml       Physical Exam   Constitutional: He appears ill. No distress. He is sedated, intubated and restrained.   HENT:   Head: Atraumatic.       Eyes: Conjunctivae are normal. Pupils are equal, round, and reactive to light.    Neck: JVD present. No neck rigidity. No tracheal deviation present.   Cardiovascular: Normal rate and regular rhythm.   No murmur heard.  Pulses:       Radial pulses are 2+ on the right side, and 2+ on the left side.        Dorsalis pedis pulses are 2+ on the right side, and 2+ on the left side.   Pulmonary/Chest: No accessory muscle usage. He is intubated. No respiratory distress. He has decreased breath sounds. He has no wheezes.   Abdominal: Soft. Bowel sounds are normal. He exhibits no distension.   Musculoskeletal: He exhibits no edema (RUE dependent edema).        Arms:       Feet:    Skin: Skin is warm and dry. Capillary refill takes less than 2 seconds. No cyanosis.       Vents:  Vent Mode: A/C (10/29/18 1127)  Ventilator Initiated: Yes (10/27/18 0800)  Set Rate: 16 bmp (10/29/18 1127)  Vt Set: 450 mL (10/29/18 1127)  Pressure Support: 0 cmH20 (10/29/18 1127)  PEEP/CPAP: 5 cmH20 (10/29/18 1127)  Oxygen Concentration (%): 40 (10/29/18 1127)  Peak Airway Pressure: 21 cmH2O (10/29/18 1127)  Plateau Pressure: 0 cmH20 (10/29/18 1127)  Total Ve: 7.43 mL (10/29/18 1127)  F/VT Ratio<105 (RSBI): (!) 35.63 (10/29/18 1127)    Lines/Drains/Airways     Drain                 Hemodialysis AV Fistula Right forearm -- days         Hemodialysis AV Fistula 12/18/17 0017 Right forearm 315 days         NG/OG Tube 10/27/18 0807 nasogastric 16 Fr. Left nostril 2 days         Urethral Catheter 10/27/18 0825 16 Fr. 2 days          Airway                 Airway - Non-Surgical 10/27/18 0745 Endotracheal Tube 2 days          Peripheral Intravenous Line                 Peripheral IV - Single Lumen 10/27/18 0930 Left Forearm 2 days         Peripheral IV - Single Lumen 10/28/18 2330 Left Forearm less than 1 day                Significant Labs:    CBC/Anemia Profile:  Recent Labs   Lab 10/27/18  1923 10/28/18  0407 10/29/18  0413   WBC 9.60 12.03 8.16   HGB 9.4* 8.9* 8.6*   HCT 29.8* 27.6* 26.6*   * 152 130*   MCV 89 89 88   RDW  15.8* 16.0* 16.3*        Chemistries:  Recent Labs   Lab 10/28/18  0407 10/29/18  0413    133*   K 4.3 4.6   CL 99 96   CO2 29 28   BUN 21 41*   CREATININE 4.4* 6.2*   CALCIUM 8.5* 8.2*   MG 1.7 1.9   PHOS 2.3* 4.6*       All pertinent labs within the past 24 hours have been reviewed.  ABG  Recent Labs   Lab 10/29/18  0453   PH 7.400   PO2 119*   PCO2 47.7*   HCO3 29.6*   BE 5         Significant Imaging:  I have reviewed all pertinent imaging results/findings within the past 24 hours.      Assessment/Plan:     Pulmonary   Acute pulmonary edema    HD with additional volume removal today     Acute hypercapnic respiratory failure on mechanical ventilation    S/t pulmonary edema; intravascular volume overload with acute on chronic right heart failure with MI  Vent settings reviewed and adjusted for optimal gas exchange  VAP prophylaxis  SAT/SBT once troponin trending down     Cardiac/Vascular   * Acute ST elevation myocardial infarction (STEMI) of inferior wall    Diffuse CAD not amenable to interventions per cards; medical management  On ASA, plavix, statin, hydralazine, imdur  Add BB as able with BP low s/t sedation  No ACEI/ARB at this point with sedational low BP  Plan heparin drip for at least full 48 hours     Acute on chronic diastolic congestive heart failure    Additional fluid removal via HD     Hypertension    ICU hemodynamic monitoring  Continue clonidine patch; hydralazine, imdur per NG     Renal/   ESRD (end stage renal disease) on dialysis    Nephrology following  Scheduled HD with volume removal today        Critical Care Daily Checklist:    A: Awake: RASS Goal/Actual Goal: RASS Goal: -2-->light sedation  Actual: Dowell Agitation Sedation Scale (RASS): Light sedation   B: Spontaneous Breathing Trial Performed?     C: SAT & SBT Coordinated?  n/a                      D: Delirium: CAM-ICU Overall CAM-ICU: Positive   E: Early Mobility Performed? ROM   F: Feeding Goal: Goals: Meet > 85 % EEN/EPN  while admitted  Status: Nutrition Goal Status: new   Current Diet Order   Procedures    Diet NPO      AS: Analgesia/Sedation Propofol, fentanyl   T: Thromboembolic Prophylaxis heparin   H: HOB > 300 Yes   U: Stress Ulcer Prophylaxis (if needed) PPI   G: Glucose Control Range 83 - 125   B: Bowel Function Stool Occurrence: 1   I: Indwelling Catheter (Lines & Rai) Necessity reviewed   D: De-escalation of Antimicrobials/Pharmacotherapies reviewed    Plan for the day/ETD Cardiac rest, HD, supportive care    Code Status:  Family/Goals of Care: Full Code  Home on discharge   I have discussed case and plan of care in detail with Dr Rod and Dr Wesley; Status and plan of care were discussed with team on multidisciplinary rounds.    Critical Care Time: 45 minutes  Critical secondary to mechanical ventilation   Critical care was time spent personally by me on the following activities: development of treatment plan with patient or surrogate and bedside caregivers, discussions with consultants, evaluation of patient's response to treatment, examination of patient, ordering and performing treatments and interventions, ordering and review of laboratory studies, ordering and review of radiographic studies, pulse oximetry, re-evaluation of patient's condition. This critical care time did not overlap with that of any other provider or involve time for any procedures.     SHERWIN Velasco-BC  Critical Care Medicine  [facility] [place]

## 2018-10-29 NOTE — SUBJECTIVE & OBJECTIVE
Interval History: no acute issues o/n    Review of Systems   Unable to perform ROS: intubated     Objective:     Vital Signs (Most Recent):  Temp: 98.5 °F (36.9 °C) (10/29/18 0302)  Pulse: 73 (10/29/18 0900)  Resp: 18 (10/29/18 0900)  BP: 137/64 (10/29/18 0900)  SpO2: 97 % (10/29/18 0900) Vital Signs (24h Range):  Temp:  [98.5 °F (36.9 °C)-99.9 °F (37.7 °C)] 98.5 °F (36.9 °C)  Pulse:  [58-77] 73  Resp:  [10-25] 18  SpO2:  [97 %-100 %] 97 %  BP: ()/() 137/64     Weight: 91.2 kg (201 lb)  Body mass index is 23.23 kg/m².     SpO2: 97 %  O2 Device (Oxygen Therapy): ventilator      Intake/Output Summary (Last 24 hours) at 10/29/2018 0951  Last data filed at 10/29/2018 0700  Gross per 24 hour   Intake 1411.22 ml   Output 10 ml   Net 1401.22 ml       Lines/Drains/Airways     Drain                 Hemodialysis AV Fistula Right forearm -- days         Hemodialysis AV Fistula 12/18/17 0017 Right forearm 315 days         NG/OG Tube 10/27/18 0807 nasogastric 16 Fr. Left nostril 2 days         Urethral Catheter 10/27/18 0825 16 Fr. 2 days          Airway                 Airway - Non-Surgical 10/27/18 0745 Endotracheal Tube 2 days          Peripheral Intravenous Line                 Peripheral IV - Single Lumen 10/27/18 0930 Left Forearm 2 days         Peripheral IV - Single Lumen 10/28/18 2330 Left Forearm less than 1 day                Physical Exam   Constitutional: He is oriented to person, place, and time. He appears well-developed and well-nourished. No distress. He is sedated, intubated and restrained.   HENT:   Head: Normocephalic and atraumatic.   Neck: Normal range of motion and full passive range of motion without pain. Neck supple. No JVD present.   NG tube in place   Cardiovascular: Normal rate, regular rhythm, S1 normal, S2 normal and intact distal pulses. PMI is not displaced. Exam reveals no distant heart sounds.   No murmur heard.  Pulses:       Radial pulses are 2+ on the right side, and 2+ on the  left side.        Dorsalis pedis pulses are 1+ on the right side, and 1+ on the left side.   Right groin access site C/D/I, no hematoma or ecchymosis noted.    Pulmonary/Chest: Effort normal and breath sounds normal. No accessory muscle usage. He is intubated. No respiratory distress. He has no wheezes.   Abdominal: Soft. Bowel sounds are normal. He exhibits no distension. There is no tenderness.   obese   Genitourinary:   Genitourinary Comments: +kim cath   Musculoskeletal: Normal range of motion. He exhibits no edema.        Right ankle: He exhibits no swelling.        Left ankle: He exhibits no swelling.   AV fistula Right forearm   Neurological: He is oriented to person, place, and time.   Skin: Skin is warm and dry. He is not diaphoretic. No cyanosis. Nails show no clubbing.   Psychiatric: He has a normal mood and affect. His speech is normal and behavior is normal. Judgment and thought content normal. Cognition and memory are normal.   Nursing note and vitals reviewed.      Significant Labs:   All pertinent lab results from the last 24 hours have been reviewed. and   Recent Lab Results       10/29/18  0518   10/29/18  0453   10/29/18  0413   10/29/18  0014   10/28/18  2237        Allens Test   Pass           Anion Gap     9         aPTT     55.5  Comment:  aPTT therapeutic range = 39-69 seconds   46.7  Comment:  aPTT therapeutic range = 39-69 seconds     Baso #     0.01         Basophil%     0.1         Site   RR           BUN, Bld     41         Calcium     8.2         Chloride     96         CO2     28         Creatinine     6.2         DelSys   Adult Vent           Differential Method     Automated         eGFR if      10         eGFR if non      8  Comment:  Calculation used to obtain the estimated glomerular filtration  rate (eGFR) is the CKD-EPI equation.            Eos #     0.3         Eosinophil%     3.4         FiO2   40           Glucose     98         Gran # (ANC)      6.8         Gran%     83.5         Hematocrit     26.6         Hemoglobin     8.6         Lymph #     0.6         Lymph%     7.6         Magnesium     1.9         MCH     28.3         MCHC     32.3         MCV     88         Mode   AC/PRVC           Mono #     0.4         Mono%     5.4         MPV     9.9         PEEP   5           Phosphorus     4.6         Platelets     130         POC BE   5           POC HCO3   29.6           POC PCO2   47.7           POC PH   7.400           POC PO2   119           POC SATURATED O2   99           POCT Glucose 125     105       Potassium     4.6         Rate   16           RBC     3.04         RDW     16.3         Sample   ARTERIAL           Sodium     133         Troponin I     40.895  Comment:  The reference interval for Troponin I represents the 99th percentile   cutoff   for our facility and is consistent with 3rd generation assay   performance.           Vt   450           WBC     8.16                          10/28/18  2044   10/28/18  1829   10/28/18  1426   10/28/18  1303        Allens Test             Anion Gap             aPTT 118.1  Comment:  aPTT therapeutic range = 39-69 seconds   38.0  Comment:  aPTT therapeutic range = 39-69 seconds       Baso #             Basophil%             Site             BUN, Bld             Calcium             Chloride             CO2             Creatinine             DelSys             Differential Method             eGFR if              eGFR if non              Eos #             Eosinophil%             FiO2             Glucose             Gran # (ANC)             Gran%             Hematocrit             Hemoglobin             Lymph #             Lymph%             Magnesium             MCH             MCHC             MCV             Mode             Mono #             Mono%             MPV             PEEP             Phosphorus             Platelets             POC BE             POC HCO3              POC PCO2             POC PH             POC PO2             POC SATURATED O2             POCT Glucose   90   83     Potassium             Rate             RBC             RDW             Sample             Sodium             Troponin I             Vt             WBC                   Significant Imaging: Echocardiogram:   2D echo with color flow doppler:   Results for orders placed or performed during the hospital encounter of 04/27/18   2D echo with color flow doppler   Result Value Ref Range    Calculated EF 55 55 - 65    Diastolic Dysfunction No     Mitral Valve Mobility NORMAL     Tricuspid Valve Regurgitation TRIVIAL     and X-Ray: CXR: X-Ray Chest 1 View (CXR):   Results for orders placed or performed during the hospital encounter of 10/27/18   X-Ray Chest 1 View    Narrative    EXAMINATION:  XR CHEST 1 VIEW    CLINICAL HISTORY:  SOB;    TECHNIQUE:  Single frontal view of the chest was performed.    COMPARISON:  10/28/2018    FINDINGS:  Tubes and lines are stable. Bilateral pleural effusions with bibasilar atelectasis or airspace disease or interstitial edema.  Mild cardiomegaly.  Aorta demonstrates atherosclerotic disease.  In comparison to the prior study, there is no adverse interval changes.      Impression    In comparison to the prior study, there is no adverse interval changes      Electronically signed by: Santo Marrero MD  Date:    10/29/2018  Time:    08:32    and X-Ray Chest PA and Lateral (CXR): No results found for this visit on 10/27/18.

## 2018-10-29 NOTE — ASSESSMENT & PLAN NOTE
Resume DASH diet, 2 gm sodium restriction post extubation  Daily weights  Strict intake and output  Dialysis per nephrology

## 2018-10-29 NOTE — SUBJECTIVE & OBJECTIVE
Review of Systems   Unable to perform ROS: Intubated       Objective:     Vital Signs (Most Recent):  Temp: 97.4 °F (36.3 °C) (10/29/18 0830)  Pulse: 65 (10/29/18 1127)  Resp: 16 (10/29/18 1127)  BP: (!) 107/54 (10/29/18 1120)  SpO2: 100 % (10/29/18 1127) Vital Signs (24h Range):  Temp:  [97.4 °F (36.3 °C)-99.2 °F (37.3 °C)] 97.4 °F (36.3 °C)  Pulse:  [58-77] 65  Resp:  [10-25] 16  SpO2:  [97 %-100 %] 100 %  BP: ()/() 107/54     Weight: 91.2 kg (201 lb)  Body mass index is 23.23 kg/m².      Intake/Output Summary (Last 24 hours) at 10/29/2018 1139  Last data filed at 10/29/2018 0700  Gross per 24 hour   Intake 1152.42 ml   Output 10 ml   Net 1142.42 ml       Physical Exam   Constitutional: He appears ill. No distress. He is sedated, intubated and restrained.   HENT:   Head: Atraumatic.       Eyes: Conjunctivae are normal. Pupils are equal, round, and reactive to light.   Neck: JVD present. No neck rigidity. No tracheal deviation present.   Cardiovascular: Normal rate and regular rhythm.   No murmur heard.  Pulses:       Radial pulses are 2+ on the right side, and 2+ on the left side.        Dorsalis pedis pulses are 2+ on the right side, and 2+ on the left side.   Pulmonary/Chest: No accessory muscle usage. He is intubated. No respiratory distress. He has decreased breath sounds. He has no wheezes.   Abdominal: Soft. Bowel sounds are normal. He exhibits no distension.   Musculoskeletal: He exhibits no edema (RUE dependent edema).        Arms:       Feet:    Skin: Skin is warm and dry. Capillary refill takes less than 2 seconds. No cyanosis.       Vents:  Vent Mode: A/C (10/29/18 1127)  Ventilator Initiated: Yes (10/27/18 0800)  Set Rate: 16 bmp (10/29/18 1127)  Vt Set: 450 mL (10/29/18 1127)  Pressure Support: 0 cmH20 (10/29/18 1127)  PEEP/CPAP: 5 cmH20 (10/29/18 1127)  Oxygen Concentration (%): 40 (10/29/18 1127)  Peak Airway Pressure: 21 cmH2O (10/29/18 1127)  Plateau Pressure: 0 cmH20 (10/29/18  1127)  Total Ve: 7.43 mL (10/29/18 1127)  F/VT Ratio<105 (RSBI): (!) 35.63 (10/29/18 1127)    Lines/Drains/Airways     Drain                 Hemodialysis AV Fistula Right forearm -- days         Hemodialysis AV Fistula 12/18/17 0017 Right forearm 315 days         NG/OG Tube 10/27/18 0807 nasogastric 16 Fr. Left nostril 2 days         Urethral Catheter 10/27/18 0825 16 Fr. 2 days          Airway                 Airway - Non-Surgical 10/27/18 0745 Endotracheal Tube 2 days          Peripheral Intravenous Line                 Peripheral IV - Single Lumen 10/27/18 0930 Left Forearm 2 days         Peripheral IV - Single Lumen 10/28/18 2330 Left Forearm less than 1 day                Significant Labs:    CBC/Anemia Profile:  Recent Labs   Lab 10/27/18  1923 10/28/18  0407 10/29/18  0413   WBC 9.60 12.03 8.16   HGB 9.4* 8.9* 8.6*   HCT 29.8* 27.6* 26.6*   * 152 130*   MCV 89 89 88   RDW 15.8* 16.0* 16.3*        Chemistries:  Recent Labs   Lab 10/28/18  0407 10/29/18  0413    133*   K 4.3 4.6   CL 99 96   CO2 29 28   BUN 21 41*   CREATININE 4.4* 6.2*   CALCIUM 8.5* 8.2*   MG 1.7 1.9   PHOS 2.3* 4.6*       All pertinent labs within the past 24 hours have been reviewed.  ABG  Recent Labs   Lab 10/29/18  0453   PH 7.400   PO2 119*   PCO2 47.7*   HCO3 29.6*   BE 5         Significant Imaging:  I have reviewed all pertinent imaging results/findings within the past 24 hours.

## 2018-10-29 NOTE — PROGRESS NOTES
Ptt resulted at 118.1.  Stopped Heparin infusion per nomogram.  Placed STAT redraw order for PTT.  Patient has small amount of blood tinged sputum in ETT. No other active signs of bleeding.  Notified EICU.  Will wait for results of PTT and continue to monitor.

## 2018-10-29 NOTE — PROGRESS NOTES
[facility] -   Cardiology  Progress Note    Patient Name: Conner Perez III  MRN: [id number]  Admission Date: 10/27/2018  Hospital Length of Stay: 2 days  Code Status: Full Code   Attending Physician: Keith Wesley MD   Primary Care Physician: Raciel Angela MD  Expected Discharge Date:   Principal Problem:Acute ST elevation myocardial infarction (STEMI) of inferior wall    Subjective:   HPI  Pt admitted with dyspnea/respiratory failure.  Pt has ESRD, HTN.    Admitted with acute dyspnea, respiratory failure requiring intubation.  ecg on admit showed NSR, nonspecific st-t abnl.   Troponin levels increased, started on IV heparin, asa for NSTEMI by [facility].  Anemia noted on labs today, Hg 8.9  This am routine am ECG showed inferior ST elevation concerning for STEMI and marked anterolateral st-t abnormalities, worsening.  Cardiology on call was contacted about ECG and decision made to activate cath lab for emergent cath.  Pt is intubated, sedated and stable otherwise and there are no reports of chest pain sxs.  Echo yesterday showed EF 45%, apical WMA.  He was admitted last month, dyspnea/cp and had negative stress test.  Echo in earlier 4/7/17 showed similar WMA.  Reportedly has had LHC 2016 with no major blockages (no report available).    Hospital Course:   10/29/18-Patient seen and examined in room, intubated and sedated. IV heparin gtt in place. Pending dialysis treatment today. Labs reviewed, stable.     Interval History: no acute issues o/n    Review of Systems   Unable to perform ROS: intubated     Objective:     Vital Signs (Most Recent):  Temp: 98.5 °F (36.9 °C) (10/29/18 0302)  Pulse: 73 (10/29/18 0900)  Resp: 18 (10/29/18 0900)  BP: 137/64 (10/29/18 0900)  SpO2: 97 % (10/29/18 0900) Vital Signs (24h Range):  Temp:  [98.5 °F (36.9 °C)-99.9 °F (37.7 °C)] 98.5 °F (36.9 °C)  Pulse:  [58-77] 73  Resp:  [10-25] 18  SpO2:  [97 %-100 %] 97 %  BP: ()/() 137/64     Weight: 91.2 kg  (201 lb)  Body mass index is 23.23 kg/m².     SpO2: 97 %  O2 Device (Oxygen Therapy): ventilator      Intake/Output Summary (Last 24 hours) at 10/29/2018 0951  Last data filed at 10/29/2018 0700  Gross per 24 hour   Intake 1411.22 ml   Output 10 ml   Net 1401.22 ml       Lines/Drains/Airways     Drain                 Hemodialysis AV Fistula Right forearm -- days         Hemodialysis AV Fistula 12/18/17 0017 Right forearm 315 days         NG/OG Tube 10/27/18 0807 nasogastric 16 Fr. Left nostril 2 days         Urethral Catheter 10/27/18 0825 16 Fr. 2 days          Airway                 Airway - Non-Surgical 10/27/18 0745 Endotracheal Tube 2 days          Peripheral Intravenous Line                 Peripheral IV - Single Lumen 10/27/18 0930 Left Forearm 2 days         Peripheral IV - Single Lumen 10/28/18 2330 Left Forearm less than 1 day                Physical Exam   Constitutional: He is oriented to person, place, and time. He appears well-developed and well-nourished. No distress. He is sedated, intubated and restrained.   HENT:   Head: Normocephalic and atraumatic.   Neck: Normal range of motion and full passive range of motion without pain. Neck supple. No JVD present.   NG tube in place   Cardiovascular: Normal rate, regular rhythm, S1 normal, S2 normal and intact distal pulses. PMI is not displaced. Exam reveals no distant heart sounds.   No murmur heard.  Pulses:       Radial pulses are 2+ on the right side, and 2+ on the left side.        Dorsalis pedis pulses are 1+ on the right side, and 1+ on the left side.   Right groin access site C/D/I, no hematoma or ecchymosis noted.    Pulmonary/Chest: Effort normal and breath sounds normal. No accessory muscle usage. He is intubated. No respiratory distress. He has no wheezes.   Abdominal: Soft. Bowel sounds are normal. He exhibits no distension. There is no tenderness.   obese   Genitourinary:   Genitourinary Comments: +kim cath   Musculoskeletal: Normal range  of motion. He exhibits no edema.        Right ankle: He exhibits no swelling.        Left ankle: He exhibits no swelling.   AV fistula Right forearm   Neurological: He is oriented to person, place, and time.   Skin: Skin is warm and dry. He is not diaphoretic. No cyanosis. Nails show no clubbing.   Psychiatric: He has a normal mood and affect. His speech is normal and behavior is normal. Judgment and thought content normal. Cognition and memory are normal.   Nursing note and vitals reviewed.      Significant Labs:   All pertinent lab results from the last 24 hours have been reviewed. and   Recent Lab Results       10/29/18  0518   10/29/18  0453   10/29/18  0413   10/29/18  0014   10/28/18  2237        Allens Test   Pass           Anion Gap     9         aPTT     55.5  Comment:  aPTT therapeutic range = 39-69 seconds   46.7  Comment:  aPTT therapeutic range = 39-69 seconds     Baso #     0.01         Basophil%     0.1         Site   RR           BUN, Bld     41         Calcium     8.2         Chloride     96         CO2     28         Creatinine     6.2         DelSys   Adult Vent           Differential Method     Automated         eGFR if      10         eGFR if non      8  Comment:  Calculation used to obtain the estimated glomerular filtration  rate (eGFR) is the CKD-EPI equation.            Eos #     0.3         Eosinophil%     3.4         FiO2   40           Glucose     98         Gran # (ANC)     6.8         Gran%     83.5         Hematocrit     26.6         Hemoglobin     8.6         Lymph #     0.6         Lymph%     7.6         Magnesium     1.9         MCH     28.3         MCHC     32.3         MCV     88         Mode   AC/PRVC           Mono #     0.4         Mono%     5.4         MPV     9.9         PEEP   5           Phosphorus     4.6         Platelets     130         POC BE   5           POC HCO3   29.6           POC PCO2   47.7           POC PH   7.400           POC  PO2   119           POC SATURATED O2   99           POCT Glucose 125     105       Potassium     4.6         Rate   16           RBC     3.04         RDW     16.3         Sample   ARTERIAL           Sodium     133         Troponin I     40.895  Comment:  The reference interval for Troponin I represents the 99th percentile   cutoff   for our facility and is consistent with 3rd generation assay   performance.           Vt   450           WBC     8.16                          10/28/18  2044   10/28/18  1829   10/28/18  1426   10/28/18  1303        Allens Test             Anion Gap             aPTT 118.1  Comment:  aPTT therapeutic range = 39-69 seconds   38.0  Comment:  aPTT therapeutic range = 39-69 seconds       Baso #             Basophil%             Site             BUN, Bld             Calcium             Chloride             CO2             Creatinine             DelSys             Differential Method             eGFR if              eGFR if non              Eos #             Eosinophil%             FiO2             Glucose             Gran # (ANC)             Gran%             Hematocrit             Hemoglobin             Lymph #             Lymph%             Magnesium             MCH             MCHC             MCV             Mode             Mono #             Mono%             MPV             PEEP             Phosphorus             Platelets             POC BE             POC HCO3             POC PCO2             POC PH             POC PO2             POC SATURATED O2             POCT Glucose   90   83     Potassium             Rate             RBC             RDW             Sample             Sodium             Troponin I             Vt             WBC                   Significant Imaging: Echocardiogram:   2D echo with color flow doppler:   Results for orders placed or performed during the hospital encounter of 04/27/18   2D echo with color flow doppler   Result Value  Ref Range    Calculated EF 55 55 - 65    Diastolic Dysfunction No     Mitral Valve Mobility NORMAL     Tricuspid Valve Regurgitation TRIVIAL     and X-Ray: CXR: X-Ray Chest 1 View (CXR):   Results for orders placed or performed during the hospital encounter of 10/27/18   X-Ray Chest 1 View    Narrative    EXAMINATION:  XR CHEST 1 VIEW    CLINICAL HISTORY:  SOB;    TECHNIQUE:  Single frontal view of the chest was performed.    COMPARISON:  10/28/2018    FINDINGS:  Tubes and lines are stable. Bilateral pleural effusions with bibasilar atelectasis or airspace disease or interstitial edema.  Mild cardiomegaly.  Aorta demonstrates atherosclerotic disease.  In comparison to the prior study, there is no adverse interval changes.      Impression    In comparison to the prior study, there is no adverse interval changes      Electronically signed by: Santo Marrero MD  Date:    10/29/2018  Time:    08:32    and X-Ray Chest PA and Lateral (CXR): No results found for this visit on 10/27/18.    Assessment and Plan:     Patient seen and examined today in ICU, remains intubated and sedated at time of exam. Dialysis scheduled for today. LHC yesterday revealed diffused diabetic CAD with no targets for CABG and not amenable to optimal PCI. Will optimize medical therapy as able. Continue IV heparin gtt for another 24 hours. Continue ASA, Statin, Plavix, Imdur, Hydralazine. No ACEI/ARB for now given soft BP, will add as BP allows. . Will add BB when BP and HR allows, issues with bradycardia overnight as well.      * Acute ST elevation myocardial infarction (STEMI) of inferior wall    See plan under CAD       Abnormal ECG           Acute pulmonary edema    Dialysis per nephrology.        On mechanically assisted ventilation    Per critical care        Acute on chronic diastolic congestive heart failure    Dialysis per nephrology  Remains intubated and sedated today.        Coronary artery disease of native artery of native heart with  stable angina pectoris    10/29/18   [facility] [place] revealed Diffuse diabetic CAD which was not amenable to optimal PCI and no targets for CABG  Diffuse LAD disease with distal occlusion and medical management advised  Continue IV heparin gtt for another 24 hours  Continue ASA, statin, Plavix, Hydralazine, Imdur  NO ACEI/ARB given soft BP, will add as BP allows.   Will add BB as BP allows, BP soft today at time of exam.   Remains intubated and sedated today.      Hypertension    On medical therapy  Continue clonidine patch, hydralazine, Imdur     ESRD (end stage renal disease) on dialysis    -per nephrology          Chronic diastolic heart failure    Resume DASH diet, 2 gm sodium restriction post extubation  Daily weights  Strict intake and output  Dialysis per nephrology         VTE Risk Mitigation (From admission, onward)        Ordered     heparin 25,000 units in dextrose 5% 250 mL (100 units/mL) infusion LOW INTENSITY nomogram - [facility]  Continuous      10/28/18 0806     heparin 25,000 units in dextrose 5% (100 units/ml) IV bolus from bag - ADDITIONAL PRN BOLUS - 60 units/kg (max bolus 4000 units)  As needed (PRN)      10/27/18 1915     heparin 25,000 units in dextrose 5% (100 units/ml) IV bolus from bag - ADDITIONAL PRN BOLUS - 30 units/kg (max bolus 4000 units)  As needed (PRN)      10/27/18 1915     IP VTE HIGH RISK PATIENT  Once      10/27/18 0805     heparin (porcine) injection 3,000 Units  Once      10/27/18 0659     heparin (porcine) injection 2,000 Units  Once      10/27/18 0659          Litzy Thomas NP  Cardiology  [facility] [place]

## 2018-10-29 NOTE — EICU
Nurse called to report ptt level is 118 to Dr. Kim.  She also reports pt with only blood tined sputum, but no change from yesterday. No other acitve bleeding seen. Informed Dr. Kim. She will follow heparin protocol.

## 2018-10-29 NOTE — PLAN OF CARE
Problem: Patient Care Overview  Goal: Plan of Care Review  Outcome: Ongoing (interventions implemented as appropriate)  Patient sedated on Propofol and Fentanyl on Vent.  Not following commands.  When aroused patient becomes agitated and bucks the vent. Tries to pull ETT out.  Remains in restraints. Afebrile on shift.  Remains NSR on monitor. BP stable. Heparin drip infusing.  Bleeding precautions in place.   Tube feeding continued at 30ml/hr.  Goal is 40ml/hr.  Residuals have been 200, 175, and 150.  No BM on shift.  No UOP. Patient on specialty bed.  Repositioning to prevent skin breakdown.  POC reviewed with family.

## 2018-10-29 NOTE — ASSESSMENT & PLAN NOTE
S/t pulmonary edema; intravascular volume overload with acute on chronic right heart failure with MI  Vent settings reviewed and adjusted for optimal gas exchange  VAP prophylaxis  SAT/SBT once troponin trending down

## 2018-10-29 NOTE — PLAN OF CARE
Problem: Patient Care Overview  Goal: Plan of Care Review  Outcome: Ongoing (interventions implemented as appropriate)  Recommendations     Recommendation/Intervention: 1.Consider changing current TF regimen to Peptamen Intense VHP at 70 ml/hr to better meet EEN/EPN. With current propofol infusion, provides 2041 calories, 155 g protein, and 1411 ml water. Flushes as needed with medications and additional per MD/NP for hydration. 2. Check residuals Q4 hours. Hold if > 300 cc.3. Will monitor propofol infusion and adjust recs prn.   Goals: Meet > 85 % EEN/EPN while admitted  Nutrition Goal Status: new  Communication of RD Recs: (Reviewed w/ RN)

## 2018-10-29 NOTE — ASSESSMENT & PLAN NOTE
1. ESRD on HD : (  MWF , [facility] ) ,  seen on HD , tolerating well,     As out pt , he is not adherent with fluid restriction per family,      2.  HTN : BP better after adjusting meds, UF on HD as tolerated      3. Anemia of CKD :  follow hemoglobin, Procrit with dialysis, pRBC tx per CCM,      4. SHPT : renal diet when stable,       5. CAD / acute MI - s/p LHC, optimize medical management per Cards ,

## 2018-10-29 NOTE — ASSESSMENT & PLAN NOTE
10/29/18   LHC revealed Diffuse diabetic CAD which was not amenable to optimal PCI and no targets for CABG  Diffuse LAD disease with distal occlusion and medical management advised  Continue IV heparin gtt for another 24 hours  Continue ASA, statin, Plavix, Hydralazine, Imdur  NO ACEI/ARB given renal function requiring dialysis.   Will add BB as BP allows, BP soft today at time of exam.   Remains intubated and sedated today.

## 2018-10-30 LAB
ALLENS TEST: ABNORMAL
ALLENS TEST: ABNORMAL
ANION GAP SERPL CALC-SCNC: 11 MMOL/L
APTT BLDCRRT: 47.8 SEC
BASOPHILS # BLD AUTO: 0.01 K/UL
BASOPHILS NFR BLD: 0.2 %
BUN SERPL-MCNC: 41 MG/DL
CALCIUM SERPL-MCNC: 8.3 MG/DL
CHLORIDE SERPL-SCNC: 96 MMOL/L
CO2 SERPL-SCNC: 28 MMOL/L
CREAT SERPL-MCNC: 5.9 MG/DL
DELSYS: ABNORMAL
DELSYS: ABNORMAL
DIFFERENTIAL METHOD: ABNORMAL
EOSINOPHIL # BLD AUTO: 0.1 K/UL
EOSINOPHIL NFR BLD: 2.5 %
ERYTHROCYTE [DISTWIDTH] IN BLOOD BY AUTOMATED COUNT: 16.1 %
ERYTHROCYTE [SEDIMENTATION RATE] IN BLOOD BY WESTERGREN METHOD: 16 MM/H
EST. GFR  (AFRICAN AMERICAN): 10 ML/MIN/1.73 M^2
EST. GFR  (NON AFRICAN AMERICAN): 9 ML/MIN/1.73 M^2
FIO2: 32
FIO2: 40
FLOW: 3
GLUCOSE SERPL-MCNC: 114 MG/DL
HBV CORE AB SERPL QL IA: NEGATIVE
HBV SURFACE AG SERPL QL IA: NEGATIVE
HCO3 UR-SCNC: 29 MMOL/L (ref 24–28)
HCO3 UR-SCNC: 32 MMOL/L (ref 24–28)
HCT VFR BLD AUTO: 25.7 %
HGB BLD-MCNC: 8.4 G/DL
LYMPHOCYTES # BLD AUTO: 0.7 K/UL
LYMPHOCYTES NFR BLD: 12 %
MAGNESIUM SERPL-MCNC: 2 MG/DL
MCH RBC QN AUTO: 28.4 PG
MCHC RBC AUTO-ENTMCNC: 32.7 G/DL
MCV RBC AUTO: 87 FL
MODE: ABNORMAL
MODE: ABNORMAL
MONOCYTES # BLD AUTO: 0.6 K/UL
MONOCYTES NFR BLD: 10.7 %
NEUTROPHILS # BLD AUTO: 4.2 K/UL
NEUTROPHILS NFR BLD: 74.6 %
PCO2 BLDA: 44.7 MMHG (ref 35–45)
PCO2 BLDA: 52.3 MMHG (ref 35–45)
PEEP: 5
PH SMN: 7.39 [PH] (ref 7.35–7.45)
PH SMN: 7.42 [PH] (ref 7.35–7.45)
PHOSPHATE SERPL-MCNC: 5 MG/DL
PLATELET # BLD AUTO: 140 K/UL
PMV BLD AUTO: 10.8 FL
PO2 BLDA: 104 MMHG (ref 80–100)
PO2 BLDA: 138 MMHG (ref 80–100)
POC BE: 5 MMOL/L
POC BE: 7 MMOL/L
POC SATURATED O2: 98 % (ref 95–100)
POC SATURATED O2: 99 % (ref 95–100)
POCT GLUCOSE: 130 MG/DL (ref 70–110)
POCT GLUCOSE: 153 MG/DL (ref 70–110)
POTASSIUM SERPL-SCNC: 4.7 MMOL/L
RBC # BLD AUTO: 2.96 M/UL
SAMPLE: ABNORMAL
SAMPLE: ABNORMAL
SITE: ABNORMAL
SITE: ABNORMAL
SODIUM SERPL-SCNC: 135 MMOL/L
TROPONIN I SERPL DL<=0.01 NG/ML-MCNC: 26.1 NG/ML
VT: 450
WBC # BLD AUTO: 5.6 K/UL

## 2018-10-30 PROCEDURE — 99233 SBSQ HOSP IP/OBS HIGH 50: CPT | Mod: ,,, | Performed by: INTERNAL MEDICINE

## 2018-10-30 PROCEDURE — 85025 COMPLETE CBC W/AUTO DIFF WBC: CPT

## 2018-10-30 PROCEDURE — 99900035 HC TECH TIME PER 15 MIN (STAT)

## 2018-10-30 PROCEDURE — 84484 ASSAY OF TROPONIN QUANT: CPT

## 2018-10-30 PROCEDURE — 0BH17EZ INSERTION OF ENDOTRACHEAL AIRWAY INTO TRACHEA, VIA NATURAL OR ARTIFICIAL OPENING: ICD-10-PCS | Performed by: INTERNAL MEDICINE

## 2018-10-30 PROCEDURE — 20000000 HC ICU ROOM

## 2018-10-30 PROCEDURE — 25000003 PHARM REV CODE 250: Performed by: NURSE PRACTITIONER

## 2018-10-30 PROCEDURE — 63600175 PHARM REV CODE 636 W HCPCS: Performed by: NURSE PRACTITIONER

## 2018-10-30 PROCEDURE — 80048 BASIC METABOLIC PNL TOTAL CA: CPT

## 2018-10-30 PROCEDURE — 5A1945Z RESPIRATORY VENTILATION, 24-96 CONSECUTIVE HOURS: ICD-10-PCS | Performed by: INTERNAL MEDICINE

## 2018-10-30 PROCEDURE — 25000003 PHARM REV CODE 250: Performed by: INTERNAL MEDICINE

## 2018-10-30 PROCEDURE — 36600 WITHDRAWAL OF ARTERIAL BLOOD: CPT

## 2018-10-30 PROCEDURE — C9113 INJ PANTOPRAZOLE SODIUM, VIA: HCPCS | Performed by: NURSE PRACTITIONER

## 2018-10-30 PROCEDURE — 84100 ASSAY OF PHOSPHORUS: CPT

## 2018-10-30 PROCEDURE — 36415 COLL VENOUS BLD VENIPUNCTURE: CPT

## 2018-10-30 PROCEDURE — 99900026 HC AIRWAY MAINTENANCE (STAT)

## 2018-10-30 PROCEDURE — 82803 BLOOD GASES ANY COMBINATION: CPT

## 2018-10-30 PROCEDURE — 85730 THROMBOPLASTIN TIME PARTIAL: CPT

## 2018-10-30 PROCEDURE — 94003 VENT MGMT INPAT SUBQ DAY: CPT

## 2018-10-30 PROCEDURE — 99291 CRITICAL CARE FIRST HOUR: CPT | Mod: ,,, | Performed by: NURSE PRACTITIONER

## 2018-10-30 PROCEDURE — 83735 ASSAY OF MAGNESIUM: CPT

## 2018-10-30 RX ORDER — AMLODIPINE BESYLATE 5 MG/1
5 TABLET ORAL DAILY
Status: DISCONTINUED | OUTPATIENT
Start: 2018-10-30 | End: 2018-10-31

## 2018-10-30 RX ORDER — LOSARTAN POTASSIUM 25 MG/1
25 TABLET ORAL DAILY
Status: DISCONTINUED | OUTPATIENT
Start: 2018-10-30 | End: 2018-11-02

## 2018-10-30 RX ORDER — METOPROLOL TARTRATE 25 MG/1
25 TABLET, FILM COATED ORAL 2 TIMES DAILY
Status: DISCONTINUED | OUTPATIENT
Start: 2018-10-30 | End: 2018-10-31

## 2018-10-30 RX ORDER — LABETALOL HYDROCHLORIDE 5 MG/ML
10 INJECTION, SOLUTION INTRAVENOUS EVERY 6 HOURS PRN
Status: DISCONTINUED | OUTPATIENT
Start: 2018-10-30 | End: 2018-11-07 | Stop reason: HOSPADM

## 2018-10-30 RX ORDER — HEPARIN SODIUM 5000 [USP'U]/ML
5000 INJECTION, SOLUTION INTRAVENOUS; SUBCUTANEOUS EVERY 8 HOURS
Status: DISCONTINUED | OUTPATIENT
Start: 2018-10-30 | End: 2018-11-07 | Stop reason: HOSPADM

## 2018-10-30 RX ORDER — HYDRALAZINE HYDROCHLORIDE 25 MG/1
25 TABLET, FILM COATED ORAL ONCE
Status: COMPLETED | OUTPATIENT
Start: 2018-10-30 | End: 2018-10-30

## 2018-10-30 RX ADMIN — ASPIRIN 81 MG: 81 TABLET, COATED ORAL at 09:10

## 2018-10-30 RX ADMIN — AMLODIPINE BESYLATE 5 MG: 5 TABLET ORAL at 01:10

## 2018-10-30 RX ADMIN — ATORVASTATIN CALCIUM 80 MG: 40 TABLET, FILM COATED ORAL at 09:10

## 2018-10-30 RX ADMIN — METOPROLOL TARTRATE 25 MG: 25 TABLET ORAL at 11:10

## 2018-10-30 RX ADMIN — CHLORHEXIDINE GLUCONATE 15 ML: 1.2 RINSE ORAL at 09:10

## 2018-10-30 RX ADMIN — HYDRALAZINE HYDROCHLORIDE 50 MG: 50 TABLET, FILM COATED ORAL at 01:10

## 2018-10-30 RX ADMIN — CHLORHEXIDINE GLUCONATE 15 ML: 1.2 RINSE ORAL at 08:10

## 2018-10-30 RX ADMIN — LOSARTAN POTASSIUM 25 MG: 25 TABLET, FILM COATED ORAL at 02:10

## 2018-10-30 RX ADMIN — ISOSORBIDE DINITRATE 40 MG: 20 TABLET ORAL at 08:10

## 2018-10-30 RX ADMIN — HEPARIN SODIUM 5000 UNITS: 5000 INJECTION, SOLUTION INTRAVENOUS; SUBCUTANEOUS at 09:10

## 2018-10-30 RX ADMIN — LABETALOL HYDROCHLORIDE 10 MG: 5 INJECTION, SOLUTION INTRAVENOUS at 11:10

## 2018-10-30 RX ADMIN — ISOSORBIDE DINITRATE 40 MG: 20 TABLET ORAL at 02:10

## 2018-10-30 RX ADMIN — HYDRALAZINE HYDROCHLORIDE 50 MG: 50 TABLET, FILM COATED ORAL at 05:10

## 2018-10-30 RX ADMIN — PROPOFOL 25 MCG/KG/MIN: 10 INJECTION, EMULSION INTRAVENOUS at 05:10

## 2018-10-30 RX ADMIN — METOPROLOL TARTRATE 25 MG: 25 TABLET ORAL at 08:10

## 2018-10-30 RX ADMIN — ISOSORBIDE DINITRATE 40 MG: 20 TABLET ORAL at 09:10

## 2018-10-30 RX ADMIN — PANTOPRAZOLE SODIUM 40 MG: 40 INJECTION, POWDER, FOR SOLUTION INTRAVENOUS at 09:10

## 2018-10-30 RX ADMIN — HYDRALAZINE HYDROCHLORIDE 75 MG: 50 TABLET, FILM COATED ORAL at 09:10

## 2018-10-30 RX ADMIN — CLOPIDOGREL BISULFATE 75 MG: 75 TABLET ORAL at 09:10

## 2018-10-30 RX ADMIN — HYDRALAZINE HYDROCHLORIDE 25 MG: 25 TABLET, FILM COATED ORAL at 05:10

## 2018-10-30 NOTE — ASSESSMENT & PLAN NOTE
10/29/18   LHC revealed Diffuse diabetic CAD which was not amenable to optimal PCI and no targets for CABG  Diffuse LAD disease with distal occlusion and medical management advised  Continue IV heparin gtt for another 24 hours  Continue ASA, statin, Plavix, Hydralazine, Imdur  NO ACEI/ARB given renal function requiring dialysis.   Will add BB as BP allows, BP soft today at time of exam.   Remains intubated and sedated today.     10/30  -Attempting to wean to extubate today  -OK to stop IV heparin gtt today  Continue ASA, Statin, Plavix, Hydralazine, Imdur  Start BB and Norvasc today for better BP control  Will attempt to add ACEi/ARB tomorrow as BP permits.

## 2018-10-30 NOTE — PROGRESS NOTES
[facility] Hospital Medicine  Progress Note    Patient Name: Conner Perez III  MRN: [id number]  Patient Class: IP- Inpatient   Admission Date: 10/27/2018  Length of Stay: 3 days  Attending Physician: Keith Wesley MD  Primary Care Provider: Raciel Angela MD        Subjective:     Principal Problem:Acute ST elevation myocardial infarction (STEMI) of inferior wall    HPI:  Came to the emergency room overnight from home with shortness of breath and hypertension.  He was initially stabilized with supplemental oxygen and continuous BiPAP but decompensated.  He was increasingly hypercapnic and short of breath, so was intubated in the ER.  Ponit-of-care ultrasound showed decreased LV function.  He is a [facility] [place].  He dialyzed successfully yesterday with 3 L ultrafiltration.  Denied chest pain on admission.  POC is his Son Conner.  He frequently comes to the ER complaining of shortness of breath hypertensive responds to dialysis.    Hospital Course:  Admitted to ICU on mechanical ventilation.  Urgent hemodialysis per Nephrology.  Troponin increased from 0.02 to 3.5 with new wall motion abnormalities and reduction in EF on echo.  Cardiology informed.  Given 325 mg aspirin and Atorvastatin 80 mg and start Heparin infusion ACS protocol.  Troponin peaked at 24.8 and repeat EKG showed and Anterior STEMI.  The Cardiologist took him to the catheterization lab and performed an angiogram which showed diffuse disease without bypass targets.  Recommendation was to optimize medical management.  He returned to the ICU.    Interval History:  Cardiac enzymes now trending down but still elevated at 26.  Possible extubation today.  Optimizing medical management.    Review of Systems   Unable to perform ROS: Intubated     Objective:     Vital Signs (Most Recent):  Temp: 99.2 °F (37.3 °C) (10/30/18 0715)  Pulse: 69 (10/30/18 0818)  Resp: 16 (10/30/18 0818)  BP: (!) 129/55 (10/30/18 0818)  SpO2: 100 %  (10/30/18 0818) Vital Signs (24h Range):  Temp:  [98 °F (36.7 °C)-99.8 °F (37.7 °C)] 99.2 °F (37.3 °C)  Pulse:  [63-80] 69  Resp:  [0-18] 16  SpO2:  [88 %-100 %] 100 %  BP: ()/(33-71) 129/55     Weight: 91.2 kg (201 lb)  Body mass index is 23.23 kg/m².    Intake/Output Summary (Last 24 hours) at 10/30/2018 0837  Last data filed at 10/30/2018 0700  Gross per 24 hour   Intake 1544.98 ml   Output 20 ml   Net 1524.98 ml      Physical Exam   Constitutional: He is oriented to person, place, and time. He appears well-developed and well-nourished. No distress.   HENT:   Head: Normocephalic and atraumatic.   Mouth/Throat: Oropharynx is clear and moist.   Eyes: Conjunctivae and EOM are normal. Pupils are equal, round, and reactive to light.   Neck: No JVD present. No thyromegaly present.   Cardiovascular: Normal rate, regular rhythm and normal heart sounds. Exam reveals no gallop and no friction rub.   No murmur heard.  Right groin arterial access site dressed clean and intact.   Pulmonary/Chest: Effort normal and breath sounds normal. No respiratory distress. He has no wheezes. He has no rales.   Abdominal: Soft. Bowel sounds are normal. He exhibits no distension. There is no tenderness. There is no rebound and no guarding.   Musculoskeletal: Normal range of motion. He exhibits no edema or tenderness.   RUE AV fistula with palpable thrill.   Lymphadenopathy:     He has no cervical adenopathy.   Neurological: He is alert and oriented to person, place, and time. He has normal reflexes. He displays normal reflexes. No cranial nerve deficit.   Skin: Skin is warm and dry. No rash noted. He is not diaphoretic. No erythema.   Psychiatric: He has a normal mood and affect. His behavior is normal. Judgment and thought content normal.       Significant Labs: All pertinent labs within the past 24 hours have been reviewed.    Significant Imaging: I have reviewed all pertinent imaging results/findings within the past 24  hours.    Assessment/Plan:      * Acute ST elevation myocardial infarction (STEMI) of inferior wall    Troponin increased from 0.02 to 3.5 with new wall motion abnormalities and reduction in EF on echo.  Cardiology informed.  Given 325 mg aspirin and Atorvastatin 80 mg and start Heparin infusion ACS protocol.  Troponin increased to 24.8 and follow up EKG showed Anterior STEMI.  Urgent LHC revealed diffuse disease without appropriate bypass targets.  Optimizing medical management.     Acute hypercapnic respiratory failure on mechanical ventilation    Probably due to pulmonary congestion from volume overload.  Intubated in the ED for hypercapnia and worsening respiratory distress.  Urgent hemodialysis.     ESRD (end stage renal disease) on dialysis    Nephrology following and plan for urgent HD with ultrafiltration.  He reportedly had 3 L removed yesterday and has been compliant with HD but may still be volume overloaded.     Chronic diastolic heart failure    Repeat cardiac echo.  There may be a cardiac component.     Anemia of renal disease    Follow blood counts and transfuse if needed.     Coronary artery disease of native artery of native heart with stable angina pectoris    Control blood pressure.  Trend cardiac enzymes.       VTE Risk Mitigation (From admission, onward)        Ordered     heparin 25,000 units in dextrose 5% 250 mL (100 units/mL) infusion LOW INTENSITY nomogram - [facility]  Continuous      10/28/18 0806     heparin 25,000 units in dextrose 5% (100 units/ml) IV bolus from bag - ADDITIONAL PRN BOLUS - 60 units/kg (max bolus 4000 units)  As needed (PRN)      10/27/18 1915     heparin 25,000 units in dextrose 5% (100 units/ml) IV bolus from bag - ADDITIONAL PRN BOLUS - 30 units/kg (max bolus 4000 units)  As needed (PRN)      10/27/18 1915     IP VTE HIGH RISK PATIENT  Once      10/27/18 0805     heparin (porcine) injection 3,000 Units  Once      10/27/18 0659     heparin (porcine) injection 2,000 Units   Once      10/27/18 0659          Critical care time spent on the evaluation and treatment of severe organ dysfunction, review of pertinent labs and imaging studies, discussions with consulting providers and discussions with patient/family: 30 minutes.    Keith Wesley MD  Department of Hospital Medicine   [facility]

## 2018-10-30 NOTE — SUBJECTIVE & OBJECTIVE
Interval History:  Cardiac enzymes now trending down but still elevated at 26.  Possible extubation today.  Optimizing medical management.    Review of Systems   Unable to perform ROS: Intubated     Objective:     Vital Signs (Most Recent):  Temp: 99.2 °F (37.3 °C) (10/30/18 0715)  Pulse: 69 (10/30/18 0818)  Resp: 16 (10/30/18 0818)  BP: (!) 129/55 (10/30/18 0818)  SpO2: 100 % (10/30/18 0818) Vital Signs (24h Range):  Temp:  [98 °F (36.7 °C)-99.8 °F (37.7 °C)] 99.2 °F (37.3 °C)  Pulse:  [63-80] 69  Resp:  [0-18] 16  SpO2:  [88 %-100 %] 100 %  BP: ()/(33-71) 129/55     Weight: 91.2 kg (201 lb)  Body mass index is 23.23 kg/m².    Intake/Output Summary (Last 24 hours) at 10/30/2018 0837  Last data filed at 10/30/2018 0700  Gross per 24 hour   Intake 1544.98 ml   Output 20 ml   Net 1524.98 ml      Physical Exam   Constitutional: He is oriented to person, place, and time. He appears well-developed and well-nourished. No distress.   HENT:   Head: Normocephalic and atraumatic.   Mouth/Throat: Oropharynx is clear and moist.   Eyes: Conjunctivae and EOM are normal. Pupils are equal, round, and reactive to light.   Neck: No JVD present. No thyromegaly present.   Cardiovascular: Normal rate, regular rhythm and normal heart sounds. Exam reveals no gallop and no friction rub.   No murmur heard.  Right groin arterial access site dressed clean and intact.   Pulmonary/Chest: Effort normal and breath sounds normal. No respiratory distress. He has no wheezes. He has no rales.   Abdominal: Soft. Bowel sounds are normal. He exhibits no distension. There is no tenderness. There is no rebound and no guarding.   Musculoskeletal: Normal range of motion. He exhibits no edema or tenderness.   RUE AV fistula with palpable thrill.   Lymphadenopathy:     He has no cervical adenopathy.   Neurological: He is alert and oriented to person, place, and time. He has normal reflexes. He displays normal reflexes. No cranial nerve deficit.   Skin:  Skin is warm and dry. No rash noted. He is not diaphoretic. No erythema.   Psychiatric: He has a normal mood and affect. His behavior is normal. Judgment and thought content normal.       Significant Labs: All pertinent labs within the past 24 hours have been reviewed.    Significant Imaging: I have reviewed all pertinent imaging results/findings within the past 24 hours.

## 2018-10-30 NOTE — ASSESSMENT & PLAN NOTE
Diffuse CAD not amenable to interventions per cards; medical management  On ASA, plavix, statin, hydralazine, imdur  Add BB as able with BP low s/t sedation  No ACEI/ARB at this point with sedational low BP

## 2018-10-30 NOTE — SUBJECTIVE & OBJECTIVE
Interval History:  Cardiac enzymes continuing to increase.  Defer extubation.  Optimizing medical management.    Review of Systems   Unable to perform ROS: Intubated     Objective:     Vital Signs (Most Recent):  Temp: 98.9 °F (37.2 °C) (10/29/18 1902)  Pulse: 72 (10/29/18 2115)  Resp: 10 (10/29/18 2115)  BP: (!) 116/53 (10/29/18 2115)  SpO2: 100 % (10/29/18 2115) Vital Signs (24h Range):  Temp:  [97.4 °F (36.3 °C)-99.8 °F (37.7 °C)] 98.9 °F (37.2 °C)  Pulse:  [58-77] 72  Resp:  [4-25] 10  SpO2:  [88 %-100 %] 100 %  BP: ()/() 116/53     Weight: 91.2 kg (201 lb)  Body mass index is 23.23 kg/m².    Intake/Output Summary (Last 24 hours) at 10/29/2018 2123  Last data filed at 10/29/2018 2100  Gross per 24 hour   Intake 1587.8 ml   Output 40 ml   Net 1547.8 ml      Physical Exam   Constitutional: He is oriented to person, place, and time. He appears well-developed and well-nourished. No distress.   HENT:   Head: Normocephalic and atraumatic.   Mouth/Throat: Oropharynx is clear and moist.   Eyes: Conjunctivae and EOM are normal. Pupils are equal, round, and reactive to light.   Neck: No JVD present. No thyromegaly present.   Cardiovascular: Normal rate, regular rhythm and normal heart sounds. Exam reveals no gallop and no friction rub.   No murmur heard.  Right groin arterial access site dressed clean and intact.   Pulmonary/Chest: Effort normal and breath sounds normal. No respiratory distress. He has no wheezes. He has no rales.   Abdominal: Soft. Bowel sounds are normal. He exhibits no distension. There is no tenderness. There is no rebound and no guarding.   Musculoskeletal: Normal range of motion. He exhibits no edema or tenderness.   RUE AV fistula with palpable thrill.   Lymphadenopathy:     He has no cervical adenopathy.   Neurological: He is alert and oriented to person, place, and time. He has normal reflexes. He displays normal reflexes. No cranial nerve deficit.   Skin: Skin is warm and dry. No  rash noted. He is not diaphoretic. No erythema.   Psychiatric: He has a normal mood and affect. His behavior is normal. Judgment and thought content normal.       Significant Labs: All pertinent labs within the past 24 hours have been reviewed.    Significant Imaging: I have reviewed all pertinent imaging results/findings within the past 24 hours.

## 2018-10-30 NOTE — PROGRESS NOTES
pts sbp remains in the 180s-200s despite pts oral antihypertensives.  Metoprolol added and given as well.  N. May notified.  Orders received.  Amlodipine given as ordered.  Will continue to monitor.  Pt doing well on nasal cannula.

## 2018-10-30 NOTE — PROGRESS NOTES
[facility]   Critical Care Medicine  Progress Note    Patient Name: Conner Perez III  MRN: [id number]  Admission Date: 10/27/2018  Hospital Length of Stay: 3 days  Code Status: Full Code  Attending Provider: Keith Wesley MD  Primary Care Provider: Raciel Angela MD   Principal Problem: Acute ST elevation myocardial infarction (STEMI) of inferior wall    Subjective:     HPI:  68 year old male with ESRD and multiple admissions for volume overload/HTN requiring additional HD; HTN, GERD, CAD, COPD   Presented to ED 10/27 with SOB  Evaluation revealed evidence of fluid overload with hypertensive urgency and hypoxia; decline despite trial of NIPPV required intubation for mechanical ventilation    Hospital/ICU Course:  Admitted to ICU with OETT on mechanical ventilation with propofol sedation  10/28 Stable overnight, heart cath revealed multivessel disease  10/29 remains intubated and sedated for cardiac rest after LHC with multivessel disease not amenable to intervention or bypass; am troponin still rising; plan HD today  10/30 troponin trending down; tolerating SBT; BP trending higher off sedation    Review of Systems   Unable to perform ROS: Intubated       Objective:     Vital Signs (Most Recent):  Temp: 99.2 °F (37.3 °C) (10/30/18 0715)  Pulse: 91 (10/30/18 1330)  Resp: 16 (10/30/18 1330)  BP: (!) 188/88 (10/30/18 1330)  SpO2: 98 % (10/30/18 1330) Vital Signs (24h Range):  Temp:  [98.9 °F (37.2 °C)-99.5 °F (37.5 °C)] 99.2 °F (37.3 °C)  Pulse:  [64-95] 91  Resp:  [0-23] 16  SpO2:  [88 %-100 %] 98 %  BP: ()/() 188/88     Weight: 84.9 kg (187 lb 2.7 oz)  Body mass index is 21.63 kg/m².      Intake/Output Summary (Last 24 hours) at 10/30/2018 1354  Last data filed at 10/30/2018 0700  Gross per 24 hour   Intake 1246.98 ml   Output 2500 ml   Net -1253.02 ml       Physical Exam   Constitutional: He appears ill. No distress. He is sedated, intubated and restrained.   HENT:   Head:  Atraumatic.       Eyes: Conjunctivae are normal. Pupils are equal, round, and reactive to light.   Neck: JVD present. No neck rigidity. No tracheal deviation present.   Cardiovascular: Normal rate and regular rhythm.   No murmur heard.  Pulses:       Radial pulses are 2+ on the right side, and 2+ on the left side.        Dorsalis pedis pulses are 2+ on the right side, and 2+ on the left side.   Pulmonary/Chest: No accessory muscle usage. He is intubated. No respiratory distress. He has decreased breath sounds. He has no wheezes.   Abdominal: Soft. Bowel sounds are normal. He exhibits no distension.   Musculoskeletal: He exhibits no edema.        Arms:       Feet:    Skin: Skin is warm and dry. Capillary refill takes less than 2 seconds. No cyanosis.       Vents:  Vent Mode: Spont (10/30/18 1020)  Ventilator Initiated: Yes (10/27/18 0800)  Set Rate: 0 bmp (10/30/18 1020)  Vt Set: 450 mL (10/30/18 1020)  Pressure Support: 10 cmH20 (10/30/18 1020)  PEEP/CPAP: 5 cmH20 (10/30/18 1020)  Oxygen Concentration (%): 40 (10/30/18 1030)  Peak Airway Pressure: 15 cmH2O (10/30/18 1020)  Plateau Pressure: 0 cmH20 (10/30/18 1020)  Total Ve: 7.86 mL (10/30/18 1020)  F/VT Ratio<105 (RSBI): (!) 41.92 (10/30/18 1020)    Lines/Drains/Airways     Drain                 Hemodialysis AV Fistula Right forearm -- days         Hemodialysis AV Fistula 12/18/17 0017 Right forearm 316 days         NG/OG Tube 10/27/18 0807 nasogastric 16 Fr. Left nostril 3 days          Peripheral Intravenous Line                 Peripheral IV - Single Lumen 10/27/18 0930 Left Forearm 3 days         Peripheral IV - Single Lumen 10/28/18 2330 Left Forearm 1 day                Significant Labs:    CBC/Anemia Profile:  Recent Labs   Lab 10/29/18  0413 10/30/18  0405   WBC 8.16 5.60   HGB 8.6* 8.4*   HCT 26.6* 25.7*   * 140*   MCV 88 87   RDW 16.3* 16.1*        Chemistries:  Recent Labs   Lab 10/29/18  0413 10/30/18  0405   * 135*   K 4.6 4.7   CL 96 96    CO2 28 28   BUN 41* 41*   CREATININE 6.2* 5.9*   CALCIUM 8.2* 8.3*   MG 1.9 2.0   PHOS 4.6* 5.0*       All pertinent labs within the past 24 hours have been reviewed.  ABG  Recent Labs   Lab 10/30/18  0501   PH 7.395   PO2 138*   PCO2 52.3*   HCO3 32.0*   BE 7       Significant Imaging:  I have reviewed all pertinent imaging results/findings within the past 24 hours.      Assessment/Plan:     Pulmonary   Acute pulmonary edema    Improved with HD volume removal     Acute hypercapnic respiratory failure on mechanical ventilation    S/t pulmonary edema; intravascular volume overload with acute on chronic right heart failure with MI  Vent settings reviewed and adjusted for optimal gas exchange  VAP prophylaxis  Trial extubation to supplemental oxygen support     Cardiac/Vascular   * Acute ST elevation myocardial infarction (STEMI) of inferior wall    Diffuse CAD not amenable to interventions per cards; medical management  On ASA, plavix, statin, hydralazine, imdur  Add BB as able with BP low s/t sedation  No ACEI/ARB at this point with sedational low BP     Acute on chronic diastolic congestive heart failure    Fluid restriction  Strict I/O     Hypertension    ICU hemodynamic monitoring  Continue clonidine patch; hydralazine, imdur; norvasc added  Will consider ARB if remains hypertensive     Renal/   ESRD (end stage renal disease) on dialysis    Nephrology following  HD per schedule        Critical Care Daily Checklist:    A: Awake: RASS Goal/Actual Goal: RASS Goal: -2-->light sedation  Actual: Dowell Agitation Sedation Scale (RASS): Light sedation   B: Spontaneous Breathing Trial Performed?     C: SAT & SBT Coordinated?  yest                      D: Delirium: CAM-ICU Overall CAM-ICU: Positive   E: Early Mobility Performed? ROM   F: Feeding Goal: Goals: Meet > 85 % EEN/EPN while admitted  Status: Nutrition Goal Status: new   Current Diet Order   Procedures    Diet NPO      AS: Analgesia/Sedation Stopped for  extubation   T: Thromboembolic Prophylaxis heparin   H: HOB > 300 Yes   U: Stress Ulcer Prophylaxis (if needed) PPI   G: Glucose Control monitoring   B: Bowel Function Stool Occurrence: 1   I: Indwelling Catheter (Lines & Rai) Necessity reviewed   D: De-escalation of Antimicrobials/Pharmacotherapies reviewed    Plan for the day/ETD Trial extubation    Code Status:  Family/Goals of Care: Full Code  Home on discharge   I have discussed case and plan of care in detail with BLANCA Haley, NP and Dr Wesley; Status and plan of care were discussed with team on multidisciplinary rounds.    Critical Care Time: 45 minutes  Critical secondary to respiratory failure with mechanical ventilation     Critical care was time spent personally by me on the following activities: development of treatment plan with patient or surrogate and bedside caregivers, discussions with consultants, evaluation of patient's response to treatment, examination of patient, ordering and performing treatments and interventions, ordering and review of laboratory studies, ordering and review of radiographic studies, pulse oximetry, re-evaluation of patient's condition. This critical care time did not overlap with that of any other provider or involve time for any procedures.     SHERWIN Velasco-BC  Critical Care Medicine  [facility]

## 2018-10-30 NOTE — PROGRESS NOTES
Pts sbp continues to be up in the 180s-190s despite oral antihypertensives being added and adjusted.  BLANCA Moreno NP notified.  Orders received.  Hydralazine given as ordered.

## 2018-10-30 NOTE — ASSESSMENT & PLAN NOTE
S/t pulmonary edema; intravascular volume overload with acute on chronic right heart failure with MI  Vent settings reviewed and adjusted for optimal gas exchange  VAP prophylaxis  Trial extubation to supplemental oxygen support

## 2018-10-30 NOTE — PROGRESS NOTES
[facility] -   Cardiology  Progress Note    Patient Name: Conner Perez III  MRN: [id number]  Admission Date: 10/27/2018  Hospital Length of Stay: 3 days  Code Status: Full Code   Attending Physician: Keith Wesley MD   Primary Care Physician: Raciel Angela MD  Expected Discharge Date:   Principal Problem:Acute ST elevation myocardial infarction (STEMI) of inferior wall    Subjective:   HPI  Pt admitted with dyspnea/respiratory failure.  Pt has ESRD, HTN.    Admitted with acute dyspnea, respiratory failure requiring intubation.  ecg on admit showed NSR, nonspecific st-t abnl.   Troponin levels increased, started on IV heparin, asa for NSTEMI by Hospital Med.  Anemia noted on labs today, Hg 8.9  This am routine am ECG showed inferior ST elevation concerning for STEMI and marked anterolateral st-t abnormalities, worsening.  Cardiology on call was contacted about ECG and decision made to activate cath lab for emergent cath.  Pt is intubated, sedated and stable otherwise and there are no reports of chest pain sxs.  Echo yesterday showed EF 45%, apical WMA.  He was admitted last month, dyspnea/cp and had negative stress test.  Echo in earlier 4/7/17 showed similar WMA.  Reportedly has had LHC 2016 with no major blockages (no report available).    Hospital Course:   10/29/18-Patient seen and examined in room, intubated and sedated. IV heparin gtt in place. Pending dialysis treatment today. Labs reviewed, stable.     10/30/18-Patient seen and examined in room, intubated. On SBT today to possible extubation. IV heparin gtt in place. Labs reviewed, stable. BP remains elevated, will adjust BP meds as tolerated.     Interval History: no acute issues o/n    Review of Systems   Unable to perform ROS: intubated     Objective:     Vital Signs (Most Recent):  Temp: 99.2 °F (37.3 °C) (10/30/18 0715)  Pulse: 95 (10/30/18 1100)  Resp: 16 (10/30/18 1100)  BP: (!) 199/71 (10/30/18 1100)  SpO2: 96 % (10/30/18 1100)  Vital Signs (24h Range):  Temp:  [98.9 °F (37.2 °C)-99.5 °F (37.5 °C)] 99.2 °F (37.3 °C)  Pulse:  [63-95] 95  Resp:  [0-21] 16  SpO2:  [88 %-100 %] 96 %  BP: ()/() 199/71     Weight: 84.9 kg (187 lb 2.7 oz)  Body mass index is 21.63 kg/m².     SpO2: 96 %  O2 Device (Oxygen Therapy): ventilator      Intake/Output Summary (Last 24 hours) at 10/30/2018 1218  Last data filed at 10/30/2018 0700  Gross per 24 hour   Intake 1306.58 ml   Output 0 ml   Net 1306.58 ml       Lines/Drains/Airways     Drain                 Hemodialysis AV Fistula Right forearm -- days         Hemodialysis AV Fistula 12/18/17 0017 Right forearm 316 days         NG/OG Tube 10/27/18 0807 nasogastric 16 Fr. Left nostril 3 days          Peripheral Intravenous Line                 Peripheral IV - Single Lumen 10/27/18 0930 Left Forearm 3 days         Peripheral IV - Single Lumen 10/28/18 2330 Left Forearm 1 day                Physical Exam   Constitutional: He is oriented to person, place, and time. He appears well-developed and well-nourished. No distress. He is sedated, intubated and restrained.   HENT:   Head: Normocephalic and atraumatic.   Neck: Normal range of motion and full passive range of motion without pain. Neck supple. No JVD present.   NG tube in place   Cardiovascular: Normal rate, regular rhythm, S1 normal, S2 normal and intact distal pulses. PMI is not displaced. Exam reveals no distant heart sounds.   No murmur heard.  Pulses:       Radial pulses are 2+ on the right side, and 2+ on the left side.        Dorsalis pedis pulses are 1+ on the right side, and 1+ on the left side.   Right groin access site C/D/I, no hematoma or ecchymosis noted.    Pulmonary/Chest: Effort normal and breath sounds normal. No accessory muscle usage. He is intubated. No respiratory distress. He has no wheezes.   Abdominal: Soft. Bowel sounds are normal. He exhibits no distension. There is no tenderness.   obese   Genitourinary:   Genitourinary  Comments: +kim cath   Musculoskeletal: Normal range of motion. He exhibits no edema.        Right ankle: He exhibits no swelling.        Left ankle: He exhibits no swelling.   AV fistula Right forearm   Neurological: He is oriented to person, place, and time.   Skin: Skin is warm and dry. He is not diaphoretic. No cyanosis. Nails show no clubbing.   Psychiatric: He has a normal mood and affect. His speech is normal and behavior is normal. Judgment and thought content normal. Cognition and memory are normal.   Nursing note and vitals reviewed.      Significant Labs:   All pertinent lab results from the last 24 hours have been reviewed. and   Recent Lab Results       10/30/18  0502   10/30/18  0501   10/30/18  0405   10/29/18  2310   10/29/18  1833        Allens Test   Pass           Anion Gap     11         aPTT     47.8  Comment:  aPTT therapeutic range = 39-69 seconds         Baso #     0.01         Basophil%     0.2         Site   LR           BUN, Bld     41         Calcium     8.3         Chloride     96         CO2     28         Creatinine     5.9         DelSys   Adult Vent           Differential Method     Automated         eGFR if      10         eGFR if non      9  Comment:  Calculation used to obtain the estimated glomerular filtration  rate (eGFR) is the CKD-EPI equation.            Eos #     0.1         Eosinophil%     2.5         FiO2   40           Glucose     114         Gran # (ANC)     4.2         Gran%     74.6         Hematocrit     25.7         Hemoglobin     8.4         Hep B Core Total Ab               Hepatitis B Surface Ag               Lymph #     0.7         Lymph%     12.0         Magnesium     2.0         MCH     28.4         MCHC     32.7         MCV     87         Mode   AC/PRVC           Mono #     0.6         Mono%     10.7         MPV     10.8         PEEP   5           Phosphorus     5.0         Platelets     140         POC BE   7           POC  HCO3   32.0           POC PCO2   52.3           POC PH   7.395           POC PO2   138           POC SATURATED O2   99           POCT Glucose 130     116 131     Potassium     4.7         Rate   16           RBC     2.96         RDW     16.1         Sample   ARTERIAL           Sodium     135         Troponin I     26.103  Comment:  The reference interval for Troponin I represents the 99th percentile   cutoff   for our facility and is consistent with 3rd generation assay   performance.           Vt   450           WBC     5.60                          10/29/18  1322        Allens Test       Anion Gap       aPTT       Baso #       Basophil%       Site       BUN, Bld       Calcium       Chloride       CO2       Creatinine       DelSys       Differential Method       eGFR if        eGFR if non        Eos #       Eosinophil%       FiO2       Glucose       Gran # (ANC)       Gran%       Hematocrit       Hemoglobin       Hep B Core Total Ab Negative     Hepatitis B Surface Ag Negative     Lymph #       Lymph%       Magnesium       MCH       MCHC       MCV       Mode       Mono #       Mono%       MPV       PEEP       Phosphorus       Platelets       POC BE       POC HCO3       POC PCO2       POC PH       POC PO2       POC SATURATED O2       POCT Glucose       Potassium       Rate       RBC       RDW       Sample       Sodium       Troponin I       Vt       WBC             Significant Imaging: Echocardiogram:   2D echo with color flow doppler:   Results for orders placed or performed during the hospital encounter of 04/27/18   2D echo with color flow doppler   Result Value Ref Range    Calculated EF 55 55 - 65    Diastolic Dysfunction No     Mitral Valve Mobility NORMAL     Tricuspid Valve Regurgitation TRIVIAL     and X-Ray: CXR: X-Ray Chest 1 View (CXR):   Results for orders placed or performed during the hospital encounter of 10/27/18   X-Ray Chest 1 View    Narrative    EXAMINATION:  XR  CHEST 1 VIEW    CLINICAL HISTORY:  respiratory failure;    COMPARISON:  10/29/2018    FINDINGS:  Endotracheal and NG tubes remain in place.  The size of the heart is prominent.  There is persistent opacification of the bases of both hemithoraces with haziness in the bases of the lungs.  There is no pneumothorax.      Impression    1. There is persistent opacification of the bases of both hemithoraces with haziness in the bases of the lungs.  This is characteristic of small bilateral pleural effusions with associated atelectasis.  2. The size of the heart is prominent.  This may be secondary to magnification.  3. Endotracheal and NG tubes remain in place.  .      Electronically signed by: Damien Kumar MD  Date:    10/30/2018  Time:    07:45    and X-Ray Chest PA and Lateral (CXR): No results found for this visit on 10/27/18.    Assessment and Plan:       * Acute ST elevation myocardial infarction (STEMI) of inferior wall    See plan under CAD       Abnormal ECG           Acute pulmonary edema    Dialysis per nephrology.        On mechanically assisted ventilation    Per critical care        Acute on chronic diastolic congestive heart failure    Dialysis per nephrology  Remains intubated and sedated today.        Coronary artery disease of native artery of native heart with stable angina pectoris    10/29/18   [facility] revealed Diffuse diabetic CAD which was not amenable to optimal PCI and no targets for CABG  Diffuse LAD disease with distal occlusion and medical management advised  Continue IV heparin gtt for another 24 hours  Continue ASA, statin, Plavix, Hydralazine, Imdur  NO ACEI/ARB given renal function requiring dialysis.   Will add BB as BP allows, BP soft today at time of exam.   Remains intubated and sedated today.     10/30  -Attempting to wean to extubate today  -OK to stop IV heparin gtt today  Continue ASA, Statin, Plavix, Hydralazine, Imdur  Start BB and Norvasc today for better BP control  Will attempt  to add ACEi/ARB tomorrow as BP permits.      Hypertension    On medical therapy  Continue clonidine patch, hydralazine, Imdur  Start BB and Norvasc today  Monitor BP trend and adjust as needed.      ESRD (end stage renal disease) on dialysis    -per nephrology          Chronic diastolic heart failure    Resume DASH diet, 2 gm sodium restriction post extubation  Daily weights  Strict intake and output  Dialysis per nephrology         VTE Risk Mitigation (From admission, onward)        Ordered     heparin 25,000 units in dextrose 5% 250 mL (100 units/mL) infusion LOW INTENSITY nomogram - [facility]  Continuous      10/28/18 0806     heparin 25,000 units in dextrose 5% (100 units/ml) IV bolus from bag - ADDITIONAL PRN BOLUS - 60 units/kg (max bolus 4000 units)  As needed (PRN)      10/27/18 1915     heparin 25,000 units in dextrose 5% (100 units/ml) IV bolus from bag - ADDITIONAL PRN BOLUS - 30 units/kg (max bolus 4000 units)  As needed (PRN)      10/27/18 1915     IP VTE HIGH RISK PATIENT  Once      10/27/18 0805          Litzy Thomas NP  Cardiology  [facility]

## 2018-10-30 NOTE — PLAN OF CARE
Problem: Patient Care Overview  Goal: Plan of Care Review  Outcome: Ongoing (interventions implemented as appropriate)  Vitals signs closely monitored. Fall precautions in place. Patient turned every two hours. Pain assessed every two hours. Safety promoted. Infection risks reduced. Prop and fentanyl for sedation. Pt resting comfortably on vent. Restraints for safety.  Pt remained intubated, becauseTroponin increased to 40. Period of low bp after giving evening bp meds. Np Josh aware. TF increased to goal. Tolerating well. Daughter updated

## 2018-10-30 NOTE — SUBJECTIVE & OBJECTIVE
Review of Systems   Unable to perform ROS: Intubated       Objective:     Vital Signs (Most Recent):  Temp: 99.2 °F (37.3 °C) (10/30/18 0715)  Pulse: 91 (10/30/18 1330)  Resp: 16 (10/30/18 1330)  BP: (!) 188/88 (10/30/18 1330)  SpO2: 98 % (10/30/18 1330) Vital Signs (24h Range):  Temp:  [98.9 °F (37.2 °C)-99.5 °F (37.5 °C)] 99.2 °F (37.3 °C)  Pulse:  [64-95] 91  Resp:  [0-23] 16  SpO2:  [88 %-100 %] 98 %  BP: ()/() 188/88     Weight: 84.9 kg (187 lb 2.7 oz)  Body mass index is 21.63 kg/m².      Intake/Output Summary (Last 24 hours) at 10/30/2018 1354  Last data filed at 10/30/2018 0700  Gross per 24 hour   Intake 1246.98 ml   Output 2500 ml   Net -1253.02 ml       Physical Exam   Constitutional: He appears ill. No distress. He is sedated, intubated and restrained.   HENT:   Head: Atraumatic.       Eyes: Conjunctivae are normal. Pupils are equal, round, and reactive to light.   Neck: JVD present. No neck rigidity. No tracheal deviation present.   Cardiovascular: Normal rate and regular rhythm.   No murmur heard.  Pulses:       Radial pulses are 2+ on the right side, and 2+ on the left side.        Dorsalis pedis pulses are 2+ on the right side, and 2+ on the left side.   Pulmonary/Chest: No accessory muscle usage. He is intubated. No respiratory distress. He has decreased breath sounds. He has no wheezes.   Abdominal: Soft. Bowel sounds are normal. He exhibits no distension.   Musculoskeletal: He exhibits no edema.        Arms:       Feet:    Skin: Skin is warm and dry. Capillary refill takes less than 2 seconds. No cyanosis.       Vents:  Vent Mode: Spont (10/30/18 1020)  Ventilator Initiated: Yes (10/27/18 0800)  Set Rate: 0 bmp (10/30/18 1020)  Vt Set: 450 mL (10/30/18 1020)  Pressure Support: 10 cmH20 (10/30/18 1020)  PEEP/CPAP: 5 cmH20 (10/30/18 1020)  Oxygen Concentration (%): 40 (10/30/18 1030)  Peak Airway Pressure: 15 cmH2O (10/30/18 1020)  Plateau Pressure: 0 cmH20 (10/30/18 1020)  Total Ve:  7.86 mL (10/30/18 1020)  F/VT Ratio<105 (RSBI): (!) 41.92 (10/30/18 1020)    Lines/Drains/Airways     Drain                 Hemodialysis AV Fistula Right forearm -- days         Hemodialysis AV Fistula 12/18/17 0017 Right forearm 316 days         NG/OG Tube 10/27/18 0807 nasogastric 16 Fr. Left nostril 3 days          Peripheral Intravenous Line                 Peripheral IV - Single Lumen 10/27/18 0930 Left Forearm 3 days         Peripheral IV - Single Lumen 10/28/18 2330 Left Forearm 1 day                Significant Labs:    CBC/Anemia Profile:  Recent Labs   Lab 10/29/18  0413 10/30/18  0405   WBC 8.16 5.60   HGB 8.6* 8.4*   HCT 26.6* 25.7*   * 140*   MCV 88 87   RDW 16.3* 16.1*        Chemistries:  Recent Labs   Lab 10/29/18  0413 10/30/18  0405   * 135*   K 4.6 4.7   CL 96 96   CO2 28 28   BUN 41* 41*   CREATININE 6.2* 5.9*   CALCIUM 8.2* 8.3*   MG 1.9 2.0   PHOS 4.6* 5.0*       All pertinent labs within the past 24 hours have been reviewed.  ABG  Recent Labs   Lab 10/30/18  0501   PH 7.395   PO2 138*   PCO2 52.3*   HCO3 32.0*   BE 7       Significant Imaging:  I have reviewed all pertinent imaging results/findings within the past 24 hours.

## 2018-10-30 NOTE — ASSESSMENT & PLAN NOTE
1. ESRD on HD : (  MWF , [facility] ) ,  plan HD tomorrow,     As out pt , he is not adherent with fluid restriction per family,      2.  HTN : BP fluctuates,  Adjust meds, UF on HD as tolerated      3. Anemia of CKD :  follow hemoglobin, Procrit with dialysis, pRBC tx per CCM,      4. SHPT : renal diet when stable,       5. CAD / acute MI - s/p LHC, optimize medical management per Cards ,     6. AMS - multifactorial,

## 2018-10-30 NOTE — PLAN OF CARE
Problem: Patient Care Overview  Goal: Plan of Care Review  Outcome: Ongoing (interventions implemented as appropriate)  Remains Sedated on Vent with propofol and fentanyl.  No s/s of pain.  Remains NSR on monitor.  BP stable.  Heparin drip infusing.  Bleeding precautions in place.  Tube feeding continued at goal.  Residuals have been 60, 125, and 225.  No BM. Anuric on shift.  Patient on specialty bed. repositioning patient Q2H to prevent skin breakdown. restraints in place  Bed alarm activated.  POC reviewed with family

## 2018-10-30 NOTE — ASSESSMENT & PLAN NOTE
On medical therapy  Continue clonidine patch, hydralazine, Imdur  Start BB and Norvasc today  Monitor BP trend and adjust as needed.

## 2018-10-30 NOTE — PROGRESS NOTES
[facility] Hospital Medicine  Progress Note    Patient Name: Conner Perez III  MRN: [id number]  Patient Class: IP- Inpatient   Admission Date: 10/27/2018  Length of Stay: 2 days  Attending Physician: Keith Wesley MD  Primary Care Provider: Raciel Angela MD        Subjective:     Principal Problem:Acute ST elevation myocardial infarction (STEMI) of inferior wall    HPI:  Came to the emergency room overnight from home with shortness of breath and hypertension.  He was initially stabilized with supplemental oxygen and continuous BiPAP but decompensated.  He was increasingly hypercapnic and short of breath, so was intubated in the ER.  Ponit-of-care ultrasound showed decreased LV function.  He is a [employer] [place].  He dialyzed successfully yesterday with 3 L ultrafiltration.  Denied chest pain on admission.  POC is his Son Conner.  He frequently comes to the ER complaining of shortness of breath hypertensive responds to dialysis.    Hospital Course:  Admitted to ICU on mechanical ventilation.  Urgent hemodialysis per Nephrology.  Troponin increased from 0.02 to 3.5 with new wall motion abnormalities and reduction in EF on echo.  Cardiology informed.  Given 325 mg aspirin and Atorvastatin 80 mg and start Heparin infusion ACS protocol.  Troponin peaked at 24.8 and repeat EKG showed and Anterior STEMI.  The Cardiologist took him to the catheterization lab and performed an angiogram which showed diffuse disease without bypass targets.  Recommendation was to optimize medical management.  He returned to the ICU.    Interval History:  Cardiac enzymes continuing to increase.  Defer extubation.  Optimizing medical management.    Review of Systems   Unable to perform ROS: Intubated     Objective:     Vital Signs (Most Recent):  Temp: 98.9 °F (37.2 °C) (10/29/18 1902)  Pulse: 72 (10/29/18 2115)  Resp: 10 (10/29/18 2115)  BP: (!) 116/53 (10/29/18 2115)  SpO2: 100 % (10/29/18 2115) Vital Signs (24h  Range):  Temp:  [97.4 °F (36.3 °C)-99.8 °F (37.7 °C)] 98.9 °F (37.2 °C)  Pulse:  [58-77] 72  Resp:  [4-25] 10  SpO2:  [88 %-100 %] 100 %  BP: ()/() 116/53     Weight: 91.2 kg (201 lb)  Body mass index is 23.23 kg/m².    Intake/Output Summary (Last 24 hours) at 10/29/2018 2123  Last data filed at 10/29/2018 2100  Gross per 24 hour   Intake 1587.8 ml   Output 40 ml   Net 1547.8 ml      Physical Exam   Constitutional: He is oriented to person, place, and time. He appears well-developed and well-nourished. No distress.   HENT:   Head: Normocephalic and atraumatic.   Mouth/Throat: Oropharynx is clear and moist.   Eyes: Conjunctivae and EOM are normal. Pupils are equal, round, and reactive to light.   Neck: No JVD present. No thyromegaly present.   Cardiovascular: Normal rate, regular rhythm and normal heart sounds. Exam reveals no gallop and no friction rub.   No murmur heard.  Right groin arterial access site dressed clean and intact.   Pulmonary/Chest: Effort normal and breath sounds normal. No respiratory distress. He has no wheezes. He has no rales.   Abdominal: Soft. Bowel sounds are normal. He exhibits no distension. There is no tenderness. There is no rebound and no guarding.   Musculoskeletal: Normal range of motion. He exhibits no edema or tenderness.   RUE AV fistula with palpable thrill.   Lymphadenopathy:     He has no cervical adenopathy.   Neurological: He is alert and oriented to person, place, and time. He has normal reflexes. He displays normal reflexes. No cranial nerve deficit.   Skin: Skin is warm and dry. No rash noted. He is not diaphoretic. No erythema.   Psychiatric: He has a normal mood and affect. His behavior is normal. Judgment and thought content normal.       Significant Labs: All pertinent labs within the past 24 hours have been reviewed.    Significant Imaging: I have reviewed all pertinent imaging results/findings within the past 24 hours.    Assessment/Plan:      * Acute ST  elevation myocardial infarction (STEMI) of inferior wall    Troponin increased from 0.02 to 3.5 with new wall motion abnormalities and reduction in EF on echo.  Cardiology informed.  Given 325 mg aspirin and Atorvastatin 80 mg and start Heparin infusion ACS protocol.  Troponin increased to 24.8 and follow up EKG showed Anterior STEMI.  Urgent LHC revealed diffuse disease without appropriate bypass targets.  Optimizing medical management.     Acute hypercapnic respiratory failure on mechanical ventilation    Probably due to pulmonary congestion from volume overload.  Intubated in the ED for hypercapnia and worsening respiratory distress.  Urgent hemodialysis.     ESRD (end stage renal disease) on dialysis    Nephrology following and plan for urgent HD with ultrafiltration.  He reportedly had 3 L removed yesterday and has been compliant with HD but may still be volume overloaded.     Chronic diastolic heart failure    Repeat cardiac echo.  There may be a cardiac component.     Anemia of renal disease    Follow blood counts and transfuse if needed.     Coronary artery disease of native artery of native heart with stable angina pectoris    Control blood pressure.  Trend cardiac enzymes.       VTE Risk Mitigation (From admission, onward)        Ordered     heparin 25,000 units in dextrose 5% 250 mL (100 units/mL) infusion LOW INTENSITY nomogram - [facility]  Continuous      10/28/18 0806     heparin 25,000 units in dextrose 5% (100 units/ml) IV bolus from bag - ADDITIONAL PRN BOLUS - 60 units/kg (max bolus 4000 units)  As needed (PRN)      10/27/18 1915     heparin 25,000 units in dextrose 5% (100 units/ml) IV bolus from bag - ADDITIONAL PRN BOLUS - 30 units/kg (max bolus 4000 units)  As needed (PRN)      10/27/18 1915     IP VTE HIGH RISK PATIENT  Once      10/27/18 0805     heparin (porcine) injection 3,000 Units  Once      10/27/18 0659     heparin (porcine) injection 2,000 Units  Once      10/27/18 0659          Critical  care time spent on the evaluation and treatment of severe organ dysfunction, review of pertinent labs and imaging studies, discussions with consulting providers and discussions with patient/family: 30 minutes.    Keith Wesley MD  Department of Hospital Medicine   [facility]

## 2018-10-30 NOTE — PROGRESS NOTES
[facility] -   Nephrology  Progress Note    Patient Name: Conner Perez III  MRN: [id number]  Admission Date: 10/27/2018  Hospital Length of Stay: 3 days  Attending Provider: Keith Wesley MD   Primary Care Physician: Raciel Angela MD  Principal Problem:Acute ST elevation myocardial infarction (STEMI) of inferior wall    Subjective:     HPI: Conner Perez III  is a 68 y old AA man with h/o ESRD on HD ( [facility], [facility] ) presented to the ER this morning  for SOB, bilateral pulmonary edema noted on the chest x-ray, patient had his dialysis treatment yesterday, patient has been having recurrent episodes of shortness of breath and similar presentations to the emergency room in the last few months, he also has been losing weight, patient had to be emergently intubated in the emergency room and was transferred to ICU, we were consulted for emergent dialysis for volume removal,       Interval History:    10/28/18 - Pt s/p C for acute MI . Severe CAD , Cards suggest medical management,       10/29/18 - seen on HD, tolerating well, troponin continues to get worse     10/30/18 - extubated and doing well, remains confused ,       Review of patient's allergies indicates:   Allergen Reactions    Augmentin [amoxicillin-pot clavulanate] Edema    Sulfa (sulfonamide antibiotics)      swelling    Lisinopril      cough     Current Facility-Administered Medications   Medication Frequency    albuterol nebulizer solution 2.5 mg Q6H PRN    aspirin EC tablet 81 mg Daily    atorvastatin tablet 80 mg Daily    chlorhexidine 0.12 % solution 15 mL BID    cloNIDine 0.3 mg/24 hr td ptwk 1 patch Q7 Days    clopidogrel tablet 75 mg Daily    dextrose 50% injection 25 g Q6H PRN    heparin 25,000 units in dextrose 5% (100 units/ml) IV bolus from bag - ADDITIONAL PRN BOLUS - 30 units/kg (max bolus 4000 units) PRN    heparin 25,000 units in dextrose 5% (100 units/ml) IV bolus from bag - ADDITIONAL PRN  BOLUS - 60 units/kg (max bolus 4000 units) PRN    heparin 25,000 units in dextrose 5% 250 mL (100 units/mL) infusion LOW INTENSITY nomogram - [facility] Continuous    hydrALAZINE tablet 50 mg Q8H    isosorbide dinitrate tablet 40 mg TID    metoprolol tartrate (LOPRESSOR) tablet 25 mg BID    pantoprazole injection 40 mg Daily       Objective:     Vital Signs (Most Recent):  Temp: 99.2 °F (37.3 °C) (10/30/18 0715)  Pulse: 95 (10/30/18 1100)  Resp: 16 (10/30/18 1100)  BP: (!) 199/71 (10/30/18 1100)  SpO2: 96 % (10/30/18 1100)  O2 Device (Oxygen Therapy): ventilator (10/30/18 0818) Vital Signs (24h Range):  Temp:  [98 °F (36.7 °C)-99.5 °F (37.5 °C)] 99.2 °F (37.3 °C)  Pulse:  [63-95] 95  Resp:  [0-21] 16  SpO2:  [88 %-100 %] 96 %  BP: ()/() 199/71     Weight: 84.9 kg (187 lb 2.7 oz) (10/30/18 0800)  Body mass index is 21.63 kg/m².  Body surface area is 2.16 meters squared.    I/O last 3 completed shifts:  In: 2363.8 [I.V.:993.8; NG/GT:1370]  Out: 40 [Urine:40]    Physical Exam   Constitutional: He appears well-developed. No distress.   HENT:   Head: Normocephalic and atraumatic.   Neck: Neck supple. No tracheal deviation present. No thyromegaly present.   Cardiovascular: Normal rate, regular rhythm, normal heart sounds and intact distal pulses. Exam reveals no gallop and no friction rub.   No murmur heard.  Pulmonary/Chest: He has no wheezes.   Abdominal: Soft. He exhibits no mass. There is no tenderness. There is no rebound and no guarding.   Musculoskeletal: He exhibits no edema.   Lymphadenopathy:     He has no cervical adenopathy.   Skin: Skin is warm. No rash noted. He is not diaphoretic. No erythema.   Nursing note and vitals reviewed.      Significant Labs:    CBC:     Recent Labs   Lab 10/30/18  0405   WBC 5.60   RBC 2.96*   HGB 8.4*   HCT 25.7*   *   MCV 87   MCH 28.4   MCHC 32.7     CMP:   Recent Labs   Lab 10/27/18  0548  10/30/18  0405      < > 114*   CALCIUM 9.3   < > 8.3*   ALBUMIN  3.9  --   --    PROT 7.9  --   --       < > 135*   K 4.5   < > 4.7   CO2 30*   < > 28   CL 95   < > 96   BUN 18   < > 41*   CREATININE 4.3*   < > 5.9*   ALKPHOS 66  --   --    ALT 19  --   --    AST 30  --   --    BILITOT 0.8  --   --     < > = values in this interval not displayed.     Coagulation:   Recent Labs   Lab 10/27/18  1923  10/30/18  0405   INR 1.1  --   --    APTT 30.5   < > 47.8*    < > = values in this interval not displayed.     LFTs:   Recent Labs   Lab 10/27/18  0548   ALT 19   AST 30   ALKPHOS 66   BILITOT 0.8   PROT 7.9   ALBUMIN 3.9     All labs within the past 24 hours have been reviewed.     Significant Imaging:  Reviewed     Lab Results   Component Value Date    .7 (H) 08/26/2017    CALCIUM 8.3 (L) 10/30/2018    PHOS 5.0 (H) 10/30/2018       Lab Results   Component Value Date    ALBUMIN 3.9 10/27/2018         Assessment/Plan:     ESRD (end stage renal disease) on dialysis    1. ESRD on HD : (  MWF , [facility] ) ,  plan HD tomorrow,     As out pt , he is not adherent with fluid restriction per family,      2.  HTN : BP fluctuates,  Adjust meds, UF on HD as tolerated      3. Anemia of CKD :  follow hemoglobin, Procrit with dialysis, pRBC tx per [facility],      4. SHPT : renal diet when stable,       5. CAD / acute MI - s/p LHC, optimize medical management per Cards ,     6. AMS - multifactorial,               I will follow-up with patient. Please contact us if you have any additional questions.     Total time spent 40 minutes including time needed to review the records,  patient  evaluation, documentation, face-to-face discussion with the patient, primary and Critical care teams, more than 50% of the time was spent on coordination of care and counseling.       Fabián London MD  Nephrology  [facility] -

## 2018-10-30 NOTE — SUBJECTIVE & OBJECTIVE
Interval History: no acute issues o/n    Review of Systems   Unable to perform ROS: intubated     Objective:     Vital Signs (Most Recent):  Temp: 99.2 °F (37.3 °C) (10/30/18 0715)  Pulse: 95 (10/30/18 1100)  Resp: 16 (10/30/18 1100)  BP: (!) 199/71 (10/30/18 1100)  SpO2: 96 % (10/30/18 1100) Vital Signs (24h Range):  Temp:  [98.9 °F (37.2 °C)-99.5 °F (37.5 °C)] 99.2 °F (37.3 °C)  Pulse:  [63-95] 95  Resp:  [0-21] 16  SpO2:  [88 %-100 %] 96 %  BP: ()/() 199/71     Weight: 84.9 kg (187 lb 2.7 oz)  Body mass index is 21.63 kg/m².     SpO2: 96 %  O2 Device (Oxygen Therapy): ventilator      Intake/Output Summary (Last 24 hours) at 10/30/2018 1218  Last data filed at 10/30/2018 0700  Gross per 24 hour   Intake 1306.58 ml   Output 0 ml   Net 1306.58 ml       Lines/Drains/Airways     Drain                 Hemodialysis AV Fistula Right forearm -- days         Hemodialysis AV Fistula 12/18/17 0017 Right forearm 316 days         NG/OG Tube 10/27/18 0807 nasogastric 16 Fr. Left nostril 3 days          Peripheral Intravenous Line                 Peripheral IV - Single Lumen 10/27/18 0930 Left Forearm 3 days         Peripheral IV - Single Lumen 10/28/18 2330 Left Forearm 1 day                Physical Exam   Constitutional: He is oriented to person, place, and time. He appears well-developed and well-nourished. No distress. He is sedated, intubated and restrained.   HENT:   Head: Normocephalic and atraumatic.   Neck: Normal range of motion and full passive range of motion without pain. Neck supple. No JVD present.   NG tube in place   Cardiovascular: Normal rate, regular rhythm, S1 normal, S2 normal and intact distal pulses. PMI is not displaced. Exam reveals no distant heart sounds.   No murmur heard.  Pulses:       Radial pulses are 2+ on the right side, and 2+ on the left side.        Dorsalis pedis pulses are 1+ on the right side, and 1+ on the left side.   Right groin access site C/D/I, no hematoma or ecchymosis  noted.    Pulmonary/Chest: Effort normal and breath sounds normal. No accessory muscle usage. He is intubated. No respiratory distress. He has no wheezes.   Abdominal: Soft. Bowel sounds are normal. He exhibits no distension. There is no tenderness.   obese   Genitourinary:   Genitourinary Comments: +kim cath   Musculoskeletal: Normal range of motion. He exhibits no edema.        Right ankle: He exhibits no swelling.        Left ankle: He exhibits no swelling.   AV fistula Right forearm   Neurological: He is oriented to person, place, and time.   Skin: Skin is warm and dry. He is not diaphoretic. No cyanosis. Nails show no clubbing.   Psychiatric: He has a normal mood and affect. His speech is normal and behavior is normal. Judgment and thought content normal. Cognition and memory are normal.   Nursing note and vitals reviewed.      Significant Labs:   All pertinent lab results from the last 24 hours have been reviewed. and   Recent Lab Results       10/30/18  0502   10/30/18  0501   10/30/18  0405   10/29/18  2310   10/29/18  1833        Allens Test   Pass           Anion Gap     11         aPTT     47.8  Comment:  aPTT therapeutic range = 39-69 seconds         Baso #     0.01         Basophil%     0.2         Site   LR           BUN, Bld     41         Calcium     8.3         Chloride     96         CO2     28         Creatinine     5.9         DelSys   Adult Vent           Differential Method     Automated         eGFR if      10         eGFR if non      9  Comment:  Calculation used to obtain the estimated glomerular filtration  rate (eGFR) is the CKD-EPI equation.            Eos #     0.1         Eosinophil%     2.5         FiO2   40           Glucose     114         Gran # (ANC)     4.2         Gran%     74.6         Hematocrit     25.7         Hemoglobin     8.4         Hep B Core Total Ab               Hepatitis B Surface Ag               Lymph #     0.7         Lymph%      12.0         Magnesium     2.0         MCH     28.4         MCHC     32.7         MCV     87         Mode   AC/PRVC           Mono #     0.6         Mono%     10.7         MPV     10.8         PEEP   5           Phosphorus     5.0         Platelets     140         POC BE   7           POC HCO3   32.0           POC PCO2   52.3           POC PH   7.395           POC PO2   138           POC SATURATED O2   99           POCT Glucose 130     116 131     Potassium     4.7         Rate   16           RBC     2.96         RDW     16.1         Sample   ARTERIAL           Sodium     135         Troponin I     26.103  Comment:  The reference interval for Troponin I represents the 99th percentile   cutoff   for our facility and is consistent with 3rd generation assay   performance.           Vt   450           WBC     5.60                          10/29/18  1322        Allens Test       Anion Gap       aPTT       Baso #       Basophil%       Site       BUN, Bld       Calcium       Chloride       CO2       Creatinine       DelSys       Differential Method       eGFR if        eGFR if non        Eos #       Eosinophil%       FiO2       Glucose       Gran # (ANC)       Gran%       Hematocrit       Hemoglobin       Hep B Core Total Ab Negative     Hepatitis B Surface Ag Negative     Lymph #       Lymph%       Magnesium       MCH       MCHC       MCV       Mode       Mono #       Mono%       MPV       PEEP       Phosphorus       Platelets       POC BE       POC HCO3       POC PCO2       POC PH       POC PO2       POC SATURATED O2       POCT Glucose       Potassium       Rate       RBC       RDW       Sample       Sodium       Troponin I       Vt       WBC             Significant Imaging: Echocardiogram:   2D echo with color flow doppler:   Results for orders placed or performed during the hospital encounter of 04/27/18   2D echo with color flow doppler   Result Value Ref Range    Calculated EF 55 55 -  65    Diastolic Dysfunction No     Mitral Valve Mobility NORMAL     Tricuspid Valve Regurgitation TRIVIAL     and X-Ray: CXR: X-Ray Chest 1 View (CXR):   Results for orders placed or performed during the hospital encounter of 10/27/18   X-Ray Chest 1 View    Narrative    EXAMINATION:  XR CHEST 1 VIEW    CLINICAL HISTORY:  respiratory failure;    COMPARISON:  10/29/2018    FINDINGS:  Endotracheal and NG tubes remain in place.  The size of the heart is prominent.  There is persistent opacification of the bases of both hemithoraces with haziness in the bases of the lungs.  There is no pneumothorax.      Impression    1. There is persistent opacification of the bases of both hemithoraces with haziness in the bases of the lungs.  This is characteristic of small bilateral pleural effusions with associated atelectasis.  2. The size of the heart is prominent.  This may be secondary to magnification.  3. Endotracheal and NG tubes remain in place.  .      Electronically signed by: Damien Kumar MD  Date:    10/30/2018  Time:    07:45    and X-Ray Chest PA and Lateral (CXR): No results found for this visit on 10/27/18.

## 2018-10-30 NOTE — SUBJECTIVE & OBJECTIVE
Interval History:    10/28/18 - Pt s/p C for acute MI . Severe CAD , Cards suggest medical management,       10/29/18 - seen on HD, tolerating well, troponin continues to get worse     10/30/18 - extubated and doing well, remains confused ,       Review of patient's allergies indicates:   Allergen Reactions    Augmentin [amoxicillin-pot clavulanate] Edema    Sulfa (sulfonamide antibiotics)      swelling    Lisinopril      cough     Current Facility-Administered Medications   Medication Frequency    albuterol nebulizer solution 2.5 mg Q6H PRN    aspirin EC tablet 81 mg Daily    atorvastatin tablet 80 mg Daily    chlorhexidine 0.12 % solution 15 mL BID    cloNIDine 0.3 mg/24 hr td ptwk 1 patch Q7 Days    clopidogrel tablet 75 mg Daily    dextrose 50% injection 25 g Q6H PRN    heparin 25,000 units in dextrose 5% (100 units/ml) IV bolus from bag - ADDITIONAL PRN BOLUS - 30 units/kg (max bolus 4000 units) PRN    heparin 25,000 units in dextrose 5% (100 units/ml) IV bolus from bag - ADDITIONAL PRN BOLUS - 60 units/kg (max bolus 4000 units) PRN    heparin 25,000 units in dextrose 5% 250 mL (100 units/mL) infusion LOW INTENSITY nomogram - [facility] Continuous    hydrALAZINE tablet 50 mg Q8H    isosorbide dinitrate tablet 40 mg TID    metoprolol tartrate (LOPRESSOR) tablet 25 mg BID    pantoprazole injection 40 mg Daily       Objective:     Vital Signs (Most Recent):  Temp: 99.2 °F (37.3 °C) (10/30/18 0715)  Pulse: 95 (10/30/18 1100)  Resp: 16 (10/30/18 1100)  BP: (!) 199/71 (10/30/18 1100)  SpO2: 96 % (10/30/18 1100)  O2 Device (Oxygen Therapy): ventilator (10/30/18 0818) Vital Signs (24h Range):  Temp:  [98 °F (36.7 °C)-99.5 °F (37.5 °C)] 99.2 °F (37.3 °C)  Pulse:  [63-95] 95  Resp:  [0-21] 16  SpO2:  [88 %-100 %] 96 %  BP: ()/() 199/71     Weight: 84.9 kg (187 lb 2.7 oz) (10/30/18 0800)  Body mass index is 21.63 kg/m².  Body surface area is 2.16 meters squared.    I/O last 3 completed shifts:  In:  2363.8 [I.V.:993.8; NG/GT:1370]  Out: 40 [Urine:40]    Physical Exam   Constitutional: He appears well-developed. No distress.   HENT:   Head: Normocephalic and atraumatic.   Neck: Neck supple. No tracheal deviation present. No thyromegaly present.   Cardiovascular: Normal rate, regular rhythm, normal heart sounds and intact distal pulses. Exam reveals no gallop and no friction rub.   No murmur heard.  Pulmonary/Chest: He has no wheezes.   Abdominal: Soft. He exhibits no mass. There is no tenderness. There is no rebound and no guarding.   Musculoskeletal: He exhibits no edema.   Lymphadenopathy:     He has no cervical adenopathy.   Skin: Skin is warm. No rash noted. He is not diaphoretic. No erythema.   Nursing note and vitals reviewed.      Significant Labs:    CBC:     Recent Labs   Lab 10/30/18  0405   WBC 5.60   RBC 2.96*   HGB 8.4*   HCT 25.7*   *   MCV 87   MCH 28.4   MCHC 32.7     CMP:   Recent Labs   Lab 10/27/18  0548  10/30/18  0405      < > 114*   CALCIUM 9.3   < > 8.3*   ALBUMIN 3.9  --   --    PROT 7.9  --   --       < > 135*   K 4.5   < > 4.7   CO2 30*   < > 28   CL 95   < > 96   BUN 18   < > 41*   CREATININE 4.3*   < > 5.9*   ALKPHOS 66  --   --    ALT 19  --   --    AST 30  --   --    BILITOT 0.8  --   --     < > = values in this interval not displayed.     Coagulation:   Recent Labs   Lab 10/27/18  1923  10/30/18  0405   INR 1.1  --   --    APTT 30.5   < > 47.8*    < > = values in this interval not displayed.     LFTs:   Recent Labs   Lab 10/27/18  0548   ALT 19   AST 30   ALKPHOS 66   BILITOT 0.8   PROT 7.9   ALBUMIN 3.9     All labs within the past 24 hours have been reviewed.     Significant Imaging:  Reviewed     Lab Results   Component Value Date    .7 (H) 08/26/2017    CALCIUM 8.3 (L) 10/30/2018    PHOS 5.0 (H) 10/30/2018       Lab Results   Component Value Date    ALBUMIN 3.9 10/27/2018

## 2018-10-30 NOTE — ASSESSMENT & PLAN NOTE
ICU hemodynamic monitoring  Continue clonidine patch; hydralazine, imdur; norvasc added  Will consider ARB if remains hypertensive

## 2018-10-31 PROBLEM — I21.3 STEMI (ST ELEVATION MYOCARDIAL INFARCTION): Status: ACTIVE | Noted: 2018-10-31

## 2018-10-31 PROBLEM — Z99.11 ON MECHANICALLY ASSISTED VENTILATION: Status: RESOLVED | Noted: 2018-10-27 | Resolved: 2018-10-31

## 2018-10-31 LAB
ANION GAP SERPL CALC-SCNC: 16 MMOL/L
BASOPHILS # BLD AUTO: 0.02 K/UL
BASOPHILS NFR BLD: 0.3 %
BUN SERPL-MCNC: 59 MG/DL
CALCIUM SERPL-MCNC: 8.7 MG/DL
CHLORIDE SERPL-SCNC: 94 MMOL/L
CO2 SERPL-SCNC: 24 MMOL/L
CREAT SERPL-MCNC: 7.5 MG/DL
DIFFERENTIAL METHOD: ABNORMAL
EOSINOPHIL # BLD AUTO: 0.1 K/UL
EOSINOPHIL NFR BLD: 1.2 %
ERYTHROCYTE [DISTWIDTH] IN BLOOD BY AUTOMATED COUNT: 15.5 %
EST. GFR  (AFRICAN AMERICAN): 8 ML/MIN/1.73 M^2
EST. GFR  (NON AFRICAN AMERICAN): 7 ML/MIN/1.73 M^2
GLUCOSE SERPL-MCNC: 88 MG/DL
HCT VFR BLD AUTO: 28.3 %
HGB BLD-MCNC: 9.4 G/DL
LYMPHOCYTES # BLD AUTO: 0.4 K/UL
LYMPHOCYTES NFR BLD: 6.5 %
MAGNESIUM SERPL-MCNC: 2.1 MG/DL
MCH RBC QN AUTO: 28.3 PG
MCHC RBC AUTO-ENTMCNC: 33.2 G/DL
MCV RBC AUTO: 85 FL
MONOCYTES # BLD AUTO: 0.7 K/UL
MONOCYTES NFR BLD: 9.5 %
NEUTROPHILS # BLD AUTO: 5.6 K/UL
NEUTROPHILS NFR BLD: 82.5 %
PHOSPHATE SERPL-MCNC: 6 MG/DL
PLATELET # BLD AUTO: 163 K/UL
PMV BLD AUTO: 10.9 FL
POTASSIUM SERPL-SCNC: 4.9 MMOL/L
RBC # BLD AUTO: 3.32 M/UL
SODIUM SERPL-SCNC: 134 MMOL/L
WBC # BLD AUTO: 6.82 K/UL

## 2018-10-31 PROCEDURE — 21400001 HC TELEMETRY ROOM

## 2018-10-31 PROCEDURE — C9113 INJ PANTOPRAZOLE SODIUM, VIA: HCPCS | Performed by: NURSE PRACTITIONER

## 2018-10-31 PROCEDURE — 83735 ASSAY OF MAGNESIUM: CPT

## 2018-10-31 PROCEDURE — 99232 SBSQ HOSP IP/OBS MODERATE 35: CPT | Mod: ,,, | Performed by: INTERNAL MEDICINE

## 2018-10-31 PROCEDURE — 25000003 PHARM REV CODE 250: Performed by: INTERNAL MEDICINE

## 2018-10-31 PROCEDURE — 63600175 PHARM REV CODE 636 W HCPCS: Performed by: NURSE PRACTITIONER

## 2018-10-31 PROCEDURE — 84100 ASSAY OF PHOSPHORUS: CPT

## 2018-10-31 PROCEDURE — 80048 BASIC METABOLIC PNL TOTAL CA: CPT

## 2018-10-31 PROCEDURE — 90935 HEMODIALYSIS ONE EVALUATION: CPT | Mod: ,,, | Performed by: INTERNAL MEDICINE

## 2018-10-31 PROCEDURE — 25000003 PHARM REV CODE 250: Performed by: NURSE PRACTITIONER

## 2018-10-31 PROCEDURE — 63600175 PHARM REV CODE 636 W HCPCS: Mod: JG | Performed by: INTERNAL MEDICINE

## 2018-10-31 PROCEDURE — 80100016 HC MAINTENANCE HEMODIALYSIS

## 2018-10-31 PROCEDURE — 36415 COLL VENOUS BLD VENIPUNCTURE: CPT

## 2018-10-31 PROCEDURE — 63600175 PHARM REV CODE 636 W HCPCS: Performed by: INTERNAL MEDICINE

## 2018-10-31 PROCEDURE — 99233 SBSQ HOSP IP/OBS HIGH 50: CPT | Mod: ,,, | Performed by: NURSE PRACTITIONER

## 2018-10-31 PROCEDURE — 27000221 HC OXYGEN, UP TO 24 HOURS

## 2018-10-31 PROCEDURE — 94761 N-INVAS EAR/PLS OXIMETRY MLT: CPT

## 2018-10-31 PROCEDURE — 85025 COMPLETE CBC W/AUTO DIFF WBC: CPT

## 2018-10-31 RX ORDER — HYDRALAZINE HYDROCHLORIDE 50 MG/1
100 TABLET, FILM COATED ORAL EVERY 8 HOURS
Status: DISCONTINUED | OUTPATIENT
Start: 2018-10-31 | End: 2018-11-07 | Stop reason: HOSPADM

## 2018-10-31 RX ORDER — HEPARIN SODIUM 1000 [USP'U]/ML
3000 INJECTION, SOLUTION INTRAVENOUS; SUBCUTANEOUS ONCE
Status: DISCONTINUED | OUTPATIENT
Start: 2018-10-31 | End: 2018-11-01

## 2018-10-31 RX ORDER — POLYETHYLENE GLYCOL 3350 17 G/17G
17 POWDER, FOR SOLUTION ORAL DAILY
Status: DISCONTINUED | OUTPATIENT
Start: 2018-10-31 | End: 2018-11-07 | Stop reason: HOSPADM

## 2018-10-31 RX ORDER — METOPROLOL TARTRATE 25 MG/1
25 TABLET, FILM COATED ORAL ONCE
Status: COMPLETED | OUTPATIENT
Start: 2018-10-31 | End: 2018-10-31

## 2018-10-31 RX ORDER — METOPROLOL TARTRATE 50 MG/1
50 TABLET ORAL 2 TIMES DAILY
Status: DISCONTINUED | OUTPATIENT
Start: 2018-10-31 | End: 2018-11-01

## 2018-10-31 RX ORDER — AMLODIPINE BESYLATE 10 MG/1
10 TABLET ORAL DAILY
Status: DISCONTINUED | OUTPATIENT
Start: 2018-10-31 | End: 2018-11-07 | Stop reason: HOSPADM

## 2018-10-31 RX ORDER — HYDRALAZINE HYDROCHLORIDE 20 MG/ML
10 INJECTION INTRAMUSCULAR; INTRAVENOUS EVERY 8 HOURS PRN
Status: DISCONTINUED | OUTPATIENT
Start: 2018-10-31 | End: 2018-11-07 | Stop reason: HOSPADM

## 2018-10-31 RX ADMIN — AMLODIPINE BESYLATE 10 MG: 10 TABLET ORAL at 12:10

## 2018-10-31 RX ADMIN — HEPARIN SODIUM 5000 UNITS: 5000 INJECTION, SOLUTION INTRAVENOUS; SUBCUTANEOUS at 05:10

## 2018-10-31 RX ADMIN — ISOSORBIDE DINITRATE 40 MG: 20 TABLET ORAL at 08:10

## 2018-10-31 RX ADMIN — POLYETHYLENE GLYCOL 3350 17 G: 17 POWDER, FOR SOLUTION ORAL at 01:10

## 2018-10-31 RX ADMIN — ATORVASTATIN CALCIUM 80 MG: 40 TABLET, FILM COATED ORAL at 12:10

## 2018-10-31 RX ADMIN — METOPROLOL TARTRATE 50 MG: 50 TABLET ORAL at 09:10

## 2018-10-31 RX ADMIN — LOSARTAN POTASSIUM 25 MG: 25 TABLET, FILM COATED ORAL at 12:10

## 2018-10-31 RX ADMIN — ISOSORBIDE DINITRATE 40 MG: 20 TABLET ORAL at 01:10

## 2018-10-31 RX ADMIN — METOPROLOL TARTRATE 25 MG: 25 TABLET ORAL at 10:10

## 2018-10-31 RX ADMIN — HEPARIN SODIUM 5000 UNITS: 5000 INJECTION, SOLUTION INTRAVENOUS; SUBCUTANEOUS at 01:10

## 2018-10-31 RX ADMIN — CHLORHEXIDINE GLUCONATE 15 ML: 1.2 RINSE ORAL at 09:10

## 2018-10-31 RX ADMIN — CLOPIDOGREL BISULFATE 75 MG: 75 TABLET ORAL at 12:10

## 2018-10-31 RX ADMIN — HYDRALAZINE HYDROCHLORIDE 100 MG: 50 TABLET, FILM COATED ORAL at 10:10

## 2018-10-31 RX ADMIN — HYDRALAZINE HYDROCHLORIDE 75 MG: 50 TABLET, FILM COATED ORAL at 05:10

## 2018-10-31 RX ADMIN — ASPIRIN 81 MG: 81 TABLET, COATED ORAL at 12:10

## 2018-10-31 RX ADMIN — PANTOPRAZOLE SODIUM 40 MG: 40 INJECTION, POWDER, FOR SOLUTION INTRAVENOUS at 01:10

## 2018-10-31 RX ADMIN — HYDRALAZINE HYDROCHLORIDE 10 MG: 20 INJECTION INTRAMUSCULAR; INTRAVENOUS at 01:10

## 2018-10-31 RX ADMIN — METOPROLOL TARTRATE 25 MG: 25 TABLET, FILM COATED ORAL at 11:10

## 2018-10-31 RX ADMIN — HEPARIN SODIUM 5000 UNITS: 5000 INJECTION, SOLUTION INTRAVENOUS; SUBCUTANEOUS at 10:10

## 2018-10-31 RX ADMIN — CHLORHEXIDINE GLUCONATE 15 ML: 1.2 RINSE ORAL at 08:10

## 2018-10-31 RX ADMIN — ERYTHROPOIETIN 10000 UNITS: 10000 INJECTION, SOLUTION INTRAVENOUS; SUBCUTANEOUS at 10:10

## 2018-10-31 NOTE — PROGRESS NOTES
Pt completed 4 hours of HD tx with 3 L net UF.  Pt tolerated tx well. No access issues. During tx, pt received epo. Dr. London rounded on pt at bedside during tx.  Post tx, blood rinsed back, needles removed, and hemostasis achieved. Report to nurse attending.

## 2018-10-31 NOTE — SUBJECTIVE & OBJECTIVE
Encounter addended by: Danielle Ocampo PT on: 10/21/2018  8:39 PM<BR>     Actions taken: Flowsheet accepted Interval History: extubated yesterday, HD in progress    Review of Systems   Constitution: Negative for weakness and malaise/fatigue.   HENT: Negative for hearing loss and hoarse voice.    Eyes: Negative for blurred vision and visual disturbance.   Cardiovascular: Positive for chest pain (resolved). Negative for claudication, dyspnea on exertion, irregular heartbeat, leg swelling, near-syncope, orthopnea, palpitations, paroxysmal nocturnal dyspnea and syncope.   Respiratory: Negative for cough, hemoptysis, shortness of breath, sleep disturbances due to breathing, snoring and wheezing.    Endocrine: Negative for cold intolerance and heat intolerance.   Hematologic/Lymphatic: Does not bruise/bleed easily.   Skin: Negative for color change, dry skin and nail changes.   Musculoskeletal: Positive for arthritis and back pain. Negative for joint pain and myalgias.   Gastrointestinal: Negative for bloating, abdominal pain, constipation, nausea and vomiting.   Genitourinary: Negative for dysuria, flank pain, hematuria and hesitancy.   Neurological: Negative for headaches, light-headedness, loss of balance, numbness and paresthesias.   Psychiatric/Behavioral: Negative for altered mental status.   Allergic/Immunologic: Negative for environmental allergies.     Objective:     Vital Signs (Most Recent):  Temp: 99.2 °F (37.3 °C) (10/31/18 0715)  Pulse: 82 (10/31/18 1040)  Resp: (!) 22 (10/31/18 1020)  BP: (!) 181/76 (10/31/18 1040)  SpO2: 100 % (10/31/18 1020) Vital Signs (24h Range):  Temp:  [98.1 °F (36.7 °C)-99.2 °F (37.3 °C)] 99.2 °F (37.3 °C)  Pulse:  [74-95] 82  Resp:  [14-23] 22  SpO2:  [95 %-100 %] 100 %  BP: (153-199)/(63-96) 181/76     Weight: 84.9 kg (187 lb 2.7 oz)  Body mass index is 21.63 kg/m².     SpO2: 100 %  O2 Device (Oxygen Therapy): nasal cannula      Intake/Output Summary (Last 24 hours) at 10/31/2018 1059  Last data filed at 10/30/2018 1400  Gross per 24 hour   Intake 98.1 ml   Output --   Net 98.1 ml        Lines/Drains/Airways     Drain                 Hemodialysis AV Fistula Right forearm -- days         Hemodialysis AV Fistula 12/18/17 0017 Right forearm 317 days          Peripheral Intravenous Line                 Peripheral IV - Single Lumen 10/27/18 0930 Left Forearm 4 days         Peripheral IV - Single Lumen 10/28/18 2330 Left Forearm 2 days                Physical Exam   Constitutional: He is oriented to person, place, and time. He appears well-developed and well-nourished. Nasal cannula in place.   HENT:   Head: Normocephalic and atraumatic.   Eyes: Pupils are equal, round, and reactive to light.   Neck: Normal range of motion and full passive range of motion without pain. Neck supple. No JVD present.   Cardiovascular: Normal rate, regular rhythm, S1 normal, S2 normal and intact distal pulses. PMI is not displaced. Exam reveals no distant heart sounds.   No murmur heard.  Pulses:       Radial pulses are 2+ on the right side, and 2+ on the left side.        Dorsalis pedis pulses are 2+ on the right side, and 2+ on the left side.   Right groin access site C/D/I, no hematoma or ecchymosis noted.    Pulmonary/Chest: Effort normal and breath sounds normal. No accessory muscle usage. No respiratory distress. He has no decreased breath sounds. He has no wheezes. He has no rales.   Abdominal: Soft. Bowel sounds are normal. He exhibits no distension. There is no tenderness.   Genitourinary:   Genitourinary Comments: +kim cath  +Right forearm AV fistula +thrill, +bruit   Musculoskeletal: Normal range of motion. He exhibits no edema.        Right ankle: He exhibits no swelling.        Left ankle: He exhibits no swelling.   Neurological: He is alert and oriented to person, place, and time.   Skin: Skin is warm and dry. No rash noted. No cyanosis or erythema. No pallor. Nails show no clubbing.   Psychiatric: He has a normal mood and affect. His speech is normal and behavior is normal. Judgment and thought content  normal. Cognition and memory are normal.   Nursing note and vitals reviewed.      Significant Labs:   All pertinent lab results from the last 24 hours have been reviewed. and   Recent Lab Results       10/31/18  0326   10/30/18  2233   10/30/18  1353        Allens Test   Pass       Anion Gap 16         Baso # 0.02         Basophil% 0.3         Site   LR       BUN, Bld 59         Calcium 8.7         Chloride 94         CO2 24         Creatinine 7.5         DelSys   Nasal Can       Differential Method Automated         eGFR if  8         eGFR if non  7  Comment:  Calculation used to obtain the estimated glomerular filtration  rate (eGFR) is the CKD-EPI equation.            Eos # 0.1         Eosinophil% 1.2         FiO2   32       Flow   3       Glucose 88         Gran # (ANC) 5.6         Gran% 82.5         Hematocrit 28.3         Hemoglobin 9.4         Lymph # 0.4         Lymph% 6.5         Magnesium 2.1         MCH 28.3         MCHC 33.2         MCV 85         Mode   SPONT       Mono # 0.7         Mono% 9.5         MPV 10.9         Phosphorus 6.0         Platelets 163         POC BE   5       POC HCO3   29.0       POC PCO2   44.7       POC PH   7.420       POC PO2   104       POC SATURATED O2   98       POCT Glucose     153     Potassium 4.9         RBC 3.32         RDW 15.5         Sample   ARTERIAL       Sodium 134         WBC 6.82               Significant Imaging: Echocardiogram:   2D echo with color flow doppler:   Results for orders placed or performed during the hospital encounter of 04/27/18   2D echo with color flow doppler   Result Value Ref Range    Calculated EF 55 55 - 65    Diastolic Dysfunction No     Mitral Valve Mobility NORMAL     Tricuspid Valve Regurgitation TRIVIAL     and X-Ray: CXR: X-Ray Chest 1 View (CXR):   Results for orders placed or performed during the hospital encounter of 10/27/18   X-Ray Chest 1 View    Narrative    EXAMINATION:  XR CHEST 1  VIEW    CLINICAL HISTORY:  respiratory failure;    TECHNIQUE:  Single frontal view of the chest was performed.    COMPARISON:  10/30/2018    FINDINGS:  Endotracheal and nasogastric tubes have been removed.  Small right and trace left pleural effusions with right lower lobe atelectasis or airspace disease versus aspiration.  Stable cardiomegaly.  In comparison to the prior study, there is no adverse interval changes      Impression    In comparison to the prior study, there is no adverse interval changes      Electronically signed by: Santo Marrero MD  Date:    10/31/2018  Time:    08:20    and X-Ray Chest PA and Lateral (CXR): No results found for this visit on 10/27/18.

## 2018-10-31 NOTE — PLAN OF CARE
Problem: Patient Care Overview  Goal: Plan of Care Review  Outcome: Ongoing (interventions implemented as appropriate)  Patient AAO to person and place.  Disoriented to time. Remained on 3L nasal canula.  NSR on monitor.  BP elevated on shift. See mar for interventions. Tolerating diet.  No BM on shift.  Remains anuric. On specialty bed. Refusing to turn. Bed alarm activated.  [place] encouraged.  POC reviewed with patient and family

## 2018-10-31 NOTE — PROGRESS NOTES
Patient BP elevated.  Notified EICU.  MD gave order to get at blood gas to see if patient is retaining CO2 and to call back with results.  Patient is currently awake and alert and following commands.  No signs of respiratory distress but will proceed to get Blood gas for MD.      2233:  Blood gas resulted WDL.  PRN Labetalol ordered.  Will administer and continue to monitor.

## 2018-10-31 NOTE — SUBJECTIVE & OBJECTIVE
ROS    Objective:     Vital Signs (Most Recent):  Temp: 97.7 °F (36.5 °C) (10/31/18 1105)  Pulse: 82 (10/31/18 1105)  Resp: 18 (10/31/18 1105)  BP: (!) 186/76 (10/31/18 1100)  SpO2: 100 % (10/31/18 1105) Vital Signs (24h Range):  Temp:  [97.7 °F (36.5 °C)-99.2 °F (37.3 °C)] 97.7 °F (36.5 °C)  Pulse:  [74-95] 82  Resp:  [14-23] 18  SpO2:  [95 %-100 %] 100 %  BP: (153-188)/(63-96) 186/76     Weight: 84.9 kg (187 lb 2.7 oz)  Body mass index is 21.63 kg/m².      Intake/Output Summary (Last 24 hours) at 10/31/2018 1117  Last data filed at 10/30/2018 1400  Gross per 24 hour   Intake 98.1 ml   Output --   Net 98.1 ml       Physical Exam   Constitutional: He appears ill. No distress.   HENT:   Head: Atraumatic.       Eyes: Conjunctivae are normal. Pupils are equal, round, and reactive to light.   Neck: JVD present. No neck rigidity. No tracheal deviation present.   Cardiovascular: Normal rate and regular rhythm.   No murmur heard.  Pulses:       Radial pulses are 2+ on the right side, and 2+ on the left side.        Dorsalis pedis pulses are 2+ on the right side, and 2+ on the left side.   Pulmonary/Chest: No accessory muscle usage. No respiratory distress. He has decreased breath sounds. He has no wheezes.   Abdominal: Soft. Bowel sounds are normal. He exhibits no distension.   Musculoskeletal: He exhibits no edema.        Arms:       Feet:    Skin: Skin is warm and dry. Capillary refill takes less than 2 seconds. No cyanosis.       Vents:  Vent Mode: Spont (10/30/18 1020)  Ventilator Initiated: Yes (10/27/18 0800)  Set Rate: 0 bmp (10/30/18 1020)  Vt Set: 450 mL (10/30/18 1020)  Pressure Support: 10 cmH20 (10/30/18 1020)  PEEP/CPAP: 5 cmH20 (10/30/18 1020)  Oxygen Concentration (%): 40 (10/30/18 1030)  Peak Airway Pressure: 15 cmH2O (10/30/18 1020)  Plateau Pressure: 0 cmH20 (10/30/18 1020)  Total Ve: 7.86 mL (10/30/18 1020)  F/VT Ratio<105 (RSBI): (!) 41.92 (10/30/18 1020)    Lines/Drains/Airways     Drain                  Hemodialysis AV Fistula Right forearm -- days         Hemodialysis AV Fistula 12/18/17 0017 Right forearm 317 days          Peripheral Intravenous Line                 Peripheral IV - Single Lumen 10/27/18 0930 Left Forearm 4 days         Peripheral IV - Single Lumen 10/28/18 2330 Left Forearm 2 days                Significant Labs:    CBC/Anemia Profile:  Recent Labs   Lab 10/30/18  0405 10/31/18  0326   WBC 5.60 6.82   HGB 8.4* 9.4*   HCT 25.7* 28.3*   * 163   MCV 87 85   RDW 16.1* 15.5*        Chemistries:  Recent Labs   Lab 10/30/18  0405 10/31/18  0326   * 134*   K 4.7 4.9   CL 96 94*   CO2 28 24   BUN 41* 59*   CREATININE 5.9* 7.5*   CALCIUM 8.3* 8.7   MG 2.0 2.1   PHOS 5.0* 6.0*       All pertinent labs within the past 24 hours have been reviewed.    Significant Imaging:  I have reviewed all pertinent imaging results/findings within the past 24 hours.

## 2018-10-31 NOTE — SUBJECTIVE & OBJECTIVE
Interval History:    10/28/18 - Pt s/p C for acute MI . Severe CAD , Cards suggest medical management,       10/29/18 - seen on HD, tolerating well, troponin continues to get worse     10/30/18 - extubated and doing well, remains confused ,     10/31/18 - extubated and stable , tolerating HD well,       Review of patient's allergies indicates:   Allergen Reactions    Augmentin [amoxicillin-pot clavulanate] Edema    Sulfa (sulfonamide antibiotics)      swelling    Lisinopril      cough     Current Facility-Administered Medications   Medication Frequency    albuterol nebulizer solution 2.5 mg Q6H PRN    amLODIPine tablet 10 mg Daily    aspirin EC tablet 81 mg Daily    atorvastatin tablet 80 mg Daily    chlorhexidine 0.12 % solution 15 mL BID    cloNIDine 0.3 mg/24 hr td ptwk 1 patch Q7 Days    clopidogrel tablet 75 mg Daily    epoetin ovi injection 10,000 Units Every Mon, Wed, Fri    heparin (porcine) injection 3,000 Units Once    heparin (porcine) injection 5,000 Units Q8H    hydrALAZINE injection 10 mg Q8H PRN    hydrALAZINE tablet 100 mg Q8H    isosorbide dinitrate tablet 40 mg TID    labetalol injection 10 mg Q6H PRN    losartan tablet 25 mg Daily    metoprolol tartrate (LOPRESSOR) tablet 25 mg Once    metoprolol tartrate (LOPRESSOR) tablet 50 mg BID    pantoprazole injection 40 mg Daily       Objective:     Vital Signs (Most Recent):  Temp: 99.2 °F (37.3 °C) (10/31/18 0715)  Pulse: 82 (10/31/18 1040)  Resp: (!) 22 (10/31/18 1020)  BP: (!) 181/76 (10/31/18 1040)  SpO2: 100 % (10/31/18 1020)  O2 Device (Oxygen Therapy): nasal cannula (10/31/18 0302) Vital Signs (24h Range):  Temp:  [98.1 °F (36.7 °C)-99.2 °F (37.3 °C)] 99.2 °F (37.3 °C)  Pulse:  [74-95] 82  Resp:  [14-23] 22  SpO2:  [95 %-100 %] 100 %  BP: (153-188)/(63-96) 181/76     Weight: 84.9 kg (187 lb 2.7 oz) (10/30/18 0800)  Body mass index is 21.63 kg/m².  Body surface area is 2.16 meters squared.    I/O last 3 completed  shifts:  In: 1010.2 [I.V.:450.2; NG/GT:560]  Out: -     Physical Exam   Constitutional: He appears well-developed. No distress.   HENT:   Head: Normocephalic and atraumatic.   Neck: Neck supple. No tracheal deviation present. No thyromegaly present.   Cardiovascular: Normal rate, regular rhythm, normal heart sounds and intact distal pulses. Exam reveals no gallop and no friction rub.   No murmur heard.  Pulmonary/Chest: He has no wheezes.   Abdominal: Soft. He exhibits no mass. There is no tenderness. There is no rebound and no guarding.   Musculoskeletal: He exhibits no edema.   Lymphadenopathy:     He has no cervical adenopathy.   Skin: Skin is warm. No rash noted. He is not diaphoretic. No erythema.   Nursing note and vitals reviewed.      Significant Labs:    CBC:     Recent Labs   Lab 10/31/18  0326   WBC 6.82   RBC 3.32*   HGB 9.4*   HCT 28.3*      MCV 85   MCH 28.3   MCHC 33.2     CMP:   Recent Labs   Lab 10/27/18  0548  10/31/18  0326      < > 88   CALCIUM 9.3   < > 8.7   ALBUMIN 3.9  --   --    PROT 7.9  --   --       < > 134*   K 4.5   < > 4.9   CO2 30*   < > 24   CL 95   < > 94*   BUN 18   < > 59*   CREATININE 4.3*   < > 7.5*   ALKPHOS 66  --   --    ALT 19  --   --    AST 30  --   --    BILITOT 0.8  --   --     < > = values in this interval not displayed.     Coagulation:   Recent Labs   Lab 10/27/18  1923  10/30/18  0405   INR 1.1  --   --    APTT 30.5   < > 47.8*    < > = values in this interval not displayed.     LFTs:   Recent Labs   Lab 10/27/18  0548   ALT 19   AST 30   ALKPHOS 66   BILITOT 0.8   PROT 7.9   ALBUMIN 3.9     All labs within the past 24 hours have been reviewed.     Significant Imaging:  Reviewed     Lab Results   Component Value Date    .7 (H) 08/26/2017    CALCIUM 8.7 10/31/2018    PHOS 6.0 (H) 10/31/2018       Lab Results   Component Value Date    ALBUMIN 3.9 10/27/2018

## 2018-10-31 NOTE — PLAN OF CARE
Problem: Patient Care Overview  Goal: Plan of Care Review  Received from ICU. Patient educated on moving and increasing strength. Encouraged deep breathing and coughing. Will continue to monitor for any changes.

## 2018-10-31 NOTE — PROGRESS NOTES
[facility] -   Nephrology  Progress Note    Patient Name: Conner Perez III  MRN: [id number]  Admission Date: 10/27/2018  Hospital Length of Stay: 4 days  Attending Provider: Keith Wesley MD   Primary Care Physician: Raciel Angela MD  Principal Problem:Acute ST elevation myocardial infarction (STEMI) of inferior wall    Subjective:     HPI: Conner Perez III  is a 68 y old AA man with h/o ESRD on HD ( [facility] presented to the ER this morning  for SOB, bilateral pulmonary edema noted on the chest x-ray, patient had his dialysis treatment yesterday, patient has been having recurrent episodes of shortness of breath and similar presentations to the emergency room in the last few months, he also has been losing weight, patient had to be emergently intubated in the emergency room and was transferred to ICU, we were consulted for emergent dialysis for volume removal,       Interval History:    10/28/18 - Pt s/p C for acute MI . Severe CAD , Cards suggest medical management,       10/29/18 - seen on HD, tolerating well, troponin continues to get worse     10/30/18 - extubated and doing well, remains confused ,     10/31/18 - extubated and stable , tolerating HD well,       Review of patient's allergies indicates:   Allergen Reactions    Augmentin [amoxicillin-pot clavulanate] Edema    Sulfa (sulfonamide antibiotics)      swelling    Lisinopril      cough     Current Facility-Administered Medications   Medication Frequency    albuterol nebulizer solution 2.5 mg Q6H PRN    amLODIPine tablet 10 mg Daily    aspirin EC tablet 81 mg Daily    atorvastatin tablet 80 mg Daily    chlorhexidine 0.12 % solution 15 mL BID    cloNIDine 0.3 mg/24 hr td ptwk 1 patch Q7 Days    clopidogrel tablet 75 mg Daily    epoetin ovi injection 10,000 Units Every Mon, Wed, Fri    heparin (porcine) injection 3,000 Units Once    heparin (porcine) injection 5,000 Units Q8H    hydrALAZINE  injection 10 mg Q8H PRN    hydrALAZINE tablet 100 mg Q8H    isosorbide dinitrate tablet 40 mg TID    labetalol injection 10 mg Q6H PRN    losartan tablet 25 mg Daily    metoprolol tartrate (LOPRESSOR) tablet 25 mg Once    metoprolol tartrate (LOPRESSOR) tablet 50 mg BID    pantoprazole injection 40 mg Daily       Objective:     Vital Signs (Most Recent):  Temp: 99.2 °F (37.3 °C) (10/31/18 0715)  Pulse: 82 (10/31/18 1040)  Resp: (!) 22 (10/31/18 1020)  BP: (!) 181/76 (10/31/18 1040)  SpO2: 100 % (10/31/18 1020)  O2 Device (Oxygen Therapy): nasal cannula (10/31/18 0302) Vital Signs (24h Range):  Temp:  [98.1 °F (36.7 °C)-99.2 °F (37.3 °C)] 99.2 °F (37.3 °C)  Pulse:  [74-95] 82  Resp:  [14-23] 22  SpO2:  [95 %-100 %] 100 %  BP: (153-188)/(63-96) 181/76     Weight: 84.9 kg (187 lb 2.7 oz) (10/30/18 0800)  Body mass index is 21.63 kg/m².  Body surface area is 2.16 meters squared.    I/O last 3 completed shifts:  In: 1010.2 [I.V.:450.2; NG/GT:560]  Out: -     Physical Exam   Constitutional: He appears well-developed. No distress.   HENT:   Head: Normocephalic and atraumatic.   Neck: Neck supple. No tracheal deviation present. No thyromegaly present.   Cardiovascular: Normal rate, regular rhythm, normal heart sounds and intact distal pulses. Exam reveals no gallop and no friction rub.   No murmur heard.  Pulmonary/Chest: He has no wheezes.   Abdominal: Soft. He exhibits no mass. There is no tenderness. There is no rebound and no guarding.   Musculoskeletal: He exhibits no edema.   Lymphadenopathy:     He has no cervical adenopathy.   Skin: Skin is warm. No rash noted. He is not diaphoretic. No erythema.   Nursing note and vitals reviewed.      Significant Labs:    CBC:     Recent Labs   Lab 10/31/18  0326   WBC 6.82   RBC 3.32*   HGB 9.4*   HCT 28.3*      MCV 85   MCH 28.3   MCHC 33.2     CMP:   Recent Labs   Lab 10/27/18  0548  10/31/18  0326      < > 88   CALCIUM 9.3   < > 8.7   ALBUMIN 3.9  --   --     PROT 7.9  --   --       < > 134*   K 4.5   < > 4.9   CO2 30*   < > 24   CL 95   < > 94*   BUN 18   < > 59*   CREATININE 4.3*   < > 7.5*   ALKPHOS 66  --   --    ALT 19  --   --    AST 30  --   --    BILITOT 0.8  --   --     < > = values in this interval not displayed.     Coagulation:   Recent Labs   Lab 10/27/18  1923  10/30/18  0405   INR 1.1  --   --    APTT 30.5   < > 47.8*    < > = values in this interval not displayed.     LFTs:   Recent Labs   Lab 10/27/18  0548   ALT 19   AST 30   ALKPHOS 66   BILITOT 0.8   PROT 7.9   ALBUMIN 3.9     All labs within the past 24 hours have been reviewed.     Significant Imaging:  Reviewed     Lab Results   Component Value Date    .7 (H) 08/26/2017    CALCIUM 8.7 10/31/2018    PHOS 6.0 (H) 10/31/2018       Lab Results   Component Value Date    ALBUMIN 3.9 10/27/2018         Assessment/Plan:     ESRD (end stage renal disease) on dialysis    1. ESRD on HD : (  MWF , [facility] ) ,  seen on HD, tolerating well,      he is not adherent with fluid restriction per family,      2.  HTN : BP fluctuates,  Adjust meds, UF on HD as tolerated      3. Anemia of CKD :  follow hemoglobin, Procrit with dialysis, pRBC tx per [facility],      4. SHPT : renal diet when stable,       5. CAD / acute MI - s/p LHC, optimize medical management per Cards ,     6. AMS - multifactorial, improved ,               I will follow-up with patient. Please contact us if you have any additional questions.     Total time spent 40 minutes including time needed to review the records,  patient  evaluation, documentation, face-to-face discussion with the patient,  primary and critical care teams,   more than 50% of the time was spent on coordination of care and counseling.       Fabián London MD  Nephrology  [facility] -

## 2018-10-31 NOTE — PROGRESS NOTES
[facility] -   Critical Care Medicine  Progress Note    Patient Name: Conner Perez III  MRN: [id number]  Admission Date: 10/27/2018  Hospital Length of Stay: 4 days  Code Status: Full Code  Attending Provider: Keith Wesley MD  Primary Care Provider: Raciel Angela MD   Principal Problem: Acute ST elevation myocardial infarction (STEMI) of inferior wall    Subjective:     HPI:  68 year old male with ESRD and multiple admissions for volume overload/HTN requiring additional HD; HTN, GERD, CAD, COPD   Presented to ED 10/27 with SOB  Evaluation revealed evidence of fluid overload with hypertensive urgency and hypoxia; decline despite trial of NIPPV required intubation for mechanical ventilation    Hospital/ICU Course:  Admitted to ICU with OETT on mechanical ventilation with propofol sedation  10/28 Stable overnight, heart cath revealed multivessel disease  10/29 remains intubated and sedated for cardiac rest after LHC with multivessel disease not amenable to intervention or bypass; am troponin still rising; plan HD today  10/30 troponin trending down; tolerating SBT; BP trending higher off sedation  10/31 - awake, tolerating scheduled HD this morning; remains hypertensive but improved    ROS    Objective:     Vital Signs (Most Recent):  Temp: 97.7 °F (36.5 °C) (10/31/18 1105)  Pulse: 82 (10/31/18 1105)  Resp: 18 (10/31/18 1105)  BP: (!) 186/76 (10/31/18 1100)  SpO2: 100 % (10/31/18 1105) Vital Signs (24h Range):  Temp:  [97.7 °F (36.5 °C)-99.2 °F (37.3 °C)] 97.7 °F (36.5 °C)  Pulse:  [74-95] 82  Resp:  [14-23] 18  SpO2:  [95 %-100 %] 100 %  BP: (153-188)/(63-96) 186/76     Weight: 84.9 kg (187 lb 2.7 oz)  Body mass index is 21.63 kg/m².      Intake/Output Summary (Last 24 hours) at 10/31/2018 1117  Last data filed at 10/30/2018 1400  Gross per 24 hour   Intake 98.1 ml   Output --   Net 98.1 ml       Physical Exam   Constitutional: He appears ill. No distress.   HENT:   Head: Atraumatic.       Eyes:  Conjunctivae are normal. Pupils are equal, round, and reactive to light.   Neck: JVD present. No neck rigidity. No tracheal deviation present.   Cardiovascular: Normal rate and regular rhythm.   No murmur heard.  Pulses:       Radial pulses are 2+ on the right side, and 2+ on the left side.        Dorsalis pedis pulses are 2+ on the right side, and 2+ on the left side.   Pulmonary/Chest: No accessory muscle usage. No respiratory distress. He has decreased breath sounds. He has no wheezes.   Abdominal: Soft. Bowel sounds are normal. He exhibits no distension.   Musculoskeletal: He exhibits no edema.        Arms:       Feet:    Skin: Skin is warm and dry. Capillary refill takes less than 2 seconds. No cyanosis.       Vents:  Vent Mode: Spont (10/30/18 1020)  Ventilator Initiated: Yes (10/27/18 0800)  Set Rate: 0 bmp (10/30/18 1020)  Vt Set: 450 mL (10/30/18 1020)  Pressure Support: 10 cmH20 (10/30/18 1020)  PEEP/CPAP: 5 cmH20 (10/30/18 1020)  Oxygen Concentration (%): 40 (10/30/18 1030)  Peak Airway Pressure: 15 cmH2O (10/30/18 1020)  Plateau Pressure: 0 cmH20 (10/30/18 1020)  Total Ve: 7.86 mL (10/30/18 1020)  F/VT Ratio<105 (RSBI): (!) 41.92 (10/30/18 1020)    Lines/Drains/Airways     Drain                 Hemodialysis AV Fistula Right forearm -- days         Hemodialysis AV Fistula 12/18/17 0017 Right forearm 317 days          Peripheral Intravenous Line                 Peripheral IV - Single Lumen 10/27/18 0930 Left Forearm 4 days         Peripheral IV - Single Lumen 10/28/18 2330 Left Forearm 2 days                Significant Labs:    CBC/Anemia Profile:  Recent Labs   Lab 10/30/18  0405 10/31/18  0326   WBC 5.60 6.82   HGB 8.4* 9.4*   HCT 25.7* 28.3*   * 163   MCV 87 85   RDW 16.1* 15.5*        Chemistries:  Recent Labs   Lab 10/30/18  0405 10/31/18  0326   * 134*   K 4.7 4.9   CL 96 94*   CO2 28 24   BUN 41* 59*   CREATININE 5.9* 7.5*   CALCIUM 8.3* 8.7   MG 2.0 2.1   PHOS 5.0* 6.0*       All  pertinent labs within the past 24 hours have been reviewed.    Significant Imaging:  I have reviewed all pertinent imaging results/findings within the past 24 hours.      Assessment/Plan:     Pulmonary   Acute pulmonary edema    Resolved with volume removal     Acute hypercapnic respiratory failure on mechanical ventilation    Tolerated extubation  Wean supplemental oxygen for sat > 92  mobilize     Cardiac/Vascular   * Acute ST elevation myocardial infarction (STEMI) of inferior wall    Diffuse CAD not amenable to interventions per cards; medical management  On ASA, plavix, statin, hydralazine, imdur, BB     Acute on chronic diastolic congestive heart failure    Fluid restriction  Strict I/O     Hypertension    Continue clonidine patch, imdur, BB; escalate hydralazine and norvasc     Renal/   ESRD (end stage renal disease) on dialysis    Nephrology following  HD per schedule     Preventive Measures:   Nutrition: advance to renal as tolerated with 1L fluid restriction  Stress Ulcer: PPI  DVT: SQ heparin  BB: metoprolol  Bowel Prophylaxis: add miralax  Head of Bed/Reposition: Elevate HOB and turn Q1-2 hours    Mobility:  OOB with assist  Code Status: Full    Counseling/Consultation: I have discussed the care of this patient in detail with nursing staff and Dr. Rod. Status and plan of care discussed with team on multidisciplinary rounds.   Transferring out of ICU today; we will sign off, please call if we can be of any further assistance.     SHERWIN Velasco-BC  Critical Care Medicine  [facility] [place]

## 2018-10-31 NOTE — ASSESSMENT & PLAN NOTE
Diffuse CAD not amenable to interventions per cards; medical management  On ASA, plavix, statin, hydralazine, imdur, BB

## 2018-10-31 NOTE — PLAN OF CARE
Problem: Patient Care Overview  Goal: Plan of Care Review  Pt tolerated extubation today.  Antihypertensives added and current medications increased today.  Pt able to eat this afternoon with no issues.

## 2018-10-31 NOTE — NURSING
Telemetry monitor placed. Pt transported via specialty bed to room 215 via this nurse. Pt drowsy; easily arousable. NAD noted. Bedside report given to nurse Alex. Family at bedside, also updated daughter Lala of pt transfer.

## 2018-10-31 NOTE — ASSESSMENT & PLAN NOTE
10/29/18   LHC revealed Diffuse diabetic CAD which was not amenable to optimal PCI and no targets for CABG  Diffuse LAD disease with distal occlusion and medical management advised  Continue IV heparin gtt for another 24 hours  Continue ASA, statin, Plavix, Hydralazine, Imdur  NO ACEI/ARB given renal function requiring dialysis.   Will add BB as BP allows, BP soft today at time of exam.   Remains intubated and sedated today.     10/30  -Attempting to wean to extubate today  -OK to stop IV heparin gtt today  Continue ASA, Statin, Plavix, Hydralazine, Imdur  Start BB and Norvasc today for better BP control  Will attempt to add ACEi/ARB tomorrow as BP permits.     10/31  -Patient has been extubated and awake and alert today  -Continue ASA, Statin, Plavix, BB, ARB, Norvasc, Imdur, Hydralazine  -Denies chest pain on exam today  -HD in progress today  -OOB today after dialysis

## 2018-10-31 NOTE — EICU
HTN- no symptoms  No hypercapnia  Try labetalol IV prn    0127 BP still high - try hydralazine IV - has po

## 2018-10-31 NOTE — PROGRESS NOTES
Notified EICU of BP still being elevated despite administering Labetalol.  Will wait for further orders     0135: administered new PRN order of  hydralazine will continue to monitor

## 2018-10-31 NOTE — PROGRESS NOTES
[facility] -   Cardiology  Progress Note    Patient Name: Conner Perez III  MRN: [id number]  Admission Date: 10/27/2018  Hospital Length of Stay: 4 days  Code Status: Full Code   Attending Physician: Keith Wesley MD   Primary Care Physician: Raciel Angela MD  Expected Discharge Date:   Principal Problem:Acute ST elevation myocardial infarction (STEMI) of inferior wall    Subjective:   HPI  Pt admitted with dyspnea/respiratory failure.  Pt has ESRD, HTN.    Admitted with acute dyspnea, respiratory failure requiring intubation.  ecg on admit showed NSR, nonspecific st-t abnl.   Troponin levels increased, started on IV heparin, asa for NSTEMI by Hospital [facility].  Anemia noted on labs today, Hg 8.9  This am routine am ECG showed inferior ST elevation concerning for STEMI and marked anterolateral st-t abnormalities, worsening.  Cardiology on call was contacted about ECG and decision made to activate cath lab for emergent cath.  Pt is intubated, sedated and stable otherwise and there are no reports of chest pain sxs.  Echo yesterday showed EF 45%, apical WMA.  He was admitted last month, dyspnea/cp and had negative stress test.  Echo in earlier 4/7/17 showed similar WMA.  Reportedly has had LHC 2016 with no major blockages (no report available).    Hospital Course:   10/29/18-Patient seen and examined in room, intubated and sedated. IV heparin gtt in place. Pending dialysis treatment today. Labs reviewed, stable.     10/30/18-Patient seen and examined in room, intubated. On SBT today to possible extubation. IV heparin gtt in place. Labs reviewed, stable. BP remains elevated, will adjust BP meds as tolerated.     10/31/18-Patient seen and examined in room, extubated and awake and alert. HD in progress. BP remains elevated. Labs reviewed, stable.     Interval History: extubated yesterday, HD in progress    Review of Systems   Constitution: Negative for weakness and malaise/fatigue.   HENT: Negative for  hearing loss and hoarse voice.    Eyes: Negative for blurred vision and visual disturbance.   Cardiovascular: Positive for chest pain (resolved). Negative for claudication, dyspnea on exertion, irregular heartbeat, leg swelling, near-syncope, orthopnea, palpitations, paroxysmal nocturnal dyspnea and syncope.   Respiratory: Negative for cough, hemoptysis, shortness of breath, sleep disturbances due to breathing, snoring and wheezing.    Endocrine: Negative for cold intolerance and heat intolerance.   Hematologic/Lymphatic: Does not bruise/bleed easily.   Skin: Negative for color change, dry skin and nail changes.   Musculoskeletal: Positive for arthritis and back pain. Negative for joint pain and myalgias.   Gastrointestinal: Negative for bloating, abdominal pain, constipation, nausea and vomiting.   Genitourinary: Negative for dysuria, flank pain, hematuria and hesitancy.   Neurological: Negative for headaches, light-headedness, loss of balance, numbness and paresthesias.   Psychiatric/Behavioral: Negative for altered mental status.   Allergic/Immunologic: Negative for environmental allergies.     Objective:     Vital Signs (Most Recent):  Temp: 99.2 °F (37.3 °C) (10/31/18 0715)  Pulse: 82 (10/31/18 1040)  Resp: (!) 22 (10/31/18 1020)  BP: (!) 181/76 (10/31/18 1040)  SpO2: 100 % (10/31/18 1020) Vital Signs (24h Range):  Temp:  [98.1 °F (36.7 °C)-99.2 °F (37.3 °C)] 99.2 °F (37.3 °C)  Pulse:  [74-95] 82  Resp:  [14-23] 22  SpO2:  [95 %-100 %] 100 %  BP: (153-199)/(63-96) 181/76     Weight: 84.9 kg (187 lb 2.7 oz)  Body mass index is 21.63 kg/m².     SpO2: 100 %  O2 Device (Oxygen Therapy): nasal cannula      Intake/Output Summary (Last 24 hours) at 10/31/2018 1059  Last data filed at 10/30/2018 1400  Gross per 24 hour   Intake 98.1 ml   Output --   Net 98.1 ml       Lines/Drains/Airways     Drain                 Hemodialysis AV Fistula Right forearm -- days         Hemodialysis AV Fistula 12/18/17 0017 Right forearm  317 days          Peripheral Intravenous Line                 Peripheral IV - Single Lumen 10/27/18 0930 Left Forearm 4 days         Peripheral IV - Single Lumen 10/28/18 2330 Left Forearm 2 days                Physical Exam   Constitutional: He is oriented to person, place, and time. He appears well-developed and well-nourished. Nasal cannula in place.   HENT:   Head: Normocephalic and atraumatic.   Eyes: Pupils are equal, round, and reactive to light.   Neck: Normal range of motion and full passive range of motion without pain. Neck supple. No JVD present.   Cardiovascular: Normal rate, regular rhythm, S1 normal, S2 normal and intact distal pulses. PMI is not displaced. Exam reveals no distant heart sounds.   No murmur heard.  Pulses:       Radial pulses are 2+ on the right side, and 2+ on the left side.        Dorsalis pedis pulses are 2+ on the right side, and 2+ on the left side.   Right groin access site C/D/I, no hematoma or ecchymosis noted.    Pulmonary/Chest: Effort normal and breath sounds normal. No accessory muscle usage. No respiratory distress. He has no decreased breath sounds. He has no wheezes. He has no rales.   Abdominal: Soft. Bowel sounds are normal. He exhibits no distension. There is no tenderness.   Genitourinary:   Genitourinary Comments: +kim cath  +Right forearm AV fistula +thrill, +bruit   Musculoskeletal: Normal range of motion. He exhibits no edema.        Right ankle: He exhibits no swelling.        Left ankle: He exhibits no swelling.   Neurological: He is alert and oriented to person, place, and time.   Skin: Skin is warm and dry. No rash noted. No cyanosis or erythema. No pallor. Nails show no clubbing.   Psychiatric: He has a normal mood and affect. His speech is normal and behavior is normal. Judgment and thought content normal. Cognition and memory are normal.   Nursing note and vitals reviewed.      Significant Labs:   All pertinent lab results from the last 24 hours have  been reviewed. and   Recent Lab Results       10/31/18  0326   10/30/18  2233   10/30/18  1353        Allens Test   Pass       Anion Gap 16         Baso # 0.02         Basophil% 0.3         Site   LR       BUN, Bld 59         Calcium 8.7         Chloride 94         CO2 24         Creatinine 7.5         DelSys   Nasal Can       Differential Method Automated         eGFR if  8         eGFR if non  7  Comment:  Calculation used to obtain the estimated glomerular filtration  rate (eGFR) is the CKD-EPI equation.            Eos # 0.1         Eosinophil% 1.2         FiO2   32       Flow   3       Glucose 88         Gran # (ANC) 5.6         Gran% 82.5         Hematocrit 28.3         Hemoglobin 9.4         Lymph # 0.4         Lymph% 6.5         Magnesium 2.1         MCH 28.3         MCHC 33.2         MCV 85         Mode   SPONT       Mono # 0.7         Mono% 9.5         MPV 10.9         Phosphorus 6.0         Platelets 163         POC BE   5       POC HCO3   29.0       POC PCO2   44.7       POC PH   7.420       POC PO2   104       POC SATURATED O2   98       POCT Glucose     153     Potassium 4.9         RBC 3.32         RDW 15.5         Sample   ARTERIAL       Sodium 134         WBC 6.82               Significant Imaging: Echocardiogram:   2D echo with color flow doppler:   Results for orders placed or performed during the hospital encounter of 04/27/18   2D echo with color flow doppler   Result Value Ref Range    Calculated EF 55 55 - 65    Diastolic Dysfunction No     Mitral Valve Mobility NORMAL     Tricuspid Valve Regurgitation TRIVIAL     and X-Ray: CXR: X-Ray Chest 1 View (CXR):   Results for orders placed or performed during the hospital encounter of 10/27/18   X-Ray Chest 1 View    Narrative    EXAMINATION:  XR CHEST 1 VIEW    CLINICAL HISTORY:  respiratory failure;    TECHNIQUE:  Single frontal view of the chest was performed.    COMPARISON:  10/30/2018    FINDINGS:  Endotracheal and  nasogastric tubes have been removed.  Small right and trace left pleural effusions with right lower lobe atelectasis or airspace disease versus aspiration.  Stable cardiomegaly.  In comparison to the prior study, there is no adverse interval changes      Impression    In comparison to the prior study, there is no adverse interval changes      Electronically signed by: Santo Marrero MD  Date:    10/31/2018  Time:    08:20    and X-Ray Chest PA and Lateral (CXR): No results found for this visit on 10/27/18.    Assessment and Plan:   Patient seen and examined in room this AM. Extubated and awake and alert today. Denies chest pain today. Dialysis in progress. Discussed with patient the need for better diabetes control and medical management for CAD. Continue ASA, Statin, Plavix, BB, ARB, Imdur, Hydralazine, Norvasc.     * Acute ST elevation myocardial infarction (STEMI) of inferior wall    See plan under CAD       Abnormal ECG           Acute pulmonary edema    Dialysis per nephrology.        Acute on chronic diastolic congestive heart failure    Dialysis per nephrology  Remains intubated and sedated today.        Coronary artery disease of native artery of native heart with stable angina pectoris    10/29/18   C revealed Diffuse diabetic CAD which was not amenable to optimal PCI and no targets for CABG  Diffuse LAD disease with distal occlusion and medical management advised  Continue IV heparin gtt for another 24 hours  Continue ASA, statin, Plavix, Hydralazine, Imdur  NO ACEI/ARB given renal function requiring dialysis.   Will add BB as BP allows, BP soft today at time of exam.   Remains intubated and sedated today.     10/30  -Attempting to wean to extubate today  -OK to stop IV heparin gtt today  Continue ASA, Statin, Plavix, Hydralazine, Imdur  Start BB and Norvasc today for better BP control  Will attempt to add ACEi/ARB tomorrow as BP permits.     10/31  -Patient has been extubated and awake and alert  today  -Continue ASA, Statin, Plavix, BB, ARB, Norvasc, Imdur, Hydralazine  -Denies chest pain on exam today  -HD in progress today  -OOB today after dialysis       Hypertension    On medical therapy  Continue clonidine patch, hydralazine, Imdur  Start BB and Norvasc today  Monitor BP trend and adjust as needed.     10/31  -continue BB, ARB, Hydralazine, Imdur, Norvasc  -Monitor BP trend and adjust as needed.      ESRD (end stage renal disease) on dialysis    -per nephrology          Chronic diastolic heart failure    Resume DASH diet, 2 gm sodium restriction post extubation  Daily weights  Strict intake and output  Dialysis per nephrology         VTE Risk Mitigation (From admission, onward)        Ordered     heparin (porcine) injection 3,000 Units  Once      10/31/18 0800     heparin (porcine) injection 5,000 Units  Every 8 hours      10/30/18 1440     IP VTE HIGH RISK PATIENT  Once      10/27/18 0805          Litzy Thomas NP  Cardiology  [facility] [place]

## 2018-10-31 NOTE — ASSESSMENT & PLAN NOTE
On medical therapy  Continue clonidine patch, hydralazine, Imdur  Start BB and Norvasc today  Monitor BP trend and adjust as needed.     10/31  -continue BB, ARB, Hydralazine, Imdur, Norvasc  -Monitor BP trend and adjust as needed.

## 2018-10-31 NOTE — ASSESSMENT & PLAN NOTE
1. ESRD on HD : (  MWF , [facility] ) ,  seen on HD, tolerating well,      he is not adherent with fluid restriction per family,      2.  HTN : BP fluctuates,  Adjust meds, UF on HD as tolerated      3. Anemia of CKD :  follow hemoglobin, Procrit with dialysis, pRBC tx per CCM,      4. SHPT : renal diet when stable,       5. CAD / acute MI - s/p LHC, optimize medical management per Cards ,     6. AMS - multifactorial, improved ,

## 2018-10-31 NOTE — CONSULTS
Consulted on this 69 y/o M patient for skin assessment.  Bilateral feet assessed. Full thickness ulceration noted to left dorsal foot measures 0.25x0.25x0.1cm with moist yellow wound bed, covered with foam.  Bilateral heels intact with NO redness or breakdown noted, NO pressure injury.  Right 2nd toenail is black with dusky discoloration to remaining toe.  Patient has scattered areas of hyperpigmentation noted to BLE, including left lateral malleolus.  Unable to assess sacral/coccygel regions at this time due to ongoing dialysis. Recommend pressure injury prevention measures per orders.

## 2018-11-01 LAB
ANION GAP SERPL CALC-SCNC: 15 MMOL/L
BASOPHILS # BLD AUTO: 0.03 K/UL
BASOPHILS NFR BLD: 0.5 %
BUN SERPL-MCNC: 41 MG/DL
CALCIUM SERPL-MCNC: 8.8 MG/DL
CHLORIDE SERPL-SCNC: 97 MMOL/L
CO2 SERPL-SCNC: 27 MMOL/L
CREAT SERPL-MCNC: 5.9 MG/DL
DIFFERENTIAL METHOD: ABNORMAL
EOSINOPHIL # BLD AUTO: 0.1 K/UL
EOSINOPHIL NFR BLD: 2.2 %
ERYTHROCYTE [DISTWIDTH] IN BLOOD BY AUTOMATED COUNT: 15.4 %
EST. GFR  (AFRICAN AMERICAN): 10 ML/MIN/1.73 M^2
EST. GFR  (NON AFRICAN AMERICAN): 9 ML/MIN/1.73 M^2
GLUCOSE SERPL-MCNC: 86 MG/DL
HCT VFR BLD AUTO: 28.7 %
HGB BLD-MCNC: 9.6 G/DL
LYMPHOCYTES # BLD AUTO: 0.8 K/UL
LYMPHOCYTES NFR BLD: 14.2 %
MAGNESIUM SERPL-MCNC: 2.1 MG/DL
MCH RBC QN AUTO: 28.2 PG
MCHC RBC AUTO-ENTMCNC: 33.4 G/DL
MCV RBC AUTO: 84 FL
MONOCYTES # BLD AUTO: 0.8 K/UL
MONOCYTES NFR BLD: 15.1 %
NEUTROPHILS # BLD AUTO: 3.8 K/UL
NEUTROPHILS NFR BLD: 68 %
PHOSPHATE SERPL-MCNC: 4.2 MG/DL
PLATELET # BLD AUTO: 171 K/UL
PMV BLD AUTO: 10.3 FL
POTASSIUM SERPL-SCNC: 4.4 MMOL/L
RBC # BLD AUTO: 3.4 M/UL
SODIUM SERPL-SCNC: 139 MMOL/L
WBC # BLD AUTO: 5.56 K/UL

## 2018-11-01 PROCEDURE — G8979 MOBILITY GOAL STATUS: HCPCS | Mod: CJ

## 2018-11-01 PROCEDURE — 25000003 PHARM REV CODE 250: Performed by: NURSE PRACTITIONER

## 2018-11-01 PROCEDURE — G8978 MOBILITY CURRENT STATUS: HCPCS | Mod: CK

## 2018-11-01 PROCEDURE — 25000003 PHARM REV CODE 250: Performed by: INTERNAL MEDICINE

## 2018-11-01 PROCEDURE — 63600175 PHARM REV CODE 636 W HCPCS: Performed by: INTERNAL MEDICINE

## 2018-11-01 PROCEDURE — 36415 COLL VENOUS BLD VENIPUNCTURE: CPT

## 2018-11-01 PROCEDURE — 80048 BASIC METABOLIC PNL TOTAL CA: CPT

## 2018-11-01 PROCEDURE — 84100 ASSAY OF PHOSPHORUS: CPT

## 2018-11-01 PROCEDURE — 83735 ASSAY OF MAGNESIUM: CPT

## 2018-11-01 PROCEDURE — 97162 PT EVAL MOD COMPLEX 30 MIN: CPT

## 2018-11-01 PROCEDURE — G8988 SELF CARE GOAL STATUS: HCPCS | Mod: CK

## 2018-11-01 PROCEDURE — G8987 SELF CARE CURRENT STATUS: HCPCS | Mod: CM

## 2018-11-01 PROCEDURE — 99233 SBSQ HOSP IP/OBS HIGH 50: CPT | Mod: ,,, | Performed by: INTERNAL MEDICINE

## 2018-11-01 PROCEDURE — 97116 GAIT TRAINING THERAPY: CPT

## 2018-11-01 PROCEDURE — 27000221 HC OXYGEN, UP TO 24 HOURS

## 2018-11-01 PROCEDURE — 97535 SELF CARE MNGMENT TRAINING: CPT

## 2018-11-01 PROCEDURE — 97167 OT EVAL HIGH COMPLEX 60 MIN: CPT

## 2018-11-01 PROCEDURE — C9113 INJ PANTOPRAZOLE SODIUM, VIA: HCPCS | Performed by: NURSE PRACTITIONER

## 2018-11-01 PROCEDURE — 63600175 PHARM REV CODE 636 W HCPCS: Performed by: NURSE PRACTITIONER

## 2018-11-01 PROCEDURE — 99232 SBSQ HOSP IP/OBS MODERATE 35: CPT | Mod: ,,, | Performed by: INTERNAL MEDICINE

## 2018-11-01 PROCEDURE — 21400001 HC TELEMETRY ROOM

## 2018-11-01 PROCEDURE — 94761 N-INVAS EAR/PLS OXIMETRY MLT: CPT

## 2018-11-01 PROCEDURE — 97803 MED NUTRITION INDIV SUBSEQ: CPT

## 2018-11-01 PROCEDURE — 85025 COMPLETE CBC W/AUTO DIFF WBC: CPT

## 2018-11-01 RX ORDER — METOPROLOL TARTRATE 50 MG/1
100 TABLET ORAL 2 TIMES DAILY
Status: DISCONTINUED | OUTPATIENT
Start: 2018-11-01 | End: 2018-11-03

## 2018-11-01 RX ORDER — HEPARIN SODIUM 1000 [USP'U]/ML
2000 INJECTION, SOLUTION INTRAVENOUS; SUBCUTANEOUS ONCE
Status: COMPLETED | OUTPATIENT
Start: 2018-11-02 | End: 2018-11-05

## 2018-11-01 RX ADMIN — PANTOPRAZOLE SODIUM 40 MG: 40 INJECTION, POWDER, FOR SOLUTION INTRAVENOUS at 09:11

## 2018-11-01 RX ADMIN — CLOPIDOGREL BISULFATE 75 MG: 75 TABLET ORAL at 09:11

## 2018-11-01 RX ADMIN — LOSARTAN POTASSIUM 25 MG: 25 TABLET, FILM COATED ORAL at 09:11

## 2018-11-01 RX ADMIN — AMLODIPINE BESYLATE 10 MG: 10 TABLET ORAL at 09:11

## 2018-11-01 RX ADMIN — ISOSORBIDE DINITRATE 40 MG: 20 TABLET ORAL at 09:11

## 2018-11-01 RX ADMIN — HYDRALAZINE HYDROCHLORIDE 100 MG: 50 TABLET, FILM COATED ORAL at 06:11

## 2018-11-01 RX ADMIN — CHLORHEXIDINE GLUCONATE 15 ML: 1.2 RINSE ORAL at 09:11

## 2018-11-01 RX ADMIN — POLYETHYLENE GLYCOL 3350 17 G: 17 POWDER, FOR SOLUTION ORAL at 09:11

## 2018-11-01 RX ADMIN — HYDRALAZINE HYDROCHLORIDE 10 MG: 20 INJECTION INTRAMUSCULAR; INTRAVENOUS at 07:11

## 2018-11-01 RX ADMIN — HYDRALAZINE HYDROCHLORIDE 100 MG: 50 TABLET, FILM COATED ORAL at 09:11

## 2018-11-01 RX ADMIN — ASPIRIN 81 MG: 81 TABLET, COATED ORAL at 09:11

## 2018-11-01 RX ADMIN — ATORVASTATIN CALCIUM 80 MG: 40 TABLET, FILM COATED ORAL at 09:11

## 2018-11-01 RX ADMIN — HEPARIN SODIUM 5000 UNITS: 5000 INJECTION, SOLUTION INTRAVENOUS; SUBCUTANEOUS at 01:11

## 2018-11-01 RX ADMIN — METOPROLOL TARTRATE 100 MG: 50 TABLET ORAL at 09:11

## 2018-11-01 RX ADMIN — METOPROLOL TARTRATE 50 MG: 50 TABLET ORAL at 09:11

## 2018-11-01 RX ADMIN — HEPARIN SODIUM 5000 UNITS: 5000 INJECTION, SOLUTION INTRAVENOUS; SUBCUTANEOUS at 09:11

## 2018-11-01 RX ADMIN — HYDRALAZINE HYDROCHLORIDE 100 MG: 50 TABLET, FILM COATED ORAL at 01:11

## 2018-11-01 RX ADMIN — HEPARIN SODIUM 5000 UNITS: 5000 INJECTION, SOLUTION INTRAVENOUS; SUBCUTANEOUS at 06:11

## 2018-11-01 RX ADMIN — ISOSORBIDE DINITRATE 40 MG: 20 TABLET ORAL at 02:11

## 2018-11-01 NOTE — PT/OT/SLP EVAL
Occupational Therapy   Evaluation    Name: Conner Perez III  MRN: [id number]  Admitting Diagnosis:  Acute ST elevation myocardial infarction (STEMI) of inferior wall 4 Days Post-Op    Recommendations:     Discharge Recommendations: nursing facility, skilled  Discharge Equipment Recommendations:  bedside commode, bath bench  Barriers to discharge:  Decreased caregiver support(PT STATED HE LIVES HOME ALONE BUT SON IS NOT WORKING AND SHOULD BE ABLE TO (A) AT D/C.)    History:     Occupational Profile:  Living Environment: PT LIVES ALONE IN 1-STORY HOME WITH 0 STEPS.  Previous level of function: PT STATED HE WAS (I) WITH ADL'S, T/F'S AND IADL'S AND LAST DROVE X6 DAYS AGO.  Roles and Routines: PT RECEIVES DIALYSIS  Equipment Used at Home:  walker, rolling  Assistance upon Discharge: PT STATED SON SHOULD BE ABLE TO (A) HIM AT D/C.  PT STATED SON DOES NOT WORK.    Past Medical History:   Diagnosis Date    CKD (chronic kidney disease), stage IV     COPD (chronic obstructive pulmonary disease)     Coronary artery disease     Depression     Dialysis patient     GERD (gastroesophageal reflux disease)     HTN (hypertension)     Pneumonia        Past Surgical History:   Procedure Laterality Date    AMPUTATION-TOE Left 1/24/2017    Performed by Bandar Gomes DPM at [facility] OR    AV FISTULA PLACEMENT Right 06/2017    CIRCUMCISION  1978    TOE AMPUTATION Left 01/24/2017    4th toe    VASCULAR CATH INSERTION N/A 8/28/2017    Performed by Jeremias Peralta MD at [facility] CATH LAB       Subjective     Chief Complaint: NO C/O.  Patient/Family Comments/goals: TO BE (I) AGAIN.    Pain/Comfort:  · Pain Rating 1: 0/10    Patients cultural, spiritual, Quaker conflicts given the current situation:      Objective:     Communicated with: CHART REVIEW AND NURSE prior to session.  Patient found all lines intact and call button in reach and oxygen(RUE LIMB ALERT) upon OT entry to room.    General Precautions: Standard, fall(RUE LIMB  ALERT)   Orthopedic Precautions:N/A   Braces: N/A     Occupational Performance:    Bed Mobility:    · PT FOUND LOW IN BED.  PT MIN (A) TO SCOOT TO HOB PUSHING THROUGH (B) FEET.    Functional Mobility/Transfers:  · TBA  · Functional Mobility: TBA    Activities of Daily Living:  · Feeding:  stand by assistance DUE TO DIFFICULTY OPENING CONTAINERS.  · Grooming: stand by assistance DUE TO DIFFICULTY REMOVING SEAL FROM MOUTHWASH.  · Upper Body Dressing: moderate assistance      · Lower Body Dressing: maximal assistance        Cognitive/Visual Perceptual:  Cognitive/Psychosocial Skills:     -       Oriented to: Person, Place, Time and Situation   -       Follows Commands/attention:Follows multistep  commands  -       Communication: clear/fluent  -       Mood/Affect/Coping skills/emotional control: Appropriate to situation    Physical Exam:  Edema:  Moderate RUE  Dominant hand:    -       (L) HAND DOMINANT  Upper Extremity Range of Motion:     -       Right Upper Extremity: WFL      -       Left Upper Extremity: WFL          Upper Extremity Strength:    -       Right Upper Extremity: WFL  -       Left Upper Extremity: WFL      Fine Motor Coordination:    -       Impaired  NOTED WITH DIFFICULTY OPENING CONTAINERS  Gross motor coordination:   WFL    AMPAC 6 Click ADL:  AMPAC Total Score: 11    Treatment & Education:  EVAL COMPLETED TODAY.  PT EDUCATED TO KEEP RUE ELEVATED FOR EDEMA CONTROL AND TO EAT MEALS WITH HOB ELEVATED.  PT SET UP TO FEED SELF AND SET UP TO COMPLETE ORAL HYGIENE.  Education:    Patient left HOB elevated with all lines intact, call button in reach and NURSE notified    Assessment:     Conner Perez III is a 68 y.o. male with a medical diagnosis of Acute ST elevation myocardial infarction (STEMI) of inferior wall.  He presents with the following performance deficits affecting function: weakness, impaired endurance, impaired self care skills, impaired functional mobilty, impaired balance, impaired fine  "motor, edema.      Rehab Prognosis: Good; patient would benefit from acute skilled OT services to address these deficits and reach maximum level of function.         Clinical Decision Making:     3.  OT High:  "Pt evaluation falls under high complexity for evaluation category due to 5+ performance deficits identified with comprehensive assessments and significant modifications/assistance required. An expanded review of history and occupational profile completed in addition to expanded review of physical, cognitive and psychosocial history. Several treatment options considered in care."     Plan:     Patient to be seen 3 x/week to address the above listed problems via self-care/home management, therapeutic activities, therapeutic exercises  · Plan of Care Expires: 11/08/18  · Plan of Care Reviewed with: patient    This Plan of care has been discussed with the patient who was involved in its development and understands and is in agreement with the identified goals and treatment plan    GOALS:   Multidisciplinary Problems     Occupational Therapy Goals        Problem: Occupational Therapy Goal    Goal Priority Disciplines Outcome Interventions   Occupational Therapy Goal     OT, PT/OT Ongoing (interventions implemented as appropriate)                    Time Tracking:     OT Date of Treatment: 11/01/18  OT Start Time: 1130  OT Stop Time: 1200  OT Total Time (min): 30 min    Billable Minutes:Evaluation 15  Self Care/Home Management 15    Sowmya Ortega OT  11/1/2018    "

## 2018-11-01 NOTE — ASSESSMENT & PLAN NOTE
Dialysis per nephrology  Remains intubated and sedated today.       11/1  Extubated  Seems compensated  Dialysis per nephrology for fluid management.

## 2018-11-01 NOTE — SUBJECTIVE & OBJECTIVE
Interval History: no acute issues noted o/n, transferred to telemetry yesterday PM.     Review of Systems   Constitution: Negative for weakness and malaise/fatigue.   HENT: Negative for hearing loss and hoarse voice.    Eyes: Negative for blurred vision and visual disturbance.   Cardiovascular: Positive for chest pain (resolved). Negative for claudication, dyspnea on exertion, irregular heartbeat, leg swelling, near-syncope, orthopnea, palpitations, paroxysmal nocturnal dyspnea and syncope.   Respiratory: Positive for cough and sputum production. Negative for hemoptysis, shortness of breath, sleep disturbances due to breathing, snoring and wheezing.    Endocrine: Negative for cold intolerance and heat intolerance.   Hematologic/Lymphatic: Does not bruise/bleed easily.   Skin: Negative for color change, dry skin and nail changes.   Musculoskeletal: Positive for arthritis and back pain. Negative for joint pain and myalgias.   Gastrointestinal: Negative for bloating, abdominal pain, constipation, nausea and vomiting.   Genitourinary: Negative for dysuria, flank pain, hematuria and hesitancy.   Neurological: Negative for headaches, light-headedness, loss of balance, numbness and paresthesias.   Psychiatric/Behavioral: Negative for altered mental status.   Allergic/Immunologic: Negative for environmental allergies.     Objective:     Vital Signs (Most Recent):  Temp: 97.7 °F (36.5 °C) (11/01/18 1101)  Pulse: 78 (11/01/18 1101)  Resp: 18 (11/01/18 1101)  BP: (!) 146/70 (11/01/18 1101)  SpO2: 97 % (11/01/18 1101) Vital Signs (24h Range):  Temp:  [97.7 °F (36.5 °C)-98.3 °F (36.8 °C)] 97.7 °F (36.5 °C)  Pulse:  [75-91] 78  Resp:  [16-24] 18  SpO2:  [94 %-100 %] 97 %  BP: (146-193)/(65-91) 146/70     Weight: 85 kg (187 lb 6.3 oz)  Body mass index is 21.66 kg/m².     SpO2: 97 %  O2 Device (Oxygen Therapy): nasal cannula      Intake/Output Summary (Last 24 hours) at 11/1/2018 1214  Last data filed at 10/31/2018 1243  Gross per  24 hour   Intake 500 ml   Output 3500 ml   Net -3000 ml       Lines/Drains/Airways     Drain                 Hemodialysis AV Fistula Right forearm -- days         Hemodialysis AV Fistula 12/18/17 0017 Right forearm 318 days          Peripheral Intravenous Line                 Peripheral IV - Single Lumen 10/27/18 0930 Left Forearm 5 days         Peripheral IV - Single Lumen 10/28/18 2330 Left Forearm 3 days                Physical Exam   Constitutional: He is oriented to person, place, and time. He appears well-developed and well-nourished. No distress. Nasal cannula in place.   HENT:   Head: Normocephalic and atraumatic.   Eyes: Pupils are equal, round, and reactive to light.   Neck: Normal range of motion and full passive range of motion without pain. Neck supple. No JVD present.   Cardiovascular: Normal rate, regular rhythm, S1 normal, S2 normal and intact distal pulses.  Occasional extrasystoles are present. PMI is not displaced. Exam reveals no distant heart sounds.   No murmur heard.  Pulses:       Radial pulses are 2+ on the right side, and 2+ on the left side.        Dorsalis pedis pulses are 2+ on the right side, and 2+ on the left side.   Right groin access site C/D/I, no hematoma or ecchymosis noted.    Pulmonary/Chest: Effort normal and breath sounds normal. No accessory muscle usage. No respiratory distress. He has no decreased breath sounds. He has no wheezes. He has no rales.   Abdominal: Soft. Bowel sounds are normal. He exhibits no distension. There is no tenderness.   Genitourinary:   Genitourinary Comments: +Right forearm AV fistula +thrill, +bruit   Musculoskeletal: Normal range of motion. He exhibits no edema.        Right ankle: He exhibits no swelling.        Left ankle: He exhibits no swelling.   Neurological: He is alert and oriented to person, place, and time.   Skin: Skin is warm and dry. No rash noted. He is not diaphoretic. No cyanosis or erythema. No pallor. Nails show no clubbing.    Psychiatric: He has a normal mood and affect. His speech is normal and behavior is normal. Judgment and thought content normal. Cognition and memory are normal.   Nursing note and vitals reviewed.      Significant Labs:   All pertinent lab results from the last 24 hours have been reviewed. and   Recent Lab Results       11/01/18  0535        Anion Gap 15     Baso # 0.03     Basophil% 0.5     BUN, Bld 41     Calcium 8.8     Chloride 97     CO2 27     Creatinine 5.9     Differential Method Automated     eGFR if  10     eGFR if non  9  Comment:  Calculation used to obtain the estimated glomerular filtration  rate (eGFR) is the CKD-EPI equation.        Eos # 0.1     Eosinophil% 2.2     Glucose 86     Gran # (ANC) 3.8     Gran% 68.0     Hematocrit 28.7     Hemoglobin 9.6     Lymph # 0.8     Lymph% 14.2     Magnesium 2.1     MCH 28.2     MCHC 33.4     MCV 84     Mono # 0.8     Mono% 15.1     MPV 10.3     Phosphorus 4.2     Platelets 171     Potassium 4.4     RBC 3.40     RDW 15.4     Sodium 139     WBC 5.56           Significant Imaging: Echocardiogram:   2D echo with color flow doppler:   Results for orders placed or performed during the hospital encounter of 04/27/18   2D echo with color flow doppler   Result Value Ref Range    Calculated EF 55 55 - 65    Diastolic Dysfunction No     Mitral Valve Mobility NORMAL     Tricuspid Valve Regurgitation TRIVIAL     and X-Ray: CXR: X-Ray Chest 1 View (CXR):   Results for orders placed or performed during the hospital encounter of 10/27/18   X-Ray Chest 1 View    Narrative    EXAMINATION:  XR CHEST 1 VIEW    CLINICAL HISTORY:  respiratory failure;    TECHNIQUE:  Single frontal view of the chest was performed.    COMPARISON:  10/30/2018    FINDINGS:  Endotracheal and nasogastric tubes have been removed.  Small right and trace left pleural effusions with right lower lobe atelectasis or airspace disease versus aspiration.  Stable cardiomegaly.  In  comparison to the prior study, there is no adverse interval changes      Impression    In comparison to the prior study, there is no adverse interval changes      Electronically signed by: Santo Marrero MD  Date:    10/31/2018  Time:    08:20    and X-Ray Chest PA and Lateral (CXR): No results found for this visit on 10/27/18.

## 2018-11-01 NOTE — ASSESSMENT & PLAN NOTE
1. ESRD on HD : (  MWF , [facility] ) , plan HD in AM ,      he is not adherent with fluid restriction per family,      2.  HTN : BP fluctuates,  Adjust meds, UF on HD as tolerated      3. Anemia of CKD :  follow hemoglobin, Procrit with dialysis, pRBC tx when indicated      4. SHPT : renal diet ,      5. CAD / acute MI - s/p LHC, optimize medical management per Cards ,     6. Disposition pending

## 2018-11-01 NOTE — PLAN OF CARE
CM met with patient at the bedside to discuss discharge plan.  Patient is agreeable to SNF but would prefer not to be in a nursing home.  Preferences for 1. [facility] and 2. [facility] SNF.  Choice form completed and placed in patient blue folder.      CM made referrals via [facility]

## 2018-11-01 NOTE — SUBJECTIVE & OBJECTIVE
Interval History:  Successfully extubated yesterday.  Optimizing medical management.    Review of Systems   Unable to perform ROS: Intubated     Objective:     Vital Signs (Most Recent):  Temp: 97.8 °F (36.6 °C) (10/31/18 1925)  Pulse: 77 (10/31/18 2107)  Resp: 18 (10/31/18 2107)  BP: (!) 155/74 (10/31/18 1925)  SpO2: 97 % (10/31/18 2107) Vital Signs (24h Range):  Temp:  [97.4 °F (36.3 °C)-99.2 °F (37.3 °C)] 97.8 °F (36.6 °C)  Pulse:  [74-86] 77  Resp:  [14-24] 18  SpO2:  [96 %-100 %] 97 %  BP: (153-186)/(63-89) 155/74     Weight: 84.9 kg (187 lb 2.7 oz)  Body mass index is 21.63 kg/m².    Intake/Output Summary (Last 24 hours) at 10/31/2018 2130  Last data filed at 10/31/2018 1243  Gross per 24 hour   Intake 500 ml   Output 3500 ml   Net -3000 ml      Physical Exam   Constitutional: He is oriented to person, place, and time. He appears well-developed and well-nourished. No distress.   HENT:   Head: Normocephalic and atraumatic.   Mouth/Throat: Oropharynx is clear and moist.   Eyes: Conjunctivae and EOM are normal. Pupils are equal, round, and reactive to light.   Neck: No JVD present. No thyromegaly present.   Cardiovascular: Normal rate, regular rhythm and normal heart sounds. Exam reveals no gallop and no friction rub.   No murmur heard.  Right groin arterial access site dressed clean and intact.   Pulmonary/Chest: Effort normal and breath sounds normal. No respiratory distress. He has no wheezes. He has no rales.   Abdominal: Soft. Bowel sounds are normal. He exhibits no distension. There is no tenderness. There is no rebound and no guarding.   Musculoskeletal: Normal range of motion. He exhibits no edema or tenderness.   RUE AV fistula with palpable thrill.   Lymphadenopathy:     He has no cervical adenopathy.   Neurological: He is alert and oriented to person, place, and time. He has normal reflexes. He displays normal reflexes. No cranial nerve deficit.   Skin: Skin is warm and dry. No rash noted. He is not  diaphoretic. No erythema.   Psychiatric: He has a normal mood and affect. His behavior is normal. Judgment and thought content normal.       Significant Labs: All pertinent labs within the past 24 hours have been reviewed.    Significant Imaging: I have reviewed all pertinent imaging results/findings within the past 24 hours.

## 2018-11-01 NOTE — PLAN OF CARE
Problem: Patient Care Overview  Goal: Plan of Care Review  Plan of care and treatment therapies explained and discussed with patient. Patient encouraged to increase activity, encouraged to assist in turning and relieving pressure. Patient received physical and occupational therapy through those departments. See notes for comments. Patient remain free of injury throughout the day. Will continue to monitor for any changes.

## 2018-11-01 NOTE — PT/OT/SLP EVAL
Physical Therapy Evaluation    Patient Name:  Conner Perez III   MRN:  [id number]    Recommendations:     Discharge Recommendations:  nursing facility, skilled   Discharge Equipment Recommendations: none   Barriers to discharge: Decreased caregiver support    Assessment:     Conner Perez III is a 68 y.o. male admitted with a medical diagnosis of Acute ST elevation myocardial infarction (STEMI) of inferior wall.  He presents with the following impairments/functional limitations:  weakness, impaired endurance, impaired functional mobilty, gait instability, decreased lower extremity function, impaired balance, impaired cardiopulmonary response to activity .    Rehab Prognosis:  GOOD; patient would benefit from acute skilled PT services to address these deficits and reach maximum level of function.      Recent Surgery: Procedure(s) (LRB):  CATHETERIZATION, HEART, LEFT (Left) 4 Days Post-Op    Plan:     During this hospitalization, patient to be seen   to address the above listed problems via gait training, therapeutic activities, therapeutic exercises  · Plan of Care Expires:  11/08/18   Plan of Care Reviewed with: patient    Subjective     Communicated with NURSE AND Epic CHART REVIEW prior to session.  Patient found SUP IN BED  upon PT entry to room, agreeable to evaluation.      Chief Complaint: NONE   Patient comments/goals: NONE STATED   Pain/Comfort:  · Pain Rating 1: 0/10  · Pain Rating Post-Intervention 1: 0/10    Patients cultural, spiritual, Quaker conflicts given the current situation:      Living Environment:   PT LIVES AT HOME ALONE AND HAS NO STEPS.  Prior to admission, patients level of function was IND AND DRIVES.  Patient has the following equipment: walker, rolling.  DME owned (not currently used): none.  Upon discharge, patient will have assistance from NONE.    Objective:     Patient found with: peripheral IV, telemetry, oxygen     General Precautions: Standard, fall   Orthopedic Precautions:N/A    Braces: N/A     Exams:  · RLE ROM: LIMITED  · RLE Strength: LIMITED  · LLE ROM: LIMITED  · LLE Strength: LIMITED    Functional Mobility:  · PT MET IN RM SUP.SIT EOB WITH SBA. PT STOOD WITH RW AND CGA FOR GT 2X 10' WITH RW AND CGA. PT RETURNED TO RM T/F TO EOB AND SUP IN BED WITH SBA. PT LEFT SUP WITH ALL NEEDS MET.    AM-PAC 6 CLICK MOBILITY  Total Score:18     Patient left supine with call button in reach.    GOALS:   Multidisciplinary Problems     Physical Therapy Goals        Problem: Physical Therapy Goal    Goal Priority Disciplines Outcome Goal Variances Interventions   Physical Therapy Goal     PT, PT/OT      Description:  PT WILL BE SEEN FOR P.T. FOR A MIN OF 5 OUT OF 7 DAYS A WEEK  LT18  1. PT BED MOBILITY WILL BE IND   2. PT WILL T/F TO CHAIR WITH RW AND SBA  3. PT WILL GT TRAIN X 150' WITH RW AND SBA                      History:     Past Medical History:   Diagnosis Date    CKD (chronic kidney disease), stage IV     COPD (chronic obstructive pulmonary disease)     Coronary artery disease     Depression     Dialysis patient     GERD (gastroesophageal reflux disease)     HTN (hypertension)     Pneumonia        Past Surgical History:   Procedure Laterality Date    AMPUTATION-TOE Left 2017    Performed by Bandar Gomes DPM at [facility] OR    AV FISTULA PLACEMENT Right 2017    CIRCUMCISION  1978    TOE AMPUTATION Left 2017    4th toe    VASCULAR CATH INSERTION N/A 2017    Performed by Jeremias Peralta MD at [facility] CATH LAB       Clinical Decision Making:     History  Co-morbidities and personal factors that may impact the plan of care Examination  Body Structures and Functions, activity limitations and participation restrictions that may impact the plan of care Clinical Presentation   Decision Making/ Complexity Score   Co-morbidities:   [] Time since onset of injury / illness / exacerbation  [] Status of current condition  []Patient's cognitive status and safety  concerns    [] Multiple Medical Problems (see med hx)  Personal Factors:   [] Patient's age  [] Prior Level of function   [] Patient's home situation (environment and family support)  [] Patient's level of motivation  [] Expected progression of patient      HISTORY:(criteria)    [] 89560 - no personal factors/history    [] 82129 - has 1-2 personal factor/comorbidity     [] 63022 - has >3 personal factor/comorbidity     Body Regions:  [] Objective examination findings  [] Head     []  Neck  [] Trunk   [] Upper Extremity  [] Lower Extremity    Body Systems:  [] For communication ability, affect, cognition, language, and learning style: the assessment of the ability to make needs known, consciousness, orientation (person, place, and time), expected emotional /behavioral responses, and learning preferences (eg, learning barriers, education  needs)  [] For the neuromuscular system: a general assessment of gross coordinated movement (eg, balance, gait, locomotion, transfers, and transitions) and motor function  (motor control and motor learning)  [] For the musculoskeletal system: the assessment of gross symmetry, gross range of motion, gross strength, height, and weight  [] For the integumentary system: the assessment of pliability(texture), presence of scar formation, skin color, and skin integrity  [] For cardiovascular/pulmonary system: the assessment of heart rate, respiratory rate, blood pressure, and edema     Activity limitations:    [] Patient's cognitive status and saf ety concerns          [] Status of current condition      [] Weight bearing restriction  [] Cardiopulmunary Restriction    Participation Restrictions:   [] Goals and goal agreement with the patient     [] Rehab potential (prognosis) and probable outcome      Examination of Body System: (criteria)    [] 82220 - addressing 1-2 elements    [] 48922 - addressing a total of 3 or more elements     [] 13548 -  Addressing a total of 4 or more  elements         Clinical Presentation: (criteria)  Choose one     On examination of body system using standardized tests and measures patient presents with (CHOOSE ONE) elements from any of the following: body structures and functions, activity limitations, and/or participation restrictions.  Leading to a clinical presentation that is considered (CHOOSE ONE)                              Clinical Decision Making  (Eval Complexity):  Choose One     Time Tracking:     PT Received On: 11/01/18  PT Start Time: 1115     PT Stop Time: 1140  PT Total Time (min): 25 min     Billable Minutes: Evaluation 15 and Gait Training 10      Jenny Bravo, PT  11/01/2018

## 2018-11-01 NOTE — PROGRESS NOTES
[facility] Hospital Medicine  Progress Note    Patient Name: Conner Perez III  MRN: [id number]  Patient Class: IP- Inpatient   Admission Date: 10/27/2018  Length of Stay: 4 days  Attending Physician: Keith Wesley MD  Primary Care Provider: Raciel Angela MD        Subjective:     Principal Problem:Acute ST elevation myocardial infarction (STEMI) of inferior wall    HPI:  Came to the emergency room overnight from home with shortness of breath and hypertension.  He was initially stabilized with supplemental oxygen and continuous BiPAP but decompensated.  He was increasingly hypercapnic and short of breath, so was intubated in the ER.  Ponit-of-care ultrasound showed decreased LV function.  He is a [facility] [employer] [place].  He dialyzed successfully yesterday with 3 L ultrafiltration.  Denied chest pain on admission.  POC is his Son Conner.  He frequently comes to the ER complaining of shortness of breath hypertensive responds to dialysis.    Hospital Course:  Admitted to ICU on mechanical ventilation.  Urgent hemodialysis per Nephrology.  Troponin increased from 0.02 to 3.5 with new wall motion abnormalities and reduction in EF on echo.  Cardiology informed.  Given 325 mg aspirin and Atorvastatin 80 mg and start Heparin infusion ACS protocol.  Troponin peaked at 24.8 and repeat EKG showed and Anterior STEMI.  The Cardiologist took him to the catheterization lab and performed an angiogram which showed diffuse disease without bypass targets.  Recommendation was to optimize medical management.  He returned to the ICU.  Successfully extubated 30 October.  Remained hemodynamically stable.    Interval History:  Successfully extubated yesterday.  Optimizing medical management.    Review of Systems   Unable to perform ROS: Intubated     Objective:     Vital Signs (Most Recent):  Temp: 97.8 °F (36.6 °C) (10/31/18 1925)  Pulse: 77 (10/31/18 2107)  Resp: 18 (10/31/18 2107)  BP: (!) 155/74 (10/31/18  1925)  SpO2: 97 % (10/31/18 2107) Vital Signs (24h Range):  Temp:  [97.4 °F (36.3 °C)-99.2 °F (37.3 °C)] 97.8 °F (36.6 °C)  Pulse:  [74-86] 77  Resp:  [14-24] 18  SpO2:  [96 %-100 %] 97 %  BP: (153-186)/(63-89) 155/74     Weight: 84.9 kg (187 lb 2.7 oz)  Body mass index is 21.63 kg/m².    Intake/Output Summary (Last 24 hours) at 10/31/2018 2130  Last data filed at 10/31/2018 1243  Gross per 24 hour   Intake 500 ml   Output 3500 ml   Net -3000 ml      Physical Exam   Constitutional: He is oriented to person, place, and time. He appears well-developed and well-nourished. No distress.   HENT:   Head: Normocephalic and atraumatic.   Mouth/Throat: Oropharynx is clear and moist.   Eyes: Conjunctivae and EOM are normal. Pupils are equal, round, and reactive to light.   Neck: No JVD present. No thyromegaly present.   Cardiovascular: Normal rate, regular rhythm and normal heart sounds. Exam reveals no gallop and no friction rub.   No murmur heard.  Right groin arterial access site dressed clean and intact.   Pulmonary/Chest: Effort normal and breath sounds normal. No respiratory distress. He has no wheezes. He has no rales.   Abdominal: Soft. Bowel sounds are normal. He exhibits no distension. There is no tenderness. There is no rebound and no guarding.   Musculoskeletal: Normal range of motion. He exhibits no edema or tenderness.   RUE AV fistula with palpable thrill.   Lymphadenopathy:     He has no cervical adenopathy.   Neurological: He is alert and oriented to person, place, and time. He has normal reflexes. He displays normal reflexes. No cranial nerve deficit.   Skin: Skin is warm and dry. No rash noted. He is not diaphoretic. No erythema.   Psychiatric: He has a normal mood and affect. His behavior is normal. Judgment and thought content normal.       Significant Labs: All pertinent labs within the past 24 hours have been reviewed.    Significant Imaging: I have reviewed all pertinent imaging results/findings within  the past 24 hours.    Assessment/Plan:      * Acute ST elevation myocardial infarction (STEMI) of inferior wall    Troponin increased from 0.02 to 3.5 with new wall motion abnormalities and reduction in EF on echo.  Cardiology informed.  Given 325 mg aspirin and Atorvastatin 80 mg and start Heparin infusion ACS protocol.  Troponin increased to 24.8 and follow up EKG showed Anterior STEMI.  Urgent LHC revealed diffuse disease without appropriate bypass targets.  Optimizing medical management.     Acute hypercapnic respiratory failure on mechanical ventilation    Probably due to pulmonary congestion from volume overload.  Intubated in the ED for hypercapnia and worsening respiratory distress.  Urgent hemodialysis.     ESRD (end stage renal disease) on dialysis    Nephrology following and plan for urgent HD with ultrafiltration.  He reportedly had 3 L removed yesterday and has been compliant with HD but may still be volume overloaded.     Chronic diastolic heart failure    Repeat cardiac echo.  There may be a cardiac component.     Anemia of renal disease    Follow blood counts and transfuse if needed.     Coronary artery disease of native artery of native heart with stable angina pectoris    Control blood pressure.  Trend cardiac enzymes.       VTE Risk Mitigation (From admission, onward)        Ordered     heparin (porcine) injection 3,000 Units  Once      10/31/18 0800     heparin (porcine) injection 5,000 Units  Every 8 hours      10/30/18 1440     IP VTE HIGH RISK PATIENT  Once      10/27/18 0805          Critical Care Time:  30 minutes      Keith Wesley MD  Department of Hospital Medicine   [facility]

## 2018-11-01 NOTE — ASSESSMENT & PLAN NOTE
10/29/18   LHC revealed Diffuse diabetic CAD which was not amenable to optimal PCI and no targets for CABG  Diffuse LAD disease with distal occlusion and medical management advised  Continue IV heparin gtt for another 24 hours  Continue ASA, statin, Plavix, Hydralazine, Imdur  NO ACEI/ARB given renal function requiring dialysis.   Will add BB as BP allows, BP soft today at time of exam.   Remains intubated and sedated today.     10/30  -Attempting to wean to extubate today  -OK to stop IV heparin gtt today  Continue ASA, Statin, Plavix, Hydralazine, Imdur  Start BB and Norvasc today for better BP control  Will attempt to add ACEi/ARB tomorrow as BP permits.     10/31  -Patient has been extubated and awake and alert today  -Continue ASA, Statin, Plavix, BB, ARB, Norvasc, Imdur, Hydralazine  -Denies chest pain on exam today  -HD in progress today  -OOB today after dialysis    11/1  -Encourage OOB to chair  Recommend PT/OT evaluation  Continue ASA, statin, Plavix, BB, ARB, Norvasc, Imdur, Hydralazine       Can we change diagnosis code?

## 2018-11-01 NOTE — PROGRESS NOTES
[facility]   Adult Nutrition  Progress Note    SUMMARY     Recommendations    Recommendation/Intervention: 1.When medically able, ADAT to Renal. 2. Will continue to monitor.   Goals: Meet > 85 % EEN/EPN while admitted  Nutrition Goal Status:goal met   Communication of RD Recs: POC, sticky note     Reason for Assessment    Reason for Assessment: RD f/u  Dx:  1. Acute on chronic diastolic HFpEF of 55-60%    2. SOB (shortness of breath)    3. Hypervolemia, unspecified hypervolemia type    4. Essential hypertension    5. Accelerated hypertension    6. Acute pulmonary edema    7. Encounter for intubation    8. Acute hypercapnic respiratory failure    9. ESRD (end stage renal disease) on dialysis    10. Respiratory failure    11. Elevated troponin    12. NSTEMI (non-ST elevated myocardial infarction)    13. STEMI (ST elevation myocardial infarction)    14. Chest pain    15. Acute ST elevation myocardial infarction (STEMI) of inferior wall    16. On mechanically assisted ventilation    17. ST elevation myocardial infarction involving left anterior descending (LAD) coronary artery      Past Medical History:   Diagnosis Date    CKD (chronic kidney disease), stage IV     COPD (chronic obstructive pulmonary disease)     Coronary artery disease     Depression     Dialysis patient     GERD (gastroesophageal reflux disease)     HTN (hypertension)     Pneumonia        Interdisciplinary Rounds: attended  General Information Comments: Pt currently intubated and sedated w/ Propofol. No family members at bedside at time of visit. Pt on dialysis.  Per epic records pt weighed 193 lbs on 10/13/18, current weight = 201 lbs (no wt loss since last admit). Per [facility] (10.29.18): no significant muscle wasting/fat depletion noted. Pt currently on TF, tolerating. Reviewed TF recs w/ NP via secure chat this am.  11.1.18. Pt exubated. Pt currently on full liquid diet. Pt reports good appetite (PO intake ~ 100 % today).  Pt  "reports good appetite and intake PTA. Pt reports no recent wt loss.   Nutrition Discharge Planning:Renal diet     Nutrition Risk Screen    Nutrition Risk Screen: no indicators present    Nutrition/Diet History    Do you have any cultural, spiritual, Yazidism conflicts, given your current situation?: none     Anthropometrics    Temp: 97.7 °F (36.5 °C)  Height Method: Stated  Height: 6' 6" (198.1 cm)  Height (inches): 78 in  Weight Method: Bed Scale  Weight: 85 kg (187 lb 6.3 oz)  Weight (lb): 187.39 lb  Ideal Body Weight (IBW), Male: 214 lb  % Ideal Body Weight, Male (lb): 93.93 lb  BMI (Calculated): 23.3  BMI Grade: 18.5-24.9 - normal       Lab/Procedures/Meds     Pertinent Labs Reviewed: reviewed  BMP  Lab Results   Component Value Date     11/01/2018    K 4.4 11/01/2018    CL 97 11/01/2018    CO2 27 11/01/2018    BUN 41 (H) 11/01/2018    CREATININE 5.9 (H) 11/01/2018    CALCIUM 8.8 11/01/2018    ANIONGAP 15 11/01/2018    ESTGFRAFRICA 10 (A) 11/01/2018    EGFRNONAA 9 (A) 11/01/2018     Lab Results   Component Value Date    CALCIUM 8.8 11/01/2018    PHOS 4.2 11/01/2018     Lab Results   Component Value Date    ALBUMIN 3.9 10/27/2018     No results for input(s): POCTGLUCOSE in the last 24 hours.    Pertinent Medications Reviewed: reviewed    Physical Findings/Assessment    Overall Physical Appearance: nourished   Oral/Mouth Cavity: tooth/teeth missing  Skin: (wound foot )    Estimated/Assessed Needs    Weight Used For Calorie Calculations:  85 kg (187 lb 6.3 oz)  Energy Calorie Requirements (kcal): 2550  Energy Need Method: kcal/ kg   Protein Requirements: 102 - 127 g  Weight Used For Protein Calculations: Weight: 85 kg (187 lb 6.3 oz)     Fluid Need Method: RDA Method(or per MD)         Nutrition Prescription Ordered    Current Diet Order: full liquid diet renal       Evaluation of Received Nutrient/Fluid Intake    Intake/Output Summary (Last 24 hours) at 11/1/2018 1512  Last data filed at 10/31/2018 " 1243  Gross per 24 hour   Intake 500 ml   Output 3500 ml   Net -3000 ml       % Intake of Estimated Energy Needs: 75 - 100 %   % Meal Intake: 75 - 100 %       Nutrition Risk    Level of Risk/Frequency of Follow-up: (1xweekly)     Assessment and Plan    Nutrition Problem  Inadequate oral intake     Related to (etiology):   Mechanical ventilation     Signs and Symptoms (as evidenced by):   NPO status     Interventions/Recommendations (treatment strategy):  See above     Nutrition Diagnosis Status:   Resolved. 11.1.18. Pt extubated, now on oral diet w/ good intake.       Monitor and Evaluation    Food and Nutrient Intake: energy intake  Food and Nutrient Adminstration: diet order   Anthropometric Measurements: weight  Biochemical Data, Medical Tests and Procedures: electrolyte and renal panel, glucose/endocrine profile  Nutrition-Focused Physical Findings: overall appearance, skin     Nutrition Follow-Up    RD Follow-up?: Yes(1xweekly)

## 2018-11-01 NOTE — PLAN OF CARE
Problem: Occupational Therapy Goal  Goal: Occupational Therapy Goal  Outcome: Ongoing (interventions implemented as appropriate)  1) PT WILL PERFORM X1-X2 SETS X10 REPS OF LOW RESISTANCE THERE EX WITH GOAL OF INCREASED FX STRENGTH AND INCREASED FX ENDURANCE TO INCREASE SAFETY/(I) WITH ALL AREAS OF SELF CARE.  2)  PT WILL BE (S) WITH (B) HAND HEP WITH GOAL OF INCREASED FM CONTROL TO INCREASE (I) WITH FEEDING & GROOMING/HYGIENE.  3)  PT WILL BE MOD (A) TO WITH LB DRESSING.    Comments: PLAN TO ADDRESS GOALS WITH FOCUS ON INCREASING PT'S FX STRENGTH, FX ENDURANCE, AND OVERALL SAFETY/(I) WITH ALL AREAS OF SELF CARE.

## 2018-11-01 NOTE — SUBJECTIVE & OBJECTIVE
Interval History:    10/28/18 - Pt s/p [facility] for acute MI . Severe CAD , Cards suggest medical management,       10/29/18 - seen on HD, tolerating well, troponin continues to get worse     10/30/18 - extubated and doing well, remains confused ,     10/31/18 - extubated and stable , tolerating HD well,     11/1/18 - no acute events, denies complaints       Review of patient's allergies indicates:   Allergen Reactions    Augmentin [amoxicillin-pot clavulanate] Edema    Sulfa (sulfonamide antibiotics)      swelling    Lisinopril      cough     Current Facility-Administered Medications   Medication Frequency    albuterol nebulizer solution 2.5 mg Q6H PRN    amLODIPine tablet 10 mg Daily    aspirin EC tablet 81 mg Daily    atorvastatin tablet 80 mg Daily    chlorhexidine 0.12 % solution 15 mL BID    cloNIDine 0.3 mg/24 hr td ptwk 1 patch Q7 Days    clopidogrel tablet 75 mg Daily    [START ON 11/2/2018] epoetin ovi injection 10,000 Units Every Mon, Wed, Fri    [START ON 11/2/2018] heparin (porcine) injection 2,000 Units Once    heparin (porcine) injection 5,000 Units Q8H    hydrALAZINE injection 10 mg Q8H PRN    hydrALAZINE tablet 100 mg Q8H    isosorbide dinitrate tablet 40 mg TID    labetalol injection 10 mg Q6H PRN    losartan tablet 25 mg Daily    metoprolol tartrate (LOPRESSOR) tablet 100 mg BID    pantoprazole injection 40 mg Daily    polyethylene glycol packet 17 g Daily       Objective:     Vital Signs (Most Recent):  Temp: 97.7 °F (36.5 °C) (11/01/18 1101)  Pulse: 80 (11/01/18 1300)  Resp: 18 (11/01/18 1101)  BP: (!) 146/70 (11/01/18 1101)  SpO2: 97 % (11/01/18 1101)  O2 Device (Oxygen Therapy): nasal cannula (11/01/18 1101) Vital Signs (24h Range):  Temp:  [97.7 °F (36.5 °C)-98.3 °F (36.8 °C)] 97.7 °F (36.5 °C)  Pulse:  [75-91] 80  Resp:  [16-20] 18  SpO2:  [94 %-98 %] 97 %  BP: (146-193)/(70-91) 146/70     Weight: 85 kg (187 lb 6.3 oz) (11/01/18 4914)  Body mass index is 21.66 kg/m².  Body  surface area is 2.16 meters squared.    I/O last 3 completed shifts:  In: 500 [Other:500]  Out: 3500 [Other:3500]    Physical Exam   Constitutional: He appears well-developed. No distress.   HENT:   Head: Normocephalic and atraumatic.   Neck: Neck supple. No tracheal deviation present. No thyromegaly present.   Cardiovascular: Normal rate, regular rhythm, normal heart sounds and intact distal pulses. Exam reveals no gallop and no friction rub.   No murmur heard.  Pulmonary/Chest: He has no wheezes.   Abdominal: Soft. He exhibits no mass. There is no tenderness. There is no rebound and no guarding.   Musculoskeletal: He exhibits no edema.   Lymphadenopathy:     He has no cervical adenopathy.   Skin: Skin is warm. No rash noted. He is not diaphoretic. No erythema.   Nursing note and vitals reviewed.      Significant Labs:    CBC:     Recent Labs   Lab 11/01/18  0535   WBC 5.56   RBC 3.40*   HGB 9.6*   HCT 28.7*      MCV 84   MCH 28.2   MCHC 33.4     CMP:   Recent Labs   Lab 10/27/18  0548  11/01/18  0535      < > 86   CALCIUM 9.3   < > 8.8   ALBUMIN 3.9  --   --    PROT 7.9  --   --       < > 139   K 4.5   < > 4.4   CO2 30*   < > 27   CL 95   < > 97   BUN 18   < > 41*   CREATININE 4.3*   < > 5.9*   ALKPHOS 66  --   --    ALT 19  --   --    AST 30  --   --    BILITOT 0.8  --   --     < > = values in this interval not displayed.     Coagulation:   Recent Labs   Lab 10/27/18  1923  10/30/18  0405   INR 1.1  --   --    APTT 30.5   < > 47.8*    < > = values in this interval not displayed.     LFTs:   Recent Labs   Lab 10/27/18  0548   ALT 19   AST 30   ALKPHOS 66   BILITOT 0.8   PROT 7.9   ALBUMIN 3.9     All labs within the past 24 hours have been reviewed.     Significant Imaging:  Reviewed     Lab Results   Component Value Date    .7 (H) 08/26/2017    CALCIUM 8.8 11/01/2018    PHOS 4.2 11/01/2018       Lab Results   Component Value Date    ALBUMIN 3.9 10/27/2018

## 2018-11-01 NOTE — PROGRESS NOTES
[facility] -   Cardiology  Progress Note    Patient Name: Conner Perez III  MRN: [id number]  Admission Date: 10/27/2018  Hospital Length of Stay: 5 days  Code Status: Full Code   Attending Physician: Keith Wesley MD   Primary Care Physician: Raciel Angela MD  Expected Discharge Date: 11/2/2018  Principal Problem:Acute ST elevation myocardial infarction (STEMI) of inferior wall    Subjective:   HPI  Pt admitted with dyspnea/respiratory failure.  Pt has ESRD, HTN.    Admitted with acute dyspnea, respiratory failure requiring intubation.  ecg on admit showed NSR, nonspecific st-t abnl.   Troponin levels increased, started on IV heparin, asa for NSTEMI by Hospital [facility].  Anemia noted on labs today, Hg 8.9  This am routine am ECG showed inferior ST elevation concerning for STEMI and marked anterolateral st-t abnormalities, worsening.  Cardiology on call was contacted about ECG and decision made to activate cath lab for emergent cath.  Pt is intubated, sedated and stable otherwise and there are no reports of chest pain sxs.  Echo yesterday showed EF 45%, apical WMA.  He was admitted last month, dyspnea/cp and had negative stress test.  Echo in earlier 4/7/17 showed similar WMA.  Reportedly has had LHC 2016 with no major blockages (no report available).      Hospital Course:   10/29/18-Patient seen and examined in room, intubated and sedated. IV heparin gtt in place. Pending dialysis treatment today. Labs reviewed, stable.     10/30/18-Patient seen and examined in room, intubated. On SBT today to possible extubation. IV heparin gtt in place. Labs reviewed, stable. BP remains elevated, will adjust BP meds as tolerated.     10/31/18-Patient seen and examined in room, extubated and awake and alert. HD in progress. BP remains elevated. Labs reviewed, stable.     11/1/18-Patient seen and examined in room, denies chest pain or shortness of breath today. Labs reviewed, stable. Would benefit from PT/OT  evaluation.     Interval History: no acute issues noted o/n, transferred to telemetry yesterday PM.     Review of Systems   Constitution: Negative for weakness and malaise/fatigue.   HENT: Negative for hearing loss and hoarse voice.    Eyes: Negative for blurred vision and visual disturbance.   Cardiovascular: Positive for chest pain (resolved). Negative for claudication, dyspnea on exertion, irregular heartbeat, leg swelling, near-syncope, orthopnea, palpitations, paroxysmal nocturnal dyspnea and syncope.   Respiratory: Positive for cough and sputum production. Negative for hemoptysis, shortness of breath, sleep disturbances due to breathing, snoring and wheezing.    Endocrine: Negative for cold intolerance and heat intolerance.   Hematologic/Lymphatic: Does not bruise/bleed easily.   Skin: Negative for color change, dry skin and nail changes.   Musculoskeletal: Positive for arthritis and back pain. Negative for joint pain and myalgias.   Gastrointestinal: Negative for bloating, abdominal pain, constipation, nausea and vomiting.   Genitourinary: Negative for dysuria, flank pain, hematuria and hesitancy.   Neurological: Negative for headaches, light-headedness, loss of balance, numbness and paresthesias.   Psychiatric/Behavioral: Negative for altered mental status.   Allergic/Immunologic: Negative for environmental allergies.     Objective:     Vital Signs (Most Recent):  Temp: 97.7 °F (36.5 °C) (11/01/18 1101)  Pulse: 78 (11/01/18 1101)  Resp: 18 (11/01/18 1101)  BP: (!) 146/70 (11/01/18 1101)  SpO2: 97 % (11/01/18 1101) Vital Signs (24h Range):  Temp:  [97.7 °F (36.5 °C)-98.3 °F (36.8 °C)] 97.7 °F (36.5 °C)  Pulse:  [75-91] 78  Resp:  [16-24] 18  SpO2:  [94 %-100 %] 97 %  BP: (146-193)/(65-91) 146/70     Weight: 85 kg (187 lb 6.3 oz)  Body mass index is 21.66 kg/m².     SpO2: 97 %  O2 Device (Oxygen Therapy): nasal cannula      Intake/Output Summary (Last 24 hours) at 11/1/2018 1214  Last data filed at 10/31/2018  1243  Gross per 24 hour   Intake 500 ml   Output 3500 ml   Net -3000 ml       Lines/Drains/Airways     Drain                 Hemodialysis AV Fistula Right forearm -- days         Hemodialysis AV Fistula 12/18/17 0017 Right forearm 318 days          Peripheral Intravenous Line                 Peripheral IV - Single Lumen 10/27/18 0930 Left Forearm 5 days         Peripheral IV - Single Lumen 10/28/18 2330 Left Forearm 3 days                Physical Exam   Constitutional: He is oriented to person, place, and time. He appears well-developed and well-nourished. No distress. Nasal cannula in place.   HENT:   Head: Normocephalic and atraumatic.   Eyes: Pupils are equal, round, and reactive to light.   Neck: Normal range of motion and full passive range of motion without pain. Neck supple. No JVD present.   Cardiovascular: Normal rate, regular rhythm, S1 normal, S2 normal and intact distal pulses.  Occasional extrasystoles are present. PMI is not displaced. Exam reveals no distant heart sounds.   No murmur heard.  Pulses:       Radial pulses are 2+ on the right side, and 2+ on the left side.        Dorsalis pedis pulses are 2+ on the right side, and 2+ on the left side.   Right groin access site C/D/I, no hematoma or ecchymosis noted.    Pulmonary/Chest: Effort normal and breath sounds normal. No accessory muscle usage. No respiratory distress. He has no decreased breath sounds. He has no wheezes. He has no rales.   Abdominal: Soft. Bowel sounds are normal. He exhibits no distension. There is no tenderness.   Genitourinary:   Genitourinary Comments: +Right forearm AV fistula +thrill, +bruit   Musculoskeletal: Normal range of motion. He exhibits no edema.        Right ankle: He exhibits no swelling.        Left ankle: He exhibits no swelling.   Neurological: He is alert and oriented to person, place, and time.   Skin: Skin is warm and dry. No rash noted. He is not diaphoretic. No cyanosis or erythema. No pallor. Nails show  no clubbing.   Psychiatric: He has a normal mood and affect. His speech is normal and behavior is normal. Judgment and thought content normal. Cognition and memory are normal.   Nursing note and vitals reviewed.      Significant Labs:   All pertinent lab results from the last 24 hours have been reviewed. and   Recent Lab Results       11/01/18  0535        Anion Gap 15     Baso # 0.03     Basophil% 0.5     BUN, Bld 41     Calcium 8.8     Chloride 97     CO2 27     Creatinine 5.9     Differential Method Automated     eGFR if  10     eGFR if non  9  Comment:  Calculation used to obtain the estimated glomerular filtration  rate (eGFR) is the CKD-EPI equation.        Eos # 0.1     Eosinophil% 2.2     Glucose 86     Gran # (ANC) 3.8     Gran% 68.0     Hematocrit 28.7     Hemoglobin 9.6     Lymph # 0.8     Lymph% 14.2     Magnesium 2.1     MCH 28.2     MCHC 33.4     MCV 84     Mono # 0.8     Mono% 15.1     MPV 10.3     Phosphorus 4.2     Platelets 171     Potassium 4.4     RBC 3.40     RDW 15.4     Sodium 139     WBC 5.56           Significant Imaging: Echocardiogram:   2D echo with color flow doppler:   Results for orders placed or performed during the hospital encounter of 04/27/18   2D echo with color flow doppler   Result Value Ref Range    Calculated EF 55 55 - 65    Diastolic Dysfunction No     Mitral Valve Mobility NORMAL     Tricuspid Valve Regurgitation TRIVIAL     and X-Ray: CXR: X-Ray Chest 1 View (CXR):   Results for orders placed or performed during the hospital encounter of 10/27/18   X-Ray Chest 1 View    Narrative    EXAMINATION:  XR CHEST 1 VIEW    CLINICAL HISTORY:  respiratory failure;    TECHNIQUE:  Single frontal view of the chest was performed.    COMPARISON:  10/30/2018    FINDINGS:  Endotracheal and nasogastric tubes have been removed.  Small right and trace left pleural effusions with right lower lobe atelectasis or airspace disease versus aspiration.  Stable  cardiomegaly.  In comparison to the prior study, there is no adverse interval changes      Impression    In comparison to the prior study, there is no adverse interval changes      Electronically signed by: Santo Marrero MD  Date:    10/31/2018  Time:    08:20    and X-Ray Chest PA and Lateral (CXR): No results found for this visit on 10/27/18.    Assessment and Plan:       * Acute ST elevation myocardial infarction (STEMI) of inferior wall    See plan under CAD       Abnormal ECG           Acute pulmonary edema    Dialysis per nephrology.        Acute on chronic diastolic congestive heart failure    Dialysis per nephrology  Remains intubated and sedated today.       11/1  Extubated  Seems compensated  Dialysis per nephrology for fluid management.     Coronary artery disease of native artery of native heart with stable angina pectoris    10/29/18   [facility] revealed Diffuse diabetic CAD which was not amenable to optimal PCI and no targets for CABG  Diffuse LAD disease with distal occlusion and medical management advised  Continue IV heparin gtt for another 24 hours  Continue ASA, statin, Plavix, Hydralazine, Imdur  NO ACEI/ARB given renal function requiring dialysis.   Will add BB as BP allows, BP soft today at time of exam.   Remains intubated and sedated today.     10/30  -Attempting to wean to extubate today  -OK to stop IV heparin gtt today  Continue ASA, Statin, Plavix, Hydralazine, Imdur  Start BB and Norvasc today for better BP control  Will attempt to add ACEi/ARB tomorrow as BP permits.     10/31  -Patient has been extubated and awake and alert today  -Continue ASA, Statin, Plavix, BB, ARB, Norvasc, Imdur, Hydralazine  -Denies chest pain on exam today  -HD in progress today  -OOB today after dialysis    11/1  -Encourage OOB to chair  Recommend PT/OT evaluation  Continue ASA, statin, Plavix, BB, ARB, Norvasc, Imdur, Hydralazine         Hypertension    On medical therapy  Continue clonidine patch, hydralazine,  Imdur  Start BB and Norvasc today  Monitor BP trend and adjust as needed.     10/31  -continue BB, ARB, Hydralazine, Imdur, Norvasc  -Monitor BP trend and adjust as needed.     11/1  Continue BB, ARB, Hydralazine, Imdur, Norvasc  Increase BB dosage today for tachycardia on exam today.      ESRD (end stage renal disease) on dialysis    -per nephrology          Chronic diastolic heart failure    Resume DASH diet, 2 gm sodium restriction post extubation  Daily weights  Strict intake and output  Dialysis per nephrology         VTE Risk Mitigation (From admission, onward)        Ordered     heparin (porcine) injection 3,000 Units  Once      10/31/18 0800     heparin (porcine) injection 5,000 Units  Every 8 hours      10/30/18 1440     IP VTE HIGH RISK PATIENT  Once      10/27/18 0805          Litzy Thomas NP  Cardiology  [facility] [place]

## 2018-11-01 NOTE — ASSESSMENT & PLAN NOTE
On medical therapy  Continue clonidine patch, hydralazine, Imdur  Start BB and Norvasc today  Monitor BP trend and adjust as needed.     10/31  -continue BB, ARB, Hydralazine, Imdur, Norvasc  -Monitor BP trend and adjust as needed.     11/1  Continue BB, ARB, Hydralazine, Imdur, Norvasc  Increase BB dosage today for tachycardia on exam today.

## 2018-11-01 NOTE — PROGRESS NOTES
[facility] -   Nephrology  Progress Note    Patient Name: Conner Perez III  MRN: [id number]  Admission Date: 10/27/2018  Hospital Length of Stay: 5 days  Attending Provider: Keith Wesley MD   Primary Care Physician: Raciel Angela MD  Principal Problem:Acute ST elevation myocardial infarction (STEMI) of inferior wall    Subjective:     HPI: Conner Perez III  is a 68 y old AA man with h/o ESRD on HD ( [facility], [facility] ) presented to the ER this morning  for SOB, bilateral pulmonary edema noted on the chest x-ray, patient had his dialysis treatment yesterday, patient has been having recurrent episodes of shortness of breath and similar presentations to the emergency room in the last few months, he also has been losing weight, patient had to be emergently intubated in the emergency room and was transferred to ICU, we were consulted for emergent dialysis for volume removal,       Interval History:    10/28/18 - Pt s/p [facility] for acute MI . Severe CAD , Cards suggest medical management,       10/29/18 - seen on HD, tolerating well, troponin continues to get worse     10/30/18 - extubated and doing well, remains confused ,     10/31/18 - extubated and stable , tolerating HD well,     11/1/18 - no acute events, denies complaints       Review of patient's allergies indicates:   Allergen Reactions    Augmentin [amoxicillin-pot clavulanate] Edema    Sulfa (sulfonamide antibiotics)      swelling    Lisinopril      cough     Current Facility-Administered Medications   Medication Frequency    albuterol nebulizer solution 2.5 mg Q6H PRN    amLODIPine tablet 10 mg Daily    aspirin EC tablet 81 mg Daily    atorvastatin tablet 80 mg Daily    chlorhexidine 0.12 % solution 15 mL BID    cloNIDine 0.3 mg/24 hr td ptwk 1 patch Q7 Days    clopidogrel tablet 75 mg Daily    [START ON 11/2/2018] epoetin ovi injection 10,000 Units Every Mon, Wed, Fri    [START ON 11/2/2018] heparin (porcine)  injection 2,000 Units Once    heparin (porcine) injection 5,000 Units Q8H    hydrALAZINE injection 10 mg Q8H PRN    hydrALAZINE tablet 100 mg Q8H    isosorbide dinitrate tablet 40 mg TID    labetalol injection 10 mg Q6H PRN    losartan tablet 25 mg Daily    metoprolol tartrate (LOPRESSOR) tablet 100 mg BID    pantoprazole injection 40 mg Daily    polyethylene glycol packet 17 g Daily       Objective:     Vital Signs (Most Recent):  Temp: 97.7 °F (36.5 °C) (11/01/18 1101)  Pulse: 80 (11/01/18 1300)  Resp: 18 (11/01/18 1101)  BP: (!) 146/70 (11/01/18 1101)  SpO2: 97 % (11/01/18 1101)  O2 Device (Oxygen Therapy): nasal cannula (11/01/18 1101) Vital Signs (24h Range):  Temp:  [97.7 °F (36.5 °C)-98.3 °F (36.8 °C)] 97.7 °F (36.5 °C)  Pulse:  [75-91] 80  Resp:  [16-20] 18  SpO2:  [94 %-98 %] 97 %  BP: (146-193)/(70-91) 146/70     Weight: 85 kg (187 lb 6.3 oz) (11/01/18 0454)  Body mass index is 21.66 kg/m².  Body surface area is 2.16 meters squared.    I/O last 3 completed shifts:  In: 500 [Other:500]  Out: 3500 [Other:3500]    Physical Exam   Constitutional: He appears well-developed. No distress.   HENT:   Head: Normocephalic and atraumatic.   Neck: Neck supple. No tracheal deviation present. No thyromegaly present.   Cardiovascular: Normal rate, regular rhythm, normal heart sounds and intact distal pulses. Exam reveals no gallop and no friction rub.   No murmur heard.  Pulmonary/Chest: He has no wheezes.   Abdominal: Soft. He exhibits no mass. There is no tenderness. There is no rebound and no guarding.   Musculoskeletal: He exhibits no edema.   Lymphadenopathy:     He has no cervical adenopathy.   Skin: Skin is warm. No rash noted. He is not diaphoretic. No erythema.   Nursing note and vitals reviewed.      Significant Labs:    CBC:     Recent Labs   Lab 11/01/18  0535   WBC 5.56   RBC 3.40*   HGB 9.6*   HCT 28.7*      MCV 84   MCH 28.2   MCHC 33.4     CMP:   Recent Labs   Lab 10/27/18  0548   11/01/18  0535      < > 86   CALCIUM 9.3   < > 8.8   ALBUMIN 3.9  --   --    PROT 7.9  --   --       < > 139   K 4.5   < > 4.4   CO2 30*   < > 27   CL 95   < > 97   BUN 18   < > 41*   CREATININE 4.3*   < > 5.9*   ALKPHOS 66  --   --    ALT 19  --   --    AST 30  --   --    BILITOT 0.8  --   --     < > = values in this interval not displayed.     Coagulation:   Recent Labs   Lab 10/27/18  1923  10/30/18  0405   INR 1.1  --   --    APTT 30.5   < > 47.8*    < > = values in this interval not displayed.     LFTs:   Recent Labs   Lab 10/27/18  0548   ALT 19   AST 30   ALKPHOS 66   BILITOT 0.8   PROT 7.9   ALBUMIN 3.9     All labs within the past 24 hours have been reviewed.     Significant Imaging:  Reviewed     Lab Results   Component Value Date    .7 (H) 08/26/2017    CALCIUM 8.8 11/01/2018    PHOS 4.2 11/01/2018       Lab Results   Component Value Date    ALBUMIN 3.9 10/27/2018         Assessment/Plan:     ESRD (end stage renal disease) on dialysis    1. ESRD on HD : (  MWF , [facility] ) , plan HD in AM ,      he is not adherent with fluid restriction per family,      2.  HTN : BP fluctuates,  Adjust meds, UF on HD as tolerated      3. Anemia of CKD :  follow hemoglobin, Procrit with dialysis, pRBC tx when indicated      4. SHPT : renal diet ,      5. CAD / acute MI - s/p LHC, optimize medical management per Cards ,     6. Disposition pending               I will follow-up with patient. Please contact us if you have any additional questions.     Total time spent 40 minutes including time needed to review the records,  patient  evaluation, documentation, face-to-face discussion with the patient, primary team,     more than 50% of the time was spent on coordination of care and counseling.       Fabián London MD  Nephrology  [facility]

## 2018-11-02 LAB
ANION GAP SERPL CALC-SCNC: 16 MMOL/L
BASOPHILS # BLD AUTO: 0.04 K/UL
BASOPHILS NFR BLD: 0.7 %
BUN SERPL-MCNC: 57 MG/DL
CALCIUM SERPL-MCNC: 8.9 MG/DL
CHLORIDE SERPL-SCNC: 96 MMOL/L
CO2 SERPL-SCNC: 26 MMOL/L
CREAT SERPL-MCNC: 7.6 MG/DL
DIFFERENTIAL METHOD: ABNORMAL
EOSINOPHIL # BLD AUTO: 0.2 K/UL
EOSINOPHIL NFR BLD: 2.7 %
ERYTHROCYTE [DISTWIDTH] IN BLOOD BY AUTOMATED COUNT: 15.5 %
EST. GFR  (AFRICAN AMERICAN): 8 ML/MIN/1.73 M^2
EST. GFR  (NON AFRICAN AMERICAN): 7 ML/MIN/1.73 M^2
GLUCOSE SERPL-MCNC: 126 MG/DL
HCT VFR BLD AUTO: 27.7 %
HGB BLD-MCNC: 9.2 G/DL
LYMPHOCYTES # BLD AUTO: 0.8 K/UL
LYMPHOCYTES NFR BLD: 14.1 %
MAGNESIUM SERPL-MCNC: 2.3 MG/DL
MCH RBC QN AUTO: 28.1 PG
MCHC RBC AUTO-ENTMCNC: 33.2 G/DL
MCV RBC AUTO: 85 FL
MONOCYTES # BLD AUTO: 1 K/UL
MONOCYTES NFR BLD: 17 %
NEUTROPHILS # BLD AUTO: 3.9 K/UL
NEUTROPHILS NFR BLD: 65.5 %
PHOSPHATE SERPL-MCNC: 3.8 MG/DL
PLATELET # BLD AUTO: 189 K/UL
PMV BLD AUTO: 10.1 FL
POCT GLUCOSE: 146 MG/DL (ref 70–110)
POCT GLUCOSE: 167 MG/DL (ref 70–110)
POTASSIUM SERPL-SCNC: 4.2 MMOL/L
RBC # BLD AUTO: 3.27 M/UL
SODIUM SERPL-SCNC: 138 MMOL/L
WBC # BLD AUTO: 5.89 K/UL

## 2018-11-02 PROCEDURE — 63600175 PHARM REV CODE 636 W HCPCS: Performed by: INTERNAL MEDICINE

## 2018-11-02 PROCEDURE — 99233 SBSQ HOSP IP/OBS HIGH 50: CPT | Mod: ,,, | Performed by: INTERNAL MEDICINE

## 2018-11-02 PROCEDURE — 97110 THERAPEUTIC EXERCISES: CPT

## 2018-11-02 PROCEDURE — 63600175 PHARM REV CODE 636 W HCPCS: Performed by: NURSE PRACTITIONER

## 2018-11-02 PROCEDURE — 27000221 HC OXYGEN, UP TO 24 HOURS

## 2018-11-02 PROCEDURE — 94761 N-INVAS EAR/PLS OXIMETRY MLT: CPT

## 2018-11-02 PROCEDURE — 84100 ASSAY OF PHOSPHORUS: CPT

## 2018-11-02 PROCEDURE — 97803 MED NUTRITION INDIV SUBSEQ: CPT

## 2018-11-02 PROCEDURE — 85025 COMPLETE CBC W/AUTO DIFF WBC: CPT

## 2018-11-02 PROCEDURE — 99232 SBSQ HOSP IP/OBS MODERATE 35: CPT | Mod: ,,, | Performed by: INTERNAL MEDICINE

## 2018-11-02 PROCEDURE — 25000003 PHARM REV CODE 250: Performed by: NURSE PRACTITIONER

## 2018-11-02 PROCEDURE — 80100016 HC MAINTENANCE HEMODIALYSIS

## 2018-11-02 PROCEDURE — 21400001 HC TELEMETRY ROOM

## 2018-11-02 PROCEDURE — 36415 COLL VENOUS BLD VENIPUNCTURE: CPT

## 2018-11-02 PROCEDURE — 97116 GAIT TRAINING THERAPY: CPT

## 2018-11-02 PROCEDURE — 83735 ASSAY OF MAGNESIUM: CPT

## 2018-11-02 PROCEDURE — 25000003 PHARM REV CODE 250: Performed by: INTERNAL MEDICINE

## 2018-11-02 PROCEDURE — 80048 BASIC METABOLIC PNL TOTAL CA: CPT

## 2018-11-02 RX ORDER — LOSARTAN POTASSIUM 50 MG/1
50 TABLET ORAL DAILY
Status: DISCONTINUED | OUTPATIENT
Start: 2018-11-02 | End: 2018-11-02

## 2018-11-02 RX ORDER — PANTOPRAZOLE SODIUM 40 MG/1
40 TABLET, DELAYED RELEASE ORAL DAILY
Status: DISCONTINUED | OUTPATIENT
Start: 2018-11-02 | End: 2018-11-07 | Stop reason: HOSPADM

## 2018-11-02 RX ORDER — LOSARTAN POTASSIUM 50 MG/1
100 TABLET ORAL DAILY
Status: DISCONTINUED | OUTPATIENT
Start: 2018-11-03 | End: 2018-11-07 | Stop reason: HOSPADM

## 2018-11-02 RX ADMIN — HYDRALAZINE HYDROCHLORIDE 100 MG: 50 TABLET, FILM COATED ORAL at 05:11

## 2018-11-02 RX ADMIN — LABETALOL HYDROCHLORIDE 10 MG: 5 INJECTION, SOLUTION INTRAVENOUS at 04:11

## 2018-11-02 RX ADMIN — CLOPIDOGREL BISULFATE 75 MG: 75 TABLET ORAL at 01:11

## 2018-11-02 RX ADMIN — METOPROLOL TARTRATE 100 MG: 50 TABLET ORAL at 01:11

## 2018-11-02 RX ADMIN — PANTOPRAZOLE SODIUM 40 MG: 40 TABLET, DELAYED RELEASE ORAL at 01:11

## 2018-11-02 RX ADMIN — ERYTHROPOIETIN 10000 UNITS: 10000 INJECTION, SOLUTION INTRAVENOUS; SUBCUTANEOUS at 11:11

## 2018-11-02 RX ADMIN — POLYETHYLENE GLYCOL 3350 17 G: 17 POWDER, FOR SOLUTION ORAL at 01:11

## 2018-11-02 RX ADMIN — CHLORHEXIDINE GLUCONATE 15 ML: 1.2 RINSE ORAL at 08:11

## 2018-11-02 RX ADMIN — METOPROLOL TARTRATE 100 MG: 50 TABLET ORAL at 08:11

## 2018-11-02 RX ADMIN — ISOSORBIDE DINITRATE 40 MG: 20 TABLET ORAL at 01:11

## 2018-11-02 RX ADMIN — HYDRALAZINE HYDROCHLORIDE 100 MG: 50 TABLET, FILM COATED ORAL at 10:11

## 2018-11-02 RX ADMIN — AMLODIPINE BESYLATE 10 MG: 10 TABLET ORAL at 01:11

## 2018-11-02 RX ADMIN — HEPARIN SODIUM 5000 UNITS: 5000 INJECTION, SOLUTION INTRAVENOUS; SUBCUTANEOUS at 05:11

## 2018-11-02 RX ADMIN — HEPARIN SODIUM 5000 UNITS: 5000 INJECTION, SOLUTION INTRAVENOUS; SUBCUTANEOUS at 01:11

## 2018-11-02 RX ADMIN — ASPIRIN 81 MG: 81 TABLET, COATED ORAL at 01:11

## 2018-11-02 RX ADMIN — ATORVASTATIN CALCIUM 80 MG: 40 TABLET, FILM COATED ORAL at 01:11

## 2018-11-02 RX ADMIN — ISOSORBIDE DINITRATE 40 MG: 20 TABLET ORAL at 08:11

## 2018-11-02 RX ADMIN — HEPARIN SODIUM 5000 UNITS: 5000 INJECTION, SOLUTION INTRAVENOUS; SUBCUTANEOUS at 10:11

## 2018-11-02 RX ADMIN — CHLORHEXIDINE GLUCONATE 15 ML: 1.2 RINSE ORAL at 01:11

## 2018-11-02 RX ADMIN — HYDRALAZINE HYDROCHLORIDE 100 MG: 50 TABLET, FILM COATED ORAL at 01:11

## 2018-11-02 NOTE — PROGRESS NOTES
Pt completed 3.5 hours of HD tx with 3 L net UF.  Pt tolerated tx well. No access issues. During tx, pt received epo. Dr. Granados rounded on pt at bedside during tx.  Post tx, blood rinsed back, needles removed, and hemostasis achieved. Report to nurse attending.

## 2018-11-02 NOTE — PLAN OF CARE
Problem: Patient Care Overview  Goal: Plan of Care Review  Outcome: Ongoing (interventions implemented as appropriate)  End of Shift Report  Pt went to dialysis today.  Pt diet was advanced to Renal diet with fluid restrictions.  Accuchecks continues.  Telemonitoring continues  BP continues to run high.  BP medications was adjusted today.  Pt had 2 BM today.  Family remains present and supportive.  HOB elevated.  Siderails up x 2.  Call light within reach.  Bed alarm activated.

## 2018-11-02 NOTE — PROGRESS NOTES
[facility] Hospital Medicine  Progress Note    Patient Name: Conner Perez III  MRN: [id number]  Patient Class: IP- Inpatient   Admission Date: 10/27/2018  Length of Stay: 5 days  Attending Physician: Keith Wesley MD  Primary Care Provider: Raciel Angela MD        Subjective:     Principal Problem:Acute ST elevation myocardial infarction (STEMI) of inferior wall    HPI:  Came to the emergency room overnight from home with shortness of breath and hypertension.  He was initially stabilized with supplemental oxygen and continuous BiPAP but decompensated.  He was increasingly hypercapnic and short of breath, so was intubated in the ER.  Ponit-of-care ultrasound showed decreased LV function.  He is a [employer] [place].  He dialyzed successfully yesterday with 3 L ultrafiltration.  Denied chest pain on admission.  POC is his Son Conner.  He frequently comes to the ER complaining of shortness of breath hypertensive responds to dialysis.    Hospital Course:  Admitted to ICU on mechanical ventilation.  Urgent hemodialysis per Nephrology.  Troponin increased from 0.02 to 3.5 with new wall motion abnormalities and reduction in EF on echo.  Cardiology informed.  Given 325 mg aspirin and Atorvastatin 80 mg and start Heparin infusion ACS protocol.  Troponin peaked at 24.8 and repeat EKG showed and Anterior STEMI.  The Cardiologist took him to the catheterization lab and performed an angiogram which showed diffuse disease without bypass targets.  Recommendation was to optimize medical management.  He returned to the ICU.  Successfully extubated 30 October.  Remained hemodynamically stable.    Interval History:  Optimizing medical management.    Review of Systems   Unable to perform ROS: Intubated     Objective:     Vital Signs (Most Recent):  Temp: 97.7 °F (36.5 °C) (11/01/18 1101)  Pulse: 73 (11/01/18 1700)  Resp: 18 (11/01/18 1645)  BP: (!) 146/70 (11/01/18 1101)  SpO2: 95 % (11/01/18 1645) Vital  Signs (24h Range):  Temp:  [97.7 °F (36.5 °C)-98.3 °F (36.8 °C)] 97.7 °F (36.5 °C)  Pulse:  [70-91] 73  Resp:  [16-20] 18  SpO2:  [94 %-97 %] 95 %  BP: (146-193)/(70-91) 146/70     Weight: 85 kg (187 lb 6.3 oz)  Body mass index is 21.66 kg/m².  No intake or output data in the 24 hours ending 11/01/18 1935   Physical Exam   Constitutional: He is oriented to person, place, and time. He appears well-developed and well-nourished. No distress.   HENT:   Head: Normocephalic and atraumatic.   Mouth/Throat: Oropharynx is clear and moist.   Eyes: Conjunctivae and EOM are normal. Pupils are equal, round, and reactive to light.   Neck: No JVD present. No thyromegaly present.   Cardiovascular: Normal rate, regular rhythm and normal heart sounds. Exam reveals no gallop and no friction rub.   No murmur heard.  Right groin arterial access site dressed clean and intact.   Pulmonary/Chest: Effort normal and breath sounds normal. No respiratory distress. He has no wheezes. He has no rales.   Abdominal: Soft. Bowel sounds are normal. He exhibits no distension. There is no tenderness. There is no rebound and no guarding.   Musculoskeletal: Normal range of motion. He exhibits no edema or tenderness.   RUE AV fistula with palpable thrill.   Lymphadenopathy:     He has no cervical adenopathy.   Neurological: He is alert and oriented to person, place, and time. He has normal reflexes. He displays normal reflexes. No cranial nerve deficit.   Skin: Skin is warm and dry. No rash noted. He is not diaphoretic. No erythema.   Psychiatric: He has a normal mood and affect. His behavior is normal. Judgment and thought content normal.       Significant Labs: All pertinent labs within the past 24 hours have been reviewed.    Significant Imaging: I have reviewed all pertinent imaging results/findings within the past 24 hours.    Assessment/Plan:      * Acute ST elevation myocardial infarction (STEMI) of inferior wall    Troponin increased from 0.02 to  3.5 with new wall motion abnormalities and reduction in EF on echo.  Cardiology informed.  Given 325 mg aspirin and Atorvastatin 80 mg and start Heparin infusion ACS protocol.  Troponin increased to 24.8 and follow up EKG showed Anterior STEMI.  Urgent LHC revealed diffuse disease without appropriate bypass targets.  Optimizing medical management.     Acute hypercapnic respiratory failure on mechanical ventilation    Probably due to pulmonary congestion from volume overload.  Intubated in the ED for hypercapnia and worsening respiratory distress.  Urgent hemodialysis.     ESRD (end stage renal disease) on dialysis    Nephrology following and plan for urgent HD with ultrafiltration.  He reportedly had 3 L removed yesterday and has been compliant with HD but may still be volume overloaded.     Chronic diastolic heart failure    Repeat cardiac echo.  There may be a cardiac component.     Anemia of renal disease    Follow blood counts and transfuse if needed.     Coronary artery disease of native artery of native heart with stable angina pectoris    Control blood pressure.  Trend cardiac enzymes.       VTE Risk Mitigation (From admission, onward)        Ordered     heparin (porcine) injection 2,000 Units  Once      11/01/18 1336     heparin (porcine) injection 5,000 Units  Every 8 hours      10/30/18 1440     IP VTE HIGH RISK PATIENT  Once      10/27/18 0805              Keith Wesley MD  Department of Hospital Medicine   [facility]

## 2018-11-02 NOTE — ASSESSMENT & PLAN NOTE
67 y/o male with ESRD on chronic HD presented with SOB:           ESRD (end stage renal disease)     Renal: ESRD pt on q MWF HD schedule  Presented with pulmonary edema due to XS fluid intake  Good response to UF and fluid removal  HD being repeated today  Tolerating HD well, continue HD     HTN: uncontrolled HT due to large fluid gain, BP much improved with fluid removal and with medication changes done yesterday  Hypertensive urgency  Meds reviewed

## 2018-11-02 NOTE — SUBJECTIVE & OBJECTIVE
Interval History: no acute issues o/n    Review of Systems   Constitution: Negative for weakness and malaise/fatigue.   HENT: Negative for hearing loss and hoarse voice.    Eyes: Negative for blurred vision and visual disturbance.   Cardiovascular: Positive for chest pain (resolved). Negative for claudication, dyspnea on exertion, irregular heartbeat, leg swelling, near-syncope, orthopnea, palpitations, paroxysmal nocturnal dyspnea and syncope.   Respiratory: Positive for sputum production. Negative for cough, hemoptysis, shortness of breath, sleep disturbances due to breathing, snoring and wheezing.    Endocrine: Negative for cold intolerance and heat intolerance.   Hematologic/Lymphatic: Does not bruise/bleed easily.   Skin: Negative for color change, dry skin and nail changes.   Musculoskeletal: Positive for arthritis and back pain. Negative for joint pain and myalgias.   Gastrointestinal: Negative for bloating, abdominal pain, constipation, nausea and vomiting.   Genitourinary: Negative for dysuria, flank pain, hematuria and hesitancy.   Neurological: Negative for headaches, light-headedness, loss of balance, numbness and paresthesias.   Psychiatric/Behavioral: Negative for altered mental status.   Allergic/Immunologic: Negative for environmental allergies.     Objective:     Vital Signs (Most Recent):  Temp: 99 °F (37.2 °C) (11/02/18 1208)  Pulse: 68 (11/02/18 1208)  Resp: 18 (11/02/18 1208)  BP: (!) 175/95 (11/02/18 1208)  SpO2: 96 % (11/02/18 0740) Vital Signs (24h Range):  Temp:  [96.6 °F (35.9 °C)-99.1 °F (37.3 °C)] 99 °F (37.2 °C)  Pulse:  [68-83] 68  Resp:  [18] 18  SpO2:  [94 %-97 %] 96 %  BP: (155-183)/(64-96) 175/95     Weight: 85 kg (187 lb 6.3 oz)  Body mass index is 21.66 kg/m².     SpO2: 96 %  O2 Device (Oxygen Therapy): nasal cannula      Intake/Output Summary (Last 24 hours) at 11/2/2018 1335  Last data filed at 11/2/2018 1208  Gross per 24 hour   Intake 600 ml   Output 3500 ml   Net -2900 ml        Lines/Drains/Airways     Drain                 Hemodialysis AV Fistula Right forearm -- days         Hemodialysis AV Fistula 12/18/17 0017 Right forearm 319 days          Peripheral Intravenous Line                 Peripheral IV - Single Lumen 10/27/18 0930 Left Forearm 6 days         Peripheral IV - Single Lumen 10/28/18 2330 Left Forearm 4 days                Physical Exam   Constitutional: He is oriented to person, place, and time. He appears well-developed and well-nourished. No distress. Nasal cannula in place.   HENT:   Head: Normocephalic and atraumatic.   Eyes: Pupils are equal, round, and reactive to light.   Neck: Normal range of motion and full passive range of motion without pain. Neck supple. No JVD present.   Cardiovascular: Normal rate, regular rhythm, S1 normal, S2 normal and intact distal pulses.  Occasional extrasystoles are present. PMI is not displaced. Exam reveals no distant heart sounds.   No murmur heard.  Pulses:       Radial pulses are 2+ on the right side, and 2+ on the left side.        Dorsalis pedis pulses are 2+ on the right side, and 2+ on the left side.   Right groin access site C/D/I, no hematoma or ecchymosis noted.    Pulmonary/Chest: Effort normal and breath sounds normal. No accessory muscle usage. No respiratory distress. He has no decreased breath sounds. He has no wheezes. He has no rales.   Abdominal: Soft. Bowel sounds are normal. He exhibits no distension. There is no tenderness.   Genitourinary:   Genitourinary Comments: +Right forearm AV fistula +thrill, +bruit   Musculoskeletal: Normal range of motion. He exhibits no edema.        Right ankle: He exhibits no swelling.        Left ankle: He exhibits no swelling.   Neurological: He is alert and oriented to person, place, and time.   Skin: Skin is warm and dry. No rash noted. He is not diaphoretic. No cyanosis or erythema. No pallor. Nails show no clubbing.   Psychiatric: He has a normal mood and affect. His speech  is normal and behavior is normal. Judgment and thought content normal. Cognition and memory are normal.   Nursing note and vitals reviewed.      Significant Labs:   All pertinent lab results from the last 24 hours have been reviewed. and   Recent Lab Results       11/02/18  0612   11/02/18  0509        Anion Gap   16     Baso #   0.04     Basophil%   0.7     BUN, Bld   57     Calcium   8.9     Chloride   96     CO2   26     Creatinine   7.6     Differential Method   Automated     eGFR if    8     eGFR if non    7  Comment:  Calculation used to obtain the estimated glomerular filtration  rate (eGFR) is the CKD-EPI equation.        Eos #   0.2     Eosinophil%   2.7     Glucose   126     Gran # (ANC)   3.9     Gran%   65.5     Hematocrit   27.7     Hemoglobin   9.2     Lymph #   0.8     Lymph%   14.1     Magnesium   2.3     MCH   28.1     MCHC   33.2     MCV   85     Mono #   1.0     Mono%   17.0     MPV   10.1     Phosphorus   3.8     Platelets   189     POCT Glucose 146       Potassium   4.2     RBC   3.27     RDW   15.5     Sodium   138     WBC   5.89           Significant Imaging: Echocardiogram:   2D echo with color flow doppler:   Results for orders placed or performed during the hospital encounter of 04/27/18   2D echo with color flow doppler   Result Value Ref Range    Calculated EF 55 55 - 65    Diastolic Dysfunction No     Mitral Valve Mobility NORMAL     Tricuspid Valve Regurgitation TRIVIAL     and X-Ray: CXR: X-Ray Chest 1 View (CXR):   Results for orders placed or performed during the hospital encounter of 10/27/18   X-Ray Chest 1 View    Narrative    EXAMINATION:  XR CHEST 1 VIEW    CLINICAL HISTORY:  respiratory failure;    TECHNIQUE:  Single frontal view of the chest was performed.    COMPARISON:  10/30/2018    FINDINGS:  Endotracheal and nasogastric tubes have been removed.  Small right and trace left pleural effusions with right lower lobe atelectasis or airspace  disease versus aspiration.  Stable cardiomegaly.  In comparison to the prior study, there is no adverse interval changes      Impression    In comparison to the prior study, there is no adverse interval changes      Electronically signed by: Santo Marrero MD  Date:    10/31/2018  Time:    08:20    and X-Ray Chest PA and Lateral (CXR): No results found for this visit on 10/27/18.

## 2018-11-02 NOTE — PLAN OF CARE
Problem: Physical Therapy Goal  Goal: Physical Therapy Goal  PT WILL BE SEEN FOR P.T. FOR A MIN OF 5 OUT OF 7 DAYS A WEEK  LT18  1. PT BED MOBILITY WILL BE IND   2. PT WILL T/F TO CHAIR WITH RW AND SBA  3. PT WILL GT TRAIN X 150' WITH RW AND SBA     Outcome: Ongoing (interventions implemented as appropriate)  PATIENT DID WELL WITH OOB T/FS TO B/S CHAIR , GT IN ROOM 15'X2 , GOOD CAUTIOUS PACE WITH RW AT CGAX1 MIN CUES .

## 2018-11-02 NOTE — PLAN OF CARE
Problem: Patient Care Overview  Goal: Plan of Care Review  Outcome: Ongoing (interventions implemented as appropriate)  Recommendations     Recommendation/Intervention: 1.When medically able, ADAT to Renal. 2. RD provided renal diet education w/ handouts from Nutrition Care Manual. Pt verbalized understanding, all concerns addressed, RD contact info provided. 3. Will continue to monitor.   Goals: Meet > 85 % EEN/EPN while admitted  Nutrition Goal Status:goal met   Communication of RD Recs: POC, sticky note

## 2018-11-02 NOTE — NURSING
Pt transported to dialysis via bed by staff.  Pt is alert and oriented x 4.  Denies any c/o pain or discomforts at this time.

## 2018-11-02 NOTE — PROGRESS NOTES
Pharmacist Intervention IV to PO Note    Conner Perez III is a 68 y.o. male being treated with IV medication Pantoprazole 40 mg Inj Daily    Patient Data:    Vital Signs (Most Recent):  Temp: 99.1 °F (37.3 °C) (11/02/18 0344)  Pulse: 79 (11/02/18 0455)  Resp: 18 (11/02/18 0344)  BP: (!) 180/87 (11/02/18 0455)  SpO2: 95 % (11/02/18 0344)   Vital Signs (72h Range):  Temp:  [96.9 °F (36.1 °C)-99.2 °F (37.3 °C)]   Pulse:  [68-95]   Resp:  [14-24]   BP: (125-203)/()   SpO2:  [94 %-100 %]      CBC:  Recent Labs   Lab 10/30/18  0405 10/31/18  0326 11/01/18  0535   WBC 5.60 6.82 5.56   RBC 2.96* 3.32* 3.40*   HGB 8.4* 9.4* 9.6*   HCT 25.7* 28.3* 28.7*   * 163 171   MCV 87 85 84   MCH 28.4 28.3 28.2   MCHC 32.7 33.2 33.4     CMP:     Recent Labs   Lab 10/27/18  0548  10/30/18  0405 10/31/18  0326 11/01/18  0535      < > 114* 88 86   CALCIUM 9.3   < > 8.3* 8.7 8.8   ALBUMIN 3.9  --   --   --   --    PROT 7.9  --   --   --   --       < > 135* 134* 139   K 4.5   < > 4.7 4.9 4.4   CO2 30*   < > 28 24 27   CL 95   < > 96 94* 97   BUN 18   < > 41* 59* 41*   CREATININE 4.3*   < > 5.9* 7.5* 5.9*   ALKPHOS 66  --   --   --   --    ALT 19  --   --   --   --    AST 30  --   --   --   --    BILITOT 0.8  --   --   --   --     < > = values in this interval not displayed.       Dietary Orders:  Diet Orders            Diet full liquid Fluid - 1000mL: Full Liquid starting at 10/30 1703            Based on the following criteria, this patient qualifies for intravenous to oral conversion:  [x] The patients gastrointestinal tract is functioning (tolerating medications via oral or enteral route for 24 hours and tolerating food or enteral feeds for 24 hours).  [x] The patient is hemodynamically stable for 24 hours (heart rate <100 beats per minute, systolic blood pressure >99 mm Hg, and respiratory rate <20 breaths per minute).  [x] The patient shows clinical improvement (afebrile for at least 24 hours and white blood  cell count downtrending or normalized). Additionally, the patient must be non-neutropenic (absolute neutrophil count >500 cells/mm3).  [x] For antimicrobials, the patient has received IV therapy for at least 24 hours.    IV medication Pantoprazole 40 mg IV will be changed to oral medication Pantoprazole 40 mg PO    Pharmacist's Name: Santo Whitt  Pharmacist's Extension: [phone]

## 2018-11-02 NOTE — PROGRESS NOTES
[facility] -   Nephrology  Progress Note    Patient Name: Conner Perez III  MRN: [id number]  Admission Date: 10/27/2018  Hospital Length of Stay: 6 days  Attending Provider: Keith Wesley MD   Primary Care Physician: Raciel Angela MD  Principal Problem:Acute ST elevation myocardial infarction (STEMI) of inferior wall, ESRD    Subjective:     HPI: Conner Perez III  is a 68 y old AA man with h/o ESRD on HD ( [facility], [facility] ) presented to the ER this morning  for SOB, bilateral pulmonary edema noted on the chest x-ray, patient had his dialysis treatment yesterday, patient has been having recurrent episodes of shortness of breath and similar presentations to the emergency room in the last few months, he also has been losing weight, patient had to be emergently intubated in the emergency room and was transferred to ICU, we were consulted for emergent dialysis for volume removal,       Interval History: Pt was seen and examined. H/o was reviewed/ pt is a 69 y/o male with ESRD on chronic HD at [facility] who presented with CP. Pt had acute MI. Pt is s/p [facility] which finds that pt has diffuse CAD, not amenable to revascularization. Pt is on medical therapy. Cardiology note reviewed.    Review of patient's allergies indicates:   Allergen Reactions    Augmentin [amoxicillin-pot clavulanate] Edema    Sulfa (sulfonamide antibiotics)      swelling    Lisinopril      cough     Current Facility-Administered Medications   Medication Frequency    albuterol nebulizer solution 2.5 mg Q6H PRN    amLODIPine tablet 10 mg Daily    aspirin EC tablet 81 mg Daily    atorvastatin tablet 80 mg Daily    chlorhexidine 0.12 % solution 15 mL BID    cloNIDine 0.3 mg/24 hr td ptwk 1 patch Q7 Days    clopidogrel tablet 75 mg Daily    epoetin ovi injection 10,000 Units Every Mon, Wed, Fri    heparin (porcine) injection 2,000 Units Once    heparin (porcine) injection 5,000 Units Q8H    hydrALAZINE  injection 10 mg Q8H PRN    hydrALAZINE tablet 100 mg Q8H    isosorbide dinitrate tablet 40 mg TID    labetalol injection 10 mg Q6H PRN    [START ON 11/3/2018] losartan tablet 100 mg Daily    metoprolol tartrate (LOPRESSOR) tablet 100 mg BID    pantoprazole EC tablet 40 mg Daily    polyethylene glycol packet 17 g Daily       Objective:     Vital Signs (Most Recent):  Temp: 96.6 °F (35.9 °C) (11/02/18 0701)  Pulse: 75 (11/02/18 0900)  Resp: 18 (11/02/18 0701)  BP: (!) 173/82 (11/02/18 0701)  SpO2: 96 % (11/02/18 0740)  O2 Device (Oxygen Therapy): nasal cannula (11/02/18 0701) Vital Signs (24h Range):  Temp:  [96.6 °F (35.9 °C)-99.1 °F (37.3 °C)] 96.6 °F (35.9 °C)  Pulse:  [70-80] 75  Resp:  [18] 18  SpO2:  [94 %-97 %] 96 %  BP: (155-180)/(64-87) 173/82     Weight: 85 kg (187 lb 6.3 oz) (11/01/18 0454)  Body mass index is 21.66 kg/m².  Body surface area is 2.16 meters squared.    No intake/output data recorded.    Physical Exam   Constitutional: He is oriented to person, place, and time. He appears well-developed and well-nourished.   HENT:   Head: Normocephalic and atraumatic.   Neck: No JVD present.   Cardiovascular: Normal rate and regular rhythm. Exam reveals no friction rub.   Pulmonary/Chest: Effort normal and breath sounds normal. No respiratory distress. He has no rales.   Abdominal: Soft. There is no tenderness.   Musculoskeletal: He exhibits no edema.   Neurological: He is oriented to person, place, and time.   Skin: Skin is warm and dry.   Psychiatric: He has a normal mood and affect. His behavior is normal.   Nursing note and vitals reviewed.      Significant Labs: reviewed  BMP  Lab Results   Component Value Date     11/02/2018    K 4.2 11/02/2018    CL 96 11/02/2018    CO2 26 11/02/2018    BUN 57 (H) 11/02/2018    CREATININE 7.6 (H) 11/02/2018    CALCIUM 8.9 11/02/2018    ANIONGAP 16 11/02/2018    ESTGFRAFRICA 8 (A) 11/02/2018    EGFRNONAA 7 (A) 11/02/2018     Lab Results   Component Value  Date    WBC 5.89 11/02/2018    HGB 9.2 (L) 11/02/2018    HCT 27.7 (L) 11/02/2018    MCV 85 11/02/2018     11/02/2018     Recent Labs   Lab 10/30/18  0405   TROPONINI 26.103*         Significant Imaging: reviewed    Assessment/Plan:   67 y/o male with ESRD on HD q MWF presented with acute MI:                        ESRD (end stage renal disease)     Renal: ESRD pt on q MWF HD schedule  HD in progress this am, tolerating HD well  Continue HD  K normal  Acid base stable  Volume status: pt tends to gain a lot of fluid wt between HD treatments, this will put cardiac status at a worsened condition, explained to pt.      HTN: uncontrolled HTN due to large fluid gain,   BP much improved with fluid removal with HD, but still high  Meds reviewed  Increase losartan from 50 to 100 mg po qd  Advised pt to keep fluid intake low, 1-1.5 L/day       * Acute ST elevation myocardial infarction (STEMI) of inferior wall    Acute MI  C shows diffuse disease  On medical therapy  Has CHF  No renal contraindication for using ACE-I or ARB (pt is already on losartan) to improve preload.                Plans and recommendations:  As detailed above  No renal contraindication for using ACE-I or ARB    Lazara Granados MD  Nephrology  [facility]

## 2018-11-02 NOTE — ASSESSMENT & PLAN NOTE
10/29/18   LHC revealed Diffuse diabetic CAD which was not amenable to optimal PCI and no targets for CABG  Diffuse LAD disease with distal occlusion and medical management advised  Continue IV heparin gtt for another 24 hours  Continue ASA, statin, Plavix, Hydralazine, Imdur  NO ACEI/ARB given renal function requiring dialysis.   Will add BB as BP allows, BP soft today at time of exam.   Remains intubated and sedated today.     10/30  -Attempting to wean to extubate today  -OK to stop IV heparin gtt today  Continue ASA, Statin, Plavix, Hydralazine, Imdur  Start BB and Norvasc today for better BP control  Will attempt to add ACEi/ARB tomorrow as BP permits.     10/31  -Patient has been extubated and awake and alert today  -Continue ASA, Statin, Plavix, BB, ARB, Norvasc, Imdur, Hydralazine  -Denies chest pain on exam today  -HD in progress today  -OOB today after dialysis    11/1  -Encourage OOB to chair  Recommend PT/OT evaluation  Continue ASA, statin, Plavix, BB, ARB, Norvasc, Imdur, Hydralazine

## 2018-11-02 NOTE — ASSESSMENT & PLAN NOTE
On medical therapy  Continue clonidine patch, hydralazine, Imdur  Start BB and Norvasc today  Monitor BP trend and adjust as needed.     10/31  -continue BB, ARB, Hydralazine, Imdur, Norvasc  -Monitor BP trend and adjust as needed.     11/1  Continue BB, ARB, Hydralazine, Imdur, Norvasc  Increase BB dosage today for tachycardia on exam today.     11/2  -continue BB, ARB, Hydralazine, Imdur, Norvasc

## 2018-11-02 NOTE — ASSESSMENT & PLAN NOTE
Acute MI  LHC shows diffuse disease  On medical therapy  Has CHF  No renal contraindication for using ACE-I or ARB (pt is already on losartan) to improve preload.

## 2018-11-02 NOTE — SUBJECTIVE & OBJECTIVE
Interval History:  Optimizing medical management.    Review of Systems   Unable to perform ROS: Intubated     Objective:     Vital Signs (Most Recent):  Temp: 97.7 °F (36.5 °C) (11/01/18 1101)  Pulse: 73 (11/01/18 1700)  Resp: 18 (11/01/18 1645)  BP: (!) 146/70 (11/01/18 1101)  SpO2: 95 % (11/01/18 1645) Vital Signs (24h Range):  Temp:  [97.7 °F (36.5 °C)-98.3 °F (36.8 °C)] 97.7 °F (36.5 °C)  Pulse:  [70-91] 73  Resp:  [16-20] 18  SpO2:  [94 %-97 %] 95 %  BP: (146-193)/(70-91) 146/70     Weight: 85 kg (187 lb 6.3 oz)  Body mass index is 21.66 kg/m².  No intake or output data in the 24 hours ending 11/01/18 1935   Physical Exam   Constitutional: He is oriented to person, place, and time. He appears well-developed and well-nourished. No distress.   HENT:   Head: Normocephalic and atraumatic.   Mouth/Throat: Oropharynx is clear and moist.   Eyes: Conjunctivae and EOM are normal. Pupils are equal, round, and reactive to light.   Neck: No JVD present. No thyromegaly present.   Cardiovascular: Normal rate, regular rhythm and normal heart sounds. Exam reveals no gallop and no friction rub.   No murmur heard.  Right groin arterial access site dressed clean and intact.   Pulmonary/Chest: Effort normal and breath sounds normal. No respiratory distress. He has no wheezes. He has no rales.   Abdominal: Soft. Bowel sounds are normal. He exhibits no distension. There is no tenderness. There is no rebound and no guarding.   Musculoskeletal: Normal range of motion. He exhibits no edema or tenderness.   RUE AV fistula with palpable thrill.   Lymphadenopathy:     He has no cervical adenopathy.   Neurological: He is alert and oriented to person, place, and time. He has normal reflexes. He displays normal reflexes. No cranial nerve deficit.   Skin: Skin is warm and dry. No rash noted. He is not diaphoretic. No erythema.   Psychiatric: He has a normal mood and affect. His behavior is normal. Judgment and thought content normal.        Significant Labs: All pertinent labs within the past 24 hours have been reviewed.    Significant Imaging: I have reviewed all pertinent imaging results/findings within the past 24 hours.

## 2018-11-02 NOTE — PROGRESS NOTES
[facility] -   Adult Nutrition  Progress Note    SUMMARY     Recommendations    Recommendation/Intervention: 1.When medically able, ADAT to Renal. 2. RD provided renal diet education w/ handouts from Nutrition Care Manual. Pt verbalized understanding, all concerns addressed, RD contact info provided. 3. Will continue to monitor.   Goals: Meet > 85 % EEN/EPN while admitted  Nutrition Goal Status:goal met   Communication of RD Recs: POC, sticky note     Reason for Assessment    Reason for Assessment: Pt requested diet education   Dx:  1. Acute on chronic diastolic HFpEF of 55-60%    2. SOB (shortness of breath)    3. Hypervolemia, unspecified hypervolemia type    4. Essential hypertension    5. Accelerated hypertension    6. Acute pulmonary edema    7. Encounter for intubation    8. Acute hypercapnic respiratory failure    9. ESRD (end stage renal disease) on dialysis    10. Respiratory failure    11. Elevated troponin    12. NSTEMI (non-ST elevated myocardial infarction)    13. STEMI (ST elevation myocardial infarction)    14. Chest pain    15. Acute ST elevation myocardial infarction (STEMI) of inferior wall    16. On mechanically assisted ventilation    17. ST elevation myocardial infarction involving left anterior descending (LAD) coronary artery    18. ST elevation myocardial infarction (STEMI), unspecified artery      Past Medical History:   Diagnosis Date    CKD (chronic kidney disease), stage IV     COPD (chronic obstructive pulmonary disease)     Coronary artery disease     Depression     Dialysis patient     GERD (gastroesophageal reflux disease)     HTN (hypertension)     Pneumonia        Interdisciplinary Rounds: attended  General Information Comments: Pt currently intubated and sedated w/ Propofol. No family members at bedside at time of visit. Pt on dialysis.  Per epic records pt weighed 193 lbs on 10/13/18, current weight = 201 lbs (no wt loss since last admit). Per NFPE  "(10.29.18): no significant muscle wasting/fat depletion noted. Pt currently on TF, tolerating. Reviewed TF recs w/ NP via secure chat this am.  11.1.18. Pt exubated. Pt currently on full liquid diet. Pt reports good appetite (PO intake ~ 100 % today).  Pt reports good appetite and intake PTA. Pt reports no recent wt loss.   11.2.18. Pt on full liquid renal diet w/ good intake.  Pt requested renal diet education. RD provided Renal diet education.   Nutrition Discharge Planning: Renal diet     Nutrition Risk Screen    Nutrition Risk Screen: no indicators present    Nutrition/Diet History    Do you have any cultural, spiritual, Restorationist conflicts, given your current situation?: none     Anthropometrics    Temp: 96.5 °F (35.8 °C)  Height Method: Stated  Height: 6' 6" (198.1 cm)  Height (inches): 78 in  Weight Method: Bed Scale  Weight: 85 kg (187 lb 6.3 oz)  Weight (lb): 187.39 lb  Ideal Body Weight (IBW), Male: 214 lb  % Ideal Body Weight, Male (lb): 93.93 lb  BMI (Calculated): 23.3  BMI Grade: 18.5-24.9 - normal       Lab/Procedures/Meds     Pertinent Labs Reviewed: reviewed  BMP  Lab Results   Component Value Date     11/02/2018    K 4.2 11/02/2018    CL 96 11/02/2018    CO2 26 11/02/2018    BUN 57 (H) 11/02/2018    CREATININE 7.6 (H) 11/02/2018    CALCIUM 8.9 11/02/2018    ANIONGAP 16 11/02/2018    ESTGFRAFRICA 8 (A) 11/02/2018    EGFRNONAA 7 (A) 11/02/2018     Lab Results   Component Value Date    CALCIUM 8.9 11/02/2018    PHOS 3.8 11/02/2018     Lab Results   Component Value Date    ALBUMIN 3.9 10/27/2018     Recent Labs   Lab 11/02/18  1542   POCTGLUCOSE 167*       Pertinent Medications Reviewed: reviewed    Physical Findings/Assessment    Overall Physical Appearance: nourished   Oral/Mouth Cavity: tooth/teeth missing  Skin: (wound foot )    Estimated/Assessed Needs    Weight Used For Calorie Calculations:  85 kg (187 lb 6.3 oz)  Energy Calorie Requirements (kcal): 2550  Energy Need Method: kcal/ kg "   Protein Requirements: 102 - 127 g  Weight Used For Protein Calculations: Weight: 85 kg (187 lb 6.3 oz)     Fluid Need Method: RDA Method(or per MD)         Nutrition Prescription Ordered    Current Diet Order: full liquid diet renal       Evaluation of Received Nutrient/Fluid Intake    Intake/Output Summary (Last 24 hours) at 11/2/2018 1557  Last data filed at 11/2/2018 1208  Gross per 24 hour   Intake 600 ml   Output 3500 ml   Net -2900 ml       % Intake of Estimated Energy Needs: 75 - 100 %   % Meal Intake: 75 - 100 %       Nutrition Risk    Level of Risk/Frequency of Follow-up: (1xweekly)     Assessment and Plan    Nutrition Problem  Inadequate oral intake     Related to (etiology):   Mechanical ventilation     Signs and Symptoms (as evidenced by):   NPO status     Interventions/Recommendations (treatment strategy):  See above     Nutrition Diagnosis Status:   Resolved. 11.1.18. Pt extubated, now on oral diet w/ good intake.       Monitor and Evaluation    Food and Nutrient Intake: energy intake  Food and Nutrient Adminstration: diet order   Anthropometric Measurements: weight  Biochemical Data, Medical Tests and Procedures: electrolyte and renal panel, glucose/endocrine profile  Nutrition-Focused Physical Findings: overall appearance, skin     Nutrition Follow-Up    RD Follow-up?: Yes(1xweekly)

## 2018-11-02 NOTE — PROGRESS NOTES
[facility] -   Cardiology  Progress Note    Patient Name: Conner Perez III  MRN: [id number]  Admission Date: 10/27/2018  Hospital Length of Stay: 6 days  Code Status: Full Code   Attending Physician: Keith Wesley MD   Primary Care Physician: Raciel Angela MD  Expected Discharge Date: 11/2/2018  Principal Problem:Acute ST elevation myocardial infarction (STEMI) of inferior wall    Subjective:     Hospital Course:   10/29/18-Patient seen and examined in room, intubated and sedated. IV heparin gtt in place. Pending dialysis treatment today. Labs reviewed, stable.     10/30/18-Patient seen and examined in room, intubated. On SBT today to possible extubation. IV heparin gtt in place. Labs reviewed, stable. BP remains elevated, will adjust BP meds as tolerated.     10/31/18-Patient seen and examined in room, extubated and awake and alert. HD in progress. BP remains elevated. Labs reviewed, stable.     11/1/18-Patient seen and examined in room, denies chest pain or shortness of breath today. Labs reviewed, stable. Would benefit from PT/OT evaluation.     11/2/18-Patient seen and examined during dialysis this AM. Denies chest pain or shortness of breath. Labs reviewed, K+ 4.2, Na 138.    Interval History: no acute issues o/n    Review of Systems   Constitution: Negative for weakness and malaise/fatigue.   HENT: Negative for hearing loss and hoarse voice.    Eyes: Negative for blurred vision and visual disturbance.   Cardiovascular: Positive for chest pain (resolved). Negative for claudication, dyspnea on exertion, irregular heartbeat, leg swelling, near-syncope, orthopnea, palpitations, paroxysmal nocturnal dyspnea and syncope.   Respiratory: Positive for sputum production. Negative for cough, hemoptysis, shortness of breath, sleep disturbances due to breathing, snoring and wheezing.    Endocrine: Negative for cold intolerance and heat intolerance.   Hematologic/Lymphatic: Does not bruise/bleed easily.    Skin: Negative for color change, dry skin and nail changes.   Musculoskeletal: Positive for arthritis and back pain. Negative for joint pain and myalgias.   Gastrointestinal: Negative for bloating, abdominal pain, constipation, nausea and vomiting.   Genitourinary: Negative for dysuria, flank pain, hematuria and hesitancy.   Neurological: Negative for headaches, light-headedness, loss of balance, numbness and paresthesias.   Psychiatric/Behavioral: Negative for altered mental status.   Allergic/Immunologic: Negative for environmental allergies.     Objective:     Vital Signs (Most Recent):  Temp: 99 °F (37.2 °C) (11/02/18 1208)  Pulse: 68 (11/02/18 1208)  Resp: 18 (11/02/18 1208)  BP: (!) 175/95 (11/02/18 1208)  SpO2: 96 % (11/02/18 0740) Vital Signs (24h Range):  Temp:  [96.6 °F (35.9 °C)-99.1 °F (37.3 °C)] 99 °F (37.2 °C)  Pulse:  [68-83] 68  Resp:  [18] 18  SpO2:  [94 %-97 %] 96 %  BP: (155-183)/(64-96) 175/95     Weight: 85 kg (187 lb 6.3 oz)  Body mass index is 21.66 kg/m².     SpO2: 96 %  O2 Device (Oxygen Therapy): nasal cannula      Intake/Output Summary (Last 24 hours) at 11/2/2018 1335  Last data filed at 11/2/2018 1208  Gross per 24 hour   Intake 600 ml   Output 3500 ml   Net -2900 ml       Lines/Drains/Airways     Drain                 Hemodialysis AV Fistula Right forearm -- days         Hemodialysis AV Fistula 12/18/17 0017 Right forearm 319 days          Peripheral Intravenous Line                 Peripheral IV - Single Lumen 10/27/18 0930 Left Forearm 6 days         Peripheral IV - Single Lumen 10/28/18 2330 Left Forearm 4 days                Physical Exam   Constitutional: He is oriented to person, place, and time. He appears well-developed and well-nourished. No distress. Nasal cannula in place.   HENT:   Head: Normocephalic and atraumatic.   Eyes: Pupils are equal, round, and reactive to light.   Neck: Normal range of motion and full passive range of motion without pain. Neck supple. No JVD  present.   Cardiovascular: Normal rate, regular rhythm, S1 normal, S2 normal and intact distal pulses.  Occasional extrasystoles are present. PMI is not displaced. Exam reveals no distant heart sounds.   No murmur heard.  Pulses:       Radial pulses are 2+ on the right side, and 2+ on the left side.        Dorsalis pedis pulses are 2+ on the right side, and 2+ on the left side.   Right groin access site C/D/I, no hematoma or ecchymosis noted.    Pulmonary/Chest: Effort normal and breath sounds normal. No accessory muscle usage. No respiratory distress. He has no decreased breath sounds. He has no wheezes. He has no rales.   Abdominal: Soft. Bowel sounds are normal. He exhibits no distension. There is no tenderness.   Genitourinary:   Genitourinary Comments: +Right forearm AV fistula +thrill, +bruit   Musculoskeletal: Normal range of motion. He exhibits no edema.        Right ankle: He exhibits no swelling.        Left ankle: He exhibits no swelling.   Neurological: He is alert and oriented to person, place, and time.   Skin: Skin is warm and dry. No rash noted. He is not diaphoretic. No cyanosis or erythema. No pallor. Nails show no clubbing.   Psychiatric: He has a normal mood and affect. His speech is normal and behavior is normal. Judgment and thought content normal. Cognition and memory are normal.   Nursing note and vitals reviewed.      Significant Labs:   All pertinent lab results from the last 24 hours have been reviewed. and   Recent Lab Results       11/02/18  0612   11/02/18  0509        Anion Gap   16     Baso #   0.04     Basophil%   0.7     BUN, Bld   57     Calcium   8.9     Chloride   96     CO2   26     Creatinine   7.6     Differential Method   Automated     eGFR if    8     eGFR if non    7  Comment:  Calculation used to obtain the estimated glomerular filtration  rate (eGFR) is the CKD-EPI equation.        Eos #   0.2     Eosinophil%   2.7     Glucose   126      Gran # (ANC)   3.9     Gran%   65.5     Hematocrit   27.7     Hemoglobin   9.2     Lymph #   0.8     Lymph%   14.1     Magnesium   2.3     MCH   28.1     MCHC   33.2     MCV   85     Mono #   1.0     Mono%   17.0     MPV   10.1     Phosphorus   3.8     Platelets   189     POCT Glucose 146       Potassium   4.2     RBC   3.27     RDW   15.5     Sodium   138     WBC   5.89           Significant Imaging: Echocardiogram:   2D echo with color flow doppler:   Results for orders placed or performed during the hospital encounter of 04/27/18   2D echo with color flow doppler   Result Value Ref Range    Calculated EF 55 55 - 65    Diastolic Dysfunction No     Mitral Valve Mobility NORMAL     Tricuspid Valve Regurgitation TRIVIAL     and X-Ray: CXR: X-Ray Chest 1 View (CXR):   Results for orders placed or performed during the hospital encounter of 10/27/18   X-Ray Chest 1 View    Narrative    EXAMINATION:  XR CHEST 1 VIEW    CLINICAL HISTORY:  respiratory failure;    TECHNIQUE:  Single frontal view of the chest was performed.    COMPARISON:  10/30/2018    FINDINGS:  Endotracheal and nasogastric tubes have been removed.  Small right and trace left pleural effusions with right lower lobe atelectasis or airspace disease versus aspiration.  Stable cardiomegaly.  In comparison to the prior study, there is no adverse interval changes      Impression    In comparison to the prior study, there is no adverse interval changes      Electronically signed by: Santo Marrero MD  Date:    10/31/2018  Time:    08:20    and X-Ray Chest PA and Lateral (CXR): No results found for this visit on 10/27/18.    Assessment and Plan:       * Acute ST elevation myocardial infarction (STEMI) of inferior wall    See plan under CAD       Abnormal ECG           Acute pulmonary edema    Dialysis per nephrology.        Acute on chronic diastolic congestive heart failure    Dialysis per nephrology  Remains intubated and sedated today.        11/1  Extubated  Seems compensated  Dialysis per nephrology for fluid management.     Coronary artery disease of native artery of native heart with stable angina pectoris    10/29/18   [facility] revealed Diffuse diabetic CAD which was not amenable to optimal PCI and no targets for CABG  Diffuse LAD disease with distal occlusion and medical management advised  Continue IV heparin gtt for another 24 hours  Continue ASA, statin, Plavix, Hydralazine, Imdur  NO ACEI/ARB given renal function requiring dialysis.   Will add BB as BP allows, BP soft today at time of exam.   Remains intubated and sedated today.     10/30  -Attempting to wean to extubate today  -OK to stop IV heparin gtt today  Continue ASA, Statin, Plavix, Hydralazine, Imdur  Start BB and Norvasc today for better BP control  Will attempt to add ACEi/ARB tomorrow as BP permits.     10/31  -Patient has been extubated and awake and alert today  -Continue ASA, Statin, Plavix, BB, ARB, Norvasc, Imdur, Hydralazine  -Denies chest pain on exam today  -HD in progress today  -OOB today after dialysis    11/1  -Encourage OOB to chair  Recommend PT/OT evaluation  Continue ASA, statin, Plavix, BB, ARB, Norvasc, Imdur, Hydralazine         Hypertension    On medical therapy  Continue clonidine patch, hydralazine, Imdur  Start BB and Norvasc today  Monitor BP trend and adjust as needed.     10/31  -continue BB, ARB, Hydralazine, Imdur, Norvasc  -Monitor BP trend and adjust as needed.     11/1  Continue BB, ARB, Hydralazine, Imdur, Norvasc  Increase BB dosage today for tachycardia on exam today.     11/2  -continue BB, ARB, Hydralazine, Imdur, Norvasc     ESRD (end stage renal disease) on dialysis    -per nephrology          Chronic diastolic heart failure    Resume DASH diet, 2 gm sodium restriction post extubation  Daily weights  Strict intake and output  Dialysis per nephrology         VTE Risk Mitigation (From admission, onward)        Ordered     heparin (porcine)  injection 2,000 Units  Once      11/01/18 1336     heparin (porcine) injection 5,000 Units  Every 8 hours      10/30/18 1440     IP VTE HIGH RISK PATIENT  Once      10/27/18 0805          Litzy Thomas NP  Cardiology  [facility] [place]

## 2018-11-02 NOTE — SUBJECTIVE & OBJECTIVE
Interval History: Pt was seen and examined. H/o was reviewed/ pt is a 67 y/o male with ESRD on chronic HD at [facility] who presented with CP. Pt had acute MI. Pt is s/p [facility] which finds that pt has diffuse CAD, not amenable to revascularization. Pt is on medical therapy. Cardiology note reviewed.    Review of patient's allergies indicates:   Allergen Reactions    Augmentin [amoxicillin-pot clavulanate] Edema    Sulfa (sulfonamide antibiotics)      swelling    Lisinopril      cough     Current Facility-Administered Medications   Medication Frequency    albuterol nebulizer solution 2.5 mg Q6H PRN    amLODIPine tablet 10 mg Daily    aspirin EC tablet 81 mg Daily    atorvastatin tablet 80 mg Daily    chlorhexidine 0.12 % solution 15 mL BID    cloNIDine 0.3 mg/24 hr td ptwk 1 patch Q7 Days    clopidogrel tablet 75 mg Daily    epoetin ovi injection 10,000 Units Every Mon, Wed, Fri    heparin (porcine) injection 2,000 Units Once    heparin (porcine) injection 5,000 Units Q8H    hydrALAZINE injection 10 mg Q8H PRN    hydrALAZINE tablet 100 mg Q8H    isosorbide dinitrate tablet 40 mg TID    labetalol injection 10 mg Q6H PRN    [START ON 11/3/2018] losartan tablet 100 mg Daily    metoprolol tartrate (LOPRESSOR) tablet 100 mg BID    pantoprazole EC tablet 40 mg Daily    polyethylene glycol packet 17 g Daily       Objective:     Vital Signs (Most Recent):  Temp: 96.6 °F (35.9 °C) (11/02/18 0701)  Pulse: 75 (11/02/18 0900)  Resp: 18 (11/02/18 0701)  BP: (!) 173/82 (11/02/18 0701)  SpO2: 96 % (11/02/18 0740)  O2 Device (Oxygen Therapy): nasal cannula (11/02/18 0701) Vital Signs (24h Range):  Temp:  [96.6 °F (35.9 °C)-99.1 °F (37.3 °C)] 96.6 °F (35.9 °C)  Pulse:  [70-80] 75  Resp:  [18] 18  SpO2:  [94 %-97 %] 96 %  BP: (155-180)/(64-87) 173/82     Weight: 85 kg (187 lb 6.3 oz) (11/01/18 0454)  Body mass index is 21.66 kg/m².  Body surface area is 2.16 meters squared.    No intake/output data  recorded.    Physical Exam   Constitutional: He is oriented to person, place, and time. He appears well-developed and well-nourished.   HENT:   Head: Normocephalic and atraumatic.   Neck: No JVD present.   Cardiovascular: Normal rate and regular rhythm. Exam reveals no friction rub.   Pulmonary/Chest: Effort normal and breath sounds normal. No respiratory distress. He has no rales.   Abdominal: Soft. There is no tenderness.   Musculoskeletal: He exhibits no edema.   Neurological: He is oriented to person, place, and time.   Skin: Skin is warm and dry.   Psychiatric: He has a normal mood and affect. His behavior is normal.   Nursing note and vitals reviewed.      Significant Labs: reviewed  BMP  Lab Results   Component Value Date     11/02/2018    K 4.2 11/02/2018    CL 96 11/02/2018    CO2 26 11/02/2018    BUN 57 (H) 11/02/2018    CREATININE 7.6 (H) 11/02/2018    CALCIUM 8.9 11/02/2018    ANIONGAP 16 11/02/2018    ESTGFRAFRICA 8 (A) 11/02/2018    EGFRNONAA 7 (A) 11/02/2018     Lab Results   Component Value Date    WBC 5.89 11/02/2018    HGB 9.2 (L) 11/02/2018    HCT 27.7 (L) 11/02/2018    MCV 85 11/02/2018     11/02/2018     Recent Labs   Lab 10/30/18  0405   TROPONINI 26.103*         Significant Imaging: reviewed

## 2018-11-02 NOTE — PT/OT/SLP PROGRESS
Physical Therapy  Treatment    Conner Perez III   MRN: [id number]   Admitting Diagnosis: Acute ST elevation myocardial infarction (STEMI) of inferior wall    PT Received On: 11/02/18  PT Start Time: 1425     PT Stop Time: 1450    PT Total Time (min): 25 min       Billable Minutes:  Gait Training 15 and Therapeutic Exercise 10    Treatment Type: Treatment  PT/PTA: PTA     PTA Visit Number: 1       General Precautions: Standard, fall  Orthopedic Precautions: N/A   Braces: N/A         Subjective:  Communicated with Epic AND NURSELUCIA  prior to session.  PATIENT AGREE TO TX NOW.    Pain/Comfort  Pain Rating 1: 0/10    Objective:   Patient found with: peripheral IV, telemetry, oxygen, SUPINE IN BED.    Functional Mobility:  Bed Mobility:    SUPINE TO SIT AT MIN TO CGAX1    Transfers:   SIT TO STAND , STAND TO SIT AT CGAX1 WITH MIN VERVAL CUES FOR INCREASIGN SAFE BODY ALIGNMENT DURING T/FS.    Gait:    AMBULATE IN ROOM AT 15'X2 AT CGA TO MIN ASSISTX1.    Stairs:  N/A    Balance:   Static Sit: FAIR: Maintains without assist, but unable to take any challenges   Dynamic Sit: FAIR+: Maintains balance through MINIMAL excursions of active trunk motion  Static Stand: FAIR: Maintains without assist but unable to take challenges  Dynamic stand: POOR+: Needs MIN (minimal ) assist during gait     Therapeutic Activities and Exercises:  EXERCISE BILATERALLY SHORTSEATED, GT IN ROOM , T/FS OOB.    AM-PAC 6 CLICK MOBILITY  How much help from another person does this patient currently need?   1 = Unable, Total/Dependent Assistance  2 = A lot, Maximum/Moderate Assistance  3 = A little, Minimum/Contact Guard/Supervision  4 = None, Modified [place]/Independent    Turning over in bed (including adjusting bedclothes, sheets and blankets)?: 3  Sitting down on and standing up from a chair with arms (e.g., wheelchair, bedside commode, etc.): 3  Moving from lying on back to sitting on the side of the bed?: 3  Moving to and from a bed to a  chair (including a wheelchair)?: 3  Need to walk in hospital room?: 3  Climbing 3-5 steps with a railing?: 1  Basic Mobility Total Score: 16    AM-PAC Raw Score CMS G-Code Modifier Level of Impairment Assistance   6 % Total / Unable   7 - 9 CM 80 - 100% Maximal Assist   10 - 14 CL 60 - 80% Moderate Assist   15 - 19 CK 40 - 60% Moderate Assist   20 - 22 CJ 20 - 40% Minimal Assist   23 CI 1-20% SBA / CGA   24 CH 0% Independent/ Mod I     Patient left up in chair with all lines intact and call button in reach.    Assessment:  Conner Perez III is a 68 y.o. male with a medical diagnosis of Acute ST elevation myocardial infarction (STEMI) of inferior wall .    Rehab identified problem list/impairments: Rehab identified problem list/impairments: impaired functional mobilty, weakness, impaired endurance, gait instability, impaired self care skills, impaired balance    Rehab potential is good.    Activity tolerance: Good    Discharge recommendations: Discharge Facility/Level Of Care Needs: nursing facility, skilled     Barriers to discharge:      Equipment recommendations: Equipment Needed After Discharge: none     GOALS:   Multidisciplinary Problems     Physical Therapy Goals        Problem: Physical Therapy Goal    Goal Priority Disciplines Outcome Goal Variances Interventions   Physical Therapy Goal     PT, PT/OT Ongoing (interventions implemented as appropriate)     Description:  PT WILL BE SEEN FOR P.T. FOR A MIN OF 5 OUT OF 7 DAYS A WEEK  LT18  1. PT BED MOBILITY WILL BE IND   2. PT WILL T/F TO CHAIR WITH RW AND SBA  3. PT WILL GT TRAIN X 150' WITH RW AND SBA                      PLAN:    Patient to be seen 5 x/week  to address the above listed problems via gait training, therapeutic activities, therapeutic exercises  Plan of Care expires: 18  Plan of Care reviewed with: patient         Georgiana Delatorre, PTA  2018

## 2018-11-03 LAB
ANION GAP SERPL CALC-SCNC: 12 MMOL/L
BASOPHILS # BLD AUTO: 0.03 K/UL
BASOPHILS NFR BLD: 0.5 %
BUN SERPL-MCNC: 44 MG/DL
CALCIUM SERPL-MCNC: 8.9 MG/DL
CHLORIDE SERPL-SCNC: 98 MMOL/L
CO2 SERPL-SCNC: 27 MMOL/L
CREAT SERPL-MCNC: 6.9 MG/DL
DIFFERENTIAL METHOD: ABNORMAL
EOSINOPHIL # BLD AUTO: 0.3 K/UL
EOSINOPHIL NFR BLD: 4.7 %
ERYTHROCYTE [DISTWIDTH] IN BLOOD BY AUTOMATED COUNT: 15.5 %
EST. GFR  (AFRICAN AMERICAN): 9 ML/MIN/1.73 M^2
EST. GFR  (NON AFRICAN AMERICAN): 7 ML/MIN/1.73 M^2
GLUCOSE SERPL-MCNC: 113 MG/DL
HCT VFR BLD AUTO: 28.6 %
HGB BLD-MCNC: 9.4 G/DL
LYMPHOCYTES # BLD AUTO: 1.2 K/UL
LYMPHOCYTES NFR BLD: 19.4 %
MAGNESIUM SERPL-MCNC: 2.3 MG/DL
MCH RBC QN AUTO: 28.1 PG
MCHC RBC AUTO-ENTMCNC: 32.9 G/DL
MCV RBC AUTO: 86 FL
MONOCYTES # BLD AUTO: 1 K/UL
MONOCYTES NFR BLD: 16 %
NEUTROPHILS # BLD AUTO: 3.5 K/UL
NEUTROPHILS NFR BLD: 59.4 %
PHOSPHATE SERPL-MCNC: 3.4 MG/DL
PLATELET # BLD AUTO: 232 K/UL
PMV BLD AUTO: 10.4 FL
POCT GLUCOSE: 157 MG/DL (ref 70–110)
POTASSIUM SERPL-SCNC: 4.8 MMOL/L
RBC # BLD AUTO: 3.34 M/UL
SODIUM SERPL-SCNC: 137 MMOL/L
WBC # BLD AUTO: 5.94 K/UL

## 2018-11-03 PROCEDURE — 83735 ASSAY OF MAGNESIUM: CPT

## 2018-11-03 PROCEDURE — 99233 SBSQ HOSP IP/OBS HIGH 50: CPT | Mod: ,,, | Performed by: INTERNAL MEDICINE

## 2018-11-03 PROCEDURE — 63600175 PHARM REV CODE 636 W HCPCS: Performed by: NURSE PRACTITIONER

## 2018-11-03 PROCEDURE — 25000003 PHARM REV CODE 250: Performed by: INTERNAL MEDICINE

## 2018-11-03 PROCEDURE — 63600175 PHARM REV CODE 636 W HCPCS: Performed by: INTERNAL MEDICINE

## 2018-11-03 PROCEDURE — 25000003 PHARM REV CODE 250: Performed by: NURSE PRACTITIONER

## 2018-11-03 PROCEDURE — 94761 N-INVAS EAR/PLS OXIMETRY MLT: CPT

## 2018-11-03 PROCEDURE — 27000221 HC OXYGEN, UP TO 24 HOURS

## 2018-11-03 PROCEDURE — 85025 COMPLETE CBC W/AUTO DIFF WBC: CPT

## 2018-11-03 PROCEDURE — 80048 BASIC METABOLIC PNL TOTAL CA: CPT

## 2018-11-03 PROCEDURE — 84100 ASSAY OF PHOSPHORUS: CPT

## 2018-11-03 PROCEDURE — 36415 COLL VENOUS BLD VENIPUNCTURE: CPT

## 2018-11-03 PROCEDURE — 21400001 HC TELEMETRY ROOM

## 2018-11-03 PROCEDURE — 99232 SBSQ HOSP IP/OBS MODERATE 35: CPT | Mod: ,,, | Performed by: INTERNAL MEDICINE

## 2018-11-03 RX ADMIN — LOSARTAN POTASSIUM 100 MG: 50 TABLET, FILM COATED ORAL at 09:11

## 2018-11-03 RX ADMIN — AMLODIPINE BESYLATE 10 MG: 10 TABLET ORAL at 09:11

## 2018-11-03 RX ADMIN — LABETALOL HYDROCHLORIDE 10 MG: 5 INJECTION, SOLUTION INTRAVENOUS at 12:11

## 2018-11-03 RX ADMIN — ATORVASTATIN CALCIUM 80 MG: 40 TABLET, FILM COATED ORAL at 09:11

## 2018-11-03 RX ADMIN — CHLORHEXIDINE GLUCONATE 15 ML: 1.2 RINSE ORAL at 09:11

## 2018-11-03 RX ADMIN — HEPARIN SODIUM 5000 UNITS: 5000 INJECTION, SOLUTION INTRAVENOUS; SUBCUTANEOUS at 09:11

## 2018-11-03 RX ADMIN — HYDRALAZINE HYDROCHLORIDE 100 MG: 50 TABLET, FILM COATED ORAL at 05:11

## 2018-11-03 RX ADMIN — CLONIDINE 1 PATCH: 0.3 PATCH TRANSDERMAL at 09:11

## 2018-11-03 RX ADMIN — ISOSORBIDE DINITRATE 40 MG: 20 TABLET ORAL at 09:11

## 2018-11-03 RX ADMIN — HEPARIN SODIUM 5000 UNITS: 5000 INJECTION, SOLUTION INTRAVENOUS; SUBCUTANEOUS at 05:11

## 2018-11-03 RX ADMIN — ASPIRIN 81 MG: 81 TABLET, COATED ORAL at 09:11

## 2018-11-03 RX ADMIN — HYDRALAZINE HYDROCHLORIDE 10 MG: 20 INJECTION INTRAMUSCULAR; INTRAVENOUS at 01:11

## 2018-11-03 RX ADMIN — POLYETHYLENE GLYCOL 3350 17 G: 17 POWDER, FOR SOLUTION ORAL at 09:11

## 2018-11-03 RX ADMIN — ISOSORBIDE DINITRATE 40 MG: 20 TABLET ORAL at 02:11

## 2018-11-03 RX ADMIN — HEPARIN SODIUM 5000 UNITS: 5000 INJECTION, SOLUTION INTRAVENOUS; SUBCUTANEOUS at 02:11

## 2018-11-03 RX ADMIN — PANTOPRAZOLE SODIUM 40 MG: 40 TABLET, DELAYED RELEASE ORAL at 09:11

## 2018-11-03 RX ADMIN — CLOPIDOGREL BISULFATE 75 MG: 75 TABLET ORAL at 09:11

## 2018-11-03 RX ADMIN — LABETALOL HCL 300 MG: 200 TABLET, FILM COATED ORAL at 09:11

## 2018-11-03 RX ADMIN — METOPROLOL TARTRATE 100 MG: 50 TABLET ORAL at 09:11

## 2018-11-03 RX ADMIN — HYDRALAZINE HYDROCHLORIDE 100 MG: 50 TABLET, FILM COATED ORAL at 09:11

## 2018-11-03 RX ADMIN — HYDRALAZINE HYDROCHLORIDE 100 MG: 50 TABLET, FILM COATED ORAL at 02:11

## 2018-11-03 NOTE — PROGRESS NOTES
[facility] -   Cardiology  Progress Note    Patient Name: Conner Perez III  MRN: [id number]  Admission Date: 10/27/2018  Hospital Length of Stay: 7 days  Code Status: Full Code   Attending Physician: Keith Wesley MD   Primary Care Physician: Raciel Angela MD  Expected Discharge Date: 11/5/2018  Principal Problem:Acute ST elevation myocardial infarction (STEMI) of inferior wall    Subjective:     Hospital Course:   10/29/18-Patient seen and examined in room, intubated and sedated. IV heparin gtt in place. Pending dialysis treatment today. Labs reviewed, stable.     10/30/18-Patient seen and examined in room, intubated. On SBT today to possible extubation. IV heparin gtt in place. Labs reviewed, stable. BP remains elevated, will adjust BP meds as tolerated.     10/31/18-Patient seen and examined in room, extubated and awake and alert. HD in progress. BP remains elevated. Labs reviewed, stable.     11/1/18-Patient seen and examined in room, denies chest pain or shortness of breath today. Labs reviewed, stable. Would benefit from PT/OT evaluation.     11/2/18-Patient seen and examined during dialysis this AM. Denies chest pain or shortness of breath. Labs reviewed, K+ 4.2, Na 138.    11/3/18-Patient seen and examined in room, lying in bed. Denies any new complaints today. Labs reviewed, stable. Awaiting SNF bed.     Interval History: no acute issues o/n    Review of Systems   Constitution: Negative for weakness and malaise/fatigue.   HENT: Negative for hearing loss and hoarse voice.    Eyes: Negative for blurred vision and visual disturbance.   Cardiovascular: Positive for chest pain (resolved). Negative for claudication, dyspnea on exertion, irregular heartbeat, leg swelling, near-syncope, orthopnea, palpitations, paroxysmal nocturnal dyspnea and syncope.   Respiratory: Positive for sputum production. Negative for cough, hemoptysis, shortness of breath, sleep disturbances due to breathing, snoring  and wheezing.    Endocrine: Negative for cold intolerance and heat intolerance.   Hematologic/Lymphatic: Does not bruise/bleed easily.   Skin: Negative for color change, dry skin and nail changes.   Musculoskeletal: Positive for arthritis and back pain. Negative for joint pain and myalgias.   Gastrointestinal: Negative for bloating, abdominal pain, constipation, nausea and vomiting.   Genitourinary: Negative for dysuria, flank pain, hematuria and hesitancy.   Neurological: Negative for headaches, light-headedness, loss of balance, numbness and paresthesias.   Psychiatric/Behavioral: Negative for altered mental status.   Allergic/Immunologic: Negative for environmental allergies.     Objective:     Vital Signs (Most Recent):  Temp: 97.6 °F (36.4 °C) (11/03/18 0723)  Pulse: 72 (11/03/18 0723)  Resp: 18 (11/03/18 0723)  BP: (!) 173/80 (11/03/18 0723)  SpO2: 99 % (11/03/18 0723) Vital Signs (24h Range):  Temp:  [96.5 °F (35.8 °C)-99 °F (37.2 °C)] 97.6 °F (36.4 °C)  Pulse:  [62-83] 72  Resp:  [18-20] 18  SpO2:  [96 %-99 %] 99 %  BP: (154-183)/(72-96) 173/80     Weight: 85 kg (187 lb 6.3 oz)  Body mass index is 21.66 kg/m².     SpO2: 99 %  O2 Device (Oxygen Therapy): nasal cannula      Intake/Output Summary (Last 24 hours) at 11/3/2018 0724  Last data filed at 11/3/2018 0500  Gross per 24 hour   Intake 650 ml   Output 3500 ml   Net -2850 ml       Lines/Drains/Airways     Drain                 Hemodialysis AV Fistula Right forearm -- days         Hemodialysis AV Fistula 12/18/17 0017 Right forearm 320 days          Peripheral Intravenous Line                 Peripheral IV - Single Lumen 10/27/18 0930 Left Forearm 6 days         Peripheral IV - Single Lumen 10/28/18 2330 Left Forearm 5 days                Physical Exam   Constitutional: He is oriented to person, place, and time. He appears well-developed and well-nourished. No distress. Nasal cannula in place.   HENT:   Head: Normocephalic and atraumatic.   Eyes: Pupils are  equal, round, and reactive to light.   Neck: Normal range of motion and full passive range of motion without pain. Neck supple. No JVD present.   Cardiovascular: Normal rate, regular rhythm, S1 normal, S2 normal and intact distal pulses.  Occasional extrasystoles are present. PMI is not displaced. Exam reveals no distant heart sounds.   No murmur heard.  Pulses:       Radial pulses are 2+ on the right side, and 2+ on the left side.        Dorsalis pedis pulses are 2+ on the right side, and 2+ on the left side.   Right groin access site C/D/I, no hematoma or ecchymosis noted.    Pulmonary/Chest: Effort normal and breath sounds normal. No accessory muscle usage. No respiratory distress. He has no decreased breath sounds. He has no wheezes. He has no rales.   Abdominal: Soft. Bowel sounds are normal. He exhibits no distension. There is no tenderness.   Genitourinary:   Genitourinary Comments: +Right forearm AV fistula +thrill, +bruit   Musculoskeletal: Normal range of motion. He exhibits no edema.        Right ankle: He exhibits no swelling.        Left ankle: He exhibits no swelling.   Neurological: He is alert and oriented to person, place, and time.   Skin: Skin is warm and dry. No rash noted. He is not diaphoretic. No cyanosis or erythema. No pallor. Nails show no clubbing.   Psychiatric: He has a normal mood and affect. His speech is normal and behavior is normal. Judgment and thought content normal. Cognition and memory are normal.   Nursing note and vitals reviewed.      Significant Labs:   All pertinent lab results from the last 24 hours have been reviewed. and   Recent Lab Results       11/03/18  0354   11/02/18  1542        Anion Gap 12       Baso # 0.03       Basophil% 0.5       BUN, Bld 44       Calcium 8.9       Chloride 98       CO2 27       Creatinine 6.9       Differential Method Automated       eGFR if  9       eGFR if non  7  Comment:  Calculation used to obtain the  estimated glomerular filtration  rate (eGFR) is the CKD-EPI equation.          Eos # 0.3       Eosinophil% 4.7       Glucose 113       Gran # (ANC) 3.5       Gran% 59.4       Hematocrit 28.6       Hemoglobin 9.4       Lymph # 1.2       Lymph% 19.4       Magnesium 2.3       MCH 28.1       MCHC 32.9       MCV 86       Mono # 1.0       Mono% 16.0       MPV 10.4       Phosphorus 3.4       Platelets 232       POCT Glucose   167     Potassium 4.8       RBC 3.34       RDW 15.5       Sodium 137       WBC 5.94             Significant Imaging: Echocardiogram:   2D echo with color flow doppler:   Results for orders placed or performed during the hospital encounter of 04/27/18   2D echo with color flow doppler   Result Value Ref Range    Calculated EF 55 55 - 65    Diastolic Dysfunction No     Mitral Valve Mobility NORMAL     Tricuspid Valve Regurgitation TRIVIAL     and X-Ray: CXR: X-Ray Chest 1 View (CXR):   Results for orders placed or performed during the hospital encounter of 10/27/18   X-Ray Chest 1 View    Narrative    EXAMINATION:  XR CHEST 1 VIEW    CLINICAL HISTORY:  respiratory failure;    TECHNIQUE:  Single frontal view of the chest was performed.    COMPARISON:  10/30/2018    FINDINGS:  Endotracheal and nasogastric tubes have been removed.  Small right and trace left pleural effusions with right lower lobe atelectasis or airspace disease versus aspiration.  Stable cardiomegaly.  In comparison to the prior study, there is no adverse interval changes      Impression    In comparison to the prior study, there is no adverse interval changes      Electronically signed by: Santo Marrero MD  Date:    10/31/2018  Time:    08:20    and X-Ray Chest PA and Lateral (CXR): No results found for this visit on 10/27/18.    Assessment and Plan:       * Acute ST elevation myocardial infarction (STEMI) of inferior wall    See plan under CAD       Abnormal ECG           Acute pulmonary edema    Dialysis per nephrology.        Acute on  chronic diastolic congestive heart failure    Dialysis per nephrology  Remains intubated and sedated today.       11/1  Extubated  Seems compensated  Dialysis per nephrology for fluid management.     Coronary artery disease of native artery of native heart with stable angina pectoris    10/29/18   [facility] revealed Diffuse diabetic CAD which was not amenable to optimal PCI and no targets for CABG  Diffuse LAD disease with distal occlusion and medical management advised  Continue IV heparin gtt for another 24 hours  Continue ASA, statin, Plavix, Hydralazine, Imdur  NO ACEI/ARB given renal function requiring dialysis.   Will add BB as BP allows, BP soft today at time of exam.   Remains intubated and sedated today.     10/30  -Attempting to wean to extubate today  -OK to stop IV heparin gtt today  Continue ASA, Statin, Plavix, Hydralazine, Imdur  Start BB and Norvasc today for better BP control  Will attempt to add ACEi/ARB tomorrow as BP permits.     10/31  -Patient has been extubated and awake and alert today  -Continue ASA, Statin, Plavix, BB, ARB, Norvasc, Imdur, Hydralazine  -Denies chest pain on exam today  -HD in progress today  -OOB today after dialysis    11/1  -Encourage OOB to chair  Recommend PT/OT evaluation  Continue ASA, statin, Plavix, BB, ARB, Norvasc, Imdur, Hydralazine    11/3  Encourage PT/OT  Continue ASA, Statin, Plavix, BB, ARB, Norvasc, Imdur, Hydralazine  NO chest pain       Hypertension    On medical therapy  Continue clonidine patch, hydralazine, Imdur  Start BB and Norvasc today  Monitor BP trend and adjust as needed.     10/31  -continue BB, ARB, Hydralazine, Imdur, Norvasc  -Monitor BP trend and adjust as needed.     11/1  Continue BB, ARB, Hydralazine, Imdur, Norvasc  Increase BB dosage today for tachycardia on exam today.     11/2  -continue BB, ARB, Hydralazine, Imdur, Norvasc    11/3  Continue same medical regimen     ESRD (end stage renal disease) on dialysis    -per nephrology           Chronic diastolic heart failure    Resume DASH diet, 2 gm sodium restriction post extubation  Daily weights  Strict intake and output  Dialysis per nephrology         VTE Risk Mitigation (From admission, onward)        Ordered     heparin (porcine) injection 2,000 Units  Once      11/01/18 1336     heparin (porcine) injection 5,000 Units  Every 8 hours      10/30/18 1440     IP VTE HIGH RISK PATIENT  Once      10/27/18 0805          Litzy Thomas NP  Cardiology  [facility]

## 2018-11-03 NOTE — PROGRESS NOTES
[facility] -   Nephrology  Progress Note    Patient Name: Conner Perez III  MRN: [id number]  Admission Date: 10/27/2018  Hospital Length of Stay: 7 days  Attending Provider: Keith Wesley MD   Primary Care Physician: Raciel Angela MD  Principal Problem:Acute ST elevation myocardial infarction (STEMI) of inferior wall    Subjective:     HPI: Conner Perez III  is a 68 y old AA man with h/o ESRD on HD ( [facility], [facility] [place] [facility] [place] ) presented to the ER this morning  for SOB, bilateral pulmonary edema noted on the chest x-ray, patient had his dialysis treatment yesterday, patient has been having recurrent episodes of shortness of breath and similar presentations to the emergency room in the last few months, he also has been losing weight, patient had to be emergently intubated in the emergency room and was transferred to ICU, we were consulted for emergent dialysis for volume removal,       Interval History: Pt was seen and examined. Stable last pm, no new issues. Pt continues to consume large amounts of fluids. Says is not SOB. Has leg swelling.    Review of patient's allergies indicates:   Allergen Reactions    Augmentin [amoxicillin-pot clavulanate] Edema    Sulfa (sulfonamide antibiotics)      swelling    Lisinopril      cough     Current Facility-Administered Medications   Medication Frequency    albuterol nebulizer solution 2.5 mg Q6H PRN    amLODIPine tablet 10 mg Daily    aspirin EC tablet 81 mg Daily    atorvastatin tablet 80 mg Daily    chlorhexidine 0.12 % solution 15 mL BID    cloNIDine 0.3 mg/24 hr td ptwk 1 patch Q7 Days    clopidogrel tablet 75 mg Daily    epoetin ovi injection 10,000 Units Every Mon, Wed, Fri    heparin (porcine) injection 2,000 Units Once    heparin (porcine) injection 5,000 Units Q8H    hydrALAZINE injection 10 mg Q8H PRN    hydrALAZINE tablet 100 mg Q8H    isosorbide dinitrate tablet 40 mg TID    labetalol injection 10 mg Q6H PRN     labetalol tablet 300 mg TID    losartan tablet 100 mg Daily    pantoprazole EC tablet 40 mg Daily    polyethylene glycol packet 17 g Daily       Objective:     Vital Signs (Most Recent):  Temp: 98.8 °F (37.1 °C) (11/03/18 1143)  Pulse: 62 (11/03/18 1143)  Resp: 18 (11/03/18 1143)  BP: (!) 187/84 (11/03/18 1143)  SpO2: 98 % (11/03/18 1143)  O2 Device (Oxygen Therapy): nasal cannula (11/03/18 0838) Vital Signs (24h Range):  Temp:  [96.5 °F (35.8 °C)-98.8 °F (37.1 °C)] 98.8 °F (37.1 °C)  Pulse:  [62-80] 62  Resp:  [18-20] 18  SpO2:  [96 %-99 %] 98 %  BP: (154-187)/(72-86) 187/84     Weight: 85 kg (187 lb 6.3 oz) (11/01/18 0454)  Body mass index is 21.66 kg/m².  Body surface area is 2.16 meters squared.    I/O last 3 completed shifts:  In: 650 [P.O.:50; Other:600]  Out: 3500 [Other:3500]    Physical Exam   Constitutional: He is oriented to person, place, and time. He appears well-developed and well-nourished.   HENT:   Head: Normocephalic and atraumatic.   Neck: No JVD present.   Cardiovascular: Normal rate and regular rhythm. Exam reveals no friction rub.   Pulmonary/Chest: Effort normal and breath sounds normal. No respiratory distress. He has no rales.   Abdominal: Soft. There is no tenderness.   Musculoskeletal: He exhibits no edema.   Neurological: He is oriented to person, place, and time.   Skin: Skin is warm and dry.   Psychiatric: He has a normal mood and affect. His behavior is normal.   Nursing note and vitals reviewed.      Significant Labs: reviewed  BMP  Lab Results   Component Value Date     11/03/2018    K 4.8 11/03/2018    CL 98 11/03/2018    CO2 27 11/03/2018    BUN 44 (H) 11/03/2018    CREATININE 6.9 (H) 11/03/2018    CALCIUM 8.9 11/03/2018    ANIONGAP 12 11/03/2018    ESTGFRAFRICA 9 (A) 11/03/2018    EGFRNONAA 7 (A) 11/03/2018     Lab Results   Component Value Date    WBC 5.94 11/03/2018    HGB 9.4 (L) 11/03/2018    HCT 28.6 (L) 11/03/2018    MCV 86 11/03/2018     11/03/2018        Significant Imaging: reviewed    Assessment/Plan:         67 y/o male with ESRD on HD q MWF presented with acute MI:                                  ESRD (end stage renal disease)     Renal: ESRD pt on q MWF HD schedule  S/p HD yesterday, no issues  Next Hd per schedule  K normal  Acid base stable  Good O2 sat  No indications for HD today      HTN: BP ntinues to be uncontrolled  Partly due to XS fluid intake  HD and UF can not keep up with pt's XS fluid intake  Advised pt  Meds reviewed  Increased losartan from 50 to 100 mg po qd yesterday  Meds reviewed  Suggest switch metoprolol (is on high dose with bradycardiac effect) to labetalol  Advised pt to keep fluid intake low, 1-1.5 L/day             * Acute ST elevation myocardial infarction (STEMI) of inferior wall     Acute MI  LHC shows diffuse disease  On medical therapy  Has CHF  No renal contraindication for using ACE-I or ARB (pt is already on losartan) to improve preload.                      Plans and recommendations:  As detailed above  D/c metoprolol  Start labetalol 300 mg po tid  No renal contraindication for using ACE-I or ARB               Lazara Granados MD  Nephrology  [facility]

## 2018-11-03 NOTE — SUBJECTIVE & OBJECTIVE
Interval History:  Optimizing medical management.    Review of Systems   Unable to perform ROS: Intubated     Objective:     Vital Signs (Most Recent):  Temp: 96.9 °F (36.1 °C) (11/02/18 2038)  Pulse: 66 (11/02/18 2038)  Resp: 18 (11/02/18 2038)  BP: (!) 154/76 (11/02/18 2038)  SpO2: 96 % (11/02/18 2038) Vital Signs (24h Range):  Temp:  [96.5 °F (35.8 °C)-99.1 °F (37.3 °C)] 96.9 °F (36.1 °C)  Pulse:  [62-83] 66  Resp:  [18] 18  SpO2:  [95 %-98 %] 96 %  BP: (154-183)/(64-96) 154/76     Weight: 85 kg (187 lb 6.3 oz)  Body mass index is 21.66 kg/m².    Intake/Output Summary (Last 24 hours) at 11/2/2018 2202  Last data filed at 11/2/2018 1208  Gross per 24 hour   Intake 600 ml   Output 3500 ml   Net -2900 ml      Physical Exam   Constitutional: He is oriented to person, place, and time. He appears well-developed and well-nourished. No distress.   HENT:   Head: Normocephalic and atraumatic.   Mouth/Throat: Oropharynx is clear and moist.   Eyes: Conjunctivae and EOM are normal. Pupils are equal, round, and reactive to light.   Neck: No JVD present. No thyromegaly present.   Cardiovascular: Normal rate, regular rhythm and normal heart sounds. Exam reveals no gallop and no friction rub.   No murmur heard.  Right groin arterial access site dressed clean and intact.   Pulmonary/Chest: Effort normal and breath sounds normal. No respiratory distress. He has no wheezes. He has no rales.   Abdominal: Soft. Bowel sounds are normal. He exhibits no distension. There is no tenderness. There is no rebound and no guarding.   Musculoskeletal: Normal range of motion. He exhibits no edema or tenderness.   RUE AV fistula with palpable thrill.   Lymphadenopathy:     He has no cervical adenopathy.   Neurological: He is alert and oriented to person, place, and time. He has normal reflexes. He displays normal reflexes. No cranial nerve deficit.   Skin: Skin is warm and dry. No rash noted. He is not diaphoretic. No erythema.   Psychiatric: He  has a normal mood and affect. His behavior is normal. Judgment and thought content normal.       Significant Labs: All pertinent labs within the past 24 hours have been reviewed.    Significant Imaging: I have reviewed all pertinent imaging results/findings within the past 24 hours.

## 2018-11-03 NOTE — PROGRESS NOTES
[facility] Hospital Medicine  Progress Note    Patient Name: Conner Perez III  MRN: [id number]  Patient Class: IP- Inpatient   Admission Date: 10/27/2018  Length of Stay: 6 days  Attending Physician: Keith Wesley MD  Primary Care Provider: Raciel Angela MD        Subjective:     Principal Problem:Acute ST elevation myocardial infarction (STEMI) of inferior wall    HPI:  Came to the emergency room overnight from home with shortness of breath and hypertension.  He was initially stabilized with supplemental oxygen and continuous BiPAP but decompensated.  He was increasingly hypercapnic and short of breath, so was intubated in the ER.  Ponit-of-care ultrasound showed decreased LV function.  He is a [employer].  He dialyzed successfully yesterday with 3 L ultrafiltration.  Denied chest pain on admission.  POC is his Son Conner.  He frequently comes to the ER complaining of shortness of breath hypertensive responds to dialysis.    Hospital Course:  Admitted to ICU on mechanical ventilation.  Urgent hemodialysis per Nephrology.  Troponin increased from 0.02 to 3.5 with new wall motion abnormalities and reduction in EF on echo.  Cardiology informed.  Given 325 mg aspirin and Atorvastatin 80 mg and start Heparin infusion ACS protocol.  Troponin peaked at 24.8 and repeat EKG showed and Anterior STEMI.  The Cardiologist took him to the catheterization lab and performed an angiogram which showed diffuse disease without bypass targets.  Recommendation was to optimize medical management.  He returned to the ICU.  Successfully extubated 30 October.  Remained hemodynamically stable.    Interval History:  Optimizing medical management.    Review of Systems   Unable to perform ROS: Intubated     Objective:     Vital Signs (Most Recent):  Temp: 96.9 °F (36.1 °C) (11/02/18 2038)  Pulse: 66 (11/02/18 2038)  Resp: 18 (11/02/18 2038)  BP: (!) 154/76 (11/02/18 2038)  SpO2: 96 % (11/02/18 2038) Vital  Signs (24h Range):  Temp:  [96.5 °F (35.8 °C)-99.1 °F (37.3 °C)] 96.9 °F (36.1 °C)  Pulse:  [62-83] 66  Resp:  [18] 18  SpO2:  [95 %-98 %] 96 %  BP: (154-183)/(64-96) 154/76     Weight: 85 kg (187 lb 6.3 oz)  Body mass index is 21.66 kg/m².    Intake/Output Summary (Last 24 hours) at 11/2/2018 2202  Last data filed at 11/2/2018 1208  Gross per 24 hour   Intake 600 ml   Output 3500 ml   Net -2900 ml      Physical Exam   Constitutional: He is oriented to person, place, and time. He appears well-developed and well-nourished. No distress.   HENT:   Head: Normocephalic and atraumatic.   Mouth/Throat: Oropharynx is clear and moist.   Eyes: Conjunctivae and EOM are normal. Pupils are equal, round, and reactive to light.   Neck: No JVD present. No thyromegaly present.   Cardiovascular: Normal rate, regular rhythm and normal heart sounds. Exam reveals no gallop and no friction rub.   No murmur heard.  Right groin arterial access site dressed clean and intact.   Pulmonary/Chest: Effort normal and breath sounds normal. No respiratory distress. He has no wheezes. He has no rales.   Abdominal: Soft. Bowel sounds are normal. He exhibits no distension. There is no tenderness. There is no rebound and no guarding.   Musculoskeletal: Normal range of motion. He exhibits no edema or tenderness.   RUE AV fistula with palpable thrill.   Lymphadenopathy:     He has no cervical adenopathy.   Neurological: He is alert and oriented to person, place, and time. He has normal reflexes. He displays normal reflexes. No cranial nerve deficit.   Skin: Skin is warm and dry. No rash noted. He is not diaphoretic. No erythema.   Psychiatric: He has a normal mood and affect. His behavior is normal. Judgment and thought content normal.       Significant Labs: All pertinent labs within the past 24 hours have been reviewed.    Significant Imaging: I have reviewed all pertinent imaging results/findings within the past 24 hours.    Assessment/Plan:      *  Acute ST elevation myocardial infarction (STEMI) of inferior wall    Troponin increased from 0.02 to 3.5 with new wall motion abnormalities and reduction in EF on echo.  Cardiology informed.  Given 325 mg aspirin and Atorvastatin 80 mg and start Heparin infusion ACS protocol.  Troponin increased to 24.8 and follow up EKG showed Anterior STEMI.  Urgent LHC revealed diffuse disease without appropriate bypass targets.  Optimizing medical management.     Acute hypercapnic respiratory failure on mechanical ventilation    Probably due to pulmonary congestion from volume overload.  Intubated in the ED for hypercapnia and worsening respiratory distress.  Urgent hemodialysis.     ESRD (end stage renal disease) on dialysis    Nephrology following and plan for urgent HD with ultrafiltration.  He reportedly had 3 L removed yesterday and has been compliant with HD but may still be volume overloaded.     Chronic diastolic heart failure    Repeat cardiac echo.  There may be a cardiac component.     Anemia of renal disease    Follow blood counts and transfuse if needed.     Coronary artery disease of native artery of native heart with stable angina pectoris    Control blood pressure.  Trend cardiac enzymes.       VTE Risk Mitigation (From admission, onward)        Ordered     heparin (porcine) injection 2,000 Units  Once      11/01/18 1336     heparin (porcine) injection 5,000 Units  Every 8 hours      10/30/18 1440     IP VTE HIGH RISK PATIENT  Once      10/27/18 0805              Keith Wesley MD  Department of Hospital Medicine   [facility]

## 2018-11-03 NOTE — SUBJECTIVE & OBJECTIVE
Interval History: no acute issues o/n    Review of Systems   Constitution: Negative for weakness and malaise/fatigue.   HENT: Negative for hearing loss and hoarse voice.    Eyes: Negative for blurred vision and visual disturbance.   Cardiovascular: Positive for chest pain (resolved). Negative for claudication, dyspnea on exertion, irregular heartbeat, leg swelling, near-syncope, orthopnea, palpitations, paroxysmal nocturnal dyspnea and syncope.   Respiratory: Positive for sputum production. Negative for cough, hemoptysis, shortness of breath, sleep disturbances due to breathing, snoring and wheezing.    Endocrine: Negative for cold intolerance and heat intolerance.   Hematologic/Lymphatic: Does not bruise/bleed easily.   Skin: Negative for color change, dry skin and nail changes.   Musculoskeletal: Positive for arthritis and back pain. Negative for joint pain and myalgias.   Gastrointestinal: Negative for bloating, abdominal pain, constipation, nausea and vomiting.   Genitourinary: Negative for dysuria, flank pain, hematuria and hesitancy.   Neurological: Negative for headaches, light-headedness, loss of balance, numbness and paresthesias.   Psychiatric/Behavioral: Negative for altered mental status.   Allergic/Immunologic: Negative for environmental allergies.     Objective:     Vital Signs (Most Recent):  Temp: 97.6 °F (36.4 °C) (11/03/18 0723)  Pulse: 72 (11/03/18 0723)  Resp: 18 (11/03/18 0723)  BP: (!) 173/80 (11/03/18 0723)  SpO2: 99 % (11/03/18 0723) Vital Signs (24h Range):  Temp:  [96.5 °F (35.8 °C)-99 °F (37.2 °C)] 97.6 °F (36.4 °C)  Pulse:  [62-83] 72  Resp:  [18-20] 18  SpO2:  [96 %-99 %] 99 %  BP: (154-183)/(72-96) 173/80     Weight: 85 kg (187 lb 6.3 oz)  Body mass index is 21.66 kg/m².     SpO2: 99 %  O2 Device (Oxygen Therapy): nasal cannula      Intake/Output Summary (Last 24 hours) at 11/3/2018 0724  Last data filed at 11/3/2018 0500  Gross per 24 hour   Intake 650 ml   Output 3500 ml   Net -2850  ml       Lines/Drains/Airways     Drain                 Hemodialysis AV Fistula Right forearm -- days         Hemodialysis AV Fistula 12/18/17 0017 Right forearm 320 days          Peripheral Intravenous Line                 Peripheral IV - Single Lumen 10/27/18 0930 Left Forearm 6 days         Peripheral IV - Single Lumen 10/28/18 2330 Left Forearm 5 days                Physical Exam   Constitutional: He is oriented to person, place, and time. He appears well-developed and well-nourished. No distress. Nasal cannula in place.   HENT:   Head: Normocephalic and atraumatic.   Eyes: Pupils are equal, round, and reactive to light.   Neck: Normal range of motion and full passive range of motion without pain. Neck supple. No JVD present.   Cardiovascular: Normal rate, regular rhythm, S1 normal, S2 normal and intact distal pulses.  Occasional extrasystoles are present. PMI is not displaced. Exam reveals no distant heart sounds.   No murmur heard.  Pulses:       Radial pulses are 2+ on the right side, and 2+ on the left side.        Dorsalis pedis pulses are 2+ on the right side, and 2+ on the left side.   Right groin access site C/D/I, no hematoma or ecchymosis noted.    Pulmonary/Chest: Effort normal and breath sounds normal. No accessory muscle usage. No respiratory distress. He has no decreased breath sounds. He has no wheezes. He has no rales.   Abdominal: Soft. Bowel sounds are normal. He exhibits no distension. There is no tenderness.   Genitourinary:   Genitourinary Comments: +Right forearm AV fistula +thrill, +bruit   Musculoskeletal: Normal range of motion. He exhibits no edema.        Right ankle: He exhibits no swelling.        Left ankle: He exhibits no swelling.   Neurological: He is alert and oriented to person, place, and time.   Skin: Skin is warm and dry. No rash noted. He is not diaphoretic. No cyanosis or erythema. No pallor. Nails show no clubbing.   Psychiatric: He has a normal mood and affect. His  speech is normal and behavior is normal. Judgment and thought content normal. Cognition and memory are normal.   Nursing note and vitals reviewed.      Significant Labs:   All pertinent lab results from the last 24 hours have been reviewed. and   Recent Lab Results       11/03/18  0354   11/02/18  1542        Anion Gap 12       Baso # 0.03       Basophil% 0.5       BUN, Bld 44       Calcium 8.9       Chloride 98       CO2 27       Creatinine 6.9       Differential Method Automated       eGFR if  9       eGFR if non  7  Comment:  Calculation used to obtain the estimated glomerular filtration  rate (eGFR) is the CKD-EPI equation.          Eos # 0.3       Eosinophil% 4.7       Glucose 113       Gran # (ANC) 3.5       Gran% 59.4       Hematocrit 28.6       Hemoglobin 9.4       Lymph # 1.2       Lymph% 19.4       Magnesium 2.3       MCH 28.1       MCHC 32.9       MCV 86       Mono # 1.0       Mono% 16.0       MPV 10.4       Phosphorus 3.4       Platelets 232       POCT Glucose   167     Potassium 4.8       RBC 3.34       RDW 15.5       Sodium 137       WBC 5.94             Significant Imaging: Echocardiogram:   2D echo with color flow doppler:   Results for orders placed or performed during the hospital encounter of 04/27/18   2D echo with color flow doppler   Result Value Ref Range    Calculated EF 55 55 - 65    Diastolic Dysfunction No     Mitral Valve Mobility NORMAL     Tricuspid Valve Regurgitation TRIVIAL     and X-Ray: CXR: X-Ray Chest 1 View (CXR):   Results for orders placed or performed during the hospital encounter of 10/27/18   X-Ray Chest 1 View    Narrative    EXAMINATION:  XR CHEST 1 VIEW    CLINICAL HISTORY:  respiratory failure;    TECHNIQUE:  Single frontal view of the chest was performed.    COMPARISON:  10/30/2018    FINDINGS:  Endotracheal and nasogastric tubes have been removed.  Small right and trace left pleural effusions with right lower lobe atelectasis or  airspace disease versus aspiration.  Stable cardiomegaly.  In comparison to the prior study, there is no adverse interval changes      Impression    In comparison to the prior study, there is no adverse interval changes      Electronically signed by: Santo Marrero MD  Date:    10/31/2018  Time:    08:20    and X-Ray Chest PA and Lateral (CXR): No results found for this visit on 10/27/18.

## 2018-11-03 NOTE — SUBJECTIVE & OBJECTIVE
Interval History: Pt was seen and examined. Stable last pm, no new issues. Pt continues to consume large amounts of fluids. Says is not SOB. Has leg swelling.    Review of patient's allergies indicates:   Allergen Reactions    Augmentin [amoxicillin-pot clavulanate] Edema    Sulfa (sulfonamide antibiotics)      swelling    Lisinopril      cough     Current Facility-Administered Medications   Medication Frequency    albuterol nebulizer solution 2.5 mg Q6H PRN    amLODIPine tablet 10 mg Daily    aspirin EC tablet 81 mg Daily    atorvastatin tablet 80 mg Daily    chlorhexidine 0.12 % solution 15 mL BID    cloNIDine 0.3 mg/24 hr td ptwk 1 patch Q7 Days    clopidogrel tablet 75 mg Daily    epoetin ovi injection 10,000 Units Every Mon, Wed, Fri    heparin (porcine) injection 2,000 Units Once    heparin (porcine) injection 5,000 Units Q8H    hydrALAZINE injection 10 mg Q8H PRN    hydrALAZINE tablet 100 mg Q8H    isosorbide dinitrate tablet 40 mg TID    labetalol injection 10 mg Q6H PRN    labetalol tablet 300 mg TID    losartan tablet 100 mg Daily    pantoprazole EC tablet 40 mg Daily    polyethylene glycol packet 17 g Daily       Objective:     Vital Signs (Most Recent):  Temp: 98.8 °F (37.1 °C) (11/03/18 1143)  Pulse: 62 (11/03/18 1143)  Resp: 18 (11/03/18 1143)  BP: (!) 187/84 (11/03/18 1143)  SpO2: 98 % (11/03/18 1143)  O2 Device (Oxygen Therapy): nasal cannula (11/03/18 0838) Vital Signs (24h Range):  Temp:  [96.5 °F (35.8 °C)-98.8 °F (37.1 °C)] 98.8 °F (37.1 °C)  Pulse:  [62-80] 62  Resp:  [18-20] 18  SpO2:  [96 %-99 %] 98 %  BP: (154-187)/(72-86) 187/84     Weight: 85 kg (187 lb 6.3 oz) (11/01/18 0454)  Body mass index is 21.66 kg/m².  Body surface area is 2.16 meters squared.    I/O last 3 completed shifts:  In: 650 [P.O.:50; Other:600]  Out: 3500 [Other:3500]    Physical Exam   Constitutional: He is oriented to person, place, and time. He appears well-developed and well-nourished.   HENT:    Head: Normocephalic and atraumatic.   Neck: No JVD present.   Cardiovascular: Normal rate and regular rhythm. Exam reveals no friction rub.   Pulmonary/Chest: Effort normal and breath sounds normal. No respiratory distress. He has no rales.   Abdominal: Soft. There is no tenderness.   Musculoskeletal: He exhibits no edema.   Neurological: He is oriented to person, place, and time.   Skin: Skin is warm and dry.   Psychiatric: He has a normal mood and affect. His behavior is normal.   Nursing note and vitals reviewed.      Significant Labs: reviewed  BMP  Lab Results   Component Value Date     11/03/2018    K 4.8 11/03/2018    CL 98 11/03/2018    CO2 27 11/03/2018    BUN 44 (H) 11/03/2018    CREATININE 6.9 (H) 11/03/2018    CALCIUM 8.9 11/03/2018    ANIONGAP 12 11/03/2018    ESTGFRAFRICA 9 (A) 11/03/2018    EGFRNONAA 7 (A) 11/03/2018     Lab Results   Component Value Date    WBC 5.94 11/03/2018    HGB 9.4 (L) 11/03/2018    HCT 28.6 (L) 11/03/2018    MCV 86 11/03/2018     11/03/2018       Significant Imaging: reviewed   Alert and oriented to person, place and time

## 2018-11-03 NOTE — ASSESSMENT & PLAN NOTE
10/29/18   LHC revealed Diffuse diabetic CAD which was not amenable to optimal PCI and no targets for CABG  Diffuse LAD disease with distal occlusion and medical management advised  Continue IV heparin gtt for another 24 hours  Continue ASA, statin, Plavix, Hydralazine, Imdur  NO ACEI/ARB given renal function requiring dialysis.   Will add BB as BP allows, BP soft today at time of exam.   Remains intubated and sedated today.     10/30  -Attempting to wean to extubate today  -OK to stop IV heparin gtt today  Continue ASA, Statin, Plavix, Hydralazine, Imdur  Start BB and Norvasc today for better BP control  Will attempt to add ACEi/ARB tomorrow as BP permits.     10/31  -Patient has been extubated and awake and alert today  -Continue ASA, Statin, Plavix, BB, ARB, Norvasc, Imdur, Hydralazine  -Denies chest pain on exam today  -HD in progress today  -OOB today after dialysis    11/1  -Encourage OOB to chair  Recommend PT/OT evaluation  Continue ASA, statin, Plavix, BB, ARB, Norvasc, Imdur, Hydralazine    11/3  Encourage PT/OT  Continue ASA, Statin, Plavix, BB, ARB, Norvasc, Imdur, Hydralazine  NO chest pain

## 2018-11-03 NOTE — ASSESSMENT & PLAN NOTE
On medical therapy  Continue clonidine patch, hydralazine, Imdur  Start BB and Norvasc today  Monitor BP trend and adjust as needed.     10/31  -continue BB, ARB, Hydralazine, Imdur, Norvasc  -Monitor BP trend and adjust as needed.     11/1  Continue BB, ARB, Hydralazine, Imdur, Norvasc  Increase BB dosage today for tachycardia on exam today.     11/2  -continue BB, ARB, Hydralazine, Imdur, Norvasc    11/3  Continue same medical regimen

## 2018-11-03 NOTE — ASSESSMENT & PLAN NOTE
67 y/o male with ESRD on HD q MWF presented with acute MI:                                  ESRD (end stage renal disease)     Renal: ESRD pt on q MWF HD schedule  HD in progress this am, tolerating HD well  Continue HD  K normal  Acid base stable  Volume status: pt tends to gain a lot of fluid wt between HD treatments, this will put cardiac status at a worsened condition, explained to pt.      HTN: uncontrolled HTN due to large fluid gain,   BP much improved with fluid removal with HD, but still high  Meds reviewed  Increase losartan from 50 to 100 mg po qd  Advised pt to keep fluid intake low, 1-1.5 L/day         * Acute ST elevation myocardial infarction (STEMI) of inferior wall     Acute MI  LHC shows diffuse disease  On medical therapy  Has CHF  No renal contraindication for using ACE-I or ARB (pt is already on losartan) to improve preload.                      Plans and recommendations:  As detailed above  No renal contraindication for using ACE-I or ARB

## 2018-11-04 LAB
ANION GAP SERPL CALC-SCNC: 15 MMOL/L
BASOPHILS # BLD AUTO: 0.03 K/UL
BASOPHILS NFR BLD: 0.5 %
BUN SERPL-MCNC: 62 MG/DL
CALCIUM SERPL-MCNC: 8.4 MG/DL
CHLORIDE SERPL-SCNC: 99 MMOL/L
CO2 SERPL-SCNC: 21 MMOL/L
CREAT SERPL-MCNC: 8.7 MG/DL
DIFFERENTIAL METHOD: ABNORMAL
EOSINOPHIL # BLD AUTO: 0.4 K/UL
EOSINOPHIL NFR BLD: 6.5 %
ERYTHROCYTE [DISTWIDTH] IN BLOOD BY AUTOMATED COUNT: 15.8 %
EST. GFR  (AFRICAN AMERICAN): 7 ML/MIN/1.73 M^2
EST. GFR  (NON AFRICAN AMERICAN): 6 ML/MIN/1.73 M^2
GLUCOSE SERPL-MCNC: 121 MG/DL
HCT VFR BLD AUTO: 27.2 %
HGB BLD-MCNC: 8.9 G/DL
LYMPHOCYTES # BLD AUTO: 1.4 K/UL
LYMPHOCYTES NFR BLD: 24.4 %
MAGNESIUM SERPL-MCNC: 2.3 MG/DL
MCH RBC QN AUTO: 28 PG
MCHC RBC AUTO-ENTMCNC: 32.7 G/DL
MCV RBC AUTO: 86 FL
MONOCYTES # BLD AUTO: 0.8 K/UL
MONOCYTES NFR BLD: 13.3 %
NEUTROPHILS # BLD AUTO: 3.3 K/UL
NEUTROPHILS NFR BLD: 55.3 %
PHOSPHATE SERPL-MCNC: 4 MG/DL
PLATELET # BLD AUTO: 263 K/UL
PMV BLD AUTO: 10.3 FL
POTASSIUM SERPL-SCNC: 4.7 MMOL/L
RBC # BLD AUTO: 3.18 M/UL
SODIUM SERPL-SCNC: 135 MMOL/L
WBC # BLD AUTO: 5.87 K/UL

## 2018-11-04 PROCEDURE — 97116 GAIT TRAINING THERAPY: CPT

## 2018-11-04 PROCEDURE — 25000003 PHARM REV CODE 250: Performed by: NURSE PRACTITIONER

## 2018-11-04 PROCEDURE — 27000221 HC OXYGEN, UP TO 24 HOURS

## 2018-11-04 PROCEDURE — 99232 SBSQ HOSP IP/OBS MODERATE 35: CPT | Mod: ,,, | Performed by: INTERNAL MEDICINE

## 2018-11-04 PROCEDURE — 85025 COMPLETE CBC W/AUTO DIFF WBC: CPT

## 2018-11-04 PROCEDURE — 21400001 HC TELEMETRY ROOM

## 2018-11-04 PROCEDURE — 36415 COLL VENOUS BLD VENIPUNCTURE: CPT

## 2018-11-04 PROCEDURE — 97535 SELF CARE MNGMENT TRAINING: CPT

## 2018-11-04 PROCEDURE — 63600175 PHARM REV CODE 636 W HCPCS: Performed by: INTERNAL MEDICINE

## 2018-11-04 PROCEDURE — 25000003 PHARM REV CODE 250: Performed by: INTERNAL MEDICINE

## 2018-11-04 PROCEDURE — 97110 THERAPEUTIC EXERCISES: CPT

## 2018-11-04 PROCEDURE — 99233 SBSQ HOSP IP/OBS HIGH 50: CPT | Mod: ,,, | Performed by: INTERNAL MEDICINE

## 2018-11-04 PROCEDURE — 83735 ASSAY OF MAGNESIUM: CPT

## 2018-11-04 PROCEDURE — 97530 THERAPEUTIC ACTIVITIES: CPT

## 2018-11-04 PROCEDURE — 63600175 PHARM REV CODE 636 W HCPCS: Performed by: NURSE PRACTITIONER

## 2018-11-04 PROCEDURE — 84100 ASSAY OF PHOSPHORUS: CPT

## 2018-11-04 PROCEDURE — 80048 BASIC METABOLIC PNL TOTAL CA: CPT

## 2018-11-04 PROCEDURE — 94761 N-INVAS EAR/PLS OXIMETRY MLT: CPT

## 2018-11-04 RX ORDER — DIPHENHYDRAMINE HCL 50 MG
50 CAPSULE ORAL ONCE
Status: COMPLETED | OUTPATIENT
Start: 2018-11-04 | End: 2018-11-04

## 2018-11-04 RX ORDER — ZOLPIDEM TARTRATE 5 MG/1
5 TABLET ORAL NIGHTLY PRN
Status: DISCONTINUED | OUTPATIENT
Start: 2018-11-04 | End: 2018-11-07 | Stop reason: HOSPADM

## 2018-11-04 RX ADMIN — HYDRALAZINE HYDROCHLORIDE 10 MG: 20 INJECTION INTRAMUSCULAR; INTRAVENOUS at 11:11

## 2018-11-04 RX ADMIN — PANTOPRAZOLE SODIUM 40 MG: 40 TABLET, DELAYED RELEASE ORAL at 09:11

## 2018-11-04 RX ADMIN — CHLORHEXIDINE GLUCONATE 15 ML: 1.2 RINSE ORAL at 09:11

## 2018-11-04 RX ADMIN — ISOSORBIDE DINITRATE 40 MG: 20 TABLET ORAL at 08:11

## 2018-11-04 RX ADMIN — LABETALOL HCL 300 MG: 200 TABLET, FILM COATED ORAL at 09:11

## 2018-11-04 RX ADMIN — ASPIRIN 81 MG: 81 TABLET, COATED ORAL at 09:11

## 2018-11-04 RX ADMIN — HEPARIN SODIUM 5000 UNITS: 5000 INJECTION, SOLUTION INTRAVENOUS; SUBCUTANEOUS at 03:11

## 2018-11-04 RX ADMIN — LOSARTAN POTASSIUM 100 MG: 50 TABLET, FILM COATED ORAL at 09:11

## 2018-11-04 RX ADMIN — HYDRALAZINE HYDROCHLORIDE 100 MG: 50 TABLET, FILM COATED ORAL at 03:11

## 2018-11-04 RX ADMIN — POLYETHYLENE GLYCOL 3350 17 G: 17 POWDER, FOR SOLUTION ORAL at 09:11

## 2018-11-04 RX ADMIN — CLOPIDOGREL BISULFATE 75 MG: 75 TABLET ORAL at 09:11

## 2018-11-04 RX ADMIN — DIPHENHYDRAMINE HYDROCHLORIDE 50 MG: 50 CAPSULE ORAL at 04:11

## 2018-11-04 RX ADMIN — AMLODIPINE BESYLATE 10 MG: 10 TABLET ORAL at 09:11

## 2018-11-04 RX ADMIN — ZOLPIDEM TARTRATE 5 MG: 5 TABLET ORAL at 09:11

## 2018-11-04 RX ADMIN — ISOSORBIDE DINITRATE 40 MG: 20 TABLET ORAL at 09:11

## 2018-11-04 RX ADMIN — HYDRALAZINE HYDROCHLORIDE 10 MG: 20 INJECTION INTRAMUSCULAR; INTRAVENOUS at 01:11

## 2018-11-04 RX ADMIN — ISOSORBIDE DINITRATE 40 MG: 20 TABLET ORAL at 03:11

## 2018-11-04 RX ADMIN — ATORVASTATIN CALCIUM 80 MG: 40 TABLET, FILM COATED ORAL at 09:11

## 2018-11-04 RX ADMIN — HEPARIN SODIUM 5000 UNITS: 5000 INJECTION, SOLUTION INTRAVENOUS; SUBCUTANEOUS at 09:11

## 2018-11-04 RX ADMIN — HYDRALAZINE HYDROCHLORIDE 100 MG: 50 TABLET, FILM COATED ORAL at 09:11

## 2018-11-04 RX ADMIN — HYDRALAZINE HYDROCHLORIDE 100 MG: 50 TABLET, FILM COATED ORAL at 05:11

## 2018-11-04 RX ADMIN — LABETALOL HYDROCHLORIDE 10 MG: 5 INJECTION, SOLUTION INTRAVENOUS at 04:11

## 2018-11-04 RX ADMIN — LABETALOL HCL 300 MG: 200 TABLET, FILM COATED ORAL at 03:11

## 2018-11-04 RX ADMIN — HEPARIN SODIUM 5000 UNITS: 5000 INJECTION, SOLUTION INTRAVENOUS; SUBCUTANEOUS at 05:11

## 2018-11-04 NOTE — PROGRESS NOTES
[facility] Hospital Medicine  Progress Note    Patient Name: Conner Perez III  MRN: [id number]  Patient Class: IP- Inpatient   Admission Date: 10/27/2018  Length of Stay: 7 days  Attending Physician: Keith Wesley MD  Primary Care Provider: Raciel Angela MD        Subjective:     Principal Problem:Acute ST elevation myocardial infarction (STEMI) of inferior wall    HPI:  Came to the emergency room overnight from home with shortness of breath and hypertension.  He was initially stabilized with supplemental oxygen and continuous BiPAP but decompensated.  He was increasingly hypercapnic and short of breath, so was intubated in the ER.  Ponit-of-care ultrasound showed decreased LV function.  He is a [employer] on [employer].  He dialyzed successfully yesterday with 3 L ultrafiltration.  Denied chest pain on admission.  POC is his Son Conner.  He frequently comes to the ER complaining of shortness of breath hypertensive responds to dialysis.    Hospital Course:  Admitted to ICU on mechanical ventilation.  Urgent hemodialysis per Nephrology.  Troponin increased from 0.02 to 3.5 with new wall motion abnormalities and reduction in EF on echo.  Cardiology informed.  Given 325 mg aspirin and Atorvastatin 80 mg and start Heparin infusion ACS protocol.  Troponin peaked at 24.8 and repeat EKG showed and Anterior STEMI.  The Cardiologist took him to the catheterization lab and performed an angiogram which showed diffuse disease without bypass targets.  Recommendation was to optimize medical management.  He returned to the ICU.  Successfully extubated 30 October.  Remained hemodynamically stable.  Arranging placement.    Interval History:  Optimizing medical management.    Review of Systems   Constitutional: Negative.  Negative for chills and fever.   HENT: Negative.  Negative for congestion and sore throat.    Eyes: Negative.  Negative for visual disturbance.   Respiratory: Negative.  Negative for cough,  shortness of breath and wheezing.    Cardiovascular: Negative.  Negative for chest pain.   Gastrointestinal: Negative for abdominal pain, diarrhea, nausea and vomiting.   Endocrine: Negative.    Genitourinary: Negative.    Musculoskeletal: Negative.  Negative for myalgias and neck stiffness.   Skin: Negative.  Negative for color change and pallor.   Allergic/Immunologic: Negative.    Neurological: Negative.    Hematological: Negative.    Psychiatric/Behavioral: Negative.    All other systems reviewed and are negative.    Objective:     Vital Signs (Most Recent):  Temp: 98.1 °F (36.7 °C) (11/03/18 1606)  Pulse: 63 (11/03/18 1700)  Resp: 18 (11/03/18 1606)  BP: (!) 196/94 (11/03/18 1606)  SpO2: 99 % (11/03/18 1606) Vital Signs (24h Range):  Temp:  [96.9 °F (36.1 °C)-98.8 °F (37.1 °C)] 98.1 °F (36.7 °C)  Pulse:  [60-80] 63  Resp:  [18-20] 18  SpO2:  [96 %-99 %] 99 %  BP: (154-196)/(72-94) 196/94     Weight: 85 kg (187 lb 6.3 oz)  Body mass index is 21.66 kg/m².    Intake/Output Summary (Last 24 hours) at 11/3/2018 2005  Last data filed at 11/3/2018 0945  Gross per 24 hour   Intake 410 ml   Output --   Net 410 ml      Physical Exam   Constitutional: He is oriented to person, place, and time. He appears well-developed and well-nourished. No distress.   HENT:   Head: Normocephalic and atraumatic.   Mouth/Throat: Oropharynx is clear and moist.   Eyes: Conjunctivae and EOM are normal. Pupils are equal, round, and reactive to light.   Neck: No JVD present. No thyromegaly present.   Cardiovascular: Normal rate, regular rhythm and normal heart sounds. Exam reveals no gallop and no friction rub.   No murmur heard.  Right groin arterial access site dressed clean and intact.   Pulmonary/Chest: Effort normal and breath sounds normal. No respiratory distress. He has no wheezes. He has no rales.   Abdominal: Soft. Bowel sounds are normal. He exhibits no distension. There is no tenderness. There is no rebound and no guarding.    Musculoskeletal: Normal range of motion. He exhibits no edema or tenderness.   RUE AV fistula with palpable thrill.   Lymphadenopathy:     He has no cervical adenopathy.   Neurological: He is alert and oriented to person, place, and time. He has normal reflexes. He displays normal reflexes. No cranial nerve deficit.   Skin: Skin is warm and dry. No rash noted. He is not diaphoretic. No erythema.   Psychiatric: He has a normal mood and affect. His behavior is normal. Judgment and thought content normal.       Significant Labs: All pertinent labs within the past 24 hours have been reviewed.    Significant Imaging: I have reviewed all pertinent imaging results/findings within the past 24 hours.    Assessment/Plan:      * Acute ST elevation myocardial infarction (STEMI) of inferior wall    Troponin increased from 0.02 to 3.5 with new wall motion abnormalities and reduction in EF on echo.  Cardiology informed.  Given 325 mg aspirin and Atorvastatin 80 mg and start Heparin infusion ACS protocol.  Troponin increased to 24.8 and follow up EKG showed Anterior STEMI.  Urgent LHC revealed diffuse disease without appropriate bypass targets.  Optimizing medical management.     Acute hypercapnic respiratory failure on mechanical ventilation    Probably due to pulmonary congestion from volume overload.  Intubated in the ED for hypercapnia and worsening respiratory distress.  Urgent hemodialysis.     ESRD (end stage renal disease) on dialysis    Nephrology following and plan for urgent HD with ultrafiltration.  He reportedly had 3 L removed yesterday and has been compliant with HD but may still be volume overloaded.     Chronic diastolic heart failure    Repeat cardiac echo.  There may be a cardiac component.     Anemia of renal disease    Follow blood counts and transfuse if needed.     Coronary artery disease of native artery of native heart with stable angina pectoris    Control blood pressure.  Trend cardiac enzymes.        VTE Risk Mitigation (From admission, onward)        Ordered     heparin (porcine) injection 2,000 Units  Once      11/01/18 1336     heparin (porcine) injection 5,000 Units  Every 8 hours      10/30/18 1440     IP VTE HIGH RISK PATIENT  Once      10/27/18 0805              Keith Wesley MD  Department of Hospital Medicine   [facility]

## 2018-11-04 NOTE — PLAN OF CARE
Problem: Patient Care Overview  Goal: Plan of Care Review  Pt ambulated 70 feet with a RW and MIN A. Continues to unsteady . Oxygen sats 91% on Room air during activity.

## 2018-11-04 NOTE — ASSESSMENT & PLAN NOTE
67 y/o male with ESRD on HD q MWF presented with acute MI:                                    ESRD (end stage renal disease)     Renal: ESRD pt on q MWF HD schedule  S/p HD yesterday, no issues  Next Hd per schedule  K normal  Acid base stable  Good O2 sat  No indications for HD today      HTN: BP ntinues to be uncontrolled  Partly due to XS fluid intake  HD and UF can not keep up with pt's XS fluid intake  Advised pt  Meds reviewed  Increased losartan from 50 to 100 mg po qd yesterday  Meds reviewed  Suggest switch metoprolol (is on high dose with bradycardiac effect) to labetalol  Advised pt to keep fluid intake low, 1-1.5 L/day               * Acute ST elevation myocardial infarction (STEMI) of inferior wall     Acute MI  LHC shows diffuse disease  On medical therapy  Has CHF  No renal contraindication for using ACE-I or ARB (pt is already on losartan) to improve preload.                      Plans and recommendations:  As detailed above  D/c metoprolol  Start labetalol 300 mg po tid  No renal contraindication for using ACE-I or ARB

## 2018-11-04 NOTE — PT/OT/SLP PROGRESS
"Physical Therapy Treatment    Patient Name:  Conner Perez III   MRN:  [id number]    Recommendations:     Discharge Recommendations:  nursing facility, skilled   Discharge Equipment Recommendations:     Barriers to discharge: None    Assessment:     Conner Perez III is a 68 y.o. male admitted with a medical diagnosis of Acute ST elevation myocardial infarction (STEMI) of inferior wall.  He presents with the following impairments/functional limitations:  weakness, impaired endurance, impaired self care skills, gait instability, decreased lower extremity function, decreased ROM, impaired joint extensibility, impaired cardiopulmonary response to activity .    Pt was eager for tx and motivated to get better. Continues to demonstrate decreased safety with mobility due to weakness.     Rehab Prognosis:  ood; patient would benefit from acute skilled PT services to address these deficits and reach maximum level of function.      Recent Surgery: Procedure(s) (LRB):  CATHETERIZATION, HEART, LEFT (Left) 7 Days Post-Op    Plan:     During this hospitalization, patient to be seen 5 x/week to address the above listed problems via gait training, therapeutic activities, therapeutic exercises  · Plan of Care Expires:  11/08/18   Plan of Care Reviewed with: patient    Subjective     Communicated with epic and nurse: Mary Ann prior to session.  Patient found visiting with his brother upon PT entry to room, agreeable to treatment.      Chief Complaint: I am ready to get out of here  Patient comments/goals: "people take me for granted, they think I am younger than I am"   Pain/Comfort:  · Pain Rating 1: 0/10    Patients cultural, spiritual, Jehovah's witness conflicts given the current situation:      Objective:     Patient found with: oxygen, telemetry, peripheral IV     General Precautions: Standard, fall   Orthopedic Precautions:N/A   Braces:       Functional Mobility:  · Supine to sit: CGA  · Sit to stand: min a  · Stand to sit : cga  · Gait " training: min a with a RW ~ 70 feet with unsteadiness. Shuffled steps. Flexed posture. On Room air, sat 91%.       AM-PAC 6 CLICK MOBILITY  Turning over in bed (including adjusting bedclothes, sheets and blankets)?: 4  Sitting down on and standing up from a chair with arms (e.g., wheelchair, bedside commode, etc.): 3  Moving from lying on back to sitting on the side of the bed?: 4  Moving to and from a bed to a chair (including a wheelchair)?: 3  Need to walk in hospital room?: 3  Climbing 3-5 steps with a railing?: 1  Basic Mobility Total Score: 18       Therapeutic Activities and Exercises:   seated therex for strengthening: chair push ups 2 x 5 reps. 2o active tke, hf and hip ab/adduction. No reports of SOB   Patient left up in chair with all lines intact, call button in reach and nurse Mary Ann notified..    GOALS:   Multidisciplinary Problems     Physical Therapy Goals        Problem: Physical Therapy Goal    Goal Priority Disciplines Outcome Goal Variances Interventions   Physical Therapy Goal     PT, PT/OT Ongoing (interventions implemented as appropriate)     Description:  PT WILL BE SEEN FOR P.T. FOR A MIN OF 5 OUT OF 7 DAYS A WEEK  LT18  1. PT BED MOBILITY WILL BE IND   2. PT WILL T/F TO CHAIR WITH RW AND SBA  3. PT WILL GT TRAIN X 150' WITH RW AND SBA                      Time Tracking:     PT Received On: 18  PT Start Time: 0830     PT Stop Time: 0855  PT Total Time (min): 25 min     Billable Minutes: Gait Training 15 and Therapeutic Exercise 10    Treatment Type: Treatment  PT/PTA: PTA     PTA Visit Number: 2     Marium Yin, ALEXEI  2018

## 2018-11-04 NOTE — PROGRESS NOTES
[facility] -   Cardiology  Progress Note    Patient Name: Conner Perez III  MRN: [id number]  Admission Date: 10/27/2018  Hospital Length of Stay: 8 days  Code Status: Full Code   Attending Physician: Keith Wesley MD   Primary Care Physician: Raciel Angela MD  Expected Discharge Date: 11/5/2018  Principal Problem:Acute ST elevation myocardial infarction (STEMI) of inferior wall    Subjective:   HPI    Pt admitted with dyspnea/respiratory failure.  Pt has ESRD, HTN.    Admitted with acute dyspnea, respiratory failure requiring intubation.  ecg on admit showed NSR, nonspecific st-t abnl.   Troponin levels increased, started on IV heparin, asa for NSTEMI by Hospital [facility].  Anemia noted on labs today, Hg 8.9  This am routine am ECG showed inferior ST elevation concerning for STEMI and marked anterolateral st-t abnormalities, worsening.  Cardiology on call was contacted about ECG and decision made to activate cath lab for emergent cath.  Pt is intubated, sedated and stable otherwise and there are no reports of chest pain sxs.  Echo yesterday showed EF 45%, apical WMA.  He was admitted last month, dyspnea/cp and had negative stress test.  Echo in earlier 4/7/17 showed similar WMA.  Reportedly has had LHC 2016 with no major blockages (no report available).    Hospital Course:   10/29/18-Patient seen and examined in room, intubated and sedated. IV heparin gtt in place. Pending dialysis treatment today. Labs reviewed, stable.     10/30/18-Patient seen and examined in room, intubated. On SBT today to possible extubation. IV heparin gtt in place. Labs reviewed, stable. BP remains elevated, will adjust BP meds as tolerated.     10/31/18-Patient seen and examined in room, extubated and awake and alert. HD in progress. BP remains elevated. Labs reviewed, stable.     11/1/18-Patient seen and examined in room, denies chest pain or shortness of breath today. Labs reviewed, stable. Would benefit from PT/OT  evaluation.     11/2/18-Patient seen and examined during dialysis this AM. Denies chest pain or shortness of breath. Labs reviewed, K+ 4.2, Na 138.    11/3/18-Patient seen and examined in room, lying in bed. Denies any new complaints today. Labs reviewed, stable. Awaiting SNF bed.     11/4/18-Patient seen and examined in room, lying in bed. Denies any new complaints. Pending SNF placement.     Interval History: no acute issues o/n    Review of Systems   Constitution: Negative for weakness and malaise/fatigue.   HENT: Negative for hearing loss and hoarse voice.    Eyes: Negative for blurred vision and visual disturbance.   Cardiovascular: Positive for chest pain (resolved). Negative for claudication, dyspnea on exertion, irregular heartbeat, leg swelling, near-syncope, orthopnea, palpitations, paroxysmal nocturnal dyspnea and syncope.   Respiratory: Positive for sputum production. Negative for cough, hemoptysis, shortness of breath, sleep disturbances due to breathing, snoring and wheezing.    Endocrine: Negative for cold intolerance and heat intolerance.   Hematologic/Lymphatic: Does not bruise/bleed easily.   Skin: Negative for color change, dry skin and nail changes.   Musculoskeletal: Positive for arthritis and back pain. Negative for joint pain and myalgias.   Gastrointestinal: Negative for bloating, abdominal pain, constipation, nausea and vomiting.   Genitourinary: Negative for dysuria, flank pain, hematuria and hesitancy.   Neurological: Negative for headaches, light-headedness, loss of balance, numbness and paresthesias.   Psychiatric/Behavioral: Negative for altered mental status.   Allergic/Immunologic: Negative for environmental allergies.     Objective:     Vital Signs (Most Recent):  Temp: 96.1 °F (35.6 °C) (11/04/18 0722)  Pulse: 70 (11/04/18 0722)  Resp: 18 (11/04/18 0722)  BP: (!) 161/74 (11/04/18 0722)  SpO2: 98 % (11/04/18 0722) Vital Signs (24h Range):  Temp:  [96.1 °F (35.6 °C)-98.8 °F (37.1 °C)]  96.1 °F (35.6 °C)  Pulse:  [60-80] 70  Resp:  [18-20] 18  SpO2:  [97 %-99 %] 98 %  BP: (147-196)/(70-94) 161/74     Weight: 85 kg (187 lb 6.3 oz)  Body mass index is 21.66 kg/m².     SpO2: 98 %  O2 Device (Oxygen Therapy): nasal cannula      Intake/Output Summary (Last 24 hours) at 11/4/2018 0807  Last data filed at 11/4/2018 0626  Gross per 24 hour   Intake 480 ml   Output --   Net 480 ml       Lines/Drains/Airways     Drain                 Hemodialysis AV Fistula Right forearm -- days         Hemodialysis AV Fistula 12/18/17 0017 Right forearm 321 days          Peripheral Intravenous Line                 Peripheral IV - Single Lumen 10/27/18 0930 Left Forearm 7 days         Peripheral IV - Single Lumen 10/28/18 2330 Left Forearm 6 days                Physical Exam   Constitutional: He is oriented to person, place, and time. He appears well-developed and well-nourished. No distress. Nasal cannula in place.   HENT:   Head: Normocephalic and atraumatic.   Eyes: Pupils are equal, round, and reactive to light.   Neck: Normal range of motion and full passive range of motion without pain. Neck supple. No JVD present.   Cardiovascular: Normal rate, regular rhythm, S1 normal, S2 normal and intact distal pulses.  Occasional extrasystoles are present. PMI is not displaced. Exam reveals no distant heart sounds.   No murmur heard.  Pulses:       Radial pulses are 2+ on the right side, and 2+ on the left side.        Dorsalis pedis pulses are 2+ on the right side, and 2+ on the left side.   Right groin access site C/D/I, no hematoma or ecchymosis noted.    Pulmonary/Chest: Effort normal and breath sounds normal. No accessory muscle usage. No respiratory distress. He has no decreased breath sounds. He has no wheezes. He has no rales.   Abdominal: Soft. Bowel sounds are normal. He exhibits no distension. There is no tenderness.   Genitourinary:   Genitourinary Comments: +Right forearm AV fistula +thrill, +bruit   Musculoskeletal:  Normal range of motion. He exhibits no edema.        Right ankle: He exhibits no swelling.        Left ankle: He exhibits no swelling.   Neurological: He is alert and oriented to person, place, and time.   Skin: Skin is warm and dry. No rash noted. He is not diaphoretic. No cyanosis or erythema. No pallor. Nails show no clubbing.   Psychiatric: He has a normal mood and affect. His speech is normal and behavior is normal. Judgment and thought content normal. Cognition and memory are normal.   Nursing note and vitals reviewed.      Significant Labs:   All pertinent lab results from the last 24 hours have been reviewed. and   Recent Lab Results       11/04/18  0414   11/03/18  1203        Anion Gap 15       Baso # 0.03       Basophil% 0.5       BUN, Bld 62       Calcium 8.4       Chloride 99       CO2 21       Creatinine 8.7       Differential Method Automated       eGFR if  7       eGFR if non  6  Comment:  Calculation used to obtain the estimated glomerular filtration  rate (eGFR) is the CKD-EPI equation.          Eos # 0.4       Eosinophil% 6.5       Glucose 121       Gran # (ANC) 3.3       Gran% 55.3       Hematocrit 27.2       Hemoglobin 8.9       Lymph # 1.4       Lymph% 24.4       Magnesium 2.3       MCH 28.0       MCHC 32.7       MCV 86       Mono # 0.8       Mono% 13.3       MPV 10.3       Phosphorus 4.0       Platelets 263       POCT Glucose   157     Potassium 4.7       RBC 3.18       RDW 15.8       Sodium 135       WBC 5.87             Significant Imaging: Echocardiogram:   2D echo with color flow doppler:   Results for orders placed or performed during the hospital encounter of 04/27/18   2D echo with color flow doppler   Result Value Ref Range    Calculated EF 55 55 - 65    Diastolic Dysfunction No     Mitral Valve Mobility NORMAL     Tricuspid Valve Regurgitation TRIVIAL     and X-Ray: CXR: X-Ray Chest 1 View (CXR):   Results for orders placed or performed during the  hospital encounter of 10/27/18   X-Ray Chest 1 View    Narrative    EXAMINATION:  XR CHEST 1 VIEW    CLINICAL HISTORY:  respiratory failure;    TECHNIQUE:  Single frontal view of the chest was performed.    COMPARISON:  10/30/2018    FINDINGS:  Endotracheal and nasogastric tubes have been removed.  Small right and trace left pleural effusions with right lower lobe atelectasis or airspace disease versus aspiration.  Stable cardiomegaly.  In comparison to the prior study, there is no adverse interval changes      Impression    In comparison to the prior study, there is no adverse interval changes      Electronically signed by: Santo Marrero MD  Date:    10/31/2018  Time:    08:20     Assessment and Plan:       * Acute ST elevation myocardial infarction (STEMI) of inferior wall    See plan under CAD       Abnormal ECG           Acute pulmonary edema    Dialysis per nephrology.        Acute on chronic diastolic congestive heart failure    Dialysis per nephrology  Remains intubated and sedated today.       11/1  Extubated  Seems compensated  Dialysis per nephrology for fluid management.     Coronary artery disease of native artery of native heart with stable angina pectoris    10/29/18   [facility] revealed Diffuse diabetic CAD which was not amenable to optimal PCI and no targets for CABG  Diffuse LAD disease with distal occlusion and medical management advised  Continue IV heparin gtt for another 24 hours  Continue ASA, statin, Plavix, Hydralazine, Imdur  NO ACEI/ARB given renal function requiring dialysis.   Will add BB as BP allows, BP soft today at time of exam.   Remains intubated and sedated today.     10/30  -Attempting to wean to extubate today  -OK to stop IV heparin gtt today  Continue ASA, Statin, Plavix, Hydralazine, Imdur  Start BB and Norvasc today for better BP control  Will attempt to add ACEi/ARB tomorrow as BP permits.     10/31  -Patient has been extubated and awake and alert today  -Continue ASA, Statin,  Plavix, BB, ARB, Norvasc, Imdur, Hydralazine  -Denies chest pain on exam today  -HD in progress today  -OOB today after dialysis    11/1  -Encourage OOB to chair  Recommend PT/OT evaluation  Continue ASA, statin, Plavix, BB, ARB, Norvasc, Imdur, Hydralazine    11/3  Encourage PT/OT  Continue ASA, Statin, Plavix, BB, ARB, Norvasc, Imdur, Hydralazine  NO chest pain       Hypertension    On medical therapy  Continue clonidine patch, hydralazine, Imdur  Start BB and Norvasc today  Monitor BP trend and adjust as needed.     10/31  -continue BB, ARB, Hydralazine, Imdur, Norvasc  -Monitor BP trend and adjust as needed.     11/1  Continue BB, ARB, Hydralazine, Imdur, Norvasc  Increase BB dosage today for tachycardia on exam today.     11/2  -continue BB, ARB, Hydralazine, Imdur, Norvasc    11/3  Continue same medical regimen     ESRD (end stage renal disease) on dialysis    -per nephrology          Chronic diastolic heart failure    Resume DASH diet, 2 gm sodium restriction post extubation  Daily weights  Strict intake and output  Dialysis per nephrology         VTE Risk Mitigation (From admission, onward)        Ordered     heparin (porcine) injection 2,000 Units  Once      11/01/18 1336     heparin (porcine) injection 5,000 Units  Every 8 hours      10/30/18 1440     IP VTE HIGH RISK PATIENT  Once      10/27/18 0805          Litzy Thomas NP  Cardiology  [facility]

## 2018-11-04 NOTE — SUBJECTIVE & OBJECTIVE
Interval History:  Optimizing medical management.    Review of Systems   Constitutional: Negative.  Negative for chills and fever.   HENT: Negative.  Negative for congestion and sore throat.    Eyes: Negative.  Negative for visual disturbance.   Respiratory: Negative.  Negative for cough, shortness of breath and wheezing.    Cardiovascular: Negative.  Negative for chest pain.   Gastrointestinal: Negative for abdominal pain, diarrhea, nausea and vomiting.   Endocrine: Negative.    Genitourinary: Negative.    Musculoskeletal: Negative.  Negative for myalgias and neck stiffness.   Skin: Negative.  Negative for color change and pallor.   Allergic/Immunologic: Negative.    Neurological: Negative.    Hematological: Negative.    Psychiatric/Behavioral: Negative.    All other systems reviewed and are negative.    Objective:     Vital Signs (Most Recent):  Temp: 98.1 °F (36.7 °C) (11/03/18 1606)  Pulse: 63 (11/03/18 1700)  Resp: 18 (11/03/18 1606)  BP: (!) 196/94 (11/03/18 1606)  SpO2: 99 % (11/03/18 1606) Vital Signs (24h Range):  Temp:  [96.9 °F (36.1 °C)-98.8 °F (37.1 °C)] 98.1 °F (36.7 °C)  Pulse:  [60-80] 63  Resp:  [18-20] 18  SpO2:  [96 %-99 %] 99 %  BP: (154-196)/(72-94) 196/94     Weight: 85 kg (187 lb 6.3 oz)  Body mass index is 21.66 kg/m².    Intake/Output Summary (Last 24 hours) at 11/3/2018 2005  Last data filed at 11/3/2018 0945  Gross per 24 hour   Intake 410 ml   Output --   Net 410 ml      Physical Exam   Constitutional: He is oriented to person, place, and time. He appears well-developed and well-nourished. No distress.   HENT:   Head: Normocephalic and atraumatic.   Mouth/Throat: Oropharynx is clear and moist.   Eyes: Conjunctivae and EOM are normal. Pupils are equal, round, and reactive to light.   Neck: No JVD present. No thyromegaly present.   Cardiovascular: Normal rate, regular rhythm and normal heart sounds. Exam reveals no gallop and no friction rub.   No murmur heard.  Right groin arterial access  site dressed clean and intact.   Pulmonary/Chest: Effort normal and breath sounds normal. No respiratory distress. He has no wheezes. He has no rales.   Abdominal: Soft. Bowel sounds are normal. He exhibits no distension. There is no tenderness. There is no rebound and no guarding.   Musculoskeletal: Normal range of motion. He exhibits no edema or tenderness.   RUE AV fistula with palpable thrill.   Lymphadenopathy:     He has no cervical adenopathy.   Neurological: He is alert and oriented to person, place, and time. He has normal reflexes. He displays normal reflexes. No cranial nerve deficit.   Skin: Skin is warm and dry. No rash noted. He is not diaphoretic. No erythema.   Psychiatric: He has a normal mood and affect. His behavior is normal. Judgment and thought content normal.       Significant Labs: All pertinent labs within the past 24 hours have been reviewed.    Significant Imaging: I have reviewed all pertinent imaging results/findings within the past 24 hours.

## 2018-11-04 NOTE — PT/OT/SLP PROGRESS
Occupational Therapy  Treatment    Conner Perez III   MRN: [id number]   Admitting Diagnosis: Acute ST elevation myocardial infarction (STEMI) of inferior wall    OT Date of Treatment: 11/04/18   OT Start Time: 1410  OT Stop Time: 1448  OT Total Time (min): 38 min    Billable Minutes:  Self Care/Home Management 25 minutes and Therapeutic Activity 13 minutes    General Precautions: Standard, fall(RUE LIMB ALERT)  Orthopedic Precautions: N/A  Braces: N/A         Subjective:  Communicated with nurse Reese and epic chart review prior to session.    Pain/Comfort  Pain Rating 1: 0/10    Objective:  Patient found with: telemetry, peripheral IV, oxygen     Functional Mobility:  Bed Mobility:   sba    Transfers:    sba    Functional Ambulation:     Activities of Daily Living:     Feeding adaptive equipment: na     UE adaptive equipment: min a      LE adaptive equipment: sba with christiano/doff socks               g/h task: sba     Bathing adaptive equipment:min a seated in bed side chair    Balance:   Static Sit: GOOD: Takes MODERATE challenges from all directions  Dynamic Sit: GOOD: Maintains balance through MODERATE excursions of active trunk movement  Static Stand: FAIR+: Takes MINIMAL challenges from all directions  Dynamic stand: FAIR: Needs CONTACT GUARD during gait    Therapeutic Activities and Exercises:  Pt seen in room. Pt req sba with bed mobility and step transfer to bed side chair. Pt req min a with simple bathing seated in bedside chair and min a with ue dressing. Pt req sba with christiano/doff socks. pt req sba with grooming /hygiene task seated in bed side chair.pt left sitting in bed side chair with all needs met and call button within reach    AM-PAC 6 CLICK ADL   How much help from another person does this patient currently need?   1 = Unable, Total/Dependent Assistance  2 = A lot, Maximum/Moderate Assistance  3 = A little, Minimum/Contact Guard/Supervision  4 = None, Modified [place]/Independent    Putting on and  "taking off regular lower body clothing? : 3  Bathing (including washing, rinsing, drying)?: 3  Toileting, which includes using toilet, bedpan, or urinal? : 3  Putting on and taking off regular upper body clothing?: 3  Taking care of personal grooming such as brushing teeth?: 4  Eating meals?: 4  Daily Activity Total Score: 20     AM-PAC Raw Score CMS "G-Code Modifier Level of Impairment Assistance   6 % Total / Unable   7 - 8 CM 80 - 100% Maximal Assist   9-13 CL 60 - 80% Moderate Assist   14 - 19 CK 40 - 60% Moderate Assist   20 - 22 CJ 20 - 40% Minimal Assist   23 CI 1-20% SBA / CGA   24 CH 0% Independent/ Mod I       Patient left up in chair with all lines intact, call button in reach and nurse Mary Ann notified    ASSESSMENT:  Conner Perez III is a 68 y.o. male with a medical diagnosis of Acute ST elevation myocardial infarction (STEMI) of inferior wall and presents with debility and generalized weakness. Pt will continue to benefit from skilled o.t..    Rehab identified problem list/impairments: Rehab identified problem list/impairments: weakness, impaired functional mobilty, decreased safety awareness, gait instability, impaired endurance, impaired balance, impaired self care skills    Rehab potential is good.    Activity tolerance: Good    Discharge recommendations: Discharge Facility/Level Of Care Needs: nursing facility, skilled     Barriers to discharge:      Equipment recommendations: none     GOALS:   Multidisciplinary Problems     Occupational Therapy Goals        Problem: Occupational Therapy Goal    Goal Priority Disciplines Outcome Interventions   Occupational Therapy Goal     OT, PT/OT Ongoing (interventions implemented as appropriate)    Description:  OT goals to be met 11-11-18  Pt will tolerate 1 set x 15 reps b ue rom exercise with min resistance  Mod  (i)  with ue dressing  MOD (i) with toilet  T5/f's  Mod (i) with le dressing                    Plan:  Patient to be seen 3 x/week to address " the above listed problems via self-care/home management, therapeutic activities, therapeutic exercises  Plan of Care expires: 11/08/18  Plan of Care reviewed with: patient         Janet Stallings, OT  11/04/2018

## 2018-11-04 NOTE — PROGRESS NOTES
Tom declined patient and [facility] does not have any dialysis beds per note of 11/1/2018.  Alba Cabezas LMSW, ACENRIQUE-MEAGHAN, CCM  11/4/2018

## 2018-11-04 NOTE — SUBJECTIVE & OBJECTIVE
Interval History:  Optimizing medical management.    Review of Systems   Constitutional: Negative.  Negative for chills and fever.   HENT: Negative.  Negative for congestion and sore throat.    Eyes: Negative.  Negative for visual disturbance.   Respiratory: Negative.  Negative for cough, shortness of breath and wheezing.    Cardiovascular: Negative.  Negative for chest pain.   Gastrointestinal: Negative for abdominal pain, diarrhea, nausea and vomiting.   Endocrine: Negative.    Genitourinary: Negative.    Musculoskeletal: Negative.  Negative for myalgias and neck stiffness.   Skin: Negative.  Negative for color change and pallor.   Allergic/Immunologic: Negative.    Neurological: Negative.    Hematological: Negative.    Psychiatric/Behavioral: Negative.    All other systems reviewed and are negative.    Objective:     Vital Signs (Most Recent):  Temp: 96.1 °F (35.6 °C) (11/04/18 0722)  Pulse: 70 (11/04/18 0722)  Resp: 18 (11/04/18 0722)  BP: (!) 161/74 (11/04/18 0722)  SpO2: 99 % (11/04/18 0827) Vital Signs (24h Range):  Temp:  [96.1 °F (35.6 °C)-98.4 °F (36.9 °C)] 96.1 °F (35.6 °C)  Pulse:  [60-74] 70  Resp:  [18-20] 18  SpO2:  [97 %-99 %] 99 %  BP: (147-196)/(70-94) 161/74     Weight: 85 kg (187 lb 6.3 oz)  Body mass index is 21.66 kg/m².    Intake/Output Summary (Last 24 hours) at 11/4/2018 1235  Last data filed at 11/4/2018 0626  Gross per 24 hour   Intake 240 ml   Output --   Net 240 ml      Physical Exam   Constitutional: He is oriented to person, place, and time. He appears well-developed and well-nourished. No distress.   HENT:   Head: Normocephalic and atraumatic.   Mouth/Throat: Oropharynx is clear and moist.   Eyes: Conjunctivae and EOM are normal. Pupils are equal, round, and reactive to light.   Neck: No JVD present. No thyromegaly present.   Cardiovascular: Normal rate, regular rhythm and normal heart sounds. Exam reveals no gallop and no friction rub.   No murmur heard.  Right groin arterial access  site dressed clean and intact.   Pulmonary/Chest: Effort normal and breath sounds normal. No respiratory distress. He has no wheezes. He has no rales.   Abdominal: Soft. Bowel sounds are normal. He exhibits no distension. There is no tenderness. There is no rebound and no guarding.   Musculoskeletal: Normal range of motion. He exhibits no edema or tenderness.   RUE AV fistula with palpable thrill.   Lymphadenopathy:     He has no cervical adenopathy.   Neurological: He is alert and oriented to person, place, and time. He has normal reflexes. He displays normal reflexes. No cranial nerve deficit.   Skin: Skin is warm and dry. No rash noted. He is not diaphoretic. No erythema.   Psychiatric: He has a normal mood and affect. His behavior is normal. Judgment and thought content normal.       Significant Labs: All pertinent labs within the past 24 hours have been reviewed.    Significant Imaging: I have reviewed all pertinent imaging results/findings within the past 24 hours.

## 2018-11-04 NOTE — SUBJECTIVE & OBJECTIVE
"Interval History: Pt was seen and examined. No new c/o's, no SOB, feels "OK" today.    Review of patient's allergies indicates:   Allergen Reactions    Augmentin [amoxicillin-pot clavulanate] Edema    Sulfa (sulfonamide antibiotics)      swelling    Lisinopril      cough     Current Facility-Administered Medications   Medication Frequency    albuterol nebulizer solution 2.5 mg Q6H PRN    amLODIPine tablet 10 mg Daily    aspirin EC tablet 81 mg Daily    atorvastatin tablet 80 mg Daily    chlorhexidine 0.12 % solution 15 mL BID    cloNIDine 0.3 mg/24 hr td ptwk 1 patch Q7 Days    clopidogrel tablet 75 mg Daily    epoetin ovi injection 10,000 Units Every Mon, Wed, Fri    heparin (porcine) injection 2,000 Units Once    heparin (porcine) injection 5,000 Units Q8H    hydrALAZINE injection 10 mg Q8H PRN    hydrALAZINE tablet 100 mg Q8H    isosorbide dinitrate tablet 40 mg TID    labetalol injection 10 mg Q6H PRN    labetalol tablet 300 mg TID    losartan tablet 100 mg Daily    pantoprazole EC tablet 40 mg Daily    polyethylene glycol packet 17 g Daily    zolpidem tablet 5 mg Nightly PRN       Objective:     Vital Signs (Most Recent):  Temp: 96.1 °F (35.6 °C) (11/04/18 0722)  Pulse: 69 (11/04/18 1100)  Resp: 18 (11/04/18 0722)  BP: (!) 161/74 (11/04/18 0722)  SpO2: 99 % (11/04/18 0827)  O2 Device (Oxygen Therapy): nasal cannula (11/04/18 0827) Vital Signs (24h Range):  Temp:  [96.1 °F (35.6 °C)-98.4 °F (36.9 °C)] 96.1 °F (35.6 °C)  Pulse:  [60-74] 69  Resp:  [18-20] 18  SpO2:  [97 %-99 %] 99 %  BP: (147-196)/(70-94) 161/74     Weight: 85 kg (187 lb 6.3 oz) (11/01/18 0454)  Body mass index is 21.66 kg/m².  Body surface area is 2.16 meters squared.    I/O last 3 completed shifts:  In: 650 [P.O.:650]  Out: -     Physical Exam   Constitutional: He is oriented to person, place, and time. He appears well-developed and well-nourished.   HENT:   Head: Normocephalic and atraumatic.   Neck: No JVD present. "   Cardiovascular: Normal rate and regular rhythm. Exam reveals no friction rub.   Pulmonary/Chest: Effort normal and breath sounds normal. No respiratory distress. He has no rales.   Abdominal: Soft. There is no tenderness.   Musculoskeletal: He exhibits no edema.   Neurological: He is oriented to person, place, and time.   Skin: Skin is warm and dry.   Psychiatric: He has a normal mood and affect. His behavior is normal.   Nursing note and vitals reviewed.      Significant Labs: reviewed  BMP  Lab Results   Component Value Date     (L) 11/04/2018    K 4.7 11/04/2018    CL 99 11/04/2018    CO2 21 (L) 11/04/2018    BUN 62 (H) 11/04/2018    CREATININE 8.7 (H) 11/04/2018    CALCIUM 8.4 (L) 11/04/2018    ANIONGAP 15 11/04/2018    ESTGFRAFRICA 7 (A) 11/04/2018    EGFRNONAA 6 (A) 11/04/2018     Lab Results   Component Value Date    WBC 5.87 11/04/2018    HGB 8.9 (L) 11/04/2018    HCT 27.2 (L) 11/04/2018    MCV 86 11/04/2018     11/04/2018         Significant Imaging: reviewed

## 2018-11-04 NOTE — PLAN OF CARE
Problem: Patient Care Overview  Goal: Plan of Care Review  Outcome: Ongoing (interventions implemented as appropriate)  End of Shift Report:  Telemonitoring continues  Strict I/O continues  O2 continues.  AV fistula patent to RA  Saline locks remains intact to LA  BM today.  Denies pain or discomforts at this time.   HOB elevated.  Siderails up x 2.  Call light within reach.

## 2018-11-04 NOTE — PROGRESS NOTES
[facility] Hospital Medicine  Progress Note    Patient Name: Conner Perez III  MRN: [id number]  Patient Class: IP- Inpatient   Admission Date: 10/27/2018  Length of Stay: 8 days  Attending Physician: Keith Wesley MD  Primary Care Provider: Raciel Angela MD        Subjective:     Principal Problem:Acute ST elevation myocardial infarction (STEMI) of inferior wall    HPI:  Came to the emergency room overnight from home with shortness of breath and hypertension.  He was initially stabilized with supplemental oxygen and continuous BiPAP but decompensated.  He was increasingly hypercapnic and short of breath, so was intubated in the ER.  Ponit-of-care ultrasound showed decreased LV function.  He is a [employer].  He dialyzed successfully yesterday with 3 L ultrafiltration.  Denied chest pain on admission.  POC is his Son Conner.  He frequently comes to the ER complaining of shortness of breath hypertensive responds to dialysis.    Hospital Course:  Admitted to ICU on mechanical ventilation.  Urgent hemodialysis per Nephrology.  Troponin increased from 0.02 to 3.5 with new wall motion abnormalities and reduction in EF on echo.  Cardiology informed.  Given 325 mg aspirin and Atorvastatin 80 mg and start Heparin infusion ACS protocol.  Troponin peaked at 24.8 and repeat EKG showed and Anterior STEMI.  The Cardiologist took him to the catheterization lab and performed an angiogram which showed diffuse disease without bypass targets.  Recommendation was to optimize medical management.  He returned to the ICU.  Successfully extubated 30 October.  Remained hemodynamically stable.  Arranging placement.    Interval History:  Optimizing medical management.    Review of Systems   Constitutional: Negative.  Negative for chills and fever.   HENT: Negative.  Negative for congestion and sore throat.    Eyes: Negative.  Negative for visual disturbance.   Respiratory: Negative.  Negative for cough,  shortness of breath and wheezing.    Cardiovascular: Negative.  Negative for chest pain.   Gastrointestinal: Negative for abdominal pain, diarrhea, nausea and vomiting.   Endocrine: Negative.    Genitourinary: Negative.    Musculoskeletal: Negative.  Negative for myalgias and neck stiffness.   Skin: Negative.  Negative for color change and pallor.   Allergic/Immunologic: Negative.    Neurological: Negative.    Hematological: Negative.    Psychiatric/Behavioral: Negative.    All other systems reviewed and are negative.    Objective:     Vital Signs (Most Recent):  Temp: 96.1 °F (35.6 °C) (11/04/18 0722)  Pulse: 70 (11/04/18 0722)  Resp: 18 (11/04/18 0722)  BP: (!) 161/74 (11/04/18 0722)  SpO2: 99 % (11/04/18 0827) Vital Signs (24h Range):  Temp:  [96.1 °F (35.6 °C)-98.4 °F (36.9 °C)] 96.1 °F (35.6 °C)  Pulse:  [60-74] 70  Resp:  [18-20] 18  SpO2:  [97 %-99 %] 99 %  BP: (147-196)/(70-94) 161/74     Weight: 85 kg (187 lb 6.3 oz)  Body mass index is 21.66 kg/m².    Intake/Output Summary (Last 24 hours) at 11/4/2018 1235  Last data filed at 11/4/2018 0626  Gross per 24 hour   Intake 240 ml   Output --   Net 240 ml      Physical Exam   Constitutional: He is oriented to person, place, and time. He appears well-developed and well-nourished. No distress.   HENT:   Head: Normocephalic and atraumatic.   Mouth/Throat: Oropharynx is clear and moist.   Eyes: Conjunctivae and EOM are normal. Pupils are equal, round, and reactive to light.   Neck: No JVD present. No thyromegaly present.   Cardiovascular: Normal rate, regular rhythm and normal heart sounds. Exam reveals no gallop and no friction rub.   No murmur heard.  Right groin arterial access site dressed clean and intact.   Pulmonary/Chest: Effort normal and breath sounds normal. No respiratory distress. He has no wheezes. He has no rales.   Abdominal: Soft. Bowel sounds are normal. He exhibits no distension. There is no tenderness. There is no rebound and no guarding.    Musculoskeletal: Normal range of motion. He exhibits no edema or tenderness.   RUE AV fistula with palpable thrill.   Lymphadenopathy:     He has no cervical adenopathy.   Neurological: He is alert and oriented to person, place, and time. He has normal reflexes. He displays normal reflexes. No cranial nerve deficit.   Skin: Skin is warm and dry. No rash noted. He is not diaphoretic. No erythema.   Psychiatric: He has a normal mood and affect. His behavior is normal. Judgment and thought content normal.       Significant Labs: All pertinent labs within the past 24 hours have been reviewed.    Significant Imaging: I have reviewed all pertinent imaging results/findings within the past 24 hours.    Assessment/Plan:      * Acute ST elevation myocardial infarction (STEMI) of inferior wall    Troponin increased from 0.02 to 3.5 with new wall motion abnormalities and reduction in EF on echo.  Cardiology informed.  Given 325 mg aspirin and Atorvastatin 80 mg and start Heparin infusion ACS protocol.  Troponin increased to 24.8 and follow up EKG showed Anterior STEMI.  Urgent LHC revealed diffuse disease without appropriate bypass targets.  Optimizing medical management.     Acute hypercapnic respiratory failure on mechanical ventilation    Probably due to pulmonary congestion from volume overload.  Intubated in the ED for hypercapnia and worsening respiratory distress.  Urgent hemodialysis.     ESRD (end stage renal disease) on dialysis    Nephrology following and plan for urgent HD with ultrafiltration.  He reportedly had 3 L removed yesterday and has been compliant with HD but may still be volume overloaded.     Chronic diastolic heart failure    Repeat cardiac echo.  There may be a cardiac component.     Anemia of renal disease    Follow blood counts and transfuse if needed.     Coronary artery disease of native artery of native heart with stable angina pectoris    Control blood pressure.  Trend cardiac enzymes.        VTE Risk Mitigation (From admission, onward)        Ordered     heparin (porcine) injection 2,000 Units  Once      11/01/18 1336     heparin (porcine) injection 5,000 Units  Every 8 hours      10/30/18 1440     IP VTE HIGH RISK PATIENT  Once      10/27/18 0805              Keith Wesley MD  Department of Hospital Medicine   [facility]

## 2018-11-04 NOTE — PROGRESS NOTES
"[facility]   Nephrology  Progress Note    Patient Name: Conner Perez III  MRN: [id number]  Admission Date: 10/27/2018  Hospital Length of Stay: 8 days  Attending Provider: Keith Wesley MD   Primary Care Physician: Raciel Angela MD  Principal Problem:Acute ST elevation myocardial infarction (STEMI) of inferior wall, ESRD    Subjective:     HPI: Conner Perez III  is a 68 y old AA man with h/o ESRD on HD ( [facility], [facility] ) presented to the ER this morning  for SOB, bilateral pulmonary edema noted on the chest x-ray, patient had his dialysis treatment yesterday, patient has been having recurrent episodes of shortness of breath and similar presentations to the emergency room in the last few months, he also has been losing weight, patient had to be emergently intubated in the emergency room and was transferred to ICU, we were consulted for emergent dialysis for volume removal,       Interval History: Pt was seen and examined. No new c/o's, no SOB, feels "OK" today.    Review of patient's allergies indicates:   Allergen Reactions    Augmentin [amoxicillin-pot clavulanate] Edema    Sulfa (sulfonamide antibiotics)      swelling    Lisinopril      cough     Current Facility-Administered Medications   Medication Frequency    albuterol nebulizer solution 2.5 mg Q6H PRN    amLODIPine tablet 10 mg Daily    aspirin EC tablet 81 mg Daily    atorvastatin tablet 80 mg Daily    chlorhexidine 0.12 % solution 15 mL BID    cloNIDine 0.3 mg/24 hr td ptwk 1 patch Q7 Days    clopidogrel tablet 75 mg Daily    epoetin ovi injection 10,000 Units Every Mon, Wed, Fri    heparin (porcine) injection 2,000 Units Once    heparin (porcine) injection 5,000 Units Q8H    hydrALAZINE injection 10 mg Q8H PRN    hydrALAZINE tablet 100 mg Q8H    isosorbide dinitrate tablet 40 mg TID    labetalol injection 10 mg Q6H PRN    labetalol tablet 300 mg TID    losartan tablet 100 mg Daily    " pantoprazole EC tablet 40 mg Daily    polyethylene glycol packet 17 g Daily    zolpidem tablet 5 mg Nightly PRN       Objective:     Vital Signs (Most Recent):  Temp: 96.1 °F (35.6 °C) (11/04/18 0722)  Pulse: 69 (11/04/18 1100)  Resp: 18 (11/04/18 0722)  BP: (!) 161/74 (11/04/18 0722)  SpO2: 99 % (11/04/18 0827)  O2 Device (Oxygen Therapy): nasal cannula (11/04/18 0827) Vital Signs (24h Range):  Temp:  [96.1 °F (35.6 °C)-98.4 °F (36.9 °C)] 96.1 °F (35.6 °C)  Pulse:  [60-74] 69  Resp:  [18-20] 18  SpO2:  [97 %-99 %] 99 %  BP: (147-196)/(70-94) 161/74     Weight: 85 kg (187 lb 6.3 oz) (11/01/18 0454)  Body mass index is 21.66 kg/m².  Body surface area is 2.16 meters squared.    I/O last 3 completed shifts:  In: 650 [P.O.:650]  Out: -     Physical Exam   Constitutional: He is oriented to person, place, and time. He appears well-developed and well-nourished.   HENT:   Head: Normocephalic and atraumatic.   Neck: No JVD present.   Cardiovascular: Normal rate and regular rhythm. Exam reveals no friction rub.   Pulmonary/Chest: Effort normal and breath sounds normal. No respiratory distress. He has no rales.   Abdominal: Soft. There is no tenderness.   Musculoskeletal: He exhibits no edema.   Neurological: He is oriented to person, place, and time.   Skin: Skin is warm and dry.   Psychiatric: He has a normal mood and affect. His behavior is normal.   Nursing note and vitals reviewed.      Significant Labs: reviewed  BMP  Lab Results   Component Value Date     (L) 11/04/2018    K 4.7 11/04/2018    CL 99 11/04/2018    CO2 21 (L) 11/04/2018    BUN 62 (H) 11/04/2018    CREATININE 8.7 (H) 11/04/2018    CALCIUM 8.4 (L) 11/04/2018    ANIONGAP 15 11/04/2018    ESTGFRAFRICA 7 (A) 11/04/2018    EGFRNONAA 6 (A) 11/04/2018     Lab Results   Component Value Date    WBC 5.87 11/04/2018    HGB 8.9 (L) 11/04/2018    HCT 27.2 (L) 11/04/2018    MCV 86 11/04/2018     11/04/2018         Significant Imaging:  reviewed    Assessment/Plan:         67 y/o male with ESRD on HD q MWF presented with acute MI:                                    ESRD (end stage renal disease)     Renal: ESRD pt on q MWF HD schedule  K normal  Acid base stable  Good O2 sat  No indications for HD today  HD in am      HTN: BP better controlled after switching metoprolol to labetalol yesterday  Increased losartan from 50 to 100 mg po qd already previously  Advised pt to keep fluid intake low to moderate 1-1.5 L/day  Meds reviewed               * Acute ST elevation myocardial infarction (STEMI) of inferior wall     Acute MI  [facility] shows diffuse disease  On medical therapy  Has CHF  No renal contraindication for using ACE-I or ARB (pt is already on losartan) to improve preload.                      Plans and recommendations:  As detailed above  HD in am  Orders fro HD were placed in epic  No renal contraindication for using ACE-I or ARB               Lazara Granados MD  Nephrology  [facility]

## 2018-11-04 NOTE — PLAN OF CARE
Problem: Patient Care Overview  Goal: Plan of Care Review  Outcome: Ongoing (interventions implemented as appropriate)  Patient AAO and VSS.  NSR on monitor. Denies pain. SBP>160 treated with hydralazine and labetalol effectively. Remains free of injury. Fall precautions maintained with bed low and wheels locked. Chart review for 24 hours completed. Will continue to monitor till discharge.

## 2018-11-04 NOTE — SUBJECTIVE & OBJECTIVE
Interval History: no acute issues o/n    Review of Systems   Constitution: Negative for weakness and malaise/fatigue.   HENT: Negative for hearing loss and hoarse voice.    Eyes: Negative for blurred vision and visual disturbance.   Cardiovascular: Positive for chest pain (resolved). Negative for claudication, dyspnea on exertion, irregular heartbeat, leg swelling, near-syncope, orthopnea, palpitations, paroxysmal nocturnal dyspnea and syncope.   Respiratory: Positive for sputum production. Negative for cough, hemoptysis, shortness of breath, sleep disturbances due to breathing, snoring and wheezing.    Endocrine: Negative for cold intolerance and heat intolerance.   Hematologic/Lymphatic: Does not bruise/bleed easily.   Skin: Negative for color change, dry skin and nail changes.   Musculoskeletal: Positive for arthritis and back pain. Negative for joint pain and myalgias.   Gastrointestinal: Negative for bloating, abdominal pain, constipation, nausea and vomiting.   Genitourinary: Negative for dysuria, flank pain, hematuria and hesitancy.   Neurological: Negative for headaches, light-headedness, loss of balance, numbness and paresthesias.   Psychiatric/Behavioral: Negative for altered mental status.   Allergic/Immunologic: Negative for environmental allergies.     Objective:     Vital Signs (Most Recent):  Temp: 96.1 °F (35.6 °C) (11/04/18 0722)  Pulse: 70 (11/04/18 0722)  Resp: 18 (11/04/18 0722)  BP: (!) 161/74 (11/04/18 0722)  SpO2: 98 % (11/04/18 0722) Vital Signs (24h Range):  Temp:  [96.1 °F (35.6 °C)-98.8 °F (37.1 °C)] 96.1 °F (35.6 °C)  Pulse:  [60-80] 70  Resp:  [18-20] 18  SpO2:  [97 %-99 %] 98 %  BP: (147-196)/(70-94) 161/74     Weight: 85 kg (187 lb 6.3 oz)  Body mass index is 21.66 kg/m².     SpO2: 98 %  O2 Device (Oxygen Therapy): nasal cannula      Intake/Output Summary (Last 24 hours) at 11/4/2018 0807  Last data filed at 11/4/2018 0626  Gross per 24 hour   Intake 480 ml   Output --   Net 480 ml        Lines/Drains/Airways     Drain                 Hemodialysis AV Fistula Right forearm -- days         Hemodialysis AV Fistula 12/18/17 0017 Right forearm 321 days          Peripheral Intravenous Line                 Peripheral IV - Single Lumen 10/27/18 0930 Left Forearm 7 days         Peripheral IV - Single Lumen 10/28/18 2330 Left Forearm 6 days                Physical Exam   Constitutional: He is oriented to person, place, and time. He appears well-developed and well-nourished. No distress. Nasal cannula in place.   HENT:   Head: Normocephalic and atraumatic.   Eyes: Pupils are equal, round, and reactive to light.   Neck: Normal range of motion and full passive range of motion without pain. Neck supple. No JVD present.   Cardiovascular: Normal rate, regular rhythm, S1 normal, S2 normal and intact distal pulses.  Occasional extrasystoles are present. PMI is not displaced. Exam reveals no distant heart sounds.   No murmur heard.  Pulses:       Radial pulses are 2+ on the right side, and 2+ on the left side.        Dorsalis pedis pulses are 2+ on the right side, and 2+ on the left side.   Right groin access site C/D/I, no hematoma or ecchymosis noted.    Pulmonary/Chest: Effort normal and breath sounds normal. No accessory muscle usage. No respiratory distress. He has no decreased breath sounds. He has no wheezes. He has no rales.   Abdominal: Soft. Bowel sounds are normal. He exhibits no distension. There is no tenderness.   Genitourinary:   Genitourinary Comments: +Right forearm AV fistula +thrill, +bruit   Musculoskeletal: Normal range of motion. He exhibits no edema.        Right ankle: He exhibits no swelling.        Left ankle: He exhibits no swelling.   Neurological: He is alert and oriented to person, place, and time.   Skin: Skin is warm and dry. No rash noted. He is not diaphoretic. No cyanosis or erythema. No pallor. Nails show no clubbing.   Psychiatric: He has a normal mood and affect. His speech  is normal and behavior is normal. Judgment and thought content normal. Cognition and memory are normal.   Nursing note and vitals reviewed.      Significant Labs:   All pertinent lab results from the last 24 hours have been reviewed. and   Recent Lab Results       11/04/18  0414   11/03/18  1203        Anion Gap 15       Baso # 0.03       Basophil% 0.5       BUN, Bld 62       Calcium 8.4       Chloride 99       CO2 21       Creatinine 8.7       Differential Method Automated       eGFR if  7       eGFR if non  6  Comment:  Calculation used to obtain the estimated glomerular filtration  rate (eGFR) is the CKD-EPI equation.          Eos # 0.4       Eosinophil% 6.5       Glucose 121       Gran # (ANC) 3.3       Gran% 55.3       Hematocrit 27.2       Hemoglobin 8.9       Lymph # 1.4       Lymph% 24.4       Magnesium 2.3       MCH 28.0       MCHC 32.7       MCV 86       Mono # 0.8       Mono% 13.3       MPV 10.3       Phosphorus 4.0       Platelets 263       POCT Glucose   157     Potassium 4.7       RBC 3.18       RDW 15.8       Sodium 135       WBC 5.87             Significant Imaging: Echocardiogram:   2D echo with color flow doppler:   Results for orders placed or performed during the hospital encounter of 04/27/18   2D echo with color flow doppler   Result Value Ref Range    Calculated EF 55 55 - 65    Diastolic Dysfunction No     Mitral Valve Mobility NORMAL     Tricuspid Valve Regurgitation TRIVIAL     and X-Ray: CXR: X-Ray Chest 1 View (CXR):   Results for orders placed or performed during the hospital encounter of 10/27/18   X-Ray Chest 1 View    Narrative    EXAMINATION:  XR CHEST 1 VIEW    CLINICAL HISTORY:  respiratory failure;    TECHNIQUE:  Single frontal view of the chest was performed.    COMPARISON:  10/30/2018    FINDINGS:  Endotracheal and nasogastric tubes have been removed.  Small right and trace left pleural effusions with right lower lobe atelectasis or airspace  disease versus aspiration.  Stable cardiomegaly.  In comparison to the prior study, there is no adverse interval changes      Impression    In comparison to the prior study, there is no adverse interval changes      Electronically signed by: Santo Marrero MD  Date:    10/31/2018  Time:    08:20

## 2018-11-05 LAB
ANION GAP SERPL CALC-SCNC: 16 MMOL/L
BASOPHILS # BLD AUTO: 0.02 K/UL
BASOPHILS NFR BLD: 0.3 %
BUN SERPL-MCNC: 75 MG/DL
CALCIUM SERPL-MCNC: 8.4 MG/DL
CHLORIDE SERPL-SCNC: 98 MMOL/L
CO2 SERPL-SCNC: 20 MMOL/L
CREAT SERPL-MCNC: 10.1 MG/DL
DIFFERENTIAL METHOD: ABNORMAL
EOSINOPHIL # BLD AUTO: 0.4 K/UL
EOSINOPHIL NFR BLD: 7.7 %
ERYTHROCYTE [DISTWIDTH] IN BLOOD BY AUTOMATED COUNT: 15.9 %
EST. GFR  (AFRICAN AMERICAN): 5 ML/MIN/1.73 M^2
EST. GFR  (NON AFRICAN AMERICAN): 5 ML/MIN/1.73 M^2
GLUCOSE SERPL-MCNC: 103 MG/DL
HCT VFR BLD AUTO: 25.3 %
HGB BLD-MCNC: 8.4 G/DL
LYMPHOCYTES # BLD AUTO: 1.2 K/UL
LYMPHOCYTES NFR BLD: 21.4 %
MAGNESIUM SERPL-MCNC: 2.2 MG/DL
MCH RBC QN AUTO: 28.3 PG
MCHC RBC AUTO-ENTMCNC: 33.2 G/DL
MCV RBC AUTO: 85 FL
MONOCYTES # BLD AUTO: 0.5 K/UL
MONOCYTES NFR BLD: 9.1 %
NEUTROPHILS # BLD AUTO: 3.5 K/UL
NEUTROPHILS NFR BLD: 61.5 %
PHOSPHATE SERPL-MCNC: 3.6 MG/DL
PLATELET # BLD AUTO: 279 K/UL
PMV BLD AUTO: 9.9 FL
POTASSIUM SERPL-SCNC: 5.1 MMOL/L
RBC # BLD AUTO: 2.97 M/UL
SODIUM SERPL-SCNC: 134 MMOL/L
WBC # BLD AUTO: 5.74 K/UL

## 2018-11-05 PROCEDURE — 83735 ASSAY OF MAGNESIUM: CPT

## 2018-11-05 PROCEDURE — 27000221 HC OXYGEN, UP TO 24 HOURS

## 2018-11-05 PROCEDURE — 21400001 HC TELEMETRY ROOM

## 2018-11-05 PROCEDURE — 94761 N-INVAS EAR/PLS OXIMETRY MLT: CPT

## 2018-11-05 PROCEDURE — 25000003 PHARM REV CODE 250: Performed by: INTERNAL MEDICINE

## 2018-11-05 PROCEDURE — 63600175 PHARM REV CODE 636 W HCPCS: Performed by: INTERNAL MEDICINE

## 2018-11-05 PROCEDURE — 84100 ASSAY OF PHOSPHORUS: CPT

## 2018-11-05 PROCEDURE — 25000003 PHARM REV CODE 250: Performed by: NURSE PRACTITIONER

## 2018-11-05 PROCEDURE — 85025 COMPLETE CBC W/AUTO DIFF WBC: CPT

## 2018-11-05 PROCEDURE — 63600175 PHARM REV CODE 636 W HCPCS: Performed by: NURSE PRACTITIONER

## 2018-11-05 PROCEDURE — 80048 BASIC METABOLIC PNL TOTAL CA: CPT

## 2018-11-05 PROCEDURE — 80100016 HC MAINTENANCE HEMODIALYSIS

## 2018-11-05 PROCEDURE — 36415 COLL VENOUS BLD VENIPUNCTURE: CPT

## 2018-11-05 PROCEDURE — 99233 SBSQ HOSP IP/OBS HIGH 50: CPT | Mod: ,,, | Performed by: INTERNAL MEDICINE

## 2018-11-05 RX ADMIN — HEPARIN SODIUM 5000 UNITS: 5000 INJECTION, SOLUTION INTRAVENOUS; SUBCUTANEOUS at 05:11

## 2018-11-05 RX ADMIN — ATORVASTATIN CALCIUM 80 MG: 40 TABLET, FILM COATED ORAL at 02:11

## 2018-11-05 RX ADMIN — HEPARIN SODIUM 5000 UNITS: 5000 INJECTION, SOLUTION INTRAVENOUS; SUBCUTANEOUS at 10:11

## 2018-11-05 RX ADMIN — ISOSORBIDE DINITRATE 40 MG: 20 TABLET ORAL at 02:11

## 2018-11-05 RX ADMIN — ASPIRIN 81 MG: 81 TABLET, COATED ORAL at 02:11

## 2018-11-05 RX ADMIN — HEPARIN SODIUM 2000 UNITS: 1000 INJECTION, SOLUTION INTRAVENOUS; SUBCUTANEOUS at 09:11

## 2018-11-05 RX ADMIN — ERYTHROPOIETIN 10000 UNITS: 10000 INJECTION, SOLUTION INTRAVENOUS; SUBCUTANEOUS at 12:11

## 2018-11-05 RX ADMIN — HYDRALAZINE HYDROCHLORIDE 50 MG: 50 TABLET, FILM COATED ORAL at 10:11

## 2018-11-05 RX ADMIN — LABETALOL HCL 300 MG: 200 TABLET, FILM COATED ORAL at 02:11

## 2018-11-05 RX ADMIN — HYDRALAZINE HYDROCHLORIDE 100 MG: 50 TABLET, FILM COATED ORAL at 02:11

## 2018-11-05 RX ADMIN — LOSARTAN POTASSIUM 100 MG: 50 TABLET, FILM COATED ORAL at 02:11

## 2018-11-05 RX ADMIN — CHLORHEXIDINE GLUCONATE 15 ML: 1.2 RINSE ORAL at 10:11

## 2018-11-05 RX ADMIN — AMLODIPINE BESYLATE 10 MG: 10 TABLET ORAL at 02:11

## 2018-11-05 RX ADMIN — ISOSORBIDE DINITRATE 40 MG: 20 TABLET ORAL at 10:11

## 2018-11-05 RX ADMIN — HEPARIN SODIUM 5000 UNITS: 5000 INJECTION, SOLUTION INTRAVENOUS; SUBCUTANEOUS at 02:11

## 2018-11-05 RX ADMIN — POLYETHYLENE GLYCOL 3350 17 G: 17 POWDER, FOR SOLUTION ORAL at 02:11

## 2018-11-05 RX ADMIN — CHLORHEXIDINE GLUCONATE 15 ML: 1.2 RINSE ORAL at 02:11

## 2018-11-05 RX ADMIN — HYDRALAZINE HYDROCHLORIDE 100 MG: 50 TABLET, FILM COATED ORAL at 05:11

## 2018-11-05 RX ADMIN — PANTOPRAZOLE SODIUM 40 MG: 40 TABLET, DELAYED RELEASE ORAL at 02:11

## 2018-11-05 RX ADMIN — LABETALOL HCL 300 MG: 200 TABLET, FILM COATED ORAL at 10:11

## 2018-11-05 RX ADMIN — CLOPIDOGREL BISULFATE 75 MG: 75 TABLET ORAL at 02:11

## 2018-11-05 RX ADMIN — LABETALOL HYDROCHLORIDE 10 MG: 5 INJECTION, SOLUTION INTRAVENOUS at 03:11

## 2018-11-05 NOTE — PLAN OF CARE
Problem: Patient Care Overview  Goal: Plan of Care Review  Outcome: Ongoing (interventions implemented as appropriate)  End of Shift Report,  Telemonitoring continues  Placement possible this week  Labatelol added to medications for blood pressure control  Pt ambulated in hallway with therapy, tolerated it well.  Pt sat in bedside chair most of the day.  Continue to educate patient about Renal diet and fluid restrictions.   AV fistula patent to RFA.  Pt will go to dialysis tomorrow.  Pt voiced no c/o pain or discomforts at this time.  HOB elevated.  Siderails up x 2.  Call light within reach.  Bed alarm activated.

## 2018-11-05 NOTE — PROGRESS NOTES
[facility] -   Nephrology  Progress Note    Patient Name: Conner Perez III  MRN: [id number]  Admission Date: 10/27/2018  Hospital Length of Stay: 9 days  Attending Provider: Kasie Salomon MD   Primary Care Physician: Raciel Angela MD  Principal Problem:Acute ST elevation myocardial infarction (STEMI) of inferior wall, ESRD    Subjective:     HPI: Conner Perez III  is a 68 y old AA man with h/o ESRD on HD ( [facility], [facility] ) presented to the ER this morning  for SOB, bilateral pulmonary edema noted on the chest x-ray, patient had his dialysis treatment yesterday, patient has been having recurrent episodes of shortness of breath and similar presentations to the emergency room in the last few months, he also has been losing weight, patient had to be emergently intubated in the emergency room and was transferred to ICU, we were consulted for emergent dialysis for volume removal,       Interval History: Pt was seen and examined. Stable last pm, no new c/o's, no SOB.    Review of patient's allergies indicates:   Allergen Reactions    Augmentin [amoxicillin-pot clavulanate] Edema    Sulfa (sulfonamide antibiotics)      swelling    Lisinopril      cough     Current Facility-Administered Medications   Medication Frequency    albuterol nebulizer solution 2.5 mg Q6H PRN    amLODIPine tablet 10 mg Daily    aspirin EC tablet 81 mg Daily    atorvastatin tablet 80 mg Daily    chlorhexidine 0.12 % solution 15 mL BID    cloNIDine 0.3 mg/24 hr td ptwk 1 patch Q7 Days    clopidogrel tablet 75 mg Daily    epoetin ovi injection 10,000 Units Every Mon, Wed, Fri    heparin (porcine) injection 2,000 Units Once    heparin (porcine) injection 5,000 Units Q8H    hydrALAZINE injection 10 mg Q8H PRN    hydrALAZINE tablet 100 mg Q8H    isosorbide dinitrate tablet 40 mg TID    labetalol injection 10 mg Q6H PRN    labetalol tablet 300 mg TID    losartan tablet 100 mg Daily    pantoprazole  EC tablet 40 mg Daily    polyethylene glycol packet 17 g Daily    zolpidem tablet 5 mg Nightly PRN       Objective:     Vital Signs (Most Recent):  Temp: 98 °F (36.7 °C) (11/05/18 0855)  Pulse: 67 (11/05/18 1045)  Resp: 18 (11/05/18 1045)  BP: (!) 163/82 (11/05/18 1045)  SpO2: (pt in paz transferring ) (11/05/18 0849)  O2 Device (Oxygen Therapy): nasal cannula (11/05/18 1045) Vital Signs (24h Range):  Temp:  [97.5 °F (36.4 °C)-98.2 °F (36.8 °C)] 98 °F (36.7 °C)  Pulse:  [65-75] 67  Resp:  [17-20] 18  SpO2:  [98 %] 98 %  BP: (112-186)/(56-86) 163/82     Weight: 85 kg (187 lb 6.3 oz) (11/01/18 0454)  Body mass index is 21.66 kg/m².  Body surface area is 2.16 meters squared.    I/O last 3 completed shifts:  In: 720 [P.O.:720]  Out: -     Physical Exam   Constitutional: He is oriented to person, place, and time. He appears well-developed and well-nourished.   HENT:   Head: Normocephalic and atraumatic.   Neck: No JVD present.   Cardiovascular: Normal rate and regular rhythm. Exam reveals no friction rub.   Pulmonary/Chest: Effort normal and breath sounds normal. No respiratory distress. He has no rales.   Abdominal: Soft. There is no tenderness.   Musculoskeletal: He exhibits no edema.   Neurological: He is oriented to person, place, and time.   Skin: Skin is warm and dry.   Psychiatric: He has a normal mood and affect. His behavior is normal.   Nursing note and vitals reviewed.      Significant Labs: reviewed  BMP  Lab Results   Component Value Date     (L) 11/05/2018    K 5.1 11/05/2018    CL 98 11/05/2018    CO2 20 (L) 11/05/2018    BUN 75 (H) 11/05/2018    CREATININE 10.1 (H) 11/05/2018    CALCIUM 8.4 (L) 11/05/2018    ANIONGAP 16 11/05/2018    ESTGFRAFRICA 5 (A) 11/05/2018    EGFRNONAA 5 (A) 11/05/2018     Lab Results   Component Value Date    WBC 5.74 11/05/2018    HGB 8.4 (L) 11/05/2018    HCT 25.3 (L) 11/05/2018    MCV 85 11/05/2018     11/05/2018         Assessment/Plan:         69 y/o male with  ESRD on HD q MWF presented with acute MI:                                    ESRD (end stage renal disease)     Renal: ESRD pt on q MWF HD schedule  Due for HD today.  Tolerating HD, continue HD  K normal  Acid base stable  Good O2 sat      HTN: BP better controlled overall  A little higher today, BP was pre-HD  Will monitor BP  Continue same meds   Advised pt to keep fluid intake low to moderate 1-1.5 L/day  Meds reviewed               * Acute ST elevation myocardial infarction (STEMI) of inferior wall     Acute MI  LHC shows diffuse disease  On medical therapy  Has CHF  No renal contraindication for using ACE-I or ARB (pt is already on losartan) to improve preload.                      Plans and recommendations:  As detailed above  No renal contraindication for using ACE-I or ARB               Lazara Granados MD  Nephrology  [facility]

## 2018-11-05 NOTE — ASSESSMENT & PLAN NOTE
67 y/o male with ESRD on HD q MWF presented with acute MI:                                    ESRD (end stage renal disease)     Renal: ESRD pt on q MWF HD schedule  K normal  Acid base stable  Good O2 sat  No indications for HD today  HD in am      HTN: BP better controlled after switching metoprolol to labetalol yesterday  Increased losartan from 50 to 100 mg po qd already previously  Advised pt to keep fluid intake low to moderate 1-1.5 L/day  Meds reviewed               * Acute ST elevation myocardial infarction (STEMI) of inferior wall     Acute MI  C shows diffuse disease  On medical therapy  Has CHF  No renal contraindication for using ACE-I or ARB (pt is already on losartan) to improve preload.                      Plans and recommendations:  As detailed above  HD in am  Orders fro HD were placed in epic  No renal contraindication for using ACE-I or ARB

## 2018-11-05 NOTE — PLAN OF CARE
Problem: Patient Care Overview  Goal: Plan of Care Review  Outcome: Ongoing (interventions implemented as appropriate)  Pt returned from dialysis. No noted distress. O2 flowing at 2l per NC. NO complaints of pain voiced. Educated pt on dietary modifications for renal patients, verbalized understanding. Pt to be discharged to SNF. Call light placed in reach. Instructed to call prn for assistance. Will  Monitor.

## 2018-11-05 NOTE — PLAN OF CARE
Problem: Pressure Ulcer Risk (Ronen Scale) (Adult,Obstetrics,Pediatric)  Intervention: Turn/Reposition Often  Patient awake and alert free from falls and injury, neuro checks in progress, normal sinus rhythm on monitor, denies pain at this time, weight shift assistance provided, POC reviewed with patient,  bed low locked, call light within reach, will continue to monitor

## 2018-11-05 NOTE — PLAN OF CARE
Patient received hd as ordered. Net removal 2 liters. No access issues. Tolerated well. Adm. epogen with hd as ordered. Dr. Granados visited during hd.

## 2018-11-05 NOTE — PLAN OF CARE
Met with pt to obtain SNF choices. I explained the importance of choosing integrated partners. Pt stated he wanted to choose based on his address for facilities close to his home. 1. [facility] 2. [facility] 3. . Faxed all choices via [employer].

## 2018-11-05 NOTE — PT/OT/SLP PROGRESS
Physical Therapy      Patient Name:  Conner Perez III   MRN:  [id number]    Patient not seen today secondary to pt gone to HD. Will follow-up next available visit.    Maria Esther Colon, PT              1027                11/5/18

## 2018-11-06 LAB
ANION GAP SERPL CALC-SCNC: 13 MMOL/L
BASOPHILS # BLD AUTO: 0.04 K/UL
BASOPHILS NFR BLD: 0.6 %
BUN SERPL-MCNC: 40 MG/DL
CALCIUM SERPL-MCNC: 8.3 MG/DL
CHLORIDE SERPL-SCNC: 99 MMOL/L
CO2 SERPL-SCNC: 25 MMOL/L
CREAT SERPL-MCNC: 6.5 MG/DL
DIFFERENTIAL METHOD: ABNORMAL
EOSINOPHIL # BLD AUTO: 0.4 K/UL
EOSINOPHIL NFR BLD: 6.8 %
ERYTHROCYTE [DISTWIDTH] IN BLOOD BY AUTOMATED COUNT: 16.4 %
EST. GFR  (AFRICAN AMERICAN): 9 ML/MIN/1.73 M^2
EST. GFR  (NON AFRICAN AMERICAN): 8 ML/MIN/1.73 M^2
GLUCOSE SERPL-MCNC: 124 MG/DL
HCT VFR BLD AUTO: 25.9 %
HGB BLD-MCNC: 8.4 G/DL
LYMPHOCYTES # BLD AUTO: 1.3 K/UL
LYMPHOCYTES NFR BLD: 21.1 %
MAGNESIUM SERPL-MCNC: 2 MG/DL
MCH RBC QN AUTO: 27.9 PG
MCHC RBC AUTO-ENTMCNC: 32.4 G/DL
MCV RBC AUTO: 86 FL
MONOCYTES # BLD AUTO: 0.8 K/UL
MONOCYTES NFR BLD: 12.4 %
NEUTROPHILS # BLD AUTO: 3.7 K/UL
NEUTROPHILS NFR BLD: 59.1 %
PHOSPHATE SERPL-MCNC: 2.4 MG/DL
PLATELET # BLD AUTO: 294 K/UL
PMV BLD AUTO: 9.7 FL
POTASSIUM SERPL-SCNC: 5 MMOL/L
RBC # BLD AUTO: 3.01 M/UL
SODIUM SERPL-SCNC: 137 MMOL/L
WBC # BLD AUTO: 6.21 K/UL

## 2018-11-06 PROCEDURE — 63600175 PHARM REV CODE 636 W HCPCS: Performed by: NURSE PRACTITIONER

## 2018-11-06 PROCEDURE — 21400001 HC TELEMETRY ROOM

## 2018-11-06 PROCEDURE — 25000003 PHARM REV CODE 250: Performed by: NURSE PRACTITIONER

## 2018-11-06 PROCEDURE — 94761 N-INVAS EAR/PLS OXIMETRY MLT: CPT

## 2018-11-06 PROCEDURE — 80048 BASIC METABOLIC PNL TOTAL CA: CPT

## 2018-11-06 PROCEDURE — 99233 SBSQ HOSP IP/OBS HIGH 50: CPT | Mod: ,,, | Performed by: INTERNAL MEDICINE

## 2018-11-06 PROCEDURE — 36415 COLL VENOUS BLD VENIPUNCTURE: CPT

## 2018-11-06 PROCEDURE — 97110 THERAPEUTIC EXERCISES: CPT

## 2018-11-06 PROCEDURE — 85025 COMPLETE CBC W/AUTO DIFF WBC: CPT

## 2018-11-06 PROCEDURE — 25000003 PHARM REV CODE 250: Performed by: INTERNAL MEDICINE

## 2018-11-06 PROCEDURE — 84100 ASSAY OF PHOSPHORUS: CPT

## 2018-11-06 PROCEDURE — 83735 ASSAY OF MAGNESIUM: CPT

## 2018-11-06 PROCEDURE — 27000221 HC OXYGEN, UP TO 24 HOURS

## 2018-11-06 RX ADMIN — ZOLPIDEM TARTRATE 5 MG: 5 TABLET ORAL at 01:11

## 2018-11-06 RX ADMIN — LABETALOL HCL 300 MG: 200 TABLET, FILM COATED ORAL at 02:11

## 2018-11-06 RX ADMIN — LABETALOL HCL 300 MG: 200 TABLET, FILM COATED ORAL at 09:11

## 2018-11-06 RX ADMIN — ATORVASTATIN CALCIUM 80 MG: 40 TABLET, FILM COATED ORAL at 08:11

## 2018-11-06 RX ADMIN — HYDRALAZINE HYDROCHLORIDE 100 MG: 50 TABLET, FILM COATED ORAL at 09:11

## 2018-11-06 RX ADMIN — PANTOPRAZOLE SODIUM 40 MG: 40 TABLET, DELAYED RELEASE ORAL at 08:11

## 2018-11-06 RX ADMIN — ISOSORBIDE DINITRATE 40 MG: 20 TABLET ORAL at 02:11

## 2018-11-06 RX ADMIN — AMLODIPINE BESYLATE 10 MG: 10 TABLET ORAL at 08:11

## 2018-11-06 RX ADMIN — CHLORHEXIDINE GLUCONATE 15 ML: 1.2 RINSE ORAL at 08:11

## 2018-11-06 RX ADMIN — HYDRALAZINE HYDROCHLORIDE 100 MG: 50 TABLET, FILM COATED ORAL at 06:11

## 2018-11-06 RX ADMIN — POLYETHYLENE GLYCOL 3350 17 G: 17 POWDER, FOR SOLUTION ORAL at 08:11

## 2018-11-06 RX ADMIN — CHLORHEXIDINE GLUCONATE 15 ML: 1.2 RINSE ORAL at 09:11

## 2018-11-06 RX ADMIN — HEPARIN SODIUM 5000 UNITS: 5000 INJECTION, SOLUTION INTRAVENOUS; SUBCUTANEOUS at 01:11

## 2018-11-06 RX ADMIN — ISOSORBIDE DINITRATE 40 MG: 20 TABLET ORAL at 08:11

## 2018-11-06 RX ADMIN — ASPIRIN 81 MG: 81 TABLET, COATED ORAL at 08:11

## 2018-11-06 RX ADMIN — LABETALOL HCL 300 MG: 200 TABLET, FILM COATED ORAL at 08:11

## 2018-11-06 RX ADMIN — LOSARTAN POTASSIUM 100 MG: 50 TABLET, FILM COATED ORAL at 08:11

## 2018-11-06 RX ADMIN — HEPARIN SODIUM 5000 UNITS: 5000 INJECTION, SOLUTION INTRAVENOUS; SUBCUTANEOUS at 09:11

## 2018-11-06 RX ADMIN — CLOPIDOGREL BISULFATE 75 MG: 75 TABLET ORAL at 08:11

## 2018-11-06 RX ADMIN — HYDRALAZINE HYDROCHLORIDE 100 MG: 50 TABLET, FILM COATED ORAL at 01:11

## 2018-11-06 RX ADMIN — ISOSORBIDE DINITRATE 40 MG: 20 TABLET ORAL at 09:11

## 2018-11-06 NOTE — SUBJECTIVE & OBJECTIVE
Review of Systems   Constitution: Negative.   HENT: Negative.    Eyes: Negative.    Cardiovascular: Negative.    Respiratory: Negative.    Endocrine: Negative.    Hematologic/Lymphatic: Negative.    Skin: Negative.    Musculoskeletal: Positive for arthritis and back pain.   Gastrointestinal: Negative.    Genitourinary: Negative.    Neurological: Negative.    Psychiatric/Behavioral: Negative.    Allergic/Immunologic: Negative.      Objective:     Vital Signs (Most Recent):  Temp: 97.9 °F (36.6 °C) (11/06/18 1107)  Pulse: 73 (11/06/18 1116)  Resp: 20 (11/06/18 1107)  BP: 129/61 (11/06/18 1107)  SpO2: 98 % (11/06/18 1107) Vital Signs (24h Range):  Temp:  [97.6 °F (36.4 °C)-98.8 °F (37.1 °C)] 97.9 °F (36.6 °C)  Pulse:  [68-75] 73  Resp:  [17-20] 20  SpO2:  [95 %-99 %] 98 %  BP: (124-184)/(56-84) 129/61     Weight: 85 kg (187 lb 6.3 oz)  Body mass index is 21.66 kg/m².     SpO2: 98 %  O2 Device (Oxygen Therapy): nasal cannula      Intake/Output Summary (Last 24 hours) at 11/6/2018 1215  Last data filed at 11/6/2018 0805  Gross per 24 hour   Intake 360 ml   Output 2500 ml   Net -2140 ml       Lines/Drains/Airways     Drain                 Hemodialysis AV Fistula Right forearm -- days         Hemodialysis AV Fistula 12/18/17 0017 Right forearm 323 days          Peripheral Intravenous Line                 Peripheral IV - Single Lumen 10/27/18 0930 Left Forearm 10 days         Peripheral IV - Single Lumen 10/28/18 2330 Left Forearm 8 days                Physical Exam   Constitutional: He is oriented to person, place, and time. He appears well-developed and well-nourished. No distress.   HENT:   Head: Normocephalic and atraumatic.   Eyes: Pupils are equal, round, and reactive to light. Right eye exhibits no discharge. Left eye exhibits no discharge.   Neck: Neck supple. No JVD present. No thyromegaly present.   Cardiovascular: Normal rate, regular rhythm, S1 normal, S2 normal and normal heart sounds.   No murmur  heard.  Pulmonary/Chest: Effort normal and breath sounds normal. No respiratory distress. He has no wheezes. He has no rales.   Abdominal: Soft. He exhibits no distension. There is no tenderness. There is no rebound.   Musculoskeletal: He exhibits no edema.   Neurological: He is alert and oriented to person, place, and time.   Skin: Skin is warm and dry. He is not diaphoretic. No erythema.   Psychiatric: He has a normal mood and affect. His behavior is normal. Thought content normal.   Nursing note and vitals reviewed.      Significant Labs:   CMP   Recent Labs   Lab 11/05/18  0405 11/06/18  0444   * 137   K 5.1 5.0   CL 98 99   CO2 20* 25    124*   BUN 75* 40*   CREATININE 10.1* 6.5*   CALCIUM 8.4* 8.3*   ANIONGAP 16 13   ESTGFRAFRICA 5* 9*   EGFRNONAA 5* 8*   , CBC   Recent Labs   Lab 11/05/18  0405 11/06/18  0444   WBC 5.74 6.21   HGB 8.4* 8.4*   HCT 25.3* 25.9*    294   , Troponin No results for input(s): TROPONINI in the last 48 hours. and All pertinent lab results from the last 24 hours have been reviewed.    Significant Imaging: Echocardiogram:   2D echo with color flow doppler:   Results for orders placed or performed during the hospital encounter of 04/27/18   2D echo with color flow doppler   Result Value Ref Range    Calculated EF 55 55 - 65    Diastolic Dysfunction No     Mitral Valve Mobility NORMAL     Tricuspid Valve Regurgitation TRIVIAL

## 2018-11-06 NOTE — ASSESSMENT & PLAN NOTE
69 y/o male with ESRD on HD q MWF presented with acute MI:                                    ESRD (end stage renal disease)     Renal: ESRD pt on q MWF HD schedule  Due for HD today.  Tolerating HD, continue HD  K normal  Acid base stable  Good O2 sat      HTN: BP better controlled overall  A little higher today, BP was pre-HD  Will monitor BP  Continue same meds   Advised pt to keep fluid intake low to moderate 1-1.5 L/day  Meds reviewed               * Acute ST elevation myocardial infarction (STEMI) of inferior wall     Acute MI  C shows diffuse disease  On medical therapy  Has CHF  No renal contraindication for using ACE-I or ARB (pt is already on losartan) to improve preload.                      Plans and recommendations:  As detailed above  No renal contraindication for using ACE-I or ARB

## 2018-11-06 NOTE — PLAN OF CARE
CM met with patient at the bedside.  CM discussed acceptance at [facility].  [place] can provide transportation to [facility].  [facility] schedule is M/W/F at 11:00.  CM will call [place] tomorrow when state office is open

## 2018-11-06 NOTE — PROGRESS NOTES
[facility] Hospital Medicine  Progress Note    Patient Name: Conner Perez III  MRN: [id number]  Patient Class: IP- Inpatient   Admission Date: 10/27/2018  Length of Stay: 10 days  Attending Physician: Kasie Salomon MD  Primary Care Provider: Raciel Angela MD        Subjective:     Principal Problem:Acute ST elevation myocardial infarction (STEMI) of inferior wall    HPI:  Came to the emergency room overnight from home with shortness of breath and hypertension.  He was initially stabilized with supplemental oxygen and continuous BiPAP but decompensated.  He was increasingly hypercapnic and short of breath, so was intubated in the ER.  Ponit-of-care ultrasound showed decreased LV function.  He is a [employer].  He dialyzed successfully yesterday with 3 L ultrafiltration.  Denied chest pain on admission.  POC is his Son Conner.  He frequently comes to the ER complaining of shortness of breath hypertensive responds to dialysis.    Hospital Course:  Admitted to ICU on mechanical ventilation.  Urgent hemodialysis per Nephrology.  Troponin increased from 0.02 to 3.5 with new wall motion abnormalities and reduction in EF on echo.  Cardiology informed.  Given 325 mg aspirin and Atorvastatin 80 mg and start Heparin infusion ACS protocol.  Troponin peaked at 24.8 and repeat EKG showed and Anterior STEMI.  The Cardiologist took him to the catheterization lab and performed an angiogram which showed diffuse disease without bypass targets.  Recommendation was to optimize medical management.  He returned to the ICU.  Successfully extubated 30 October.  Remained hemodynamically stable.  Awaiting SNF. Dialysis MWF. Nephro on board.     Interval History:  Voices no issues or overnight events. Awaiting placement    Review of Systems   Constitutional: Negative.  Negative for chills and fever.   HENT: Negative.  Negative for congestion and sore throat.    Eyes: Negative.  Negative for visual  disturbance.   Respiratory: Negative.  Negative for cough, shortness of breath and wheezing.    Cardiovascular: Negative.  Negative for chest pain.   Gastrointestinal: Negative for abdominal pain, diarrhea, nausea and vomiting.   Endocrine: Negative.    Genitourinary: Negative.    Musculoskeletal: Negative.  Negative for myalgias and neck stiffness.   Skin: Negative.  Negative for color change and pallor.   Allergic/Immunologic: Negative.    Neurological: Negative.    Hematological: Negative.    Psychiatric/Behavioral: Negative.    All other systems reviewed and are negative.    Objective:     Vital Signs (Most Recent):  Temp: 96.1 °F (35.6 °C) (11/06/18 1551)  Pulse: 65 (11/06/18 1551)  Resp: 20 (11/06/18 1107)  BP: (!) 119/58 (11/06/18 1551)  SpO2: 97 % (11/06/18 1551) Vital Signs (24h Range):  Temp:  [96.1 °F (35.6 °C)-98.8 °F (37.1 °C)] 96.1 °F (35.6 °C)  Pulse:  [65-75] 65  Resp:  [17-20] 20  SpO2:  [95 %-99 %] 97 %  BP: (119-184)/(56-84) 119/58     Weight: 85 kg (187 lb 6.3 oz)  Body mass index is 21.66 kg/m².    Intake/Output Summary (Last 24 hours) at 11/6/2018 1605  Last data filed at 11/6/2018 1210  Gross per 24 hour   Intake 600 ml   Output --   Net 600 ml      Physical Exam   Constitutional: He is oriented to person, place, and time. He appears well-developed and well-nourished. No distress.   HENT:   Head: Normocephalic and atraumatic.   Mouth/Throat: Oropharynx is clear and moist.   Eyes: Conjunctivae and EOM are normal. Pupils are equal, round, and reactive to light.   Neck: No JVD present. No thyromegaly present.   Cardiovascular: Normal rate, regular rhythm and normal heart sounds. Exam reveals no gallop and no friction rub.   No murmur heard.  Right groin arterial access site dressed clean and intact.   Pulmonary/Chest: Effort normal and breath sounds normal. No respiratory distress. He has no wheezes. He has no rales.   Abdominal: Soft. Bowel sounds are normal. He exhibits no distension. There is  no tenderness. There is no rebound and no guarding.   Musculoskeletal: Normal range of motion. He exhibits no edema or tenderness.   RUE AV fistula with palpable thrill.   Lymphadenopathy:     He has no cervical adenopathy.   Neurological: He is alert and oriented to person, place, and time. He has normal reflexes. He displays normal reflexes. No cranial nerve deficit.   Skin: Skin is warm and dry. No rash noted. He is not diaphoretic. No erythema.   Psychiatric: He has a normal mood and affect. His behavior is normal. Judgment and thought content normal.       Significant Labs: All pertinent labs within the past 24 hours have been reviewed.    Significant Imaging: I have reviewed all pertinent imaging results/findings within the past 24 hours.    Assessment/Plan:      * Acute ST elevation myocardial infarction (STEMI) of inferior wall    Troponin increased from 0.02 to 3.5 with new wall motion abnormalities and reduction in EF on echo.  Cardiology informed.  Given 325 mg aspirin and Atorvastatin 80 mg and start Heparin infusion ACS protocol.  Troponin increased to 24.8 and follow up EKG showed Anterior STEMI.  Urgent LHC revealed diffuse disease without appropriate bypass targets.  Optimizing medical management.     STEMI (ST elevation myocardial infarction)    S/p LHC  medicla mangement       Acute on chronic diastolic congestive heart failure           Acute hypercapnic respiratory failure on mechanical ventilation    S/p extubation     Coronary artery disease of native artery of native heart with stable angina pectoris    Medical managment     ESRD (end stage renal disease) on dialysis    Nephrology following  Dialysis MWF     Chronic diastolic heart failure    Echo: EF of 45%, Grade 2 diastolic     Anemia of renal disease    Follow blood counts and transfuse if needed.       VTE Risk Mitigation (From admission, onward)        Ordered     heparin (porcine) injection 5,000 Units  Every 8 hours      10/30/18 1440      IP VTE HIGH RISK PATIENT  Once      10/27/18 0805              Kasie Salomon MD  Department of Hospital Medicine   [facility]

## 2018-11-06 NOTE — ASSESSMENT & PLAN NOTE
10/29/18   LHC revealed Diffuse diabetic CAD which was not amenable to optimal PCI and no targets for CABG  Diffuse LAD disease with distal occlusion and medical management advised  Continue IV heparin gtt for another 24 hours  Continue ASA, statin, Plavix, Hydralazine, Imdur  NO ACEI/ARB given renal function requiring dialysis.   Will add BB as BP allows, BP soft today at time of exam.   Remains intubated and sedated today.     10/30  -Attempting to wean to extubate today  -OK to stop IV heparin gtt today  Continue ASA, Statin, Plavix, Hydralazine, Imdur  Start BB and Norvasc today for better BP control  Will attempt to add ACEi/ARB tomorrow as BP permits.     10/31  -Patient has been extubated and awake and alert today  -Continue ASA, Statin, Plavix, BB, ARB, Norvasc, Imdur, Hydralazine  -Denies chest pain on exam today  -HD in progress today  -OOB today after dialysis    11/1  -Encourage OOB to chair  Recommend PT/OT evaluation  Continue ASA, statin, Plavix, BB, ARB, Norvasc, Imdur, Hydralazine    11/3  Encourage PT/OT  Continue ASA, Statin, Plavix, BB, ARB, Norvasc, Imdur, Hydralazine  NO chest pain    11/6/18  -Remains stable CV wise  -No chest pain or SOB  -Continue ASA, statin, Plavix, BB, ARB, norvasc, Imdur, hydralazine

## 2018-11-06 NOTE — SUBJECTIVE & OBJECTIVE
Interval History:  Voices no issues or overnight events. Awaiting placement    Review of Systems   Constitutional: Negative.  Negative for chills and fever.   HENT: Negative.  Negative for congestion and sore throat.    Eyes: Negative.  Negative for visual disturbance.   Respiratory: Negative.  Negative for cough, shortness of breath and wheezing.    Cardiovascular: Negative.  Negative for chest pain.   Gastrointestinal: Negative for abdominal pain, diarrhea, nausea and vomiting.   Endocrine: Negative.    Genitourinary: Negative.    Musculoskeletal: Negative.  Negative for myalgias and neck stiffness.   Skin: Negative.  Negative for color change and pallor.   Allergic/Immunologic: Negative.    Neurological: Negative.    Hematological: Negative.    Psychiatric/Behavioral: Negative.    All other systems reviewed and are negative.    Objective:     Vital Signs (Most Recent):  Temp: 97.8 °F (36.6 °C) (11/05/18 1618)  Pulse: 73 (11/05/18 1618)  Resp: 17 (11/05/18 1618)  BP: 138/64 (11/05/18 1618)  SpO2: 95 % (11/05/18 1618) Vital Signs (24h Range):  Temp:  [97.6 °F (36.4 °C)-98.2 °F (36.8 °C)] 97.8 °F (36.6 °C)  Pulse:  [65-75] 73  Resp:  [17-20] 17  SpO2:  [95 %-98 %] 95 %  BP: (112-186)/(56-87) 138/64     Weight: 85 kg (187 lb 6.3 oz)  Body mass index is 21.66 kg/m².    Intake/Output Summary (Last 24 hours) at 11/5/2018 1838  Last data filed at 11/5/2018 1305  Gross per 24 hour   Intake --   Output 2500 ml   Net -2500 ml      Physical Exam   Constitutional: He is oriented to person, place, and time. He appears well-developed and well-nourished. No distress.   HENT:   Head: Normocephalic and atraumatic.   Mouth/Throat: Oropharynx is clear and moist.   Eyes: Conjunctivae and EOM are normal. Pupils are equal, round, and reactive to light.   Neck: No JVD present. No thyromegaly present.   Cardiovascular: Normal rate, regular rhythm and normal heart sounds. Exam reveals no gallop and no friction rub.   No murmur  heard.  Right groin arterial access site dressed clean and intact.   Pulmonary/Chest: Effort normal and breath sounds normal. No respiratory distress. He has no wheezes. He has no rales.   Abdominal: Soft. Bowel sounds are normal. He exhibits no distension. There is no tenderness. There is no rebound and no guarding.   Musculoskeletal: Normal range of motion. He exhibits no edema or tenderness.   RUE AV fistula with palpable thrill.   Lymphadenopathy:     He has no cervical adenopathy.   Neurological: He is alert and oriented to person, place, and time. He has normal reflexes. He displays normal reflexes. No cranial nerve deficit.   Skin: Skin is warm and dry. No rash noted. He is not diaphoretic. No erythema.   Psychiatric: He has a normal mood and affect. His behavior is normal. Judgment and thought content normal.       Significant Labs: All pertinent labs within the past 24 hours have been reviewed.    Significant Imaging: I have reviewed all pertinent imaging results/findings within the past 24 hours.

## 2018-11-06 NOTE — ASSESSMENT & PLAN NOTE
Dialysis per nephrology  Remains intubated and sedated today.       11/1  Extubated  Seems compensated  Dialysis per nephrology for fluid management.    11/6/18  -Dialysis per nephrology

## 2018-11-06 NOTE — ASSESSMENT & PLAN NOTE
On medical therapy  Continue clonidine patch, hydralazine, Imdur  Start BB and Norvasc today  Monitor BP trend and adjust as needed.     10/31  -continue BB, ARB, Hydralazine, Imdur, Norvasc  -Monitor BP trend and adjust as needed.     11/1  Continue BB, ARB, Hydralazine, Imdur, Norvasc  Increase BB dosage today for tachycardia on exam today.     11/2  -continue BB, ARB, Hydralazine, Imdur, Norvasc    11/3  Continue same medical regimen    11/6/18  -Stable, continue same meds

## 2018-11-06 NOTE — PLAN OF CARE
11/06/18 1027   Medicare Message   Important Message from Medicare regarding Discharge Appeal Rights Given to patient/caregiver;Explained to patient/caregiver;Signed/date by patient/caregiver   Date IMM was signed 11/06/18   Time IMM was signed 1009

## 2018-11-06 NOTE — PROGRESS NOTES
[facility]   Nephrology  Progress Note    Patient Name: Conner Perez III  MRN: [id number]  Admission Date: 10/27/2018  Hospital Length of Stay: 10 days  Attending Provider: Kasie Salomon MD   Primary Care Physician: Raciel Angela MD,   Principal Problem:Acute ST elevation myocardial infarction (STEMI) of inferior wall, ESRD    Subjective:     HPI: Conner Perez III  is a 68 y old AA man with h/o ESRD on HD ( [facility], [facility], [facility] ) presented to the ER this morning  for SOB, bilateral pulmonary edema noted on the chest x-ray, patient had his dialysis treatment yesterday, patient has been having recurrent episodes of shortness of breath and similar presentations to the emergency room in the last few months, he also has been losing weight, patient had to be emergently intubated in the emergency room and was transferred to ICU, we were consulted for emergent dialysis for volume removal,       Interval History: Pt was seen this am. No new c/o's, stable last pm, no SOB. No new events last pm.    Review of patient's allergies indicates:   Allergen Reactions    Augmentin [amoxicillin-pot clavulanate] Edema    Sulfa (sulfonamide antibiotics)      swelling    Lisinopril      cough     Current Facility-Administered Medications   Medication Frequency    albuterol nebulizer solution 2.5 mg Q6H PRN    amLODIPine tablet 10 mg Daily    aspirin EC tablet 81 mg Daily    atorvastatin tablet 80 mg Daily    chlorhexidine 0.12 % solution 15 mL BID    cloNIDine 0.3 mg/24 hr td ptwk 1 patch Q7 Days    clopidogrel tablet 75 mg Daily    epoetin ovi injection 10,000 Units Every Mon, Wed, Fri    heparin (porcine) injection 5,000 Units Q8H    hydrALAZINE injection 10 mg Q8H PRN    hydrALAZINE tablet 100 mg Q8H    isosorbide dinitrate tablet 40 mg TID    labetalol injection 10 mg Q6H PRN    labetalol tablet 300 mg TID    losartan tablet 100 mg Daily    pantoprazole EC tablet 40 mg Daily     polyethylene glycol packet 17 g Daily    zolpidem tablet 5 mg Nightly PRN       Objective:     Vital Signs (Most Recent):  Temp: 97.9 °F (36.6 °C) (11/06/18 1107)  Pulse: 73 (11/06/18 1116)  Resp: 20 (11/06/18 1107)  BP: 129/61 (11/06/18 1107)  SpO2: 98 % (11/06/18 1107)  O2 Device (Oxygen Therapy): nasal cannula (11/06/18 1053) Vital Signs (24h Range):  Temp:  [97.8 °F (36.6 °C)-98.8 °F (37.1 °C)] 97.9 °F (36.6 °C)  Pulse:  [68-75] 73  Resp:  [17-20] 20  SpO2:  [95 %-99 %] 98 %  BP: (124-184)/(56-84) 129/61     Weight: 85 kg (187 lb 6.3 oz) (11/01/18 0454)  Body mass index is 21.66 kg/m².  Body surface area is 2.16 meters squared.    I/O last 3 completed shifts:  In: -   Out: 2500 [Other:2500]    Physical Exam   Constitutional: He is oriented to person, place, and time. He appears well-developed and well-nourished.   HENT:   Head: Normocephalic and atraumatic.   Neck: No JVD present.   Cardiovascular: Normal rate and regular rhythm. Exam reveals no friction rub.   Pulmonary/Chest: Effort normal and breath sounds normal. No respiratory distress. He has no rales.   Abdominal: Soft. There is no tenderness.   Musculoskeletal: He exhibits no edema.   Neurological: He is oriented to person, place, and time.   Skin: Skin is warm and dry.   Psychiatric: He has a normal mood and affect. His behavior is normal.   Nursing note and vitals reviewed.      Significant Labs: reviewed  BMP  Lab Results   Component Value Date     11/06/2018    K 5.0 11/06/2018    CL 99 11/06/2018    CO2 25 11/06/2018    BUN 40 (H) 11/06/2018    CREATININE 6.5 (H) 11/06/2018    CALCIUM 8.3 (L) 11/06/2018    ANIONGAP 13 11/06/2018    ESTGFRAFRICA 9 (A) 11/06/2018    EGFRNONAA 8 (A) 11/06/2018         Assessment/Plan:         69 y/o male with ESRD on HD q MWF presented with acute MI:                                    ESRD (end stage renal disease)     Renal: ESRD pt on q MWF HD schedule  Next HD in am  Tolerating HD, continue HD  K normal  Acid  base stable  Good O2 sat  No indications for HD today      HTN: BP well controlled   Continue same meds   Advised pt to keep fluid intake low to moderate 1-1.5 L/day  Meds reviewed               * Acute ST elevation myocardial infarction (STEMI) of inferior wall     Acute MI  [facility] shows diffuse disease  On medical therapy  Has CHF  No renal contraindication for using ACE-I or ARB (pt is already on losartan) to improve preload.                      Plans and recommendations:  As detailed above  No renal contraindication for using ACE-I or ARB  HD in am  HD orders were placed in epic               Lazara Granados MD  Nephrology  [facility] -

## 2018-11-06 NOTE — PLAN OF CARE
Problem: Patient Care Overview  Goal: Plan of Care Review  Outcome: Ongoing (interventions implemented as appropriate)  Resting in bed with no noted distress. O2 flowing at 2l per NC. NO complaints of pain voiced. Educated pt on dietary modifications for renal patients, verbalized understanding. Pt to be discharged to SNF. Call light placed in reach. Instructed to call prn for assistance. Will  Monitor.

## 2018-11-06 NOTE — SUBJECTIVE & OBJECTIVE
Interval History: Pt was seen this am. No new c/o's, stable last pm, no SOB. No new events last pm.    Review of patient's allergies indicates:   Allergen Reactions    Augmentin [amoxicillin-pot clavulanate] Edema    Sulfa (sulfonamide antibiotics)      swelling    Lisinopril      cough     Current Facility-Administered Medications   Medication Frequency    albuterol nebulizer solution 2.5 mg Q6H PRN    amLODIPine tablet 10 mg Daily    aspirin EC tablet 81 mg Daily    atorvastatin tablet 80 mg Daily    chlorhexidine 0.12 % solution 15 mL BID    cloNIDine 0.3 mg/24 hr td ptwk 1 patch Q7 Days    clopidogrel tablet 75 mg Daily    epoetin ovi injection 10,000 Units Every Mon, Wed, Fri    heparin (porcine) injection 5,000 Units Q8H    hydrALAZINE injection 10 mg Q8H PRN    hydrALAZINE tablet 100 mg Q8H    isosorbide dinitrate tablet 40 mg TID    labetalol injection 10 mg Q6H PRN    labetalol tablet 300 mg TID    losartan tablet 100 mg Daily    pantoprazole EC tablet 40 mg Daily    polyethylene glycol packet 17 g Daily    zolpidem tablet 5 mg Nightly PRN       Objective:     Vital Signs (Most Recent):  Temp: 97.9 °F (36.6 °C) (11/06/18 1107)  Pulse: 73 (11/06/18 1116)  Resp: 20 (11/06/18 1107)  BP: 129/61 (11/06/18 1107)  SpO2: 98 % (11/06/18 1107)  O2 Device (Oxygen Therapy): nasal cannula (11/06/18 1053) Vital Signs (24h Range):  Temp:  [97.8 °F (36.6 °C)-98.8 °F (37.1 °C)] 97.9 °F (36.6 °C)  Pulse:  [68-75] 73  Resp:  [17-20] 20  SpO2:  [95 %-99 %] 98 %  BP: (124-184)/(56-84) 129/61     Weight: 85 kg (187 lb 6.3 oz) (11/01/18 0454)  Body mass index is 21.66 kg/m².  Body surface area is 2.16 meters squared.    I/O last 3 completed shifts:  In: -   Out: 2500 [Other:2500]    Physical Exam   Constitutional: He is oriented to person, place, and time. He appears well-developed and well-nourished.   HENT:   Head: Normocephalic and atraumatic.   Neck: No JVD present.   Cardiovascular: Normal rate and  regular rhythm. Exam reveals no friction rub.   Pulmonary/Chest: Effort normal and breath sounds normal. No respiratory distress. He has no rales.   Abdominal: Soft. There is no tenderness.   Musculoskeletal: He exhibits no edema.   Neurological: He is oriented to person, place, and time.   Skin: Skin is warm and dry.   Psychiatric: He has a normal mood and affect. His behavior is normal.   Nursing note and vitals reviewed.      Significant Labs: reviewed  BMP  Lab Results   Component Value Date     11/06/2018    K 5.0 11/06/2018    CL 99 11/06/2018    CO2 25 11/06/2018    BUN 40 (H) 11/06/2018    CREATININE 6.5 (H) 11/06/2018    CALCIUM 8.3 (L) 11/06/2018    ANIONGAP 13 11/06/2018    ESTGFRAFRICA 9 (A) 11/06/2018    EGFRNONAA 8 (A) 11/06/2018

## 2018-11-06 NOTE — PROGRESS NOTES
[facility] -   Cardiology  Progress Note    Patient Name: Conner Perez III  MRN: [id number]  Admission Date: 10/27/2018  Hospital Length of Stay: 10 days  Code Status: Full Code   Attending Physician: Kasie Salomon MD   Primary Care Physician: Raciel Angela MD  Expected Discharge Date: 11/6/2018  Principal Problem:Acute ST elevation myocardial infarction (STEMI) of inferior wall    Subjective:   HPI:  Pt admitted with dyspnea/respiratory failure.  Pt has ESRD, HTN.    Admitted with acute dyspnea, respiratory failure requiring intubation.  ecg on admit showed NSR, nonspecific st-t abnl.   Troponin levels increased, started on IV heparin, asa for NSTEMI by Hospital Med.  Anemia noted on labs today, Hg 8.9  This am routine am ECG showed inferior ST elevation concerning for STEMI and marked anterolateral st-t abnormalities, worsening.  Cardiology on call was contacted about ECG and decision made to activate cath lab for emergent cath.  Pt is intubated, sedated and stable otherwise and there are no reports of chest pain sxs.  Echo yesterday showed EF 45%, apical WMA.  He was admitted last month, dyspnea/cp and had negative stress test.  Echo in earlier 4/7/17 showed similar WMA.  Reportedly has had LHC 2016 with no major blockages (no report available).    Hospital Course:   10/29/18-Patient seen and examined in room, intubated and sedated. IV heparin gtt in place. Pending dialysis treatment today. Labs reviewed, stable.     10/30/18-Patient seen and examined in room, intubated. On SBT today to possible extubation. IV heparin gtt in place. Labs reviewed, stable. BP remains elevated, will adjust BP meds as tolerated.     10/31/18-Patient seen and examined in room, extubated and awake and alert. HD in progress. BP remains elevated. Labs reviewed, stable.     11/1/18-Patient seen and examined in room, denies chest pain or shortness of breath today. Labs reviewed, stable. Would benefit from PT/OT  evaluation.     11/2/18-Patient seen and examined during dialysis this AM. Denies chest pain or shortness of breath. Labs reviewed, K+ 4.2, Na 138.    11/3/18-Patient seen and examined in room, lying in bed. Denies any new complaints today. Labs reviewed, stable. Awaiting SNF bed.     11/4/18-Patient seen and examined in room, lying in bed. Denies any new complaints. Pending SNF placement.     11/6/18-Patient seen and examined today, sitting up in bed. No chest pain or SOB. No acute events overnight. Awaiting SNF placement.         Review of Systems   Constitution: Negative.   HENT: Negative.    Eyes: Negative.    Cardiovascular: Negative.    Respiratory: Negative.    Endocrine: Negative.    Hematologic/Lymphatic: Negative.    Skin: Negative.    Musculoskeletal: Positive for arthritis and back pain.   Gastrointestinal: Negative.    Genitourinary: Negative.    Neurological: Negative.    Psychiatric/Behavioral: Negative.    Allergic/Immunologic: Negative.      Objective:     Vital Signs (Most Recent):  Temp: 97.9 °F (36.6 °C) (11/06/18 1107)  Pulse: 73 (11/06/18 1116)  Resp: 20 (11/06/18 1107)  BP: 129/61 (11/06/18 1107)  SpO2: 98 % (11/06/18 1107) Vital Signs (24h Range):  Temp:  [97.6 °F (36.4 °C)-98.8 °F (37.1 °C)] 97.9 °F (36.6 °C)  Pulse:  [68-75] 73  Resp:  [17-20] 20  SpO2:  [95 %-99 %] 98 %  BP: (124-184)/(56-84) 129/61     Weight: 85 kg (187 lb 6.3 oz)  Body mass index is 21.66 kg/m².     SpO2: 98 %  O2 Device (Oxygen Therapy): nasal cannula      Intake/Output Summary (Last 24 hours) at 11/6/2018 1215  Last data filed at 11/6/2018 0805  Gross per 24 hour   Intake 360 ml   Output 2500 ml   Net -2140 ml       Lines/Drains/Airways     Drain                 Hemodialysis AV Fistula Right forearm -- days         Hemodialysis AV Fistula 12/18/17 0017 Right forearm 323 days          Peripheral Intravenous Line                 Peripheral IV - Single Lumen 10/27/18 0930 Left Forearm 10 days         Peripheral IV -  Single Lumen 10/28/18 2330 Left Forearm 8 days                Physical Exam   Constitutional: He is oriented to person, place, and time. He appears well-developed and well-nourished. No distress.   HENT:   Head: Normocephalic and atraumatic.   Eyes: Pupils are equal, round, and reactive to light. Right eye exhibits no discharge. Left eye exhibits no discharge.   Neck: Neck supple. No JVD present. No thyromegaly present.   Cardiovascular: Normal rate, regular rhythm, S1 normal, S2 normal and normal heart sounds.   No murmur heard.  Pulmonary/Chest: Effort normal and breath sounds normal. No respiratory distress. He has no wheezes. He has no rales.   Abdominal: Soft. He exhibits no distension. There is no tenderness. There is no rebound.   Musculoskeletal: He exhibits no edema.   Neurological: He is alert and oriented to person, place, and time.   Skin: Skin is warm and dry. He is not diaphoretic. No erythema.   Psychiatric: He has a normal mood and affect. His behavior is normal. Thought content normal.   Nursing note and vitals reviewed.      Significant Labs:   CMP   Recent Labs   Lab 11/05/18  0405 11/06/18  0444   * 137   K 5.1 5.0   CL 98 99   CO2 20* 25    124*   BUN 75* 40*   CREATININE 10.1* 6.5*   CALCIUM 8.4* 8.3*   ANIONGAP 16 13   ESTGFRAFRICA 5* 9*   EGFRNONAA 5* 8*   , CBC   Recent Labs   Lab 11/05/18  0405 11/06/18  0444   WBC 5.74 6.21   HGB 8.4* 8.4*   HCT 25.3* 25.9*    294   , Troponin No results for input(s): TROPONINI in the last 48 hours. and All pertinent lab results from the last 24 hours have been reviewed.    Significant Imaging: Echocardiogram:   2D echo with color flow doppler:   Results for orders placed or performed during the hospital encounter of 04/27/18   2D echo with color flow doppler   Result Value Ref Range    Calculated EF 55 55 - 65    Diastolic Dysfunction No     Mitral Valve Mobility NORMAL     Tricuspid Valve Regurgitation TRIVIAL      Assessment and  Plan:   Patient who presents with CAD/acute inferior STEMI s/p LHC that showed diffuse CAD not amenable to PCI or CABG. Continue optimal medical therapy. Follow-up in clinic. Will be available as needed.    * Acute ST elevation myocardial infarction (STEMI) of inferior wall    -See plan under CAD  -Cardiac rehab to be addressed as OP       Acute pulmonary edema    Dialysis per nephrology.        Acute on chronic diastolic congestive heart failure    Dialysis per nephrology  Remains intubated and sedated today.       11/1  Extubated  Seems compensated  Dialysis per nephrology for fluid management.    11/6/18  -Dialysis per nephrology     Coronary artery disease of native artery of native heart with stable angina pectoris    10/29/18   [facility] revealed Diffuse diabetic CAD which was not amenable to optimal PCI and no targets for CABG  Diffuse LAD disease with distal occlusion and medical management advised  Continue IV heparin gtt for another 24 hours  Continue ASA, statin, Plavix, Hydralazine, Imdur  NO ACEI/ARB given renal function requiring dialysis.   Will add BB as BP allows, BP soft today at time of exam.   Remains intubated and sedated today.     10/30  -Attempting to wean to extubate today  -OK to stop IV heparin gtt today  Continue ASA, Statin, Plavix, Hydralazine, Imdur  Start BB and Norvasc today for better BP control  Will attempt to add ACEi/ARB tomorrow as BP permits.     10/31  -Patient has been extubated and awake and alert today  -Continue ASA, Statin, Plavix, BB, ARB, Norvasc, Imdur, Hydralazine  -Denies chest pain on exam today  -HD in progress today  -OOB today after dialysis    11/1  -Encourage OOB to chair  Recommend PT/OT evaluation  Continue ASA, statin, Plavix, BB, ARB, Norvasc, Imdur, Hydralazine    11/3  Encourage PT/OT  Continue ASA, Statin, Plavix, BB, ARB, Norvasc, Imdur, Hydralazine  NO chest pain    11/6/18  -Remains stable CV wise  -No chest pain or SOB  -Continue ASA, statin, Plavix, BB,  ARB, norvasc, Imdur, hydralazine     Hypertension    On medical therapy  Continue clonidine patch, hydralazine, Imdur  Start BB and Norvasc today  Monitor BP trend and adjust as needed.     10/31  -continue BB, ARB, Hydralazine, Imdur, Norvasc  -Monitor BP trend and adjust as needed.     11/1  Continue BB, ARB, Hydralazine, Imdur, Norvasc  Increase BB dosage today for tachycardia on exam today.     11/2  -continue BB, ARB, Hydralazine, Imdur, Norvasc    11/3  Continue same medical regimen    11/6/18  -Stable, continue same meds     ESRD (end stage renal disease) on dialysis    -Per nephrology          Chronic diastolic heart failure    Resume DASH diet, 2 gm sodium restriction post extubation  Daily weights  Strict intake and output  Dialysis per nephrology         VTE Risk Mitigation (From admission, onward)        Ordered     heparin (porcine) injection 5,000 Units  Every 8 hours      10/30/18 1440     IP VTE HIGH RISK PATIENT  Once      10/27/18 0805          Jocelyn Aiken PA-C  Cardiology  [facility] -     Chart reviewed. Dr. Asher examined patient and agrees with plan as outlined above.

## 2018-11-06 NOTE — PROGRESS NOTES
[facility] Hospital Medicine  Progress Note    Patient Name: Conner Perez III  MRN: [id number]  Patient Class: IP- Inpatient   Admission Date: 10/27/2018  Length of Stay: 9 days  Attending Physician: Kasie Salomon MD  Primary Care Provider: Raciel Angela MD        Subjective:     Principal Problem:Acute ST elevation myocardial infarction (STEMI) of inferior wall    HPI:  Came to the emergency room overnight from home with shortness of breath and hypertension.  He was initially stabilized with supplemental oxygen and continuous BiPAP but decompensated.  He was increasingly hypercapnic and short of breath, so was intubated in the ER.  Ponit-of-care ultrasound showed decreased LV function.  He is a [employer] on [employer].  He dialyzed successfully yesterday with 3 L ultrafiltration.  Denied chest pain on admission.  POC is his Son Conner.  He frequently comes to the ER complaining of shortness of breath hypertensive responds to dialysis.    Hospital Course:  Admitted to ICU on mechanical ventilation.  Urgent hemodialysis per Nephrology.  Troponin increased from 0.02 to 3.5 with new wall motion abnormalities and reduction in EF on echo.  Cardiology informed.  Given 325 mg aspirin and Atorvastatin 80 mg and start Heparin infusion ACS protocol.  Troponin peaked at 24.8 and repeat EKG showed and Anterior STEMI.  The Cardiologist took him to the catheterization lab and performed an angiogram which showed diffuse disease without bypass targets.  Recommendation was to optimize medical management.  He returned to the ICU.  Successfully extubated 30 October.  Remained hemodynamically stable.  Awaiting SNF. Dialysis MWF. Nephro on board.     Interval History:  Voices no issues or overnight events. Awaiting placement    Review of Systems   Constitutional: Negative.  Negative for chills and fever.   HENT: Negative.  Negative for congestion and sore throat.    Eyes: Negative.  Negative for visual  disturbance.   Respiratory: Negative.  Negative for cough, shortness of breath and wheezing.    Cardiovascular: Negative.  Negative for chest pain.   Gastrointestinal: Negative for abdominal pain, diarrhea, nausea and vomiting.   Endocrine: Negative.    Genitourinary: Negative.    Musculoskeletal: Negative.  Negative for myalgias and neck stiffness.   Skin: Negative.  Negative for color change and pallor.   Allergic/Immunologic: Negative.    Neurological: Negative.    Hematological: Negative.    Psychiatric/Behavioral: Negative.    All other systems reviewed and are negative.    Objective:     Vital Signs (Most Recent):  Temp: 97.8 °F (36.6 °C) (11/05/18 1618)  Pulse: 73 (11/05/18 1618)  Resp: 17 (11/05/18 1618)  BP: 138/64 (11/05/18 1618)  SpO2: 95 % (11/05/18 1618) Vital Signs (24h Range):  Temp:  [97.6 °F (36.4 °C)-98.2 °F (36.8 °C)] 97.8 °F (36.6 °C)  Pulse:  [65-75] 73  Resp:  [17-20] 17  SpO2:  [95 %-98 %] 95 %  BP: (112-186)/(56-87) 138/64     Weight: 85 kg (187 lb 6.3 oz)  Body mass index is 21.66 kg/m².    Intake/Output Summary (Last 24 hours) at 11/5/2018 1838  Last data filed at 11/5/2018 1305  Gross per 24 hour   Intake --   Output 2500 ml   Net -2500 ml      Physical Exam   Constitutional: He is oriented to person, place, and time. He appears well-developed and well-nourished. No distress.   HENT:   Head: Normocephalic and atraumatic.   Mouth/Throat: Oropharynx is clear and moist.   Eyes: Conjunctivae and EOM are normal. Pupils are equal, round, and reactive to light.   Neck: No JVD present. No thyromegaly present.   Cardiovascular: Normal rate, regular rhythm and normal heart sounds. Exam reveals no gallop and no friction rub.   No murmur heard.  Right groin arterial access site dressed clean and intact.   Pulmonary/Chest: Effort normal and breath sounds normal. No respiratory distress. He has no wheezes. He has no rales.   Abdominal: Soft. Bowel sounds are normal. He exhibits no distension. There is  no tenderness. There is no rebound and no guarding.   Musculoskeletal: Normal range of motion. He exhibits no edema or tenderness.   RUE AV fistula with palpable thrill.   Lymphadenopathy:     He has no cervical adenopathy.   Neurological: He is alert and oriented to person, place, and time. He has normal reflexes. He displays normal reflexes. No cranial nerve deficit.   Skin: Skin is warm and dry. No rash noted. He is not diaphoretic. No erythema.   Psychiatric: He has a normal mood and affect. His behavior is normal. Judgment and thought content normal.       Significant Labs: All pertinent labs within the past 24 hours have been reviewed.    Significant Imaging: I have reviewed all pertinent imaging results/findings within the past 24 hours.    Assessment/Plan:      * Acute ST elevation myocardial infarction (STEMI) of inferior wall    Troponin increased from 0.02 to 3.5 with new wall motion abnormalities and reduction in EF on echo.  Cardiology informed.  Given 325 mg aspirin and Atorvastatin 80 mg and start Heparin infusion ACS protocol.  Troponin increased to 24.8 and follow up EKG showed Anterior STEMI.  Urgent LHC revealed diffuse disease without appropriate bypass targets.  Optimizing medical management.     STEMI (ST elevation myocardial infarction)    S/p LHC  medicla mangement       Acute pulmonary edema    dialysis       Acute on chronic diastolic congestive heart failure           Acute hypercapnic respiratory failure on mechanical ventilation    S/p extubation     Coronary artery disease of native artery of native heart with stable angina pectoris    Medical managment     ESRD (end stage renal disease) on dialysis    Nephrology following  Dialysis MWF     Chronic diastolic heart failure    Echo: EF of 45%, Grade 2 diastolic     Anemia of renal disease    Follow blood counts and transfuse if needed.       VTE Risk Mitigation (From admission, onward)        Ordered     heparin (porcine) injection 5,000  Units  Every 8 hours      10/30/18 1440     IP VTE HIGH RISK PATIENT  Once      10/27/18 0805              Kasie Salomon MD  Department of Hospital Medicine   [facility]

## 2018-11-07 VITALS
BODY MASS INDEX: 21.68 KG/M2 | SYSTOLIC BLOOD PRESSURE: 171 MMHG | WEIGHT: 187.38 LBS | RESPIRATION RATE: 16 BRPM | HEIGHT: 78 IN | TEMPERATURE: 98 F | OXYGEN SATURATION: 94 % | DIASTOLIC BLOOD PRESSURE: 68 MMHG | HEART RATE: 77 BPM

## 2018-11-07 LAB
ANION GAP SERPL CALC-SCNC: 12 MMOL/L
BUN SERPL-MCNC: 49 MG/DL
CALCIUM SERPL-MCNC: 8.2 MG/DL
CHLORIDE SERPL-SCNC: 97 MMOL/L
CO2 SERPL-SCNC: 24 MMOL/L
CREAT SERPL-MCNC: 8 MG/DL
EST. GFR  (AFRICAN AMERICAN): 7 ML/MIN/1.73 M^2
EST. GFR  (NON AFRICAN AMERICAN): 6 ML/MIN/1.73 M^2
GLUCOSE SERPL-MCNC: 155 MG/DL
POTASSIUM SERPL-SCNC: 5.3 MMOL/L
SODIUM SERPL-SCNC: 133 MMOL/L

## 2018-11-07 PROCEDURE — 25000003 PHARM REV CODE 250: Performed by: NURSE PRACTITIONER

## 2018-11-07 PROCEDURE — 80100016 HC MAINTENANCE HEMODIALYSIS

## 2018-11-07 PROCEDURE — 63600175 PHARM REV CODE 636 W HCPCS: Performed by: INTERNAL MEDICINE

## 2018-11-07 PROCEDURE — 36415 COLL VENOUS BLD VENIPUNCTURE: CPT

## 2018-11-07 PROCEDURE — 25000003 PHARM REV CODE 250: Performed by: INTERNAL MEDICINE

## 2018-11-07 PROCEDURE — 63600175 PHARM REV CODE 636 W HCPCS: Performed by: NURSE PRACTITIONER

## 2018-11-07 PROCEDURE — 99233 SBSQ HOSP IP/OBS HIGH 50: CPT | Mod: ,,, | Performed by: INTERNAL MEDICINE

## 2018-11-07 PROCEDURE — 80048 BASIC METABOLIC PNL TOTAL CA: CPT

## 2018-11-07 RX ORDER — CLOPIDOGREL BISULFATE 75 MG/1
75 TABLET ORAL DAILY
Qty: 30 TABLET | Refills: 11 | Status: SHIPPED | OUTPATIENT
Start: 2018-11-08 | End: 2019-11-08

## 2018-11-07 RX ORDER — AMLODIPINE BESYLATE 10 MG/1
10 TABLET ORAL DAILY
Qty: 30 TABLET | Refills: 11 | Status: SHIPPED | OUTPATIENT
Start: 2018-11-08 | End: 2019-02-19

## 2018-11-07 RX ORDER — LABETALOL 200 MG/1
400 TABLET, FILM COATED ORAL 3 TIMES DAILY
Status: DISCONTINUED | OUTPATIENT
Start: 2018-11-07 | End: 2018-11-07 | Stop reason: HOSPADM

## 2018-11-07 RX ADMIN — HYDRALAZINE HYDROCHLORIDE 100 MG: 50 TABLET, FILM COATED ORAL at 02:11

## 2018-11-07 RX ADMIN — AMLODIPINE BESYLATE 10 MG: 10 TABLET ORAL at 02:11

## 2018-11-07 RX ADMIN — CLOPIDOGREL BISULFATE 75 MG: 75 TABLET ORAL at 02:11

## 2018-11-07 RX ADMIN — LABETALOL HYDROCHLORIDE 400 MG: 200 TABLET, FILM COATED ORAL at 02:11

## 2018-11-07 RX ADMIN — HYDRALAZINE HYDROCHLORIDE 10 MG: 20 INJECTION INTRAMUSCULAR; INTRAVENOUS at 04:11

## 2018-11-07 RX ADMIN — HEPARIN SODIUM 5000 UNITS: 5000 INJECTION, SOLUTION INTRAVENOUS; SUBCUTANEOUS at 06:11

## 2018-11-07 RX ADMIN — ISOSORBIDE DINITRATE 40 MG: 20 TABLET ORAL at 02:11

## 2018-11-07 RX ADMIN — ERYTHROPOIETIN 10000 UNITS: 10000 INJECTION, SOLUTION INTRAVENOUS; SUBCUTANEOUS at 10:11

## 2018-11-07 RX ADMIN — ASPIRIN 81 MG: 81 TABLET, COATED ORAL at 02:11

## 2018-11-07 RX ADMIN — LOSARTAN POTASSIUM 100 MG: 50 TABLET, FILM COATED ORAL at 02:11

## 2018-11-07 RX ADMIN — ATORVASTATIN CALCIUM 80 MG: 40 TABLET, FILM COATED ORAL at 02:11

## 2018-11-07 RX ADMIN — PANTOPRAZOLE SODIUM 40 MG: 40 TABLET, DELAYED RELEASE ORAL at 02:11

## 2018-11-07 RX ADMIN — HYDRALAZINE HYDROCHLORIDE 100 MG: 50 TABLET, FILM COATED ORAL at 06:11

## 2018-11-07 NOTE — ASSESSMENT & PLAN NOTE
69 y/o male with ESRD on HD q MWF presented with acute MI:                                    ESRD (end stage renal disease)     Renal: ESRD pt on q MWF HD schedule  Next HD in am  Tolerating HD, continue HD  K normal  Acid base stable  Good O2 sat  No indications for HD today      HTN: BP well controlled   Continue same meds   Advised pt to keep fluid intake low to moderate 1-1.5 L/day  Meds reviewed               * Acute ST elevation myocardial infarction (STEMI) of inferior wall     Acute MI  LHC shows diffuse disease  On medical therapy  Has CHF  No renal contraindication for using ACE-I or ARB (pt is already on losartan) to improve preload.                      Plans and recommendations:  As detailed above  No renal contraindication for using ACE-I or ARB  HD in am  HD orders were placed in epic

## 2018-11-07 NOTE — DISCHARGE SUMMARY
[facility] Hospital Medicine  Discharge Summary      Patient Name: Conner Perez III  MRN: [id number]  Admission Date: 10/27/2018  Hospital Length of Stay: 11 days  Discharge Date and Time:  11/07/2018 2:38 PM  Attending Physician: Kasie Salomon MD   Discharging Provider: Kasie Salomon MD  Primary Care Provider: Raciel Angela MD      HPI:   Came to the emergency room overnight from home with shortness of breath and hypertension.  He was initially stabilized with supplemental oxygen and continuous BiPAP but decompensated.  He was increasingly hypercapnic and short of breath, so was intubated in the ER.  Ponit-of-care ultrasound showed decreased LV function.  He is a [employer].  He dialyzed successfully yesterday with 3 L ultrafiltration.  Denied chest pain on admission.  POC is his Son Conner.  He frequently comes to the ER complaining of shortness of breath hypertensive responds to dialysis.    Procedure(s) (LRB):  CATHETERIZATION, HEART, LEFT (Left)      Hospital Course:   Admitted to ICU on mechanical ventilation.  Emergent hemodialysis per Nephrology.  Troponin increased from 0.02 to 3.5 with new wall motion abnormalities and reduction in EF on echo.  Cardiology informed.  Given 325 mg aspirin and Atorvastatin 80 mg and start Heparin infusion ACS protocol.  Troponin peaked at 24.8 and repeat EKG showed and Anterior STEMI.  The Cardiologist took him to the catheterization lab and performed an angiogram which showed diffuse disease without bypass targets.  Recommendation was to optimize medical management.  He returned to the ICU.  Successfully extubated 30 October.  Remained hemodynamically stable.  Dialysis MWF. Nephro on board and followed during hospitalizations. Seen and examined. Accepted to [facility]. Deemed suitable to be transferred.      Consults:   Consults (From admission, onward)        Status Ordering Provider     Inpatient consult to Cardiology  Once      Provider:  Damian Fairbanks MD    Completed COLLIN MOREAU     Inpatient consult to Nephrology  Once     Provider:  Fabián London MD    Completed CARIDAD NUNO     Inpatient consult to Pulmonology  Once     Provider:  (Not yet assigned)    JACKELYN Kee          No new Assessment & Plan notes have been filed under this hospital service since the last note was generated.  Service: Hospital Medicine    Final Active Diagnoses:    Diagnosis Date Noted POA    PRINCIPAL PROBLEM:  Acute ST elevation myocardial infarction (STEMI) of inferior wall [I21.19] 10/28/2018 No    STEMI (ST elevation myocardial infarction) [I21.3] 10/31/2018 Yes    Abnormal ECG [R94.31] 10/28/2018 Yes    Acute hypercapnic respiratory failure on mechanical ventilation [J96.02] 10/27/2018 Yes    Acute on chronic diastolic congestive heart failure [I50.33] 10/27/2018 Yes    Coronary artery disease of native artery of native heart with stable angina pectoris [I25.118] 09/13/2018 Yes    ESRD (end stage renal disease) on dialysis [N18.6, Z99.2]  Not Applicable     Chronic    Hypertension [I10]  Yes    Chronic diastolic heart failure [I50.32] 08/25/2017 Yes    Anemia of renal disease [D63.1] 01/18/2017 Yes     Chronic      Problems Resolved During this Admission:    Diagnosis Date Noted Date Resolved POA    On mechanically assisted ventilation [Z99.11] 10/27/2018 10/31/2018 Not Applicable    Acute pulmonary edema [J81.0]  11/06/2018 Yes       Discharged Condition: stable    Disposition: Skilled Nursing Facility    Follow Up:  Follow-up Information     [facility].    Specialties:  Nursing Home Agency, SNF Agency  Contact information:  [address]  [place] [address] [phone]             [facility].    Why:  Monday, Wednesday, Friday at 11:00 am  Contact information:  [address]  [facility] [address] [phone]               Patient Instructions:      Diet renal      Activity as tolerated       Significant Diagnostic Studies: Labs:   CMP   Recent Labs   Lab 11/06/18  0444 11/07/18  0508    133*   K 5.0 5.3*   CL 99 97   CO2 25 24   * 155*   BUN 40* 49*   CREATININE 6.5* 8.0*   CALCIUM 8.3* 8.2*   ANIONGAP 13 12   ESTGFRAFRICA 9* 7*   EGFRNONAA 8* 6*    and CBC   Recent Labs   Lab 11/06/18  0444   WBC 6.21   HGB 8.4*   HCT 25.9*          Pending Diagnostic Studies:     None         Medications:  Reconciled Home Medications:      Medication List      START taking these medications    amLODIPine 10 MG tablet  Commonly known as:  NORVASC  Take 1 tablet (10 mg total) by mouth once daily.  Start taking on:  11/8/2018     clopidogrel 75 mg tablet  Commonly known as:  PLAVIX  Take 1 tablet (75 mg total) by mouth once daily.  Start taking on:  11/8/2018        CHANGE how you take these medications    atorvastatin 40 MG tablet  Commonly known as:  LIPITOR  Take 1 tablet (40 mg total) by mouth every evening.  What changed:  how much to take        CONTINUE taking these medications    * albuterol 90 mcg/actuation inhaler  Commonly known as:  PROVENTIL/VENTOLIN HFA  Inhale into the lungs.     * albuterol 2.5 mg /3 mL (0.083 %) nebulizer solution  Commonly known as:  PROVENTIL  2.5 mg.     aspirin 81 MG Chew  Take 1 tablet (81 mg total) by mouth once daily.     cloNIDine 0.3 mg/24 hr td ptwk 0.3 mg/24 hr  Commonly known as:  CATAPRES  Place 1 patch onto the skin every 7 days.     furosemide 40 MG tablet  Commonly known as:  LASIX  Take 40 mg by mouth once daily.     hydrALAZINE 100 MG tablet  Commonly known as:  APRESOLINE  Take 1 tablet (100 mg total) by mouth every 8 (eight) hours.     isosorbide dinitrate 40 MG Tab  Commonly known as:  ISORDIL  Take 1 tablet (40 mg total) by mouth 3 (three) times daily.     labetalol 300 MG tablet  Commonly known as:  NORMODYNE  Take 2 tablets (600 mg total) by mouth 3 (three) times daily.     losartan 100 MG tablet  Commonly known  as:  COZAAR  Take 1 tablet (100 mg total) by mouth once daily.     NIFEdipine 60 MG (OSM) 24 hr tablet  Commonly known as:  PROCARDIA-XL  Take 1 tablet (60 mg total) by mouth once daily.     pantoprazole 40 MG tablet  Commonly known as:  PROTONIX  Take 1 tablet (40 mg total) by mouth once daily.     tamsulosin 0.4 mg Cap  Commonly known as:  FLOMAX  Take 1 capsule (0.4 mg total) by mouth once daily.         * This list has 2 medication(s) that are the same as other medications prescribed for you. Read the directions carefully, and ask your doctor or other care provider to review them with you.                Indwelling Lines/Drains at time of discharge:   Lines/Drains/Airways     Drain                 Hemodialysis AV Fistula Right forearm -- days         Hemodialysis AV Fistula 12/18/17 0017 Right forearm 324 days                Time spent on the discharge of patient: >30 minutes  Patient was seen and examined on the date of discharge and determined to be suitable for discharge.         Kasie Salomon MD  Department of Hospital Medicine  [facility]

## 2018-11-07 NOTE — SUBJECTIVE & OBJECTIVE
Interval History: Pt was seen and examined. HD in progress. No c/o's, no new issues. Stable last pm. No c/o's with HD procedure.    Review of patient's allergies indicates:   Allergen Reactions    Augmentin [amoxicillin-pot clavulanate] Edema    Sulfa (sulfonamide antibiotics)      swelling    Lisinopril      cough     Current Facility-Administered Medications   Medication Frequency    albuterol nebulizer solution 2.5 mg Q6H PRN    amLODIPine tablet 10 mg Daily    aspirin EC tablet 81 mg Daily    atorvastatin tablet 80 mg Daily    chlorhexidine 0.12 % solution 15 mL BID    cloNIDine 0.3 mg/24 hr td ptwk 1 patch Q7 Days    clopidogrel tablet 75 mg Daily    epoetin ovi injection 10,000 Units Every Mon, Wed, Fri    heparin (porcine) injection 5,000 Units Q8H    hydrALAZINE injection 10 mg Q8H PRN    hydrALAZINE tablet 100 mg Q8H    isosorbide dinitrate tablet 40 mg TID    labetalol injection 10 mg Q6H PRN    [START ON 11/8/2018] labetalol tablet 400 mg TID    losartan tablet 100 mg Daily    pantoprazole EC tablet 40 mg Daily    polyethylene glycol packet 17 g Daily    zolpidem tablet 5 mg Nightly PRN       Objective:     Vital Signs (Most Recent):  Temp: 98 °F (36.7 °C) (11/07/18 0717)  Pulse: 71 (11/07/18 0717)  Resp: 16 (11/07/18 0717)  BP: (!) 185/89 (11/07/18 0717)  SpO2: 99 % (11/07/18 0717)  O2 Device (Oxygen Therapy): nasal cannula (11/07/18 0734) Vital Signs (24h Range):  Temp:  [96.1 °F (35.6 °C)-98 °F (36.7 °C)] 98 °F (36.7 °C)  Pulse:  [65-73] 71  Resp:  [16-20] 16  SpO2:  [97 %-99 %] 99 %  BP: (119-185)/(58-89) 185/89     Weight: 85 kg (187 lb 6.3 oz) (11/01/18 0454)  Body mass index is 21.66 kg/m².  Body surface area is 2.16 meters squared.    I/O last 3 completed shifts:  In: 600 [P.O.:600]  Out: -     Physical Exam   Constitutional: He is oriented to person, place, and time. He appears well-developed and well-nourished.   HENT:   Head: Normocephalic and atraumatic.   Neck: No JVD  present.   Cardiovascular: Normal rate and regular rhythm. Exam reveals no friction rub.   Pulmonary/Chest: Effort normal and breath sounds normal. No respiratory distress. He has no rales.   Abdominal: Soft. There is no tenderness.   Musculoskeletal: He exhibits no edema.   Neurological: He is oriented to person, place, and time.   Skin: Skin is warm and dry.   Psychiatric: He has a normal mood and affect. His behavior is normal.   Nursing note and vitals reviewed.      Significant Labs: reviewed  BMP  Lab Results   Component Value Date     (L) 11/07/2018    K 5.3 (H) 11/07/2018    CL 97 11/07/2018    CO2 24 11/07/2018    BUN 49 (H) 11/07/2018    CREATININE 8.0 (H) 11/07/2018    CALCIUM 8.2 (L) 11/07/2018    ANIONGAP 12 11/07/2018    ESTGFRAFRICA 7 (A) 11/07/2018    EGFRNONAA 6 (A) 11/07/2018

## 2018-11-07 NOTE — PLAN OF CARE
[facility] can accept today.  Discharge orders requested from Dr Salomon.    @1403 CM updated patient on discharge status.      Anais Jimenes will arrange for transportation around 4pm    @1405 CM provided Sowmya, primary nurse, number for report.

## 2018-11-07 NOTE — PLAN OF CARE
Locet called to Melissa @[phone]  Level I faxed to [facility] [phone]    @[phone]  received form 142.    Message sent to [facility] via [facility] to find out if they can accept today.  Awaiting a message back from [place]

## 2018-11-07 NOTE — PLAN OF CARE
Problem: Patient Care Overview  Goal: Plan of Care Review  Outcome: Ongoing (interventions implemented as appropriate)  Patient verbalized understanding of HEP to do supine in bed and on EOB. Too tired for amb today d/t fatigue.

## 2018-11-07 NOTE — PT/OT/SLP PROGRESS
Physical Therapy  Treatment    Conner Perez III   MRN: [id number]   Admitting Diagnosis: Acute ST elevation myocardial infarction (STEMI) of inferior wall       PT Start Time: 1625     PT Stop Time: 1640    PT Total Time (min): 15 min       Billable Minutes:  Therapeutic Exercise 15    Treatment Type: Treatment  PT/PTA: PT     PTA Visit Number: 2       General Precautions: Standard, fall  Orthopedic Precautions: N/A   Braces:           Subjective:  Communicated with nurse prior to session.           Objective:        Functional Mobility:  PT educated patient on supine and seated B LE HEP - he verbalized understanding of info provided.    Balance:   Sitting - Good    Therapeutic Activities and Exercises:  As above     AM-PAC 6 CLICK MOBILITY  How much help from another person does this patient currently need?   1 = Unable, Total/Dependent Assistance  2 = A lot, Maximum/Moderate Assistance  3 = A little, Minimum/Contact Guard/Supervision  4 = None, Modified [place]/Independent         AM-PAC Raw Score CMS G-Code Modifier Level of Impairment Assistance   6 % Total / Unable   7 - 9 CM 80 - 100% Maximal Assist   10 - 14 CL 60 - 80% Moderate Assist   15 - 19 CK 40 - 60% Moderate Assist   20 - 22 CJ 20 - 40% Minimal Assist   23 CI 1-20% SBA / CGA   24 CH 0% Independent/ Mod I     Patient left supine with all lines intact, call button in reach and nurse notified.    Assessment:  Conner Perez III is a 68 y.o. male with a medical diagnosis of Acute ST elevation myocardial infarction (STEMI) of inferior wall and presents with impaired mobility.    Rehab identified problem list/impairments: Rehab identified problem list/impairments: weakness, impaired endurance, impaired self care skills, impaired functional mobilty, gait instability, impaired balance    Rehab potential is good.    Activity tolerance: Good    Discharge recommendations: Discharge Facility/Level Of Care Needs: nursing facility, skilled     Barriers to  discharge:      Equipment recommendations: Equipment Needed After Discharge: none     GOALS:   Multidisciplinary Problems     Physical Therapy Goals        Problem: Physical Therapy Goal    Goal Priority Disciplines Outcome Goal Variances Interventions   Physical Therapy Goal     PT, PT/OT Ongoing (interventions implemented as appropriate)     Description:  PT WILL BE SEEN FOR P.T. FOR A MIN OF 5 OUT OF 7 DAYS A WEEK  LT18  1. PT BED MOBILITY WILL BE IND   2. PT WILL T/F TO CHAIR WITH RW AND SBA  3. PT WILL GT TRAIN X 150' WITH RW AND SBA                      PLAN:    Patient to be seen 5 x/week  to address the above listed problems via gait training, therapeutic exercises, therapeutic activities  Plan of Care expires: 18  Plan of Care reviewed with: patient         Jose Robertsnel, PT  2018

## 2018-11-07 NOTE — PROGRESS NOTES
[facility] -   Nephrology  Progress Note    Patient Name: Conner Perez III  MRN: [id number]  Admission Date: 10/27/2018  Hospital Length of Stay: 11 days  Attending Provider: Kasie Salomon MD   Primary Care Physician: Raciel Angela MD  Principal Problem:Acute ST elevation myocardial infarction (STEMI) of inferior wall    Subjective:     HPI: Conner Perez III  is a 68 y old AA man with h/o ESRD on HD ( [facility] presented to the ER this morning  for SOB, bilateral pulmonary edema noted on the chest x-ray, patient had his dialysis treatment yesterday, patient has been having recurrent episodes of shortness of breath and similar presentations to the emergency room in the last few months, he also has been losing weight, patient had to be emergently intubated in the emergency room and was transferred to ICU, we were consulted for emergent dialysis for volume removal,       Interval History: Pt was seen and examined. HD in progress. No c/o's, no new issues. Stable last pm. No c/o's with HD procedure.    Review of patient's allergies indicates:   Allergen Reactions    Augmentin [amoxicillin-pot clavulanate] Edema    Sulfa (sulfonamide antibiotics)      swelling    Lisinopril      cough     Current Facility-Administered Medications   Medication Frequency    albuterol nebulizer solution 2.5 mg Q6H PRN    amLODIPine tablet 10 mg Daily    aspirin EC tablet 81 mg Daily    atorvastatin tablet 80 mg Daily    chlorhexidine 0.12 % solution 15 mL BID    cloNIDine 0.3 mg/24 hr td ptwk 1 patch Q7 Days    clopidogrel tablet 75 mg Daily    epoetin ovi injection 10,000 Units Every Mon, Wed, Fri    heparin (porcine) injection 5,000 Units Q8H    hydrALAZINE injection 10 mg Q8H PRN    hydrALAZINE tablet 100 mg Q8H    isosorbide dinitrate tablet 40 mg TID    labetalol injection 10 mg Q6H PRN    [START ON 11/8/2018] labetalol tablet 400 mg TID    losartan tablet 100 mg Daily     pantoprazole EC tablet 40 mg Daily    polyethylene glycol packet 17 g Daily    zolpidem tablet 5 mg Nightly PRN       Objective:     Vital Signs (Most Recent):  Temp: 98 °F (36.7 °C) (11/07/18 0717)  Pulse: 71 (11/07/18 0717)  Resp: 16 (11/07/18 0717)  BP: (!) 185/89 (11/07/18 0717)  SpO2: 99 % (11/07/18 0717)  O2 Device (Oxygen Therapy): nasal cannula (11/07/18 0734) Vital Signs (24h Range):  Temp:  [96.1 °F (35.6 °C)-98 °F (36.7 °C)] 98 °F (36.7 °C)  Pulse:  [65-73] 71  Resp:  [16-20] 16  SpO2:  [97 %-99 %] 99 %  BP: (119-185)/(58-89) 185/89     Weight: 85 kg (187 lb 6.3 oz) (11/01/18 0454)  Body mass index is 21.66 kg/m².  Body surface area is 2.16 meters squared.    I/O last 3 completed shifts:  In: 600 [P.O.:600]  Out: -     Physical Exam   Constitutional: He is oriented to person, place, and time. He appears well-developed and well-nourished.   HENT:   Head: Normocephalic and atraumatic.   Neck: No JVD present.   Cardiovascular: Normal rate and regular rhythm. Exam reveals no friction rub.   Pulmonary/Chest: Effort normal and breath sounds normal. No respiratory distress. He has no rales.   Abdominal: Soft. There is no tenderness.   Musculoskeletal: He exhibits no edema.   Neurological: He is oriented to person, place, and time.   Skin: Skin is warm and dry.   Psychiatric: He has a normal mood and affect. His behavior is normal.   Nursing note and vitals reviewed.      Significant Labs: reviewed  BMP  Lab Results   Component Value Date     (L) 11/07/2018    K 5.3 (H) 11/07/2018    CL 97 11/07/2018    CO2 24 11/07/2018    BUN 49 (H) 11/07/2018    CREATININE 8.0 (H) 11/07/2018    CALCIUM 8.2 (L) 11/07/2018    ANIONGAP 12 11/07/2018    ESTGFRAFRICA 7 (A) 11/07/2018    EGFRNONAA 6 (A) 11/07/2018         Assessment/Plan:         69 y/o male with ESRD on HD q MWF presented with acute MI:                                    ESRD (end stage renal disease)     Renal: ESRD pt on q MWF HD schedule  HD in  progress  Tolerating HD, continue HD  K normal  Acid base stable  Good O2 sat      HTN: BP again not as controlled   Meds reviewed  Fluid intake is not high  Will increase labetalol from 300 to 400 mg po tid  Advised pt to keep fluid intake low to moderate 1-1.5 L/day                 * Acute ST elevation myocardial infarction (STEMI) of inferior wall     Acute MI  LH shows diffuse disease  On medical therapy  Has CHF  No renal contraindication for using ACE-I or ARB (pt is already on losartan) to improve preload.                      Plans and recommendations:  As detailed above  No renal contraindication for using ACE-I or ARB  Increase labetalol to 400 mg po tid             Lazara Granados MD  Nephrology  [facility]

## 2018-11-07 NOTE — PLAN OF CARE
Problem: Patient Care Overview  Goal: Plan of Care Review  Plan of care and education provided and explained. Vitals remain stable and safety measures maintained. All alarms are on and audible, will continue to monitor.

## 2018-11-07 NOTE — PROGRESS NOTES
Pt completed 4 hours of HD removing 3L, crit line used. Pt tolerated HD well. No signs of distress noted. De accessed per p&p  Each site held for 10mins, no post bleeding noted. Dr hernandez made rounds during HD.

## 2018-11-07 NOTE — PROGRESS NOTES
Attempted follow up visit with T[facility] Herrerar for high risk screening. Patient currently off floor in HD. Discussed with nurse. He remains on MARICRUZ bed and pressure injury prevention plan. No breakdown. No wound care needs. Recommend continued pressure injury prevention per orders.

## 2018-11-07 NOTE — PT/OT/SLP PROGRESS
Physical Therapy      Patient Name:  Conner Perez III   MRN:  [id number]    Patient not seen today secondary to PATIENT AT DIALYSIS PER NURSE, CELINE  . Will follow-up WHEN APPROPRIATE.    Georgiana Delatorre, ALEXEI

## 2018-11-07 NOTE — PLAN OF CARE
11/07/18 1317   Post-Acute Status   Post-Acute Placement Status Pending State Certification  (awaiting 142)

## 2018-11-08 NOTE — PLAN OF CARE
Problem: Patient Care Overview  Goal: Plan of Care Review  Patient was in hemodialysis for most of shift. Was feeling ok with no complaints of pain when returned to room. Lines intact. All needs met. Tele monitor in place, normal sinus. Bed in lowest, locked position. Safety measures in place. Will continue to monitor.

## 2018-11-08 NOTE — NURSING
Discharged orders received and reviewed with family and pt. Papers signed by patient, one copy to pt one copy to chart. IV removed, telemetry removed. Pt assisted with dressing by staff. Pt transported to [facility] via w/c by staff from [facility].

## 2018-11-08 NOTE — PLAN OF CARE
Patient to follow nephrologist in outpatient HD center.     11/08/18 0715   Final Note   Assessment Type Final Discharge Note   Anticipated Discharge Disposition SNF   Right Care Referral Info   Post Acute Recommendation SNF / Sub-Acute Rehab   Facility Name [facility]

## 2018-11-12 ENCOUNTER — TELEPHONE (OUTPATIENT)
Dept: CARDIOLOGY | Facility: CLINIC | Age: 68
End: 2018-11-12

## 2018-11-12 NOTE — TELEPHONE ENCOUNTER
Called pt to schedule follow up appt states he is currently in rehab due to having heart attack states he will call back to schedule appt once released.

## 2018-11-12 NOTE — TELEPHONE ENCOUNTER
----- Message from Jocelyn Aiken PA-C sent at 11/12/2018  8:07 AM CST -----  Needs f/u with me in 1-2 weeks    Please arrange    Thanks

## 2018-11-14 NOTE — ED AVS SNAPSHOT
[facility]  [address]  [place] [address]               Conner Perez III   2017  1:41 PM   ED    Description:  Male : 1950   Department:  [facility]            Your Care was Coordinated By:     Provider Role From To    Karyn Akhtar DO Attending Provider [phone] --      Reason for Visit     Emesis           Diagnoses this Visit        Comments    Diarrhea, unspecified type    -  Primary     Vomiting         High magnesium levels         Chronic kidney disease, stage 5         Type II diabetes mellitus, well controlled         Essential hypertension         Diarrhea due to drug           ED Disposition     ED Disposition Condition Comment    Discharge             To Do List           Follow-up Information     Follow up with [facility] In 2 days.    Why:  Avoid magnesium containing products - no mylanta, no milk of magnesia, no magnesemium replacements.     Contact information:    [address]  [address]  [place] [id number] [phone]          Follow up with Raciel Angela MD. Schedule an appointment as soon as possible for a visit in 2 days.    Specialty:  Family Medicine    Why:  Return to the ED for:   nausea, vomiting, fever, abdominal pain, weakness, confusuion or other concerns.     Contact information:    [address]   [address]  [place] [address] [phone]         These Medications        Disp Refills Start End    ondansetron (ZOFRAN-ODT) 4 MG TbDL 12 tablet 0 2017     Take 1 tablet (4 mg total) by mouth every 8 (eight) hours as needed. - Oral    Pharmacy: [facility] [address]  #: [phone]         [facility]     [facility] Nurse Care Line - 24/7 Assistance  Unless otherwise directed by your provider, please contact [facility], our nurse care line that is available for 24/7 assistance.  Detail Level: Zone "    Registered nurses in the [facility] provide: appointment scheduling, clinical advisement, health education, and other advisory services.  Call: [phone] (toll free)               Medications           Message regarding Medications     Verify the changes and/or additions to your medication regime listed below are the same as discussed with your clinician today.  If any of these changes or additions are incorrect, please notify your healthcare provider.        START taking these NEW medications        Refills    ondansetron (ZOFRAN-ODT) 4 MG TbDL 0    Sig: Take 1 tablet (4 mg total) by mouth every 8 (eight) hours as needed.    Class: Print    Route: Oral      These medications were administered today        Dose Freq    sodium chloride 0.9% bolus 1,000 mL 1,000 mL ED 1 Time    Sig: Inject 1,000 mLs into the vein ED 1 Time.    Class: Normal    Route: Intravenous    ondansetron injection 4 mg 4 mg Once    Sig: Inject 4 mg into the vein once.    Class: Normal    Route: Intravenous    hydrocodone-acetaminophen 10-325mg per tablet 1 tablet 1 tablet ED 1 Time    Sig: Take 1 tablet by mouth ED 1 Time.    Class: Normal    Route: Oral           Verify that the below list of medications is an accurate representation of the medications you are currently taking.  If none reported, the list may be blank. If incorrect, please contact your healthcare provider. Carry this list with you in case of emergency.           Current Medications     aspirin 81 MG Chew Take 1 tablet (81 mg total) by mouth once daily.    atorvastatin (LIPITOR) 40 MG tablet Take 1 tablet (40 mg total) by mouth every evening.    BD ULTRA-FINE ANISA PEN NEEDLES 32 gauge x 5/32" Ndle     calcium carbonate (TUMS) 200 mg calcium (500 mg) chewable tablet Take 1 tablet (500 mg total) by mouth once daily.    diltiaZEM (CARDIZEM CD) 360 MG 24 hr capsule Take 1 capsule (360 mg total) by mouth once daily.    docusate sodium (COLACE) 100 MG capsule Take " "1 capsule (100 mg total) by mouth 2 (two) times daily.    FREESTYLE LITE STRIPS Strp     hydrALAZINE (APRESOLINE) 50 MG tablet Take 1 tablet (50 mg total) by mouth 3 (three) times daily.    insulin NPH-insulin regular, 70/30, (NOVOLIN 70/30) 100 unit/mL (70-30) injection Inject 30 Units into the skin 2 (two) times daily. Dispense with needles and syringes.    lactulose (CHRONULAC) 10 gram/15 mL solution     metoclopramide HCl (REGLAN) 10 MG tablet Take 1 tablet (10 mg total) by mouth every 6 (six) hours as needed.    ondansetron (ZOFRAN-ODT) 4 MG TbDL Take 1 tablet (4 mg total) by mouth every 8 (eight) hours as needed.    pantoprazole (PROTONIX) 40 MG tablet Take 1 tablet (40 mg total) by mouth once daily.    polyethylene glycol (GLYCOLAX) 17 gram PwPk Take 17 g by mouth once daily.    sodium bicarbonate 650 MG tablet Take 1 tablet (650 mg total) by mouth 3 (three) times daily.    sucralfate (CARAFATE) 100 mg/mL suspension Take 10 mLs (1 g total) by mouth 4 (four) times daily.    tamsulosin (FLOMAX) 0.4 mg Cp24 Take 1 capsule (0.4 mg total) by mouth once daily.    zolpidem (AMBIEN) 5 MG Tab Take 1 tablet (5 mg total) by mouth nightly as needed.           Clinical Reference Information           Your Vitals Were     BP Pulse Temp Resp Height Weight    155/78 (BP Location: Right arm, Patient Position: Sitting) 101 97.5 °F (36.4 °C) (Oral) 20 6' 6" (1.981 m) 122 kg (269 lb)    SpO2 BMI             97% 31.09 kg/m2         Allergies as of 4/22/2017        Reactions    Augmentin [Amoxicillin-pot Clavulanate]     Lisinopril     cough    Sulfa (Sulfonamide Antibiotics)       Immunizations Administered on Date of Encounter - 4/22/2017     None      ED Micro, Lab, POCT     Start Ordered       Status Ordering Provider    04/22/17 1431 04/22/17 1431  POCT glucose  Once      Final result     04/22/17 1416 04/22/17 1415    Once,   Status:  Canceled      Canceled     04/22/17 1415 04/22/17 1414    Once,   Status:  Canceled      " Canceled     04/22/17 1415 04/22/17 1414  CBC auto differential  STAT      Final result     04/22/17 1415 04/22/17 1414  Comprehensive metabolic panel  STAT      Final result     04/22/17 1415 04/22/17 1414  Magnesium  STAT      Final result     04/22/17 1415 04/22/17 1414  Urinalysis  STAT      Acknowledged     04/22/17 1415 04/22/17 1414    STAT,   Status:  Canceled      Canceled     04/22/17 1415 04/22/17 1414    Once,   Status:  Canceled      Canceled     04/22/17 1415 04/22/17 1414    Once,   Status:  Canceled      Canceled     04/22/17 1415 04/22/17 1414    STAT,   Status:  Canceled      Canceled       ED Imaging Orders     Start Ordered       Status Ordering Provider    04/22/17 1440 04/22/17 1440  X-Ray Abdomen Flat And Erect  1 time imaging      Final result     04/22/17 1440 04/22/17 1440  X-Ray Chest 1 View  1 time imaging      Final result       Discharge References/Attachments     HYPERMAGNESEMIA, DISCHARGE INSTRUCTIONS (ENGLISH)    ANEMIA (ENGLISH)    BACK PAIN (ACUTE OR CHRONIC) (ENGLISH)    ONDANSETRON ORAL DISSOLVING TABLET (ENGLISH)    HYPERTENSION, ESTABLISHED (ENGLISH)      Your Scheduled Appointments     Apr 25, 2017  8:30 AM CDT   Nuclear [facility] with [facility], NUCLEAR MEDICINE   [facility] ([facility])    [address]  [facility] [id number] [phone]            Apr 25, 2017  8:30 AM CDT   MPI Delay with [facility] CARDIAC   [facility] ([facility]    [address] [id number] [phone]            Apr 25, 2017 10:30 AM CDT   MPI Delay with [facility] CARDIAC   [facility] ([facility]    [address] [id number] [phone]              [employer] Sign-Up     Activating your [facility] account is as easy as 1-2-3!     1) Visit [email or web address], select Sign Up Now, enter this activation code and your date of birth, then select Next.  [email or web address]  Expires: 6/3/2017  1:59  Quality 224: Stage 0-Iic Melanoma: Overutilization Of Imaging Studies For Only Stage 0-Iic Melanoma: None of the following diagnostic imaging studies ordered: chest X-ray, CT, Ultrasound, MRI, PET, or nuclear medicine scans (ML) AM      2) Create a username and password to use when you visit [facility] in the future and select a security question in case you lose your password and select Next.    3) Enter your e-mail address and click Sign Up!    Additional Information  If you have questions, please e-mail [email or web address] or call [phone] to talk to our [facility] staff. Remember, [facility] is NOT to be used for urgent needs. For medical emergencies, dial 911.         Smoking Cessation     If you would like to quit smoking:   You may be eligible for free services if you are a [place] resident and started smoking cigarettes before September 1, 1988.  Call the Smoking Cessation Trust (SCT) toll free at (670) 481-6864 or (052) 974-8897.   Call 6-800-QUIT-NOW if you do not meet the above criteria.   Contact us via email: [email or web address]   View our website for more information: [email or web address]         [facility] complies with applicable Federal civil rights laws and does not discriminate on the basis of race, color, national origin, age, disability, or sex.        Language Assistance Services     ATTENTION: Language assistance services are available, free of charge. Please call [phone].      ATENCIÓN: Si habla español, tiene a espinoza disposición servicios gratuitos de asistencia lingüística. Llame al [phone].     CHÚ Ý: N?u b?n nói Ti?ng Vi?t, có các d?ch v? h? tr? ngôn ng? mi?n phí dành cho b?n. G?i s? [phone].         Quality 137: Melanoma: Continuity Of Care - Recall System: Patient information entered into a recall system that includes: target date for the next exam specified AND a process to follow up with patients regarding missed or unscheduled appointments Detail Level: Detailed

## 2019-02-19 ENCOUNTER — OFFICE VISIT (OUTPATIENT)
Dept: CARDIOLOGY | Facility: CLINIC | Age: 69
End: 2019-02-19
Payer: MEDICARE

## 2019-02-19 VITALS
WEIGHT: 198.63 LBS | HEIGHT: 78 IN | SYSTOLIC BLOOD PRESSURE: 140 MMHG | BODY MASS INDEX: 22.98 KG/M2 | HEART RATE: 76 BPM | DIASTOLIC BLOOD PRESSURE: 70 MMHG

## 2019-02-19 DIAGNOSIS — E11.69: Chronic | ICD-10-CM

## 2019-02-19 DIAGNOSIS — E78.2 MIXED HYPERLIPIDEMIA: ICD-10-CM

## 2019-02-19 DIAGNOSIS — I21.3 ST ELEVATION MYOCARDIAL INFARCTION (STEMI), UNSPECIFIED ARTERY: ICD-10-CM

## 2019-02-19 DIAGNOSIS — Z99.2 ESRD (END STAGE RENAL DISEASE) ON DIALYSIS: Chronic | ICD-10-CM

## 2019-02-19 DIAGNOSIS — I50.22 CHRONIC SYSTOLIC HEART FAILURE: ICD-10-CM

## 2019-02-19 DIAGNOSIS — I10 ESSENTIAL HYPERTENSION: ICD-10-CM

## 2019-02-19 DIAGNOSIS — I25.118 CORONARY ARTERY DISEASE OF NATIVE ARTERY OF NATIVE HEART WITH STABLE ANGINA PECTORIS: Primary | ICD-10-CM

## 2019-02-19 DIAGNOSIS — E43: Chronic | ICD-10-CM

## 2019-02-19 DIAGNOSIS — N18.6 ESRD (END STAGE RENAL DISEASE) ON DIALYSIS: Chronic | ICD-10-CM

## 2019-02-19 PROCEDURE — 99999 PR PBB SHADOW E&M-EST. PATIENT-LVL III: CPT | Mod: PBBFAC,,, | Performed by: INTERNAL MEDICINE

## 2019-02-19 PROCEDURE — 99999 PR PBB SHADOW E&M-EST. PATIENT-LVL III: ICD-10-PCS | Mod: PBBFAC,,, | Performed by: INTERNAL MEDICINE

## 2019-02-19 PROCEDURE — 99215 OFFICE O/P EST HI 40 MIN: CPT | Mod: S$PBB,,, | Performed by: INTERNAL MEDICINE

## 2019-02-19 PROCEDURE — 99215 PR OFFICE/OUTPT VISIT, EST, LEVL V, 40-54 MIN: ICD-10-PCS | Mod: S$PBB,,, | Performed by: INTERNAL MEDICINE

## 2019-02-19 PROCEDURE — 99213 OFFICE O/P EST LOW 20 MIN: CPT | Mod: PBBFAC,PN | Performed by: INTERNAL MEDICINE

## 2019-02-19 RX ORDER — PANTOPRAZOLE SODIUM 40 MG/1
40 TABLET, DELAYED RELEASE ORAL DAILY
Qty: 30 TABLET | Refills: 0 | OUTPATIENT
Start: 2019-02-19 | End: 2020-02-19

## 2019-02-19 RX ORDER — ISOSORBIDE DINITRATE 20 MG/1
20 TABLET ORAL 3 TIMES DAILY
Qty: 90 TABLET | Refills: 11 | Status: SHIPPED | OUTPATIENT
Start: 2019-02-19 | End: 2019-05-21 | Stop reason: SDUPTHER

## 2019-02-19 NOTE — PROGRESS NOTES
Subjective:   Patient ID:  Conner Perez III is a 68 y.o. male who presents for follow up of Follow-up      66 yo, male, came in for f/u retired  of [employer].  PMH CAD, h/o STEMI in , LHC done by  showing 3 vessels severe CAD, not candidate for PCI and CABG, EF 40%, CHFrEF, nonsustained VT, IDDM, ESRD on HD in , via fistula on rigth upper arm, HTN, HLD. Has been on Kidney tx list at [facility]  Had numerous ER visits for uncontrolled HTN and chest pain. Admitted in  for  and CHF.  ECHO in  EF normal, mildly dilated LV.  Nuclear stress in  normal.      Admitted to [facility] in  for CHF exacerbation. Had extra HD. Discharged o/n.  Now dyspnea improved. No chest pain, orthopnea.  HD 3 times a week, 5 hours and 3 to 4 liters fluid rem,oval.  Compliant with med and diet.  No smoking/drinking  Low sodium diet and fluid resrtriction compliance.  Feels weak and came in with wheelchair.         Past Medical History:   Diagnosis Date    CKD (chronic kidney disease), stage IV     COPD (chronic obstructive pulmonary disease)     Coronary artery disease     Depression     Dialysis patient     GERD (gastroesophageal reflux disease)     HTN (hypertension)     Pneumonia        Past Surgical History:   Procedure Laterality Date    AMPUTATION-TOE Left 2017    Performed by [facility] DPM at [facility] OR    AV FISTULA PLACEMENT Right 2017    CATHETERIZATION, HEART, LEFT Left 10/28/2018    Performed by Damian Fairbanks MD at [facility] CATH LAB    CIRCUMCISION  1978    TOE AMPUTATION Left 2017    4th toe    VASCULAR CATH INSERTION N/A 2017    Performed by Jeremias Peralta MD at [facility] CATH LAB       Social History     Tobacco Use    Smoking status: Former Smoker     Types: Cigarettes     Last attempt to quit: 1971     Years since quittin.1    Smokeless tobacco: Never Used   Substance Use Topics    Alcohol use: No    Drug use: No       Family History    Problem Relation Age of Onset    Hypertension Father     No Known Problems Mother          Review of Systems   Constitution: Negative for decreased appetite, diaphoresis, fever, weakness, malaise/fatigue and night sweats.   HENT: Negative for nosebleeds.    Eyes: Negative for blurred vision and double vision.   Cardiovascular: Positive for dyspnea on exertion. Negative for chest pain, claudication, irregular heartbeat, leg swelling, near-syncope, orthopnea, palpitations, paroxysmal nocturnal dyspnea and syncope.   Respiratory: Negative for cough, shortness of breath, sleep disturbances due to breathing, snoring, sputum production and wheezing.    Endocrine: Negative for cold intolerance and polyuria.   Hematologic/Lymphatic: Does not bruise/bleed easily.   Skin: Negative for rash.   Musculoskeletal: Negative for back pain, falls, joint pain, joint swelling and neck pain.   Gastrointestinal: Negative for abdominal pain, heartburn, nausea and vomiting.   Genitourinary: Negative for dysuria, frequency and hematuria.   Neurological: Negative for difficulty with concentration, dizziness, focal weakness, headaches, light-headedness, numbness and seizures.   Psychiatric/Behavioral: Negative for depression, memory loss and substance abuse. The patient does not have insomnia.    Allergic/Immunologic: Negative for HIV exposure and hives.       Objective:   Physical Exam   Constitutional: He is oriented to person, place, and time. He appears well-nourished.   HENT:   Head: Normocephalic.   Eyes: Pupils are equal, round, and reactive to light.   Neck: Normal carotid pulses and no JVD present. Carotid bruit is not present. No thyromegaly present.   Cardiovascular: Normal rate, regular rhythm, normal heart sounds and normal pulses.  No extrasystoles are present. PMI is not displaced. Exam reveals no gallop and no S3.   No murmur heard.  Pulmonary/Chest: Breath sounds normal. No stridor. No respiratory distress.   Abdominal:  Soft. Bowel sounds are normal. There is no tenderness. There is no rebound.   Musculoskeletal: Normal range of motion.   Neurological: He is alert and oriented to person, place, and time.   Skin: Skin is intact. No rash noted.   Psychiatric: His behavior is normal.       Lab Results   Component Value Date    CHOL 128 09/04/2018     Lab Results   Component Value Date    HDL 50 09/04/2018     Lab Results   Component Value Date    LDLCALC 70.6 09/04/2018     Lab Results   Component Value Date    TRIG 37 09/04/2018     Lab Results   Component Value Date    CHOLHDL 39.1 09/04/2018       Chemistry        Component Value Date/Time     (L) 11/07/2018 0508    K 5.3 (H) 11/07/2018 0508    CL 97 11/07/2018 0508    CO2 24 11/07/2018 0508    BUN 49 (H) 11/07/2018 0508    CREATININE 8.0 (H) 11/07/2018 0508     (H) 11/07/2018 0508        Component Value Date/Time    CALCIUM 8.2 (L) 11/07/2018 0508    ALKPHOS 66 10/27/2018 0548    AST 30 10/27/2018 0548    ALT 19 10/27/2018 0548    BILITOT 0.8 10/27/2018 0548    ESTGFRAFRICA 7 (A) 11/07/2018 0508    EGFRNONAA 6 (A) 11/07/2018 0508          Lab Results   Component Value Date    HGBA1C 4.4 09/04/2018     Lab Results   Component Value Date    TSH 0.667 12/17/2017     Lab Results   Component Value Date    INR 1.1 10/27/2018    INR 1.0 04/23/2018    INR 1.1 04/12/2018     Lab Results   Component Value Date    WBC 6.21 11/06/2018    HGB 8.4 (L) 11/06/2018    HCT 25.9 (L) 11/06/2018    MCV 86 11/06/2018     11/06/2018     BMP  Sodium   Date Value Ref Range Status   11/07/2018 133 (L) 136 - 145 mmol/L Final     Potassium   Date Value Ref Range Status   11/07/2018 5.3 (H) 3.5 - 5.1 mmol/L Final     Chloride   Date Value Ref Range Status   11/07/2018 97 95 - 110 mmol/L Final     CO2   Date Value Ref Range Status   11/07/2018 24 23 - 29 mmol/L Final     BUN, Bld   Date Value Ref Range Status   11/07/2018 49 (H) 8 - 23 mg/dL Final     Creatinine   Date Value Ref Range  Status   11/07/2018 8.0 (H) 0.5 - 1.4 mg/dL Final     Calcium   Date Value Ref Range Status   11/07/2018 8.2 (L) 8.7 - 10.5 mg/dL Final     Anion Gap   Date Value Ref Range Status   11/07/2018 12 8 - 16 mmol/L Final     eGFR if    Date Value Ref Range Status   11/07/2018 7 (A) >60 mL/min/1.73 m^2 Final     eGFR if non    Date Value Ref Range Status   11/07/2018 6 (A) >60 mL/min/1.73 m^2 Final     Comment:     Calculation used to obtain the estimated glomerular filtration  rate (eGFR) is the CKD-EPI equation.        BNP  @LABRCNTIP(BNP,BNPTRIAGEBLO)@  @LABRCNTIP(troponini)@  CrCl cannot be calculated (Patient's most recent lab result is older than the maximum 7 days allowed.).  No results found in the last 24 hours.  No results found in the last 24 hours.  No results found in the last 24 hours.    Assessment:      1. Coronary artery disease of native artery of native heart with stable angina pectoris    2. ESRD (end stage renal disease) on dialysis    3. Chronic systolic heart failure    4. ST elevation myocardial infarction (STEMI), unspecified artery    5. Mixed hyperlipidemia    6. Essential hypertension    7. Severe malnutrition due to type 2 diabetes mellitus      CHFrEF 40% compensated  BP better controlled  LDL and A1c wnl  No CP    Plan:   Resume Isordil at low dosage 20 mg tid  Continue Labetalol, Losartan, Hydralazine, nifedipine, clonidine, ASA, PLavix, and Statin  Repeat echo in 3 months for EF  Discussed Low sodium and fluid restriction to 1.5 liters  Recommend heart-healthy diet, weight control and regular exercise.  Singh. Risk modification.   RTC in 3 months    I have reviewed all pertinent labs and cardiac studies. Plans and recommendations have been formulated under my direct supervision. All questions answered and patient voiced understanding. Patient to continue current medications.

## 2019-04-15 ENCOUNTER — TELEPHONE (OUTPATIENT)
Dept: ADMINISTRATIVE | Facility: CLINIC | Age: 69
End: 2019-04-15

## 2019-05-06 DIAGNOSIS — I25.10 CORONARY ARTERY DISEASE INVOLVING NATIVE CORONARY ARTERY OF NATIVE HEART WITHOUT ANGINA PECTORIS: ICD-10-CM

## 2019-05-06 DIAGNOSIS — I10 ESSENTIAL HYPERTENSION: ICD-10-CM

## 2019-05-06 RX ORDER — CLONIDINE 0.3 MG/24H
PATCH, EXTENDED RELEASE TRANSDERMAL
Qty: 12 PATCH | Refills: 4 | Status: SHIPPED | OUTPATIENT
Start: 2019-05-06 | End: 2019-06-14 | Stop reason: SDUPTHER

## 2019-05-21 DIAGNOSIS — I25.10 CORONARY ARTERY DISEASE INVOLVING NATIVE CORONARY ARTERY OF NATIVE HEART WITHOUT ANGINA PECTORIS: ICD-10-CM

## 2019-05-21 DIAGNOSIS — I10 ESSENTIAL HYPERTENSION: ICD-10-CM

## 2019-05-21 DIAGNOSIS — I16.1 HYPERTENSIVE EMERGENCY: ICD-10-CM

## 2019-05-21 NOTE — TELEPHONE ENCOUNTER
----- Message from Jillian Green sent at 5/21/2019  3:51 PM CDT -----  Contact: PATIENT  Type:  RX Refill Request    Who Called: PATIENT  Refill or New Rx:REFILLS  RX Name and Strength:HYDRALAZINE 100 MG AND ISOSORBIDE 20 MG  How is the patient currently taking it? (ex. 1XDay):1 PILL 3 X DAILY  BOTH  Is this a 30 day or 90 day RX:90 BOTH    Preferred Pharmacy with phone number:  [id number] - [place] - [address] [facility] [address]  [address] [address]  Phone: [phone] Fax: [phone]      Local or Mail Order:LOCAL  Ordering Provider:LAYO  Would the patient rather a call back or a response via [facility]? CALL  Best Call Back Number:[phone]  Additional Information: THANKS, EDNA

## 2019-05-27 RX ORDER — HYDRALAZINE HYDROCHLORIDE 100 MG/1
100 TABLET, FILM COATED ORAL EVERY 8 HOURS
Qty: 90 TABLET | Refills: 3 | Status: SHIPPED | OUTPATIENT
Start: 2019-05-27 | End: 2019-07-23 | Stop reason: SDUPTHER

## 2019-05-27 RX ORDER — ISOSORBIDE DINITRATE 20 MG/1
20 TABLET ORAL 3 TIMES DAILY
Qty: 270 TABLET | Refills: 3 | Status: SHIPPED | OUTPATIENT
Start: 2019-05-27 | End: 2019-11-19 | Stop reason: SDUPTHER

## 2019-05-31 ENCOUNTER — CLINICAL SUPPORT (OUTPATIENT)
Dept: CARDIOLOGY | Facility: CLINIC | Age: 69
End: 2019-05-31
Attending: INTERNAL MEDICINE
Payer: MEDICARE

## 2019-05-31 DIAGNOSIS — Z99.2 ESRD (END STAGE RENAL DISEASE) ON DIALYSIS: Chronic | ICD-10-CM

## 2019-05-31 DIAGNOSIS — I25.118 CORONARY ARTERY DISEASE OF NATIVE ARTERY OF NATIVE HEART WITH STABLE ANGINA PECTORIS: ICD-10-CM

## 2019-05-31 DIAGNOSIS — N18.6 ESRD (END STAGE RENAL DISEASE) ON DIALYSIS: Chronic | ICD-10-CM

## 2019-05-31 PROCEDURE — 93306 TTE W/DOPPLER COMPLETE: CPT | Mod: PBBFAC,PN | Performed by: INTERNAL MEDICINE

## 2019-05-31 PROCEDURE — 93306 2D ECHO WITH COLOR FLOW DOPPLER: ICD-10-PCS | Mod: 26,S$PBB,, | Performed by: INTERNAL MEDICINE

## 2019-06-01 LAB
DIASTOLIC DYSFUNCTION: YES
ESTIMATED PA SYSTOLIC PRESSURE: 39.58
RETIRED EF AND QEF - SEE NOTES: 45 (ref 55–65)

## 2019-06-03 ENCOUNTER — TELEPHONE (OUTPATIENT)
Dept: CARDIOLOGY | Facility: CLINIC | Age: 69
End: 2019-06-03

## 2019-06-03 NOTE — TELEPHONE ENCOUNTER
Spoke with patient to advise him that Echo showed stable heart function.  Denies any questions/concerns.

## 2019-06-14 DIAGNOSIS — I25.10 CORONARY ARTERY DISEASE INVOLVING NATIVE CORONARY ARTERY OF NATIVE HEART WITHOUT ANGINA PECTORIS: ICD-10-CM

## 2019-06-14 DIAGNOSIS — I10 ESSENTIAL HYPERTENSION: ICD-10-CM

## 2019-06-18 RX ORDER — CLONIDINE 0.3 MG/24H
PATCH, EXTENDED RELEASE TRANSDERMAL
Qty: 4 PATCH | Refills: 11 | Status: SHIPPED | OUTPATIENT
Start: 2019-06-18

## 2019-06-26 ENCOUNTER — OFFICE VISIT (OUTPATIENT)
Dept: CARDIOLOGY | Facility: CLINIC | Age: 69
End: 2019-06-26
Payer: MEDICARE

## 2019-06-26 VITALS
HEART RATE: 84 BPM | WEIGHT: 196.88 LBS | SYSTOLIC BLOOD PRESSURE: 158 MMHG | DIASTOLIC BLOOD PRESSURE: 70 MMHG | BODY MASS INDEX: 22.78 KG/M2 | HEIGHT: 78 IN

## 2019-06-26 DIAGNOSIS — I25.118 CORONARY ARTERY DISEASE OF NATIVE ARTERY OF NATIVE HEART WITH STABLE ANGINA PECTORIS: ICD-10-CM

## 2019-06-26 DIAGNOSIS — N18.6 ESRD (END STAGE RENAL DISEASE) ON DIALYSIS: Chronic | ICD-10-CM

## 2019-06-26 DIAGNOSIS — Z99.2 ESRD (END STAGE RENAL DISEASE) ON DIALYSIS: Chronic | ICD-10-CM

## 2019-06-26 DIAGNOSIS — I50.33 ACUTE ON CHRONIC DIASTOLIC CONGESTIVE HEART FAILURE: ICD-10-CM

## 2019-06-26 DIAGNOSIS — E78.2 MIXED HYPERLIPIDEMIA: ICD-10-CM

## 2019-06-26 DIAGNOSIS — I10 ESSENTIAL HYPERTENSION: Primary | ICD-10-CM

## 2019-06-26 PROCEDURE — 99213 OFFICE O/P EST LOW 20 MIN: CPT | Mod: PBBFAC | Performed by: INTERNAL MEDICINE

## 2019-06-26 PROCEDURE — 99999 PR PBB SHADOW E&M-EST. PATIENT-LVL III: ICD-10-PCS | Mod: PBBFAC,,, | Performed by: INTERNAL MEDICINE

## 2019-06-26 PROCEDURE — 99214 PR OFFICE/OUTPT VISIT, EST, LEVL IV, 30-39 MIN: ICD-10-PCS | Mod: S$PBB,,, | Performed by: INTERNAL MEDICINE

## 2019-06-26 PROCEDURE — 99999 PR PBB SHADOW E&M-EST. PATIENT-LVL III: CPT | Mod: PBBFAC,,, | Performed by: INTERNAL MEDICINE

## 2019-06-26 PROCEDURE — 99214 OFFICE O/P EST MOD 30 MIN: CPT | Mod: S$PBB,,, | Performed by: INTERNAL MEDICINE

## 2019-06-26 RX ORDER — NIFEDIPINE 90 MG/1
90 TABLET, EXTENDED RELEASE ORAL DAILY
Qty: 30 TABLET | Refills: 11 | Status: SHIPPED | OUTPATIENT
Start: 2019-06-26 | End: 2020-06-25

## 2019-06-26 NOTE — PROGRESS NOTES
Subjective:   Patient ID:  Conner Perez III is a 69 y.o. male who presents for follow up of Hypertension; Hyperlipidemia; and Coronary Artery Disease      68 yo, male, came in for f/u retired  of [employer].  PMH CAD, h/o STEMI in , LHC done by Dr. Fairbanks showing 3 vessels severe CAD, not candidate for PCI and CABG, EF 40%, CHFrEF, nonsustained VT, IDDM, ESRD on HD in , via fistula on rig upper arm, HTN, HLD. Has been on Kidney tx list at [facility]  Had numerous ER visits for uncontrolled HTN and chest pain. Admitted in  for  and CHF.  ECHO in  EF normal, mildly dilated LV.  Nuclear stress in  normal.      Admitted to [facility] in  for CHF exacerbation. Had extra HD. Discharged o/n.  In the past 4 weeks, HD for o/n to run slow machicine  to remove fluid at 4 liters.   Pt states that he is taking med at 7 am, 2pm and bedtime. hsi HD at 7 pm and sometime BP was low.  Feels weak and came in with wheelchair.   Now dyspnea improved. No chest pain, orthopnea.  Compliant with med and diet.  No smoking/drinking  Low sodium diet and fluid resrtriction compliance.        Past Medical History:   Diagnosis Date    CKD (chronic kidney disease), stage IV     COPD (chronic obstructive pulmonary disease)     Coronary artery disease     Depression     Dialysis patient     GERD (gastroesophageal reflux disease)     HTN (hypertension)     Pneumonia        Past Surgical History:   Procedure Laterality Date    AMPUTATION-TOE Left 1/24/2017    Performed by [facility] D[facility] at [facility] OR    AV FISTULA PLACEMENT Right 06/2017    CATHETERIZATION, HEART, LEFT Left 10/28/2018    Performed by Damian Fairbanks MD at [facility] CATH LAB    CIRCUMCISION  1978    TOE AMPUTATION Left 01/24/2017    4th toe    VASCULAR CATH INSERTION N/A 8/28/2017    Performed by Jeremias Peralta MD at [facility] CATH LAB       Social History     Tobacco Use    Smoking status: Former Smoker     Types: Cigarettes     Last  attempt to quit: 1971     Years since quittin.5    Smokeless tobacco: Never Used   Substance Use Topics    Alcohol use: No    Drug use: No       Family History   Problem Relation Age of Onset    Hypertension Father     No Known Problems Mother          Review of Systems   Constitution: Positive for malaise/fatigue. Negative for decreased appetite, diaphoresis, fever and night sweats.   HENT: Negative for nosebleeds.    Eyes: Negative for blurred vision and double vision.   Cardiovascular: Positive for dyspnea on exertion. Negative for chest pain, claudication, irregular heartbeat, leg swelling, near-syncope, orthopnea, palpitations, paroxysmal nocturnal dyspnea and syncope.   Respiratory: Negative for cough, shortness of breath, sleep disturbances due to breathing, snoring, sputum production and wheezing.    Endocrine: Negative for cold intolerance and polyuria.   Hematologic/Lymphatic: Does not bruise/bleed easily.   Skin: Negative for rash.   Musculoskeletal: Negative for back pain, falls, joint pain, joint swelling and neck pain.   Gastrointestinal: Negative for abdominal pain, heartburn, nausea and vomiting.   Genitourinary: Negative for dysuria, frequency and hematuria.   Neurological: Negative for difficulty with concentration, dizziness, focal weakness, headaches, light-headedness, numbness, seizures and weakness.   Psychiatric/Behavioral: Negative for depression, memory loss and substance abuse. The patient does not have insomnia.    Allergic/Immunologic: Negative for HIV exposure and hives.       Objective:   Physical Exam   Constitutional: He is oriented to person, place, and time. He appears well-nourished.   HENT:   Head: Normocephalic.   Eyes: Pupils are equal, round, and reactive to light.   Neck: Normal carotid pulses and no JVD present. Carotid bruit is not present. No thyromegaly present.   Cardiovascular: Normal rate, regular rhythm, normal heart sounds and normal pulses.  No  extrasystoles are present. PMI is not displaced. Exam reveals no gallop and no S3.   No murmur heard.  Pulmonary/Chest: Breath sounds normal. No stridor. No respiratory distress.   Abdominal: Soft. Bowel sounds are normal. There is no tenderness. There is no rebound.   Musculoskeletal: Normal range of motion.   Neurological: He is alert and oriented to person, place, and time.   Skin: Skin is intact. No rash noted.   Psychiatric: His behavior is normal.       Lab Results   Component Value Date    CHOL 128 09/04/2018     Lab Results   Component Value Date    HDL 50 09/04/2018     Lab Results   Component Value Date    LDLCALC 70.6 09/04/2018     Lab Results   Component Value Date    TRIG 37 09/04/2018     Lab Results   Component Value Date    CHOLHDL 39.1 09/04/2018       Chemistry        Component Value Date/Time     (L) 11/07/2018 0508    K 5.3 (H) 11/07/2018 0508    CL 97 11/07/2018 0508    CO2 24 11/07/2018 0508    BUN 49 (H) 11/07/2018 0508    CREATININE 8.0 (H) 11/07/2018 0508     (H) 11/07/2018 0508        Component Value Date/Time    CALCIUM 8.2 (L) 11/07/2018 0508    ALKPHOS 66 10/27/2018 0548    AST 30 10/27/2018 0548    ALT 19 10/27/2018 0548    BILITOT 0.8 10/27/2018 0548    ESTGFRAFRICA 7 (A) 11/07/2018 0508    EGFRNONAA 6 (A) 11/07/2018 0508          Lab Results   Component Value Date    HGBA1C 4.4 09/04/2018     Lab Results   Component Value Date    TSH 0.667 12/17/2017     Lab Results   Component Value Date    INR 1.1 10/27/2018    INR 1.0 04/23/2018    INR 1.1 04/12/2018     Lab Results   Component Value Date    WBC 6.21 11/06/2018    HGB 8.4 (L) 11/06/2018    HCT 25.9 (L) 11/06/2018    MCV 86 11/06/2018     11/06/2018     BMP  Sodium   Date Value Ref Range Status   11/07/2018 133 (L) 136 - 145 mmol/L Final     Potassium   Date Value Ref Range Status   11/07/2018 5.3 (H) 3.5 - 5.1 mmol/L Final     Chloride   Date Value Ref Range Status   11/07/2018 97 95 - 110 mmol/L Final     CO2    Date Value Ref Range Status   11/07/2018 24 23 - 29 mmol/L Final     BUN, Bld   Date Value Ref Range Status   11/07/2018 49 (H) 8 - 23 mg/dL Final     Creatinine   Date Value Ref Range Status   11/07/2018 8.0 (H) 0.5 - 1.4 mg/dL Final     Calcium   Date Value Ref Range Status   11/07/2018 8.2 (L) 8.7 - 10.5 mg/dL Final     Anion Gap   Date Value Ref Range Status   11/07/2018 12 8 - 16 mmol/L Final     eGFR if    Date Value Ref Range Status   11/07/2018 7 (A) >60 mL/min/1.73 m^2 Final     eGFR if non    Date Value Ref Range Status   11/07/2018 6 (A) >60 mL/min/1.73 m^2 Final     Comment:     Calculation used to obtain the estimated glomerular filtration  rate (eGFR) is the CKD-EPI equation.        BNP  @LABRCNTIP(BNP,BNPTRIAGEBLO)@  @LABRCNTIP(troponini)@  CrCl cannot be calculated (Patient's most recent lab result is older than the maximum 7 days allowed.).  No results found in the last 24 hours.  No results found in the last 24 hours.  No results found in the last 24 hours.    Assessment:      1. Essential hypertension    2. Coronary artery disease of native artery of native heart with stable angina pectoris    3. Acute on chronic diastolic congestive heart failure    4. Mixed hyperlipidemia    5. ESRD (end stage renal disease) on dialysis      BP high today. Low at HD days  No cp.  LDL and A1c wnl    Plan:   Advise to take once a day of labetalol, hydralazine and Isordil at HD days, otherwise, continue tid.  Increase Nifedipine to 90 mg daily  Continue clonidine, ARB, statin, asa and Plavix  DASH  RTC in 4 months

## 2019-07-23 DIAGNOSIS — I10 ESSENTIAL HYPERTENSION: ICD-10-CM

## 2019-07-23 DIAGNOSIS — I16.1 HYPERTENSIVE EMERGENCY: ICD-10-CM

## 2019-07-23 DIAGNOSIS — I25.10 CORONARY ARTERY DISEASE INVOLVING NATIVE CORONARY ARTERY OF NATIVE HEART WITHOUT ANGINA PECTORIS: ICD-10-CM

## 2019-07-24 RX ORDER — HYDRALAZINE HYDROCHLORIDE 100 MG/1
100 TABLET, FILM COATED ORAL EVERY 8 HOURS
Qty: 90 TABLET | Refills: 6 | Status: SHIPPED | OUTPATIENT
Start: 2019-07-24 | End: 2020-07-23

## 2019-08-29 NOTE — PLAN OF CARE
Problem: Patient Care Overview  Goal: Plan of Care Review  Outcome: Ongoing (interventions implemented as appropriate)  Recommendations     Recommendation/Intervention: 1.When medically able, ADAT to Renal. 2. Will continue to monitor.   Goals: Meet > 85 % EEN/EPN while admitted  Nutrition Goal Status:goal met   Communication of RD Recs: POC, sticky note            Radiology Post-Procedure Note    Pre Op Diagnosis: Ascites  Post Op Diagnosis: Same    Procedure: Ultrasound Guided Paracentesis    Procedure performed by: Juliane Vo NP    Written Informed Consent Obtained: Yes  Specimen Removed: YES ascitic fluid  Estimated Blood Loss: Minimal    Findings:   Successful paracentesis.  Albumin administered PRN per protocol.    Patient tolerated procedure well.    Juliane Vo NP  Interventional Radiology

## 2019-09-05 ENCOUNTER — TELEPHONE (OUTPATIENT)
Dept: CARDIOLOGY | Facility: CLINIC | Age: 69
End: 2019-09-05

## 2019-09-05 NOTE — TELEPHONE ENCOUNTER
Dr. Carbajal office calling about Patient holding his Plavix Aspirin for cardio clearance.    Phone number [phone]

## 2019-09-06 ENCOUNTER — TELEPHONE (OUTPATIENT)
Dept: CARDIOLOGY | Facility: CLINIC | Age: 69
End: 2019-09-06

## 2019-09-06 NOTE — TELEPHONE ENCOUNTER
Spoke with pt to hold ASA and plavix 5-7 days prior to procedure and resume ASAP post op.    ----- Message from Yandel Talbot sent at 9/6/2019  1:54 PM CDT -----  ..Type:  Patient Returning Call    Who Called:pt   Who Left Message for Patient:  Does the patient know what this is regarding?:carli   Would the patient rather a call back or a response via [facility]? Call back  Best Call Back Number:[phone]  Additional Information: pt is requesting a call from nurse to discuss medication before procedure.

## 2019-09-09 ENCOUNTER — TELEPHONE (OUTPATIENT)
Dept: CARDIOLOGY | Facility: CLINIC | Age: 69
End: 2019-09-09

## 2019-10-28 DIAGNOSIS — I10 ESSENTIAL HYPERTENSION: Primary | ICD-10-CM

## 2019-11-04 NOTE — SUBJECTIVE & OBJECTIVE
Interval History: Pt was seen and examined. Stable last pm, no new c/o's, no SOB.    Review of patient's allergies indicates:   Allergen Reactions    Augmentin [amoxicillin-pot clavulanate] Edema    Sulfa (sulfonamide antibiotics)      swelling    Lisinopril      cough     Current Facility-Administered Medications   Medication Frequency    albuterol nebulizer solution 2.5 mg Q6H PRN    amLODIPine tablet 10 mg Daily    aspirin EC tablet 81 mg Daily    atorvastatin tablet 80 mg Daily    chlorhexidine 0.12 % solution 15 mL BID    cloNIDine 0.3 mg/24 hr td ptwk 1 patch Q7 Days    clopidogrel tablet 75 mg Daily    epoetin ovi injection 10,000 Units Every Mon, Wed, Fri    heparin (porcine) injection 2,000 Units Once    heparin (porcine) injection 5,000 Units Q8H    hydrALAZINE injection 10 mg Q8H PRN    hydrALAZINE tablet 100 mg Q8H    isosorbide dinitrate tablet 40 mg TID    labetalol injection 10 mg Q6H PRN    labetalol tablet 300 mg TID    losartan tablet 100 mg Daily    pantoprazole EC tablet 40 mg Daily    polyethylene glycol packet 17 g Daily    zolpidem tablet 5 mg Nightly PRN       Objective:     Vital Signs (Most Recent):  Temp: 98 °F (36.7 °C) (11/05/18 0855)  Pulse: 67 (11/05/18 1045)  Resp: 18 (11/05/18 1045)  BP: (!) 163/82 (11/05/18 1045)  SpO2: (pt in paz transferring ) (11/05/18 0849)  O2 Device (Oxygen Therapy): nasal cannula (11/05/18 1045) Vital Signs (24h Range):  Temp:  [97.5 °F (36.4 °C)-98.2 °F (36.8 °C)] 98 °F (36.7 °C)  Pulse:  [65-75] 67  Resp:  [17-20] 18  SpO2:  [98 %] 98 %  BP: (112-186)/(56-86) 163/82     Weight: 85 kg (187 lb 6.3 oz) (11/01/18 0454)  Body mass index is 21.66 kg/m².  Body surface area is 2.16 meters squared.    I/O last 3 completed shifts:  In: 720 [P.O.:720]  Out: -     Physical Exam   Constitutional: He is oriented to person, place, and time. He appears well-developed and well-nourished.   HENT:   Head: Normocephalic and atraumatic.   Neck: No JVD  present.   Cardiovascular: Normal rate and regular rhythm. Exam reveals no friction rub.   Pulmonary/Chest: Effort normal and breath sounds normal. No respiratory distress. He has no rales.   Abdominal: Soft. There is no tenderness.   Musculoskeletal: He exhibits no edema.   Neurological: He is oriented to person, place, and time.   Skin: Skin is warm and dry.   Psychiatric: He has a normal mood and affect. His behavior is normal.   Nursing note and vitals reviewed.      Significant Labs: reviewed  BMP  Lab Results   Component Value Date     (L) 11/05/2018    K 5.1 11/05/2018    CL 98 11/05/2018    CO2 20 (L) 11/05/2018    BUN 75 (H) 11/05/2018    CREATININE 10.1 (H) 11/05/2018    CALCIUM 8.4 (L) 11/05/2018    ANIONGAP 16 11/05/2018    ESTGFRAFRICA 5 (A) 11/05/2018    EGFRNONAA 5 (A) 11/05/2018     Lab Results   Component Value Date    WBC 5.74 11/05/2018    HGB 8.4 (L) 11/05/2018    HCT 25.3 (L) 11/05/2018    MCV 85 11/05/2018     11/05/2018        No

## 2019-11-19 RX ORDER — ISOSORBIDE DINITRATE 20 MG/1
20 TABLET ORAL 3 TIMES DAILY
Qty: 270 TABLET | Refills: 3 | Status: SHIPPED | OUTPATIENT
Start: 2019-11-19 | End: 2020-11-18

## 2019-11-19 NOTE — TELEPHONE ENCOUNTER
----- Message from Michaela Alex sent at 11/19/2019 12:39 PM CST -----  [phone]  Would like to have new prescription for pt's Isosorbide medication sent to pharmacy; pt is out of medication. Please call back at [phone].  paty Badillo Md           Pt uses.  [id number] - [place] - [address] [place] [facility] [place] [address]  [address] [address]  Phone: [phone] Fax: [phone]

## 2019-12-12 ENCOUNTER — TELEPHONE (OUTPATIENT)
Dept: CARDIOLOGY | Facility: CLINIC | Age: 69
End: 2019-12-12

## 2019-12-12 NOTE — TELEPHONE ENCOUNTER
Patient currently admitted in the hospital at the [facility]. Schedule follow up appointment with Dr. Barnhart 12/17/19 at the [facility]. Patient verbalizes understanding.

## 2020-11-10 NOTE — ASSESSMENT & PLAN NOTE
Renal: ESRD pt on q MWF HD schedule  Has pulmonary edema due to XS fluid intake  Good response to UF and fluid removal  Sx's improved  K normal  Tolerating HD well, continue HD   Vaccine Information Statement(s) was given today. This has been reviewed, questions answered, and verbal consent given by Patient for injection(s) and administration of shingrix .    Patient tolerated without incident. See immunization grid for documentation.

## 2021-02-22 NOTE — ED NOTES
Bedside report given to Gissell LUNDBERG  
Patient placed on continuous cardiac monitor, automatic blood pressure cuff and continuous pulse oximeter.  
Pt bp 210/102. Er md made aware. Pt sleeping and resting comfortably. Pt bed in low position, side rails up, call light within reach and family at bedside. Pt has no other c/o at this time.   
Report given to dialysis RN at bedside  
show

## 2021-03-25 NOTE — ASSESSMENT & PLAN NOTE
Entry time 1015 - Received report of patient at start of shift. Patient is AOx4. PRN medication administered for complaints of pain. Assessment complete, on room air. Dressing to buttocks changed per order. LUQ ostomy with soft brown output. Patient ambulates with standby assistance. Discharge orders received. Family at bedside to provide transportation home. Plan of care discussed with patient and family. Safety precautions in place.    Entry time 1145 - Discharging patient home per physician order.  Discharged with family.  Patient demonstrated understanding of discharge instructions, follow up appointments, home medications, prescriptions, and home care for surgical wound.  Patient ambulating with standby assistance, voiding without difficulty, pain well controlled, tolerating oral medications, oxygen saturation greater than 90%, tolerating diet.  Patient verbalized understanding of discharge instructions.  All questions addressed.  Belongings, including hearing aids and eyeglasses, with patient at time of discharge.   Probably due to pulmonary congestion from volume overload.  Intubated in the ED for hypercapnia and worsening respiratory distress.  Urgent hemodialysis.

## 2021-11-12 NOTE — SUBJECTIVE & OBJECTIVE
Biopsy Photograph Reviewed: Yes Past Medical History:   Diagnosis Date    CKD (chronic kidney disease), stage IV     Diabetes mellitus     Diabetes mellitus, type 2     HTN (hypertension)        Past Surgical History:   Procedure Laterality Date    AV FISTULA PLACEMENT Right 06/2017    CIRCUMCISION  1978    TOE AMPUTATION Left 01/24/2017    4th toe       Review of patient's allergies indicates:   Allergen Reactions    Augmentin [amoxicillin-pot clavulanate] Edema    Lisinopril      cough    Sulfa (sulfonamide antibiotics)      swelling     Current Facility-Administered Medications   Medication Frequency    acetaminophen tablet 650 mg Q8H PRN    aspirin chewable tablet 81 mg Daily    atorvastatin tablet 20 mg QHS    cloNIDine tablet 0.1 mg BID    dextrose 50% injection 12.5 g PRN    dextrose 50% injection 25 g PRN    diltiaZEM 24 hr capsule 360 mg Daily    glucagon (human recombinant) injection 1 mg PRN    glucose chewable tablet 16 g PRN    glucose chewable tablet 24 g PRN    heparin (porcine) injection 3,000 Units Once    heparin (porcine) injection 5,000 Units Q8H    hydrALAZINE tablet 100 mg Q12H    insulin aspart U-100 pen 0-5 Units QID (AC + HS) PRN    losartan tablet 100 mg Daily    metoprolol succinate (TOPROL-XL) 24 hr tablet 100 mg Daily    nitroGLYCERIN 2% TD oint ointment 1 inch Q6H    ondansetron injection 4 mg Q12H PRN    pantoprazole EC tablet 40 mg Daily    sodium chloride 0.9% flush 5 mL PRN    tamsulosin 24 hr capsule 0.4 mg Daily     Current Outpatient Prescriptions   Medication    aspirin 81 MG Chew    atorvastatin (LIPITOR) 40 MG tablet    cloNIDine (CATAPRES) 0.1 MG tablet    diltiaZEM (CARDIZEM CD) 360 MG 24 hr capsule    hydrALAZINE (APRESOLINE) 50 MG tablet    metoprolol succinate (TOPROL-XL) 50 MG 24 hr tablet    ondansetron (ZOFRAN-ODT) 4 MG TbDL    pantoprazole (PROTONIX) 40 MG tablet    tamsulosin (FLOMAX) 0.4 mg Cp24    [START ON 3/5/2018] losartan (COZAAR) 100 MG tablet     Size Of Lesion In Cm: 0.9 zolpidem (AMBIEN) 5 MG Tab     Family History     Problem Relation (Age of Onset)    Hypertension Father    No Known Problems Mother        Social History Main Topics    Smoking status: Former Smoker     Types: Cigarettes     Quit date: 1971    Smokeless tobacco: Never Used    Alcohol use No    Drug use: No    Sexual activity: Not on file     Review of Systems   Constitutional: Negative.  Negative for activity change, appetite change, chills, diaphoresis, fatigue and fever.   HENT: Negative.  Negative for congestion and trouble swallowing.    Eyes: Negative.    Respiratory: Positive for cough and shortness of breath. Negative for chest tightness and wheezing.    Cardiovascular: Negative.  Negative for chest pain, palpitations and leg swelling.   Gastrointestinal: Negative.  Negative for abdominal distention, abdominal pain, nausea and vomiting.   Genitourinary: Negative.  Negative for decreased urine volume, difficulty urinating, dysuria, enuresis, flank pain, frequency, hematuria, penile swelling, scrotal swelling and urgency.   Musculoskeletal: Negative.  Negative for arthralgias, back pain, joint swelling and neck pain.   Skin: Negative for rash.   Neurological: Negative.  Negative for tremors, seizures and headaches.   Psychiatric/Behavioral: Negative.  Negative for confusion and sleep disturbance. The patient is not nervous/anxious.      Objective:     Vital Signs (Most Recent):  Temp: 98 °F (36.7 °C) (03/04/18 0920)  Pulse: (!) 53 (03/04/18 1015)  Resp: 18 (03/04/18 1015)  BP: (!) 163/84 (03/04/18 1015)  SpO2: 97 % (03/04/18 0800)  O2 Device (Oxygen Therapy): room air (03/04/18 1015) Vital Signs (24h Range):  Temp:  [97.7 °F (36.5 °C)-98 °F (36.7 °C)] 98 °F (36.7 °C)  Pulse:  [53-74] 53  Resp:  [14-20] 18  SpO2:  [95 %-100 %] 97 %  BP: (161-214)/() 163/84     Weight: 103.4 kg (228 lb) (03/04/18 0154)  Body mass index is 26.35 kg/m².  Body surface area is 2.39 meters squared.    No intake/output data  X Size Of Lesion In Cm (Optional): 0 recorded.    Physical Exam   Constitutional: He is oriented to person, place, and time. He appears well-developed and well-nourished. No distress.   HENT:   Head: Normocephalic.   Eyes: EOM are normal. Pupils are equal, round, and reactive to light.   Neck: Normal range of motion. Neck supple. No JVD present. No thyromegaly present.   Cardiovascular: Normal rate, regular rhythm, S1 normal, S2 normal and intact distal pulses.  PMI is not displaced.  Exam reveals no gallop and no friction rub.    No murmur heard.  Positive JVD   Pulmonary/Chest: Effort normal. No respiratory distress. He has no wheezes. He has rales. He exhibits no tenderness.   Abdominal: He exhibits no distension and no mass. There is no hepatosplenomegaly. There is no tenderness. There is no rebound and no CVA tenderness. No hernia.   Musculoskeletal: Normal range of motion. He exhibits no edema or tenderness.   Right forearm fistula is working with positive bruit and thrill   Lymphadenopathy:     He has no cervical adenopathy.   Neurological: He is alert and oriented to person, place, and time. He has normal reflexes. He is not disoriented. He displays normal reflexes. No cranial nerve deficit. He exhibits normal muscle tone. Coordination normal.   Skin: Skin is warm and dry. No rash noted. No erythema.   Psychiatric: He has a normal mood and affect. His behavior is normal. Judgment and thought content normal.       Significant Labs:  All labs within the past 24 hours have been reviewed.    Significant Imaging:  Labs: Reviewed  X-Ray: Reviewed  CT: Reviewed   Size Of Margin In Cm: 0.4 Was An Eye Clamp Used?: No Eye Clamp Note Details: An eye clamp was used during the procedure. Excision Method: Elliptical Saucerization Depth: dermis and superficial adipose tissue Repair Type: Intermediate Suturegard Retention Suture: 2-0 Nylon Retention Suture Bite Size: 3 mm Length To Time In Minutes Device Was In Place: 10 Number Of Hemigard Strips Per Side: 1 Intermediate / Complex Repair - Final Wound Length In Cm: 4.3 Undermining Type: Entire Wound Debridement Text: The wound edges were debrided prior to proceeding with the closure to facilitate wound healing. Helical Rim Text: The closure involved the helical rim. Vermilion Border Text: The closure involved the vermilion border. Nostril Rim Text: The closure involved the nostril rim. Retention Suture Text: Retention sutures were placed to support the closure and prevent dehiscence. Suture Removal: 12 days Lab: [id number] Lab Facility: [id number] Graft Donor Site Bandage (Optional-Leave Blank If You Don't Want In Note): Steri-strips and a pressure bandage were applied to the donor site. Epidermal Closure Graft Donor Site (Optional): simple interrupted Billing Type: Third-Party Bill Excision Depth: adipose tissue Scalpel Size: 15 blade Anesthesia Type: 1% lidocaine with epinephrine Additional Anesthesia Volume In Cc: 6 Hemostasis: Electrodesiccation Estimated Blood Loss (Cc): minimal Detail Level: Detailed Deep Sutures: 4-0 Monocryl Epidermal Sutures: 4-0 Prolene Wound Care: Petrolatum Dressing: dry sterile dressing Suturegard Intro: Intraoperative tissue expansion was performed, utilizing the SUTUREGARD device, in order to reduce wound tension. Suturegard Body: The suture ends were repeatedly re-tightened and re-clamped to achieve the desired tissue expansion. Hemigard Intro: Due to skin fragility and wound tension, it was decided to use HEMIGARD adhesive retention suture devices to permit a linear closure. The skin was cleaned and dried for a 6cm distance away from the wound. Excessive hair, if present, was removed to allow for adhesion. Hemigard Postcare Instructions: The HEMIGARD strips are to remain completely dry for at least 5-7 days. Positioning (Leave Blank If You Do Not Want): The patient was placed in a comfortable position exposing the surgical site. Pre-Excision Curettage Text (Leave Blank If You Do Not Want): Prior to drawing the surgical margin the visible lesion was removed with electrodesiccation and curettage to clearly define the lesion size. Complex Repair Preamble Text (Leave Blank If You Do Not Want): Extensive wide undermining was performed. Intermediate Repair Preamble Text (Leave Blank If You Do Not Want): Undermining was performed with blunt dissection. Curvilinear Excision Additional Text (Leave Blank If You Do Not Want): The margin was drawn around the clinically apparent lesion.  A curvilinear shape was then drawn on the skin incorporating the lesion and margins.  Incisions were then made along these lines to the appropriate tissue plane and the lesion was extirpated. Fusiform Excision Additional Text (Leave Blank If You Do Not Want): The margin was drawn around the clinically apparent lesion.  A fusiform shape was then drawn on the skin incorporating the lesion and margins.  Incisions were then made along these lines to the appropriate tissue plane and the lesion was extirpated. Elliptical Excision Additional Text (Leave Blank If You Do Not Want): The margin was drawn around the clinically apparent lesion.  An elliptical shape was then drawn on the skin incorporating the lesion and margins.  Incisions were then made along these lines to the appropriate tissue plane and the lesion was extirpated. Saucerization Excision Additional Text (Leave Blank If You Do Not Want): The margin was drawn around the clinically apparent lesion.  Incisions were then made along these lines, in a tangential fashion, to the appropriate tissue plane and the lesion was extirpated. Slit Excision Additional Text (Leave Blank If You Do Not Want): A linear line was drawn on the skin overlying the lesion. An incision was made slowly until the lesion was visualized.  Once visualized, the lesion was removed with blunt dissection. Excisional Biopsy Additional Text (Leave Blank If You Do Not Want): The margin was drawn around the clinically apparent lesion. An elliptical shape was then drawn on the skin incorporating the lesion and margins.  Incisions were then made along these lines to the appropriate tissue plane and the lesion was extirpated. Perilesional Excision Additional Text (Leave Blank If You Do Not Want): The margin was drawn around the clinically apparent lesion. Incisions were then made along these lines to the appropriate tissue plane and the lesion was extirpated. Repair Performed By Another Provider Text (Leave Blank If You Do Not Want): After the tissue was excised the defect was repaired by another provider. No Repair - Repaired With Adjacent Surgical Defect Text (Leave Blank If You Do Not Want): After the excision the defect was repaired concurrently with another surgical defect which was in close approximation. Adjacent Tissue Transfer Text: The defect edges were debeveled with a #15 scalpel blade.  Given the location of the defect and the proximity to free margins an adjacent tissue transfer was deemed most appropriate.  Using a sterile surgical marker, an appropriate flap was drawn incorporating the defect and placing the expected incisions within the relaxed skin tension lines where possible.    The area thus outlined was incised deep to adipose tissue with a #15 scalpel blade.  The skin margins were undermined to an appropriate distance in all directions utilizing iris scissors. Advancement Flap (Single) Text: The defect edges were debeveled with a #15 scalpel blade.  Given the location of the defect and the proximity to free margins a single advancement flap was deemed most appropriate.  Using a sterile surgical marker, an appropriate advancement flap was drawn incorporating the defect and placing the expected incisions within the relaxed skin tension lines where possible.    The area thus outlined was incised deep to adipose tissue with a #15 scalpel blade.  The skin margins were undermined to an appropriate distance in all directions utilizing iris scissors. Advancement Flap (Double) Text: The defect edges were debeveled with a #15 scalpel blade.  Given the location of the defect and the proximity to free margins a double advancement flap was deemed most appropriate.  Using a sterile surgical marker, the appropriate advancement flaps were drawn incorporating the defect and placing the expected incisions within the relaxed skin tension lines where possible.    The area thus outlined was incised deep to adipose tissue with a #15 scalpel blade.  The skin margins were undermined to an appropriate distance in all directions utilizing iris scissors. Burow's Advancement Flap Text: The defect edges were debeveled with a #15 scalpel blade.  Given the location of the defect and the proximity to free margins a Burow's advancement flap was deemed most appropriate.  Using a sterile surgical marker, the appropriate advancement flap was drawn incorporating the defect and placing the expected incisions within the relaxed skin tension lines where possible.    The area thus outlined was incised deep to adipose tissue with a #15 scalpel blade.  The skin margins were undermined to an appropriate distance in all directions utilizing iris scissors. Chonodrocutaneous Helical Advancement Flap Text: The defect edges were debeveled with a #15 scalpel blade.  Given the location of the defect and the proximity to free margins a chondrocutaneous helical advancement flap was deemed most appropriate.  Using a sterile surgical marker, the appropriate advancement flap was drawn incorporating the defect and placing the expected incisions within the relaxed skin tension lines where possible.    The area thus outlined was incised deep to adipose tissue with a #15 scalpel blade.  The skin margins were undermined to an appropriate distance in all directions utilizing iris scissors. Crescentic Advancement Flap Text: The defect edges were debeveled with a #15 scalpel blade.  Given the location of the defect and the proximity to free margins a crescentic advancement flap was deemed most appropriate.  Using a sterile surgical marker, the appropriate advancement flap was drawn incorporating the defect and placing the expected incisions within the relaxed skin tension lines where possible.    The area thus outlined was incised deep to adipose tissue with a #15 scalpel blade.  The skin margins were undermined to an appropriate distance in all directions utilizing iris scissors. A-T Advancement Flap Text: The defect edges were debeveled with a #15 scalpel blade.  Given the location of the defect, shape of the defect and the proximity to free margins an A-T advancement flap was deemed most appropriate.  Using a sterile surgical marker, an appropriate advancement flap was drawn incorporating the defect and placing the expected incisions within the relaxed skin tension lines where possible.    The area thus outlined was incised deep to adipose tissue with a #15 scalpel blade.  The skin margins were undermined to an appropriate distance in all directions utilizing iris scissors. O-T Advancement Flap Text: The defect edges were debeveled with a #15 scalpel blade.  Given the location of the defect, shape of the defect and the proximity to free margins an O-T advancement flap was deemed most appropriate.  Using a sterile surgical marker, an appropriate advancement flap was drawn incorporating the defect and placing the expected incisions within the relaxed skin tension lines where possible.    The area thus outlined was incised deep to adipose tissue with a #15 scalpel blade.  The skin margins were undermined to an appropriate distance in all directions utilizing iris scissors. O-L Flap Text: The defect edges were debeveled with a #15 scalpel blade.  Given the location of the defect, shape of the defect and the proximity to free margins an O-L flap was deemed most appropriate.  Using a sterile surgical marker, an appropriate advancement flap was drawn incorporating the defect and placing the expected incisions within the relaxed skin tension lines where possible.    The area thus outlined was incised deep to adipose tissue with a #15 scalpel blade.  The skin margins were undermined to an appropriate distance in all directions utilizing iris scissors. O-Z Flap Text: The defect edges were debeveled with a #15 scalpel blade.  Given the location of the defect, shape of the defect and the proximity to free margins an O-Z flap was deemed most appropriate.  Using a sterile surgical marker, an appropriate transposition flap was drawn incorporating the defect and placing the expected incisions within the relaxed skin tension lines where possible. The area thus outlined was incised deep to adipose tissue with a #15 scalpel blade.  The skin margins were undermined to an appropriate distance in all directions utilizing iris scissors. Double O-Z Flap Text: The defect edges were debeveled with a #15 scalpel blade.  Given the location of the defect, shape of the defect and the proximity to free margins a Double O-Z flap was deemed most appropriate.  Using a sterile surgical marker, an appropriate transposition flap was drawn incorporating the defect and placing the expected incisions within the relaxed skin tension lines where possible. The area thus outlined was incised deep to adipose tissue with a #15 scalpel blade.  The skin margins were undermined to an appropriate distance in all directions utilizing iris scissors. V-Y Flap Text: The defect edges were debeveled with a #15 scalpel blade.  Given the location of the defect, shape of the defect and the proximity to free margins a V-Y flap was deemed most appropriate.  Using a sterile surgical marker, an appropriate advancement flap was drawn incorporating the defect and placing the expected incisions within the relaxed skin tension lines where possible.    The area thus outlined was incised deep to adipose tissue with a #15 scalpel blade.  The skin margins were undermined to an appropriate distance in all directions utilizing iris scissors. Advancement-Rotation Flap Text: The defect edges were debeveled with a #15 scalpel blade.  Given the location of the defect, shape of the defect and the proximity to free margins an advancement-rotation flap was deemed most appropriate.  Using a sterile surgical marker, an appropriate flap was drawn incorporating the defect and placing the expected incisions within the relaxed skin tension lines where possible. The area thus outlined was incised deep to adipose tissue with a #15 scalpel blade.  The skin margins were undermined to an appropriate distance in all directions utilizing iris scissors. Mercedes Flap Text: The defect edges were debeveled with a #15 scalpel blade.  Given the location of the defect, shape of the defect and the proximity to free margins a Mercedes flap was deemed most appropriate.  Using a sterile surgical marker, an appropriate advancement flap was drawn incorporating the defect and placing the expected incisions within the relaxed skin tension lines where possible. The area thus outlined was incised deep to adipose tissue with a #15 scalpel blade.  The skin margins were undermined to an appropriate distance in all directions utilizing iris scissors. Modified Advancement Flap Text: The defect edges were debeveled with a #15 scalpel blade.  Given the location of the defect, shape of the defect and the proximity to free margins a modified advancement flap was deemed most appropriate.  Using a sterile surgical marker, an appropriate advancement flap was drawn incorporating the defect and placing the expected incisions within the relaxed skin tension lines where possible.    The area thus outlined was incised deep to adipose tissue with a #15 scalpel blade.  The skin margins were undermined to an appropriate distance in all directions utilizing iris scissors. Mucosal Advancement Flap Text: Given the location of the defect, shape of the defect and the proximity to free margins a mucosal advancement flap was deemed most appropriate. Incisions were made with a 15 blade scalpel in the appropriate fashion along the cutaneous vermilion border and the mucosal lip. The remaining actinically damaged mucosal tissue was excised.  The mucosal advancement flap was then elevated to the gingival sulcus with care taken to preserve the neurovascular structures and advanced into the primary defect. Care was taken to ensure that precise realignment of the vermilion border was achieved. Peng Advancement Flap Text: The defect edges were debeveled with a #15 scalpel blade.  Given the location of the defect, shape of the defect and the proximity to free margins a Peng advancement flap was deemed most appropriate.  Using a sterile surgical marker, an appropriate advancement flap was drawn incorporating the defect and placing the expected incisions within the relaxed skin tension lines where possible. The area thus outlined was incised deep to adipose tissue with a #15 scalpel blade.  The skin margins were undermined to an appropriate distance in all directions utilizing iris scissors. Hatchet Flap Text: The defect edges were debeveled with a #15 scalpel blade.  Given the location of the defect, shape of the defect and the proximity to free margins a hatchet flap was deemed most appropriate.  Using a sterile surgical marker, an appropriate hatchet flap was drawn incorporating the defect and placing the expected incisions within the relaxed skin tension lines where possible.    The area thus outlined was incised deep to adipose tissue with a #15 scalpel blade.  The skin margins were undermined to an appropriate distance in all directions utilizing iris scissors. Rotation Flap Text: The defect edges were debeveled with a #15 scalpel blade.  Given the location of the defect, shape of the defect and the proximity to free margins a rotation flap was deemed most appropriate.  Using a sterile surgical marker, an appropriate rotation flap was drawn incorporating the defect and placing the expected incisions within the relaxed skin tension lines where possible.    The area thus outlined was incised deep to adipose tissue with a #15 scalpel blade.  The skin margins were undermined to an appropriate distance in all directions utilizing iris scissors. Spiral Flap Text: The defect edges were debeveled with a #15 scalpel blade.  Given the location of the defect, shape of the defect and the proximity to free margins a spiral flap was deemed most appropriate.  Using a sterile surgical marker, an appropriate rotation flap was drawn incorporating the defect and placing the expected incisions within the relaxed skin tension lines where possible. The area thus outlined was incised deep to adipose tissue with a #15 scalpel blade.  The skin margins were undermined to an appropriate distance in all directions utilizing iris scissors. Staged Advancement Flap Text: The defect edges were debeveled with a #15 scalpel blade.  Given the location of the defect, shape of the defect and the proximity to free margins a staged advancement flap was deemed most appropriate.  Using a sterile surgical marker, an appropriate advancement flap was drawn incorporating the defect and placing the expected incisions within the relaxed skin tension lines where possible. The area thus outlined was incised deep to adipose tissue with a #15 scalpel blade.  The skin margins were undermined to an appropriate distance in all directions utilizing iris scissors. Star Wedge Flap Text: The defect edges were debeveled with a #15 scalpel blade.  Given the location of the defect, shape of the defect and the proximity to free margins a star wedge flap was deemed most appropriate.  Using a sterile surgical marker, an appropriate rotation flap was drawn incorporating the defect and placing the expected incisions within the relaxed skin tension lines where possible. The area thus outlined was incised deep to adipose tissue with a #15 scalpel blade.  The skin margins were undermined to an appropriate distance in all directions utilizing iris scissors. Transposition Flap Text: The defect edges were debeveled with a #15 scalpel blade.  Given the location of the defect and the proximity to free margins a transposition flap was deemed most appropriate.  Using a sterile surgical marker, an appropriate transposition flap was drawn incorporating the defect.    The area thus outlined was incised deep to adipose tissue with a #15 scalpel blade.  The skin margins were undermined to an appropriate distance in all directions utilizing iris scissors. Muscle Hinge Flap Text: The defect edges were debeveled with a #15 scalpel blade.  Given the size, depth and location of the defect and the proximity to free margins a muscle hinge flap was deemed most appropriate.  Using a sterile surgical marker, an appropriate hinge flap was drawn incorporating the defect. The area thus outlined was incised with a #15 scalpel blade.  The skin margins were undermined to an appropriate distance in all directions utilizing iris scissors. Mustarde Flap Text: The defect edges were debeveled with a #15 scalpel blade.  Given the size, depth and location of the defect and the proximity to free margins a Mustarde flap was deemed most appropriate.  Using a sterile surgical marker, an appropriate flap was drawn incorporating the defect. The area thus outlined was incised with a #15 scalpel blade.  The skin margins were undermined to an appropriate distance in all directions utilizing iris scissors. Nasal Turnover Hinge Flap Text: The defect edges were debeveled with a #15 scalpel blade.  Given the size, depth, location of the defect and the defect being full thickness a nasal turnover hinge flap was deemed most appropriate.  Using a sterile surgical marker, an appropriate hinge flap was drawn incorporating the defect. The area thus outlined was incised with a #15 scalpel blade. The flap was designed to recreate the nasal mucosal lining and the alar rim. The skin margins were undermined to an appropriate distance in all directions utilizing iris scissors. Nasalis-Muscle-Based Myocutaneous Island Pedicle Flap Text: Using a #15 blade, an incision was made around the donor flap to the level of the nasalis muscle. Wide lateral undermining was then performed in both the subcutaneous plane above the nasalis muscle, and in a submuscular plane just above periosteum. This allowed the formation of a free nasalis muscle axial pedicle (based on the angular artery) which was still attached to the actual cutaneous flap, increasing its mobility and vascular viability. Hemostasis was obtained with pinpoint electrocoagulation. The flap was mobilized into position and the pivotal anchor points positioned and stabilized with buried interrupted sutures. Subcutaneous and dermal tissues were closed in a multilayered fashion with sutures. Tissue redundancies were excised, and the epidermal edges were apposed without significant tension and sutured with sutures. Orbicularis Oris Muscle Flap Text: The defect edges were debeveled with a #15 scalpel blade.  Given that the defect affected the competency of the oral sphincter an orbicularis oris muscle flap was deemed most appropriate to restore this competency and normal muscle function.  Using a sterile surgical marker, an appropriate flap was drawn incorporating the defect. The area thus outlined was incised with a #15 scalpel blade. Melolabial Transposition Flap Text: The defect edges were debeveled with a #15 scalpel blade.  Given the location of the defect and the proximity to free margins a melolabial flap was deemed most appropriate.  Using a sterile surgical marker, an appropriate melolabial transposition flap was drawn incorporating the defect.    The area thus outlined was incised deep to adipose tissue with a #15 scalpel blade.  The skin margins were undermined to an appropriate distance in all directions utilizing iris scissors. Rhombic Flap Text: The defect edges were debeveled with a #15 scalpel blade.  Given the location of the defect and the proximity to free margins a rhombic flap was deemed most appropriate.  Using a sterile surgical marker, an appropriate rhombic flap was drawn incorporating the defect.    The area thus outlined was incised deep to adipose tissue with a #15 scalpel blade.  The skin margins were undermined to an appropriate distance in all directions utilizing iris scissors. Rhomboid Transposition Flap Text: The defect edges were debeveled with a #15 scalpel blade.  Given the location of the defect and the proximity to free margins a rhomboid transposition flap was deemed most appropriate.  Using a sterile surgical marker, an appropriate rhomboid flap was drawn incorporating the defect.    The area thus outlined was incised deep to adipose tissue with a #15 scalpel blade.  The skin margins were undermined to an appropriate distance in all directions utilizing iris scissors. Bi-Rhombic Flap Text: The defect edges were debeveled with a #15 scalpel blade.  Given the location of the defect and the proximity to free margins a bi-rhombic flap was deemed most appropriate.  Using a sterile surgical marker, an appropriate rhombic flap was drawn incorporating the defect. The area thus outlined was incised deep to adipose tissue with a #15 scalpel blade.  The skin margins were undermined to an appropriate distance in all directions utilizing iris scissors. Helical Rim Advancement Flap Text: The defect edges were debeveled with a #15 blade scalpel.  Given the location of the defect and the proximity to free margins (helical rim) a double helical rim advancement flap was deemed most appropriate.  Using a sterile surgical marker, the appropriate advancement flaps were drawn incorporating the defect and placing the expected incisions between the helical rim and antihelix where possible.  The area thus outlined was incised through and through with a #15 scalpel blade.  With a skin hook and iris scissors, the flaps were gently and sharply undermined and freed up. Bilateral Helical Rim Advancement Flap Text: The defect edges were debeveled with a #15 blade scalpel.  Given the location of the defect and the proximity to free margins (helical rim) a bilateral helical rim advancement flap was deemed most appropriate.  Using a sterile surgical marker, the appropriate advancement flaps were drawn incorporating the defect and placing the expected incisions between the helical rim and antihelix where possible.  The area thus outlined was incised through and through with a #15 scalpel blade.  With a skin hook and iris scissors, the flaps were gently and sharply undermined and freed up. Ear Star Wedge Flap Text: The defect edges were debeveled with a #15 blade scalpel.  Given the location of the defect and the proximity to free margins (helical rim) an ear star wedge flap was deemed most appropriate.  Using a sterile surgical marker, the appropriate flap was drawn incorporating the defect and placing the expected incisions between the helical rim and antihelix where possible.  The area thus outlined was incised through and through with a #15 scalpel blade. Banner Transposition Flap Text: The defect edges were debeveled with a #15 scalpel blade.  Given the location of the defect and the proximity to free margins a Banner transposition flap was deemed most appropriate.  Using a sterile surgical marker, an appropriate flap drawn around the defect. The area thus outlined was incised deep to adipose tissue with a #15 scalpel blade.  The skin margins were undermined to an appropriate distance in all directions utilizing iris scissors. Bilobed Flap Text: The defect edges were debeveled with a #15 scalpel blade.  Given the location of the defect and the proximity to free margins a bilobe flap was deemed most appropriate.  Using a sterile surgical marker, an appropriate bilobe flap drawn around the defect.    The area thus outlined was incised deep to adipose tissue with a #15 scalpel blade.  The skin margins were undermined to an appropriate distance in all directions utilizing iris scissors. Bilobed Transposition Flap Text: The defect edges were debeveled with a #15 scalpel blade.  Given the location of the defect and the proximity to free margins a bilobed transposition flap was deemed most appropriate.  Using a sterile surgical marker, an appropriate bilobe flap drawn around the defect.    The area thus outlined was incised deep to adipose tissue with a #15 scalpel blade.  The skin margins were undermined to an appropriate distance in all directions utilizing iris scissors. Trilobed Flap Text: The defect edges were debeveled with a #15 scalpel blade.  Given the location of the defect and the proximity to free margins a trilobed flap was deemed most appropriate.  Using a sterile surgical marker, an appropriate trilobed flap drawn around the defect.    The area thus outlined was incised deep to adipose tissue with a #15 scalpel blade.  The skin margins were undermined to an appropriate distance in all directions utilizing iris scissors. Dorsal Nasal Flap Text: The defect edges were debeveled with a #15 scalpel blade.  Given the location of the defect and the proximity to free margins a dorsal nasal flap was deemed most appropriate.  Using a sterile surgical marker, an appropriate dorsal nasal flap was drawn around the defect.    The area thus outlined was incised deep to adipose tissue with a #15 scalpel blade.  The skin margins were undermined to an appropriate distance in all directions utilizing iris scissors. Island Pedicle Flap Text: The defect edges were debeveled with a #15 scalpel blade.  Given the location of the defect, shape of the defect and the proximity to free margins an island pedicle advancement flap was deemed most appropriate.  Using a sterile surgical marker, an appropriate advancement flap was drawn incorporating the defect, outlining the appropriate donor tissue and placing the expected incisions within the relaxed skin tension lines where possible.    The area thus outlined was incised deep to adipose tissue with a #15 scalpel blade.  The skin margins were undermined to an appropriate distance in all directions around the primary defect and laterally outward around the island pedicle utilizing iris scissors.  There was minimal undermining beneath the pedicle flap. Island Pedicle Flap With Canthal Suspension Text: The defect edges were debeveled with a #15 scalpel blade.  Given the location of the defect, shape of the defect and the proximity to free margins an island pedicle advancement flap was deemed most appropriate.  Using a sterile surgical marker, an appropriate advancement flap was drawn incorporating the defect, outlining the appropriate donor tissue and placing the expected incisions within the relaxed skin tension lines where possible. The area thus outlined was incised deep to adipose tissue with a #15 scalpel blade.  The skin margins were undermined to an appropriate distance in all directions around the primary defect and laterally outward around the island pedicle utilizing iris scissors.  There was minimal undermining beneath the pedicle flap. A suspension suture was placed in the canthal tendon to prevent tension and prevent ectropion. Alar Island Pedicle Flap Text: The defect edges were debeveled with a #15 scalpel blade.  Given the location of the defect, shape of the defect and the proximity to the alar rim an island pedicle advancement flap was deemed most appropriate.  Using a sterile surgical marker, an appropriate advancement flap was drawn incorporating the defect, outlining the appropriate donor tissue and placing the expected incisions within the nasal ala running parallel to the alar rim. The area thus outlined was incised with a #15 scalpel blade.  The skin margins were undermined minimally to an appropriate distance in all directions around the primary defect and laterally outward around the island pedicle utilizing iris scissors.  There was minimal undermining beneath the pedicle flap. Double Island Pedicle Flap Text: The defect edges were debeveled with a #15 scalpel blade.  Given the location of the defect, shape of the defect and the proximity to free margins a double island pedicle advancement flap was deemed most appropriate.  Using a sterile surgical marker, an appropriate advancement flap was drawn incorporating the defect, outlining the appropriate donor tissue and placing the expected incisions within the relaxed skin tension lines where possible.    The area thus outlined was incised deep to adipose tissue with a #15 scalpel blade.  The skin margins were undermined to an appropriate distance in all directions around the primary defect and laterally outward around the island pedicle utilizing iris scissors.  There was minimal undermining beneath the pedicle flap. Island Pedicle Flap-Requiring Vessel Identification Text: The defect edges were debeveled with a #15 scalpel blade.  Given the location of the defect, shape of the defect and the proximity to free margins an island pedicle advancement flap was deemed most appropriate.  Using a sterile surgical marker, an appropriate advancement flap was drawn, based on the axial vessel mentioned above, incorporating the defect, outlining the appropriate donor tissue and placing the expected incisions within the relaxed skin tension lines where possible.    The area thus outlined was incised deep to adipose tissue with a #15 scalpel blade.  The skin margins were undermined to an appropriate distance in all directions around the primary defect and laterally outward around the island pedicle utilizing iris scissors.  There was minimal undermining beneath the pedicle flap. Keystone Flap Text: The defect edges were debeveled with a #15 scalpel blade.  Given the location of the defect, shape of the defect a keystone flap was deemed most appropriate.  Using a sterile surgical marker, an appropriate keystone flap was drawn incorporating the defect, outlining the appropriate donor tissue and placing the expected incisions within the relaxed skin tension lines where possible. The area thus outlined was incised deep to adipose tissue with a #15 scalpel blade.  The skin margins were undermined to an appropriate distance in all directions around the primary defect and laterally outward around the flap utilizing iris scissors. O-T Plasty Text: The defect edges were debeveled with a #15 scalpel blade.  Given the location of the defect, shape of the defect and the proximity to free margins an O-T plasty was deemed most appropriate.  Using a sterile surgical marker, an appropriate O-T plasty was drawn incorporating the defect and placing the expected incisions within the relaxed skin tension lines where possible.    The area thus outlined was incised deep to adipose tissue with a #15 scalpel blade.  The skin margins were undermined to an appropriate distance in all directions utilizing iris scissors. O-Z Plasty Text: The defect edges were debeveled with a #15 scalpel blade.  Given the location of the defect, shape of the defect and the proximity to free margins an O-Z plasty (double transposition flap) was deemed most appropriate.  Using a sterile surgical marker, the appropriate transposition flaps were drawn incorporating the defect and placing the expected incisions within the relaxed skin tension lines where possible.    The area thus outlined was incised deep to adipose tissue with a #15 scalpel blade.  The skin margins were undermined to an appropriate distance in all directions utilizing iris scissors.  Hemostasis was achieved with electrocautery.  The flaps were then transposed into place, one clockwise and the other counterclockwise, and anchored with interrupted buried subcutaneous sutures. Double O-Z Plasty Text: The defect edges were debeveled with a #15 scalpel blade.  Given the location of the defect, shape of the defect and the proximity to free margins a Double O-Z plasty (double transposition flap) was deemed most appropriate.  Using a sterile surgical marker, the appropriate transposition flaps were drawn incorporating the defect and placing the expected incisions within the relaxed skin tension lines where possible. The area thus outlined was incised deep to adipose tissue with a #15 scalpel blade.  The skin margins were undermined to an appropriate distance in all directions utilizing iris scissors.  Hemostasis was achieved with electrocautery.  The flaps were then transposed into place, one clockwise and the other counterclockwise, and anchored with interrupted buried subcutaneous sutures. V-Y Plasty Text: The defect edges were debeveled with a #15 scalpel blade.  Given the location of the defect, shape of the defect and the proximity to free margins an V-Y advancement flap was deemed most appropriate.  Using a sterile surgical marker, an appropriate advancement flap was drawn incorporating the defect and placing the expected incisions within the relaxed skin tension lines where possible.    The area thus outlined was incised deep to adipose tissue with a #15 scalpel blade.  The skin margins were undermined to an appropriate distance in all directions utilizing iris scissors. H Plasty Text: Given the location of the defect, shape of the defect and the proximity to free margins a H-plasty was deemed most appropriate for repair.  Using a sterile surgical marker, the appropriate advancement arms of the H-plasty were drawn incorporating the defect and placing the expected incisions within the relaxed skin tension lines where possible. The area thus outlined was incised deep to adipose tissue with a #15 scalpel blade. The skin margins were undermined to an appropriate distance in all directions utilizing iris scissors.  The opposing advancement arms were then advanced into place in opposite direction and anchored with interrupted buried subcutaneous sutures. W Plasty Text: The lesion was extirpated to the level of the fat with a #15 scalpel blade.  Given the location of the defect, shape of the defect and the proximity to free margins a W-plasty was deemed most appropriate for repair.  Using a sterile surgical marker, the appropriate transposition arms of the W-plasty were drawn incorporating the defect and placing the expected incisions within the relaxed skin tension lines where possible.    The area thus outlined was incised deep to adipose tissue with a #15 scalpel blade.  The skin margins were undermined to an appropriate distance in all directions utilizing iris scissors.  The opposing transposition arms were then transposed into place in opposite direction and anchored with interrupted buried subcutaneous sutures. Z Plasty Text: The lesion was extirpated to the level of the fat with a #15 scalpel blade.  Given the location of the defect, shape of the defect and the proximity to free margins a Z-plasty was deemed most appropriate for repair.  Using a sterile surgical marker, the appropriate transposition arms of the Z-plasty were drawn incorporating the defect and placing the expected incisions within the relaxed skin tension lines where possible.    The area thus outlined was incised deep to adipose tissue with a #15 scalpel blade.  The skin margins were undermined to an appropriate distance in all directions utilizing iris scissors.  The opposing transposition arms were then transposed into place in opposite direction and anchored with interrupted buried subcutaneous sutures. Zygomaticofacial Flap Text: Given the location of the defect, shape of the defect and the proximity to free margins a zygomaticofacial flap was deemed most appropriate for repair.  Using a sterile surgical marker, the appropriate flap was drawn incorporating the defect and placing the expected incisions within the relaxed skin tension lines where possible. The area thus outlined was incised deep to adipose tissue with a #15 scalpel blade with preservation of a vascular pedicle.  The skin margins were undermined to an appropriate distance in all directions utilizing iris scissors.  The flap was then placed into the defect and anchored with interrupted buried subcutaneous sutures. Cheek Interpolation Flap Text: A decision was made to reconstruct the defect utilizing an interpolation axial flap and a staged reconstruction.  A telfa template was made of the defect.  This telfa template was then used to outline the Cheek Interpolation flap.  The donor area for the pedicle flap was then injected with anesthesia.  The flap was excised through the skin and subcutaneous tissue down to the layer of the underlying musculature.  The interpolation flap was carefully excised within this deep plane to maintain its blood supply.  The edges of the donor site were undermined.   The donor site was closed in a primary fashion.  The pedicle was then rotated into position and sutured.  Once the tube was sutured into place, adequate blood supply was confirmed with blanching and refill.  The pedicle was then wrapped with xeroform gauze and dressed appropriately with a telfa and gauze bandage to ensure continued blood supply and protect the attached pedicle. Cheek-To-Nose Interpolation Flap Text: A decision was made to reconstruct the defect utilizing an interpolation axial flap and a staged reconstruction.  A telfa template was made of the defect.  This telfa template was then used to outline the Cheek-To-Nose Interpolation flap.  The donor area for the pedicle flap was then injected with anesthesia.  The flap was excised through the skin and subcutaneous tissue down to the layer of the underlying musculature.  The interpolation flap was carefully excised within this deep plane to maintain its blood supply.  The edges of the donor site were undermined.   The donor site was closed in a primary fashion.  The pedicle was then rotated into position and sutured.  Once the tube was sutured into place, adequate blood supply was confirmed with blanching and refill.  The pedicle was then wrapped with xeroform gauze and dressed appropriately with a telfa and gauze bandage to ensure continued blood supply and protect the attached pedicle. Interpolation Flap Text: A decision was made to reconstruct the defect utilizing an interpolation axial flap and a staged reconstruction.  A telfa template was made of the defect.  This telfa template was then used to outline the interpolation flap.  The donor area for the pedicle flap was then injected with anesthesia.  The flap was excised through the skin and subcutaneous tissue down to the layer of the underlying musculature.  The interpolation flap was carefully excised within this deep plane to maintain its blood supply.  The edges of the donor site were undermined.   The donor site was closed in a primary fashion.  The pedicle was then rotated into position and sutured.  Once the tube was sutured into place, adequate blood supply was confirmed with blanching and refill.  The pedicle was then wrapped with xeroform gauze and dressed appropriately with a telfa and gauze bandage to ensure continued blood supply and protect the attached pedicle. Melolabial Interpolation Flap Text: A decision was made to reconstruct the defect utilizing an interpolation axial flap and a staged reconstruction.  A telfa template was made of the defect.  This telfa template was then used to outline the melolabial interpolation flap.  The donor area for the pedicle flap was then injected with anesthesia.  The flap was excised through the skin and subcutaneous tissue down to the layer of the underlying musculature.  The pedicle flap was carefully excised within this deep plane to maintain its blood supply.  The edges of the donor site were undermined.   The donor site was closed in a primary fashion.  The pedicle was then rotated into position and sutured.  Once the tube was sutured into place, adequate blood supply was confirmed with blanching and refill.  The pedicle was then wrapped with xeroform gauze and dressed appropriately with a telfa and gauze bandage to ensure continued blood supply and protect the attached pedicle. Mastoid Interpolation Flap Text: A decision was made to reconstruct the defect utilizing an interpolation axial flap and a staged reconstruction.  A telfa template was made of the defect.  This telfa template was then used to outline the mastoid interpolation flap.  The donor area for the pedicle flap was then injected with anesthesia.  The flap was excised through the skin and subcutaneous tissue down to the layer of the underlying musculature.  The pedicle flap was carefully excised within this deep plane to maintain its blood supply.  The edges of the donor site were undermined.   The donor site was closed in a primary fashion.  The pedicle was then rotated into position and sutured.  Once the tube was sutured into place, adequate blood supply was confirmed with blanching and refill.  The pedicle was then wrapped with xeroform gauze and dressed appropriately with a telfa and gauze bandage to ensure continued blood supply and protect the attached pedicle. Posterior Auricular Interpolation Flap Text: A decision was made to reconstruct the defect utilizing an interpolation axial flap and a staged reconstruction.  A telfa template was made of the defect.  This telfa template was then used to outline the posterior auricular interpolation flap.  The donor area for the pedicle flap was then injected with anesthesia.  The flap was excised through the skin and subcutaneous tissue down to the layer of the underlying musculature.  The pedicle flap was carefully excised within this deep plane to maintain its blood supply.  The edges of the donor site were undermined.   The donor site was closed in a primary fashion.  The pedicle was then rotated into position and sutured.  Once the tube was sutured into place, adequate blood supply was confirmed with blanching and refill.  The pedicle was then wrapped with xeroform gauze and dressed appropriately with a telfa and gauze bandage to ensure continued blood supply and protect the attached pedicle. Paramedian Forehead Flap Text: A decision was made to reconstruct the defect utilizing an interpolation axial flap and a staged reconstruction.  A telfa template was made of the defect.  This telfa template was then used to outline the paramedian forehead pedicle flap.  The donor area for the pedicle flap was then injected with anesthesia.  The flap was excised through the skin and subcutaneous tissue down to the layer of the underlying musculature.  The pedicle flap was carefully excised within this deep plane to maintain its blood supply.  The edges of the donor site were undermined.   The donor site was closed in a primary fashion.  The pedicle was then rotated into position and sutured.  Once the tube was sutured into place, adequate blood supply was confirmed with blanching and refill.  The pedicle was then wrapped with xeroform gauze and dressed appropriately with a telfa and gauze bandage to ensure continued blood supply and protect the attached pedicle. Lip Wedge Excision Repair Text: Given the location of the defect and the proximity to free margins a full thickness wedge repair was deemed most appropriate.  Using a sterile surgical marker, the appropriate repair was drawn incorporating the defect and placing the expected incisions perpendicular to the vermilion border.  The vermilion border was also meticulously outlined to ensure appropriate reapproximation during the repair.  The area thus outlined was incised through and through with a #15 scalpel blade.  The muscularis and dermis were reaproximated with deep sutures following hemostasis. Care was taken to realign the vermilion border before proceeding with the superficial closure.  Once the vermilion was realigned the superfical and mucosal closure was finished. Ftsg Text: The defect edges were debeveled with a #15 scalpel blade.  Given the location of the defect, shape of the defect and the proximity to free margins a full thickness skin graft was deemed most appropriate.  Using a sterile surgical marker, the primary defect shape was transferred to the donor site. The area thus outlined was incised deep to adipose tissue with a #15 scalpel blade.  The harvested graft was then trimmed of adipose tissue until only dermis and epidermis was left.  The skin margins of the secondary defect were undermined to an appropriate distance in all directions utilizing iris scissors.  The secondary defect was closed with interrupted buried subcutaneous sutures.  The skin edges were then re-apposed with running  sutures.  The skin graft was then placed in the primary defect and oriented appropriately. Split-Thickness Skin Graft Text: The defect edges were debeveled with a #15 scalpel blade.  Given the location of the defect, shape of the defect and the proximity to free margins a split thickness skin graft was deemed most appropriate.  Using a sterile surgical marker, the primary defect shape was transferred to the donor site. The split thickness graft was then harvested.  The skin graft was then placed in the primary defect and oriented appropriately. Burow's Graft Text: The defect edges were debeveled with a #15 scalpel blade.  Given the location of the defect, shape of the defect, the proximity to free margins and the presence of a standing cone deformity a Burow's skin graft was deemed most appropriate. The standing cone was removed and this tissue was then trimmed to the shape of the primary defect. The adipose tissue was also removed until only dermis and epidermis were left.  The skin margins of the secondary defect were undermined to an appropriate distance in all directions utilizing iris scissors.  The secondary defect was closed with interrupted buried subcutaneous sutures.  The skin edges were then re-apposed with running  sutures.  The skin graft was then placed in the primary defect and oriented appropriately. Cartilage Graft Text: The defect edges were debeveled with a #15 scalpel blade.  Given the location of the defect, shape of the defect, the fact the defect involved a full thickness cartilage defect a cartilage graft was deemed most appropriate.  An appropriate donor site was identified, cleansed, and anesthetized. The cartilage graft was then harvested and transferred to the recipient site, oriented appropriately and then sutured into place.  The secondary defect was then repaired using a primary closure. Composite Graft Text: The defect edges were debeveled with a #15 scalpel blade.  Given the location of the defect, shape of the defect, the proximity to free margins and the fact the defect was full thickness a composite graft was deemed most appropriate.  The defect was outline and then transferred to the donor site.  A full thickness graft was then excised from the donor site. The graft was then placed in the primary defect, oriented appropriately and then sutured into place.  The secondary defect was then repaired using a primary closure. Epidermal Autograft Text: The defect edges were debeveled with a #15 scalpel blade.  Given the location of the defect, shape of the defect and the proximity to free margins an epidermal autograft was deemed most appropriate.  Using a sterile surgical marker, the primary defect shape was transferred to the donor site. The epidermal graft was then harvested.  The skin graft was then placed in the primary defect and oriented appropriately. Dermal Autograft Text: The defect edges were debeveled with a #15 scalpel blade.  Given the location of the defect, shape of the defect and the proximity to free margins a dermal autograft was deemed most appropriate.  Using a sterile surgical marker, the primary defect shape was transferred to the donor site. The area thus outlined was incised deep to adipose tissue with a #15 scalpel blade.  The harvested graft was then trimmed of adipose and epidermal tissue until only dermis was left.  The skin graft was then placed in the primary defect and oriented appropriately. Skin Substitute Text: The defect edges were debeveled with a #15 scalpel blade.  Given the location of the defect, shape of the defect and the proximity to free margins a skin substitute graft was deemed most appropriate.  The graft material was trimmed to fit the size of the defect. The graft was then placed in the primary defect and oriented appropriately. Tissue Cultured Epidermal Autograft Text: The defect edges were debeveled with a #15 scalpel blade.  Given the location of the defect, shape of the defect and the proximity to free margins a tissue cultured epidermal autograft was deemed most appropriate.  The graft was then trimmed to fit the size of the defect.  The graft was then placed in the primary defect and oriented appropriately. Xenograft Text: The defect edges were debeveled with a #15 scalpel blade.  Given the location of the defect, shape of the defect and the proximity to free margins a xenograft was deemed most appropriate.  The graft was then trimmed to fit the size of the defect.  The graft was then placed in the primary defect and oriented appropriately. Purse String (Intermediate) Text: Given the location of the defect and the characteristics of the surrounding skin a purse string intermediate closure was deemed most appropriate.  Undermining was performed circumfirentially around the surgical defect.  A purse string suture was then placed and tightened. Purse String (Simple) Text: Given the location of the defect and the characteristics of the surrounding skin a purse string simple closure was deemed most appropriate.  Undermining was performed circumferentially around the surgical defect.  A purse string suture was then placed and tightened. Partial Purse String (Intermediate) Text: Given the location of the defect and the characteristics of the surrounding skin an intermediate purse string closure was deemed most appropriate.  Undermining was performed circumferentially around the surgical defect.  A purse string suture was then placed and tightened. Wound tension of the circular defect prevented complete closure of the wound. Partial Purse String (Simple) Text: Given the location of the defect and the characteristics of the surrounding skin a simple purse string closure was deemed most appropriate.  Undermining was performed circumferentially around the surgical defect.  A purse string suture was then placed and tightened. Wound tension of the circular defect prevented complete closure of the wound. Complex Repair And Single Advancement Flap Text: The defect edges were debeveled with a #15 scalpel blade.  The primary defect was closed partially with a complex linear closure.  Given the location of the remaining defect, shape of the defect and the proximity to free margins a single advancement flap was deemed most appropriate for complete closure of the defect.  Using a sterile surgical marker, an appropriate advancement flap was drawn incorporating the defect and placing the expected incisions within the relaxed skin tension lines where possible.    The area thus outlined was incised deep to adipose tissue with a #15 scalpel blade.  The skin margins were undermined to an appropriate distance in all directions utilizing iris scissors. Complex Repair And Double Advancement Flap Text: The defect edges were debeveled with a #15 scalpel blade.  The primary defect was closed partially with a complex linear closure.  Given the location of the remaining defect, shape of the defect and the proximity to free margins a double advancement flap was deemed most appropriate for complete closure of the defect.  Using a sterile surgical marker, an appropriate advancement flap was drawn incorporating the defect and placing the expected incisions within the relaxed skin tension lines where possible.    The area thus outlined was incised deep to adipose tissue with a #15 scalpel blade.  The skin margins were undermined to an appropriate distance in all directions utilizing iris scissors. Complex Repair And Modified Advancement Flap Text: The defect edges were debeveled with a #15 scalpel blade.  The primary defect was closed partially with a complex linear closure.  Given the location of the remaining defect, shape of the defect and the proximity to free margins a modified advancement flap was deemed most appropriate for complete closure of the defect.  Using a sterile surgical marker, an appropriate advancement flap was drawn incorporating the defect and placing the expected incisions within the relaxed skin tension lines where possible.    The area thus outlined was incised deep to adipose tissue with a #15 scalpel blade.  The skin margins were undermined to an appropriate distance in all directions utilizing iris scissors. Complex Repair And A-T Advancement Flap Text: The defect edges were debeveled with a #15 scalpel blade.  The primary defect was closed partially with a complex linear closure.  Given the location of the remaining defect, shape of the defect and the proximity to free margins an A-T advancement flap was deemed most appropriate for complete closure of the defect.  Using a sterile surgical marker, an appropriate advancement flap was drawn incorporating the defect and placing the expected incisions within the relaxed skin tension lines where possible.    The area thus outlined was incised deep to adipose tissue with a #15 scalpel blade.  The skin margins were undermined to an appropriate distance in all directions utilizing iris scissors. Complex Repair And O-T Advancement Flap Text: The defect edges were debeveled with a #15 scalpel blade.  The primary defect was closed partially with a complex linear closure.  Given the location of the remaining defect, shape of the defect and the proximity to free margins an O-T advancement flap was deemed most appropriate for complete closure of the defect.  Using a sterile surgical marker, an appropriate advancement flap was drawn incorporating the defect and placing the expected incisions within the relaxed skin tension lines where possible.    The area thus outlined was incised deep to adipose tissue with a #15 scalpel blade.  The skin margins were undermined to an appropriate distance in all directions utilizing iris scissors. Complex Repair And O-L Flap Text: The defect edges were debeveled with a #15 scalpel blade.  The primary defect was closed partially with a complex linear closure.  Given the location of the remaining defect, shape of the defect and the proximity to free margins an O-L flap was deemed most appropriate for complete closure of the defect.  Using a sterile surgical marker, an appropriate flap was drawn incorporating the defect and placing the expected incisions within the relaxed skin tension lines where possible.    The area thus outlined was incised deep to adipose tissue with a #15 scalpel blade.  The skin margins were undermined to an appropriate distance in all directions utilizing iris scissors. Complex Repair And Bilobe Flap Text: The defect edges were debeveled with a #15 scalpel blade.  The primary defect was closed partially with a complex linear closure.  Given the location of the remaining defect, shape of the defect and the proximity to free margins a bilobe flap was deemed most appropriate for complete closure of the defect.  Using a sterile surgical marker, an appropriate advancement flap was drawn incorporating the defect and placing the expected incisions within the relaxed skin tension lines where possible.    The area thus outlined was incised deep to adipose tissue with a #15 scalpel blade.  The skin margins were undermined to an appropriate distance in all directions utilizing iris scissors. Complex Repair And Melolabial Flap Text: The defect edges were debeveled with a #15 scalpel blade.  The primary defect was closed partially with a complex linear closure.  Given the location of the remaining defect, shape of the defect and the proximity to free margins a melolabial flap was deemed most appropriate for complete closure of the defect.  Using a sterile surgical marker, an appropriate advancement flap was drawn incorporating the defect and placing the expected incisions within the relaxed skin tension lines where possible.    The area thus outlined was incised deep to adipose tissue with a #15 scalpel blade.  The skin margins were undermined to an appropriate distance in all directions utilizing iris scissors. Complex Repair And Rotation Flap Text: The defect edges were debeveled with a #15 scalpel blade.  The primary defect was closed partially with a complex linear closure.  Given the location of the remaining defect, shape of the defect and the proximity to free margins a rotation flap was deemed most appropriate for complete closure of the defect.  Using a sterile surgical marker, an appropriate advancement flap was drawn incorporating the defect and placing the expected incisions within the relaxed skin tension lines where possible.    The area thus outlined was incised deep to adipose tissue with a #15 scalpel blade.  The skin margins were undermined to an appropriate distance in all directions utilizing iris scissors. Complex Repair And Rhombic Flap Text: The defect edges were debeveled with a #15 scalpel blade.  The primary defect was closed partially with a complex linear closure.  Given the location of the remaining defect, shape of the defect and the proximity to free margins a rhombic flap was deemed most appropriate for complete closure of the defect.  Using a sterile surgical marker, an appropriate advancement flap was drawn incorporating the defect and placing the expected incisions within the relaxed skin tension lines where possible.    The area thus outlined was incised deep to adipose tissue with a #15 scalpel blade.  The skin margins were undermined to an appropriate distance in all directions utilizing iris scissors. Complex Repair And Transposition Flap Text: The defect edges were debeveled with a #15 scalpel blade.  The primary defect was closed partially with a complex linear closure.  Given the location of the remaining defect, shape of the defect and the proximity to free margins a transposition flap was deemed most appropriate for complete closure of the defect.  Using a sterile surgical marker, an appropriate advancement flap was drawn incorporating the defect and placing the expected incisions within the relaxed skin tension lines where possible.    The area thus outlined was incised deep to adipose tissue with a #15 scalpel blade.  The skin margins were undermined to an appropriate distance in all directions utilizing iris scissors. Complex Repair And V-Y Plasty Text: The defect edges were debeveled with a #15 scalpel blade.  The primary defect was closed partially with a complex linear closure.  Given the location of the remaining defect, shape of the defect and the proximity to free margins a V-Y plasty was deemed most appropriate for complete closure of the defect.  Using a sterile surgical marker, an appropriate advancement flap was drawn incorporating the defect and placing the expected incisions within the relaxed skin tension lines where possible.    The area thus outlined was incised deep to adipose tissue with a #15 scalpel blade.  The skin margins were undermined to an appropriate distance in all directions utilizing iris scissors. Complex Repair And M Plasty Text: The defect edges were debeveled with a #15 scalpel blade.  The primary defect was closed partially with a complex linear closure.  Given the location of the remaining defect, shape of the defect and the proximity to free margins an M plasty was deemed most appropriate for complete closure of the defect.  Using a sterile surgical marker, an appropriate advancement flap was drawn incorporating the defect and placing the expected incisions within the relaxed skin tension lines where possible.    The area thus outlined was incised deep to adipose tissue with a #15 scalpel blade.  The skin margins were undermined to an appropriate distance in all directions utilizing iris scissors. Complex Repair And Double M Plasty Text: The defect edges were debeveled with a #15 scalpel blade.  The primary defect was closed partially with a complex linear closure.  Given the location of the remaining defect, shape of the defect and the proximity to free margins a double M plasty was deemed most appropriate for complete closure of the defect.  Using a sterile surgical marker, an appropriate advancement flap was drawn incorporating the defect and placing the expected incisions within the relaxed skin tension lines where possible.    The area thus outlined was incised deep to adipose tissue with a #15 scalpel blade.  The skin margins were undermined to an appropriate distance in all directions utilizing iris scissors. Complex Repair And W Plasty Text: The defect edges were debeveled with a #15 scalpel blade.  The primary defect was closed partially with a complex linear closure.  Given the location of the remaining defect, shape of the defect and the proximity to free margins a W plasty was deemed most appropriate for complete closure of the defect.  Using a sterile surgical marker, an appropriate advancement flap was drawn incorporating the defect and placing the expected incisions within the relaxed skin tension lines where possible.    The area thus outlined was incised deep to adipose tissue with a #15 scalpel blade.  The skin margins were undermined to an appropriate distance in all directions utilizing iris scissors. Complex Repair And Z Plasty Text: The defect edges were debeveled with a #15 scalpel blade.  The primary defect was closed partially with a complex linear closure.  Given the location of the remaining defect, shape of the defect and the proximity to free margins a Z plasty was deemed most appropriate for complete closure of the defect.  Using a sterile surgical marker, an appropriate advancement flap was drawn incorporating the defect and placing the expected incisions within the relaxed skin tension lines where possible.    The area thus outlined was incised deep to adipose tissue with a #15 scalpel blade.  The skin margins were undermined to an appropriate distance in all directions utilizing iris scissors. Complex Repair And Dorsal Nasal Flap Text: The defect edges were debeveled with a #15 scalpel blade.  The primary defect was closed partially with a complex linear closure.  Given the location of the remaining defect, shape of the defect and the proximity to free margins a dorsal nasal flap was deemed most appropriate for complete closure of the defect.  Using a sterile surgical marker, an appropriate flap was drawn incorporating the defect and placing the expected incisions within the relaxed skin tension lines where possible.    The area thus outlined was incised deep to adipose tissue with a #15 scalpel blade.  The skin margins were undermined to an appropriate distance in all directions utilizing iris scissors. Complex Repair And Ftsg Text: The defect edges were debeveled with a #15 scalpel blade.  The primary defect was closed partially with a complex linear closure.  Given the location of the defect, shape of the defect and the proximity to free margins a full thickness skin graft was deemed most appropriate to repair the remaining defect.  The graft was trimmed to fit the size of the remaining defect.  The graft was then placed in the primary defect, oriented appropriately, and sutured into place. Complex Repair And Burow's Graft Text: The defect edges were debeveled with a #15 scalpel blade.  The primary defect was closed partially with a complex linear closure.  Given the location of the defect, shape of the defect, the proximity to free margins and the presence of a standing cone deformity a Burow's graft was deemed most appropriate to repair the remaining defect.  The graft was trimmed to fit the size of the remaining defect.  The graft was then placed in the primary defect, oriented appropriately, and sutured into place. Complex Repair And Split-Thickness Skin Graft Text: The defect edges were debeveled with a #15 scalpel blade.  The primary defect was closed partially with a complex linear closure.  Given the location of the defect, shape of the defect and the proximity to free margins a split thickness skin graft was deemed most appropriate to repair the remaining defect.  The graft was trimmed to fit the size of the remaining defect.  The graft was then placed in the primary defect, oriented appropriately, and sutured into place. Complex Repair And Epidermal Autograft Text: The defect edges were debeveled with a #15 scalpel blade.  The primary defect was closed partially with a complex linear closure.  Given the location of the defect, shape of the defect and the proximity to free margins an epidermal autograft was deemed most appropriate to repair the remaining defect.  The graft was trimmed to fit the size of the remaining defect.  The graft was then placed in the primary defect, oriented appropriately, and sutured into place. Complex Repair And Dermal Autograft Text: The defect edges were debeveled with a #15 scalpel blade.  The primary defect was closed partially with a complex linear closure.  Given the location of the defect, shape of the defect and the proximity to free margins an dermal autograft was deemed most appropriate to repair the remaining defect.  The graft was trimmed to fit the size of the remaining defect.  The graft was then placed in the primary defect, oriented appropriately, and sutured into place. Complex Repair And Tissue Cultured Epidermal Autograft Text: The defect edges were debeveled with a #15 scalpel blade.  The primary defect was closed partially with a complex linear closure.  Given the location of the defect, shape of the defect and the proximity to free margins an tissue cultured epidermal autograft was deemed most appropriate to repair the remaining defect.  The graft was trimmed to fit the size of the remaining defect.  The graft was then placed in the primary defect, oriented appropriately, and sutured into place. Complex Repair And Xenograft Text: The defect edges were debeveled with a #15 scalpel blade.  The primary defect was closed partially with a complex linear closure.  Given the location of the defect, shape of the defect and the proximity to free margins a xenograft was deemed most appropriate to repair the remaining defect.  The graft was trimmed to fit the size of the remaining defect.  The graft was then placed in the primary defect, oriented appropriately, and sutured into place. Complex Repair And Skin Substitute Graft Text: The defect edges were debeveled with a #15 scalpel blade.  The primary defect was closed partially with a complex linear closure.  Given the location of the remaining defect, shape of the defect and the proximity to free margins a skin substitute graft was deemed most appropriate to repair the remaining defect.  The graft was trimmed to fit the size of the remaining defect.  The graft was then placed in the primary defect, oriented appropriately, and sutured into place. Path Notes (To The Dermatopathologist): Please check margins. Consent was obtained from the patient. The risks and benefits to therapy were discussed in detail. Specifically, the risks of infection, scarring, bleeding, prolonged wound healing, incomplete removal, allergy to anesthesia, nerve injury and recurrence were addressed. Prior to the procedure, the treatment site was clearly identified and confirmed by the patient. All components of Universal Protocol/PAUSE Rule completed. Post-Care Instructions: I reviewed with the patient in detail post-care instructions. Patient is not to engage in any heavy lifting, exercise, or swimming for the next 14 days. Should the patient develop any fevers, chills, bleeding, severe pain patient will contact the office immediately. Home Suture Removal Text: Patient was provided a home suture removal kit and will remove their sutures at home.  If they have any questions or difficulties they will call the office. Where Do You Want The Question To Include Opioid Counseling Located?: Case Summary Tab Information: Selecting Yes will display possible errors in your note based on the variables you have selected. This validation is only offered as a suggestion for you. PLEASE NOTE THAT THE VALIDATION TEXT WILL BE REMOVED WHEN YOU FINALIZE YOUR NOTE. IF YOU WANT TO FAX A PRELIMINARY NOTE YOU WILL NEED TO TOGGLE THIS TO 'NO' IF YOU DO NOT WANT IT IN YOUR FAXED NOTE.

## 2021-11-18 NOTE — ED NOTES
Pt denies chest pain, states he had chest tightness, but that has resolved. Still slightly short of breath. Anterior bilateral breath sounds are clear. 02 per N/C cannula in place, 02 sat on CM reading 97%. CM in place showing NSR, rate of 65. Elevated BP at 204/101.    no edema,  no murmurs,  regular rate and rhythm , no edema.

## 2022-03-21 NOTE — ASSESSMENT & PLAN NOTE
1. ESRD on HD : (  [facility] , [facility] ) ,  Seen on HD , tolerating well, LUE AVF ,      2.  HTN : BP elevated, UF on HD as tolerated , resume home meds,      3. Anemia of CKD : epogen with HD ,      4. SHPT : renal diet and binders      5. D/c home when stable     6. Angina - Cards eval, ? Stress test    known (describe)

## 2022-03-24 NOTE — PROGRESS NOTES
[facility] -   Nephrology  Progress Note    Patient Name: Conner Perez III  MRN: [id number]  Admission Date: 8/5/2018  Hospital Length of Stay: 0 days  Attending Provider: Parisa Haider MD   Primary Care Physician: Raciel Angela MD  Principal Problem:Acute pulmonary edema    Subjective:     HPI: Conner Perez III is a 68 y old AA man with h/o ESRD on HD ( [facility], [facility] ) , has his HD on Friday , but presented to the ER last evening for SOB likely due to noncompliance with fluid and salt restriction , Pt having HD this morning and tolerating well, denies CP , SOB improved,     Past Medical History:   Diagnosis Date    CKD (chronic kidney disease), stage IV     Diabetes mellitus, type 2     Dialysis patient     HTN (hypertension)        Past Surgical History:   Procedure Laterality Date    AV FISTULA PLACEMENT Right 06/2017    CIRCUMCISION  1978    TOE AMPUTATION Left 01/24/2017    4th toe       Review of patient's allergies indicates:   Allergen Reactions    Augmentin [amoxicillin-pot clavulanate] Edema    Lisinopril      cough    Sulfa (sulfonamide antibiotics)      swelling     Current Facility-Administered Medications   Medication Frequency    acetaminophen tablet 650 mg Q8H PRN    albuterol-ipratropium 2.5 mg-0.5 mg/3 mL nebulizer solution 3 mL Q4H PRN    aspirin chewable tablet 81 mg Daily    atorvastatin tablet 20 mg QHS    cloNIDine tablet 0.3 mg TID    diltiaZEM 24 hr capsule 360 mg Daily    diphenhydrAMINE capsule 25 mg Q6H PRN    escitalopram oxalate tablet 10 mg Daily    furosemide tablet 40 mg Daily    guaifenesin 100 mg/5 ml syrup 200 mg Q4H PRN    heparin (porcine) injection 3,000 Units Once    hydrALAZINE tablet 100 mg Q12H    HYDROcodone-acetaminophen  mg per tablet 1 tablet Q4H PRN    labetalol injection 10 mg Q6H PRN    labetalol tablet 600 mg Q12H    losartan tablet 100 mg Daily    ondansetron injection 4 mg Q8H PRN     Take the antibiotics as directed.  Continue Tylenol or ibuprofen for pain and fever.  Any worsening abdominal pain, vomiting, unable to keep liquids down or concerns return.  Your child also has RSV which is likely not a problem for your 14-year-old kid but make sure he stays away from your younger child.  I hope he feels better soon.   tamsulosin 24 hr capsule 0.4 mg Daily    zolpidem tablet 5 mg Nightly PRN     Family History     Problem Relation (Age of Onset)    Hypertension Father    No Known Problems Mother        Social History Main Topics    Smoking status: Former Smoker     Types: Cigarettes     Quit date: 1971    Smokeless tobacco: Never Used    Alcohol use No    Drug use: No    Sexual activity: Not on file     Review of Systems   Constitutional: Positive for fatigue. Negative for activity change and appetite change.   HENT: Negative for congestion and facial swelling.    Eyes: Negative for pain, discharge and redness.   Respiratory: Positive for shortness of breath. Negative for apnea, cough and chest tightness.    Cardiovascular: Negative for chest pain, palpitations and leg swelling.   Gastrointestinal: Negative for abdominal distention.   Genitourinary: Negative for difficulty urinating, dysuria and frequency.   Musculoskeletal: Negative for neck pain and neck stiffness.   Skin: Negative for color change, rash and wound.   Neurological: Negative for dizziness, weakness and numbness.   Psychiatric/Behavioral: Negative for sleep disturbance.   All other systems reviewed and are negative.    Objective:     Vital Signs (Most Recent):  Temp: 97.6 °F (36.4 °C) (08/05/18 0728)  Pulse: 68 (08/05/18 1258)  Resp: 20 (08/05/18 1025)  BP: (!) 196/92 (08/05/18 0813)  SpO2: 98 % (08/05/18 1025)  O2 Device (Oxygen Therapy): (S) nasal cannula (placed on room air) (08/05/18 1025) Vital Signs (24h Range):  Temp:  [97.2 °F (36.2 °C)-97.6 °F (36.4 °C)] 97.6 °F (36.4 °C)  Pulse:  [61-88] 68  Resp:  [8-25] 20  SpO2:  [94 %-98 %] 98 %  BP: (192-242)/() 196/92     Weight: 95 kg (209 lb 7 oz) (08/05/18 0257)  Body mass index is 24.2 kg/m².  Body surface area is 2.29 meters squared.    No intake/output data recorded.    Physical Exam   Constitutional: He is oriented to person, place, and time. He appears well-developed. No distress.   HENT:   Head:  Normocephalic and atraumatic.   Eyes: Pupils are equal, round, and reactive to light.   Neck: Normal range of motion. Neck supple. No tracheal deviation present. No thyromegaly present.   Cardiovascular: Normal rate, regular rhythm, normal heart sounds and intact distal pulses.  Exam reveals no gallop and no friction rub.    No murmur heard.  Pulmonary/Chest: Effort normal. He has no wheezes. He has rales.   Abdominal: Soft. He exhibits no mass. There is no tenderness. There is no rebound and no guarding.   Musculoskeletal: He exhibits no edema.   Lymphadenopathy:     He has no cervical adenopathy.   Neurological: He is oriented to person, place, and time.   Skin: Skin is warm. No rash noted. He is not diaphoretic. No erythema.   Nursing note and vitals reviewed.      Significant Labs:  CBC:   Recent Labs  Lab 08/05/18 0335   WBC 6.00   RBC 3.73*   HGB 11.2*   HCT 33.9*      MCV 91   MCH 30.0   MCHC 33.0     CMP:   Recent Labs  Lab 08/05/18 0335      CALCIUM 9.0   ALBUMIN 3.6   PROT 7.2      K 4.6   CO2 28      BUN 31*   CREATININE 6.3*   ALKPHOS 61   ALT 11   AST 20   BILITOT 0.8     Coagulation: No results for input(s): PT, INR, APTT in the last 168 hours.  LFTs:   Recent Labs  Lab 08/05/18 0335   ALT 11   AST 20   ALKPHOS 61   BILITOT 0.8   PROT 7.2   ALBUMIN 3.6     All labs within the past 24 hours have been reviewed.    Lab Results   Component Value Date    .7 (H) 08/26/2017    CALCIUM 9.0 08/05/2018    PHOS 4.4 08/05/2018         Significant Imaging: reviewed     Assessment/Plan:     ESRD (end stage renal disease) on dialysis    1. ESRD on HD : (  [facility] , [facility] [place] [facility] ) , emergent HD today due to fluid overload and high BP ,      2. SOB : UF on HD as tolerated, will plan regular tx tomorrow,  if he clinically improves and  is discharged today he needs to f/u out pt HD tomorrow,      3. HTN : BP elevated, UF on HD as tolerated , resume home meds,      4.  Anemia of CKD : epogen with HD when indicated      5. SHPT : renal diet and binders      6. D/c home when stable             Thank you for your consult. I will follow-up with patient. Please contact us if you have any additional questions.     Total time spent 70 minutes including time needed to review the records,  patient  evaluation, documentation, face-to-face discussion with the patient,  primary team   more than 50% of the time was spent on coordination of care and counseling.       Fabián London MD  Nephrology  [facility]

## 2022-06-27 NOTE — ASSESSMENT & PLAN NOTE
See management plan detailed above.      Divya Damon MD  Cardiology Fellow  [phone]  All Cardiology service information can be found 24/7 on [email or web address], password: [email or web address]      Overnight Events:     Review Of Systems: No chest pain, shortness of breath, or palpitations            Current Meds:  acetaminophen     Tablet .. 975 milliGRAM(s) Oral every 6 hours PRN  albuterol/ipratropium for Nebulization 3 milliLiter(s) Nebulizer every 6 hours  allopurinol 100 milliGRAM(s) Oral daily  aMIOdarone    Tablet 200 milliGRAM(s) Oral daily  aspirin enteric coated 81 milliGRAM(s) Oral daily  atorvastatin 40 milliGRAM(s) Oral at bedtime  buDESOnide    Inhalation Suspension 0.5 milliGRAM(s) Inhalation every 12 hours  carvedilol 6.25 milliGRAM(s) Oral every 12 hours  chlorhexidine 2% Cloths 1 Application(s) Topical <User Schedule>  enoxaparin Injectable 40 milliGRAM(s) SubCutaneous every 24 hours  finasteride 5 milliGRAM(s) Oral at bedtime  hydrALAZINE 75 milliGRAM(s) Oral every 8 hours  hydrocortisone sodium succinate Injectable 25 milliGRAM(s) IV Push every 12 hours  HYDROmorphone  Injectable 0.25 milliGRAM(s) IV Push every 3 hours PRN  insulin lispro (ADMELOG) corrective regimen sliding scale   SubCutaneous every 6 hours  isosorbide   mononitrate ER Tablet (IMDUR) 30 milliGRAM(s) Oral <User Schedule>  montelukast 10 milliGRAM(s) Oral daily  pantoprazole    Tablet 40 milliGRAM(s) Oral before breakfast  Parenteral Nutrition - Adult 1 Each TPN Continuous <Continuous>  piperacillin/tazobactam IVPB.. 3.375 Gram(s) IV Intermittent every 8 hours  polyethylene glycol 3350 17 Gram(s) Oral daily  senna 2 Tablet(s) Oral at bedtime  torsemide 5 milliGRAM(s) Oral <User Schedule>      Vitals:  T(F): 97.2 (06-27), Max: 98.5 (06-26)  HR: 71 (06-27) (67 - 89)  BP: 121/75 (06-27) (109/60 - 153/89)  BP(mean): 93 (06-27)  ABP: 149/60 (06-26)  ABP(mean): 88 (06-26)  RR: 24 (06-27)  SpO2: 100% (06-27)  CVP(mm Hg): --  CVP(cm H2O): --  CO: --  CI: --  PA: --  PA(mean): --  PA(direct): --  PCWP: --  I&O's Summary    26 Jun 2022 07:01  -  27 Jun 2022 07:00  --------------------------------------------------------  IN: 2722.4 mL / OUT: 1505 mL / NET: 1217.4 mL    27 Jun 2022 07:01  -  27 Jun 2022 08:44  --------------------------------------------------------  IN: 143 mL / OUT: 40 mL / NET: 103 mL        Physical Exam:  Appearance: No acute distress; well appearing  Eyes: PERRL, EOMI, pink conjunctiva  HEENT: Normal oral mucosa  Cardiovascular: RRR, S1, S2, no murmurs, rubs, or gallops; no edema; no JVD  Respiratory: Clear to auscultation bilaterally  Gastrointestinal: soft, non-tender, non-distended with normal bowel sounds  Musculoskeletal: No clubbing; no joint deformity   Neurologic: Non-focal  Lymphatic: No lymphadenopathy  Psychiatry: AAOx3, mood & affect appropriate  Skin: No rashes, ecchymoses, or cyanosis                          9.1    6.95  )-----------( 227      ( 27 Jun 2022 00:31 )             28.2     06-27    149<H>  |  116<H>  |  52<H>  ----------------------------<  152<H>  3.9   |  22  |  1.65<H>    Ca    8.4      27 Jun 2022 06:18  Phos  3.3     06-27  Mg     2.9     06-27    TPro  6.8  /  Alb  3.1<L>  /  TBili  0.3  /  DBili  x   /  AST  13  /  ALT  9<L>  /  AlkPhos  63  06-26    PT/INR - ( 27 Jun 2022 00:31 )   PT: 12.7 sec;   INR: 1.10 ratio         PTT - ( 27 Jun 2022 00:31 )  PTT:26.7 sec      Serum Pro-Brain Natriuretic Peptide: 7586 pg/mL (06-27 @ 00:31)           Divya Damon MD  Cardiology Fellow  [phone]  All Cardiology service information can be found 24/7 on [email or web address], password: [email or web address]      Overnight Events: Patient is doing well, eating a liquid diet for the first time. His pain is controlled. No complaints.     Review Of Systems: No chest pain, shortness of breath, or palpitations            Current Meds:  acetaminophen     Tablet .. 975 milliGRAM(s) Oral every 6 hours PRN  albuterol/ipratropium for Nebulization 3 milliLiter(s) Nebulizer every 6 hours  allopurinol 100 milliGRAM(s) Oral daily  aMIOdarone    Tablet 200 milliGRAM(s) Oral daily  aspirin enteric coated 81 milliGRAM(s) Oral daily  atorvastatin 40 milliGRAM(s) Oral at bedtime  buDESOnide    Inhalation Suspension 0.5 milliGRAM(s) Inhalation every 12 hours  carvedilol 6.25 milliGRAM(s) Oral every 12 hours  chlorhexidine 2% Cloths 1 Application(s) Topical <User Schedule>  enoxaparin Injectable 40 milliGRAM(s) SubCutaneous every 24 hours  finasteride 5 milliGRAM(s) Oral at bedtime  hydrALAZINE 75 milliGRAM(s) Oral every 8 hours  hydrocortisone sodium succinate Injectable 25 milliGRAM(s) IV Push every 12 hours  HYDROmorphone  Injectable 0.25 milliGRAM(s) IV Push every 3 hours PRN  insulin lispro (ADMELOG) corrective regimen sliding scale   SubCutaneous every 6 hours  isosorbide   mononitrate ER Tablet (IMDUR) 30 milliGRAM(s) Oral <User Schedule>  montelukast 10 milliGRAM(s) Oral daily  pantoprazole    Tablet 40 milliGRAM(s) Oral before breakfast  Parenteral Nutrition - Adult 1 Each TPN Continuous <Continuous>  piperacillin/tazobactam IVPB.. 3.375 Gram(s) IV Intermittent every 8 hours  polyethylene glycol 3350 17 Gram(s) Oral daily  senna 2 Tablet(s) Oral at bedtime  torsemide 5 milliGRAM(s) Oral <User Schedule>      Vitals:  T(F): 97.2 (06-27), Max: 98.5 (06-26)  HR: 71 (06-27) (67 - 89)  BP: 121/75 (06-27) (109/60 - 153/89)  BP(mean): 93 (06-27)  ABP: 149/60 (06-26)  ABP(mean): 88 (06-26)  RR: 24 (06-27)  SpO2: 100% (06-27)  I&O's Summary    26 Jun 2022 07:01  -  27 Jun 2022 07:00  --------------------------------------------------------  IN: 2722.4 mL / OUT: 1505 mL / NET: 1217.4 mL    27 Jun 2022 07:01  -  27 Jun 2022 08:44  --------------------------------------------------------  IN: 143 mL / OUT: 40 mL / NET: 103 mL        Physical Exam:  Appearance: No acute distress; well appearing  Eyes: PERRL, EOMI, pink conjunctiva  HEENT: Normal oral mucosa  Cardiovascular: RRR, S1, S2, no murmurs, rubs, or gallops; no edema; no JVD  Respiratory: Clear to auscultation bilaterally  Gastrointestinal: soft, non-tender, non-distended with normal bowel sounds  Musculoskeletal: No clubbing; no joint deformity   Neurologic: Non-focal  Lymphatic: No lymphadenopathy  Psychiatry: AAOx3, mood & affect appropriate  Skin: No rashes, ecchymoses, or cyanosis                          9.1    6.95  )-----------( 227      ( 27 Jun 2022 00:31 )             28.2     06-27    149<H>  |  116<H>  |  52<H>  ----------------------------<  152<H>  3.9   |  22  |  1.65<H>    Ca    8.4      27 Jun 2022 06:18  Phos  3.3     06-27  Mg     2.9     06-27    TPro  6.8  /  Alb  3.1<L>  /  TBili  0.3  /  DBili  x   /  AST  13  /  ALT  9<L>  /  AlkPhos  63  06-26    PT/INR - ( 27 Jun 2022 00:31 )   PT: 12.7 sec;   INR: 1.10 ratio         PTT - ( 27 Jun 2022 00:31 )  PTT:26.7 sec      Serum Pro-Brain Natriuretic Peptide: 7586 pg/mL (06-27 @ 00:31)

## 2024-04-15 NOTE — ASSESSMENT & PLAN NOTE
--Non-oliguric  --Nephrology following  --Continue IV Lasix   --Daily weights  --I&O  --Fistula not matured enough to use - Vas Cath placed  --HD initiated per pt request on 8/28/17   [Home] : at home, [unfilled] , at the time of the visit. [Medical Office: ([facility])___] : at the medical office located in  [Verbal consent obtained from patient] : the patient, [unfilled] [FreeTextEntry1] : Ms. JACQUES DIAZ  is a 36 year old female with past medical history of Diabetes Mellitus Type 2, Chronic back pain s/p surgery and chronic steroid injections who presents for management of her diabetes.   POCT Glucose: mg/dL POCT Hga1c: %   Diabetes first diagnosed: at age 18 Type: 2   Current diabetic regimen: Basaglar 35 units QAM Novolog 35 units TID Metformin 1000 mg BID Ozempic 2 mg Qweekly Other relevant medications:  Self monitoring blood glucose : Patient is using glucose meter and [employer] G6  AGP Report:  (2/11/24-3/11/24) High: 60% Target (): 40% Low: 0%   Hypoglycemia:none   Diet:  Breakfast: skips mostly Lunch: rice and chicken or beef, cereal Dinner: rice and meat or fish, eggplants, string bean Snacks: chips, or crackers, banana, apples  Exercise:  Urine micro: 54 (12/2021), 55 mg/g (6/2021),  55 mg/g (9/2020) lipid profile: LDL 99 (12/2021), LDL 65 (6/2021), 118 (8/2020) last hba1c:10.2% (6/2022), 11.8% (2/2022), 10.2% (5/2021) 11.8% (3/2021), 7.9% (12/2020), 12.8% (8/2020) eye exam: 2023 diabetic foot exam/podiatry: in office (8/2020)

## 2024-09-14 NOTE — ED NOTES
Patient placed on continuous cardiac monitor, automatic blood pressure cuff and continuous pulse oximeter.   [FreeTextEntry1] : NP hair loss, hyperpigmentation, moles [de-identified] : NP Here to discuss hair loss, AGA, on po minoxidil 2.5mg, finasteride 1mg, and topical hair stim- noted improvement with this regimen but asking what else can be done Also with hyperpigmentation on face/neck, has improved with hydroquinone based therapy over time  Also ere for eval of moles- none new, growing, changingRequesting FBSE. No personal or family hx of skin cancer.